# Patient Record
Sex: MALE | Race: WHITE | NOT HISPANIC OR LATINO | URBAN - METROPOLITAN AREA
[De-identification: names, ages, dates, MRNs, and addresses within clinical notes are randomized per-mention and may not be internally consistent; named-entity substitution may affect disease eponyms.]

---

## 2018-04-30 ENCOUNTER — EMERGENCY (EMERGENCY)
Facility: HOSPITAL | Age: 48
LOS: 1 days | End: 2018-04-30
Admitting: EMERGENCY MEDICINE

## 2018-04-30 VITALS
WEIGHT: 214.95 LBS | DIASTOLIC BLOOD PRESSURE: 78 MMHG | RESPIRATION RATE: 18 BRPM | HEART RATE: 100 BPM | SYSTOLIC BLOOD PRESSURE: 119 MMHG | OXYGEN SATURATION: 94 % | TEMPERATURE: 98 F

## 2018-04-30 NOTE — ED ADULT TRIAGE NOTE - CHIEF COMPLAINT QUOTE
pt c/o "MSCW pain that started while bowling about 1 1/2 hrs ago. "+ reproducible pain with inspiration, pt jumping when MSCW touched

## 2018-11-03 ENCOUNTER — EMERGENCY (EMERGENCY)
Facility: HOSPITAL | Age: 48
LOS: 1 days | Discharge: HOME | End: 2018-11-03
Attending: EMERGENCY MEDICINE
Payer: COMMERCIAL

## 2018-11-03 VITALS
TEMPERATURE: 98 F | HEART RATE: 73 BPM | RESPIRATION RATE: 16 BRPM | OXYGEN SATURATION: 96 % | SYSTOLIC BLOOD PRESSURE: 116 MMHG | DIASTOLIC BLOOD PRESSURE: 75 MMHG

## 2018-11-03 VITALS
OXYGEN SATURATION: 97 % | RESPIRATION RATE: 18 BRPM | DIASTOLIC BLOOD PRESSURE: 84 MMHG | SYSTOLIC BLOOD PRESSURE: 134 MMHG | HEART RATE: 87 BPM | TEMPERATURE: 98 F

## 2018-11-03 DIAGNOSIS — S70.11XA CONTUSION OF RIGHT THIGH, INITIAL ENCOUNTER: ICD-10-CM

## 2018-11-03 PROBLEM — E11.9 TYPE 2 DIABETES MELLITUS WITHOUT COMPLICATIONS: Chronic | Status: ACTIVE | Noted: 2018-04-30

## 2018-11-03 LAB
ALBUMIN SERPL ELPH-MCNC: 4.1 G/DL — SIGNIFICANT CHANGE UP (ref 3.3–5)
ALP SERPL-CCNC: 63 U/L — SIGNIFICANT CHANGE UP (ref 40–120)
ALT FLD-CCNC: 22 U/L — SIGNIFICANT CHANGE UP (ref 10–45)
ANION GAP SERPL CALC-SCNC: 10 MMOL/L — SIGNIFICANT CHANGE UP (ref 5–17)
ANION GAP SERPL CALC-SCNC: 11 MMOL/L — SIGNIFICANT CHANGE UP (ref 5–17)
APTT BLD: 27.5 SEC — SIGNIFICANT CHANGE UP (ref 27.5–36.3)
AST SERPL-CCNC: 11 U/L — SIGNIFICANT CHANGE UP (ref 10–40)
BASE EXCESS BLDV CALC-SCNC: -0.2 MMOL/L — SIGNIFICANT CHANGE UP (ref -2–2)
BASOPHILS # BLD AUTO: 0 K/UL — SIGNIFICANT CHANGE UP (ref 0–0.2)
BASOPHILS NFR BLD AUTO: 0.3 % — SIGNIFICANT CHANGE UP (ref 0–2)
BILIRUB SERPL-MCNC: 0.4 MG/DL — SIGNIFICANT CHANGE UP (ref 0.2–1.2)
BUN SERPL-MCNC: 14 MG/DL — SIGNIFICANT CHANGE UP (ref 7–23)
BUN SERPL-MCNC: 17 MG/DL — SIGNIFICANT CHANGE UP (ref 7–23)
CALCIUM SERPL-MCNC: 8.7 MG/DL — SIGNIFICANT CHANGE UP (ref 8.4–10.5)
CALCIUM SERPL-MCNC: 9.2 MG/DL — SIGNIFICANT CHANGE UP (ref 8.4–10.5)
CHLORIDE SERPL-SCNC: 100 MMOL/L — SIGNIFICANT CHANGE UP (ref 96–108)
CHLORIDE SERPL-SCNC: 94 MMOL/L — LOW (ref 96–108)
CK SERPL-CCNC: 105 U/L — SIGNIFICANT CHANGE UP (ref 30–200)
CO2 BLDV-SCNC: 28 MMOL/L — SIGNIFICANT CHANGE UP (ref 22–30)
CO2 SERPL-SCNC: 24 MMOL/L — SIGNIFICANT CHANGE UP (ref 22–31)
CO2 SERPL-SCNC: 24 MMOL/L — SIGNIFICANT CHANGE UP (ref 22–31)
CREAT SERPL-MCNC: 0.64 MG/DL — SIGNIFICANT CHANGE UP (ref 0.5–1.3)
CREAT SERPL-MCNC: 0.73 MG/DL — SIGNIFICANT CHANGE UP (ref 0.5–1.3)
EOSINOPHIL # BLD AUTO: 0.1 K/UL — SIGNIFICANT CHANGE UP (ref 0–0.5)
EOSINOPHIL NFR BLD AUTO: 0.9 % — SIGNIFICANT CHANGE UP (ref 0–6)
GAS PNL BLDV: SIGNIFICANT CHANGE UP
GLUCOSE SERPL-MCNC: 302 MG/DL — HIGH (ref 70–99)
GLUCOSE SERPL-MCNC: 501 MG/DL — CRITICAL HIGH (ref 70–99)
HCO3 BLDV-SCNC: 26 MMOL/L — SIGNIFICANT CHANGE UP (ref 21–29)
HCT VFR BLD CALC: 41.7 % — SIGNIFICANT CHANGE UP (ref 39–50)
HGB BLD-MCNC: 15 G/DL — SIGNIFICANT CHANGE UP (ref 13–17)
INR BLD: 0.89 RATIO — SIGNIFICANT CHANGE UP (ref 0.88–1.16)
LACTATE BLDV-MCNC: 1.7 MMOL/L — SIGNIFICANT CHANGE UP (ref 0.7–2)
LYMPHOCYTES # BLD AUTO: 1.7 K/UL — SIGNIFICANT CHANGE UP (ref 1–3.3)
LYMPHOCYTES # BLD AUTO: 18.2 % — SIGNIFICANT CHANGE UP (ref 13–44)
MAGNESIUM SERPL-MCNC: 2 MG/DL — SIGNIFICANT CHANGE UP (ref 1.6–2.6)
MCHC RBC-ENTMCNC: 31.6 PG — SIGNIFICANT CHANGE UP (ref 27–34)
MCHC RBC-ENTMCNC: 36 GM/DL — SIGNIFICANT CHANGE UP (ref 32–36)
MCV RBC AUTO: 87.8 FL — SIGNIFICANT CHANGE UP (ref 80–100)
MONOCYTES # BLD AUTO: 0.7 K/UL — SIGNIFICANT CHANGE UP (ref 0–0.9)
MONOCYTES NFR BLD AUTO: 7.7 % — SIGNIFICANT CHANGE UP (ref 2–14)
NEUTROPHILS # BLD AUTO: 6.8 K/UL — SIGNIFICANT CHANGE UP (ref 1.8–7.4)
NEUTROPHILS NFR BLD AUTO: 72.9 % — SIGNIFICANT CHANGE UP (ref 43–77)
PCO2 BLDV: 51 MMHG — HIGH (ref 35–50)
PH BLDV: 7.33 — LOW (ref 7.35–7.45)
PHOSPHATE SERPL-MCNC: 3.5 MG/DL — SIGNIFICANT CHANGE UP (ref 2.5–4.5)
PLATELET # BLD AUTO: 153 K/UL — SIGNIFICANT CHANGE UP (ref 150–400)
PO2 BLDV: <50 MMHG — LOW (ref 25–45)
POTASSIUM SERPL-MCNC: 4 MMOL/L — SIGNIFICANT CHANGE UP (ref 3.5–5.3)
POTASSIUM SERPL-MCNC: 4.1 MMOL/L — SIGNIFICANT CHANGE UP (ref 3.5–5.3)
POTASSIUM SERPL-SCNC: 4 MMOL/L — SIGNIFICANT CHANGE UP (ref 3.5–5.3)
POTASSIUM SERPL-SCNC: 4.1 MMOL/L — SIGNIFICANT CHANGE UP (ref 3.5–5.3)
PROT SERPL-MCNC: 6.7 G/DL — SIGNIFICANT CHANGE UP (ref 6–8.3)
PROTHROM AB SERPL-ACNC: 10.2 SEC — SIGNIFICANT CHANGE UP (ref 10–12.9)
RBC # BLD: 4.75 M/UL — SIGNIFICANT CHANGE UP (ref 4.2–5.8)
RBC # FLD: 12.2 % — SIGNIFICANT CHANGE UP (ref 10.3–14.5)
SAO2 % BLDV: 47 % — LOW (ref 67–88)
SODIUM SERPL-SCNC: 129 MMOL/L — LOW (ref 135–145)
SODIUM SERPL-SCNC: 134 MMOL/L — LOW (ref 135–145)
WBC # BLD: 9.3 K/UL — SIGNIFICANT CHANGE UP (ref 3.8–10.5)
WBC # FLD AUTO: 9.3 K/UL — SIGNIFICANT CHANGE UP (ref 3.8–10.5)

## 2018-11-03 PROCEDURE — 82962 GLUCOSE BLOOD TEST: CPT

## 2018-11-03 PROCEDURE — 99284 EMERGENCY DEPT VISIT MOD MDM: CPT | Mod: 25

## 2018-11-03 PROCEDURE — 83735 ASSAY OF MAGNESIUM: CPT

## 2018-11-03 PROCEDURE — 82550 ASSAY OF CK (CPK): CPT

## 2018-11-03 PROCEDURE — 84100 ASSAY OF PHOSPHORUS: CPT

## 2018-11-03 PROCEDURE — 96376 TX/PRO/DX INJ SAME DRUG ADON: CPT | Mod: XU

## 2018-11-03 PROCEDURE — 85610 PROTHROMBIN TIME: CPT

## 2018-11-03 PROCEDURE — 96372 THER/PROPH/DIAG INJ SC/IM: CPT | Mod: XU

## 2018-11-03 PROCEDURE — 84550 ASSAY OF BLOOD/URIC ACID: CPT

## 2018-11-03 PROCEDURE — 85027 COMPLETE CBC AUTOMATED: CPT

## 2018-11-03 PROCEDURE — 82803 BLOOD GASES ANY COMBINATION: CPT

## 2018-11-03 PROCEDURE — 99285 EMERGENCY DEPT VISIT HI MDM: CPT | Mod: 25

## 2018-11-03 PROCEDURE — 80048 BASIC METABOLIC PNL TOTAL CA: CPT

## 2018-11-03 PROCEDURE — 96374 THER/PROPH/DIAG INJ IV PUSH: CPT | Mod: XU

## 2018-11-03 PROCEDURE — 83605 ASSAY OF LACTIC ACID: CPT

## 2018-11-03 PROCEDURE — 73701 CT LOWER EXTREMITY W/DYE: CPT | Mod: 26,RT

## 2018-11-03 PROCEDURE — 80053 COMPREHEN METABOLIC PANEL: CPT

## 2018-11-03 PROCEDURE — 85730 THROMBOPLASTIN TIME PARTIAL: CPT

## 2018-11-03 PROCEDURE — 73701 CT LOWER EXTREMITY W/DYE: CPT

## 2018-11-03 PROCEDURE — 99283 EMERGENCY DEPT VISIT LOW MDM: CPT | Mod: GC

## 2018-11-03 RX ORDER — SODIUM CHLORIDE 9 MG/ML
1000 INJECTION INTRAMUSCULAR; INTRAVENOUS; SUBCUTANEOUS ONCE
Qty: 0 | Refills: 0 | Status: COMPLETED | OUTPATIENT
Start: 2018-11-03 | End: 2018-11-03

## 2018-11-03 RX ORDER — HYDROMORPHONE HYDROCHLORIDE 2 MG/ML
1 INJECTION INTRAMUSCULAR; INTRAVENOUS; SUBCUTANEOUS ONCE
Qty: 0 | Refills: 0 | Status: DISCONTINUED | OUTPATIENT
Start: 2018-11-03 | End: 2018-11-03

## 2018-11-03 RX ORDER — INSULIN LISPRO 100/ML
10 VIAL (ML) SUBCUTANEOUS ONCE
Qty: 0 | Refills: 0 | Status: COMPLETED | OUTPATIENT
Start: 2018-11-03 | End: 2018-11-03

## 2018-11-03 RX ADMIN — HYDROMORPHONE HYDROCHLORIDE 1 MILLIGRAM(S): 2 INJECTION INTRAMUSCULAR; INTRAVENOUS; SUBCUTANEOUS at 05:51

## 2018-11-03 RX ADMIN — SODIUM CHLORIDE 1000 MILLILITER(S): 9 INJECTION INTRAMUSCULAR; INTRAVENOUS; SUBCUTANEOUS at 05:42

## 2018-11-03 RX ADMIN — HYDROMORPHONE HYDROCHLORIDE 1 MILLIGRAM(S): 2 INJECTION INTRAMUSCULAR; INTRAVENOUS; SUBCUTANEOUS at 03:15

## 2018-11-03 RX ADMIN — Medication 10 UNIT(S): at 05:43

## 2018-11-03 NOTE — ED PROVIDER NOTE - PLAN OF CARE
w/u for right thigh hematoma vs myositis vs compartment syndrome vs dvt, obtain CT RLE with contrast, cbc, bmp, coags, ck, pain control and reassess You were seen in the emergency department for thigh pain. You had a CT scan that showed bleeding in your muscle. Please follow up with an orthopedist ([phone]) in the next 2-3 days. Take tylenol 650 milligrams every 6 hrs as needed for pain. Return to the emergency department immediately if you experience You were seen in the emergency department for thigh pain. You had a CT scan that showed bleeding in your muscle. Please follow up with an orthopedist ([phone]) in the next 2-3 days. Take tylenol 650 milligrams every 6 hrs as needed for pain. Return to the emergency department immediately if you experience increasing pain or swelling, numbness, changes in skin color or any other concerning symptoms.

## 2018-11-03 NOTE — CONSULT NOTE ADULT - ASSESSMENT
Jose Villegas is a 48 y.o. man with history of DM and CAD s/p stenting (2010, ASA + Plavix) who presented to the ED with 12 hours of sudden-onset R thigh pain. Found to have an area of heterogeneity that could be intramuscular hematoma versus neoplastic. On physical exam, no signs of compartment syndrome.    Plan:  - No vascular surgery intervention indicated at this time  - Given CT scan reading, recommend Ortho evaluation of leg    Jerman Gardner, PGY2  [phone] Jose Villegas is a 48 y.o. man with history of DM and CAD s/p stenting (2010, ASA + Plavix) who presented to the ED with 12 hours of sudden-onset R thigh pain. Found to have an area of heterogeneity that could be intramuscular hematoma versus neoplastic. On physical exam, no signs of compartment syndrome.    Plan:  - No vascular surgery intervention indicated at this time  no evidence of compartment syndrome  - Given CT scan reading, recommend Ortho evaluation of leg    Jerman Gardner, PGY2  [phone]

## 2018-11-03 NOTE — ED PROVIDER NOTE - CARE PLAN
Principal Discharge DX:	Right thigh pain  Assessment and plan of treatment:	w/u for right thigh hematoma vs myositis vs compartment syndrome vs dvt, obtain CT RLE with contrast, cbc, bmp, coags, ck, pain control and reassess Principal Discharge DX:	Hematoma of muscle  Goal:	You were seen in the emergency department for thigh pain. You had a CT scan that showed bleeding in your muscle. Please follow up with an orthopedist ([phone]) in the next 2-3 days. Take tylenol 650 milligrams every 6 hrs as needed for pain. Return to the emergency department immediately if you experience  Assessment and plan of treatment:	w/u for right thigh hematoma vs myositis vs compartment syndrome vs dvt, obtain CT RLE with contrast, cbc, bmp, coags, ck, pain control and reassess Principal Discharge DX:	Hematoma of muscle  Goal:	You were seen in the emergency department for thigh pain. You had a CT scan that showed bleeding in your muscle. Please follow up with an orthopedist ([phone]) in the next 2-3 days. Take tylenol 650 milligrams every 6 hrs as needed for pain. Return to the emergency department immediately if you experience increasing pain or swelling, numbness, changes in skin color or any other concerning symptoms.  Assessment and plan of treatment:	w/u for right thigh hematoma vs myositis vs compartment syndrome vs dvt, obtain CT RLE with contrast, cbc, bmp, coags, ck, pain control and reassess Principal Discharge DX:	Hematoma of muscle  Goal:	You were seen in the emergency department for thigh pain. You had a CT scan that showed bleeding in your muscle. Please follow up with an orthopedist ([phone]) in the next 2-3 days. Take tylenol 650 milligrams every 6 hrs as needed for pain. Return to the emergency department immediately if you experience increasing pain or swelling, numbness, changes in skin color or any other concerning symptoms.  Assessment and plan of treatment:	w/u for right thigh hematoma vs myositis vs compartment syndrome vs dvt, obtain CT RLE with contrast, cbc, bmp, coags, ck, pain control and reassess  Secondary Diagnosis:	Hyperglycemia

## 2018-11-03 NOTE — ED ADULT NURSE REASSESSMENT NOTE - NS ED NURSE REASSESS COMMENT FT1
Received report from eliana HERRMANN . patient had a fall on wednesday onto right thigh, states he has been able to ambulate at home with pain.   patient states pain is a 7/10 after pain medication. Patient's pulses are equal b/l and strong pedal pulses. patient is strong in the left lower extremity, able to move right lower extremity but states too painful to raise. lung sounds clear, heart sounds within normal limits, right thigh is warm and tight to the touch, sensation and circulation intact b/l. patient in no acute distress.  educated patient on diabetes management, and how important it is to stay compliant with diabetes medication, patient verbalizes understanding .  patient pending surgery consult at this time.

## 2018-11-03 NOTE — ED PROVIDER NOTE - ATTENDING CONTRIBUTION TO CARE
48 yom pmhx cad on plavix, dm on insulin, presents w acute onset of right thigh pain. states was bowling one week ago and accidentally fell landing on right hip, however was pain free in the days following. tonight, was running around playing w kids and had sudden onset right lateral thigh pain. no similar sxs. pain severe, 10/10. no numbness/ tingling. no recent immob or travel.     ROS:   constitutional - no fever, no chills  eyes - no visual changes, no redness  eent - no sore throat, no nasal congestion  cvs - no chest pain, no leg swelling  resp - no shortness of breath, no cough  gi - no abdominal pain, no vomiting, no diarrhea  gu - no dysuria, no hematuria  msk - no acute back pain, no joint swelling, + thigh pain   skin - no rashes, no jaundice  neuro - no headache, no focal weakness  psych - no acute mental health issue     Physical Exam:   constitutional - well appearing, awake and alert, oriented x3  head - no external evidence of trauma  cvs - rrr, no murmurs, no peripheral edema  resp - breath sounds clear and equal bilat  gi - abdomen soft and nontender, no rigidity, guarding or rebound, bowel sounds present  msk - moving all extremities spontaneously. right thigh quad area firm and very tender to touch, noticably more swollen than left. distal pulses / sensation/ motor are intact. no color change. no induration or crepitus.   neuro - alert and oriented x3, no focal deficits, CNs 2-12 grossly intact  skin- no jaundice, warm and dry  psych - mood and affect wnl, no apparent risk to self or others     focal tenderness to quad muscle area - ? intramuscular hematoma, compartments syndrome. distal pulses intact and symmetric making acute arterial embolus less likely. no risk factors for dvt.   ct showing intramuscular hematoma, perhaps due to a focal muscle tear on plavix while running today.   pt continues to have signif pain, vasc surg consulted to r/o compartment syndrome.   endorsed to Dr de la cruz pending surg final recs. on reassessment, leg less tender, compartments were softer prior to endorsement. MARIA A Ko MD

## 2018-11-03 NOTE — ED PROVIDER NOTE - PHYSICAL EXAMINATION
GENERAL: NAD  HEENT: EOMI, MMM, no oropharyngeal lesions or erythema appreciated  Pulm: normal work of breathing, CTABL  CV: RRR, S1&S2+,   ABDOMEN: soft, nt, nd, no hepatosplenomegaly  MSK: dec rom of RLE 2/2 pain, right thigh appears to be swollen, tense. no skin break down, erythema, ecchymosis, +2 DP pulse on right   EXTREMITIES:  no appreciable edema in b/l LE  Neuro: A&Ox3, no focal deficits  SKIN: warm and dry, no visible rash

## 2018-11-03 NOTE — ED ADULT NURSE NOTE - INTERVENTIONS DEFINITIONS
Physically safe environment: no spills, clutter or unnecessary equipment/Provide visual clues: red socks/Review medications for side effects contributing to fall risk/Monitor for mental status changes and reorient to person, place, and time/Reinforce activity limits and safety measures with patient and family

## 2018-11-03 NOTE — CONSULT NOTE ADULT - SUBJECTIVE AND OBJECTIVE BOX
General Surgery Consult  Consulting surgical team: Vascular  Consulting attending: Soraida    HPI: Jose Villegas is a 48 y.o. man with history of DM and CAD s/p stenting (2010, ASA + Plavix) who presented to the ED with 12 hours of sudden-onset R thigh pain. The patient states that he was sitting in a recliner last night and when he extended the foot rest, his thigh began to hurt and began to swell. When the patient did not improve over the next several hours, he came to the ED. The patient states that the pain was severe until he was given pain medication. The patient denies any associated weakness, numbness, or tingling in the right leg or foot.     The patient states that approximately 10 days ago, he was bowling and fell on his right leg. The patient denies any other history of trauma.       PAST MEDICAL HISTORY:  Diabetes  CAD (coronary artery disease)      PAST SURGICAL HISTORY:  No significant past surgical history      MEDICATIONS:      ALLERGIES:  No Known Allergies      VITALS & I/Os:  Vital Signs Last 24 Hrs  T(C): 36.6 (03 Nov 2018 07:37), Max: 36.7 (03 Nov 2018 02:54)  T(F): 97.8 (03 Nov 2018 07:37), Max: 98.1 (03 Nov 2018 02:54)  HR: 73 (03 Nov 2018 07:37) (71 - 87)  BP: 116/75 (03 Nov 2018 07:37) (115/76 - 134/84)  BP(mean): --  RR: 16 (03 Nov 2018 07:37) (16 - 18)  SpO2: 96% (03 Nov 2018 07:37) (96% - 97%)    I&O's Summary      PHYSICAL EXAM:  General: No acute distress  Respiratory: Nonlabored  Cardiovascular: normotensive, regular rate   Extremities: Warm, well perfused, R thigh swollen, soft compartments  Vascular: palpable DP + PT B/l    LABS:                        15.0   9.3   )-----------( 153      ( 03 Nov 2018 03:24 )             41.7     11-03    134<L>  |  100  |  14  ----------------------------<  302<H>  4.0   |  24  |  0.64    Ca    8.7      03 Nov 2018 06:53  Phos  3.5     11-03  Mg     2.0     11-03    TPro  6.7  /  Alb  4.1  /  TBili  0.4  /  DBili  x   /  AST  11  /  ALT  22  /  AlkPhos  63  11-03    Lactate:  11-03 @ 04:01  1.7    PT/INR - ( 03 Nov 2018 03:24 )   PT: 10.2 sec;   INR: 0.89 ratio         PTT - ( 03 Nov 2018 03:24 )  PTT:27.5 sec    CARDIAC MARKERS ( 03 Nov 2018 03:24 )  x     / x     / 105 U/L / x     / x        IMAGING:  < from: CT Lower Extremity w/ IV Cont, Right (11.03.18 @ 04:59) >  There is heterogeneity and loss of the normal muscular fascial/fat planes   as well as bulging contour of the distal vastus intermedius muscle. There   is no joint effusion. No osseous susceptibility is seen.    IMPRESSION:   Findings compatible with intramuscular hematoma versus neoplastic   etiology. If a neoplastic etiology is consistent with the patient's   presentation, MRI can be obtained for further characterization.

## 2018-11-03 NOTE — ED PROVIDER NOTE - OBJECTIVE STATEMENT
48M h/o T2DM, CAD s/p stents, p/w worsening right thigh pain. Pain described as constant, sharp pain, not relieved by advil, worse with physical exertion, ROM. No recent trauma. No recent skin break down. No recent immobilization, LE swelling. Injects diabetic meds in abdomen. No fever/chills, abn pain,  Cp, SOB, N/V/D/C.

## 2018-11-03 NOTE — ED PROVIDER NOTE - NS ED ROS FT
REVIEW OF SYSTEMS:    CONSTITUTIONAL: No weakness, fevers or chills  EYES/ENT: No visual changes;  no throat pain   NECK: No pain or stiffness  RESPIRATORY: No cough, wheezing, hemoptysis; No shortness of breath  CARDIOVASCULAR: No chest pain or palpitations  GASTROINTESTINAL: No abdominal or epigastric pain. No nausea, vomiting, or hematemesis; No diarrhea or constipation. No melena or hematochezia.  GENITOURINARY: No dysuria, change in frequency or hematuria  NEUROLOGICAL: No numbness or weakness  MSK: (+) RLE pain   SKIN: No itching, burning, rashes, or lesions   All other review of systems is negative unless indicated above.

## 2018-11-03 NOTE — ED PROVIDER NOTE - PROGRESS NOTE DETAILS
Elizabeth Goldberger PGY-2: pt signed out to me pending surg c/s. On my exam, pt has swelling of anterolateral right thigh w increased firmness of area relative to remainder however still soft, full pulses, neuro intact - low susp for compartment syndrome by current exam Elizabeth Goldberger PGY-2: pt seen by surg d/w attg, did not suspect compt syndrome. Then seen by vasc fellow who agree. Recommend possible ortho eval as read said hematoma vs malig lesion, however given hx hematoma much more likely. Pt can follow w ortho as outpt Elizabeth Goldberger PGY-2: pt seen by surg d/w attg, did not suspect compt syndrome. Then seen by vasc fellow who agree. Recommend possible ortho eval as read said hematoma vs malig lesion, however given hx hematoma much more likely. Pt can follow w ortho as outpt. Pt feeling better w controlled pain and soft compartments

## 2018-11-03 NOTE — ED ADULT NURSE NOTE - OBJECTIVE STATEMENT
49 y/o male with PMH diabetes, CAD s/p stent placement on plavix, current smoker reports having right leg pain. Patient reports falling while bowling 9 days ago landing on right side. Reports not having pain until today after injecting insulin. Patient reports taking aspirin and advil without relief today, able to ambulate but worsening pain and limiting range of motion to affected leg. Patient right leg swollen compared to left with bruising noted to right knee. Strong pedal pulses noted BL, patient reports decreases sensation to right leg. Patient reports having family history of blood clots. Denies shortness of breath, chest pain, palpitations, abdomen pain, n/v/d. 49 y/o male with PMH diabetes, CAD s/p stent placement on plavix, current smoker reports having right leg pain. Patient reports falling while bowling 9 days ago landing on right side. Reports not having pain until today after injecting insulin. Patient reports taking aspirin and advil without relief today, able to ambulate but worsening pain and limiting range of motion to affected leg. Patient right leg swollen compared to left with bruising noted to right knee. Strong pedal pulses noted BL, patient reports decreases sensation to right leg. Patient reports having family history of blood clots. Denies shortness of breath, chest pain, palpitations, abdomen pain, n/v/d. Patient reports he has not been taking diabetes medications, has felt increased thirst and urinating more.

## 2020-04-14 ENCOUNTER — TRANSCRIPTION ENCOUNTER (OUTPATIENT)
Age: 50
End: 2020-04-14

## 2020-04-14 ENCOUNTER — INPATIENT (INPATIENT)
Facility: HOSPITAL | Age: 50
LOS: 5 days | Discharge: HOME | DRG: 239 | End: 2020-04-20
Attending: SURGERY | Admitting: SURGERY
Payer: COMMERCIAL

## 2020-04-14 VITALS
DIASTOLIC BLOOD PRESSURE: 81 MMHG | RESPIRATION RATE: 18 BRPM | HEART RATE: 117 BPM | TEMPERATURE: 100 F | OXYGEN SATURATION: 96 % | SYSTOLIC BLOOD PRESSURE: 137 MMHG | HEIGHT: 73 IN | WEIGHT: 205.03 LBS

## 2020-04-14 DIAGNOSIS — M86.9 OSTEOMYELITIS, UNSPECIFIED: ICD-10-CM

## 2020-04-14 LAB
ALBUMIN SERPL ELPH-MCNC: 3.9 G/DL — SIGNIFICANT CHANGE UP (ref 3.3–5)
ALP SERPL-CCNC: 52 U/L — SIGNIFICANT CHANGE UP (ref 40–120)
ALT FLD-CCNC: 11 U/L — SIGNIFICANT CHANGE UP (ref 10–45)
ANION GAP SERPL CALC-SCNC: 16 MMOL/L — SIGNIFICANT CHANGE UP (ref 5–17)
APTT BLD: 30.3 SEC — SIGNIFICANT CHANGE UP (ref 27.5–36.3)
AST SERPL-CCNC: 9 U/L — LOW (ref 10–40)
BASE EXCESS BLDV CALC-SCNC: 2.5 MMOL/L — HIGH (ref -2–2)
BASOPHILS # BLD AUTO: 0.02 K/UL — SIGNIFICANT CHANGE UP (ref 0–0.2)
BASOPHILS NFR BLD AUTO: 0.2 % — SIGNIFICANT CHANGE UP (ref 0–2)
BILIRUB SERPL-MCNC: 0.8 MG/DL — SIGNIFICANT CHANGE UP (ref 0.2–1.2)
BUN SERPL-MCNC: 15 MG/DL — SIGNIFICANT CHANGE UP (ref 7–23)
CA-I SERPL-SCNC: 1.17 MMOL/L — SIGNIFICANT CHANGE UP (ref 1.12–1.3)
CALCIUM SERPL-MCNC: 9.5 MG/DL — SIGNIFICANT CHANGE UP (ref 8.4–10.5)
CHLORIDE BLDV-SCNC: 96 MMOL/L — SIGNIFICANT CHANGE UP (ref 96–108)
CHLORIDE SERPL-SCNC: 92 MMOL/L — LOW (ref 96–108)
CO2 BLDV-SCNC: 29 MMOL/L — SIGNIFICANT CHANGE UP (ref 22–30)
CO2 SERPL-SCNC: 25 MMOL/L — SIGNIFICANT CHANGE UP (ref 22–31)
CREAT SERPL-MCNC: 0.9 MG/DL — SIGNIFICANT CHANGE UP (ref 0.5–1.3)
EOSINOPHIL # BLD AUTO: 0 K/UL — SIGNIFICANT CHANGE UP (ref 0–0.5)
EOSINOPHIL NFR BLD AUTO: 0 % — SIGNIFICANT CHANGE UP (ref 0–6)
ERYTHROCYTE [SEDIMENTATION RATE] IN BLOOD: 105 MM/HR — HIGH (ref 0–15)
GAS PNL BLDV: 133 MMOL/L — LOW (ref 135–145)
GAS PNL BLDV: SIGNIFICANT CHANGE UP
GAS PNL BLDV: SIGNIFICANT CHANGE UP
GLUCOSE BLDV-MCNC: 248 MG/DL — HIGH (ref 70–99)
GLUCOSE SERPL-MCNC: 258 MG/DL — HIGH (ref 70–99)
HCO3 BLDV-SCNC: 27 MMOL/L — SIGNIFICANT CHANGE UP (ref 21–29)
HCT VFR BLD CALC: 42.4 % — SIGNIFICANT CHANGE UP (ref 39–50)
HCT VFR BLDA CALC: 46 % — SIGNIFICANT CHANGE UP (ref 39–50)
HGB BLD CALC-MCNC: 15 G/DL — SIGNIFICANT CHANGE UP (ref 13–17)
HGB BLD-MCNC: 14 G/DL — SIGNIFICANT CHANGE UP (ref 13–17)
IMM GRANULOCYTES NFR BLD AUTO: 0.5 % — SIGNIFICANT CHANGE UP (ref 0–1.5)
INR BLD: 1.28 RATIO — HIGH (ref 0.88–1.16)
LACTATE BLDV-MCNC: 1.6 MMOL/L — SIGNIFICANT CHANGE UP (ref 0.7–2)
LYMPHOCYTES # BLD AUTO: 0.87 K/UL — LOW (ref 1–3.3)
LYMPHOCYTES # BLD AUTO: 7 % — LOW (ref 13–44)
MCHC RBC-ENTMCNC: 29.6 PG — SIGNIFICANT CHANGE UP (ref 27–34)
MCHC RBC-ENTMCNC: 33 GM/DL — SIGNIFICANT CHANGE UP (ref 32–36)
MCV RBC AUTO: 89.6 FL — SIGNIFICANT CHANGE UP (ref 80–100)
MONOCYTES # BLD AUTO: 1.27 K/UL — HIGH (ref 0–0.9)
MONOCYTES NFR BLD AUTO: 10.3 % — SIGNIFICANT CHANGE UP (ref 2–14)
NEUTROPHILS # BLD AUTO: 10.16 K/UL — HIGH (ref 1.8–7.4)
NEUTROPHILS NFR BLD AUTO: 82 % — HIGH (ref 43–77)
NRBC # BLD: 0 /100 WBCS — SIGNIFICANT CHANGE UP (ref 0–0)
PCO2 BLDV: 46 MMHG — SIGNIFICANT CHANGE UP (ref 35–50)
PH BLDV: 7.4 — SIGNIFICANT CHANGE UP (ref 7.35–7.45)
PLATELET # BLD AUTO: 161 K/UL — SIGNIFICANT CHANGE UP (ref 150–400)
PO2 BLDV: 26 MMHG — SIGNIFICANT CHANGE UP (ref 25–45)
POTASSIUM BLDV-SCNC: 4.1 MMOL/L — SIGNIFICANT CHANGE UP (ref 3.5–5.3)
POTASSIUM SERPL-MCNC: 4.2 MMOL/L — SIGNIFICANT CHANGE UP (ref 3.5–5.3)
POTASSIUM SERPL-SCNC: 4.2 MMOL/L — SIGNIFICANT CHANGE UP (ref 3.5–5.3)
PROT SERPL-MCNC: 7.7 G/DL — SIGNIFICANT CHANGE UP (ref 6–8.3)
PROTHROM AB SERPL-ACNC: 14.8 SEC — HIGH (ref 10–12.9)
RBC # BLD: 4.73 M/UL — SIGNIFICANT CHANGE UP (ref 4.2–5.8)
RBC # FLD: 11.7 % — SIGNIFICANT CHANGE UP (ref 10.3–14.5)
SAO2 % BLDV: 42 % — LOW (ref 67–88)
SARS-COV-2 RNA SPEC QL NAA+PROBE: SIGNIFICANT CHANGE UP
SODIUM SERPL-SCNC: 133 MMOL/L — LOW (ref 135–145)
WBC # BLD: 12.38 K/UL — HIGH (ref 3.8–10.5)
WBC # FLD AUTO: 12.38 K/UL — HIGH (ref 3.8–10.5)

## 2020-04-14 PROCEDURE — 99285 EMERGENCY DEPT VISIT HI MDM: CPT

## 2020-04-14 PROCEDURE — 73630 X-RAY EXAM OF FOOT: CPT | Mod: 26,RT

## 2020-04-14 PROCEDURE — 71045 X-RAY EXAM CHEST 1 VIEW: CPT | Mod: 26

## 2020-04-14 RX ORDER — LISINOPRIL 2.5 MG/1
20 TABLET ORAL DAILY
Refills: 0 | Status: DISCONTINUED | OUTPATIENT
Start: 2020-04-14 | End: 2020-04-20

## 2020-04-14 RX ORDER — DEXTROSE 50 % IN WATER 50 %
25 SYRINGE (ML) INTRAVENOUS ONCE
Refills: 0 | Status: DISCONTINUED | OUTPATIENT
Start: 2020-04-14 | End: 2020-04-20

## 2020-04-14 RX ORDER — INSULIN LISPRO 100/ML
VIAL (ML) SUBCUTANEOUS AT BEDTIME
Refills: 0 | Status: DISCONTINUED | OUTPATIENT
Start: 2020-04-14 | End: 2020-04-15

## 2020-04-14 RX ORDER — CLOPIDOGREL BISULFATE 75 MG/1
75 TABLET, FILM COATED ORAL DAILY
Refills: 0 | Status: DISCONTINUED | OUTPATIENT
Start: 2020-04-14 | End: 2020-04-14

## 2020-04-14 RX ORDER — SODIUM CHLORIDE 9 MG/ML
1000 INJECTION, SOLUTION INTRAVENOUS
Refills: 0 | Status: DISCONTINUED | OUTPATIENT
Start: 2020-04-14 | End: 2020-04-20

## 2020-04-14 RX ORDER — ENOXAPARIN SODIUM 100 MG/ML
40 INJECTION SUBCUTANEOUS DAILY
Refills: 0 | Status: DISCONTINUED | OUTPATIENT
Start: 2020-04-14 | End: 2020-04-20

## 2020-04-14 RX ORDER — SODIUM CHLORIDE 9 MG/ML
1000 INJECTION, SOLUTION INTRAVENOUS
Refills: 0 | Status: DISCONTINUED | OUTPATIENT
Start: 2020-04-14 | End: 2020-04-16

## 2020-04-14 RX ORDER — DEXTROSE 50 % IN WATER 50 %
15 SYRINGE (ML) INTRAVENOUS ONCE
Refills: 0 | Status: DISCONTINUED | OUTPATIENT
Start: 2020-04-14 | End: 2020-04-20

## 2020-04-14 RX ORDER — ASPIRIN/CALCIUM CARB/MAGNESIUM 324 MG
325 TABLET ORAL DAILY
Refills: 0 | Status: DISCONTINUED | OUTPATIENT
Start: 2020-04-14 | End: 2020-04-20

## 2020-04-14 RX ORDER — PIPERACILLIN AND TAZOBACTAM 4; .5 G/20ML; G/20ML
3.38 INJECTION, POWDER, LYOPHILIZED, FOR SOLUTION INTRAVENOUS ONCE
Refills: 0 | Status: COMPLETED | OUTPATIENT
Start: 2020-04-14 | End: 2020-04-14

## 2020-04-14 RX ORDER — GLUCAGON INJECTION, SOLUTION 0.5 MG/.1ML
1 INJECTION, SOLUTION SUBCUTANEOUS ONCE
Refills: 0 | Status: DISCONTINUED | OUTPATIENT
Start: 2020-04-14 | End: 2020-04-20

## 2020-04-14 RX ORDER — VANCOMYCIN HCL 1 G
1000 VIAL (EA) INTRAVENOUS EVERY 12 HOURS
Refills: 0 | Status: DISCONTINUED | OUTPATIENT
Start: 2020-04-14 | End: 2020-04-16

## 2020-04-14 RX ORDER — LOSARTAN POTASSIUM 100 MG/1
50 TABLET, FILM COATED ORAL DAILY
Refills: 0 | Status: DISCONTINUED | OUTPATIENT
Start: 2020-04-14 | End: 2020-04-20

## 2020-04-14 RX ORDER — OXYCODONE HYDROCHLORIDE 5 MG/1
5 TABLET ORAL EVERY 4 HOURS
Refills: 0 | Status: DISCONTINUED | OUTPATIENT
Start: 2020-04-14 | End: 2020-04-20

## 2020-04-14 RX ORDER — INSULIN LISPRO 100/ML
VIAL (ML) SUBCUTANEOUS
Refills: 0 | Status: DISCONTINUED | OUTPATIENT
Start: 2020-04-14 | End: 2020-04-15

## 2020-04-14 RX ORDER — ACETAMINOPHEN 500 MG
975 TABLET ORAL ONCE
Refills: 0 | Status: COMPLETED | OUTPATIENT
Start: 2020-04-14 | End: 2020-04-14

## 2020-04-14 RX ORDER — HEPARIN SODIUM 5000 [USP'U]/ML
5000 INJECTION INTRAVENOUS; SUBCUTANEOUS EVERY 8 HOURS
Refills: 0 | Status: DISCONTINUED | OUTPATIENT
Start: 2020-04-14 | End: 2020-04-14

## 2020-04-14 RX ORDER — DEXTROSE 50 % IN WATER 50 %
12.5 SYRINGE (ML) INTRAVENOUS ONCE
Refills: 0 | Status: DISCONTINUED | OUTPATIENT
Start: 2020-04-14 | End: 2020-04-20

## 2020-04-14 RX ORDER — OXYCODONE HYDROCHLORIDE 5 MG/1
10 TABLET ORAL EVERY 4 HOURS
Refills: 0 | Status: DISCONTINUED | OUTPATIENT
Start: 2020-04-14 | End: 2020-04-20

## 2020-04-14 RX ORDER — PIPERACILLIN AND TAZOBACTAM 4; .5 G/20ML; G/20ML
3.38 INJECTION, POWDER, LYOPHILIZED, FOR SOLUTION INTRAVENOUS ONCE
Refills: 0 | Status: COMPLETED | OUTPATIENT
Start: 2020-04-14 | End: 2020-04-15

## 2020-04-14 RX ORDER — ACETAMINOPHEN 500 MG
650 TABLET ORAL EVERY 6 HOURS
Refills: 0 | Status: DISCONTINUED | OUTPATIENT
Start: 2020-04-14 | End: 2020-04-16

## 2020-04-14 RX ORDER — VANCOMYCIN HCL 1 G
1000 VIAL (EA) INTRAVENOUS ONCE
Refills: 0 | Status: COMPLETED | OUTPATIENT
Start: 2020-04-14 | End: 2020-04-14

## 2020-04-14 RX ADMIN — Medication 975 MILLIGRAM(S): at 18:06

## 2020-04-14 RX ADMIN — Medication 0.5 MILLIGRAM(S): at 23:59

## 2020-04-14 RX ADMIN — Medication 250 MILLIGRAM(S): at 18:46

## 2020-04-14 RX ADMIN — PIPERACILLIN AND TAZOBACTAM 200 GRAM(S): 4; .5 INJECTION, POWDER, LYOPHILIZED, FOR SOLUTION INTRAVENOUS at 23:59

## 2020-04-14 NOTE — H&P ADULT - NSHPPHYSICALEXAM_GEN_ALL_CORE
T(C): 38.1 (04-14-20 @ 18:50), Max: 39.5 (04-14-20 @ 18:07)  HR: 103 (04-14-20 @ 20:00) (103 - 117)  BP: 118/64 (04-14-20 @ 20:00) (114/66 - 137/81)  BP(mean): --  ABP: --  ABP(mean): --  RR: 20 (04-14-20 @ 20:00) (18 - 20)  SpO2: 96% (04-14-20 @ 20:00) (93% - 96%)  Wt(kg): --  CVP(mm Hg): --  CI: --  CAPILLARY BLOOD GLUCOSE      General: NAD  Respiratory: respirations unlabored  CVS: regular rate and rhythm  Abdomen: soft, nontender, nondistended  Extremities: right distal LE edema and erythema, ecchymosis overlying dorsal aspect of 3rd and 4th digits and interdigital webbed space, palpable PT pulses, DP non-palpable  Skin: warm T(C): 38.1 (04-14-20 @ 18:50), Max: 39.5 (04-14-20 @ 18:07)  HR: 103 (04-14-20 @ 20:00) (103 - 117)  BP: 118/64 (04-14-20 @ 20:00) (114/66 - 137/81)  BP(mean): --  ABP: --  ABP(mean): --  RR: 20 (04-14-20 @ 20:00) (18 - 20)  SpO2: 96% (04-14-20 @ 20:00) (93% - 96%)  Wt(kg): --  CVP(mm Hg): --  CI: --  CAPILLARY BLOOD GLUCOSE      General: NAD  Respiratory: respirations unlabored  CVS: regular rate and rhythm  Abdomen: soft, nontender, nondistended  Extremities: right distal LE edema and erythema, ecchymosis overlying dorsal aspect of 3rd and 4th digits and interdigital webbed space, non palp PT pulses, but has PT signals, no DP signals   Skin: warm

## 2020-04-14 NOTE — H&P ADULT - NSHPLABSRESULTS_GEN_ALL_CORE
CBC (04-14 @ 18:22)                          14.0                     12.38<H>  )--------------(  161        82.0<H>% Neuts, 7.0<L>% Lymphs, ANC: 10.16<H>                          42.4      BMP (04-14 @ 18:22)       133<L>  |  92<L>   |  15    			Ca++ --      Ca 9.5          ---------------------------------( 258<H>		Mg --           4.2     |  25      |  0.90  			Ph --        LFTs (04-14 @ 18:22)      TPro 7.7 / Alb 3.9 / TBili 0.8 / DBili -- / AST 9<L> / ALT 11 / AlkPhos 52    Coags (04-14 @ 18:22)  aPTT 30.3 / INR 1.28<H> / PT 14.8<H>        VBG (04-14 @ 18:22)     7.40 / 46 / 26 / 27 / 2.5<H> / 42<L>%      Lactate: 1.6      ******PRELIMINARY REPORT******    ******PRELIMINARY REPORT******          EXAM:  FOOT COMPLETE RIGHT (MIN 3 VIEW)                        PROCEDURE DATE:  04/14/2020      ******PRELIMINARY REPORT******    ******PRELIMINARY REPORT******          INTERPRETATION:  No cortical erosions or periosteal reaction to suggest osteomyelitis. There is subcutaneous emphysema within the MTP interdigital webspaces.      ******PRELIMINARY REPORT******    ******PRELIMINARY REPORT******          SHAKEEL AGOSTO M.D., RADIOLOGY RESIDENT

## 2020-04-14 NOTE — ED PROVIDER NOTE - PHYSICAL EXAMINATION
GEN:  Nontoxic, NAD  HEENT: NC/AT, Symm Facies.   CV:  +S1S2, RRR w/o m/g/r  RESP: CTAB w/o w/r/r  ABD: Soft, nt/nd  EXT/MSK: No lower extremity edema or calf tenderness. FROMx4  SKIN: Right foot edematous and erythematous. Sensation intact. +Limited ROM of ankle and toes although limited 2/2 swelling. Bruising to dorsal aspect of right toes 3-5. Quarter sized ulceration to the base of right foot, without discharge at this time.   Neuro: Grossly intact, AOX3 with normal speech GEN:  Nontoxic, NAD  HEENT: NC/AT, Symm Facies.   CV:  +S1S2, RRR w/o m/g/r  RESP: CTAB w/o w/r/r  ABD: Soft, nt/nd  EXT/MSK: No lower extremity edema or calf tenderness. FROMx4  SKIN: Right foot edematous and erythematous. Sensation intact. +Limited ROM of ankle and toes although limited 2/2 swelling. Bruising to dorsal aspect of right toes 3-5. Quarter sized ulceration to the base of right foot, without discharge at this time. +DP pulses   Neuro: Grossly intact, AOX3 with normal speech

## 2020-04-14 NOTE — ED ADULT NURSE NOTE - NS ED NOTE ABUSE RESPONSE YN
Yes
decreased step length/decreased velocity of limb motion/decreased stride length/decreased weight-shifting ability/decreased taran/Pt not advancing RW far enough, cues needed

## 2020-04-14 NOTE — CONSULT NOTE ADULT - SUBJECTIVE AND OBJECTIVE BOX
Patient is a 49y old  Male who presents with a chief complaint of gas gangrene (14 Apr 2020 21:59)      HPI:  50yo Man with PMH type 2 diabetes mellitus on metformin and insulin, CAD s/p stent (2010) on asa and plavix, current daily smoker presents with worsening right foot ulceration. He has had intermittent ulceration of his right foot for over 1 year. He saw his podiatrist, Dr. Casimiro Riley, earlier today who referred him to ED for IV abx. He reports worsening pain over the past several days. He has tried doxycycline with no relief. He reports nausea, vomiting, chills, difficulty ambulating 2/2 pain, right leg swelling. Febrile in ED. COVID-19 pcr not detected.    ED xray foot showed no cortical erosions or periosteal reaction to suggest osteomyelitis. There is subcutaneous emphysema within the MTP interdigital webspaces.    In ED, underwent incision and drainage by podiatry. Debridement of wound demonstrated tracking across entire width of dorsum of foot. (14 Apr 2020 21:59)      PAST MEDICAL & SURGICAL HISTORY:  Diabetes  CAD (coronary artery disease)  No significant past surgical history      MEDICATIONS  (STANDING):  aspirin 325 milliGRAM(s) Oral daily  clopidogrel Tablet 75 milliGRAM(s) Oral daily  dextrose 5%. 1000 milliLiter(s) (50 mL/Hr) IV Continuous <Continuous>  dextrose 50% Injectable 12.5 Gram(s) IV Push once  dextrose 50% Injectable 25 Gram(s) IV Push once  dextrose 50% Injectable 25 Gram(s) IV Push once  heparin  Injectable 5000 Unit(s) SubCutaneous every 8 hours  insulin lispro (HumaLOG) corrective regimen sliding scale   SubCutaneous three times a day before meals  insulin lispro (HumaLOG) corrective regimen sliding scale   SubCutaneous at bedtime  lisinopril 20 milliGRAM(s) Oral daily  LORazepam     Tablet 0.5 milliGRAM(s) Oral once  losartan 50 milliGRAM(s) Oral daily  piperacillin/tazobactam IVPB. 3.375 Gram(s) IV Intermittent Once  piperacillin/tazobactam IVPB.. 3.375 Gram(s) IV Intermittent Once  vancomycin  IVPB 1000 milliGRAM(s) IV Intermittent every 12 hours    MEDICATIONS  (PRN):  acetaminophen   Tablet .. 650 milliGRAM(s) Oral every 6 hours PRN Mild Pain (1 - 3)  dextrose 40% Gel 15 Gram(s) Oral once PRN Blood Glucose LESS THAN 70 milliGRAM(s)/deciliter  glucagon  Injectable 1 milliGRAM(s) IntraMuscular once PRN Glucose LESS THAN 70 milligrams/deciliter  oxyCODONE    IR 5 milliGRAM(s) Oral every 4 hours PRN Moderate Pain (4 - 6)  oxyCODONE    IR 10 milliGRAM(s) Oral every 4 hours PRN Severe Pain (7 - 10)      Allergies    No Known Allergies    Intolerances        VITALS:    Vital Signs Last 24 Hrs  T(C): 37.4 (14 Apr 2020 22:17), Max: 39.5 (14 Apr 2020 18:07)  T(F): 99.4 (14 Apr 2020 22:17), Max: 103.1 (14 Apr 2020 18:07)  HR: 94 (14 Apr 2020 22:17) (94 - 117)  BP: 144/83 (14 Apr 2020 22:17) (114/66 - 144/83)  BP(mean): --  RR: 20 (14 Apr 2020 22:17) (18 - 20)  SpO2: 98% (14 Apr 2020 22:17) (93% - 98%)    LABS:                          14.0   12.38 )-----------( 161      ( 14 Apr 2020 18:22 )             42.4       04-14    133<L>  |  92<L>  |  15  ----------------------------<  258<H>  4.2   |  25  |  0.90    Ca    9.5      14 Apr 2020 18:22    TPro  7.7  /  Alb  3.9  /  TBili  0.8  /  DBili  x   /  AST  9<L>  /  ALT  11  /  AlkPhos  52  04-14      CAPILLARY BLOOD GLUCOSE          PT/INR - ( 14 Apr 2020 18:22 )   PT: 14.8 sec;   INR: 1.28 ratio         PTT - ( 14 Apr 2020 18:22 )  PTT:30.3 sec    LOWER EXTREMITY PHYSICAL EXAM:    Vasular: DP non palpable b/l, PT 2/4 Left foot + non palpable RF,  CFT < 3seconds B/L, Temperature gradient warm to warm Right foot  Neuro: Epicritic sensation diminished to the level of toes B/L.  Musculoskeletal/Ortho: crepitus to right forefoot  Skin: swelling of entire right lower extremity w/ cellulitis to forefoot, Right foot submet 4 ulceration to bone, tracking across forefoot, malodorous, +purulence, purple discoloration of 3rd and 4th toes dorsally    RADIOLOGY & ADDITIONAL STUDIES:  < from: Xray Foot AP + Lateral + Oblique, Right (04.14.20 @ 18:14) >    ******PRELIMINARY REPORT******    ******PRELIMINARY REPORT******          EXAM:  FOOT COMPLETE RIGHT (MIN 3 VIEW)                            PROCEDURE DATE:  04/14/2020      ******PRELIMINARY REPORT******    ******PRELIMINARY REPORT******              INTERPRETATION:  No cortical erosions or periosteal reaction to suggest osteomyelitis. There is subcutaneous emphysema within the MTP interdigital webspaces.              ******PRELIMINARY REPORT******    ******PRELIMINARY REPORT******          SHAKEEL AGOSTO M.D., RADIOLOGY RESIDENT                      < end of copied text >

## 2020-04-14 NOTE — ED PROVIDER NOTE - NS ED ROS FT
Constitutional: No fever + chills  Eyes: No visual changes  CV: No chest pain or lower extremity edema  Resp: No SOB + cough  GI: No abd pain. + nausea +vomiting. No diarrhea.   : No dysuria, hematuria.   MSK: No musculoskeletal pain  Skin: +right foot ulceration/ swelling  Psych: No complaints   Neuro: No headache. No numbness or tingling. No weakness.

## 2020-04-14 NOTE — ED PROVIDER NOTE - OBJECTIVE STATEMENT
50 y/o M with PMH DM (insulin and metformin), CAD with stent on Plavix and Asprin, daily smoker presents to ED with right foot ulceration and pain. Patient was sent from Dr. Casimiro Riley's (podiatry) office today to the ED for admission for IVF antibiotics. Patient states that he has had a foot ulceration in the past, intermittently for the past 1.5 years. This episode started on 4/8 after patient noticed scab in the shower. States that he had some discharge from the site. 12/10 pain. Patient has been taking 4 days of Doxycycline, medication left over from the past. Patient able to ambulate.   States that he has chills. Also endorses vomiting today. Patient states that he has been coughing, although stating that he smokes and coughs at baseline. 48 y/o M with PMH DM (insulin and metformin), CAD with stent on Plavix and Asprin, daily smoker presents to ED with right foot ulceration and pain. Patient was sent from Dr. Casimiro Riley's (podiatry) office today to the ED for admission for IVF antibiotics. Patient states that he has had a foot ulceration in the past, intermittently for the past 1.5 years. This episode started on 4/8 after patient noticed scab in the shower. States that he had some discharge from the site. 12/10 pain. Patient has been taking 4 days of Doxycycline, medication left over from the past. Patient also applied silver nitrate to site. Patient able to ambulate.   States that he has chills. Also endorses vomiting today. Patient states that he has been coughing, although stating that he smokes and coughs at baseline.

## 2020-04-14 NOTE — ED ADULT NURSE NOTE - OBJECTIVE STATEMENT
49 y.o. Male presents to the ED c/o R foot infection. Pt was sent by podiatrist. Hx Diabetes, CAD - on plavix. Foot ulcer size of quarter on R ball of foot - necrotic tissue noted. Necrotic tissue noted on top of foot by R third and fourth toes. Pt reports having decreased sensation on R foot. +ROM of R foot. A&Ox3. Pt reports feeling chills. MICKIE Nichole at bedside for assessment. Pt is in no current distress. Comfort and safety provided. Will continue to monitor.

## 2020-04-14 NOTE — H&P ADULT - HISTORY OF PRESENT ILLNESS
48yo Man with PMH type 2 diabetes mellitus on metformin and insulin, CAD s/p stent (2010) on asa and plavix, current daily smoker presents with worsening right foot ulceration. He has had intermittent ulceration of his right foot for over 1 year. He saw his podiatrist, Dr. Casimiro Riley, earlier today who referred him to ED for IV abx. He reports worsening pain over the past several days. He has tried doxycycline with no relief. He reports nausea, vomiting, chills, difficulty ambulating 2/2 pain, right leg swelling. Febrile in ED. COVID-19 pcr not detected.    ED xray foot showed no cortical erosions or periosteal reaction to suggest osteomyelitis. There is subcutaneous emphysema within the MTP interdigital webspaces.    In ED, underwent incision and drainage by podiatry. Debridement of wound demonstrated tracking across entire width of dorsum of foot.

## 2020-04-14 NOTE — ED PROVIDER NOTE - CLINICAL SUMMARY MEDICAL DECISION MAKING FREE TEXT BOX
Nita: 49 year old male with dm, here with right foot ulcer and pain with discoloration and fever. Patient reports worsened pain and Nita: 49 year old male with dm, here with right foot ulcer and pain with discoloration and fever. Patient reports worsened pain and chills. + ulceration to bottom of foot. + 2 dp with doppler. swelling to foot. also with discoloration (darkening). severe pain and febrile. will get labs, xrays, podiatry consult. Patient is smoker with fever. will r/o covid as well. reassess

## 2020-04-14 NOTE — H&P ADULT - ASSESSMENT
50yo Man with PMH T2DM and CAD presents with gas gangrene of right foot. Underwent debridement in ED by podiatry.    Plan  - Admit to Dr. Lopez, vascular surgery  - IV abx: vancomycin and zosyn  - NPO at midnight  - Resume home meds  - F/u podiatry consult - planning for OR later this week

## 2020-04-14 NOTE — CONSULT NOTE ADULT - ASSESSMENT
49M w/ Right foot gas gangrene   -right foot site prepped w/ ankle block administered - 20ccs 1:1 1% lido plain + 1% lido w/ epi   -incision made w/ #15 blade at 3rd interspace of right foot. incision was made to the level of subcutaneous tissue and not beyond. Blunt dissection performed with hemostat. Wound tracked across forefoot w/ 7ccs purulence expressed from site. Necrotic tissue noted at level of 3rd interspace.  -Wound cultured   -flushed w/ saline, packed and dressed w/ DSD   -recommend admit under vascular surgery; ABIs ordered   - I&D done as part of staged procedure to stabilize patient. Will allow for demarcation and plan for amputation later this week   -Continue w/ IV antibiotics   -Discussed w/ attending

## 2020-04-15 LAB
ANION GAP SERPL CALC-SCNC: 14 MMOL/L — SIGNIFICANT CHANGE UP (ref 5–17)
ANION GAP SERPL CALC-SCNC: 17 MMOL/L — SIGNIFICANT CHANGE UP (ref 5–17)
BASOPHILS # BLD AUTO: 0.02 K/UL — SIGNIFICANT CHANGE UP (ref 0–0.2)
BASOPHILS NFR BLD AUTO: 0.2 % — SIGNIFICANT CHANGE UP (ref 0–2)
BLD GP AB SCN SERPL QL: NEGATIVE — SIGNIFICANT CHANGE UP
BUN SERPL-MCNC: 15 MG/DL — SIGNIFICANT CHANGE UP (ref 7–23)
BUN SERPL-MCNC: 25 MG/DL — HIGH (ref 7–23)
CALCIUM SERPL-MCNC: 8.3 MG/DL — LOW (ref 8.4–10.5)
CALCIUM SERPL-MCNC: 9.4 MG/DL — SIGNIFICANT CHANGE UP (ref 8.4–10.5)
CHLORIDE SERPL-SCNC: 90 MMOL/L — LOW (ref 96–108)
CHLORIDE SERPL-SCNC: 96 MMOL/L — SIGNIFICANT CHANGE UP (ref 96–108)
CO2 SERPL-SCNC: 22 MMOL/L — SIGNIFICANT CHANGE UP (ref 22–31)
CO2 SERPL-SCNC: 25 MMOL/L — SIGNIFICANT CHANGE UP (ref 22–31)
CREAT SERPL-MCNC: 0.73 MG/DL — SIGNIFICANT CHANGE UP (ref 0.5–1.3)
CREAT SERPL-MCNC: 1.38 MG/DL — HIGH (ref 0.5–1.3)
CRP SERPL-MCNC: 23.85 MG/DL — HIGH (ref 0–0.4)
EOSINOPHIL # BLD AUTO: 0.02 K/UL — SIGNIFICANT CHANGE UP (ref 0–0.5)
EOSINOPHIL NFR BLD AUTO: 0.2 % — SIGNIFICANT CHANGE UP (ref 0–6)
GLUCOSE BLDC GLUCOMTR-MCNC: 257 MG/DL — HIGH (ref 70–99)
GLUCOSE BLDC GLUCOMTR-MCNC: 268 MG/DL — HIGH (ref 70–99)
GLUCOSE BLDC GLUCOMTR-MCNC: 322 MG/DL — HIGH (ref 70–99)
GLUCOSE BLDC GLUCOMTR-MCNC: 326 MG/DL — HIGH (ref 70–99)
GLUCOSE BLDC GLUCOMTR-MCNC: 360 MG/DL — HIGH (ref 70–99)
GLUCOSE BLDC GLUCOMTR-MCNC: 391 MG/DL — HIGH (ref 70–99)
GLUCOSE SERPL-MCNC: 228 MG/DL — HIGH (ref 70–99)
GLUCOSE SERPL-MCNC: 288 MG/DL — HIGH (ref 70–99)
HCT VFR BLD CALC: 36.8 % — LOW (ref 39–50)
HCT VFR BLD CALC: 41.8 % — SIGNIFICANT CHANGE UP (ref 39–50)
HGB BLD-MCNC: 12 G/DL — LOW (ref 13–17)
HGB BLD-MCNC: 13.7 G/DL — SIGNIFICANT CHANGE UP (ref 13–17)
IMM GRANULOCYTES NFR BLD AUTO: 0.5 % — SIGNIFICANT CHANGE UP (ref 0–1.5)
LYMPHOCYTES # BLD AUTO: 1.06 K/UL — SIGNIFICANT CHANGE UP (ref 1–3.3)
LYMPHOCYTES # BLD AUTO: 9.1 % — LOW (ref 13–44)
MAGNESIUM SERPL-MCNC: 2.3 MG/DL — SIGNIFICANT CHANGE UP (ref 1.6–2.6)
MCHC RBC-ENTMCNC: 29.4 PG — SIGNIFICANT CHANGE UP (ref 27–34)
MCHC RBC-ENTMCNC: 29.4 PG — SIGNIFICANT CHANGE UP (ref 27–34)
MCHC RBC-ENTMCNC: 32.6 GM/DL — SIGNIFICANT CHANGE UP (ref 32–36)
MCHC RBC-ENTMCNC: 32.8 GM/DL — SIGNIFICANT CHANGE UP (ref 32–36)
MCV RBC AUTO: 89.7 FL — SIGNIFICANT CHANGE UP (ref 80–100)
MCV RBC AUTO: 90.2 FL — SIGNIFICANT CHANGE UP (ref 80–100)
MONOCYTES # BLD AUTO: 1.18 K/UL — HIGH (ref 0–0.9)
MONOCYTES NFR BLD AUTO: 10.1 % — SIGNIFICANT CHANGE UP (ref 2–14)
NEUTROPHILS # BLD AUTO: 9.34 K/UL — HIGH (ref 1.8–7.4)
NEUTROPHILS NFR BLD AUTO: 79.9 % — HIGH (ref 43–77)
NRBC # BLD: 0 /100 WBCS — SIGNIFICANT CHANGE UP (ref 0–0)
NRBC # BLD: 0 /100 WBCS — SIGNIFICANT CHANGE UP (ref 0–0)
PHOSPHATE SERPL-MCNC: 4.3 MG/DL — SIGNIFICANT CHANGE UP (ref 2.5–4.5)
PLATELET # BLD AUTO: 163 K/UL — SIGNIFICANT CHANGE UP (ref 150–400)
PLATELET # BLD AUTO: 175 K/UL — SIGNIFICANT CHANGE UP (ref 150–400)
POTASSIUM SERPL-MCNC: 3.9 MMOL/L — SIGNIFICANT CHANGE UP (ref 3.5–5.3)
POTASSIUM SERPL-MCNC: 4.2 MMOL/L — SIGNIFICANT CHANGE UP (ref 3.5–5.3)
POTASSIUM SERPL-SCNC: 3.9 MMOL/L — SIGNIFICANT CHANGE UP (ref 3.5–5.3)
POTASSIUM SERPL-SCNC: 4.2 MMOL/L — SIGNIFICANT CHANGE UP (ref 3.5–5.3)
RBC # BLD: 4.08 M/UL — LOW (ref 4.2–5.8)
RBC # BLD: 4.66 M/UL — SIGNIFICANT CHANGE UP (ref 4.2–5.8)
RBC # FLD: 11.9 % — SIGNIFICANT CHANGE UP (ref 10.3–14.5)
RBC # FLD: 12 % — SIGNIFICANT CHANGE UP (ref 10.3–14.5)
RH IG SCN BLD-IMP: POSITIVE — SIGNIFICANT CHANGE UP
RH IG SCN BLD-IMP: POSITIVE — SIGNIFICANT CHANGE UP
SODIUM SERPL-SCNC: 129 MMOL/L — LOW (ref 135–145)
SODIUM SERPL-SCNC: 135 MMOL/L — SIGNIFICANT CHANGE UP (ref 135–145)
WBC # BLD: 11.62 K/UL — HIGH (ref 3.8–10.5)
WBC # BLD: 11.68 K/UL — HIGH (ref 3.8–10.5)
WBC # FLD AUTO: 11.62 K/UL — HIGH (ref 3.8–10.5)
WBC # FLD AUTO: 11.68 K/UL — HIGH (ref 3.8–10.5)

## 2020-04-15 PROCEDURE — 93923 UPR/LXTR ART STDY 3+ LVLS: CPT | Mod: 26

## 2020-04-15 PROCEDURE — 88311 DECALCIFY TISSUE: CPT | Mod: 26

## 2020-04-15 PROCEDURE — 88305 TISSUE EXAM BY PATHOLOGIST: CPT | Mod: 26

## 2020-04-15 PROCEDURE — 73630 X-RAY EXAM OF FOOT: CPT | Mod: 26,RT

## 2020-04-15 PROCEDURE — 93010 ELECTROCARDIOGRAM REPORT: CPT

## 2020-04-15 RX ORDER — INSULIN GLARGINE 100 [IU]/ML
5 INJECTION, SOLUTION SUBCUTANEOUS EVERY MORNING
Refills: 0 | Status: DISCONTINUED | OUTPATIENT
Start: 2020-04-15 | End: 2020-04-15

## 2020-04-15 RX ORDER — PHENYLEPHRINE HYDROCHLORIDE 10 MG/ML
0.5 INJECTION INTRAVENOUS
Qty: 40 | Refills: 0 | Status: DISCONTINUED | OUTPATIENT
Start: 2020-04-15 | End: 2020-04-16

## 2020-04-15 RX ORDER — HYDROMORPHONE HYDROCHLORIDE 2 MG/ML
0.5 INJECTION INTRAMUSCULAR; INTRAVENOUS; SUBCUTANEOUS EVERY 4 HOURS
Refills: 0 | Status: DISCONTINUED | OUTPATIENT
Start: 2020-04-15 | End: 2020-04-20

## 2020-04-15 RX ORDER — OXYCODONE AND ACETAMINOPHEN 5; 325 MG/1; MG/1
1 TABLET ORAL EVERY 4 HOURS
Refills: 0 | Status: DISCONTINUED | OUTPATIENT
Start: 2020-04-15 | End: 2020-04-16

## 2020-04-15 RX ORDER — INSULIN LISPRO 100/ML
VIAL (ML) SUBCUTANEOUS
Refills: 0 | Status: DISCONTINUED | OUTPATIENT
Start: 2020-04-15 | End: 2020-04-20

## 2020-04-15 RX ORDER — HYDROMORPHONE HYDROCHLORIDE 2 MG/ML
0.5 INJECTION INTRAMUSCULAR; INTRAVENOUS; SUBCUTANEOUS
Refills: 0 | Status: DISCONTINUED | OUTPATIENT
Start: 2020-04-15 | End: 2020-04-15

## 2020-04-15 RX ORDER — PIPERACILLIN AND TAZOBACTAM 4; .5 G/20ML; G/20ML
3.38 INJECTION, POWDER, LYOPHILIZED, FOR SOLUTION INTRAVENOUS EVERY 8 HOURS
Refills: 0 | Status: DISCONTINUED | OUTPATIENT
Start: 2020-04-15 | End: 2020-04-20

## 2020-04-15 RX ORDER — INSULIN GLARGINE 100 [IU]/ML
6 INJECTION, SOLUTION SUBCUTANEOUS AT BEDTIME
Refills: 0 | Status: DISCONTINUED | OUTPATIENT
Start: 2020-04-15 | End: 2020-04-16

## 2020-04-15 RX ORDER — PIPERACILLIN AND TAZOBACTAM 4; .5 G/20ML; G/20ML
3.38 INJECTION, POWDER, LYOPHILIZED, FOR SOLUTION INTRAVENOUS ONCE
Refills: 0 | Status: COMPLETED | OUTPATIENT
Start: 2020-04-15 | End: 2020-04-15

## 2020-04-15 RX ORDER — ONDANSETRON 8 MG/1
4 TABLET, FILM COATED ORAL EVERY 4 HOURS
Refills: 0 | Status: DISCONTINUED | OUTPATIENT
Start: 2020-04-15 | End: 2020-04-15

## 2020-04-15 RX ORDER — INSULIN LISPRO 100/ML
VIAL (ML) SUBCUTANEOUS AT BEDTIME
Refills: 0 | Status: DISCONTINUED | OUTPATIENT
Start: 2020-04-15 | End: 2020-04-20

## 2020-04-15 RX ADMIN — Medication 325 MILLIGRAM(S): at 11:40

## 2020-04-15 RX ADMIN — PIPERACILLIN AND TAZOBACTAM 25 GRAM(S): 4; .5 INJECTION, POWDER, LYOPHILIZED, FOR SOLUTION INTRAVENOUS at 06:49

## 2020-04-15 RX ADMIN — OXYCODONE HYDROCHLORIDE 10 MILLIGRAM(S): 5 TABLET ORAL at 09:53

## 2020-04-15 RX ADMIN — Medication 250 MILLIGRAM(S): at 05:20

## 2020-04-15 RX ADMIN — SODIUM CHLORIDE 125 MILLILITER(S): 9 INJECTION, SOLUTION INTRAVENOUS at 22:55

## 2020-04-15 RX ADMIN — Medication 4: at 12:18

## 2020-04-15 RX ADMIN — ENOXAPARIN SODIUM 40 MILLIGRAM(S): 100 INJECTION SUBCUTANEOUS at 11:40

## 2020-04-15 RX ADMIN — Medication 6: at 22:54

## 2020-04-15 RX ADMIN — LOSARTAN POTASSIUM 50 MILLIGRAM(S): 100 TABLET, FILM COATED ORAL at 05:19

## 2020-04-15 RX ADMIN — Medication 4: at 16:22

## 2020-04-15 RX ADMIN — LISINOPRIL 20 MILLIGRAM(S): 2.5 TABLET ORAL at 05:19

## 2020-04-15 RX ADMIN — HYDROMORPHONE HYDROCHLORIDE 0.5 MILLIGRAM(S): 2 INJECTION INTRAMUSCULAR; INTRAVENOUS; SUBCUTANEOUS at 22:55

## 2020-04-15 RX ADMIN — Medication 3: at 08:34

## 2020-04-15 RX ADMIN — INSULIN GLARGINE 6 UNIT(S): 100 INJECTION, SOLUTION SUBCUTANEOUS at 22:59

## 2020-04-15 RX ADMIN — OXYCODONE HYDROCHLORIDE 10 MILLIGRAM(S): 5 TABLET ORAL at 01:23

## 2020-04-15 RX ADMIN — SODIUM CHLORIDE 125 MILLILITER(S): 9 INJECTION, SOLUTION INTRAVENOUS at 05:19

## 2020-04-15 RX ADMIN — Medication 250 MILLIGRAM(S): at 17:43

## 2020-04-15 RX ADMIN — Medication 650 MILLIGRAM(S): at 11:39

## 2020-04-15 RX ADMIN — PHENYLEPHRINE HYDROCHLORIDE 17.4 MICROGRAM(S)/KG/MIN: 10 INJECTION INTRAVENOUS at 16:42

## 2020-04-15 RX ADMIN — PIPERACILLIN AND TAZOBACTAM 200 GRAM(S): 4; .5 INJECTION, POWDER, LYOPHILIZED, FOR SOLUTION INTRAVENOUS at 20:34

## 2020-04-15 NOTE — BRIEF OPERATIVE NOTE - COMMENTS
Low concern for residual infection   Moderate concern for viability of 5th toe   plan for VAC on Friday

## 2020-04-15 NOTE — BRIEF OPERATIVE NOTE - NSICDXBRIEFPROCEDURE_GEN_ALL_CORE_FT
PROCEDURES:  Partial amputation of fourth ray of right foot by open approach 15-Apr-2020 18:02:08  Tamera Corey  Partial amputation of third ray of right foot by open approach 15-Apr-2020 18:01:50  Tamera Corey

## 2020-04-15 NOTE — PROGRESS NOTE ADULT - ASSESSMENT
48yo Man with PMH T2DM and CAD presents with gas gangrene of right foot. Underwent debridement in ED by podiatry.    Plan  - IV abx: vancomycin and zosyn  - Resume home meds  - F/u podiatry consult - planning for OR later this week  - SAL/PVR (ordered)      2280

## 2020-04-15 NOTE — CHART NOTE - NSCHARTNOTEFT_GEN_A_CORE
SURGERY POST-OP NOTE    S/P Partial amputation of fourth ray of right foot by open approach    Subjective:   Patient on mayur drip in post op unit, unable to wean below 0.2 without blood pressures dropping to systolic 80s. Additionally sugars poorly controlled despite increasing insulin sliding scale. Humalog dose ordered for PM. Patient seen and examined at bedside. Denies fevers, chills, nausea, emesis, chest pain, or shortness of breath. Says he has no sensation in his foot yet.    Objective:   Vital Signs  T(C): 36.8 (04-15 @ 15:56), Max: 37.4 (04-14 @ 22:17)  HR: 78 (04-15 @ 19:15) (69 - 103)  BP: 104/62 (04-15 @ 19:15) (83/48 - 144/83)  RR: 14 (04-15 @ 19:15) (14 - 20)  SpO2: 95% (04-15 @ 19:15) (94% - 100%)  04-14-20 @ 07:01  -  04-15-20 @ 07:00  --------------------------------------------------------  IN: 0 mL / OUT: 650 mL / NET: -650 mL    04-15-20 @ 07:01  -  04-15-20 @ 19:34  --------------------------------------------------------  IN: 151.1 mL / OUT: 0 mL / NET: 151.1 mL        Physical Exam:  General: alert and oriented, NAD  Resp: airway patent, respirations unlabored  CVS: regular rate and rhythm  Abdomen: soft, nontender, nondistended  Extremities: no edema. RLE wrapped in ACE bandage without strikethrough  Skin: warm, dry, appropriate color                          12.0   11.62 )-----------( 163      ( 15 Apr 2020 19:19 )             36.8   04-15    135  |  96  |  15  ----------------------------<  228<H>  4.2   |  22  |  0.73    Ca    9.4      15 Apr 2020 06:32    TPro  7.7  /  Alb  3.9  /  TBili  0.8  /  DBili  x   /  AST  9<L>  /  ALT  11  /  AlkPhos  52  04-14          Assessment:  49yoM with gangrenous R fourth toe s/p I&D in ED with podiatry, now s/p fourth ray amp. Patient on mayur drip in PACU. EKG normal. STAT set repeat labs ordered    Plan:  - Pain control prn  - f/u stat labs, AM labs ordered  - if unable to wean mayur drip will proceed with SICU consult  - sugars not responding well to ISS. Lantus added at bedtime. Will continue to monitor, consider endocrine consult in AM if still unable to control.      Vascular Surgery, [id number]

## 2020-04-15 NOTE — PROGRESS NOTE ADULT - SUBJECTIVE AND OBJECTIVE BOX
VASCULAR SURGERY DAILY PROGRESS NOTE:     Hospital Day: 1     Subjective:  admitted last night, has right foot gas gangrene, I&D done as part of staged procedure to stabilize patient by pods, HD stable.       Objective:    MEDICATIONS  (STANDING):  aspirin 325 milliGRAM(s) Oral daily  dextrose 5%. 1000 milliLiter(s) (50 mL/Hr) IV Continuous <Continuous>  dextrose 50% Injectable 12.5 Gram(s) IV Push once  dextrose 50% Injectable 25 Gram(s) IV Push once  dextrose 50% Injectable 25 Gram(s) IV Push once  enoxaparin Injectable 40 milliGRAM(s) SubCutaneous daily  insulin lispro (HumaLOG) corrective regimen sliding scale   SubCutaneous three times a day before meals  insulin lispro (HumaLOG) corrective regimen sliding scale   SubCutaneous at bedtime  lactated ringers. 1000 milliLiter(s) (125 mL/Hr) IV Continuous <Continuous>  lisinopril 20 milliGRAM(s) Oral daily  losartan 50 milliGRAM(s) Oral daily  piperacillin/tazobactam IVPB.. 3.375 Gram(s) IV Intermittent Once  vancomycin  IVPB 1000 milliGRAM(s) IV Intermittent every 12 hours    MEDICATIONS  (PRN):  acetaminophen   Tablet .. 650 milliGRAM(s) Oral every 6 hours PRN Mild Pain (1 - 3)  dextrose 40% Gel 15 Gram(s) Oral once PRN Blood Glucose LESS THAN 70 milliGRAM(s)/deciliter  glucagon  Injectable 1 milliGRAM(s) IntraMuscular once PRN Glucose LESS THAN 70 milligrams/deciliter  oxyCODONE    IR 5 milliGRAM(s) Oral every 4 hours PRN Moderate Pain (4 - 6)  oxyCODONE    IR 10 milliGRAM(s) Oral every 4 hours PRN Severe Pain (7 - 10)      Vital Signs Last 24 Hrs  T(C): 37.4 (15 Apr 2020 00:55), Max: 39.5 (14 Apr 2020 18:07)  T(F): 99.3 (15 Apr 2020 00:55), Max: 103.1 (14 Apr 2020 18:07)  HR: 97 (15 Apr 2020 00:55) (94 - 117)  BP: 143/72 (15 Apr 2020 00:55) (114/66 - 144/83)  BP(mean): --  RR: 18 (15 Apr 2020 00:55) (18 - 20)  SpO2: 97% (15 Apr 2020 00:55) (93% - 98%)      	General: NAD  	Respiratory: respirations unlabored  	CVS: regular rate and rhythm  	Abdomen: soft, nontender, nondistended  	Extremities: right distal LE edema and erythema, ecchymosis overlying dorsal aspect of 3rd and 4th digits and interdigital webbed space, non palp PT pulses, but has PT signals, no DP signals   Skin: warm      Daily Height in cm: 185.42 (14 Apr 2020 16:37)    Daily     LABS:                        14.0   12.38 )-----------( 161      ( 14 Apr 2020 18:22 )             42.4     04-14    133<L>  |  92<L>  |  15  ----------------------------<  258<H>  4.2   |  25  |  0.90    Ca    9.5      14 Apr 2020 18:22    TPro  7.7  /  Alb  3.9  /  TBili  0.8  /  DBili  x   /  AST  9<L>  /  ALT  11  /  AlkPhos  52  04-14    PT/INR - ( 14 Apr 2020 18:22 )   PT: 14.8 sec;   INR: 1.28 ratio         PTT - ( 14 Apr 2020 18:22 )  PTT:30.3 sec      RADIOLOGY & ADDITIONAL STUDIES:  x-ray left foot: No cortical erosions or periosteal reaction to suggest osteomyelitis. There is subcutaneous emphysema within the MTP interdigital webspaces.

## 2020-04-16 ENCOUNTER — TRANSCRIPTION ENCOUNTER (OUTPATIENT)
Age: 50
End: 2020-04-16

## 2020-04-16 DIAGNOSIS — E78.5 HYPERLIPIDEMIA, UNSPECIFIED: ICD-10-CM

## 2020-04-16 DIAGNOSIS — I10 ESSENTIAL (PRIMARY) HYPERTENSION: ICD-10-CM

## 2020-04-16 DIAGNOSIS — E11.65 TYPE 2 DIABETES MELLITUS WITH HYPERGLYCEMIA: ICD-10-CM

## 2020-04-16 LAB
ANION GAP SERPL CALC-SCNC: 12 MMOL/L — SIGNIFICANT CHANGE UP (ref 5–17)
BUN SERPL-MCNC: 27 MG/DL — HIGH (ref 7–23)
CALCIUM SERPL-MCNC: 7.8 MG/DL — LOW (ref 8.4–10.5)
CHLORIDE SERPL-SCNC: 95 MMOL/L — LOW (ref 96–108)
CO2 SERPL-SCNC: 24 MMOL/L — SIGNIFICANT CHANGE UP (ref 22–31)
CREAT SERPL-MCNC: 1.41 MG/DL — HIGH (ref 0.5–1.3)
CULTURE RESULTS: SIGNIFICANT CHANGE UP
GLUCOSE BLDC GLUCOMTR-MCNC: 160 MG/DL — HIGH (ref 70–99)
GLUCOSE BLDC GLUCOMTR-MCNC: 223 MG/DL — HIGH (ref 70–99)
GLUCOSE BLDC GLUCOMTR-MCNC: 226 MG/DL — HIGH (ref 70–99)
GLUCOSE BLDC GLUCOMTR-MCNC: 235 MG/DL — HIGH (ref 70–99)
GLUCOSE BLDC GLUCOMTR-MCNC: 235 MG/DL — HIGH (ref 70–99)
GLUCOSE BLDC GLUCOMTR-MCNC: 290 MG/DL — HIGH (ref 70–99)
GLUCOSE BLDC GLUCOMTR-MCNC: 320 MG/DL — HIGH (ref 70–99)
GLUCOSE SERPL-MCNC: 285 MG/DL — HIGH (ref 70–99)
GRAM STN FLD: SIGNIFICANT CHANGE UP
HCT VFR BLD CALC: 31.3 % — LOW (ref 39–50)
HGB BLD-MCNC: 10.7 G/DL — LOW (ref 13–17)
MAGNESIUM SERPL-MCNC: 2.1 MG/DL — SIGNIFICANT CHANGE UP (ref 1.6–2.6)
MCHC RBC-ENTMCNC: 30.1 PG — SIGNIFICANT CHANGE UP (ref 27–34)
MCHC RBC-ENTMCNC: 34.2 GM/DL — SIGNIFICANT CHANGE UP (ref 32–36)
MCV RBC AUTO: 88.2 FL — SIGNIFICANT CHANGE UP (ref 80–100)
NRBC # BLD: 0 /100 WBCS — SIGNIFICANT CHANGE UP (ref 0–0)
PHOSPHATE SERPL-MCNC: 3.2 MG/DL — SIGNIFICANT CHANGE UP (ref 2.5–4.5)
PLATELET # BLD AUTO: 149 K/UL — LOW (ref 150–400)
POTASSIUM SERPL-MCNC: 3.6 MMOL/L — SIGNIFICANT CHANGE UP (ref 3.5–5.3)
POTASSIUM SERPL-SCNC: 3.6 MMOL/L — SIGNIFICANT CHANGE UP (ref 3.5–5.3)
RBC # BLD: 3.55 M/UL — LOW (ref 4.2–5.8)
RBC # FLD: 11.9 % — SIGNIFICANT CHANGE UP (ref 10.3–14.5)
SODIUM SERPL-SCNC: 131 MMOL/L — LOW (ref 135–145)
SPECIMEN SOURCE: SIGNIFICANT CHANGE UP
SPECIMEN SOURCE: SIGNIFICANT CHANGE UP
WBC # BLD: 8.66 K/UL — SIGNIFICANT CHANGE UP (ref 3.8–10.5)
WBC # FLD AUTO: 8.66 K/UL — SIGNIFICANT CHANGE UP (ref 3.8–10.5)

## 2020-04-16 PROCEDURE — 99223 1ST HOSP IP/OBS HIGH 75: CPT

## 2020-04-16 PROCEDURE — 71045 X-RAY EXAM CHEST 1 VIEW: CPT | Mod: 26

## 2020-04-16 RX ORDER — NICOTINE POLACRILEX 2 MG
1 GUM BUCCAL DAILY
Refills: 0 | Status: DISCONTINUED | OUTPATIENT
Start: 2020-04-16 | End: 2020-04-20

## 2020-04-16 RX ORDER — INSULIN GLARGINE 100 [IU]/ML
8 INJECTION, SOLUTION SUBCUTANEOUS AT BEDTIME
Refills: 0 | Status: COMPLETED | OUTPATIENT
Start: 2020-04-16 | End: 2020-04-16

## 2020-04-16 RX ORDER — INSULIN GLARGINE 100 [IU]/ML
10 INJECTION, SOLUTION SUBCUTANEOUS ONCE
Refills: 0 | Status: COMPLETED | OUTPATIENT
Start: 2020-04-16 | End: 2020-04-16

## 2020-04-16 RX ORDER — INSULIN LISPRO 100/ML
6 VIAL (ML) SUBCUTANEOUS
Refills: 0 | Status: DISCONTINUED | OUTPATIENT
Start: 2020-04-16 | End: 2020-04-18

## 2020-04-16 RX ORDER — ACETAMINOPHEN 500 MG
975 TABLET ORAL EVERY 6 HOURS
Refills: 0 | Status: DISCONTINUED | OUTPATIENT
Start: 2020-04-16 | End: 2020-04-20

## 2020-04-16 RX ORDER — LANOLIN ALCOHOL/MO/W.PET/CERES
3 CREAM (GRAM) TOPICAL AT BEDTIME
Refills: 0 | Status: COMPLETED | OUTPATIENT
Start: 2020-04-16 | End: 2020-04-17

## 2020-04-16 RX ORDER — INSULIN GLARGINE 100 [IU]/ML
18 INJECTION, SOLUTION SUBCUTANEOUS AT BEDTIME
Refills: 0 | Status: DISCONTINUED | OUTPATIENT
Start: 2020-04-17 | End: 2020-04-20

## 2020-04-16 RX ORDER — SODIUM CHLORIDE 9 MG/ML
1000 INJECTION INTRAMUSCULAR; INTRAVENOUS; SUBCUTANEOUS
Refills: 0 | Status: DISCONTINUED | OUTPATIENT
Start: 2020-04-16 | End: 2020-04-18

## 2020-04-16 RX ORDER — INSULIN LISPRO 100/ML
4 VIAL (ML) SUBCUTANEOUS ONCE
Refills: 0 | Status: COMPLETED | OUTPATIENT
Start: 2020-04-16 | End: 2020-04-16

## 2020-04-16 RX ADMIN — Medication 4: at 12:19

## 2020-04-16 RX ADMIN — OXYCODONE HYDROCHLORIDE 10 MILLIGRAM(S): 5 TABLET ORAL at 17:27

## 2020-04-16 RX ADMIN — Medication 6: at 08:52

## 2020-04-16 RX ADMIN — Medication 1 PATCH: at 18:04

## 2020-04-16 RX ADMIN — Medication 325 MILLIGRAM(S): at 12:09

## 2020-04-16 RX ADMIN — Medication 250 MILLIGRAM(S): at 04:36

## 2020-04-16 RX ADMIN — LOSARTAN POTASSIUM 50 MILLIGRAM(S): 100 TABLET, FILM COATED ORAL at 04:37

## 2020-04-16 RX ADMIN — PIPERACILLIN AND TAZOBACTAM 25 GRAM(S): 4; .5 INJECTION, POWDER, LYOPHILIZED, FOR SOLUTION INTRAVENOUS at 12:19

## 2020-04-16 RX ADMIN — OXYCODONE HYDROCHLORIDE 10 MILLIGRAM(S): 5 TABLET ORAL at 04:09

## 2020-04-16 RX ADMIN — PIPERACILLIN AND TAZOBACTAM 25 GRAM(S): 4; .5 INJECTION, POWDER, LYOPHILIZED, FOR SOLUTION INTRAVENOUS at 04:00

## 2020-04-16 RX ADMIN — INSULIN GLARGINE 8 UNIT(S): 100 INJECTION, SOLUTION SUBCUTANEOUS at 22:34

## 2020-04-16 RX ADMIN — OXYCODONE HYDROCHLORIDE 10 MILLIGRAM(S): 5 TABLET ORAL at 21:54

## 2020-04-16 RX ADMIN — OXYCODONE HYDROCHLORIDE 10 MILLIGRAM(S): 5 TABLET ORAL at 10:39

## 2020-04-16 RX ADMIN — PIPERACILLIN AND TAZOBACTAM 25 GRAM(S): 4; .5 INJECTION, POWDER, LYOPHILIZED, FOR SOLUTION INTRAVENOUS at 20:38

## 2020-04-16 RX ADMIN — LISINOPRIL 20 MILLIGRAM(S): 2.5 TABLET ORAL at 04:36

## 2020-04-16 RX ADMIN — ENOXAPARIN SODIUM 40 MILLIGRAM(S): 100 INJECTION SUBCUTANEOUS at 12:09

## 2020-04-16 RX ADMIN — OXYCODONE HYDROCHLORIDE 5 MILLIGRAM(S): 5 TABLET ORAL at 22:32

## 2020-04-16 RX ADMIN — Medication 650 MILLIGRAM(S): at 02:41

## 2020-04-16 RX ADMIN — OXYCODONE HYDROCHLORIDE 5 MILLIGRAM(S): 5 TABLET ORAL at 13:51

## 2020-04-16 RX ADMIN — Medication 250 MILLIGRAM(S): at 17:32

## 2020-04-16 RX ADMIN — Medication 4: at 17:32

## 2020-04-16 RX ADMIN — Medication 4 UNIT(S): at 02:35

## 2020-04-16 RX ADMIN — INSULIN GLARGINE 10 UNIT(S): 100 INJECTION, SOLUTION SUBCUTANEOUS at 10:36

## 2020-04-16 RX ADMIN — SODIUM CHLORIDE 125 MILLILITER(S): 9 INJECTION INTRAMUSCULAR; INTRAVENOUS; SUBCUTANEOUS at 08:00

## 2020-04-16 RX ADMIN — Medication 1 PATCH: at 12:10

## 2020-04-16 RX ADMIN — Medication 6 UNIT(S): at 12:20

## 2020-04-16 RX ADMIN — Medication 6 UNIT(S): at 17:32

## 2020-04-16 NOTE — DISCHARGE NOTE PROVIDER - NSDCFUADDINST_GEN_ALL_CORE_FT
Podiatry Discharge Instructions:  Follow up: Please follow up with Dr. Lott within 1 week of discharge from the hospital, please call [phone] for appointment and discuss that you recently were seen in the hospital.  Wound Care:  Please change VAC 3x/week. Black foam to site, continuous, 125mmHg.  Weight bearing: Please weight bear as tolerated in a surgical shoe.  Antibiotics: Please continue as instructed. Podiatry Discharge Instructions:  Follow up: Please follow up with Dr. Lott within 1 week of discharge from the hospital, please call [phone] for appointment and discuss that you recently were seen in the hospital.  Wound Care:  Please change VAC 3x/week. Black foam to site, continuous, 125mmHg.  Weight bearing: Please weight bear as tolerated in a surgical shoe.  Antibiotics: Please continue as instructed.  -Recommend a visiting nurse apply a wet to dry dressing tomorrow, then apply the wound VAC starting on Wednesday 4/22  -VNS to change wound VAC 3x/week  -Pt to have a telemedicine visit w/ Dr. Lott next week. Please call the wound care center ([phone]) to set up a time for a telemedicine visit    Jose De Jesus guo by contacting [phone] Podiatry Discharge Instructions:  Follow up: Please follow up with Dr. Lott within 1 week of discharge from the hospital, please call [phone] for appointment and discuss that you recently were seen in the hospital.  Wound Care:  Please change VAC 3x/week. Vac placed Monday 4/20. To be changed again Wednesday. Black foam to site, continuous, 125mmHg.  Weight bearing: Please weight bear as tolerated in a surgical shoe.  Antibiotics: Please continue as instructed.  -VNS to change wound VAC 3x/week  -Pt to have a telemedicine visit w/ Dr. Lott next week. Please call the wound care center ([phone]) to set up a time for a telemedicine visit    Jose De Jesus guo by contacting [phone] Podiatry Discharge Instructions:  Follow up: Please follow up with Dr. Lott within 1 week of discharge from the hospital, please call [phone] for appointment and discuss that you recently were seen in the hospital.  Wound Care:  -Recommend a visiting nurse apply a wet to dry dressing tomorrow, then apply the wound VAC starting on Wednesday 4/22 Black foam to site, continuous, 125mmHg.  Weight bearing: Please weight bear as tolerated in a surgical shoe.  Antibiotics: Please continue as instructed.  -VNS to change wound VAC 3x/week  -Pt to have a telemedicine visit w/ Dr. Lott next week. Please call the wound care center ([phone]) to set up a time for a telemedicine visit    Jose De Jesus guo by contacting [phone]

## 2020-04-16 NOTE — CONSULT NOTE ADULT - SUBJECTIVE AND OBJECTIVE BOX
HPI:  48 y/o M w/ uncontrolled Type 2 DM last reported A1c 12% complicated by neuropathy and CAD. At home on Tresiba 70 units at bedtime and metformin 1000mg BID with adherence. Rarely checks glucose, when he does check values fasting are 120-130 and 200's-300's during the day especially when he notices polyuria or polydipsia. No reported hypoglycemia or symptoms of hypoglycemia. Drinks diet soda and crystal lite. Avoid sweets but at times nonadherent to carbs. Mother with hx of Type 2 DM. Had nausea and vomiting on admission with fever now improving. +polyuria and polydipsia only with hyperglycemia.  Also hx of CAD s/p stent (2010) on asa and plavix, current daily smoker presents with worsening right foot ulceration. He has had intermittent ulceration of his right foot for over 1 year. He saw his podiatrist, Dr. Casimiro Riley, earlier today who referred him to ED for IV abx. He reports worsening pain over the past several days. He has tried doxycycline with no relief. ED xray foot showed no cortical erosions or periosteal reaction to suggest osteomyelitis. There is subcutaneous emphysema within the MTP interdigital webspaces.    In ED, underwent incision and drainage by podiatry. Debridement of wound demonstrated tracking across entire width of dorsum of foot. (14 Apr 2020 21:59)      PAST MEDICAL & SURGICAL HISTORY:  Diabetes Type 2  CAD (coronary artery disease)  No significant past surgical history      FAMILY HISTORY:  Mother- Type 2 DM       Social History: +Current smoker x > 30 years interested in quitting. + social alcohol use.     Outpatient Medications:  Tresiba 70 units qhs  Metformin 1000mg BID    MEDICATIONS  (STANDING):  aspirin 325 milliGRAM(s) Oral daily  dextrose 5%. 1000 milliLiter(s) (50 mL/Hr) IV Continuous <Continuous>  dextrose 50% Injectable 12.5 Gram(s) IV Push once  dextrose 50% Injectable 25 Gram(s) IV Push once  dextrose 50% Injectable 25 Gram(s) IV Push once  enoxaparin Injectable 40 milliGRAM(s) SubCutaneous daily  insulin glargine Injectable (LANTUS) 6 Unit(s) SubCutaneous at bedtime  insulin lispro (HumaLOG) corrective regimen sliding scale   SubCutaneous three times a day before meals  insulin lispro (HumaLOG) corrective regimen sliding scale   SubCutaneous at bedtime  insulin lispro Injectable (HumaLOG) 6 Unit(s) SubCutaneous three times a day before meals  lisinopril 20 milliGRAM(s) Oral daily  losartan 50 milliGRAM(s) Oral daily  nicotine -  14 mG/24Hr(s) Patch 1 patch Transdermal daily  piperacillin/tazobactam IVPB.. 3.375 Gram(s) IV Intermittent every 8 hours  sodium chloride 0.9%. 1000 milliLiter(s) (125 mL/Hr) IV Continuous <Continuous>  vancomycin  IVPB 1000 milliGRAM(s) IV Intermittent every 12 hours    MEDICATIONS  (PRN):  acetaminophen   Tablet .. 650 milliGRAM(s) Oral every 6 hours PRN Mild Pain (1 - 3)  dextrose 40% Gel 15 Gram(s) Oral once PRN Blood Glucose LESS THAN 70 milliGRAM(s)/deciliter  glucagon  Injectable 1 milliGRAM(s) IntraMuscular once PRN Glucose LESS THAN 70 milligrams/deciliter  HYDROmorphone  Injectable 0.5 milliGRAM(s) IV Push every 4 hours PRN Severe Pain (7 - 10)  oxycodone    5 mG/acetaminophen 325 mG 1 Tablet(s) Oral every 4 hours PRN Moderate Pain (4 - 6)  oxyCODONE    IR 5 milliGRAM(s) Oral every 4 hours PRN Moderate Pain (4 - 6)  oxyCODONE    IR 10 milliGRAM(s) Oral every 4 hours PRN Severe Pain (7 - 10)      Allergies    No Known Allergies    Intolerances      Review of Systems:  Constitutional: + fever, No change in weight  Eyes: + occasional blurry vision  Neuro: No headache, +neuropathy  HEENT: No throat pain  Cardiovascular: No chest pain  Respiratory: No SOB  GI: +improved nausea and vomiting  : +polyuria with hyperglycemia  Skin: +right foot ulcer  Psych: no depression  Endocrine: + polydipsia with hyperglycemia, No heat or cold intolerance, rest as noted in HPI  Hem/lymph: no swelling    All other review of systems negative      PHYSICAL EXAM:  VITALS: T(C): 37.1 (04-16-20 @ 07:37)  T(F): 98.7 (04-16-20 @ 07:37), Max: 98.8 (04-15-20 @ 20:10)  HR: 81 (04-16-20 @ 07:37) (69 - 89)  BP: 99/67 (04-16-20 @ 07:37) (83/48 - 113/68)  RR:  (14 - 19)  SpO2:  (92% - 100%)  Wt(kg): --  GENERAL: NAD at this time  EYES: No proptosis, EOMI  HEENT:  Atraumatic, Normocephalic,   RESPIRATORY: full excursion, non-labored  CARDIOVASCULAR: Regular rhythm; No murmurs; R LE edema with erythema  GI: Soft, nontender, non distended, normal bowel sounds  SKIN: +R LE erythema and foot dressing  MUSCULOSKELETAL: normal strength  NEURO: follows command  PSYCH: Alert and oriented x 3, normal affect, normal mood  CUSHING'S SIGNS: no striae      POCT Blood Glucose.: 223 mg/dL (04-16-20 @ 12:12)  POCT Blood Glucose.: 290 mg/dL (04-16-20 @ 08:46)  POCT Blood Glucose.: 320 mg/dL (04-16-20 @ 02:04)  POCT Blood Glucose.: 391 mg/dL (04-15-20 @ 22:53)  POCT Blood Glucose.: 360 mg/dL (04-15-20 @ 21:47)  POCT Blood Glucose.: 235 mg/dL (04-15-20 @ 16:01)  POCT Blood Glucose.: 257 mg/dL (04-15-20 @ 15:30)  POCT Blood Glucose.: 322 mg/dL (04-15-20 @ 14:27)  POCT Blood Glucose.: 326 mg/dL (04-15-20 @ 12:16)  POCT Blood Glucose.: 268 mg/dL (04-15-20 @ 08:22)  POCT Blood Glucose.: 226 mg/dL (04-15-20 @ 05:17)                              10.7   8.66  )-----------( 149      ( 16 Apr 2020 04:55 )             31.3       04-16    131<L>  |  95<L>  |  27<H>  ----------------------------<  285<H>  3.6   |  24  |  1.41<H>    EGFR if : 68  EGFR if non : 59<L>    Ca    7.8<L>      04-16  Mg     2.1     04-16  Phos  3.2     04-16    TPro  7.7  /  Alb  3.9  /  TBili  0.8  /  DBili  x   /  AST  9<L>  /  ALT  11  /  AlkPhos  52  04-14    Thyroid Function Tests:            Radiology:

## 2020-04-16 NOTE — DISCHARGE NOTE PROVIDER - NSDCMRMEDTOKEN_GEN_ALL_CORE_FT
aspirin 325 mg oral tablet: 1 tab(s) orally once a day  Cozaar 50 mg oral tablet: 1 tab(s) orally once a day  metFORMIN 500 mg oral tablet: 2 tab(s) orally once a day (in the morning)  metFORMIN 500 mg oral tablet: 1 tab(s) orally once a day (at bedtime)  Plavix 75 mg oral tablet: 1 tab(s) orally once a day  ramipril 5 mg oral capsule: 1 cap(s) orally once a day  Tresiba 100 units/mL subcutaneous solution: 70 unit(s) subcutaneous once a day acetaminophen 325 mg oral tablet: 3 tab(s) orally every 6 hours, As needed, Temp greater or equal to 38C (100.4F), Mild Pain (1 - 3)  Adult Aspirin 325 mg oral tablet: 1 tab(s) orally once a day  glucometer: 1 device   lancets: 1 applicator injectable 4 times a day   Lantus 100 units/mL subcutaneous solution: 20 unit(s) subcutaneous once a day (at bedtime)   lisinopril 20 mg oral tablet: 1 tab(s) orally once a day  losartan 50 mg oral tablet: 1 tab(s) orally once a day  melatonin 3 mg oral tablet: 1 tab(s) orally once a day (at bedtime), As needed, Insomnia  metFORMIN 1000 mg oral tablet: 1 tab(s) orally 2 times a day   metFORMIN 500 mg oral tablet: 1 tab(s) orally 2 times a day  nicotine 14 mg/24 hr transdermal film, extended release: 1 patch transdermal 2 times a day   oxyCODONE 5 mg oral tablet: 1 tab(s) orally every 6 hours MDD:20mg  Plavix 75 mg oral tablet: 1 tab(s) orally once a day  ramipril 5 mg oral capsule: 1 cap(s) orally once a day  Rolling Walker: Apply topically to affected area once   test strips: test 4 times a day   Tresiba 100 units/mL subcutaneous solution: 70 unit(s) subcutaneous once a day acetaminophen 325 mg oral tablet: 3 tab(s) orally every 6 hours, As needed, Temp greater or equal to 38C (100.4F), Mild Pain (1 - 3)  Adult Aspirin 325 mg oral tablet: 1 tab(s) orally once a day  amoxicillin-clavulanate 875 mg-125 mg oral tablet: 1 tab(s) orally 2 times a day   glucometer: 1 device   lancets: 1 applicator injectable 4 times a day   lisinopril 20 mg oral tablet: 1 tab(s) orally once a day  losartan 50 mg oral tablet: 1 tab(s) orally once a day  melatonin 3 mg oral tablet: 1 tab(s) orally once a day (at bedtime), As needed, Insomnia  metFORMIN 500 mg oral tablet: 1 tab(s) orally 2 times a day  nicotine 14 mg/24 hr transdermal film, extended release: 1 patch transdermal 2 times a day   oxyCODONE 5 mg oral tablet: 1 tab(s) orally every 6 hours MDD:20mg  Plavix 75 mg oral tablet: 1 tab(s) orally once a day  Prandin 2 mg oral tablet: 1 tab(s) orally 3 times a day   ramipril 5 mg oral capsule: 1 cap(s) orally once a day  Rolling Walker: Apply topically to affected area once   test strips: test 4 times a day   Tresiba 100 units/mL subcutaneous solution: 70 unit(s) subcutaneous once a day

## 2020-04-16 NOTE — CONSULT NOTE ADULT - PROBLEM SELECTOR RECOMMENDATION 9
Diabetes Education and Nutrition Nani  Received Lantus 6 last night which was not enough. Already given additional 10 units of Lantus this morning for total of 16 units for today. Would give additional 8 units of Lantus tonight at bedtime then start Lantus 18 units tomorrow at bedtime and monitor for now on Humalog 6 with meals + correction.  Goal glucose 100-180  Outpt. endo follow-up. Pt. interested in following up at [address] [place] [address]. [phone]  Outpt. optho, podiatry, micro/cr  Plan to d/c on basal bolus vs. basal +orals depending on insulin requirements.

## 2020-04-16 NOTE — DISCHARGE NOTE PROVIDER - CARE PROVIDER_API CALL
Radha Lopez (MD)  Vascular Surgery  [address] [facility], [address]  [place] [address]  Phone: [phone]  Fax: [phone]  Follow Up Time:

## 2020-04-16 NOTE — PROGRESS NOTE ADULT - SUBJECTIVE AND OBJECTIVE BOX
Vascular Progress Note    S: Patient seen and examined. s/p right 3rd and 4th ray amputation by podiatry. No acute events overnight. Persistent elevated glucose on moderate ISS. Reports tolerating diet without nausea, vomiting, passing flatus, having bowel movements, voiding without issues. Denies fever, chills, SOB, chest pain.     O:  Physical Exam:  General: NAD  Respiratory: respirations unlabored  CVS: regular rate and rhythm  Abdomen: soft, nontender, nondistended  Extremities: right distal LE covered in ACE wrap, non palp PT pulses, but has PT signals, no DP signals   Skin: warm      Vital Signs Last 24 Hrs  T(C): 37 (15 Apr 2020 21:52), Max: 37.4 (15 Apr 2020 08:06)  T(F): 98.6 (15 Apr 2020 21:52), Max: 99.3 (15 Apr 2020 08:06)  HR: 89 (15 Apr 2020 21:52) (69 - 99)  BP: 110/68 (15 Apr 2020 21:52) (83/48 - 124/74)  BP(mean): 82 (15 Apr 2020 21:00) (62 - 83)  RR: 18 (15 Apr 2020 21:52) (14 - 19)  SpO2: 96% (15 Apr 2020 21:52) (94% - 100%)    I&O's Detail    14 Apr 2020 07:01  -  15 Apr 2020 07:00  --------------------------------------------------------  IN:  Total IN: 0 mL    OUT:    Voided: 650 mL  Total OUT: 650 mL    Total NET: -650 mL      15 Apr 2020 07:01  -  16 Apr 2020 01:00  --------------------------------------------------------  IN:    IV PiggyBack: 100 mL    lactated ringers.: 375 mL    Oral Fluid: 480 mL    phenylephrine   Infusion: 26.1 mL  Total IN: 981.1 mL    OUT:    Voided: 700 mL  Total OUT: 700 mL    Total NET: 281.1 mL                                12.0   11.62 )-----------( 163      ( 15 Apr 2020 19:19 )             36.8       04-15    129<L>  |  90<L>  |  25<H>  ----------------------------<  288<H>  3.9   |  25  |  1.38<H>    Ca    8.3<L>      15 Apr 2020 19:19  Phos  4.3     04-15  Mg     2.3     04-15    TPro  7.7  /  Alb  3.9  /  TBili  0.8  /  DBili  x   /  AST  9<L>  /  ALT  11  /  AlkPhos  52  04-14

## 2020-04-16 NOTE — PHYSICAL THERAPY INITIAL EVALUATION ADULT - PERTINENT HX OF CURRENT PROBLEM, REHAB EVAL
48 y/o M, PMH type 2 DM on metformin and insulin, CAD s/p stent (2010) on asa and plavix, current daily smoker. Pt p/w worsening right foot ulceration. He has had intermittent ulceration of his right foot for over 1 year. He saw his podiatrist, Dr. Casimiro Riley, earlier today who referred him to ED for IV abx. He reports worsening pain over the past several days. He has tried doxycycline with no relief.

## 2020-04-16 NOTE — DISCHARGE NOTE PROVIDER - NSDCCPCAREPLAN_GEN_ALL_CORE_FT
PRINCIPAL DISCHARGE DIAGNOSIS  Diagnosis: Osteomyelitis of ankle or foot  Assessment and Plan of Treatment:       SECONDARY DISCHARGE DIAGNOSES  Diagnosis: Subcutaneous air  Assessment and Plan of Treatment:

## 2020-04-16 NOTE — PROGRESS NOTE ADULT - SUBJECTIVE AND OBJECTIVE BOX
Patient is a 49y old  Male who presents with a chief complaint of gas gangrene (16 Apr 2020 12:19)       INTERVAL HPI/OVERNIGHT EVENTS:  Patient seen and evaluated at bedside.  Pt is resting comfortable in NAD. Denies N/V/F/C.  Pain rated at X/10    Allergies    No Known Allergies    Intolerances        Vital Signs Last 24 Hrs  T(C): 37.4 (16 Apr 2020 16:29), Max: 37.4 (16 Apr 2020 16:29)  T(F): 99.4 (16 Apr 2020 16:29), Max: 99.4 (16 Apr 2020 16:29)  HR: 91 (16 Apr 2020 16:29) (70 - 91)  BP: 99/63 (16 Apr 2020 16:29) (87/51 - 113/68)  BP(mean): 82 (15 Apr 2020 21:00) (65 - 83)  RR: 18 (16 Apr 2020 16:29) (14 - 18)  SpO2: 92% (16 Apr 2020 16:29) (92% - 97%)    LABS:                        10.7   8.66  )-----------( 149      ( 16 Apr 2020 04:55 )             31.3     04-16    131<L>  |  95<L>  |  27<H>  ----------------------------<  285<H>  3.6   |  24  |  1.41<H>    Ca    7.8<L>      16 Apr 2020 04:55  Phos  3.2     04-16  Mg     2.1     04-16    TPro  7.7  /  Alb  3.9  /  TBili  0.8  /  DBili  x   /  AST  9<L>  /  ALT  11  /  AlkPhos  52  04-14    PT/INR - ( 14 Apr 2020 18:22 )   PT: 14.8 sec;   INR: 1.28 ratio         PTT - ( 14 Apr 2020 18:22 )  PTT:30.3 sec    CAPILLARY BLOOD GLUCOSE      POCT Blood Glucose.: 235 mg/dL (16 Apr 2020 17:25)  POCT Blood Glucose.: 223 mg/dL (16 Apr 2020 12:12)  POCT Blood Glucose.: 290 mg/dL (16 Apr 2020 08:46)  POCT Blood Glucose.: 320 mg/dL (16 Apr 2020 02:04)  POCT Blood Glucose.: 391 mg/dL (15 Apr 2020 22:53)  POCT Blood Glucose.: 360 mg/dL (15 Apr 2020 21:47)      Lower Extremity Physical Exam:  s/p Right foot partial 3rd and 4th ray resection open - purple discoloration dorsally around skin edges, cap refill present to 2nd + 5th toes - warm, viable, no necrosis, no purulence, no malodor, mild oozing but no active bleeding    RADIOLOGY & ADDITIONAL TESTS:

## 2020-04-16 NOTE — DISCHARGE NOTE PROVIDER - NSDCCPTREATMENT_GEN_ALL_CORE_FT
PRINCIPAL PROCEDURE  Procedure: Partial amputation of third ray of right foot by open approach  Findings and Treatment:       SECONDARY PROCEDURE  Procedure: Partial amputation of third ray of right foot by open approach  Findings and Treatment:

## 2020-04-16 NOTE — PHYSICAL THERAPY INITIAL EVALUATION ADULT - ADDITIONAL COMMENTS
Prior to admission, pt resided in an apartment alone (12 steps to enter) and was independent in adl's and ambulation. Pt has a functioning glucometer at home and is independent in use. Prior to admission, pt resided alone in private house, 3 steps to enter, 1 flight (12 steps to enter) to apt in home. Pt was independent in adl's and ambulation. Pt has a functioning glucometer at home and is independent in use. (+) driving.

## 2020-04-16 NOTE — PHYSICAL THERAPY INITIAL EVALUATION ADULT - IMPAIRMENTS FOUND, PT EVAL
muscle strength/aerobic capacity/endurance/gait, locomotion, and balance aerobic capacity/endurance/gait, locomotion, and balance/integumentary integrity/muscle strength

## 2020-04-16 NOTE — CHART NOTE - NSCHARTNOTEFT_GEN_A_CORE
SURGERY CHART NOTE    Patient febrile to 101.5F (38.6C).   Right foot partial 3rd &4th ray resection without gross necrosis  Fever workup: Blood cultures obtained x2, stat labs including CBC, BMP, Mg, Phos, CXR, UA     Vascular Surgery   [id number]

## 2020-04-16 NOTE — PROGRESS NOTE ADULT - ASSESSMENT
49M s/p Right foot partial 3rd and 4th ray resection (DOS 4/15)   -pt seen and evaluated   -intra-operatively extensive necrotic tissue removed; clean margin sent from 4th metatarsal   -currently surgical site appears clean, other toes are currently viable   -will await for final OR data prior to discharge   -continue with IV antibiotics   -Plan for VAC application tomorrow   -discussed w/ attending

## 2020-04-16 NOTE — CONSULT NOTE ADULT - ASSESSMENT
48 y/o M w/ severely uncontrolled Type 2 DM complicated by neuropathy and CAD w/ glucose values around 400 and prior A1c 12% now with gangrene on right foot with HTN, HLD (high risk patient with severely uncontrolled Type 2 DM w/ hyperglycemia around 400 A1c 12% previously with high risk for CAD and CVA with high level decision-making).

## 2020-04-16 NOTE — PHYSICAL THERAPY INITIAL EVALUATION ADULT - PLANNED THERAPY INTERVENTIONS, PT EVAL
strengthening/gait training/bed mobility training/transfer training/GOAL: Pt will be able to Negotiate up/down 10 steps, independently, w/ unilateral rail/and appropriate assistive device, w/reciprocal/step-to gait pattern, in 2 weeks. GOAL: Pt will be able to Negotiate up/down 10 steps, independently, w/ unilateral rail/and appropriate assistive device, w/reciprocal/step-to gait pattern, in 2 weeks.; Negative Pressure Wound Therapy/strengthening/gait training/bed mobility training/transfer training

## 2020-04-16 NOTE — PROGRESS NOTE ADULT - ASSESSMENT
50yo M s/p right 3 and 4th toe ray amputation 2/2 gas gangrene of right foot. Persistent hyperglycemia     Plan  - Consult Endo in the AM  - IV abx: vancomycin and zosyn  - PT consult  - Pain control  - IS/OOB     Vascular Surgery  [id number] 50yo M s/p right 3 and 4th toe ray amputation 2/2 gas gangrene of right foot. Persistent hyperglycemia     Plan  - Endo consult- will see patient today, preliminary recommendations- 10 lantus now (in addition to overnight 6), 6 pre-meal humalog   - IV abx: vancomycin and zosyn  - PT consult  - Pain control  - IS/OOB     Vascular Surgery  [id number]

## 2020-04-16 NOTE — DISCHARGE NOTE PROVIDER - HOSPITAL COURSE
4/14: 50yo Man with PMH T2DM and CAD presents with gas gangrene of right foot. Underwent debridement in ED by podiatry        4/15: OR w pods 4/14: 50yo Man with PMH T2DM and CAD presents with gas gangrene of right foot. Underwent debridement in ED by podiatry        4/15: OR w pods        At time of dc pt clinically and hemodynamically stable

## 2020-04-16 NOTE — PHYSICAL THERAPY INITIAL EVALUATION ADULT - PRECAUTIONS/LIMITATIONS, REHAB EVAL
CONT:  He reports nausea, vomiting, chills, difficulty ambulating 2/2 pain, right leg swelling. Febrile in ED. COVID-19 pcr not detected. ED xray foot showed no cortical erosions or periosteal reaction to suggest osteomyelitis. There is subcutaneous emphysema within the MTP interdigital webspaces. Pt s/p partial amputation of the third and fourth ray of right foot by open approach on 4/15/20. Xray R foot 4/15: Pt s/p interval amputation of the third and fourth rays at the level of the distal metatarsals. Adjacent soft tissue swelling and soft tissue air with bandaging material. The surgical margins are sharp. VA physiol b/l LE: Mild arterial flow limitation in the left lower extremity. CONT:  He reports nausea, vomiting, chills, difficulty ambulating 2/2 pain, right leg swelling. Febrile in ED. COVID-19 pcr not detected. ED xray foot showed no cortical erosions or periosteal reaction to suggest osteomyelitis. There is subcutaneous emphysema within the MTP interdigital webspaces. Pt s/p partial amputation of the third and fourth ray of right foot by open approach on 4/15/20. Xray R foot 4/15: Pt s/p interval amputation of the third and fourth rays at the level of the distal metatarsals. Adjacent soft tissue swelling and soft tissue air with bandaging material. The surgical margins are sharp. VA physiol b/l LE: Mild arterial flow limitation in the left lower extremity./fall precautions/surgical precautions

## 2020-04-17 ENCOUNTER — TRANSCRIPTION ENCOUNTER (OUTPATIENT)
Age: 50
End: 2020-04-17

## 2020-04-17 LAB
ANION GAP SERPL CALC-SCNC: 12 MMOL/L — SIGNIFICANT CHANGE UP (ref 5–17)
ANION GAP SERPL CALC-SCNC: 14 MMOL/L — SIGNIFICANT CHANGE UP (ref 5–17)
APPEARANCE UR: CLEAR — SIGNIFICANT CHANGE UP
BILIRUB UR-MCNC: NEGATIVE — SIGNIFICANT CHANGE UP
BUN SERPL-MCNC: 20 MG/DL — SIGNIFICANT CHANGE UP (ref 7–23)
BUN SERPL-MCNC: 22 MG/DL — SIGNIFICANT CHANGE UP (ref 7–23)
CALCIUM SERPL-MCNC: 8.2 MG/DL — LOW (ref 8.4–10.5)
CALCIUM SERPL-MCNC: 8.4 MG/DL — SIGNIFICANT CHANGE UP (ref 8.4–10.5)
CHLORIDE SERPL-SCNC: 94 MMOL/L — LOW (ref 96–108)
CHLORIDE SERPL-SCNC: 96 MMOL/L — SIGNIFICANT CHANGE UP (ref 96–108)
CO2 SERPL-SCNC: 24 MMOL/L — SIGNIFICANT CHANGE UP (ref 22–31)
CO2 SERPL-SCNC: 24 MMOL/L — SIGNIFICANT CHANGE UP (ref 22–31)
COLOR SPEC: YELLOW — SIGNIFICANT CHANGE UP
CREAT SERPL-MCNC: 1.15 MG/DL — SIGNIFICANT CHANGE UP (ref 0.5–1.3)
CREAT SERPL-MCNC: 1.18 MG/DL — SIGNIFICANT CHANGE UP (ref 0.5–1.3)
CULTURE RESULTS: SIGNIFICANT CHANGE UP
DIFF PNL FLD: NEGATIVE — SIGNIFICANT CHANGE UP
GLUCOSE BLDC GLUCOMTR-MCNC: 170 MG/DL — HIGH (ref 70–99)
GLUCOSE BLDC GLUCOMTR-MCNC: 183 MG/DL — HIGH (ref 70–99)
GLUCOSE BLDC GLUCOMTR-MCNC: 191 MG/DL — HIGH (ref 70–99)
GLUCOSE BLDC GLUCOMTR-MCNC: 208 MG/DL — HIGH (ref 70–99)
GLUCOSE SERPL-MCNC: 163 MG/DL — HIGH (ref 70–99)
GLUCOSE SERPL-MCNC: 190 MG/DL — HIGH (ref 70–99)
GLUCOSE UR QL: ABNORMAL
HCT VFR BLD CALC: 28.9 % — LOW (ref 39–50)
HCT VFR BLD CALC: 31.6 % — LOW (ref 39–50)
HGB BLD-MCNC: 10.2 G/DL — LOW (ref 13–17)
HGB BLD-MCNC: 9.4 G/DL — LOW (ref 13–17)
KETONES UR-MCNC: NEGATIVE — SIGNIFICANT CHANGE UP
LEUKOCYTE ESTERASE UR-ACNC: NEGATIVE — SIGNIFICANT CHANGE UP
MAGNESIUM SERPL-MCNC: 2.2 MG/DL — SIGNIFICANT CHANGE UP (ref 1.6–2.6)
MAGNESIUM SERPL-MCNC: 2.4 MG/DL — SIGNIFICANT CHANGE UP (ref 1.6–2.6)
MCHC RBC-ENTMCNC: 29.4 PG — SIGNIFICANT CHANGE UP (ref 27–34)
MCHC RBC-ENTMCNC: 29.5 PG — SIGNIFICANT CHANGE UP (ref 27–34)
MCHC RBC-ENTMCNC: 32.3 GM/DL — SIGNIFICANT CHANGE UP (ref 32–36)
MCHC RBC-ENTMCNC: 32.5 GM/DL — SIGNIFICANT CHANGE UP (ref 32–36)
MCV RBC AUTO: 90.3 FL — SIGNIFICANT CHANGE UP (ref 80–100)
MCV RBC AUTO: 91.3 FL — SIGNIFICANT CHANGE UP (ref 80–100)
NITRITE UR-MCNC: NEGATIVE — SIGNIFICANT CHANGE UP
NRBC # BLD: 0 /100 WBCS — SIGNIFICANT CHANGE UP (ref 0–0)
NRBC # BLD: 0 /100 WBCS — SIGNIFICANT CHANGE UP (ref 0–0)
PH UR: 5.5 — SIGNIFICANT CHANGE UP (ref 5–8)
PHOSPHATE SERPL-MCNC: 2.8 MG/DL — SIGNIFICANT CHANGE UP (ref 2.5–4.5)
PHOSPHATE SERPL-MCNC: 3.8 MG/DL — SIGNIFICANT CHANGE UP (ref 2.5–4.5)
PLATELET # BLD AUTO: 161 K/UL — SIGNIFICANT CHANGE UP (ref 150–400)
PLATELET # BLD AUTO: 175 K/UL — SIGNIFICANT CHANGE UP (ref 150–400)
POTASSIUM SERPL-MCNC: 3.6 MMOL/L — SIGNIFICANT CHANGE UP (ref 3.5–5.3)
POTASSIUM SERPL-MCNC: 3.7 MMOL/L — SIGNIFICANT CHANGE UP (ref 3.5–5.3)
POTASSIUM SERPL-SCNC: 3.6 MMOL/L — SIGNIFICANT CHANGE UP (ref 3.5–5.3)
POTASSIUM SERPL-SCNC: 3.7 MMOL/L — SIGNIFICANT CHANGE UP (ref 3.5–5.3)
PROT UR-MCNC: ABNORMAL
RBC # BLD: 3.2 M/UL — LOW (ref 4.2–5.8)
RBC # BLD: 3.46 M/UL — LOW (ref 4.2–5.8)
RBC # FLD: 11.9 % — SIGNIFICANT CHANGE UP (ref 10.3–14.5)
RBC # FLD: 12 % — SIGNIFICANT CHANGE UP (ref 10.3–14.5)
SODIUM SERPL-SCNC: 130 MMOL/L — LOW (ref 135–145)
SODIUM SERPL-SCNC: 134 MMOL/L — LOW (ref 135–145)
SP GR SPEC: 1.02 — SIGNIFICANT CHANGE UP (ref 1.01–1.02)
SPECIMEN SOURCE: SIGNIFICANT CHANGE UP
UROBILINOGEN FLD QL: NEGATIVE — SIGNIFICANT CHANGE UP
VANCOMYCIN FLD-MCNC: 4.1 UG/ML — SIGNIFICANT CHANGE UP
WBC # BLD: 8.72 K/UL — SIGNIFICANT CHANGE UP (ref 3.8–10.5)
WBC # BLD: 8.94 K/UL — SIGNIFICANT CHANGE UP (ref 3.8–10.5)
WBC # FLD AUTO: 8.72 K/UL — SIGNIFICANT CHANGE UP (ref 3.8–10.5)
WBC # FLD AUTO: 8.94 K/UL — SIGNIFICANT CHANGE UP (ref 3.8–10.5)

## 2020-04-17 RX ORDER — POTASSIUM CHLORIDE 20 MEQ
20 PACKET (EA) ORAL ONCE
Refills: 0 | Status: COMPLETED | OUTPATIENT
Start: 2020-04-17 | End: 2020-04-17

## 2020-04-17 RX ORDER — VANCOMYCIN HCL 1 G
VIAL (EA) INTRAVENOUS
Refills: 0 | Status: DISCONTINUED | OUTPATIENT
Start: 2020-04-17 | End: 2020-04-17

## 2020-04-17 RX ORDER — LANOLIN ALCOHOL/MO/W.PET/CERES
3 CREAM (GRAM) TOPICAL AT BEDTIME
Refills: 0 | Status: DISCONTINUED | OUTPATIENT
Start: 2020-04-17 | End: 2020-04-20

## 2020-04-17 RX ADMIN — PIPERACILLIN AND TAZOBACTAM 25 GRAM(S): 4; .5 INJECTION, POWDER, LYOPHILIZED, FOR SOLUTION INTRAVENOUS at 11:47

## 2020-04-17 RX ADMIN — ENOXAPARIN SODIUM 40 MILLIGRAM(S): 100 INJECTION SUBCUTANEOUS at 11:46

## 2020-04-17 RX ADMIN — Medication 2: at 13:23

## 2020-04-17 RX ADMIN — OXYCODONE HYDROCHLORIDE 10 MILLIGRAM(S): 5 TABLET ORAL at 20:12

## 2020-04-17 RX ADMIN — Medication 1 PATCH: at 17:42

## 2020-04-17 RX ADMIN — HYDROMORPHONE HYDROCHLORIDE 0.5 MILLIGRAM(S): 2 INJECTION INTRAMUSCULAR; INTRAVENOUS; SUBCUTANEOUS at 10:49

## 2020-04-17 RX ADMIN — PIPERACILLIN AND TAZOBACTAM 25 GRAM(S): 4; .5 INJECTION, POWDER, LYOPHILIZED, FOR SOLUTION INTRAVENOUS at 20:12

## 2020-04-17 RX ADMIN — Medication 3 MILLIGRAM(S): at 23:11

## 2020-04-17 RX ADMIN — Medication 6 UNIT(S): at 13:22

## 2020-04-17 RX ADMIN — OXYCODONE HYDROCHLORIDE 5 MILLIGRAM(S): 5 TABLET ORAL at 04:31

## 2020-04-17 RX ADMIN — Medication 20 MILLIEQUIVALENT(S): at 09:07

## 2020-04-17 RX ADMIN — OXYCODONE HYDROCHLORIDE 10 MILLIGRAM(S): 5 TABLET ORAL at 16:35

## 2020-04-17 RX ADMIN — Medication 975 MILLIGRAM(S): at 00:02

## 2020-04-17 RX ADMIN — Medication 2: at 09:07

## 2020-04-17 RX ADMIN — Medication 4: at 17:42

## 2020-04-17 RX ADMIN — Medication 3 MILLIGRAM(S): at 00:01

## 2020-04-17 RX ADMIN — Medication 325 MILLIGRAM(S): at 11:47

## 2020-04-17 RX ADMIN — Medication 6 UNIT(S): at 17:42

## 2020-04-17 RX ADMIN — Medication 6 UNIT(S): at 09:06

## 2020-04-17 RX ADMIN — PIPERACILLIN AND TAZOBACTAM 25 GRAM(S): 4; .5 INJECTION, POWDER, LYOPHILIZED, FOR SOLUTION INTRAVENOUS at 04:25

## 2020-04-17 RX ADMIN — SODIUM CHLORIDE 75 MILLILITER(S): 9 INJECTION INTRAMUSCULAR; INTRAVENOUS; SUBCUTANEOUS at 17:42

## 2020-04-17 RX ADMIN — Medication 1 PATCH: at 18:34

## 2020-04-17 RX ADMIN — INSULIN GLARGINE 18 UNIT(S): 100 INJECTION, SOLUTION SUBCUTANEOUS at 22:17

## 2020-04-17 RX ADMIN — OXYCODONE HYDROCHLORIDE 10 MILLIGRAM(S): 5 TABLET ORAL at 09:07

## 2020-04-17 NOTE — DISCHARGE NOTE NURSING/CASE MANAGEMENT/SOCIAL WORK - PATIENT PORTAL LINK FT
You can access the FollowMyHealth Patient Portal offered by [facility] by registering at the following website: [email or web address]. By joining [employer]’s FollowMyHealth portal, you will also be able to view your health information using other applications (apps) compatible with our system.

## 2020-04-17 NOTE — PROGRESS NOTE ADULT - SUBJECTIVE AND OBJECTIVE BOX
Vascular Surgery Progress Note     S: patient febrile overnight. fever workup with no obvious source. normal WBC. Patient denies any cough or URI symptoms.    MEDICATIONS  (STANDING):  aspirin 325 milliGRAM(s) Oral daily  dextrose 5%. 1000 milliLiter(s) (50 mL/Hr) IV Continuous <Continuous>  dextrose 50% Injectable 12.5 Gram(s) IV Push once  dextrose 50% Injectable 25 Gram(s) IV Push once  dextrose 50% Injectable 25 Gram(s) IV Push once  enoxaparin Injectable 40 milliGRAM(s) SubCutaneous daily  insulin glargine Injectable (LANTUS) 18 Unit(s) SubCutaneous at bedtime  insulin lispro (HumaLOG) corrective regimen sliding scale   SubCutaneous three times a day before meals  insulin lispro (HumaLOG) corrective regimen sliding scale   SubCutaneous at bedtime  insulin lispro Injectable (HumaLOG) 6 Unit(s) SubCutaneous three times a day before meals  lisinopril 20 milliGRAM(s) Oral daily  losartan 50 milliGRAM(s) Oral daily  nicotine -  14 mG/24Hr(s) Patch 1 patch Transdermal daily  piperacillin/tazobactam IVPB.. 3.375 Gram(s) IV Intermittent every 8 hours  sodium chloride 0.9%. 1000 milliLiter(s) (75 mL/Hr) IV Continuous <Continuous>    MEDICATIONS  (PRN):  acetaminophen   Tablet .. 975 milliGRAM(s) Oral every 6 hours PRN Temp greater or equal to 38C (100.4F), Mild Pain (1 - 3)  dextrose 40% Gel 15 Gram(s) Oral once PRN Blood Glucose LESS THAN 70 milliGRAM(s)/deciliter  glucagon  Injectable 1 milliGRAM(s) IntraMuscular once PRN Glucose LESS THAN 70 milligrams/deciliter  HYDROmorphone  Injectable 0.5 milliGRAM(s) IV Push every 4 hours PRN Severe Pain (7 - 10)  oxyCODONE    IR 5 milliGRAM(s) Oral every 4 hours PRN Moderate Pain (4 - 6)  oxyCODONE    IR 10 milliGRAM(s) Oral every 4 hours PRN Severe Pain (7 - 10)      Physical Exam:    Vital Signs Last 24 Hrs  T(C): 36.6 (17 Apr 2020 04:22), Max: 38.6 (16 Apr 2020 22:42)  T(F): 97.9 (17 Apr 2020 04:22), Max: 101.5 (16 Apr 2020 22:42)  HR: 74 (17 Apr 2020 04:22) (74 - 91)  BP: 91/58 (17 Apr 2020 04:22) (91/58 - 113/68)  BP(mean): --  RR: 18 (17 Apr 2020 00:03) (18 - 18)  SpO2: 93% (17 Apr 2020 00:03) (92% - 95%)    04-15-20 @ 07:01  -  04-16-20 @ 07:00  --------------------------------------------------------  IN: 981.1 mL / OUT: 1200 mL / NET: -218.9 mL    04-16-20 @ 07:01  -  04-17-20 @ 05:41  --------------------------------------------------------  IN: 2080 mL / OUT: 0 mL / NET: 2080 mL        Gen: NAD, alert and oriented  RL: dressing in place, feet warm with pulses     LABS:                        9.4    8.72  )-----------( 175      ( 17 Apr 2020 00:50 )             28.9     04-17    130<L>  |  94<L>  |  22  ----------------------------<  190<H>  3.6   |  24  |  1.18    Ca    8.2<L>      17 Apr 2020 00:50  Phos  2.8     04-17  Mg     2.2     04-17

## 2020-04-17 NOTE — PROGRESS NOTE ADULT - ASSESSMENT
48yo M s/p right 3 and 4th toe ray amputation 2/2 gas gangrene of right foot. Persistent hyperglycemia     -appreciate endocrine recommendations, please begin to set up for discharge, as patient will likely need lantus and unclear insurance status at this time  -plan for vac placement with podiatry today, will attempt to see wound today because patient febrile overnight. also needs vac paperwork for home  -vanc trough today and will continue with antibiotics    page [phone] with surgical questions   Shannan Espinoza, PGY-4 48yo M s/p right 3 and 4th toe ray amputation 2/2 gas gangrene of right foot. Persistent hyperglycemia     -appreciate endocrine recommendations, please begin to set up for discharge, as patient will likely need lantus and unclear insurance status at this time  -plan for vac placement with podiatry today, will attempt to see wound today because patient febrile overnight. also needs vac paperwork for home  -vanc trough today and will continue with antibiotics, follow up remainder of fever workup    page [phone] with surgical questions   Shannan Espinoza, PGY-4

## 2020-04-18 LAB
A1C WITH ESTIMATED AVERAGE GLUCOSE RESULT: 10.5 % — HIGH (ref 4–5.6)
ANION GAP SERPL CALC-SCNC: 11 MMOL/L — SIGNIFICANT CHANGE UP (ref 5–17)
BUN SERPL-MCNC: 12 MG/DL — SIGNIFICANT CHANGE UP (ref 7–23)
CALCIUM SERPL-MCNC: 8.7 MG/DL — SIGNIFICANT CHANGE UP (ref 8.4–10.5)
CHLORIDE SERPL-SCNC: 97 MMOL/L — SIGNIFICANT CHANGE UP (ref 96–108)
CO2 SERPL-SCNC: 27 MMOL/L — SIGNIFICANT CHANGE UP (ref 22–31)
CREAT SERPL-MCNC: 0.93 MG/DL — SIGNIFICANT CHANGE UP (ref 0.5–1.3)
ESTIMATED AVERAGE GLUCOSE: 255 MG/DL — HIGH (ref 68–114)
GLUCOSE BLDC GLUCOMTR-MCNC: 134 MG/DL — HIGH (ref 70–99)
GLUCOSE BLDC GLUCOMTR-MCNC: 152 MG/DL — HIGH (ref 70–99)
GLUCOSE BLDC GLUCOMTR-MCNC: 175 MG/DL — HIGH (ref 70–99)
GLUCOSE BLDC GLUCOMTR-MCNC: 193 MG/DL — HIGH (ref 70–99)
GLUCOSE SERPL-MCNC: 147 MG/DL — HIGH (ref 70–99)
HCT VFR BLD CALC: 28.6 % — LOW (ref 39–50)
HGB BLD-MCNC: 9.2 G/DL — LOW (ref 13–17)
MAGNESIUM SERPL-MCNC: 2.2 MG/DL — SIGNIFICANT CHANGE UP (ref 1.6–2.6)
MCHC RBC-ENTMCNC: 29.2 PG — SIGNIFICANT CHANGE UP (ref 27–34)
MCHC RBC-ENTMCNC: 32.2 GM/DL — SIGNIFICANT CHANGE UP (ref 32–36)
MCV RBC AUTO: 90.8 FL — SIGNIFICANT CHANGE UP (ref 80–100)
NRBC # BLD: 0 /100 WBCS — SIGNIFICANT CHANGE UP (ref 0–0)
PHOSPHATE SERPL-MCNC: 3.1 MG/DL — SIGNIFICANT CHANGE UP (ref 2.5–4.5)
PLATELET # BLD AUTO: 169 K/UL — SIGNIFICANT CHANGE UP (ref 150–400)
POTASSIUM SERPL-MCNC: 4.1 MMOL/L — SIGNIFICANT CHANGE UP (ref 3.5–5.3)
POTASSIUM SERPL-SCNC: 4.1 MMOL/L — SIGNIFICANT CHANGE UP (ref 3.5–5.3)
RBC # BLD: 3.15 M/UL — LOW (ref 4.2–5.8)
RBC # FLD: 12 % — SIGNIFICANT CHANGE UP (ref 10.3–14.5)
SODIUM SERPL-SCNC: 135 MMOL/L — SIGNIFICANT CHANGE UP (ref 135–145)
WBC # BLD: 7.42 K/UL — SIGNIFICANT CHANGE UP (ref 3.8–10.5)
WBC # FLD AUTO: 7.42 K/UL — SIGNIFICANT CHANGE UP (ref 3.8–10.5)

## 2020-04-18 PROCEDURE — 99231 SBSQ HOSP IP/OBS SF/LOW 25: CPT

## 2020-04-18 RX ORDER — LOSARTAN POTASSIUM 100 MG/1
1 TABLET, FILM COATED ORAL
Qty: 30 | Refills: 0
Start: 2020-04-18 | End: 2020-05-17

## 2020-04-18 RX ORDER — METFORMIN HYDROCHLORIDE 850 MG/1
1 TABLET ORAL
Qty: 60 | Refills: 0
Start: 2020-04-18 | End: 2020-05-17

## 2020-04-18 RX ORDER — RAMIPRIL 5 MG
1 CAPSULE ORAL
Qty: 0 | Refills: 0 | DISCHARGE

## 2020-04-18 RX ORDER — INSULIN LISPRO 100/ML
8 VIAL (ML) SUBCUTANEOUS
Refills: 0 | Status: DISCONTINUED | OUTPATIENT
Start: 2020-04-18 | End: 2020-04-20

## 2020-04-18 RX ORDER — CLOPIDOGREL BISULFATE 75 MG/1
1 TABLET, FILM COATED ORAL
Qty: 30 | Refills: 0
Start: 2020-04-18 | End: 2020-05-17

## 2020-04-18 RX ORDER — LANOLIN ALCOHOL/MO/W.PET/CERES
1 CREAM (GRAM) TOPICAL
Qty: 30 | Refills: 0
Start: 2020-04-18 | End: 2020-05-17

## 2020-04-18 RX ORDER — INSULIN DEGLUDEC 100 U/ML
70 INJECTION, SOLUTION SUBCUTANEOUS
Qty: 0 | Refills: 0 | DISCHARGE

## 2020-04-18 RX ORDER — OXYCODONE HYDROCHLORIDE 5 MG/1
1 TABLET ORAL
Qty: 12 | Refills: 0
Start: 2020-04-18 | End: 2020-04-20

## 2020-04-18 RX ORDER — RAMIPRIL 5 MG
1 CAPSULE ORAL
Qty: 30 | Refills: 0
Start: 2020-04-18 | End: 2020-05-17

## 2020-04-18 RX ORDER — ASPIRIN/CALCIUM CARB/MAGNESIUM 324 MG
1 TABLET ORAL
Qty: 30 | Refills: 0
Start: 2020-04-18 | End: 2020-05-17

## 2020-04-18 RX ORDER — NICOTINE POLACRILEX 2 MG
1 GUM BUCCAL
Qty: 60 | Refills: 0
Start: 2020-04-18 | End: 2020-05-17

## 2020-04-18 RX ORDER — LISINOPRIL 2.5 MG/1
1 TABLET ORAL
Qty: 30 | Refills: 0
Start: 2020-04-18 | End: 2020-05-17

## 2020-04-18 RX ORDER — LOSARTAN POTASSIUM 100 MG/1
1 TABLET, FILM COATED ORAL
Qty: 0 | Refills: 0 | DISCHARGE

## 2020-04-18 RX ORDER — ACETAMINOPHEN 500 MG
3 TABLET ORAL
Qty: 120 | Refills: 0
Start: 2020-04-18 | End: 2020-04-27

## 2020-04-18 RX ORDER — METFORMIN HYDROCHLORIDE 850 MG/1
1 TABLET ORAL
Qty: 0 | Refills: 0 | DISCHARGE

## 2020-04-18 RX ORDER — INSULIN DEGLUDEC 100 U/ML
70 INJECTION, SOLUTION SUBCUTANEOUS
Qty: 2100 | Refills: 0
Start: 2020-04-18 | End: 2020-05-17

## 2020-04-18 RX ORDER — CLOPIDOGREL BISULFATE 75 MG/1
1 TABLET, FILM COATED ORAL
Qty: 0 | Refills: 0 | DISCHARGE

## 2020-04-18 RX ORDER — METFORMIN HYDROCHLORIDE 850 MG/1
2 TABLET ORAL
Qty: 0 | Refills: 0 | DISCHARGE

## 2020-04-18 RX ORDER — ASPIRIN/CALCIUM CARB/MAGNESIUM 324 MG
1 TABLET ORAL
Qty: 0 | Refills: 0 | DISCHARGE

## 2020-04-18 RX ADMIN — PIPERACILLIN AND TAZOBACTAM 25 GRAM(S): 4; .5 INJECTION, POWDER, LYOPHILIZED, FOR SOLUTION INTRAVENOUS at 21:06

## 2020-04-18 RX ADMIN — Medication 2: at 09:09

## 2020-04-18 RX ADMIN — Medication 2: at 17:40

## 2020-04-18 RX ADMIN — Medication 8 UNIT(S): at 17:40

## 2020-04-18 RX ADMIN — Medication 8 UNIT(S): at 12:31

## 2020-04-18 RX ADMIN — ENOXAPARIN SODIUM 40 MILLIGRAM(S): 100 INJECTION SUBCUTANEOUS at 12:30

## 2020-04-18 RX ADMIN — LOSARTAN POTASSIUM 50 MILLIGRAM(S): 100 TABLET, FILM COATED ORAL at 05:39

## 2020-04-18 RX ADMIN — OXYCODONE HYDROCHLORIDE 10 MILLIGRAM(S): 5 TABLET ORAL at 21:06

## 2020-04-18 RX ADMIN — INSULIN GLARGINE 18 UNIT(S): 100 INJECTION, SOLUTION SUBCUTANEOUS at 21:06

## 2020-04-18 RX ADMIN — OXYCODONE HYDROCHLORIDE 10 MILLIGRAM(S): 5 TABLET ORAL at 14:23

## 2020-04-18 RX ADMIN — Medication 1 PATCH: at 12:43

## 2020-04-18 RX ADMIN — LISINOPRIL 20 MILLIGRAM(S): 2.5 TABLET ORAL at 05:39

## 2020-04-18 RX ADMIN — OXYCODONE HYDROCHLORIDE 10 MILLIGRAM(S): 5 TABLET ORAL at 01:22

## 2020-04-18 RX ADMIN — PIPERACILLIN AND TAZOBACTAM 25 GRAM(S): 4; .5 INJECTION, POWDER, LYOPHILIZED, FOR SOLUTION INTRAVENOUS at 05:35

## 2020-04-18 RX ADMIN — Medication 1 PATCH: at 07:44

## 2020-04-18 RX ADMIN — OXYCODONE HYDROCHLORIDE 10 MILLIGRAM(S): 5 TABLET ORAL at 09:42

## 2020-04-18 RX ADMIN — PIPERACILLIN AND TAZOBACTAM 25 GRAM(S): 4; .5 INJECTION, POWDER, LYOPHILIZED, FOR SOLUTION INTRAVENOUS at 12:30

## 2020-04-18 RX ADMIN — Medication 325 MILLIGRAM(S): at 12:30

## 2020-04-18 RX ADMIN — Medication 1 PATCH: at 12:31

## 2020-04-18 RX ADMIN — Medication 975 MILLIGRAM(S): at 07:55

## 2020-04-18 RX ADMIN — Medication 1 PATCH: at 21:05

## 2020-04-18 RX ADMIN — OXYCODONE HYDROCHLORIDE 10 MILLIGRAM(S): 5 TABLET ORAL at 05:36

## 2020-04-18 RX ADMIN — Medication 6 UNIT(S): at 09:09

## 2020-04-18 NOTE — PROGRESS NOTE ADULT - SUBJECTIVE AND OBJECTIVE BOX
SURGERY DAILY PROGRESS NOTE:      S:   Patient seen and examined. Afebrile overnight. Yesterday vac dressing was placed on amputation site; patient was found to have bleeding and vac was removed. Bleeding artery was identified, pressure was held and surgicil w/ gauze dressing was placed. Dressing remained in tact overnight. This am pain is well controlled. No complaints at this time, though patient fears going home "too soon" as he was told by podiatry he would be staying until Thursday.      O:   Exam:  Gen: NAD. A&Ox3.  Well developed, alert and cooperative.   Resp: No additional work of breathing.   Card: RR. No peripheral edema or pallor.   Abd: Soft, ND, NT. No rebound or guarding.   Ext: WWP. Able to move all extremities equally. L. 3/4 ray resection site c/i, decreased bleeding, no erythema/induration.  Vasc: Palpable pulses throughout    Vital Signs Last 24 Hrs  T(C): 37.4 (2020 00:30), Max: 37.4 (2020 00:30)  T(F): 99.4 (2020 00:30), Max: 99.4 (2020 00:30)  HR: 84 (2020 00:30) (66 - 89)  BP: 107/73 (2020 00:30) (91/58 - 166/73)  BP(mean): --  RR: 18 (2020 00:30) (18 - 18)  SpO2: 92% (2020 00:30) (92% - 95%)      20 @ 07:01  -  20 @ 07:00  --------------------------------------------------------  IN: 2080 mL / OUT: 0 mL / NET: 2080 mL    20 @ 07:01  -  20 @ 01:29  --------------------------------------------------------  IN: 1325 mL / OUT: 1750 mL / NET: -425 mL        LABS:                        10.2   8.94  )-----------( 161      ( 2020 06:52 )             31.6         134<L>  |  96  |  20  ----------------------------<  163<H>  3.7   |  24  |  1.15    Ca    8.4      2020 06:51  Phos  3.8       Mg     2.4             Urinalysis Basic - ( 2020 06:49 )    Color: Yellow / Appearance: Clear / S.016 / pH: x  Gluc: x / Ketone: Negative  / Bili: Negative / Urobili: Negative   Blood: x / Protein: 30 mg/dL / Nitrite: Negative   Leuk Esterase: Negative / RBC: 4 /hpf / WBC 2 /HPF   Sq Epi: x / Non Sq Epi: 1 /hpf / Bacteria: Negative

## 2020-04-18 NOTE — PROGRESS NOTE ADULT - SUBJECTIVE AND OBJECTIVE BOX
Chief Complaint: Evaluating this 50 y/o M for uncontrolled Type 2 DM w/ hyperglycemia      Interval History: Glucose values improving on basal bolus regimen.    MEDICATIONS  (STANDING):  aspirin 325 milliGRAM(s) Oral daily  dextrose 5%. 1000 milliLiter(s) (50 mL/Hr) IV Continuous <Continuous>  dextrose 50% Injectable 12.5 Gram(s) IV Push once  dextrose 50% Injectable 25 Gram(s) IV Push once  dextrose 50% Injectable 25 Gram(s) IV Push once  enoxaparin Injectable 40 milliGRAM(s) SubCutaneous daily  insulin glargine Injectable (LANTUS) 18 Unit(s) SubCutaneous at bedtime  insulin lispro (HumaLOG) corrective regimen sliding scale   SubCutaneous three times a day before meals  insulin lispro (HumaLOG) corrective regimen sliding scale   SubCutaneous at bedtime  insulin lispro Injectable (HumaLOG) 8 Unit(s) SubCutaneous three times a day before meals  lisinopril 20 milliGRAM(s) Oral daily  losartan 50 milliGRAM(s) Oral daily  nicotine -  14 mG/24Hr(s) Patch 1 patch Transdermal daily  piperacillin/tazobactam IVPB.. 3.375 Gram(s) IV Intermittent every 8 hours    MEDICATIONS  (PRN):  acetaminophen   Tablet .. 975 milliGRAM(s) Oral every 6 hours PRN Temp greater or equal to 38C (100.4F), Mild Pain (1 - 3)  dextrose 40% Gel 15 Gram(s) Oral once PRN Blood Glucose LESS THAN 70 milliGRAM(s)/deciliter  glucagon  Injectable 1 milliGRAM(s) IntraMuscular once PRN Glucose LESS THAN 70 milligrams/deciliter  HYDROmorphone  Injectable 0.5 milliGRAM(s) IV Push every 4 hours PRN Severe Pain (7 - 10)  melatonin 3 milliGRAM(s) Oral at bedtime PRN Insomnia  oxyCODONE    IR 5 milliGRAM(s) Oral every 4 hours PRN Moderate Pain (4 - 6)  oxyCODONE    IR 10 milliGRAM(s) Oral every 4 hours PRN Severe Pain (7 - 10)      Allergies    No Known Allergies    Intolerances      Review of Systems: Not obtained at this time      PHYSICAL EXAM:  VITALS: T(C): 36.6 (04-18-20 @ 16:33)  T(F): 97.9 (04-18-20 @ 16:33), Max: 99.4 (04-18-20 @ 00:30)  HR: 69 (04-18-20 @ 16:33) (69 - 84)  BP: 117/80 (04-18-20 @ 16:33) (107/73 - 126/75)  RR:  (18 - 18)  SpO2:  (92% - 97%)  Wt(kg): --  Deferred. Please refer to Vascular PE      POCT Blood Glucose.: 134 mg/dL (04-18-20 @ 12:21)  POCT Blood Glucose.: 152 mg/dL (04-18-20 @ 08:11)  POCT Blood Glucose.: 191 mg/dL (04-17-20 @ 22:08)  POCT Blood Glucose.: 208 mg/dL (04-17-20 @ 17:43)  POCT Blood Glucose.: 183 mg/dL (04-17-20 @ 12:32)  POCT Blood Glucose.: 170 mg/dL (04-17-20 @ 08:25)  POCT Blood Glucose.: 160 mg/dL (04-16-20 @ 22:03)  POCT Blood Glucose.: 235 mg/dL (04-16-20 @ 17:25)  POCT Blood Glucose.: 223 mg/dL (04-16-20 @ 12:12)  POCT Blood Glucose.: 290 mg/dL (04-16-20 @ 08:46)  POCT Blood Glucose.: 320 mg/dL (04-16-20 @ 02:04)  POCT Blood Glucose.: 391 mg/dL (04-15-20 @ 22:53)  POCT Blood Glucose.: 360 mg/dL (04-15-20 @ 21:47)        04-18    135  |  97  |  12  ----------------------------<  147<H>  4.1   |  27  |  0.93    EGFR if : 113  EGFR if non : 97    Ca    8.7      04-18  Mg     2.2     04-18  Phos  3.1     04-18          Thyroid Function Tests:

## 2020-04-18 NOTE — PROGRESS NOTE ADULT - PROBLEM SELECTOR PLAN 1
Goal glucose 100-180  Continue to monitor on Lantus 18 units qhs  Increase Humalog to 8 units with meals.  Will adjust further as needed.

## 2020-04-18 NOTE — PROGRESS NOTE ADULT - ASSESSMENT
48yo M s/p right 3 and 4th toe ray amputation 2/2 gas gangrene of right foot. Persistent hyperglycemia     Plan:   - appreciate endocrine recommendations, please begin to set up for discharge, as patient will likely need lantus and unclear insurance status at this time  - continue with zosyn  - change dressing  - plan for vac placement tomorrow, 4/19, pending continues bleeding from amputation site  - begin to plan for discharge w/ home VNS (vac at bedside).  - contact Podiatry re: rohini.     Vascular Surgery// [phone]

## 2020-04-19 LAB
ANION GAP SERPL CALC-SCNC: 11 MMOL/L — SIGNIFICANT CHANGE UP (ref 5–17)
BUN SERPL-MCNC: 10 MG/DL — SIGNIFICANT CHANGE UP (ref 7–23)
CALCIUM SERPL-MCNC: 9 MG/DL — SIGNIFICANT CHANGE UP (ref 8.4–10.5)
CHLORIDE SERPL-SCNC: 99 MMOL/L — SIGNIFICANT CHANGE UP (ref 96–108)
CO2 SERPL-SCNC: 28 MMOL/L — SIGNIFICANT CHANGE UP (ref 22–31)
CREAT SERPL-MCNC: 1.09 MG/DL — SIGNIFICANT CHANGE UP (ref 0.5–1.3)
CULTURE RESULTS: SIGNIFICANT CHANGE UP
CULTURE RESULTS: SIGNIFICANT CHANGE UP
GLUCOSE BLDC GLUCOMTR-MCNC: 136 MG/DL — HIGH (ref 70–99)
GLUCOSE BLDC GLUCOMTR-MCNC: 146 MG/DL — HIGH (ref 70–99)
GLUCOSE BLDC GLUCOMTR-MCNC: 147 MG/DL — HIGH (ref 70–99)
GLUCOSE BLDC GLUCOMTR-MCNC: 153 MG/DL — HIGH (ref 70–99)
GLUCOSE BLDC GLUCOMTR-MCNC: 158 MG/DL — HIGH (ref 70–99)
GLUCOSE SERPL-MCNC: 143 MG/DL — HIGH (ref 70–99)
HCT VFR BLD CALC: 28.8 % — LOW (ref 39–50)
HGB BLD-MCNC: 9.6 G/DL — LOW (ref 13–17)
MAGNESIUM SERPL-MCNC: 2.1 MG/DL — SIGNIFICANT CHANGE UP (ref 1.6–2.6)
MCHC RBC-ENTMCNC: 30.2 PG — SIGNIFICANT CHANGE UP (ref 27–34)
MCHC RBC-ENTMCNC: 33.3 GM/DL — SIGNIFICANT CHANGE UP (ref 32–36)
MCV RBC AUTO: 90.6 FL — SIGNIFICANT CHANGE UP (ref 80–100)
NRBC # BLD: 0 /100 WBCS — SIGNIFICANT CHANGE UP (ref 0–0)
PHOSPHATE SERPL-MCNC: 4.2 MG/DL — SIGNIFICANT CHANGE UP (ref 2.5–4.5)
PLATELET # BLD AUTO: 203 K/UL — SIGNIFICANT CHANGE UP (ref 150–400)
POTASSIUM SERPL-MCNC: 4.3 MMOL/L — SIGNIFICANT CHANGE UP (ref 3.5–5.3)
POTASSIUM SERPL-SCNC: 4.3 MMOL/L — SIGNIFICANT CHANGE UP (ref 3.5–5.3)
RBC # BLD: 3.18 M/UL — LOW (ref 4.2–5.8)
RBC # FLD: 12.1 % — SIGNIFICANT CHANGE UP (ref 10.3–14.5)
SODIUM SERPL-SCNC: 138 MMOL/L — SIGNIFICANT CHANGE UP (ref 135–145)
SPECIMEN SOURCE: SIGNIFICANT CHANGE UP
SPECIMEN SOURCE: SIGNIFICANT CHANGE UP
WBC # BLD: 6.75 K/UL — SIGNIFICANT CHANGE UP (ref 3.8–10.5)
WBC # FLD AUTO: 6.75 K/UL — SIGNIFICANT CHANGE UP (ref 3.8–10.5)

## 2020-04-19 RX ORDER — INSULIN GLARGINE 100 [IU]/ML
20 INJECTION, SOLUTION SUBCUTANEOUS
Qty: 6 | Refills: 0
Start: 2020-04-19 | End: 2020-05-18

## 2020-04-19 RX ORDER — METFORMIN HYDROCHLORIDE 850 MG/1
1 TABLET ORAL
Qty: 60 | Refills: 0
Start: 2020-04-19 | End: 2020-05-18

## 2020-04-19 RX ADMIN — OXYCODONE HYDROCHLORIDE 10 MILLIGRAM(S): 5 TABLET ORAL at 19:56

## 2020-04-19 RX ADMIN — Medication 8 UNIT(S): at 17:18

## 2020-04-19 RX ADMIN — Medication 1 PATCH: at 07:29

## 2020-04-19 RX ADMIN — OXYCODONE HYDROCHLORIDE 10 MILLIGRAM(S): 5 TABLET ORAL at 05:36

## 2020-04-19 RX ADMIN — Medication 8 UNIT(S): at 08:50

## 2020-04-19 RX ADMIN — INSULIN GLARGINE 18 UNIT(S): 100 INJECTION, SOLUTION SUBCUTANEOUS at 22:11

## 2020-04-19 RX ADMIN — Medication 1 PATCH: at 11:20

## 2020-04-19 RX ADMIN — Medication 1 PATCH: at 17:20

## 2020-04-19 RX ADMIN — ENOXAPARIN SODIUM 40 MILLIGRAM(S): 100 INJECTION SUBCUTANEOUS at 11:17

## 2020-04-19 RX ADMIN — PIPERACILLIN AND TAZOBACTAM 25 GRAM(S): 4; .5 INJECTION, POWDER, LYOPHILIZED, FOR SOLUTION INTRAVENOUS at 22:10

## 2020-04-19 RX ADMIN — Medication 325 MILLIGRAM(S): at 11:19

## 2020-04-19 RX ADMIN — OXYCODONE HYDROCHLORIDE 10 MILLIGRAM(S): 5 TABLET ORAL at 01:10

## 2020-04-19 RX ADMIN — Medication 1 PATCH: at 11:17

## 2020-04-19 RX ADMIN — OXYCODONE HYDROCHLORIDE 10 MILLIGRAM(S): 5 TABLET ORAL at 23:56

## 2020-04-19 RX ADMIN — OXYCODONE HYDROCHLORIDE 10 MILLIGRAM(S): 5 TABLET ORAL at 15:49

## 2020-04-19 RX ADMIN — PIPERACILLIN AND TAZOBACTAM 25 GRAM(S): 4; .5 INJECTION, POWDER, LYOPHILIZED, FOR SOLUTION INTRAVENOUS at 05:37

## 2020-04-19 RX ADMIN — PIPERACILLIN AND TAZOBACTAM 25 GRAM(S): 4; .5 INJECTION, POWDER, LYOPHILIZED, FOR SOLUTION INTRAVENOUS at 11:19

## 2020-04-19 RX ADMIN — OXYCODONE HYDROCHLORIDE 10 MILLIGRAM(S): 5 TABLET ORAL at 09:36

## 2020-04-19 RX ADMIN — Medication 8 UNIT(S): at 12:45

## 2020-04-19 NOTE — CHART NOTE - NSCHARTNOTEFT_GEN_A_CORE
Endocrinology follow-up. Pt. to be discharged. Current insulin requirements reveal much lower insulin requirements in-patient compared to home likely due to dietary differences. Would avoid sending him home on high doses of insulin given risk of hypoglycemia. Can d/c on Tresiba 20 units qhs and metformin 1000mg BID with improved dietary modifications. Please contact us with any additional questions.    Fran Weber D.O  [phone]

## 2020-04-19 NOTE — CHART NOTE - NSCHARTNOTEFT_GEN_A_CORE
Nutrition Services  Diet Rxd- Consistent CHO DASH   Dxd with Gas gangrene to s/p right 3 and 4th toe ray amputation 2/2 gas gangrene of right foot. Persistent hyperglycemia   Patient referred for nutrition education by consult.  Discussed with  and Patient set for discharge home pending podiatry recommendation.  Upon approach of Patient, Patient polite but very negative.  This RDN provided motivational counseling but Patient said being in hospital makes you negative.  Advised Patient that this RDN has been here for 18 years and is still positive.  his reply was, "I'm sorry to hear that". Patient admits that he was not managing his diabetes at home. His A1c was once >12% and now at 10.5%.  Reinforced that he must have been doing something different to improve value.  Responded that the test must have been wrong.  Very negative and unable to teach back anything positive.  Was not checking finger sticks or watching diet.  Patient had a very difficult time even discussing what he usually eats.  Tries to cook but not all the time.  Eats heavily starchy diet and proteins.  Dislikes all vegetables except some salad greens in bag.  Drinks sugar free beverages.  Admits to being diabetic for 20 years but with a knowledge and self care deficit and lack of regard for dietary discretion.  Reviewed meals and made suggestions but Patient not engaged.  Answered RDN with a condescending comment.  "Why haven't I gotten back while I'm her if it is okay to eat?"  Explained that he is on a DASH diet and [place] can be had in moderation.  Reinforced consistent meal planning, portion control, whole grains vs. simple CHO, eating more food from home and less take out.  Mentioned that he likes [place] and reinforced that could be acceptable if he watches what he adds to it.  His response was, "If it is OK to eat, then why don't they serve it here?"  Again reinforced the need for balance and moderation,  the benefit of lean protein, whole grains and including vegetables of some sort. Self monitoring also mentioned.   Nutrition Dx- Knowledge Deficit related to lack of interest and value for nutrition and diet and diabetes management. Do not see Patient this Patient complying with diet and will need reinforcement as outpatient.   Patient also with increased nutrient needs related to wound healing and suggest Vit c and multivitamin.   PLAN:  Consistent CHO DASH  Reinforce diabetes education and motivational counseling  Monitor diet tolerance, po intake, GI tolerance, weight trends, labs, and skin integrity  RDN remains available for follow up   Vit C and multivitamin for wound healing.    Mariama Fabian MA,RD,CHES,CDN  Beeper [phone]

## 2020-04-19 NOTE — PROGRESS NOTE ADULT - ASSESSMENT
48yo M s/p right 3 and 4th toe ray amputation 2/2 gas gangrene of right foot. Persistent hyperglycemia; has had home medications adjusted to reflect this. Will need extensive diet modification upon discharge per endocrine. Awaiting vac placement and final cultures for discharge.    Plan:   - appreciate endocrine recommendations: prescriptions upon discharge have been sent to pharmacy (lantus 20 units nightly; metformin 1000mg BID)  - continue with zosyn, podiatry to leave discharge recommendations tomorrow after am rounds  - no dressing change today, podiatry to evaluate tomorrow am  - plan for vac re-placement tomorrow  - dispo w/ home VNS (vac at bedside).    Podiatry has been communicated with and is aware of current place.     Vascular Surgery// [phone]

## 2020-04-19 NOTE — PROGRESS NOTE ADULT - SUBJECTIVE AND OBJECTIVE BOX
SURGERY DAILY PROGRESS NOTE:    Interval: Vac dressing taken of Friday evening due to bleeding, a pressure dressing was placed. Remains unsaturated. Podiatry was spoken to today and have stated they will not be changing the dressing today, they will evaluate on rounds tomorrow and place vac if appropriate. Patient has VNS set up at home and vac supplies at bedside, can be discharged after vac placement.     S:   Patient seen and examined. Pain is well controlled. Able to ambulated with boot. GI fxn +/+. Tolerating diet w/o N/V. Denies fever, chills, SOB, cough, chest pain.     O:   Exam:  Gen: NAD. A&Ox3.  Well developed, alert and cooperative.   Resp: No additional work of breathing.   Card: RR. No peripheral edema or pallor.   Abd: Soft, ND, NT.  Ext: WWP. Able to move all extremities equally. L. 3/4 ray resection site w/ dressing in place c/i, non-saturated at this time, no erythema/induration.  Vasc: Palpable pulses throughout    Vital Signs Last 24 Hrs  T(C): 36.8 (19 Apr 2020 08:09), Max: 36.8 (19 Apr 2020 01:00)  T(F): 98.3 (19 Apr 2020 08:09), Max: 98.3 (19 Apr 2020 08:09)  HR: 76 (19 Apr 2020 08:09) (69 - 105)  BP: 115/74 (19 Apr 2020 08:09) (113/74 - 118/72)  BP(mean): --  RR: 18 (19 Apr 2020 08:09) (18 - 18)  SpO2: 94% (19 Apr 2020 08:09) (94% - 99%)      04-18-20 @ 07:01  -  04-19-20 @ 07:00  --------------------------------------------------------  IN: 1160 mL / OUT: 2450 mL / NET: -1290 mL    04-19-20 @ 07:01  -  04-19-20 @ 14:50  --------------------------------------------------------  IN: 0 mL / OUT: 800 mL / NET: -800 mL        LABS:                        9.2    7.42  )-----------( 169      ( 18 Apr 2020 06:31 )             28.6     04-18    135  |  97  |  12  ----------------------------<  147<H>  4.1   |  27  |  0.93    Ca    8.7      18 Apr 2020 06:31  Phos  3.1     04-18  Mg     2.2     04-18

## 2020-04-20 VITALS
DIASTOLIC BLOOD PRESSURE: 81 MMHG | RESPIRATION RATE: 18 BRPM | TEMPERATURE: 98 F | SYSTOLIC BLOOD PRESSURE: 127 MMHG | HEART RATE: 80 BPM | OXYGEN SATURATION: 97 %

## 2020-04-20 DIAGNOSIS — F17.200 NICOTINE DEPENDENCE, UNSPECIFIED, UNCOMPLICATED: ICD-10-CM

## 2020-04-20 LAB
ANION GAP SERPL CALC-SCNC: 13 MMOL/L — SIGNIFICANT CHANGE UP (ref 5–17)
BUN SERPL-MCNC: 11 MG/DL — SIGNIFICANT CHANGE UP (ref 7–23)
CALCIUM SERPL-MCNC: 9.3 MG/DL — SIGNIFICANT CHANGE UP (ref 8.4–10.5)
CHLORIDE SERPL-SCNC: 96 MMOL/L — SIGNIFICANT CHANGE UP (ref 96–108)
CO2 SERPL-SCNC: 25 MMOL/L — SIGNIFICANT CHANGE UP (ref 22–31)
CREAT SERPL-MCNC: 0.99 MG/DL — SIGNIFICANT CHANGE UP (ref 0.5–1.3)
CULTURE RESULTS: SIGNIFICANT CHANGE UP
GLUCOSE BLDC GLUCOMTR-MCNC: 148 MG/DL — HIGH (ref 70–99)
GLUCOSE SERPL-MCNC: 139 MG/DL — HIGH (ref 70–99)
HCT VFR BLD CALC: 29.1 % — LOW (ref 39–50)
HGB BLD-MCNC: 9.7 G/DL — LOW (ref 13–17)
MAGNESIUM SERPL-MCNC: 2.1 MG/DL — SIGNIFICANT CHANGE UP (ref 1.6–2.6)
MCHC RBC-ENTMCNC: 29.9 PG — SIGNIFICANT CHANGE UP (ref 27–34)
MCHC RBC-ENTMCNC: 33.3 GM/DL — SIGNIFICANT CHANGE UP (ref 32–36)
MCV RBC AUTO: 89.8 FL — SIGNIFICANT CHANGE UP (ref 80–100)
NRBC # BLD: 0 /100 WBCS — SIGNIFICANT CHANGE UP (ref 0–0)
PHOSPHATE SERPL-MCNC: 4.1 MG/DL — SIGNIFICANT CHANGE UP (ref 2.5–4.5)
PLATELET # BLD AUTO: 221 K/UL — SIGNIFICANT CHANGE UP (ref 150–400)
POTASSIUM SERPL-MCNC: 4.6 MMOL/L — SIGNIFICANT CHANGE UP (ref 3.5–5.3)
POTASSIUM SERPL-SCNC: 4.6 MMOL/L — SIGNIFICANT CHANGE UP (ref 3.5–5.3)
RBC # BLD: 3.24 M/UL — LOW (ref 4.2–5.8)
RBC # FLD: 12 % — SIGNIFICANT CHANGE UP (ref 10.3–14.5)
SODIUM SERPL-SCNC: 134 MMOL/L — LOW (ref 135–145)
SPECIMEN SOURCE: SIGNIFICANT CHANGE UP
WBC # BLD: 7.48 K/UL — SIGNIFICANT CHANGE UP (ref 3.8–10.5)
WBC # FLD AUTO: 7.48 K/UL — SIGNIFICANT CHANGE UP (ref 3.8–10.5)

## 2020-04-20 PROCEDURE — 85652 RBC SED RATE AUTOMATED: CPT

## 2020-04-20 PROCEDURE — 81001 URINALYSIS AUTO W/SCOPE: CPT

## 2020-04-20 PROCEDURE — 97116 GAIT TRAINING THERAPY: CPT

## 2020-04-20 PROCEDURE — 87205 SMEAR GRAM STAIN: CPT

## 2020-04-20 PROCEDURE — 86140 C-REACTIVE PROTEIN: CPT

## 2020-04-20 PROCEDURE — 86901 BLOOD TYPING SEROLOGIC RH(D): CPT

## 2020-04-20 PROCEDURE — 96374 THER/PROPH/DIAG INJ IV PUSH: CPT | Mod: XU

## 2020-04-20 PROCEDURE — 82330 ASSAY OF CALCIUM: CPT

## 2020-04-20 PROCEDURE — 88305 TISSUE EXAM BY PATHOLOGIST: CPT

## 2020-04-20 PROCEDURE — 82962 GLUCOSE BLOOD TEST: CPT

## 2020-04-20 PROCEDURE — 87070 CULTURE OTHR SPECIMN AEROBIC: CPT

## 2020-04-20 PROCEDURE — 82947 ASSAY GLUCOSE BLOOD QUANT: CPT

## 2020-04-20 PROCEDURE — 82435 ASSAY OF BLOOD CHLORIDE: CPT

## 2020-04-20 PROCEDURE — 86850 RBC ANTIBODY SCREEN: CPT

## 2020-04-20 PROCEDURE — 93005 ELECTROCARDIOGRAM TRACING: CPT

## 2020-04-20 PROCEDURE — 93923 UPR/LXTR ART STDY 3+ LVLS: CPT

## 2020-04-20 PROCEDURE — 85730 THROMBOPLASTIN TIME PARTIAL: CPT

## 2020-04-20 PROCEDURE — 83605 ASSAY OF LACTIC ACID: CPT

## 2020-04-20 PROCEDURE — 84100 ASSAY OF PHOSPHORUS: CPT

## 2020-04-20 PROCEDURE — 97161 PT EVAL LOW COMPLEX 20 MIN: CPT

## 2020-04-20 PROCEDURE — 87075 CULTR BACTERIA EXCEPT BLOOD: CPT

## 2020-04-20 PROCEDURE — 83735 ASSAY OF MAGNESIUM: CPT

## 2020-04-20 PROCEDURE — 97164 PT RE-EVAL EST PLAN CARE: CPT

## 2020-04-20 PROCEDURE — 88311 DECALCIFY TISSUE: CPT

## 2020-04-20 PROCEDURE — 86900 BLOOD TYPING SEROLOGIC ABO: CPT

## 2020-04-20 PROCEDURE — 83036 HEMOGLOBIN GLYCOSYLATED A1C: CPT

## 2020-04-20 PROCEDURE — 80053 COMPREHEN METABOLIC PANEL: CPT

## 2020-04-20 PROCEDURE — 85014 HEMATOCRIT: CPT

## 2020-04-20 PROCEDURE — 84295 ASSAY OF SERUM SODIUM: CPT

## 2020-04-20 PROCEDURE — 71045 X-RAY EXAM CHEST 1 VIEW: CPT

## 2020-04-20 PROCEDURE — 97530 THERAPEUTIC ACTIVITIES: CPT

## 2020-04-20 PROCEDURE — 99232 SBSQ HOSP IP/OBS MODERATE 35: CPT

## 2020-04-20 PROCEDURE — 84132 ASSAY OF SERUM POTASSIUM: CPT

## 2020-04-20 PROCEDURE — 73630 X-RAY EXAM OF FOOT: CPT

## 2020-04-20 PROCEDURE — 80048 BASIC METABOLIC PNL TOTAL CA: CPT

## 2020-04-20 PROCEDURE — 99285 EMERGENCY DEPT VISIT HI MDM: CPT | Mod: 25

## 2020-04-20 PROCEDURE — 10061 I&D ABSCESS COMP/MULTIPLE: CPT

## 2020-04-20 PROCEDURE — 85027 COMPLETE CBC AUTOMATED: CPT

## 2020-04-20 PROCEDURE — 87635 SARS-COV-2 COVID-19 AMP PRB: CPT

## 2020-04-20 PROCEDURE — 87040 BLOOD CULTURE FOR BACTERIA: CPT

## 2020-04-20 PROCEDURE — 85610 PROTHROMBIN TIME: CPT

## 2020-04-20 PROCEDURE — 82803 BLOOD GASES ANY COMBINATION: CPT

## 2020-04-20 RX ORDER — REPAGLINIDE 1 MG/1
1 TABLET ORAL
Qty: 90 | Refills: 0
Start: 2020-04-20 | End: 2020-05-19

## 2020-04-20 RX ADMIN — PIPERACILLIN AND TAZOBACTAM 25 GRAM(S): 4; .5 INJECTION, POWDER, LYOPHILIZED, FOR SOLUTION INTRAVENOUS at 05:30

## 2020-04-20 RX ADMIN — LISINOPRIL 20 MILLIGRAM(S): 2.5 TABLET ORAL at 05:32

## 2020-04-20 RX ADMIN — Medication 325 MILLIGRAM(S): at 11:32

## 2020-04-20 RX ADMIN — Medication 1 PATCH: at 08:46

## 2020-04-20 RX ADMIN — OXYCODONE HYDROCHLORIDE 10 MILLIGRAM(S): 5 TABLET ORAL at 11:47

## 2020-04-20 RX ADMIN — LOSARTAN POTASSIUM 50 MILLIGRAM(S): 100 TABLET, FILM COATED ORAL at 05:30

## 2020-04-20 RX ADMIN — OXYCODONE HYDROCHLORIDE 10 MILLIGRAM(S): 5 TABLET ORAL at 05:37

## 2020-04-20 RX ADMIN — Medication 8 UNIT(S): at 08:35

## 2020-04-20 NOTE — PROGRESS NOTE ADULT - ASSESSMENT
49M s/p Right foot partial 3rd and 4th ray resection 2/2 gas gangrene (DOS 4/15)   -Pt seen and evaluated   -Intra-operatively extensive necrotic tissue removed; clean margin sent from 4th metatarsal   -Upon dressing change and removal of surgicel, active bleeder was noted and tied off using 5-0 vicryl to achieve hemostasis  -Currently surgical site appears clean and remaining toes are viable  -OR clean margin showing no growth, OR deep swab showing >4 anaerobic and/or aerobic bacteria  -Podiatry stable for discharge  -Recommend discharge on PO Augmentin x14 days  -Recommend a visiting nurse apply a wet to dry dressing tomorrow, then apply the wound VAC starting on Wednesday 4/22  -VNS to change wound VAC 3x/week  -Pt to have a telemedicine visit w/ Dr. Lott next week. Please call the wound care center ([phone]) to set up a time for a telemedicine visit.  -Seen w/ attending

## 2020-04-20 NOTE — PROGRESS NOTE ADULT - ASSESSMENT
48 y/o M smoker w/h/o uncontrolled T2DM (A1C 10.5%)c/b neuropathy and CAD> s/p stent. Also h/o HTN/HLD. Here with R foot ulcer found to have gas gangrene of R foot and is now s/p 3rd and 4th toe ray amputation. Tolerating POs with BGs at goal while on present insulin doses. No hypoglycemia. On antibiotic. Per pt and vascular team he is going home today.  Met with patient and reviewed the following:    -A1c LEVEL: Present and goal  -Blood glucose goals: 100s to 150s as out pt  -Glucose monitoring frequency: ac and hs  -Insulin(s) action, time of administration and side effects. Reviewed difference between insulin requirements at home versus hospital. Pt taking 70 units of basal insulin at home and requiring 18units while in hospital with BG at goal of 100s to 180s.  -Foot care and DM  -Importance of follow up care. Wants to f/u with [facility] endo practice as out pt  Pt able to verbalize understanding about plan of care and relation between BG control and circulatory complications.     Spent over 25 minutes providing face to face education in addition to reviewing cahrt/labs/meds and discussing plan of care with primary team.

## 2020-04-20 NOTE — PROGRESS NOTE ADULT - SUBJECTIVE AND OBJECTIVE BOX
Vascular Surgery Progress Note     S: No events overnight. Patient ready for discharge today after being seen by podiatry.     MEDICATIONS  (STANDING):  aspirin 325 milliGRAM(s) Oral daily  dextrose 5%. 1000 milliLiter(s) (50 mL/Hr) IV Continuous <Continuous>  dextrose 50% Injectable 12.5 Gram(s) IV Push once  dextrose 50% Injectable 25 Gram(s) IV Push once  dextrose 50% Injectable 25 Gram(s) IV Push once  enoxaparin Injectable 40 milliGRAM(s) SubCutaneous daily  insulin glargine Injectable (LANTUS) 18 Unit(s) SubCutaneous at bedtime  insulin lispro (HumaLOG) corrective regimen sliding scale   SubCutaneous three times a day before meals  insulin lispro (HumaLOG) corrective regimen sliding scale   SubCutaneous at bedtime  insulin lispro Injectable (HumaLOG) 8 Unit(s) SubCutaneous three times a day before meals  lisinopril 20 milliGRAM(s) Oral daily  losartan 50 milliGRAM(s) Oral daily  nicotine -  14 mG/24Hr(s) Patch 1 patch Transdermal daily  piperacillin/tazobactam IVPB.. 3.375 Gram(s) IV Intermittent every 8 hours    MEDICATIONS  (PRN):  acetaminophen   Tablet .. 975 milliGRAM(s) Oral every 6 hours PRN Temp greater or equal to 38C (100.4F), Mild Pain (1 - 3)  dextrose 40% Gel 15 Gram(s) Oral once PRN Blood Glucose LESS THAN 70 milliGRAM(s)/deciliter  glucagon  Injectable 1 milliGRAM(s) IntraMuscular once PRN Glucose LESS THAN 70 milligrams/deciliter  HYDROmorphone  Injectable 0.5 milliGRAM(s) IV Push every 4 hours PRN Severe Pain (7 - 10)  melatonin 3 milliGRAM(s) Oral at bedtime PRN Insomnia  oxyCODONE    IR 5 milliGRAM(s) Oral every 4 hours PRN Moderate Pain (4 - 6)  oxyCODONE    IR 10 milliGRAM(s) Oral every 4 hours PRN Severe Pain (7 - 10)      Physical Exam:    Vital Signs Last 24 Hrs  T(C): 36.7 (20 Apr 2020 05:29), Max: 36.9 (20 Apr 2020 00:11)  T(F): 98.1 (20 Apr 2020 05:29), Max: 98.5 (20 Apr 2020 00:11)  HR: 75 (20 Apr 2020 05:29) (71 - 76)  BP: 125/80 (20 Apr 2020 05:29) (115/74 - 128/80)  BP(mean): --  RR: 17 (20 Apr 2020 05:29) (17 - 18)  SpO2: 94% (20 Apr 2020 05:29) (94% - 97%)    04-18-20 @ 07:01  -  04-19-20 @ 07:00  --------------------------------------------------------  IN: 1160 mL / OUT: 2450 mL / NET: -1290 mL    04-19-20 @ 07:01  -  04-20-20 @ 05:55  --------------------------------------------------------  IN: 250 mL / OUT: 800 mL / NET: -550 mL        Exam:  Gen: NAD. A&Ox3.  Well developed, alert and cooperative.   Resp: No additional work of breathing.   Card: RR. No peripheral edema or pallor.   Abd: Soft, ND, NT.  Ext: WWP. Able to move all extremities equally. L. 3/4 ray resection site w/ dressing in place c/i, non-saturated at this time, no erythema/induration.  Vasc: Palpable pulses throughout    LABS:                        9.6    6.75  )-----------( 203      ( 19 Apr 2020 17:27 )             28.8     04-19    138  |  99  |  10  ----------------------------<  143<H>  4.3   |  28  |  1.09    Ca    9.0      19 Apr 2020 17:27  Phos  4.2     04-19  Mg     2.1     04-19

## 2020-04-20 NOTE — PROGRESS NOTE ADULT - PROBLEM SELECTOR PLAN 2
- C/w nicotine -  14 mG/24Hr(s) Patch 1 patch Transdermal daily  -Spoke to pt about smoking cessation. Pt willing to quit smoking at this time and would like to c/w Nicotine patch.  -Plan discussed with pt/team.  Contact info: [phone] (24/7). pager [phone]

## 2020-04-20 NOTE — PROGRESS NOTE ADULT - PROBLEM SELECTOR PLAN 1
-Test BG ac and hs  -C/w Lantus 18 units qhs plus Humalog 8 units ac meals while in hospital  -C/w Humalog moderate correction scales ac and hs  -DISCHARGE: - Tresiba 20 units qhs - Metformin 1g bid - Prandin 2mg ac meals (hold if not eating)  -Can follow at [facility] practice [address]. Phone . Please give pt info.  -Plan discussed with pt/team.  Contact info: [phone] (24/7). pager [phone]

## 2020-04-20 NOTE — PROGRESS NOTE ADULT - SUBJECTIVE AND OBJECTIVE BOX
DIABETES FOLLOW UP NOTE: Saw pt earlier today  INTERVAL HX: 48 y/o M smoker w/h/o uncontrolled T2DM (A1C 10.5%)c/b neuropathy and CAD> s/p stent. Also h/o HTN/HLD. Here with R foot ulcer found to have gas gangrene of R foot and is now s/p 3rd and 4th toe ray amputation. Pt reports tolerating POs with BG noted to be at goal while on present insulin doses. Per pt no pain feeling better and eager to go home. No hypoglycemia. On antibiotic.       Review of Systems:  General: As above  Cardiovascular: No chest pain, palpitations  Respiratory: No SOB, no cough  GI: No nausea, vomiting, abdominal pain  Endocrine: no polyuria, polydipsia or S&Sx of hypoglycemia    Allergies    No Known Allergies    Intolerances      MEDICATIONS:  insulin glargine Injectable (LANTUS) 18 Unit(s) SubCutaneous at bedtime  insulin lispro (HumaLOG) corrective regimen sliding scale   SubCutaneous three times a day before meals  insulin lispro (HumaLOG) corrective regimen sliding scale   SubCutaneous at bedtime  insulin lispro Injectable (HumaLOG) 8 Unit(s) SubCutaneous three times a day before meals  nicotine -  14 mG/24Hr(s) Patch 1 patch Transdermal daily  piperacillin/tazobactam IVPB.. 3.375 Gram(s) IV Intermittent every 8 hours      PHYSICAL EXAM:  VITALS: T(C): 36.7 (04-20-20 @ 09:37)  T(F): 98 (04-20-20 @ 09:37), Max: 98.5 (04-20-20 @ 00:11)  HR: 80 (04-20-20 @ 09:37) (71 - 80)  BP: 127/81 (04-20-20 @ 09:37) (121/79 - 128/80)  RR:  (17 - 18)  SpO2:  (94% - 97%)  Wt(kg): --  GENERAL: Male laying in bed in NAD  Abdomen: Soft, nontender, non distended, central adiposity  Extremities: Warm, R foot with ace bandage D&I. No edema noted in all 4 exts  NEURO: A&O X3    LABS:  POCT Blood Glucose.: 148 mg/dL (04-20-20 @ 08:12)  POCT Blood Glucose.: 153 mg/dL (04-19-20 @ 22:08)  POCT Blood Glucose.: 158 mg/dL (04-19-20 @ 21:02)  POCT Blood Glucose.: 147 mg/dL (04-19-20 @ 17:11)  POCT Blood Glucose.: 136 mg/dL (04-19-20 @ 12:37)  POCT Blood Glucose.: 146 mg/dL (04-19-20 @ 08:41)  POCT Blood Glucose.: 175 mg/dL (04-18-20 @ 20:41)  POCT Blood Glucose.: 193 mg/dL (04-18-20 @ 17:24)  POCT Blood Glucose.: 134 mg/dL (04-18-20 @ 12:21)  POCT Blood Glucose.: 152 mg/dL (04-18-20 @ 08:11)  POCT Blood Glucose.: 191 mg/dL (04-17-20 @ 22:08)  POCT Blood Glucose.: 208 mg/dL (04-17-20 @ 17:43)  POCT Blood Glucose.: 183 mg/dL (04-17-20 @ 12:32)                            9.7    7.48  )-----------( 221      ( 20 Apr 2020 07:13 )             29.1       04-20    134<L>  |  96  |  11  ----------------------------<  139<H>  4.6   |  25  |  0.99      EGFR if non : 90    Ca    9.3      04-20  Mg     2.1     04-20  Phos  4.1     04-20    A1C with Estimated Average Glucose Result: 10.5 % (04-18-20 @ 09:21)

## 2020-04-20 NOTE — PROGRESS NOTE ADULT - ASSESSMENT
50yo M s/p right 3 and 4th toe ray amputation 2/2 gas gangrene of right foot. Persistent hyperglycemia; has had home medications adjusted to reflect this. Will need extensive diet modification upon discharge per endocrine. Awaiting vac placement and final cultures for discharge.    -patient ready for discharge this am after podiatry sees and determines vac/antibiotic plan  -diabetes medications already [facility] to [facility] will make sure patient picks up  -VNS on discharge     vascular [id number]  Shannan Espinoza, PGY-4

## 2020-04-20 NOTE — PROGRESS NOTE ADULT - REASON FOR ADMISSION
gas gangrene

## 2020-04-20 NOTE — PROGRESS NOTE ADULT - SUBJECTIVE AND OBJECTIVE BOX
Podiatry pager #: [phone] ([place])/ [phone] ([facility])    Patient is a 49y old  Male who presents with a chief complaint of gas gangrene (20 Apr 2020 05:54)       INTERVAL HPI/OVERNIGHT EVENTS:  Patient seen and evaluated at bedside.  Pt is resting comfortable in NAD. Denies N/V/F/C.     Allergies    No Known Allergies    Intolerances        Vital Signs Last 24 Hrs  T(C): 36.7 (20 Apr 2020 09:37), Max: 36.9 (20 Apr 2020 00:11)  T(F): 98 (20 Apr 2020 09:37), Max: 98.5 (20 Apr 2020 00:11)  HR: 80 (20 Apr 2020 09:37) (71 - 80)  BP: 127/81 (20 Apr 2020 09:37) (121/79 - 128/80)  BP(mean): --  RR: 18 (20 Apr 2020 09:37) (17 - 18)  SpO2: 97% (20 Apr 2020 09:37) (94% - 97%)    LABS:                        9.7    7.48  )-----------( 221      ( 20 Apr 2020 07:13 )             29.1     04-20    134<L>  |  96  |  11  ----------------------------<  139<H>  4.6   |  25  |  0.99    Ca    9.3      20 Apr 2020 07:11  Phos  4.1     04-20  Mg     2.1     04-20          CAPILLARY BLOOD GLUCOSE      POCT Blood Glucose.: 148 mg/dL (20 Apr 2020 08:12)  POCT Blood Glucose.: 153 mg/dL (19 Apr 2020 22:08)  POCT Blood Glucose.: 158 mg/dL (19 Apr 2020 21:02)  POCT Blood Glucose.: 147 mg/dL (19 Apr 2020 17:11)  POCT Blood Glucose.: 136 mg/dL (19 Apr 2020 12:37)      Lower Extremity Physical Exam:  s/p Right foot partial 3rd and 4th ray resection open - purple discoloration dorsally around skin edges, cap refill present to 2nd + 5th toes - warm, viable, no necrosis, no purulence, no malodor, active bleeder noted - tied off w/ 5-0 vicryl.

## 2020-04-22 LAB
CULTURE RESULTS: SIGNIFICANT CHANGE UP
CULTURE RESULTS: SIGNIFICANT CHANGE UP
SPECIMEN SOURCE: SIGNIFICANT CHANGE UP
SPECIMEN SOURCE: SIGNIFICANT CHANGE UP

## 2020-04-28 ENCOUNTER — APPOINTMENT (OUTPATIENT)
Dept: WOUND CARE | Facility: CLINIC | Age: 50
End: 2020-04-28
Payer: COMMERCIAL

## 2020-04-28 VITALS — DIASTOLIC BLOOD PRESSURE: 70 MMHG | SYSTOLIC BLOOD PRESSURE: 120 MMHG

## 2020-04-28 PROCEDURE — 99213 OFFICE O/P EST LOW 20 MIN: CPT | Mod: 95

## 2020-04-28 NOTE — HISTORY OF PRESENT ILLNESS
[Home] : at home, [unfilled] , at the time of the visit. [Other Location: e.g. Home (Enter Location, City,State)___] : at [unfilled] [Other:____] : [unfilled] [Patient] : the patient [FreeTextEntry1] : Seen today for follow up management of a right foot ulcer s/p P3/4PRR for gas gangrene on 4/15/20 compliant with Augmenting and seen w/o c/o f/c/d/n/v/sob/cp.  Afeb as per nurse and his blood sugars are averaging <120.

## 2020-04-28 NOTE — PLAN
[FreeTextEntry1] : The nurse is instructed to continue with VAC @125 mmHg for now and the patient to continue with Augmentin.\par - he is to follow up at the wound center in 6 days for debridement.\par - limit ambulation, elevate

## 2020-04-28 NOTE — REVIEW OF SYSTEMS
[Negative] : Genitourinary [Fever] : no fever [Chills] : no chills [Feeling Poorly] : not feeling poorly [Feeling Tired] : not feeling tired [Chest Pain] : no chest pain [Shortness Of Breath] : no shortness of breath

## 2020-04-28 NOTE — PHYSICAL EXAM
[de-identified] : CR to the 2nd an 5th toes instant and the nurse relates that the foot is warm.  No digital necrosis.  The medial 2nd MPJ joint capsule is fibrotic. [FreeTextEntry1] : 3/4 ray resection site [FreeTextEntry2] : 15.2 cm [de-identified] : medial skin edge necrosis [FreeTextEntry3] : 3.5 cm [FreeTextEntry4] : 6.0 cm deep [TWNoteComboBox3] : FT [TWNoteComboBox1] : Right [TWNoteComboBox2] : 2 [de-identified] : no bone exposed [TWNoteComboBox4] : Small [TWNoteComboBox6] : Surgical [TWNoteComboBox5] : No [de-identified] : other [de-identified] : No [de-identified] : No [de-identified] : None [de-identified] : >75% [de-identified] : <20%

## 2020-05-04 ENCOUNTER — TRANSCRIPTION ENCOUNTER (OUTPATIENT)
Age: 50
End: 2020-05-04

## 2020-05-04 ENCOUNTER — APPOINTMENT (OUTPATIENT)
Dept: WOUND CARE | Facility: CLINIC | Age: 50
End: 2020-05-04
Payer: COMMERCIAL

## 2020-05-04 VITALS — TEMPERATURE: 98.1 F

## 2020-05-04 PROCEDURE — 11042 DBRDMT SUBQ TIS 1ST 20SQCM/<: CPT

## 2020-05-08 NOTE — HISTORY OF PRESENT ILLNESS
[FreeTextEntry1] : Seen today for follow up management of a right foot ulcer s/p P3/4PRR for gas gangrene on 4/15/20 compliant with Augmentin and seen w/o c/o f/c/d/n/v/sob/cp. Home nursing performing VAC changes.

## 2020-05-08 NOTE — PHYSICAL EXAM
[2+] : left 2+ [de-identified] : AAO x 3 [de-identified] : CR to the 2nd an 5th toes instant.  No digital ischemia noted.  The medial 2nd MPJ joint capsule is less fibrotic. [FreeTextEntry1] : 3/4 ray resection site [FreeTextEntry2] : 15.2 cm [FreeTextEntry3] : 3.5 cm [FreeTextEntry4] : 6.0 cm deep [de-identified] : no new medial skin edge necrosis [TWNoteComboBox1] : Right [de-identified] : no bone exposed [TWNoteComboBox4] : Small [TWNoteComboBox3] : FT [TWNoteComboBox2] : 2 [TWNoteComboBox5] : No [TWNoteComboBox6] : Surgical [de-identified] : <20% [de-identified] : other [de-identified] : No [de-identified] : None [de-identified] : >75% [de-identified] : No [TWNoteComboBox7] : Tra [de-identified] : Debridement performed of all devitalized tissue to bleeding viable tissue

## 2020-05-08 NOTE — ASSESSMENT
[FreeTextEntry1] : The patient is to finish the Augmentin and will continue with the VAC.  \par - I will consider him for Grafix pending increase in granulation tissue.\par - Limit ambulation\par - Referred for new PCP. Not happy with his current one.\par - I will see him for telemed eval in 2 weeks

## 2020-05-18 ENCOUNTER — APPOINTMENT (OUTPATIENT)
Dept: WOUND CARE | Facility: CLINIC | Age: 50
End: 2020-05-18
Payer: COMMERCIAL

## 2020-05-18 PROCEDURE — 99213 OFFICE O/P EST LOW 20 MIN: CPT | Mod: 95

## 2020-05-18 NOTE — HISTORY OF PRESENT ILLNESS
[Home] : at home, [unfilled] , at the time of the visit. [Medical Office: ([facility])___] : at the medical office located in  [Self] : self [Other:____] : [unfilled] [Patient] : the patient [FreeTextEntry1] : Seen today via Telemed for follow up management of a right foot ulcer s/p P3/4PRR for gas gangrene on 4/15/20 having finished the Augmentin and seen w/o c/o f/c/d/n/v/sob/cp. Home nursing with him for VAC change.  Has an appointment with nutritionist tomorrow and an appointment with Dr. London on 6/10.  BS today 138.

## 2020-05-18 NOTE — ASSESSMENT
[FreeTextEntry1] : - The patient is to continue with VAC for now and follow up in 2 weeks for debridement and application of Apligraf.\par - Continue with elevation and limited ambulation.

## 2020-05-18 NOTE — PHYSICAL EXAM
[2+] : left 2+ [de-identified] : AAO x 3 [de-identified] : There is partial thickness ischemia to the medial margin of the wound. [FreeTextEntry2] : 8.5 cm [FreeTextEntry1] : 3/4 ray resection site [FreeTextEntry3] : 5.0 cm [de-identified] : no new medial skin edge necrosis [FreeTextEntry4] : 4.0 cm deep [TWNoteComboBox1] : Right [TWNoteComboBox2] : 2 [TWNoteComboBox4] : Small [TWNoteComboBox3] : FT [TWNoteComboBox5] : No [de-identified] : No [TWNoteComboBox6] : Surgical [de-identified] : <20% [de-identified] : other [de-identified] : None [de-identified] : >75% [de-identified] : No

## 2020-05-19 ENCOUNTER — APPOINTMENT (OUTPATIENT)
Dept: ENDOCRINOLOGY | Facility: CLINIC | Age: 50
End: 2020-05-19
Payer: COMMERCIAL

## 2020-05-19 PROCEDURE — G0108 DIAB MANAGE TRN  PER INDIV: CPT | Mod: 95

## 2020-06-01 ENCOUNTER — APPOINTMENT (OUTPATIENT)
Dept: WOUND CARE | Facility: CLINIC | Age: 50
End: 2020-06-01
Payer: COMMERCIAL

## 2020-06-01 VITALS — TEMPERATURE: 97.7 F

## 2020-06-01 PROCEDURE — 11042 DBRDMT SUBQ TIS 1ST 20SQCM/<: CPT

## 2020-06-02 NOTE — PHYSICAL EXAM
[2+] : left 2+ [de-identified] : AAO x 3 [de-identified] : There are no signs of infection and no ischemic changes to the 2nd and 5th toes.  Significant improvement noted. [FreeTextEntry2] : 7.0 cm [FreeTextEntry1] : 3/4 ray resection site [FreeTextEntry3] : 4.5 cm [FreeTextEntry4] : 1.0 cm deep [de-identified] : no new medial skin edge necrosis [de-identified] : 10% medial skin edge [TWNoteComboBox1] : Right [TWNoteComboBox3] : FT [TWNoteComboBox2] : 2 [TWNoteComboBox5] : No [TWNoteComboBox4] : Small [de-identified] : No [de-identified] : other [TWNoteComboBox6] : Surgical [de-identified] : <20% [de-identified] : None [de-identified] : >75% [de-identified] : No [TWNoteComboBox7] : Tra [de-identified] : Debridement performed of all devitalized tissue to bleeding viable tissue

## 2020-06-02 NOTE — PHYSICAL EXAM
[2+] : left 2+ [de-identified] : AAO x 3 [de-identified] : There are no signs of infection and no ischemic changes to the 2nd and 5th toes.  Significant improvement noted. [FreeTextEntry2] : 7.0 cm [FreeTextEntry1] : 3/4 ray resection site [FreeTextEntry4] : 1.0 cm deep [FreeTextEntry3] : 4.5 cm [de-identified] : 10% medial skin edge [de-identified] : no new medial skin edge necrosis [TWNoteComboBox1] : Right [TWNoteComboBox3] : FT [TWNoteComboBox2] : 2 [TWNoteComboBox4] : Small [TWNoteComboBox5] : No [TWNoteComboBox6] : Surgical [de-identified] : No [de-identified] : other [de-identified] : None [de-identified] : <20% [de-identified] : >75% [de-identified] : No [TWNoteComboBox7] : Tra [de-identified] : Debridement performed of all devitalized tissue to bleeding viable tissue

## 2020-06-02 NOTE — ASSESSMENT
[FreeTextEntry1] : Full thickness excisional debridement performed medial skin edge with a #15 blade.  Aquacel applied with a DSD.  \par - the VAC appears to have performed well at achieving a granular bed and the patient is to initiate Aquacel TIW and consider Regranex until he can get coverage for a skin substitute\par - He is cleared to bathe in 1 week and is to try to increase his WB attempting sneaker\par - F/U with telemed visit in 2 weeks.

## 2020-06-02 NOTE — PLAN
[FreeTextEntry1] : applied wet to dry dressing\par patient to follow up 2 weeks thru telemedicine \par pt will benefit from right foot toe filler for 3rd and 4th partial ray resection and customized orthotics for the left

## 2020-06-03 ENCOUNTER — APPOINTMENT (OUTPATIENT)
Dept: OPHTHALMOLOGY | Facility: CLINIC | Age: 50
End: 2020-06-03
Payer: COMMERCIAL

## 2020-06-03 ENCOUNTER — NON-APPOINTMENT (OUTPATIENT)
Age: 50
End: 2020-06-03

## 2020-06-03 PROCEDURE — 92134 CPTRZ OPH DX IMG PST SGM RTA: CPT

## 2020-06-03 PROCEDURE — 92004 COMPRE OPH EXAM NEW PT 1/>: CPT

## 2020-06-03 PROCEDURE — 92202 OPSCPY EXTND ON/MAC DRAW: CPT

## 2020-06-10 ENCOUNTER — APPOINTMENT (OUTPATIENT)
Dept: ENDOCRINOLOGY | Facility: CLINIC | Age: 50
End: 2020-06-10
Payer: COMMERCIAL

## 2020-06-10 VITALS
DIASTOLIC BLOOD PRESSURE: 76 MMHG | SYSTOLIC BLOOD PRESSURE: 126 MMHG | OXYGEN SATURATION: 98 % | WEIGHT: 212 LBS | TEMPERATURE: 98.3 F | BODY MASS INDEX: 28.1 KG/M2 | HEIGHT: 73 IN | HEART RATE: 98 BPM

## 2020-06-10 PROCEDURE — 99205 OFFICE O/P NEW HI 60 MIN: CPT

## 2020-06-10 NOTE — HISTORY OF PRESENT ILLNESS
[FreeTextEntry1] : Diabetes New Patient HPI\par \par CC\par Patient referred by Dr. Hirsch for diabetes management.\par \par Recently has amputation of the right foot toes ( amputations) \par \par \par HPI:\par \par Duration of Diabetes: 18 years         \par Is patient on Insulin? Yes           \par If yes, how long on insulin? 10 years        \par \par List Current Medications for Glycemic control and the doses:\par 1- Tresiba 46 units at bedtime          \par 2-  Trulicity 1.5 mcg weekly ( started 2 weeks ago)     \par 3-  Metformin 1000 mg twice a day \par 4- Repaglinide 2 mg TID       \par \par SMBG (self monitored blood glucose) readings: \par - Name of glucometer:          \par - How often does the patient check BG? 4 times per day             \par - Does the patient keep a log?           \par \par If detailed record is available, what is the range of most of the BG readings?\par - Before Breakfast: 105-120\par - Before Lunch: 120-130s \par - Before Dinner: 130s \par - Before Bedtime:150s \par \par Does patient get Hypoglycemic episodes? None              \par If yes how frequent?            \par Hoe low do the BG readings reach?           \par When do most of those episodes occur?           \par What symptoms does the patient get during those episodes?           \par \par Diabetic Complications: Is patient aware of having any of those complications?\par - Eyes: Retinopathy?    Proliferative retinopathy (laser treatment is scheduled)    \par        	When was the last fully dilated eye exam?    Dr. Newsome one month ago         \par - Feet: 	Neuropathy? Yes             \par         	Foot Ulcers? Yes         \par 	When was the last time patient saw a Podiatrist?  2 weeks ago        \par - Kidneys: Nephropathy? None            \par \par \par Diet: review patient's diet:      \par Breakfast: Whole wheat bread, scrambled eggs, oatmeal, frozen fruit, Kashi cereal \par Lunch: Morris ( whole wheat bread) Turkey, ham, apple \par Dinner: Fish, vegetables, small portions of rice, black beans \par Juice or soda: Crystal light, diet soda  \par \par \par \par Exercise: review patient exercise habits:   Not much                \par \par Symptoms: Write any symptoms, concerns and issues bothering the patient which have not be addressed above:     \par \par

## 2020-06-10 NOTE — PHYSICAL EXAM
[Alert] : alert [Well Nourished] : well nourished [No Acute Distress] : no acute distress [Normal Sclera/Conjunctiva] : normal sclera/conjunctiva [Normal Outer Ear/Nose] : the ears and nose were normal in appearance [Normal Hearing] : hearing was normal [No Neck Mass] : no neck mass was observed [No Respiratory Distress] : no respiratory distress [Normal Rate and Effort] : normal respiratory rate and effort [Normal Rate] : heart rate was normal [Normal Bowel Sounds] : normal bowel sounds [No Rash] : no rash [No Skin Lesions] : no skin lesions [Normal Reflexes] : deep tendon reflexes were 2+ and symmetric [Oriented x3] : oriented to person, place, and time [Normal Affect] : the affect was normal [Normal Insight/Judgement] : insight and judgment were intact [de-identified] : Impaired gait secondary to amputation, dressing clean, dry and intact

## 2020-06-10 NOTE — REVIEW OF SYSTEMS
[Fatigue] : no fatigue [Decreased Appetite] : appetite not decreased [Recent Weight Gain (___ Lbs)] : no recent weight gain [Recent Weight Loss (___ Lbs)] : no recent weight loss [Visual Field Defect] : no visual field defect [Dry Eyes] : no dryness [Dysphagia] : no dysphagia [Neck Pain] : no neck pain [Dysphonia] : no dysphonia [Nasal Congestion] : no nasal congestion [Chest Pain] : no chest pain [Slow Heart Rate] : heart rate is not slow [Palpitations] : no palpitations [Fast Heart Rate] : heart rate is not fast [Shortness Of Breath] : no shortness of breath [Nausea] : no nausea [Vomiting] : no vomiting [Polyuria] : no polyuria [Dysuria] : no dysuria [Hesistancy] : no hesitancy [Muscle Weakness] : no muscle weakness [Joint Pain] : no joint pain [Acanthosis] : no acanthosis  [Acne] : no acne [Dry Skin] : no dry skin [Headaches] : no headaches [Dizziness] : no dizziness [Tremors] : no tremors [Depression] : no depression [Polydipsia] : no polydipsia [Easy Bleeding] : no ~M tendency for easy bleeding [Easy Bruising] : no tendency for easy bruising

## 2020-06-10 NOTE — ASSESSMENT
[FreeTextEntry1] : 49 year old male with history of uncontrolled DM Type 2 ( long term with insulin requirement), recent right foot toe amputation, HLD here for evaluation. \par Last HgA1c was 10.6% in 04/2020 since then was initiated on Trulicity \par SMBGS are much better controlled now \par carb consistent diet was discussed, has seen CDE previously \par At this time will continue same regimen and will try obtaining CGM for the patient ( luciana) \par HgA1C goal of <7% \par \par -Continue Tresiba 46 units at bedtime \par -Continue Trulicity 1.5 mcg weekly \par -Continue Metformin 1000 mg BID \par -Continue repaglinide 2 mg TID with meals \par -Can consider addition of Jardiance in the future \par -Continue Carb consistent diet \par \par Diabetic retinopathy\par -Close follow up encouraged\par -Hypo and hyperglycemic states should be avoided \par -HgA1C goal of <7% \par \par Amputation \par -Keep HgA1C <7% \par -Close outpatient follow up with Dr. Lott \par \par HLD \par -Check Lipid panel \par \par Follow up in 8 weeks

## 2020-06-12 LAB
ALBUMIN SERPL ELPH-MCNC: 5.3 G/DL
ALP BLD-CCNC: 54 U/L
ALT SERPL-CCNC: 35 U/L
ANION GAP SERPL CALC-SCNC: 15 MMOL/L
AST SERPL-CCNC: 19 U/L
BILIRUB SERPL-MCNC: 0.2 MG/DL
BUN SERPL-MCNC: 24 MG/DL
CALCIUM SERPL-MCNC: 10.1 MG/DL
CHLORIDE SERPL-SCNC: 102 MMOL/L
CHOLEST SERPL-MCNC: 73 MG/DL
CHOLEST/HDLC SERPL: 2.3 RATIO
CO2 SERPL-SCNC: 23 MMOL/L
CREAT SERPL-MCNC: 0.9 MG/DL
GLUCOSE SERPL-MCNC: 123 MG/DL
HDLC SERPL-MCNC: 32 MG/DL
LDLC SERPL CALC-MCNC: 21 MG/DL
POTASSIUM SERPL-SCNC: 5.8 MMOL/L
PROT SERPL-MCNC: 7.8 G/DL
SODIUM SERPL-SCNC: 140 MMOL/L
TRIGL SERPL-MCNC: 103 MG/DL

## 2020-06-15 ENCOUNTER — APPOINTMENT (OUTPATIENT)
Dept: WOUND CARE | Facility: CLINIC | Age: 50
End: 2020-06-15
Payer: COMMERCIAL

## 2020-06-15 ENCOUNTER — APPOINTMENT (OUTPATIENT)
Dept: ENDOCRINOLOGY | Facility: CLINIC | Age: 50
End: 2020-06-15
Payer: COMMERCIAL

## 2020-06-15 PROCEDURE — 99213 OFFICE O/P EST LOW 20 MIN: CPT | Mod: 95

## 2020-06-15 PROCEDURE — 95249 CONT GLUC MNTR PT PROV EQP: CPT

## 2020-06-16 LAB
ANION GAP SERPL CALC-SCNC: 14 MMOL/L
BUN SERPL-MCNC: 21 MG/DL
CALCIUM SERPL-MCNC: 10 MG/DL
CHLORIDE SERPL-SCNC: 97 MMOL/L
CO2 SERPL-SCNC: 24 MMOL/L
CREAT SERPL-MCNC: 0.84 MG/DL
GLUCOSE SERPL-MCNC: 136 MG/DL
POTASSIUM SERPL-SCNC: 5.1 MMOL/L
SODIUM SERPL-SCNC: 135 MMOL/L

## 2020-06-16 RX ORDER — RAMIPRIL 5 MG/1
5 CAPSULE ORAL
Refills: 0 | Status: DISCONTINUED | COMMUNITY
End: 2020-06-16

## 2020-06-16 NOTE — PLAN
[FreeTextEntry1] : The patient is to continue with current care.  He will need a debridement though and will see in 1 week for application of Apligraf as well.\par - clean wound with Dakin's solution.\par - elevate and compress with ace.

## 2020-06-16 NOTE — HISTORY OF PRESENT ILLNESS
[Home] : at home, [unfilled] , at the time of the visit. 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 Home nursing with him for dressing change using Aquacel noticing some swelling and drainage.  States being more active and on his feet more.

## 2020-06-16 NOTE — PHYSICAL EXAM
[2+] : left 2+ [de-identified] : AAO x 3 [FreeTextEntry2] : 6.5 cm [de-identified] : There are no signs of infection or ischemic changes to the 2nd and 5th toes.  Continued improvement noted. [FreeTextEntry1] : 3/4 ray resection site [FreeTextEntry3] : 4.0 cm [FreeTextEntry4] : 1.0 cm deep [TWNoteComboBox1] : Right [de-identified] : no new medial skin edge necrosis [TWNoteComboBox4] : Small [TWNoteComboBox3] : FT [TWNoteComboBox2] : 2 [TWNoteComboBox5] : No [de-identified] : No [TWNoteComboBox6] : Surgical [de-identified] : other [de-identified] : None [de-identified] : None [de-identified] : >75% [de-identified] : No

## 2020-06-22 ENCOUNTER — APPOINTMENT (OUTPATIENT)
Dept: WOUND CARE | Facility: CLINIC | Age: 50
End: 2020-06-22
Payer: COMMERCIAL

## 2020-06-22 VITALS — BODY MASS INDEX: 28.1 KG/M2 | TEMPERATURE: 97.2 F | HEIGHT: 73 IN | WEIGHT: 212 LBS

## 2020-06-22 PROCEDURE — 15275 SKIN SUB GRAFT FACE/NK/HF/G: CPT

## 2020-06-22 PROCEDURE — 15271 SKIN SUB GRAFT TRNK/ARM/LEG: CPT

## 2020-06-22 PROCEDURE — 11042 DBRDMT SUBQ TIS 1ST 20SQCM/<: CPT | Mod: 58

## 2020-06-23 ENCOUNTER — NON-APPOINTMENT (OUTPATIENT)
Age: 50
End: 2020-06-23

## 2020-06-23 NOTE — PHYSICAL EXAM
[2+] : left 2+ [Please See PDF for [employer]] : Please See PDF for [employer]. [de-identified] : AAO x 3 [FreeTextEntry1] : 3/4 ray resection site [TWNoteComboBox1] : Right [TWNoteComboBox2] : False [TWNoteComboBox5] : False [TWNoteComboBox3] : False [TWNoteComboBox4] : False [TWNoteComboBox6] : False [de-identified] : False [de-identified] : False [de-identified] : False [de-identified] : False [de-identified] : False [de-identified] : False

## 2020-06-23 NOTE — ASSESSMENT
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 \par -Foot was scrubbed w/ chlorhexadine scrub brush \par -Apligraft was applied to wound and secured in place using wound veil and hypafix\par -VNS to leave graft intact and only change outer dressing\par -Pt to get custom foot orthotics, to get form filled out by PCP \par -RTC in 1 week for telehealth visit

## 2020-06-23 NOTE — HISTORY OF PRESENT ILLNESS
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 VNS doing dressing changes 3x/week using Aquacel silver, nurse was noticing some swelling and drainage. States being more active and on his feet more. Pt has been seeing an endocrinologist and says his blood sugar is controlled and he stopped smoking 9 weeks ago.

## 2020-06-29 ENCOUNTER — APPOINTMENT (OUTPATIENT)
Dept: WOUND CARE | Facility: CLINIC | Age: 50
End: 2020-06-29
Payer: COMMERCIAL

## 2020-06-29 PROCEDURE — 99213 OFFICE O/P EST LOW 20 MIN: CPT | Mod: 95

## 2020-06-29 NOTE — PHYSICAL EXAM
[de-identified] : Amp site wound with intact wound veil and no periwound maceration or erythema.  No edema noted.  As per nurse no significant odor beyond the normal Apligraf odor.

## 2020-06-29 NOTE — ASSESSMENT
[FreeTextEntry1] : The patient is doing well and is to continue with current ADL.\par - Awaiting fitting for diabetic shoes and inserts\par - Will be seen in 1 week for application of another Apligraf.

## 2020-06-29 NOTE — HISTORY OF PRESENT ILLNESS
[Home] : at home, [unfilled] , at the time of the visit. [Other:____] : [unfilled] [Medical Office: ([facility])___] : at the medical office located in  [FreeTextEntry1] : Pt. seen 1 wk S/P application of Apligraf x 1 without c/c.  Performing ADL without issues or significant drainage and denies pain. [Verbal consent obtained from patient] : the patient, [unfilled]

## 2020-07-06 ENCOUNTER — APPOINTMENT (OUTPATIENT)
Dept: WOUND CARE | Facility: CLINIC | Age: 50
End: 2020-07-06
Payer: COMMERCIAL

## 2020-07-06 ENCOUNTER — NON-APPOINTMENT (OUTPATIENT)
Age: 50
End: 2020-07-06

## 2020-07-06 VITALS — HEIGHT: 73 IN | BODY MASS INDEX: 28.1 KG/M2 | WEIGHT: 212 LBS | TEMPERATURE: 97.9 F

## 2020-07-06 PROCEDURE — 11042 DBRDMT SUBQ TIS 1ST 20SQCM/<: CPT

## 2020-07-10 NOTE — ASSESSMENT
[FreeTextEntry1] : The patient is doing well and is to continue with current ADL.\par - Awaiting fitting for diabetic shoes and inserts\par - Right foot wound fibrotic tissue debrided down to the level of Subq using 15 blade; surgical wound stable w/ no signs of infection\par - currently waiting for insurance approval for apligraft \par - Will be seen in 1 week for Telehealth visit.

## 2020-07-10 NOTE — HISTORY OF PRESENT ILLNESS
[Home] : at home, [unfilled] , at the time of the visit. [Medical Office: ([facility])___] : at the medical office located in  [Other:____] : [unfilled] [Verbal consent obtained from patient] : the patient, [unfilled] [FreeTextEntry1] : Pt. seen 3 wks S/P application of Apligraf x 1 without c/c.  Performing ADL without issues or significant drainage and denies pain.

## 2020-07-10 NOTE — PHYSICAL EXAM
[Please See PDF for [employer]] : Please See PDF for [employer]. [de-identified] : Amp site wound with intact wound veil and no periwound maceration or erythema.  No edema noted.  As per nurse no significant odor beyond the normal Apligraf odor.

## 2020-07-13 ENCOUNTER — APPOINTMENT (OUTPATIENT)
Dept: WOUND CARE | Facility: CLINIC | Age: 50
End: 2020-07-13
Payer: COMMERCIAL

## 2020-07-13 PROCEDURE — 99213 OFFICE O/P EST LOW 20 MIN: CPT | Mod: 95

## 2020-07-13 NOTE — PHYSICAL EXAM
[Please See PDF for [employer]] : Please See PDF for [employer]. [de-identified] : No SOI [FreeTextEntry1] : 3/4 ray amp site [FreeTextEntry2] : 2.5 cm  [FreeTextEntry3] : 5.5 cm [FreeTextEntry4] : 0.1 [TWNoteComboBox1] : Right [TWNoteComboBox2] : 2 [TWNoteComboBox3] : FT [TWNoteComboBox4] : None [TWNoteComboBox5] : No [TWNoteComboBox6] : Surgical [de-identified] : No [de-identified] : Normal [de-identified] : None [de-identified] : None [de-identified] : 100% [de-identified] : No

## 2020-07-13 NOTE — HISTORY OF PRESENT ILLNESS
[Home] : at home, [unfilled] , at the time of the visit. [Medical Office: ([facility])___] : at the medical office located in  [Other:____] : [unfilled] [Verbal consent obtained from patient] : the patient, [unfilled] [FreeTextEntry1] : Pt. seen 1 month S/P application of Apligraf x 1 without c/c.  Performing ADL without issues or significant drainage however having pain in the foot more with use.  In a post op shoe.

## 2020-07-13 NOTE — ASSESSMENT
[FreeTextEntry1] : Will continue with alginate for now pending auth for another Apligraf.  \par - I discussed his concern for plantar foot pain with Randall Perry and he will reach out to the patient for cushioned insole.\par -  He is cleared to initiate bathing prior to nursing visit and try a light dressing and no ace to attempt a sneaker which may be more comfortable.\par -  He is to follow up in office next week for debridement.

## 2020-07-27 ENCOUNTER — APPOINTMENT (OUTPATIENT)
Dept: WOUND CARE | Facility: CLINIC | Age: 50
End: 2020-07-27
Payer: COMMERCIAL

## 2020-07-27 VITALS — BODY MASS INDEX: 27.96 KG/M2 | HEIGHT: 73 IN | TEMPERATURE: 96.4 F | WEIGHT: 211 LBS

## 2020-07-27 PROCEDURE — 11042 DBRDMT SUBQ TIS 1ST 20SQCM/<: CPT

## 2020-07-27 NOTE — ASSESSMENT
[FreeTextEntry1] : 48 yo M w/ RF ulcer healing. \par - Pt to continue with Plastizote inserts, awaiting for approval for orthotics. \par -  Continue nursing visit for dressing changes with alginate \par - Sharp excisional debridement of fibrotic tissue with 15 blade to level of subcutaneous tissue and not beyond revealing healthy 100% granular wound with increased epithelization, no clinical signs of infection \par - debridement of toenails X 10 \par - Pt RTC 2 weeks

## 2020-07-27 NOTE — HISTORY OF PRESENT ILLNESS
[Home] : at home, [unfilled] , at the time of the visit. [Medical Office: ([facility])___] : at the medical office located in  [Other:____] : [unfilled] [Verbal consent obtained from patient] : the patient, [unfilled] [FreeTextEntry1] : Pt. seen 1 month S/P application of Apligraf x 1 without c/c.  Performing ADL without issues or significant drainage performing wound care himself and wearing [employer] sneakers with no significant pain.

## 2020-07-27 NOTE — PHYSICAL EXAM
[Please See PDF for [employer]] : Please See PDF for [employer]. [de-identified] : No SOI [FreeTextEntry1] : 3/4 ray amp site [FreeTextEntry3] : 5.5 cm [FreeTextEntry2] : 2.5 cm  [FreeTextEntry4] : 0.1 [TWNoteComboBox1] : Right [TWNoteComboBox2] : 2 [TWNoteComboBox3] : FT [TWNoteComboBox4] : None [TWNoteComboBox5] : No [TWNoteComboBox6] : Surgical [de-identified] : No [de-identified] : Normal [de-identified] : None [de-identified] : 100% [de-identified] : No [de-identified] : None

## 2020-08-03 ENCOUNTER — APPOINTMENT (OUTPATIENT)
Dept: ENDOCRINOLOGY | Facility: CLINIC | Age: 50
End: 2020-08-03
Payer: COMMERCIAL

## 2020-08-03 VITALS
TEMPERATURE: 97.9 F | BODY MASS INDEX: 28.23 KG/M2 | HEART RATE: 95 BPM | SYSTOLIC BLOOD PRESSURE: 124 MMHG | OXYGEN SATURATION: 98 % | HEIGHT: 73 IN | DIASTOLIC BLOOD PRESSURE: 76 MMHG | WEIGHT: 213 LBS

## 2020-08-03 LAB
GLUCOSE BLDC GLUCOMTR-MCNC: 97
HBA1C MFR BLD HPLC: 6.6

## 2020-08-03 PROCEDURE — 83036 HEMOGLOBIN GLYCOSYLATED A1C: CPT | Mod: QW

## 2020-08-03 PROCEDURE — 82962 GLUCOSE BLOOD TEST: CPT

## 2020-08-03 PROCEDURE — 99214 OFFICE O/P EST MOD 30 MIN: CPT | Mod: 25

## 2020-08-03 NOTE — PHYSICAL EXAM
[Alert] : alert [Well Nourished] : well nourished [EOMI] : extra ocular movement intact [Normal Sclera/Conjunctiva] : normal sclera/conjunctiva [No Acute Distress] : no acute distress [Normal Outer Ear/Nose] : the ears and nose were normal in appearance [PERRL] : pupils equal, round and reactive to light [Normal TMs] : both tympanic membranes were normal [No Neck Mass] : no neck mass was observed [Thyroid Not Enlarged] : the thyroid was not enlarged [No Respiratory Distress] : no respiratory distress [Normal S1, S2] : normal S1 and S2 [Clear to Auscultation] : lungs were clear to auscultation bilaterally [Regular Rhythm] : with a regular rhythm [Not Tender] : non-tender [Normal Rate] : heart rate was normal [Normal Bowel Sounds] : normal bowel sounds [Soft] : abdomen soft [Normal Gait] : normal gait [No Clubbing, Cyanosis] : no clubbing  or cyanosis of the fingernails [Normal Strength/Tone] : muscle strength and tone were normal [No Joint Swelling] : no joint swelling seen [No Skin Lesions] : no skin lesions [No Rash] : no rash [No Motor Deficits] : the motor exam was normal [Normal Reflexes] : deep tendon reflexes were 2+ and symmetric [Oriented x3] : oriented to person, place, and time [No Tremors] : no tremors [Normal Affect] : the affect was normal [Normal Insight/Judgement] : insight and judgment were intact [Normal Mood] : the mood was normal

## 2020-08-03 NOTE — ASSESSMENT
[FreeTextEntry1] : 49 year old male with history of uncontrolled DM Type 2 ( long term with insulin requirement), recent right foot toe amputation, HLD here for f/u\par HgA1C today is 6.6%\par SMBGS are much better controlled now \par carb consistent diet was discussed, has seen CDE previously \par At this time will continue same regimen and will try obtaining CGM for the patient ( luciana) \par HgA1C goal of <7% \par \par -Will decrease the Tresiba to 40 units at bedtime \par -Continue Trulicity 1.5 mcg weekly \par -Continue Metformin 1000 mg BID \par -Continue Repaglinide 2 mg TID with meals \par -Can consider addition of Jardiance in the future \par -Continue Carb consistent diet \par \par Diabetic retinopathy\par -Close follow up encouraged\par -Hypo and hyperglycemic states should be avoided \par -HgA1C goal of <7% \par \par Amputation \par -Keep HgA1C <7% \par -Close outpatient follow up with Dr. Lott \par \par HLD \par -LDL is well controlled \par \par Follow up in 4 months

## 2020-08-03 NOTE — REVIEW OF SYSTEMS
[Decreased Appetite] : appetite not decreased [Fatigue] : no fatigue [Visual Field Defect] : no visual field defect [Recent Weight Loss (___ Lbs)] : no recent weight loss [Recent Weight Gain (___ Lbs)] : no recent weight gain [Dry Eyes] : no dryness [Dysphagia] : no dysphagia [Neck Pain] : no neck pain [Nasal Congestion] : no nasal congestion [Dysphonia] : no dysphonia [Chest Pain] : no chest pain [Slow Heart Rate] : heart rate is not slow [Palpitations] : no palpitations [Shortness Of Breath] : no shortness of breath [Fast Heart Rate] : heart rate is not fast [Constipation] : no constipation [Vomiting] : no vomiting [Nausea] : no nausea [Joint Pain] : no joint pain [Diarrhea] : no diarrhea [Polyuria] : no polyuria [Headaches] : no headaches [Acanthosis] : no acanthosis  [Muscle Weakness] : no muscle weakness [Tremors] : no tremors [Dizziness] : no dizziness [Pain/Numbness of Digits] : no pain/numbness of digits [Depression] : no depression

## 2020-08-03 NOTE — HISTORY OF PRESENT ILLNESS
[FreeTextEntry1] : Diabetes F/u  Patient HPI\par \par CC\par Patient referred by Dr. Hirsch for diabetes management.\par \par Recently has amputation of the right foot toes ( amputations) \par \par \par HPI:\par \par Duration of Diabetes: 18 years \par Is patient on Insulin? Yes \par If yes, how long on insulin? 10 years \par \par List Current Medications for Glycemic control and the doses:\par 1- Tresiba 44 units at bedtime \par 2- Trulicity 1.5 mcg weekly ( started 2 weeks ago) - has been off of it for a couple of weeks\par 3- Metformin 1000 mg twice a day \par 4- Repaglinide 2 mg TID \par \par SMBG (self monitored blood glucose) readings: \par - Name of glucometer: \par - How often does the patient check BG? 4 times per day \par - Does the patient keep a log? \par \par If detailed record is available, what is the range of most of the BG readings?\par - Before Breakfast: \par - Before Lunch: 100-110\par - Before Dinner: 120-140\par - Before Bedtime:180-200\par \par Does patient get Hypoglycemic episodes? None \par If yes how frequent? once a week \par Hoe low do the BG readings reach? \par When do most of those episodes occur? In the morning \par What symptoms does the patient get during those episodes? \par \par Diabetic Complications: Is patient aware of having any of those complications?\par - Eyes: Retinopathy? Proliferative retinopathy (laser treatment  in the right eye ( 3 tx) and left eye ( 3 tx) \par  	When was the last fully dilated eye exam? Dr. Newsome one month ago \par - Feet: 	Neuropathy? Yes \par  	Foot Ulcers? Yes \par 	When was the last time patient saw a Podiatrist? 2 weeks ago \par - Kidneys: Nephropathy? None \par \par \par Diet: review patient's diet: \par Breakfast: Whole wheat bread, scrambled eggs, oatmeal, frozen fruit, Kashi cereal \par Lunch: [place] ( whole wheat bread) Turkey, ham, apple \par Dinner: Fish, vegetables, small portions of rice, black beans \par Juice or soda: Crystal light, diet soda \par \par \par \par Exercise: review patient exercise habits: Not much \par \par \par

## 2020-08-05 ENCOUNTER — NON-APPOINTMENT (OUTPATIENT)
Age: 50
End: 2020-08-05

## 2020-08-05 ENCOUNTER — APPOINTMENT (OUTPATIENT)
Dept: INTERNAL MEDICINE | Facility: CLINIC | Age: 50
End: 2020-08-05
Payer: COMMERCIAL

## 2020-08-05 VITALS
DIASTOLIC BLOOD PRESSURE: 80 MMHG | WEIGHT: 212 LBS | BODY MASS INDEX: 28.1 KG/M2 | TEMPERATURE: 98.6 F | SYSTOLIC BLOOD PRESSURE: 130 MMHG | HEART RATE: 86 BPM | OXYGEN SATURATION: 97 % | HEIGHT: 73 IN

## 2020-08-05 PROCEDURE — 36415 COLL VENOUS BLD VENIPUNCTURE: CPT

## 2020-08-05 PROCEDURE — 82270 OCCULT BLOOD FECES: CPT

## 2020-08-05 PROCEDURE — 93000 ELECTROCARDIOGRAM COMPLETE: CPT

## 2020-08-05 PROCEDURE — 99386 PREV VISIT NEW AGE 40-64: CPT | Mod: 25

## 2020-08-05 RX ORDER — LISINOPRIL 20 MG/1
20 TABLET ORAL
Refills: 0 | Status: DISCONTINUED | COMMUNITY
End: 2020-08-05

## 2020-08-05 RX ORDER — LOSARTAN POTASSIUM 50 MG/1
50 TABLET, FILM COATED ORAL
Refills: 0 | Status: DISCONTINUED | COMMUNITY
End: 2020-08-05

## 2020-08-05 RX ORDER — OXYCODONE 5 MG/1
5 TABLET ORAL
Refills: 0 | Status: DISCONTINUED | COMMUNITY
End: 2020-08-05

## 2020-08-05 NOTE — REVIEW OF SYSTEMS
[Negative] : Heme/Lymph [FreeTextEntry8] : erectile dysfunction, secondary to diabetes-not usingViagra at this time

## 2020-08-05 NOTE — PHYSICAL EXAM
[Normal Male:] : meatus normal, the bladder was normal on palpation, the scrotum was normal, there were no testicular masses and no prostate nodules. [Normal] : normal gait, coordination grossly intact, no focal deficits [de-identified] : Good musculature without significant fat [de-identified] : Right pupil slightly larger than the left5. This shows diabetic changes with aneurysms and cotton-wool exudates of the left eye [de-identified] : No proximal [FreeTextEntry1] : Coagulase-negative prostate is normal [de-identified] : No adenopathy [de-identified] : Significant for [de-identified] : The patient's right foot is bandaged and there is amputation of the third and fourth toes [de-identified] : Thyroid is not palpable [de-identified] : Decreaseankle jerks bilaterally

## 2020-08-05 NOTE — ASSESSMENT
[FreeTextEntry1] : The patient has insulin-dependent diabetes mellitus, complicated by ophthalmology, coronary artery disease, neuropathy, and skin ulcers. He is status post amputation of 2 toes. He is getting treatment for his eyes with laser and shots. He is under the care of endocrinology. His blood pressure is under good control on current medicines 130/80. Recent hemoglobin A1c is 6.6. We are getting a general screen of bloods and EKG. eKG is normal in

## 2020-08-05 NOTE — HISTORY OF PRESENT ILLNESS
[de-identified] : The patient is a 49-year-old insulin-dependent diabetic for 18 years, who had LAD stents at age 32, recent amputation of the third and fourth toe of the right foot secondary to diabetic neuropathy and ulceration safe even for diabetic retinopathy. He had a 30-pack-year smoker and used a cane patches in order to stop and has been off approximately 3 months. He has no chest pain, palpitations, swelling, fainting. No dyspnea, and no cough or sputum production. He has no nausea, vomiting, pain in the belly button. The bowel, black, bowel. He has no blood in his urine, burning of urine, increased thirst, polyuria or polyphasia, and his last hemoglobin A1c was 6.6. He has mild numbness in his fee. His weight has been stable between 200 and 210. He refuses vaccinations. He has not had a recent cardiovascular workup

## 2020-08-06 LAB
ALBUMIN SERPL ELPH-MCNC: 4.9 G/DL
ALP BLD-CCNC: 45 U/L
ALT SERPL-CCNC: 39 U/L
ANION GAP SERPL CALC-SCNC: 13 MMOL/L
APPEARANCE: CLEAR
AST SERPL-CCNC: 24 U/L
BACTERIA: NEGATIVE
BASOPHILS # BLD AUTO: 0.01 K/UL
BASOPHILS NFR BLD AUTO: 0.2 %
BILIRUB SERPL-MCNC: 0.3 MG/DL
BILIRUBIN URINE: NEGATIVE
BLOOD URINE: NORMAL
BUN SERPL-MCNC: 17 MG/DL
CALCIUM SERPL-MCNC: 9.5 MG/DL
CHLORIDE SERPL-SCNC: 102 MMOL/L
CHOLEST SERPL-MCNC: 66 MG/DL
CHOLEST/HDLC SERPL: 2.3 RATIO
CO2 SERPL-SCNC: 24 MMOL/L
COLOR: YELLOW
CREAT SERPL-MCNC: 0.73 MG/DL
CRP SERPL HS-MCNC: 0.77 MG/L
EOSINOPHIL # BLD AUTO: 0.11 K/UL
EOSINOPHIL NFR BLD AUTO: 1.8 %
GLUCOSE QUALITATIVE U: NEGATIVE
GLUCOSE SERPL-MCNC: 113 MG/DL
HCT VFR BLD CALC: 43.6 %
HDLC SERPL-MCNC: 28 MG/DL
HGB BLD-MCNC: 14.7 G/DL
HYALINE CASTS: 0 /LPF
IMM GRANULOCYTES NFR BLD AUTO: 0.2 %
KETONES URINE: NEGATIVE
LDLC SERPL CALC-MCNC: 26 MG/DL
LEUKOCYTE ESTERASE URINE: NEGATIVE
LYMPHOCYTES # BLD AUTO: 1.88 K/UL
LYMPHOCYTES NFR BLD AUTO: 31 %
MAN DIFF?: NORMAL
MCHC RBC-ENTMCNC: 29.9 PG
MCHC RBC-ENTMCNC: 33.7 GM/DL
MCV RBC AUTO: 88.6 FL
MICROSCOPIC-UA: NORMAL
MONOCYTES # BLD AUTO: 0.46 K/UL
MONOCYTES NFR BLD AUTO: 7.6 %
NEUTROPHILS # BLD AUTO: 3.6 K/UL
NEUTROPHILS NFR BLD AUTO: 59.2 %
NITRITE URINE: NEGATIVE
PH URINE: 5.5
PLATELET # BLD AUTO: 161 K/UL
POTASSIUM SERPL-SCNC: 4.6 MMOL/L
PROT SERPL-MCNC: 7.2 G/DL
PROTEIN URINE: ABNORMAL
RBC # BLD: 4.92 M/UL
RBC # FLD: 13.8 %
RED BLOOD CELLS URINE: 1 /HPF
SODIUM SERPL-SCNC: 139 MMOL/L
SPECIFIC GRAVITY URINE: 1.02
SQUAMOUS EPITHELIAL CELLS: 0 /HPF
TRIGL SERPL-MCNC: 61 MG/DL
UROBILINOGEN URINE: NORMAL
WBC # FLD AUTO: 6.07 K/UL
WHITE BLOOD CELLS URINE: 1 /HPF

## 2020-08-10 ENCOUNTER — NON-APPOINTMENT (OUTPATIENT)
Age: 50
End: 2020-08-10

## 2020-08-10 ENCOUNTER — APPOINTMENT (OUTPATIENT)
Dept: WOUND CARE | Facility: CLINIC | Age: 50
End: 2020-08-10
Payer: COMMERCIAL

## 2020-08-10 PROCEDURE — 11042 DBRDMT SUBQ TIS 1ST 20SQCM/<: CPT

## 2020-08-11 NOTE — ASSESSMENT
[FreeTextEntry1] : 50 yo M w/ RF ulcer healing. \par - Pt to continue with Plastizote inserts, awaiting for approval for orthotics. \par - Continue nursing visit for dressing changes with alginate \par - Sharp excisional debridement of fibrotic tissue with 15 blade to level of subcutaneous tissue and not beyond revealing healthy 100% granular wound with increased epithelization, no clinical signs of infection \par - Pt RTC 2 weeks

## 2020-08-11 NOTE — HISTORY OF PRESENT ILLNESS
[Home] : at home, [unfilled] , at the time of the visit. [Medical Office: ([facility])___] : at the medical office located in  [Other:____] : [unfilled] [Verbal consent obtained from patient] : the patient, [unfilled] [FreeTextEntry1] : Pt. returns to clinic S/P application of Apligraf x 1 without c/c.  Performing ADL without issues or significant drainage performing wound care himself and wearing New Balance sneakers with no significant pain.

## 2020-08-16 ENCOUNTER — NON-APPOINTMENT (OUTPATIENT)
Age: 50
End: 2020-08-16

## 2020-08-24 ENCOUNTER — APPOINTMENT (OUTPATIENT)
Dept: WOUND CARE | Facility: CLINIC | Age: 50
End: 2020-08-24
Payer: COMMERCIAL

## 2020-08-24 ENCOUNTER — NON-APPOINTMENT (OUTPATIENT)
Age: 50
End: 2020-08-24

## 2020-08-24 VITALS — TEMPERATURE: 97.6 F

## 2020-08-24 PROCEDURE — 97597 DBRDMT OPN WND 1ST 20 CM/<: CPT

## 2020-08-24 NOTE — ASSESSMENT
[FreeTextEntry1] : 48 yo M w/ RF ulcer healing. \par - Pt to continue with Plastizote inserts, pt says he received insurance approval for the custom orthotics and has been casted for them he is currently waiting for them to be dispensed \par - Continue dressing changes with aquacell, instructed pt to leave wound open at night and only wear dressing during the day \par - Sharp excisional debridement of fibrotic tissue with 15 blade to level of subcutaneous tissue and not beyond revealing healthy 100% granular wound with increased epithelization, no clinical signs of infection \par - getting orthotics in about a week\par - Pt RTC 3 weeks for likely discharge

## 2020-08-24 NOTE — HISTORY OF PRESENT ILLNESS
[Home] : at home, [unfilled] , at the time of the visit. [Medical Office: ([facility])___] : at the medical office located in  [Other:____] : [unfilled] [Verbal consent obtained from patient] : the patient, [unfilled] [FreeTextEntry1] : Pt. returns to clinic S/P application of Apligraf x 1 without c/c.  Performing ADL without issues or significant drainage performing wound care himself and wearing New Balance sneakers with no significant pain.

## 2020-09-02 ENCOUNTER — NON-APPOINTMENT (OUTPATIENT)
Age: 50
End: 2020-09-02

## 2020-09-14 ENCOUNTER — APPOINTMENT (OUTPATIENT)
Dept: WOUND CARE | Facility: CLINIC | Age: 50
End: 2020-09-14
Payer: COMMERCIAL

## 2020-09-14 PROCEDURE — 99213 OFFICE O/P EST LOW 20 MIN: CPT

## 2020-09-21 NOTE — ASSESSMENT
[FreeTextEntry1] : 50 yo M w/ RF ulcer healing. \par - Pt to continue dressing w/ bandaid\par - Sharp excisional debridement of LF submet 4 callus with 15 blade\par - RF healthy 100% granular wound with increased epithelization, no clinical signs of infection \par - Pt RTC 3 weeks for likely discharge

## 2020-09-21 NOTE — HISTORY OF PRESENT ILLNESS
[FreeTextEntry1] : Pt. returns to clinic S/P application of Apligraf x 1 without c/c.  Performing ADL without issues or significant drainage performing wound care himself and wearing New Balance sneakers w/ custom molded orthotic. Pt denies N/V/F/Sob

## 2020-10-05 ENCOUNTER — APPOINTMENT (OUTPATIENT)
Dept: WOUND CARE | Facility: CLINIC | Age: 50
End: 2020-10-05

## 2020-10-19 ENCOUNTER — APPOINTMENT (OUTPATIENT)
Dept: WOUND CARE | Facility: CLINIC | Age: 50
End: 2020-10-19

## 2020-11-09 ENCOUNTER — APPOINTMENT (OUTPATIENT)
Dept: INTERNAL MEDICINE | Facility: CLINIC | Age: 50
End: 2020-11-09

## 2020-12-30 ENCOUNTER — APPOINTMENT (OUTPATIENT)
Dept: ENDOCRINOLOGY | Facility: CLINIC | Age: 50
End: 2020-12-30
Payer: COMMERCIAL

## 2020-12-30 VITALS
TEMPERATURE: 98 F | BODY MASS INDEX: 28.49 KG/M2 | HEART RATE: 91 BPM | HEIGHT: 73 IN | SYSTOLIC BLOOD PRESSURE: 130 MMHG | WEIGHT: 215 LBS | OXYGEN SATURATION: 98 % | DIASTOLIC BLOOD PRESSURE: 82 MMHG

## 2020-12-30 PROCEDURE — 99214 OFFICE O/P EST MOD 30 MIN: CPT

## 2020-12-30 PROCEDURE — 99072 ADDL SUPL MATRL&STAF TM PHE: CPT

## 2021-01-02 NOTE — ASSESSMENT
[FreeTextEntry1] : 50 year old male with history of uncontrolled DM Type 2 ( long term with insulin requirement), recent right foot toe amputation, HLD here for f/u\par HgA1C today is 9.9%\par SMBGS are much worse due to non-compliance with medications and diet \par carb consistent diet was discussed, has seen CDE previously \par HgA1C goal of <7% \par \par -Continue Basaglar to 40 units at bedtime \par -Continue Trulicity 1.5 mcg weekly \par -Continue Metformin 1000 mg BID \par -Continue Repaglinide 2 mg TID with meals \par -Continue Carb consistent diet \par -For now medication compliance and diet compliance is heavily emphasized \par \par Diabetic retinopathy\par -Close follow up encouraged\par -Hypo and hyperglycemic states should be avoided \par -HgA1C goal of <7% \par \par Amputation \par -Keep HgA1C <7% \par -Close outpatient follow up with Dr. Lott \par \par HLD \par -LDL is well controlled \par \par Follow up in 4 months. \par

## 2021-01-02 NOTE — HISTORY OF PRESENT ILLNESS
[FreeTextEntry1] : Diabetes F/u Patient HPI\par \par CC\par Patient referred by Dr. Hirsch for diabetes management.\par \par Recently has amputation of the right foot toes ( amputations) \par \par \par HPI:\par \par Duration of Diabetes: 18 years \par Is patient on Insulin? Yes \par If yes, how long on insulin? 10 years \par \par List Current Medications for Glycemic control and the doses:\par 1- Lantus 44 units at bedtime \par 2- Trulicity 1.5 mcg weekly ( started 2 weeks ago) - has been off of it for a couple of weeks\par 3- Metformin 1000 mg twice a day \par 4- Repaglinide 2 mg TID \par \par SMBG (self monitored blood glucose) readings: \par - Name of glucometer: \par - How often does the patient check BG? 4 times per day \par - Does the patient keep a log? \par \par If detailed record is available, what is the range of most of the BG readings?\par - Before Breakfast: 120-140\par - 2 hours after Lunch: low 200s \par - Before Bedtime:250-260\par \par Does patient get Hypoglycemic episodes? None \par If yes how frequent? once a week \par Hoe low do the BG readings reach? \par When do most of those episodes occur? In the morning \par What symptoms does the patient get during those episodes? \par \par Diabetic Complications: Is patient aware of having any of those complications?\par - Eyes: Retinopathy? Proliferative retinopathy (laser treatment in the right eye ( 3 tx) and left eye ( 3 tx) -completed and did 9 treatments \par  	When was the last fully dilated eye exam? Dr. Newsome 12/2020\par - Feet: 	Neuropathy? Yes \par  	Foot Ulcers? Yes \par 	When was the last time patient saw a Podiatrist? 2 weeks ago- developed a blister on the left foot \par - Kidneys: Nephropathy? None \par \par \par Diet: review patient's diet: \par Breakfast: Whole wheat bread, scrambled eggs, oatmeal, frozen fruit, Kashi cereal \par Lunch: [place] ( whole wheat bread) Turkey, ham, apple \par Dinner: Fish, vegetables, small portions of rice, black beans \par Juice or soda: Crystal light, diet soda \par \par \par \par Exercise: review patient exercise habits: Not much \par \par \par

## 2021-01-04 RX ORDER — FLASH GLUCOSE SENSOR
KIT MISCELLANEOUS
Qty: 2 | Refills: 7 | Status: DISCONTINUED | COMMUNITY
Start: 2020-12-07 | End: 2021-01-04

## 2021-01-04 RX ORDER — FLASH GLUCOSE SCANNING READER
EACH MISCELLANEOUS
Qty: 1 | Refills: 0 | Status: DISCONTINUED | COMMUNITY
Start: 2020-06-10 | End: 2021-01-04

## 2021-01-04 RX ORDER — FLASH GLUCOSE SENSOR
KIT MISCELLANEOUS
Qty: 6 | Refills: 3 | Status: DISCONTINUED | COMMUNITY
Start: 2020-06-10 | End: 2021-01-04

## 2021-01-06 ENCOUNTER — APPOINTMENT (OUTPATIENT)
Dept: INTERNAL MEDICINE | Facility: CLINIC | Age: 51
End: 2021-01-06
Payer: COMMERCIAL

## 2021-01-06 ENCOUNTER — NON-APPOINTMENT (OUTPATIENT)
Age: 51
End: 2021-01-06

## 2021-01-06 VITALS
BODY MASS INDEX: 28.63 KG/M2 | HEART RATE: 85 BPM | OXYGEN SATURATION: 98 % | HEIGHT: 73 IN | WEIGHT: 216 LBS | TEMPERATURE: 97.9 F

## 2021-01-06 DIAGNOSIS — N52.9 MALE ERECTILE DYSFUNCTION, UNSPECIFIED: ICD-10-CM

## 2021-01-06 PROCEDURE — 99214 OFFICE O/P EST MOD 30 MIN: CPT | Mod: 25

## 2021-01-06 PROCEDURE — 93000 ELECTROCARDIOGRAM COMPLETE: CPT

## 2021-01-06 PROCEDURE — 99072 ADDL SUPL MATRL&STAF TM PHE: CPT

## 2021-01-06 PROCEDURE — 36415 COLL VENOUS BLD VENIPUNCTURE: CPT

## 2021-01-06 RX ORDER — SILDENAFIL 20 MG/1
20 TABLET ORAL
Qty: 60 | Refills: 3 | Status: DISCONTINUED | COMMUNITY
Start: 2020-10-14 | End: 2021-01-06

## 2021-01-06 RX ORDER — INSULIN DEGLUDEC INJECTION 100 U/ML
100 INJECTION, SOLUTION SUBCUTANEOUS
Qty: 4 | Refills: 0 | Status: DISCONTINUED | COMMUNITY
Start: 2020-10-30 | End: 2021-01-06

## 2021-01-06 RX ORDER — SILDENAFIL 100 MG/1
100 TABLET, FILM COATED ORAL
Qty: 12 | Refills: 11 | Status: ACTIVE | COMMUNITY
Start: 2021-01-06 | End: 1900-01-01

## 2021-01-06 RX ORDER — INSULIN DEGLUDEC INJECTION 100 U/ML
100 INJECTION, SOLUTION SUBCUTANEOUS
Refills: 0 | Status: DISCONTINUED | COMMUNITY
End: 2021-01-06

## 2021-01-06 NOTE — ASSESSMENT
[FreeTextEntry1] : foot ulcer -wound surgeon,diabetes -poor control -seeing endocrine, [facility] ck'ing lipids,EKG -asymptomatic -LAD stent,EKG shows non sp st-t changes new from last ekg wh was normal - to get stress testing Ck'ing lipids, urology for ED. Encouraged to stay off cigarettes wh he occas uses. I spent 30 min with the pt.WE are rerchecikng lipids, cpk,lft, ekg,u/a,renal function

## 2021-01-06 NOTE — PHYSICAL EXAM
[No Carotid Bruits] : no carotid bruits [No Abdominal Bruit] : a ~M bruit was not heard ~T in the abdomen [Pedal Pulses Present] : the pedal pulses are present [Normal] : no posterior cervical lymphadenopathy and no anterior cervical lymphadenopathy [de-identified] : =overwt [de-identified] : R mildly dilated -see retinal sp [de-identified] : 3,4 toes amputation R, L ulcer sole of foot

## 2021-01-06 NOTE — REVIEW OF SYSTEMS
[Negative] : Heme/Lymph [FreeTextEntry3] : see retinologist [FreeTextEntry8] : ED [de-identified] : see foot ulcer -wound healing [de-identified] : neuropathy

## 2021-01-07 LAB
ALBUMIN SERPL ELPH-MCNC: 4.8 G/DL
ALP BLD-CCNC: 57 U/L
ALT SERPL-CCNC: 25 U/L
ANION GAP SERPL CALC-SCNC: 12 MMOL/L
APPEARANCE: CLEAR
AST SERPL-CCNC: 13 U/L
BACTERIA: NEGATIVE
BASOPHILS # BLD AUTO: 0.02 K/UL
BASOPHILS NFR BLD AUTO: 0.3 %
BILIRUB SERPL-MCNC: 0.5 MG/DL
BILIRUBIN URINE: NEGATIVE
BLOOD URINE: NEGATIVE
BUN SERPL-MCNC: 21 MG/DL
CALCIUM SERPL-MCNC: 9.7 MG/DL
CHLORIDE SERPL-SCNC: 100 MMOL/L
CHOLEST SERPL-MCNC: 93 MG/DL
CK SERPL-CCNC: 104 U/L
CO2 SERPL-SCNC: 24 MMOL/L
COLOR: YELLOW
CREAT SERPL-MCNC: 0.94 MG/DL
CRP SERPL HS-MCNC: 0.56 MG/L
EOSINOPHIL # BLD AUTO: 0.1 K/UL
EOSINOPHIL NFR BLD AUTO: 1.5 %
GLUCOSE QUALITATIVE U: ABNORMAL
GLUCOSE SERPL-MCNC: 249 MG/DL
HCT VFR BLD CALC: 50.5 %
HDLC SERPL-MCNC: 31 MG/DL
HGB BLD-MCNC: 16.2 G/DL
HYALINE CASTS: 0 /LPF
IMM GRANULOCYTES NFR BLD AUTO: 0.3 %
KETONES URINE: NEGATIVE
LDLC SERPL CALC-MCNC: 40 MG/DL
LEUKOCYTE ESTERASE URINE: NEGATIVE
LYMPHOCYTES # BLD AUTO: 2.44 K/UL
LYMPHOCYTES NFR BLD AUTO: 35.7 %
MAN DIFF?: NORMAL
MCHC RBC-ENTMCNC: 30.1 PG
MCHC RBC-ENTMCNC: 32.1 GM/DL
MCV RBC AUTO: 93.7 FL
MICROSCOPIC-UA: NORMAL
MONOCYTES # BLD AUTO: 0.57 K/UL
MONOCYTES NFR BLD AUTO: 8.3 %
NEUTROPHILS # BLD AUTO: 3.69 K/UL
NEUTROPHILS NFR BLD AUTO: 53.9 %
NITRITE URINE: NEGATIVE
NONHDLC SERPL-MCNC: 62 MG/DL
PH URINE: 6
PLATELET # BLD AUTO: 141 K/UL
POTASSIUM SERPL-SCNC: 4.7 MMOL/L
PROT SERPL-MCNC: 7.1 G/DL
PROTEIN URINE: ABNORMAL
RBC # BLD: 5.39 M/UL
RBC # FLD: 13.3 %
RED BLOOD CELLS URINE: 0 /HPF
SODIUM SERPL-SCNC: 136 MMOL/L
SPECIFIC GRAVITY URINE: 1.03
SQUAMOUS EPITHELIAL CELLS: 0 /HPF
TRIGL SERPL-MCNC: 108 MG/DL
TSH SERPL-ACNC: 4.74 UIU/ML
UROBILINOGEN URINE: NORMAL
WBC # FLD AUTO: 6.84 K/UL
WHITE BLOOD CELLS URINE: 0 /HPF

## 2021-01-12 ENCOUNTER — RX RENEWAL (OUTPATIENT)
Age: 51
End: 2021-01-12

## 2021-02-08 ENCOUNTER — RX RENEWAL (OUTPATIENT)
Age: 51
End: 2021-02-08

## 2021-03-06 ENCOUNTER — RX RENEWAL (OUTPATIENT)
Age: 51
End: 2021-03-06

## 2021-03-19 ENCOUNTER — TRANSCRIPTION ENCOUNTER (OUTPATIENT)
Age: 51
End: 2021-03-19

## 2021-03-26 ENCOUNTER — TRANSCRIPTION ENCOUNTER (OUTPATIENT)
Age: 51
End: 2021-03-26

## 2021-03-31 ENCOUNTER — RX RENEWAL (OUTPATIENT)
Age: 51
End: 2021-03-31

## 2021-04-28 ENCOUNTER — APPOINTMENT (OUTPATIENT)
Dept: ENDOCRINOLOGY | Facility: CLINIC | Age: 51
End: 2021-04-28

## 2021-05-04 ENCOUNTER — RX RENEWAL (OUTPATIENT)
Age: 51
End: 2021-05-04

## 2021-05-11 NOTE — ED PROVIDER NOTE - PROGRESS NOTE DETAILS
[facility]  Home Care Following Anesthesia  For 24 hours after surgery:  1. Get plenty of rest.  A responsible adult must stay with you for at least 24 hours after you leave the surgery center.  2. Do not drive or use heavy equipment.  If you have weakness or tingling, don't drive or use heavy equipment until this feeling goes away.   3. Do not drink alcohol.   4. Avoid strenuous or risky activities.  Ask for help when climbing stairs.  5. You may feel lightheaded.  IF so, sit for a few minutes before standing.  Have someone help you get up.   6. If you have nausea (feel sick to your stomach): Drink only clear liquids such as apple juice, ginger ale, broth or 7-Up.  Rest may also help.  Be sure to drink enough fluids.  Move to a regular diet as you feel able.   7. You may have a slight fever.  Call the doctor if your fever is over 100 F (37.7 C) (taken under the tongue) or lasts longer than 24 hours.  8. You may have a dry mouth, a sore throat, muscle aches or trouble sleeping. These should go away after 24 hours.  9. Do not make important or legal decisions.   10. It is recommended to avoid smoking.               Tips for taking pain medications  To get the best pain relief possible, remember these points:    Take pain medications as directed, before pain becomes severe.    Pain medication can upset your stomach: taking it with food may help.    Constipation is a common side effect of pain medication. Drink plenty of  fluids.    Eat foods high in fiber. Take a stool softener if recommended by your doctor or pharmacist.    Do not drink alcohol, drive or operate machinery while taking pain medications.    Ask about other ways to control pain, such as with heat, ice or relaxation.    Tylenol/Acetaminophen Consumption  To help encourage the safe use of acetaminophen, the makers of TYLENOL  have lowered the maximum daily dose for single-ingredient Extra Strength TYLENOL   (acetaminophen) products sold in the [place] from 8 pills per day (4,000 mg) to 6 pills per day (3,000 mg). The dosing interval has also changed from 2 pills every 4-6 hours to 2 pills every 6 hours.    If you feel your pain relief is insufficient, you may take Tylenol/Acetaminophen in addition to your narcotic pain medication.     Be careful not to exceed 3,000 mg of Tylenol/Acetaminophen in a 24 hour period from all sources.    If you are taking extra strength Tylenol/acetaminophen (500 mg), the maximum dose is 6 tablets in 24 hours.    If you are taking regular strength acetaminophen (325 mg), the maximum dose is 9 tablets in 24 hours.    Call a doctor for any of the followin. Signs of infection (fever, growing tenderness at the surgery site, a large amount of drainage or bleeding, severe pain, foul-smelling drainage, redness, swelling).  2. It has been over 8 to 10 hours since surgery and you are still not able to urinate (pass water).  3. Headache for over 24 hours.  4. Numbness, tingling or weakness the day after surgery (if you had spinal anesthesia).  5. Signs of Covid-19 infection (temperature over 100 degrees, shortness of breath, cough, loss of taste/smell, generalized body aches, persistent headache, chills, sore throat, nausea/vomiting/diarrhea)  Your doctor is:       Dr. Alberto Zhang, Prostate and Urology: [phone]               Or dial [phone] and ask for the resident on call for:  Prostate Urology  For emergency care, call the:  [facility]:  [phone] (TTY for hearing impaired: [phone])                 ED PROGRESS NOTE MICKIE PAL: Patient evaluated by Podiatry, for Right foot gas gangrene. I&D preformed, culture sent. Vascular consulted as will and patient admitted to Surgery Dr. Jessica Garcia. c/d/w Attending

## 2021-05-25 ENCOUNTER — INPATIENT (INPATIENT)
Facility: HOSPITAL | Age: 51
LOS: 9 days | Discharge: HOME | End: 2021-06-04
Attending: HOSPITALIST | Admitting: HOSPITALIST
Payer: COMMERCIAL

## 2021-05-25 VITALS
OXYGEN SATURATION: 97 % | HEART RATE: 115 BPM | HEIGHT: 73 IN | DIASTOLIC BLOOD PRESSURE: 79 MMHG | SYSTOLIC BLOOD PRESSURE: 144 MMHG | TEMPERATURE: 99 F | RESPIRATION RATE: 18 BRPM

## 2021-05-25 PROCEDURE — 99285 EMERGENCY DEPT VISIT HI MDM: CPT

## 2021-05-25 RX ORDER — VANCOMYCIN HCL 1 G
1000 VIAL (EA) INTRAVENOUS ONCE
Refills: 0 | Status: COMPLETED | OUTPATIENT
Start: 2021-05-25 | End: 2021-05-25

## 2021-05-25 RX ORDER — PIPERACILLIN AND TAZOBACTAM 4; .5 G/20ML; G/20ML
3.38 INJECTION, POWDER, LYOPHILIZED, FOR SOLUTION INTRAVENOUS ONCE
Refills: 0 | Status: COMPLETED | OUTPATIENT
Start: 2021-05-25 | End: 2021-05-25

## 2021-05-25 RX ORDER — SODIUM CHLORIDE 9 MG/ML
1000 INJECTION INTRAMUSCULAR; INTRAVENOUS; SUBCUTANEOUS ONCE
Refills: 0 | Status: COMPLETED | OUTPATIENT
Start: 2021-05-25 | End: 2021-05-25

## 2021-05-25 RX ORDER — IBUPROFEN 200 MG
600 TABLET ORAL ONCE
Refills: 0 | Status: COMPLETED | OUTPATIENT
Start: 2021-05-25 | End: 2021-05-25

## 2021-05-25 NOTE — ED PROVIDER NOTE - ATTENDING CONTRIBUTION TO CARE
Patient is a 49 yo M with history of type 2 DM, HTN, CAD here for evaluation of redness and drainage from right foot wound, sent in by his podiatrist for likely admission and treatment. Patient reports he had right foot 3rd and 4th digit resection in April, now with worsening symptoms and feeling unwell for 2 days. He felt like he had a fever today, spoke to Dr. Lott who told him to come in. No nausea, vomiting, diarrhea. No chest pain or shortness of breath.     VS noted  Gen. no acute distress, Non toxic   HEENT: EOMI, mmm,   Lungs: CTAB/L no C/ W /R   CVS: RRR   Abd; Soft non tender, non distended   Ext: right foot: warm to touch, edema and erythema streaking to ankle on dorsal surface. At base of 2nd metatarsal, ulcer on plantar surface.  Skin: no rash  Neuro AAOx3 non focal clear speech  a/p: diabetic foot infection - concern for osteomyelitis/ cellulitis/ sepsis given systemic symptoms. plan for Abx, labs, cultures, Podiatry and admit.   - Emily WEINER

## 2021-05-25 NOTE — ED PROVIDER NOTE - PHYSICAL EXAMINATION
Gen: Well appearing and in NAD  Head: normal appearing atraumatic   Neck: trachea midline  cv- tachy regular   Resp:  No respiratory distress  Abd; soft NT ND  Ext: no visible deformities  Neuro:  Alert and oriented, appears non focal  Skin:  Warm and dry as visualized, right foot erythematous along distal ankle to toes on dorsal surface blanchable, not indurated, no ttp, ulcer at base of 2nd mt on plantar side down to tendon    Psych:  Normal affect and mood  ~Ronny Shannon D.O

## 2021-05-25 NOTE — ED ADULT NURSE NOTE - NSIMPLEMENTINTERV_GEN_ALL_ED
Implemented All Fall with Harm Risk Interventions:  [place] to call system. Call bell, personal items and telephone within reach. Instruct patient to call for assistance. Room bathroom lighting operational. Non-slip footwear when patient is off stretcher. Physically safe environment: no spills, clutter or unnecessary equipment. Stretcher in lowest position, wheels locked, appropriate side rails in place. Provide visual cue, wrist band, yellow gown, etc. Monitor gait and stability. Monitor for mental status changes and reorient to person, place, and time. Review medications for side effects contributing to fall risk. Reinforce activity limits and safety measures with patient and family. Provide visual clues: red socks.

## 2021-05-25 NOTE — ED PROVIDER NOTE - CLINICAL SUMMARY MEDICAL DECISION MAKING FREE TEXT BOX
51 yo m PMHx T2DM and CAD hx of multiple foot ulcers, sp 4/15 Right foot partial 3rd and 4th ray resection pw fevers and increased redness and drainage from right foot wound. infected foot ucler will need admission ivabx xrays podiatry eval

## 2021-05-25 NOTE — ED PROVIDER NOTE - PROGRESS NOTE DETAILS
discussed with podiatry will eval patient pods recc admit w/ ivabx bedside debridement or in am, npo, discussed with vascular admitted for further mgmt

## 2021-05-25 NOTE — ED ADULT NURSE NOTE - OBJECTIVE STATEMENT
facilitator RN - pt received in room 4 A&Ox4 c/o foot pain, fevers. Pt has DM, CAD, multiple foot ulcers. pt c/o chills earlier today, had fever of 102 and was referred to ED. pt also states increased drainage from foot. pt has partial 3rd and 4th toe resection. pt has unstageable ulcers on bottom of bilateral feet. c/o numbness and tingling to R foot. denies n/v, cp, sob. resp even and unlabored. 18G IV placed to L AC. labs drawn and sent. report endorsed to primary RN.

## 2021-05-25 NOTE — ED PROVIDER NOTE - OBJECTIVE STATEMENT
49 yo m PMHx T2DM and CAD hx of multiple foot ulcers, sp 4/15 Right foot partial 3rd and 4th ray resection pw fevers and increased redness and drainage from right foot wound. reports fevers and feeling generalized unwell for last 2 days. messaged podiatrist dr. palacios today and concerned for fever and drainage so told to come to the ed for abx and further mgmt. Denies recent trauma, chills, headache, dizziness, nausea, vomiting, dysuria, freq, hematuria, diarrhea, constipation, chest pain, shortness of breath, cough. took 2 tylenol pta

## 2021-05-25 NOTE — ED PROVIDER NOTE - NS ED ROS FT
ROS:  GENERAL: + fever, no chills  EYES: no change in vision  HEENT: no trouble swallowing, no trouble speaking  CARDIAC: no chest pain  PULMONARY: no cough, no shortness of breath  GI: no abdominal pain, no nausea, no vomiting, no diarrhea, no constipation  : No dysuria, no frequency, no change in appearance, or odor of urine  SKIN: no rashes  NEURO: no headache, no weakness  MSK: No joint pain  ~Ronny Shannon D.O. -Resident

## 2021-05-25 NOTE — ED ADULT TRIAGE NOTE - CHIEF COMPLAINT QUOTE
c/o worsening R foot pain x3 days. Pt states he has been getting treated for an ulcer on the foot x8 months. PT states "I am going to lose my foot." PMHx diabetes, stents.

## 2021-05-26 ENCOUNTER — APPOINTMENT (OUTPATIENT)
Dept: ENDOCRINOLOGY | Facility: CLINIC | Age: 51
End: 2021-05-26

## 2021-05-26 DIAGNOSIS — L97.519 NON-PRESSURE CHRONIC ULCER OF OTHER PART OF RIGHT FOOT WITH UNSPECIFIED SEVERITY: ICD-10-CM

## 2021-05-26 LAB
ALBUMIN SERPL ELPH-MCNC: 4.2 G/DL — SIGNIFICANT CHANGE UP (ref 3.3–5)
ALP SERPL-CCNC: 61 U/L — SIGNIFICANT CHANGE UP (ref 40–120)
ALT FLD-CCNC: 13 U/L — SIGNIFICANT CHANGE UP (ref 4–41)
ANION GAP SERPL CALC-SCNC: 12 MMOL/L — SIGNIFICANT CHANGE UP (ref 7–14)
ANION GAP SERPL CALC-SCNC: 14 MMOL/L — SIGNIFICANT CHANGE UP (ref 7–14)
APTT BLD: 31.2 SEC — SIGNIFICANT CHANGE UP (ref 27–36.3)
APTT BLD: 31.9 SEC — SIGNIFICANT CHANGE UP (ref 27–36.3)
AST SERPL-CCNC: 11 U/L — SIGNIFICANT CHANGE UP (ref 4–40)
BASOPHILS # BLD AUTO: 0.02 K/UL — SIGNIFICANT CHANGE UP (ref 0–0.2)
BASOPHILS NFR BLD AUTO: 0.2 % — SIGNIFICANT CHANGE UP (ref 0–2)
BILIRUB SERPL-MCNC: 0.5 MG/DL — SIGNIFICANT CHANGE UP (ref 0.2–1.2)
BLD GP AB SCN SERPL QL: NEGATIVE — SIGNIFICANT CHANGE UP
BUN SERPL-MCNC: 19 MG/DL — SIGNIFICANT CHANGE UP (ref 7–23)
BUN SERPL-MCNC: 21 MG/DL — SIGNIFICANT CHANGE UP (ref 7–23)
CALCIUM SERPL-MCNC: 10 MG/DL — SIGNIFICANT CHANGE UP (ref 8.4–10.5)
CALCIUM SERPL-MCNC: 8.9 MG/DL — SIGNIFICANT CHANGE UP (ref 8.4–10.5)
CHLORIDE SERPL-SCNC: 98 MMOL/L — SIGNIFICANT CHANGE UP (ref 98–107)
CHLORIDE SERPL-SCNC: 99 MMOL/L — SIGNIFICANT CHANGE UP (ref 98–107)
CO2 SERPL-SCNC: 23 MMOL/L — SIGNIFICANT CHANGE UP (ref 22–31)
CO2 SERPL-SCNC: 26 MMOL/L — SIGNIFICANT CHANGE UP (ref 22–31)
CREAT SERPL-MCNC: 0.97 MG/DL — SIGNIFICANT CHANGE UP (ref 0.5–1.3)
CREAT SERPL-MCNC: 1.24 MG/DL — SIGNIFICANT CHANGE UP (ref 0.5–1.3)
CRP SERPL-MCNC: 136.6 MG/L — HIGH
EOSINOPHIL # BLD AUTO: 0.08 K/UL — SIGNIFICANT CHANGE UP (ref 0–0.5)
EOSINOPHIL NFR BLD AUTO: 0.7 % — SIGNIFICANT CHANGE UP (ref 0–6)
ERYTHROCYTE [SEDIMENTATION RATE] IN BLOOD: 70 MM/HR — HIGH (ref 1–15)
GLUCOSE BLDC GLUCOMTR-MCNC: 258 MG/DL — HIGH (ref 70–99)
GLUCOSE BLDC GLUCOMTR-MCNC: 262 MG/DL — HIGH (ref 70–99)
GLUCOSE BLDC GLUCOMTR-MCNC: 267 MG/DL — HIGH (ref 70–99)
GLUCOSE BLDC GLUCOMTR-MCNC: 283 MG/DL — HIGH (ref 70–99)
GLUCOSE SERPL-MCNC: 138 MG/DL — HIGH (ref 70–99)
GLUCOSE SERPL-MCNC: 181 MG/DL — HIGH (ref 70–99)
HCT VFR BLD CALC: 39.2 % — SIGNIFICANT CHANGE UP (ref 39–50)
HCT VFR BLD CALC: 44.1 % — SIGNIFICANT CHANGE UP (ref 39–50)
HGB BLD-MCNC: 12.9 G/DL — LOW (ref 13–17)
HGB BLD-MCNC: 14.8 G/DL — SIGNIFICANT CHANGE UP (ref 13–17)
IANC: 9.07 K/UL — HIGH (ref 1.5–8.5)
IMM GRANULOCYTES NFR BLD AUTO: 0.4 % — SIGNIFICANT CHANGE UP (ref 0–1.5)
INR BLD: 1.24 RATIO — HIGH (ref 0.88–1.16)
INR BLD: 1.25 RATIO — HIGH (ref 0.88–1.16)
LACTATE BLDV-MCNC: 1.4 MMOL/L — SIGNIFICANT CHANGE UP (ref 0.5–2)
LYMPHOCYTES # BLD AUTO: 1.16 K/UL — SIGNIFICANT CHANGE UP (ref 1–3.3)
LYMPHOCYTES # BLD AUTO: 10.3 % — LOW (ref 13–44)
MAGNESIUM SERPL-MCNC: 1.8 MG/DL — SIGNIFICANT CHANGE UP (ref 1.6–2.6)
MCHC RBC-ENTMCNC: 29.5 PG — SIGNIFICANT CHANGE UP (ref 27–34)
MCHC RBC-ENTMCNC: 29.8 PG — SIGNIFICANT CHANGE UP (ref 27–34)
MCHC RBC-ENTMCNC: 32.9 GM/DL — SIGNIFICANT CHANGE UP (ref 32–36)
MCHC RBC-ENTMCNC: 33.6 GM/DL — SIGNIFICANT CHANGE UP (ref 32–36)
MCV RBC AUTO: 88.9 FL — SIGNIFICANT CHANGE UP (ref 80–100)
MCV RBC AUTO: 89.5 FL — SIGNIFICANT CHANGE UP (ref 80–100)
MONOCYTES # BLD AUTO: 0.92 K/UL — HIGH (ref 0–0.9)
MONOCYTES NFR BLD AUTO: 8.1 % — SIGNIFICANT CHANGE UP (ref 2–14)
NEUTROPHILS # BLD AUTO: 9.07 K/UL — HIGH (ref 1.8–7.4)
NEUTROPHILS NFR BLD AUTO: 80.3 % — HIGH (ref 43–77)
NRBC # BLD: 0 /100 WBCS — SIGNIFICANT CHANGE UP
NRBC # BLD: 0 /100 WBCS — SIGNIFICANT CHANGE UP
NRBC # FLD: 0 K/UL — SIGNIFICANT CHANGE UP
NRBC # FLD: 0 K/UL — SIGNIFICANT CHANGE UP
PHOSPHATE SERPL-MCNC: 3.9 MG/DL — SIGNIFICANT CHANGE UP (ref 2.5–4.5)
PLATELET # BLD AUTO: 146 K/UL — LOW (ref 150–400)
PLATELET # BLD AUTO: 151 K/UL — SIGNIFICANT CHANGE UP (ref 150–400)
POTASSIUM SERPL-MCNC: 3.9 MMOL/L — SIGNIFICANT CHANGE UP (ref 3.5–5.3)
POTASSIUM SERPL-MCNC: 4.2 MMOL/L — SIGNIFICANT CHANGE UP (ref 3.5–5.3)
POTASSIUM SERPL-SCNC: 3.9 MMOL/L — SIGNIFICANT CHANGE UP (ref 3.5–5.3)
POTASSIUM SERPL-SCNC: 4.2 MMOL/L — SIGNIFICANT CHANGE UP (ref 3.5–5.3)
PROT SERPL-MCNC: 8.1 G/DL — SIGNIFICANT CHANGE UP (ref 6–8.3)
PROTHROM AB SERPL-ACNC: 14 SEC — HIGH (ref 10.6–13.6)
PROTHROM AB SERPL-ACNC: 14.1 SEC — HIGH (ref 10.6–13.6)
RBC # BLD: 4.38 M/UL — SIGNIFICANT CHANGE UP (ref 4.2–5.8)
RBC # BLD: 4.96 M/UL — SIGNIFICANT CHANGE UP (ref 4.2–5.8)
RBC # FLD: 12.4 % — SIGNIFICANT CHANGE UP (ref 10.3–14.5)
RBC # FLD: 12.4 % — SIGNIFICANT CHANGE UP (ref 10.3–14.5)
RH IG SCN BLD-IMP: POSITIVE — SIGNIFICANT CHANGE UP
SARS-COV-2 RNA SPEC QL NAA+PROBE: SIGNIFICANT CHANGE UP
SODIUM SERPL-SCNC: 136 MMOL/L — SIGNIFICANT CHANGE UP (ref 135–145)
SODIUM SERPL-SCNC: 136 MMOL/L — SIGNIFICANT CHANGE UP (ref 135–145)
WBC # BLD: 11.3 K/UL — HIGH (ref 3.8–10.5)
WBC # BLD: 11.39 K/UL — HIGH (ref 3.8–10.5)
WBC # FLD AUTO: 11.3 K/UL — HIGH (ref 3.8–10.5)
WBC # FLD AUTO: 11.39 K/UL — HIGH (ref 3.8–10.5)

## 2021-05-26 PROCEDURE — 73630 X-RAY EXAM OF FOOT: CPT | Mod: 26,RT

## 2021-05-26 PROCEDURE — 71045 X-RAY EXAM CHEST 1 VIEW: CPT | Mod: 26

## 2021-05-26 PROCEDURE — 73630 X-RAY EXAM OF FOOT: CPT | Mod: 26,LT,77

## 2021-05-26 RX ORDER — INSULIN GLARGINE 100 [IU]/ML
32 INJECTION, SOLUTION SUBCUTANEOUS AT BEDTIME
Refills: 0 | Status: DISCONTINUED | OUTPATIENT
Start: 2021-05-26 | End: 2021-05-26

## 2021-05-26 RX ORDER — ENOXAPARIN SODIUM 100 MG/ML
40 INJECTION SUBCUTANEOUS EVERY 24 HOURS
Refills: 0 | Status: DISCONTINUED | OUTPATIENT
Start: 2021-05-26 | End: 2021-05-27

## 2021-05-26 RX ORDER — ACETAMINOPHEN 500 MG
650 TABLET ORAL EVERY 6 HOURS
Refills: 0 | Status: DISCONTINUED | OUTPATIENT
Start: 2021-05-26 | End: 2021-06-04

## 2021-05-26 RX ORDER — LISINOPRIL 2.5 MG/1
40 TABLET ORAL DAILY
Refills: 0 | Status: DISCONTINUED | OUTPATIENT
Start: 2021-05-26 | End: 2021-05-30

## 2021-05-26 RX ORDER — ATORVASTATIN CALCIUM 80 MG/1
80 TABLET, FILM COATED ORAL AT BEDTIME
Refills: 0 | Status: DISCONTINUED | OUTPATIENT
Start: 2021-05-26 | End: 2021-06-04

## 2021-05-26 RX ORDER — CLOPIDOGREL BISULFATE 75 MG/1
75 TABLET, FILM COATED ORAL DAILY
Refills: 0 | Status: DISCONTINUED | OUTPATIENT
Start: 2021-05-26 | End: 2021-05-27

## 2021-05-26 RX ORDER — SODIUM CHLORIDE 9 MG/ML
1000 INJECTION, SOLUTION INTRAVENOUS
Refills: 0 | Status: DISCONTINUED | OUTPATIENT
Start: 2021-05-26 | End: 2021-05-26

## 2021-05-26 RX ORDER — INSULIN LISPRO 100/ML
4 VIAL (ML) SUBCUTANEOUS
Refills: 0 | Status: DISCONTINUED | OUTPATIENT
Start: 2021-05-26 | End: 2021-06-04

## 2021-05-26 RX ORDER — ACETAMINOPHEN 500 MG
975 TABLET ORAL ONCE
Refills: 0 | Status: COMPLETED | OUTPATIENT
Start: 2021-05-26 | End: 2021-05-26

## 2021-05-26 RX ORDER — INSULIN GLARGINE 100 [IU]/ML
44 INJECTION, SOLUTION SUBCUTANEOUS AT BEDTIME
Refills: 0 | Status: DISCONTINUED | OUTPATIENT
Start: 2021-05-26 | End: 2021-05-27

## 2021-05-26 RX ORDER — OXYCODONE HYDROCHLORIDE 5 MG/1
5 TABLET ORAL EVERY 4 HOURS
Refills: 0 | Status: DISCONTINUED | OUTPATIENT
Start: 2021-05-26 | End: 2021-05-28

## 2021-05-26 RX ORDER — MAGNESIUM SULFATE 500 MG/ML
2 VIAL (ML) INJECTION ONCE
Refills: 0 | Status: COMPLETED | OUTPATIENT
Start: 2021-05-26 | End: 2021-05-26

## 2021-05-26 RX ORDER — LANOLIN ALCOHOL/MO/W.PET/CERES
3 CREAM (GRAM) TOPICAL ONCE
Refills: 0 | Status: COMPLETED | OUTPATIENT
Start: 2021-05-26 | End: 2021-05-26

## 2021-05-26 RX ORDER — INSULIN LISPRO 100/ML
VIAL (ML) SUBCUTANEOUS
Refills: 0 | Status: DISCONTINUED | OUTPATIENT
Start: 2021-05-26 | End: 2021-05-28

## 2021-05-26 RX ORDER — INSULIN GLARGINE 100 [IU]/ML
20 INJECTION, SOLUTION SUBCUTANEOUS AT BEDTIME
Refills: 0 | Status: DISCONTINUED | OUTPATIENT
Start: 2021-05-26 | End: 2021-05-26

## 2021-05-26 RX ORDER — INSULIN LISPRO 100/ML
VIAL (ML) SUBCUTANEOUS EVERY 6 HOURS
Refills: 0 | Status: DISCONTINUED | OUTPATIENT
Start: 2021-05-26 | End: 2021-05-26

## 2021-05-26 RX ORDER — PIPERACILLIN AND TAZOBACTAM 4; .5 G/20ML; G/20ML
3.38 INJECTION, POWDER, LYOPHILIZED, FOR SOLUTION INTRAVENOUS EVERY 8 HOURS
Refills: 0 | Status: DISCONTINUED | OUTPATIENT
Start: 2021-05-26 | End: 2021-06-01

## 2021-05-26 RX ORDER — VANCOMYCIN HCL 1 G
1500 VIAL (EA) INTRAVENOUS EVERY 12 HOURS
Refills: 0 | Status: DISCONTINUED | OUTPATIENT
Start: 2021-05-26 | End: 2021-05-29

## 2021-05-26 RX ADMIN — Medication 600 MILLIGRAM(S): at 01:51

## 2021-05-26 RX ADMIN — OXYCODONE HYDROCHLORIDE 5 MILLIGRAM(S): 5 TABLET ORAL at 19:12

## 2021-05-26 RX ADMIN — PIPERACILLIN AND TAZOBACTAM 25 GRAM(S): 4; .5 INJECTION, POWDER, LYOPHILIZED, FOR SOLUTION INTRAVENOUS at 09:30

## 2021-05-26 RX ADMIN — Medication 6: at 18:32

## 2021-05-26 RX ADMIN — Medication 6: at 13:27

## 2021-05-26 RX ADMIN — Medication 250 MILLIGRAM(S): at 01:35

## 2021-05-26 RX ADMIN — Medication 600 MILLIGRAM(S): at 00:15

## 2021-05-26 RX ADMIN — Medication 3 MILLIGRAM(S): at 01:51

## 2021-05-26 RX ADMIN — Medication 6: at 22:23

## 2021-05-26 RX ADMIN — OXYCODONE HYDROCHLORIDE 5 MILLIGRAM(S): 5 TABLET ORAL at 13:44

## 2021-05-26 RX ADMIN — OXYCODONE HYDROCHLORIDE 5 MILLIGRAM(S): 5 TABLET ORAL at 18:42

## 2021-05-26 RX ADMIN — INSULIN GLARGINE 44 UNIT(S): 100 INJECTION, SOLUTION SUBCUTANEOUS at 22:45

## 2021-05-26 RX ADMIN — ATORVASTATIN CALCIUM 80 MILLIGRAM(S): 80 TABLET, FILM COATED ORAL at 22:24

## 2021-05-26 RX ADMIN — CLOPIDOGREL BISULFATE 75 MILLIGRAM(S): 75 TABLET, FILM COATED ORAL at 16:32

## 2021-05-26 RX ADMIN — PIPERACILLIN AND TAZOBACTAM 25 GRAM(S): 4; .5 INJECTION, POWDER, LYOPHILIZED, FOR SOLUTION INTRAVENOUS at 18:33

## 2021-05-26 RX ADMIN — Medication 975 MILLIGRAM(S): at 01:35

## 2021-05-26 RX ADMIN — Medication 50 GRAM(S): at 14:16

## 2021-05-26 RX ADMIN — ENOXAPARIN SODIUM 40 MILLIGRAM(S): 100 INJECTION SUBCUTANEOUS at 16:32

## 2021-05-26 RX ADMIN — SODIUM CHLORIDE 1000 MILLILITER(S): 9 INJECTION INTRAMUSCULAR; INTRAVENOUS; SUBCUTANEOUS at 01:51

## 2021-05-26 RX ADMIN — Medication 975 MILLIGRAM(S): at 02:21

## 2021-05-26 RX ADMIN — PIPERACILLIN AND TAZOBACTAM 200 GRAM(S): 4; .5 INJECTION, POWDER, LYOPHILIZED, FOR SOLUTION INTRAVENOUS at 00:15

## 2021-05-26 RX ADMIN — OXYCODONE HYDROCHLORIDE 5 MILLIGRAM(S): 5 TABLET ORAL at 23:26

## 2021-05-26 RX ADMIN — Medication 6: at 09:48

## 2021-05-26 RX ADMIN — OXYCODONE HYDROCHLORIDE 5 MILLIGRAM(S): 5 TABLET ORAL at 08:07

## 2021-05-26 RX ADMIN — OXYCODONE HYDROCHLORIDE 5 MILLIGRAM(S): 5 TABLET ORAL at 14:09

## 2021-05-26 RX ADMIN — OXYCODONE HYDROCHLORIDE 5 MILLIGRAM(S): 5 TABLET ORAL at 09:54

## 2021-05-26 NOTE — H&P ADULT - ASSESSMENT
ASSESSMENT:  Jose Villegas is a very pleasant 50 year old gentleman with history of DM type II, CAD s/p stents x 2, longstanding history of diabetic foot ulcers, admitted to Dr. Lopez last year and underwent R 3rd and 4th partial ray resections by podiatry 4/15/21. Patient now presents with worsening R foot ulcer and sepsis  s/p bedside debridement by podiatry    PLAN:  - Admit to surgery, Dr. Lopez  - NPO, IVF  - IV Abx: Zosyn  - Pain control  - F/u SAL/PVRs  - Medical reconciliation   - OR for washout and debridement by podiatry today  - F/u medicine consult    Compa Villa, PGY 3  Surgery [id number]

## 2021-05-26 NOTE — PROGRESS NOTE ADULT - SUBJECTIVE AND OBJECTIVE BOX
Patient is a 50y old  Male who presents with a chief complaint of      INTERVAL HPI/OVERNIGHT EVENTS:  Patient seen and evaluated at bedside.  Pt is resting comfortable in NAD. Denies N/V/F/C.  Pain rated at X/10    Allergies    No Known Allergies    Intolerances        Vital Signs Last 24 Hrs  T(C): 36.7 (26 May 2021 05:30), Max: 38.8 (26 May 2021 00:30)  T(F): 98.1 (26 May 2021 05:30), Max: 101.9 (26 May 2021 00:30)  HR: 80 (26 May 2021 05:30) (80 - 115)  BP: 110/62 (26 May 2021 05:30) (103/59 - 144/79)  BP(mean): --  RR: 18 (26 May 2021 05:30) (18 - 18)  SpO2: 100% (26 May 2021 05:30) (97% - 100%)    LABS:                        12.9   11.39 )-----------( 146      ( 26 May 2021 08:23 )             39.2     05-26    136  |  99  |  21  ----------------------------<  138<H>  3.9   |  23  |  1.24    Ca    8.9      26 May 2021 08:23  Phos  3.9     05-26  Mg     1.8     05-26    TPro  8.1  /  Alb  4.2  /  TBili  0.5  /  DBili  x   /  AST  11  /  ALT  13  /  AlkPhos  61  05-26    PT/INR - ( 26 May 2021 01:21 )   PT: 14.1 sec;   INR: 1.25 ratio         PTT - ( 26 May 2021 01:21 )  PTT:31.2 sec    CAPILLARY BLOOD GLUCOSE      POCT Blood Glucose.: 283 mg/dL (26 May 2021 09:29)  POCT Blood Glucose.: 183 mg/dL (25 May 2021 22:20)      Lower Extremity Physical Exam:  Vascular:  DP non palpable b/l, PT 2/4 Left foot + non palpable RF,  CFT < 3seconds B/L, Temperature gradient warm to warm Right foot, warm to cool L foot  Neuro: Epicritic sensation diminished to the level of toes B/L  Musculoskeletal/Ortho: s/p R 3rd and 4th ray partial resections  Skin: R foot hemorrhagic blister on submet 1,R submet 2 wound probing to bone and tracking distally towards 1st interspace and proximally towards midfoot along FDL, erythema streaking along medial aspect of R foot, R foot warm to touch and diffusely edematous, w/ strong malodor; L foot submet4 wound down to bone w/ no signs of active infection     RADIOLOGY & ADDITIONAL TESTS:

## 2021-05-26 NOTE — CONSULT NOTE ADULT - SUBJECTIVE AND OBJECTIVE BOX
Date of Admission: 5/26/21    CHIEF COMPLAINT: foot infection    REASON FOR CONSULT: transfer to medicine service    HISTORY OF PRESENT ILLNESS:    50M PMH DM type II (last a1c 10.2) , CAD s/p stents x 2, longstanding history of diabetic foot ulcers, admitted to Dr. Lopez last year and underwent R 3rd and 4th partial ray resections by podiatry 4/15/21; now presenting with worsening R foot ulcer and fever to 102F at home. States he felt unwell for the past 2 days, describing lethargy, somnolence, and fever to 102F. His girlfriend noticed new purple discoloration of the dorsal right foot, distant from the plantar ulcer he has been treating for the past 6 months, and his Podiatrist told him to come to the ED for evaluation. Pt reports no pain at this time. Denies any other complaints including recent illness/sick contacts, chest pain/shortness of breath, nausea/vomiting, diarrhea/constipation.    Patient was febrile in .9, tachy to 115, BPs stable. Received vanc/zosyn. Originally admitted to surgery due to plan for emergent OR for I&D by podiatry but this was delayed until Friday 5/28 to monitor 2nd digit viability. Patient received wound culture per podiatry. Currently on zosyn for abx. Medicine c/s to continue medical management of patient.       Allergies    No Known Allergies    Intolerances    	    MEDICATIONS:  clopidogrel Tablet 75 milliGRAM(s) Oral daily  enoxaparin Injectable 40 milliGRAM(s) SubCutaneous every 24 hours  lisinopril 40 milliGRAM(s) Oral daily    piperacillin/tazobactam IVPB.. 3.375 Gram(s) IV Intermittent every 8 hours      acetaminophen   Tablet .. 650 milliGRAM(s) Oral every 6 hours PRN  oxyCODONE    IR 5 milliGRAM(s) Oral every 4 hours PRN      atorvastatin 80 milliGRAM(s) Oral at bedtime  insulin glargine Injectable (LANTUS) 32 Unit(s) SubCutaneous at bedtime  insulin lispro (ADMELOG) corrective regimen sliding scale   SubCutaneous Before meals and at bedtime        PAST MEDICAL & SURGICAL HISTORY:  CAD (coronary artery disease)    Diabetes    No significant past surgical history        FAMILY HISTORY:  No pertinent family history in first degree relatives        SOCIAL HISTORY:    [ ] Non-smoker  [ ] Smoker  [ ] Alcohol      REVIEW OF SYSTEMS:  General: no fatigue/malaise, weight loss/gain.  Skin: no rashes.  Ophthalmologic: no blurred vision, no loss of vision. 	  ENT: no sore throat, rhinorrhea, sinus congestion.  Respiratory: no SOB, cough or wheeze.  Gastrointestinal:  no N/V/D, no melena/hematemesis/hematochezia.  Genitourinary: no dysuria/hesitancy or hematuria.  Musculoskeletal: no myalgias or arthralgias.  Neurological: no changes in vision or hearing, no lightheadedness/dizziness, no syncope/near syncope	  Psychiatric: no unusual stress/anxiety.   Hematology/Lymphatics: no unusual bleeding, bruising and no lymphadenopathy.  Endocrine: no unusual thirst.   All others negative except as stated above and in HPI.    PHYSICAL EXAM:  T(C): 37.8 (05-26-21 @ 21:40), Max: 38.8 (05-26-21 @ 00:30)  HR: 89 (05-26-21 @ 21:40) (80 - 97)  BP: 101/62 (05-26-21 @ 21:40) (101/62 - 132/78)  RR: 18 (05-26-21 @ 21:40) (18 - 18)  SpO2: 95% (05-26-21 @ 21:40) (95% - 100%)  Wt(kg): --  I&O's Summary    26 May 2021 07:01  -  26 May 2021 22:19  --------------------------------------------------------  IN: 340 mL / OUT: 1800 mL / NET: -1460 mL        Appearance: Normal	  HEENT:   Normal oral mucosa, PERRL, EOMI	  Lymphatic: No lymphadenopathy  Cardiovascular: Normal S1 S2, No JVD, No murmurs, No edema  Respiratory: Lungs clear to auscultation	  Psychiatry: A & O x 3, Mood & affect appropriate  Gastrointestinal:  Soft, Non-tender, + BS	  Skin: No rashes, No ecchymoses, No cyanosis	  Neurologic: Non-focal  Extremities: Normal range of motion, No clubbing, cyanosis or edema  Vascular: Peripheral pulses palpable 2+ bilaterally        LABS:	 	    CBC Full  -  ( 26 May 2021 08:23 )  WBC Count : 11.39 K/uL  Hemoglobin : 12.9 g/dL  Hematocrit : 39.2 %  Platelet Count - Automated : 146 K/uL  Mean Cell Volume : 89.5 fL  Mean Cell Hemoglobin : 29.5 pg  Mean Cell Hemoglobin Concentration : 32.9 gm/dL  Auto Neutrophil # : x  Auto Lymphocyte # : x  Auto Monocyte # : x  Auto Eosinophil # : x  Auto Basophil # : x  Auto Neutrophil % : x  Auto Lymphocyte % : x  Auto Monocyte % : x  Auto Eosinophil % : x  Auto Basophil % : x    05-26    136  |  99  |  21  ----------------------------<  138<H>  3.9   |  23  |  1.24  05-26    136  |  98  |  19  ----------------------------<  181<H>  4.2   |  26  |  0.97    Ca    8.9      26 May 2021 08:23  Ca    10.0      26 May 2021 00:21  Phos  3.9     05-26  Mg     1.8     05-26    TPro  8.1  /  Alb  4.2  /  TBili  0.5  /  DBili  x   /  AST  11  /  ALT  13  /  AlkPhos  61  05-26    `< from: Xray Foot AP + Lateral + Oblique, Left (05.26.21 @ 02:31) >  FINDINGS:    There is no evidence of acute fracture or dislocation. There is a small plantar calcaneal spur.    There is no cortical erosion or aggressive osseous destruction to suggest osteomyelitis radiographically.    IMPRESSION:  Noradiographic evidence of osteomyelitis.    < end of copied text >  `< from: Xray Chest 1 View- PORTABLE-Urgent (05.26.21 @ 00:24) >    FINDINGS:    The lungs are clear.    Theheart size is normal.    Degenerative changes of the spine.    IMPRESSION:    Clear lungs.    < end of copied text >   Date of Admission: 5/26/21    CHIEF COMPLAINT: foot infection    REASON FOR CONSULT: transfer to medicine service    HISTORY OF PRESENT ILLNESS:    50M PMH DM type II (last a1c 10.2) , CAD s/p stents x 2, longstanding history of diabetic foot ulcers, admitted to Dr. Lopez last year and underwent R 3rd and 4th partial ray resections by podiatry 4/15/21; now presenting with worsening R foot ulcer and fever to 102F at home. States he felt unwell for the past 2 days, describing lethargy, somnolence, and fever to 102F. His girlfriend noticed new purple discoloration of the dorsal right foot, distant from the plantar ulcer he has been treating for the past 6 months, and his Podiatrist told him to come to the ED for evaluation. Pt reports no pain at this time. Denies any other complaints including recent illness/sick contacts, chest pain/shortness of breath, nausea/vomiting, diarrhea/constipation.    Patient was febrile in .9, tachy to 115, BPs stable. Received vanc/zosyn. Originally admitted to surgery due to plan for emergent OR for I&D by podiatry but this was delayed until Friday 5/28 to monitor 2nd digit viability. Patient received wound culture per podiatry. Currently on zosyn for abx. Medicine c/s to continue medical management of patient. Patient currently reports he is feeling well; he is anxious to receive his foot surgery. He reports he can engage in rigorous physical activity without chest pain or dyspnea. He denies prior lucy-operative complications.       Allergies    No Known Allergies    Intolerances    	    MEDICATIONS:  clopidogrel Tablet 75 milliGRAM(s) Oral daily  enoxaparin Injectable 40 milliGRAM(s) SubCutaneous every 24 hours  lisinopril 40 milliGRAM(s) Oral daily    piperacillin/tazobactam IVPB.. 3.375 Gram(s) IV Intermittent every 8 hours      acetaminophen   Tablet .. 650 milliGRAM(s) Oral every 6 hours PRN  oxyCODONE    IR 5 milliGRAM(s) Oral every 4 hours PRN      atorvastatin 80 milliGRAM(s) Oral at bedtime  insulin glargine Injectable (LANTUS) 32 Unit(s) SubCutaneous at bedtime  insulin lispro (ADMELOG) corrective regimen sliding scale   SubCutaneous Before meals and at bedtime        PAST MEDICAL & SURGICAL HISTORY:  CAD (coronary artery disease)    Diabetes    No significant past surgical history        FAMILY HISTORY:  No pertinent family history in first degree relatives        SOCIAL HISTORY:    [ ] Non-smoker  [ ] Smoker  [ ] Alcohol      REVIEW OF SYSTEMS:  General: no fatigue/malaise, weight loss/gain.  Skin: no rashes.  Ophthalmologic: no blurred vision, no loss of vision. 	  ENT: no sore throat, rhinorrhea, sinus congestion.  Respiratory: no SOB, cough or wheeze.  Gastrointestinal:  no N/V/D, no melena/hematemesis/hematochezia.  Genitourinary: no dysuria/hesitancy or hematuria.  Musculoskeletal: no myalgias or arthralgias.  Neurological: no changes in vision or hearing, no lightheadedness/dizziness, no syncope/near syncope	  Psychiatric: no unusual stress/anxiety.   Hematology/Lymphatics: no unusual bleeding, bruising and no lymphadenopathy.  Endocrine: no unusual thirst.   All others negative except as stated above and in HPI.    PHYSICAL EXAM:  T(C): 37.8 (05-26-21 @ 21:40), Max: 38.8 (05-26-21 @ 00:30)  HR: 89 (05-26-21 @ 21:40) (80 - 97)  BP: 101/62 (05-26-21 @ 21:40) (101/62 - 132/78)  RR: 18 (05-26-21 @ 21:40) (18 - 18)  SpO2: 95% (05-26-21 @ 21:40) (95% - 100%)  Wt(kg): --  I&O's Summary    26 May 2021 07:01  -  26 May 2021 22:19  --------------------------------------------------------  IN: 340 mL / OUT: 1800 mL / NET: -1460 mL        Appearance: Normal	  HEENT:   Normal oral mucosa, PERRL, EOMI	  Lymphatic: No lymphadenopathy  Cardiovascular: Normal S1 S2, No JVD, No murmurs, No edema  Respiratory: Lungs clear to auscultation	  Psychiatry: A & O x 3, Mood & affect appropriate  Gastrointestinal:  Soft, Non-tender, + BS	  Skin: No rashes, No ecchymoses, No cyanosis	  Neurologic: Non-focal  Extremities: RLE 1+ pitting edema. Patient refused full foot exam as foot was wrapped in bandage by podiatry.        LABS:	 	    CBC Full  -  ( 26 May 2021 08:23 )  WBC Count : 11.39 K/uL  Hemoglobin : 12.9 g/dL  Hematocrit : 39.2 %  Platelet Count - Automated : 146 K/uL  Mean Cell Volume : 89.5 fL  Mean Cell Hemoglobin : 29.5 pg  Mean Cell Hemoglobin Concentration : 32.9 gm/dL  Auto Neutrophil # : x  Auto Lymphocyte # : x  Auto Monocyte # : x  Auto Eosinophil # : x  Auto Basophil # : x  Auto Neutrophil % : x  Auto Lymphocyte % : x  Auto Monocyte % : x  Auto Eosinophil % : x  Auto Basophil % : x    05-26    136  |  99  |  21  ----------------------------<  138<H>  3.9   |  23  |  1.24  05-26    136  |  98  |  19  ----------------------------<  181<H>  4.2   |  26  |  0.97    Ca    8.9      26 May 2021 08:23  Ca    10.0      26 May 2021 00:21  Phos  3.9     05-26  Mg     1.8     05-26    TPro  8.1  /  Alb  4.2  /  TBili  0.5  /  DBili  x   /  AST  11  /  ALT  13  /  AlkPhos  61  05-26    `< from: Xray Foot AP + Lateral + Oblique, Left (05.26.21 @ 02:31) >  FINDINGS:    There is no evidence of acute fracture or dislocation. There is a small plantar calcaneal spur.    There is no cortical erosion or aggressive osseous destruction to suggest osteomyelitis radiographically.    IMPRESSION:  Noradiographic evidence of osteomyelitis.    < end of copied text >  `< from: Xray Chest 1 View- PORTABLE-Urgent (05.26.21 @ 00:24) >    FINDINGS:    The lungs are clear.    Theheart size is normal.    Degenerative changes of the spine.    IMPRESSION:    Clear lungs.    < end of copied text >    EKG: patient refused.

## 2021-05-26 NOTE — PROGRESS NOTE ADULT - ASSESSMENT
49 yo M pt w/ b/l foot wounds  - pt seen and evaluated  - R foot hemorrhagic blister on submet 1,R submet 2 wound probing to bone and tracking distally towards 1st interspace and proximally towards midfoot along FDL, erythema streaking along medial aspect of R foot, R foot warm to touch and diffusely edematous, w/ strong malodor, R foot submet 1 tissue necrotic; L foot submet4 wound down to bone w/ no signs of active infection   - 5/26: no further purulence expressed, strong malodor persistent at right foot wound   - xrays showed gas in first interspace of R foot w/ osteomyelitis of 2nd metatarsal head   - cont. IV abx  - RF MR ordered  - Pt stabilized, will delay emergent I&D to monitor 2nd digit viability and wait for MRI. Rescheduled Right foot surgery for Friday at 3pm w/ Dr. Lott. Plan for right foot partial 2nd ray resection.   - please document medical optimization for surgery under local w/ sedation  - discussed w/ attending

## 2021-05-26 NOTE — CONSULT NOTE ADULT - SUBJECTIVE AND OBJECTIVE BOX
Podiatry pager #: [phone] ([facility])/ [phone] ([facility])    Patient is a 50y old  Male who presents with a chief complaint of     HPI:    51 yo m PMHx T2DM and CAD hx of multiple foot ulcers, sp 4/15 Right foot partial 3rd and 4th ray resection pw fevers and increased redness and drainage from right foot wound. reports fevers and feeling generalized unwell for last 2 days. messaged podiatrist dr. palacios today and concerned for fever and drainage so told to come to the ed for abx and further mgmt. Denies recent trauma, chills, headache, dizziness, nausea, vomiting, dysuria, freq, hematuria, diarrhea, constipation, chest pain, shortness of breath, cough. took 2 tylenol pta    PAST MEDICAL & SURGICAL HISTORY:  CAD (coronary artery disease)    Diabetes    No significant past surgical history        MEDICATIONS  (STANDING):  acetaminophen   Tablet .. 975 milliGRAM(s) Oral Once  melatonin 5 milliGRAM(s) Oral once  sodium chloride 0.9% Bolus 1000 milliLiter(s) IV Bolus once  vancomycin  IVPB 1000 milliGRAM(s) IV Intermittent once    MEDICATIONS  (PRN):      Allergies    No Known Allergies    Intolerances        VITALS:    Vital Signs Last 24 Hrs  T(C): 38.8 (26 May 2021 00:30), Max: 38.8 (26 May 2021 00:30)  T(F): 101.9 (26 May 2021 00:30), Max: 101.9 (26 May 2021 00:30)  HR: 97 (26 May 2021 00:30) (97 - 115)  BP: 124/81 (26 May 2021 00:30) (124/81 - 144/79)  BP(mean): --  RR: 18 (26 May 2021 00:30) (18 - 18)  SpO2: 100% (26 May 2021 00:30) (97% - 100%)    LABS:                          14.8   11.30 )-----------( 151      ( 26 May 2021 00:21 )             44.1       05-26    136  |  98  |  19  ----------------------------<  181<H>  4.2   |  26  |  0.97    Ca    10.0      26 May 2021 00:21    TPro  8.1  /  Alb  4.2  /  TBili  0.5  /  DBili  x   /  AST  11  /  ALT  13  /  AlkPhos  61  05-26      CAPILLARY BLOOD GLUCOSE      POCT Blood Glucose.: 183 mg/dL (25 May 2021 22:20)      PT/INR - ( 26 May 2021 00:21 )   PT: 14.0 sec;   INR: 1.24 ratio         PTT - ( 26 May 2021 00:21 )  PTT:31.9 sec    LOWER EXTREMITY PHYSICAL EXAM:    Vascular:  DP non palpable b/l, PT 2/4 Left foot + non palpable RF,  CFT < 3seconds B/L, Temperature gradient warm to warm Right foot, warm to cool L foot  Neuro: Epicritic sensation diminished to the level of toes B/L  Musculoskeletal/Ortho: s/p R 3rd and 4th ray partial resections  Skin: R foot hemorrhagic blister on submet 1,R submet 2 wound probing to bone and tracking distally towards 1st interspace and proximally towards midfoot along FDL, erythema streaking along medial aspect of R foot, R foot warm to touch and diffusely edematous, w/ strong malodor; L foot submet4 wound down to bone w/ no signs of active infection       RADIOLOGY & ADDITIONAL STUDIES:    < from: Xray Foot AP + Lateral + Oblique, Right (05.26.21 @ 00:24) >  ******PRELIMINARY REPORT******    ******PRELIMINARY REPORT******            EXAM:  XR FOOT COMP MIN 3 VIEWS RT        PROCEDURE DATE:  May 26 2021     ******PRELIMINARY REPORT******    ******PRELIMINARY REPORT******            INTERPRETATION:  Necrotizing soft tissue infection within the 1st metatarsophalangeal interdigital webspace. No radiographic evidence of adjacent osteomyelitis.    Cortical erosions of the lateral aspect of the 2nd metatarsal head concerning for osteomyelitis, new since 4/14/2020, possibly from prior infection from that date.          ******PRELIMINARY REPORT******    ******PRELIMINARY REPORT******          ROBERT RIVERA MD; Resident Radiology    < end of copied text >

## 2021-05-26 NOTE — CONSULT NOTE ADULT - ASSESSMENT
51 yo M pt w/ b/l foot wounds  - pt seen and evaluated  - R foot hemorrhagic blister on submet 1,R submet 2 wound probing to bone and tracking distally towards 1st interspace and proximally towards midfoot along FDL, erythema streaking along medial aspect of R foot, R foot warm to touch and diffusely edematous, w/ strong malodor, R foot submet 1 tissue necrotic; L foot submet4 wound down to bone w/ no signs of active infection   - using aseptic technique, incision and drainage performed using #15 blade down to subQ but not beyond subQ, along the areas of tracking, no purulence expressed, but malodor intensified as incision and drainage was performed  - wound culture taken   - L foot xrays and SAL/PVR ordered   - xrays showed gas in first interspace of R foot w/ possible osteomyelitis of 2nd metatarsal head   - pt added on for urgent R foot washout tomorrow w/ Dr. Lott   - please document medical optimization for surgery under local w/ sedation  - pt consented and amenable to procedure  - discussed w/ attending  51 yo M pt w/ b/l foot wounds  - pt seen and evaluated  - R foot hemorrhagic blister on submet 1,R submet 2 wound probing to bone and tracking distally towards 1st interspace and proximally towards midfoot along FDL, erythema streaking along medial aspect of R foot, R foot warm to touch and diffusely edematous, w/ strong malodor, R foot submet 1 tissue necrotic; L foot submet4 wound down to bone w/ no signs of active infection   - using aseptic technique, incision and drainage performed using #15 blade down to subQ but not beyond subQ, along the areas of tracking, no purulence expressed, but malodor intensified as incision and drainage was performed  - wound culture taken   - L foot xrays and SAL/PVR ordered   - xrays showed gas in first interspace of R foot w/ possible osteomyelitis of 2nd metatarsal head   - pt added on for urgent R foot washout 5/26 w/ Dr. Lott   - please document medical optimization for surgery under local w/ sedation  - pt consented and amenable to procedure  - discussed w/ attending  51 yo M pt w/ b/l foot wounds  - pt seen and evaluated  - R foot hemorrhagic blister on submet 1,R submet 2 wound probing to bone and tracking distally towards 1st interspace and proximally towards midfoot along FDL, erythema streaking along medial aspect of R foot, R foot warm to touch and diffusely edematous, w/ strong malodor, R foot submet 1 tissue necrotic; L foot submet4 wound down to bone w/ no signs of active infection   - using aseptic technique, incision and drainage performed using #15 blade down to subQ but not beyond subQ, along the areas of tracking, no purulence expressed, but malodor intensified as incision and drainage was performed  - wound culture taken   - L foot xrays and SAL/PVR ordered   - xrays showed gas in first interspace of R foot w/ possible osteomyelitis of 2nd metatarsal head   - admit to medicine  - cont. IV abx  - pt added on for R foot washout 5/26 w/ Dr. Lott   - please document medical optimization for surgery under local w/ sedation  - pt consented and amenable to procedure  - discussed w/ attending

## 2021-05-26 NOTE — H&P ADULT - HISTORY OF PRESENT ILLNESS
Patient is a 50y old  Male who presents with a chief complaint of right foot wound worsening    HPI:   Jose Villegas is a very pleasant 50 year old gentleman with history of DM type II, CAD s/p stents x 2, longstanding history of diabetic foot ulcers, admitted to Dr. Lopez last year and underwent R 3rd and 4th partial ray resections by podiatry 4/15/21. Patient now presents with worsening R foot ulcer and fever to 102F at home. States he felt unwell for the past 2 days, describing lethargy, somnolence, and today also a fever to 102F. His girlfriend noticed new purple discoloration of the dorsal right foot, distant from the plantar ulcer he has been treating for the past 6 months, and his Podiatrist told him to come to the ED for evaluation. Pt reports no pain at this time. Denies any other complaints including recent illness/sick contacts, chest pain/shortness of breath, nausea/vomiting, diarrhea/constipation.     ROS: 10-system review is otherwise negative except HPI above.      PAST MEDICAL & SURGICAL HISTORY:  CAD (coronary artery disease)    Diabetes    No significant past surgical history      FAMILY HISTORY:  No pertinent family history in first degree relatives      [] Family history not pertinent as reviewed with the patient and family    SOCIAL HISTORY:    Former smoker (quit last year)  Social EtOH  Denies Illicit drug use     ALLERGIES: No Known Allergies      --------------------------------------------------------------------------------------------

## 2021-05-26 NOTE — H&P ADULT - NSHPPHYSICALEXAM_GEN_ALL_CORE
General: Well-developed, in no acute distress   Neurologic: Awake, alert, GCS 15, No focal Deficits   Respiratory: Normal respiratory effort  CVS: RRR, perfusing adequately  Abdomen: Abdomen soft, NT/ND, no rebound or guarding   Ext: R foot s/p podiatry bedside debridement, open area between 1st/2nd toes not bleeding, no discharge, mild surrounding erythema noted. s/p 3rd/4th toe amputations. No proximal signs of infection, no fluctuance or crepitus.   Vascular: 2+ b/l PT pulses, nonpalpable DP b/l with +doppler signals. 2+ femoral b/l. No edema appreciated.

## 2021-05-26 NOTE — CONSULT NOTE ADULT - ASSESSMENT
50M PMH DM type II (last a1c 10.2) , CAD s/p stents x 2, longstanding history of diabetic foot ulcers, admitted to Dr. Lopez last year and underwent R 3rd and 4th partial ray resections by podiatry 4/15/21; now presenting with worsening R foot ulcer and fever to 102F at home; presentation c/f infected foot ulcer c/b sepsis in the setting of poorly controlled DM.    #Infected diabetic foot ulcer    #DM2, poorly controlled    #HTN    #CAD     50M PMH DM type II (last a1c 10.2) , CAD s/p stents x 2, longstanding history of diabetic foot ulcers, admitted to Dr. Lopez last year and underwent R 3rd and 4th partial ray resections by podiatry 4/15/21; now presenting with worsening R foot ulcer and fever to 102F at home; presentation c/f infected foot ulcer c/b sepsis in the setting of poorly controlled DM.    #Infected diabetic foot ulcer  - c/w zosyn, would c/w vanc as well  - RCRI score 2, patient with 10 METS, patient with 10% cardiac cause mortality in 30 day post-op period, patient is moderate risk for low-risk procedure. Patient refused EKG.   - obtain MRI to r/o osteo  - f/u BCs, wound cultures    #DM2, poorly controlled  - check a1c in am  - increase lantus to 44 U qHS for FS in 200s  - start premeal admelog 4 TID    #HTN  - c/w home ACE    #CAD  - would like to check EKG but patient refused  - will check BNP in AM for RCRI score 2   50M PMH DM type II (last a1c 10.2) , CAD s/p stents x 2, longstanding history of diabetic foot ulcers, admitted to Dr. Lopez last year and underwent R 3rd and 4th partial ray resections by podiatry 4/15/21; now presenting with worsening R foot ulcer and fever to 102F at home; presentation c/f infected foot ulcer c/b sepsis in the setting of poorly controlled DM.    #Infected diabetic foot ulcer  - c/w zosyn, would c/w vanc as well  - RCRI score 2, patient with 10 METS, patient with 10% cardiac cause mortality in 30 day post-op period, patient is moderate risk for low-risk procedure. Patient refused EKG. Would benefit from EKG for further risk factor stratification. Can check BNP in AM for further risk stratification but otherwise he is medically optimized.   - obtain MRI to r/o osteo  - f/u BCs, wound cultures    #DM2, poorly controlled  - check a1c in am  - increase lantus to 44 U qHS for FS in 200s  - start premeal admelog 4 TID    #HTN  - c/w home ACE    #CAD  - would like to check EKG but patient refused  - will check BNP in AM for RCRI score 2   50M PMH DM type II (last a1c 10.2) , CAD s/p stents x 2, longstanding history of diabetic foot ulcers, admitted to Dr. Lopez last year and underwent R 3rd and 4th partial ray resections by podiatry 4/15/21; now presenting with worsening R foot ulcer and fever to 102F at home; presentation c/f infected foot ulcer c/b sepsis in the setting of poorly controlled DM.    #Infected diabetic foot ulcer  - c/w zosyn, would c/w vanc as well  - RCRI score 2, patient with 10 METS, patient with 10% cardiac cause mortality in 30 day post-op period, patient is moderate risk for low-risk procedure. Patient refused EKG. Would benefit from EKG for further risk factor stratification. Can check BNP in AM for further risk stratification but otherwise he is medically optimized.   - obtain MRI to r/o osteo  - f/u BCs, wound cultures  - patient received plavix 5/26; would recommend holding plavix 5 days prior to planned procedure    #DM2, poorly controlled  - check a1c in am  - increase lantus to 44 U qHS for FS in 200s  - start premeal admelog 4 TID    #HTN  - c/w home ACE    #CAD  - would like to check EKG but patient refused  - will check BNP in AM for RCRI score 2  - c/w ASA; hold plavix  - patient received plavix 5/26; would recommend holding plavix 5 days prior to planned procedure   50M PMH DM type II (last a1c 10.2) , CAD s/p stents x 2, longstanding history of diabetic foot ulcers, admitted to Dr. Lopez last year and underwent R 3rd and 4th partial ray resections by podiatry 4/15/21; now presenting with worsening R foot ulcer and fever to 102F at home; presentation c/f infected foot ulcer with concern for OM c/b sepsis in the setting of poorly controlled DM.    #Infected diabetic foot ulcer  - c/w zosyn, would c/w vanc as well  - RCRI score 2, patient with 10 METS, patient with 10% cardiac cause mortality in 30 day post-op period, patient is moderate risk for low-risk procedure. Patient refused EKG. Would benefit from EKG for further risk factor stratification. Can check BNP in AM for further risk stratification but otherwise he is medically optimized.   - obtain MRI to r/o osteo  - f/u BCs, wound cultures  - patient received plavix 5/26; would recommend holding plavix 5 days prior to planned procedure    #DM2, poorly controlled  - check a1c in am  - increase lantus to 44 U qHS for FS in 200s  - start premeal admelog 4 TID    #HTN  - c/w home ACE    #CAD  - would like to check EKG but patient refused  - will check BNP in AM for RCRI score 2  - c/w ASA; hold plavix  - patient received plavix 5/26; would recommend holding plavix 5 days prior to planned procedure

## 2021-05-27 ENCOUNTER — TRANSCRIPTION ENCOUNTER (OUTPATIENT)
Age: 51
End: 2021-05-27

## 2021-05-27 LAB
A1C WITH ESTIMATED AVERAGE GLUCOSE RESULT: 9.8 % — HIGH (ref 4–5.6)
ANION GAP SERPL CALC-SCNC: 13 MMOL/L — SIGNIFICANT CHANGE UP (ref 7–14)
BLD GP AB SCN SERPL QL: NEGATIVE — SIGNIFICANT CHANGE UP
BUN SERPL-MCNC: 15 MG/DL — SIGNIFICANT CHANGE UP (ref 7–23)
CALCIUM SERPL-MCNC: 8.1 MG/DL — LOW (ref 8.4–10.5)
CHLORIDE SERPL-SCNC: 97 MMOL/L — LOW (ref 98–107)
CO2 SERPL-SCNC: 24 MMOL/L — SIGNIFICANT CHANGE UP (ref 22–31)
COVID-19 SPIKE DOMAIN AB INTERP: POSITIVE
COVID-19 SPIKE DOMAIN ANTIBODY RESULT: 75.4 U/ML — HIGH
CREAT SERPL-MCNC: 0.97 MG/DL — SIGNIFICANT CHANGE UP (ref 0.5–1.3)
ESTIMATED AVERAGE GLUCOSE: 235 MG/DL — HIGH (ref 68–114)
GLUCOSE BLDC GLUCOMTR-MCNC: 126 MG/DL — HIGH (ref 70–99)
GLUCOSE BLDC GLUCOMTR-MCNC: 176 MG/DL — HIGH (ref 70–99)
GLUCOSE BLDC GLUCOMTR-MCNC: 193 MG/DL — HIGH (ref 70–99)
GLUCOSE BLDC GLUCOMTR-MCNC: 203 MG/DL — HIGH (ref 70–99)
GLUCOSE BLDC GLUCOMTR-MCNC: 232 MG/DL — HIGH (ref 70–99)
GLUCOSE SERPL-MCNC: 192 MG/DL — HIGH (ref 70–99)
HCT VFR BLD CALC: 33.6 % — LOW (ref 39–50)
HGB BLD-MCNC: 11.3 G/DL — LOW (ref 13–17)
MAGNESIUM SERPL-MCNC: 2.1 MG/DL — SIGNIFICANT CHANGE UP (ref 1.6–2.6)
MCHC RBC-ENTMCNC: 29.9 PG — SIGNIFICANT CHANGE UP (ref 27–34)
MCHC RBC-ENTMCNC: 33.6 GM/DL — SIGNIFICANT CHANGE UP (ref 32–36)
MCV RBC AUTO: 88.9 FL — SIGNIFICANT CHANGE UP (ref 80–100)
NRBC # BLD: 0 /100 WBCS — SIGNIFICANT CHANGE UP
NRBC # FLD: 0 K/UL — SIGNIFICANT CHANGE UP
PHOSPHATE SERPL-MCNC: 3 MG/DL — SIGNIFICANT CHANGE UP (ref 2.5–4.5)
PLATELET # BLD AUTO: 161 K/UL — SIGNIFICANT CHANGE UP (ref 150–400)
POTASSIUM SERPL-MCNC: 3.9 MMOL/L — SIGNIFICANT CHANGE UP (ref 3.5–5.3)
POTASSIUM SERPL-SCNC: 3.9 MMOL/L — SIGNIFICANT CHANGE UP (ref 3.5–5.3)
RBC # BLD: 3.78 M/UL — LOW (ref 4.2–5.8)
RBC # FLD: 12.3 % — SIGNIFICANT CHANGE UP (ref 10.3–14.5)
RH IG SCN BLD-IMP: POSITIVE — SIGNIFICANT CHANGE UP
SARS-COV-2 IGG+IGM SERPL QL IA: 75.4 U/ML — HIGH
SARS-COV-2 IGG+IGM SERPL QL IA: POSITIVE
SARS-COV-2 RNA SPEC QL NAA+PROBE: SIGNIFICANT CHANGE UP
SODIUM SERPL-SCNC: 134 MMOL/L — LOW (ref 135–145)
WBC # BLD: 8.95 K/UL — SIGNIFICANT CHANGE UP (ref 3.8–10.5)
WBC # FLD AUTO: 8.95 K/UL — SIGNIFICANT CHANGE UP (ref 3.8–10.5)

## 2021-05-27 PROCEDURE — 93923 UPR/LXTR ART STDY 3+ LVLS: CPT | Mod: 26

## 2021-05-27 PROCEDURE — 73720 MRI LWR EXTREMITY W/O&W/DYE: CPT | Mod: 26,RT

## 2021-05-27 PROCEDURE — 12345: CPT | Mod: NC

## 2021-05-27 PROCEDURE — 99223 1ST HOSP IP/OBS HIGH 75: CPT | Mod: GC

## 2021-05-27 RX ORDER — INSULIN GLARGINE 100 [IU]/ML
30 INJECTION, SOLUTION SUBCUTANEOUS ONCE
Refills: 0 | Status: DISCONTINUED | OUTPATIENT
Start: 2021-05-27 | End: 2021-05-27

## 2021-05-27 RX ORDER — ASPIRIN/CALCIUM CARB/MAGNESIUM 324 MG
81 TABLET ORAL DAILY
Refills: 0 | Status: DISCONTINUED | OUTPATIENT
Start: 2021-05-27 | End: 2021-06-04

## 2021-05-27 RX ORDER — LANOLIN ALCOHOL/MO/W.PET/CERES
6 CREAM (GRAM) TOPICAL AT BEDTIME
Refills: 0 | Status: DISCONTINUED | OUTPATIENT
Start: 2021-05-27 | End: 2021-06-04

## 2021-05-27 RX ORDER — CLOPIDOGREL BISULFATE 75 MG/1
75 TABLET, FILM COATED ORAL DAILY
Refills: 0 | Status: DISCONTINUED | OUTPATIENT
Start: 2021-05-27 | End: 2021-05-27

## 2021-05-27 RX ORDER — INSULIN GLARGINE 100 [IU]/ML
40 INJECTION, SOLUTION SUBCUTANEOUS ONCE
Refills: 0 | Status: COMPLETED | OUTPATIENT
Start: 2021-05-27 | End: 2021-05-27

## 2021-05-27 RX ORDER — INSULIN GLARGINE 100 [IU]/ML
44 INJECTION, SOLUTION SUBCUTANEOUS AT BEDTIME
Refills: 0 | Status: DISCONTINUED | OUTPATIENT
Start: 2021-05-28 | End: 2021-05-31

## 2021-05-27 RX ADMIN — OXYCODONE HYDROCHLORIDE 5 MILLIGRAM(S): 5 TABLET ORAL at 07:32

## 2021-05-27 RX ADMIN — Medication 4 UNIT(S): at 17:51

## 2021-05-27 RX ADMIN — Medication 300 MILLIGRAM(S): at 02:08

## 2021-05-27 RX ADMIN — Medication 300 MILLIGRAM(S): at 14:47

## 2021-05-27 RX ADMIN — PIPERACILLIN AND TAZOBACTAM 25 GRAM(S): 4; .5 INJECTION, POWDER, LYOPHILIZED, FOR SOLUTION INTRAVENOUS at 10:56

## 2021-05-27 RX ADMIN — OXYCODONE HYDROCHLORIDE 5 MILLIGRAM(S): 5 TABLET ORAL at 16:06

## 2021-05-27 RX ADMIN — Medication 81 MILLIGRAM(S): at 12:39

## 2021-05-27 RX ADMIN — OXYCODONE HYDROCHLORIDE 5 MILLIGRAM(S): 5 TABLET ORAL at 10:55

## 2021-05-27 RX ADMIN — OXYCODONE HYDROCHLORIDE 5 MILLIGRAM(S): 5 TABLET ORAL at 06:50

## 2021-05-27 RX ADMIN — Medication 4: at 17:50

## 2021-05-27 RX ADMIN — INSULIN GLARGINE 40 UNIT(S): 100 INJECTION, SOLUTION SUBCUTANEOUS at 23:38

## 2021-05-27 RX ADMIN — Medication 4 UNIT(S): at 08:38

## 2021-05-27 RX ADMIN — Medication 4 UNIT(S): at 12:39

## 2021-05-27 RX ADMIN — PIPERACILLIN AND TAZOBACTAM 25 GRAM(S): 4; .5 INJECTION, POWDER, LYOPHILIZED, FOR SOLUTION INTRAVENOUS at 17:54

## 2021-05-27 RX ADMIN — Medication 4: at 20:44

## 2021-05-27 RX ADMIN — OXYCODONE HYDROCHLORIDE 5 MILLIGRAM(S): 5 TABLET ORAL at 11:25

## 2021-05-27 RX ADMIN — OXYCODONE HYDROCHLORIDE 5 MILLIGRAM(S): 5 TABLET ORAL at 16:36

## 2021-05-27 RX ADMIN — Medication 2: at 08:38

## 2021-05-27 RX ADMIN — PIPERACILLIN AND TAZOBACTAM 25 GRAM(S): 4; .5 INJECTION, POWDER, LYOPHILIZED, FOR SOLUTION INTRAVENOUS at 04:00

## 2021-05-27 RX ADMIN — OXYCODONE HYDROCHLORIDE 5 MILLIGRAM(S): 5 TABLET ORAL at 00:15

## 2021-05-27 RX ADMIN — ATORVASTATIN CALCIUM 80 MILLIGRAM(S): 80 TABLET, FILM COATED ORAL at 22:48

## 2021-05-27 NOTE — PROGRESS NOTE ADULT - SUBJECTIVE AND OBJECTIVE BOX
[facility] Division of Hospital Medicine  Leah Newell MD  Pager: [phone]      Patient is a 50y old  Male who presents with a chief complaint of foot infection (26 May 2021 22:17)      SUBJECTIVE / OVERNIGHT EVENTS: patient states that he is very upset and frustrated with hospital stay - keeps being told he needs different surgeries and has been waiting for MRI. Emotional support provided. Refusing EKG - explained we needed for medical clearance, he still refuses. Denies chest pain, SOB, is very active and can walk long distances without stopping. Denies fevers. Wants to speak to his podiatrist about planned surgery, states he has "calmed down" and would like to know what the plan is.     MEDICATIONS  (STANDING):  aspirin enteric coated 81 milliGRAM(s) Oral daily  atorvastatin 80 milliGRAM(s) Oral at bedtime  insulin glargine Injectable (LANTUS) 44 Unit(s) SubCutaneous at bedtime  insulin lispro (ADMELOG) corrective regimen sliding scale   SubCutaneous Before meals and at bedtime  insulin lispro Injectable (ADMELOG) 4 Unit(s) SubCutaneous three times a day before meals  lisinopril 40 milliGRAM(s) Oral daily  piperacillin/tazobactam IVPB.. 3.375 Gram(s) IV Intermittent every 8 hours  vancomycin  IVPB 1500 milliGRAM(s) IV Intermittent every 12 hours    MEDICATIONS  (PRN):  acetaminophen   Tablet .. 650 milliGRAM(s) Oral every 6 hours PRN Mild Pain (1 - 3)  oxyCODONE    IR 5 milliGRAM(s) Oral every 4 hours PRN Severe Pain (7 - 10)      CAPILLARY BLOOD GLUCOSE      POCT Blood Glucose.: 126 mg/dL (27 May 2021 12:33)  POCT Blood Glucose.: 193 mg/dL (27 May 2021 08:28)  POCT Blood Glucose.: 258 mg/dL (26 May 2021 21:43)  POCT Blood Glucose.: 267 mg/dL (26 May 2021 18:07)    I&O's Summary    26 May 2021 07:01  -  27 May 2021 07:00  --------------------------------------------------------  IN: 1530 mL / OUT: 3050 mL / NET: -1520 mL    27 May 2021 07:01  -  27 May 2021 15:20  --------------------------------------------------------  IN: 0 mL / OUT: 600 mL / NET: -600 mL        PHYSICAL EXAM:  Vital Signs Last 24 Hrs  T(C): 37 (27 May 2021 14:30), Max: 37.8 (26 May 2021 21:40)  T(F): 98.6 (27 May 2021 14:30), Max: 100 (26 May 2021 21:40)  HR: 80 (27 May 2021 14:30) (78 - 89)  BP: 142/83 (27 May 2021 14:30) (101/62 - 142/83)  BP(mean): --  RR: 18 (27 May 2021 14:30) (16 - 18)  SpO2: 97% (27 May 2021 14:30) (94% - 98%)    CONSTITUTIONAL: NAD, well-developed, well-groomed  ENMT: Moist oral mucosa  RESPIRATORY: Normal respiratory effort; lungs are clear to auscultation bilaterally  CARDIOVASCULAR:  S1/S2; No lower extremity edema  ABDOMEN: Nontender to palpation, normoactive bowel sounds, no rebound/guarding  MUSCULOSKELETAL: R foot bandaged, L midfoot bandaged   PSYCH: A+O to person, place, and time; affect appropriate  NEUROLOGY: no focal deficits  SKIN: No rashes; no palpable lesions    LABS:                        11.3   8.95  )-----------( 161      ( 27 May 2021 06:56 )             33.6     05-27    134<L>  |  97<L>  |  15  ----------------------------<  192<H>  3.9   |  24  |  0.97    Ca    8.1<L>      27 May 2021 06:56  Phos  3.0     05-27  Mg     2.1     05-27    TPro  8.1  /  Alb  4.2  /  TBili  0.5  /  DBili  x   /  AST  11  /  ALT  13  /  AlkPhos  61  05-26    PT/INR - ( 26 May 2021 01:21 )   PT: 14.1 sec;   INR: 1.25 ratio         PTT - ( 26 May 2021 01:21 )  PTT:31.2 sec          Culture - Blood (collected 26 May 2021 06:53)  Source: .Blood Blood-Peripheral  Preliminary Report (27 May 2021 07:01):    No growth to date.    Culture - Blood (collected 26 May 2021 06:53)  Source: .Blood Blood-Peripheral  Preliminary Report (27 May 2021 07:01):    No growth to date.    Culture - Abscess with Gram Stain (collected 26 May 2021 06:11)  Source: .Abscess R foot  Preliminary Report (27 May 2021 12:54):    Moderate Streptococcus dysgalactiae (Group C/G) "Susceptibilities not    performed"    Moderate Coag Negative Staphylococcus "Susceptibilities not performed"        RADIOLOGY & ADDITIONAL TESTS:  Results Reviewed:   Imaging Personally Reviewed:  Electrocardiogram Personally Reviewed:    COORDINATION OF CARE:  Care Discussed with Consultants/Other Providers [Y/N]:  Prior or Outpatient Records Reviewed [Y/N]:

## 2021-05-27 NOTE — PROGRESS NOTE ADULT - ASSESSMENT
50M PMH DM type II (last a1c 10.2) , CAD s/p stents x 2, longstanding history of diabetic foot ulcers, s/p R 3rd and 4th partial ray resections by podiatry 4/15/21; now presenting with worsening R foot ulcer and fever to 102F at home; presentation c/f infected foot ulcer with concern for OM c/b sepsis in the setting of poorly controlled DM.    #Infected diabetic foot ulcer  - c/w zosyn and  vanc. Will need ID eval likely after OR.   - RCRI score 2 (hx of CAD and pre-op use of insulin), reportedly > 10 METS, patient with 10% cardiac cause mortality in 30 day post-op period, patient is moderate risk for low-risk procedure. Patient refused EKG. Would benefit from EKG for further risk factor stratification, however no active chest pain and no evidence of CHF. Patient is medically optimized to proceed with planned procedure without further testing.   - obtain MRI to r/o osteo  - BCx NGTD, wound cultures w/ mod strep and coag neg staph       #DM2, poorly controlled  - A1c = 9.8   - C/w lantus to 44 U qHS for FS in 200s  - start premeal admelog 4 TID    #HTN  - c/w home ACE    #CAD  - No active chest pain currently   - Attempted to check EKG but patient refused multiple times      50M PMH DM type II (last a1c 10.2) , CAD s/p stents x 2, longstanding history of diabetic foot ulcers, s/p R 3rd and 4th partial ray resections by podiatry 4/15/21; now presenting with worsening R foot ulcer and fever to 102F at home; presentation c/f infected foot ulcer with concern for OM c/b sepsis in the setting of poorly controlled DM.    #Infected diabetic foot ulcer  - c/w zosyn and  vanc. Will need ID eval likely after OR.   - RCRI score 2 (hx of CAD and pre-op use of insulin), reportedly > 10 METS, patient with 10% cardiac cause mortality in 30 day post-op period, patient is moderate risk for low-risk procedure. Patient refused EKG. Would benefit from EKG for further risk factor stratification, however no active chest pain and no evidence of CHF. Patient is medically optimized to proceed with planned procedure without further testing.   - obtain MRI to r/o osteo  - BCx NGTD, wound cultures w/ mod strep and coag neg staph       #DM2, poorly controlled  - A1c = 9.8   - C/w lantus to 44 + admelog 4 TID + ISS  - Monitor FS and adjust regimen as necessary   - Consider endo eval if FS are difficult to control     #HTN  - c/w home ACE    #CAD  - No active chest pain currently   - Attempted to check EKG but patient refused multiple times   - C/w ASA, plavix and statin     #Anxiety  - Patient anxious and intermittently agitated with staff during hospital stay   -  made aware, patient needs to agree to psych eval prior to consultation, will see agrees     Discussed with ACP Beth      50M PMH DM type II (last a1c 10.2) , CAD s/p stents x 2, longstanding history of diabetic foot ulcers, s/p R 3rd and 4th partial ray resections by podiatry 4/15/21; now presenting with worsening R foot ulcer and fever to 102F at home; presentation c/f infected foot ulcer with concern for OM c/b sepsis in the setting of poorly controlled DM.    #Infected diabetic foot ulcer  - c/w zosyn and  vanc. Will need ID eval likely after OR.   - Discussed with podiatry Dr. Pablo - tentative plan for right foot partial 2nd ray resection tomorrow, pending patient agreeable to procedure   - RCRI score 2 (hx of CAD and pre-op use of insulin), reports > 10 METS, patient with 10% cardiac cause mortality in 30 day post-op period, patient is moderate risk for low-risk procedure. Patient refused EKG. Would benefit from EKG for further risk factor stratification, however no active chest pain and no evidence of CHF. Patient is medically optimized to proceed with planned procedure without further testing.   - Pending MRI to r/o osteo  - BCx NGTD, wound cultures w/ mod strep and coag neg staph       #DM2, poorly controlled  - A1c = 9.8   - C/w lantus to 44 + admelog 4 TID + ISS  - Monitor FS and adjust regimen as necessary   - Consider endo eval if FS are difficult to control     #HTN  - c/w home ACEi   - BP acceptable     #CAD s/p stent   - No active chest pain   - Attempted to check EKG but patient refused multiple times   - C/w ASA, plavix and statin     #Anxiety  - Patient anxious and intermittently agitated with staff during hospital stay   -  made aware, patient needs to agree to psych eval prior to consultation, will see agrees     Discussed with ACP Beth

## 2021-05-27 NOTE — PROGRESS NOTE ADULT - ASSESSMENT
51 yo M pt w/ b/l foot wounds  - pt seen and evaluated  - Afebrile, no leukocytosis   - R foot hemorrhagic blister on submet 1, R submet 2 wound probing to bone and tracking distally towards 1st interspace and proximally towards midfoot along FDL, erythema streaking along medial aspect of R foot, R foot warm to touch and diffusely edematous, w/ strong malodor, R foot submet 1 tissue necrotic; L foot submet4 wound down to bone w/ no signs of active infection   -5/27: RF wound stable today, no further purulence expressed, minimal malodor, improved erythema. LF submet 4 wound w/ 1cc of purulence expressed no periwound erythema, no malodor.   - xrays showed gas in first interspace of R foot w/ osteomyelitis of 2nd metatarsal head   - RF MR ordered  - Pt expressed anxiety and frustration about initially being told he needed a transmetatarsal amputation when initially seen, even though it was clinically warranted. Also expressed frustration that his MRI was not completed yesterday evening, and expressed the desire to have a second opinion.  -  Booked for Right foot surgery for Friday at 3pm w/ Dr. Lott pending patients decision. Plan for right foot partial 2nd ray resection.   - please document medical optimization for surgery under local w/ sedation  - Recommend behavioral health evaluation for anxiety   - discussed w/ attending  49 yo M pt w/ b/l foot wounds  - pt seen and evaluated  - Afebrile, no leukocytosis   - R foot hemorrhagic blister on submet 1, R submet 2 wound probing to bone and tracking distally towards 1st interspace and proximally towards midfoot along FDL, erythema streaking along medial aspect of R foot, R foot warm to touch and diffusely edematous, w/ strong malodor, R foot submet 1 tissue necrotic; L foot submet4 wound down to bone w/ no signs of active infection   -5/27: RF wound stable today, no further purulence expressed, minimal malodor, improved erythema. LF submet 4 wound w/ 1cc of purulence expressed no periwound erythema, no malodor.   - xrays showed gas in first interspace of R foot w/ osteomyelitis of 2nd metatarsal head   - RF MR ordered  - Pt expressed anxiety and frustration about initially being told he needed a transmetatarsal amputation when initially seen, even though it was clinically warranted. Also expressed frustration that his MRI was not completed yesterday evening, and expressed the desire to have a second opinion.  -  Booked for Right foot surgery for Friday at 3pm w/ Dr. Lott pending patients decision. Plan for right foot partial 2nd ray resection.   - Rec d/c anticoagulant today prior to surgery  - please document medical optimization for surgery under local w/ sedation  - Recommend behavioral health evaluation for anxiety   - discussed w/ attending

## 2021-05-27 NOTE — PROGRESS NOTE ADULT - SUBJECTIVE AND OBJECTIVE BOX
SUBJECTIVE:   Pt seen and examined at bedside. No acute events overnight. Pt laying in bed comfortably.         Vital Signs Last 24 Hrs  T(C): 37.8 (26 May 2021 21:40), Max: 38.8 (26 May 2021 00:30)  T(F): 100 (26 May 2021 21:40), Max: 101.9 (26 May 2021 00:30)  HR: 89 (26 May 2021 21:40) (80 - 97)  BP: 101/62 (26 May 2021 21:40) (101/62 - 132/78)  BP(mean): --  RR: 18 (26 May 2021 21:40) (18 - 18)  SpO2: 95% (26 May 2021 21:40) (95% - 100%)      PHYSICAL EXAM:  Constitutional: NAD, laying in bed  Neuro: AAOx3  Respiratory: breathing comfortably  Gastrointestinal: Abdomen soft, non distended, nontender  Wound: C/D/I  Extremities: No edema, no calf tenderness      I&O's Summary    26 May 2021 07:01  -  27 May 2021 00:14  --------------------------------------------------------  IN: 340 mL / OUT: 1800 mL / NET: -1460 mL      I&O's Detail    26 May 2021 07:01  -  27 May 2021 00:14  --------------------------------------------------------  IN:    IV PiggyBack: 100 mL    Oral Fluid: 240 mL  Total IN: 340 mL    OUT:    Voided (mL): 1800 mL  Total OUT: 1800 mL    Total NET: -1460 mL          MEDICATIONS  (STANDING):  atorvastatin 80 milliGRAM(s) Oral at bedtime  clopidogrel Tablet 75 milliGRAM(s) Oral daily  enoxaparin Injectable 40 milliGRAM(s) SubCutaneous every 24 hours  insulin glargine Injectable (LANTUS) 44 Unit(s) SubCutaneous at bedtime  insulin lispro (ADMELOG) corrective regimen sliding scale   SubCutaneous Before meals and at bedtime  insulin lispro Injectable (ADMELOG) 4 Unit(s) SubCutaneous three times a day before meals  lisinopril 40 milliGRAM(s) Oral daily  piperacillin/tazobactam IVPB.. 3.375 Gram(s) IV Intermittent every 8 hours  vancomycin  IVPB 1500 milliGRAM(s) IV Intermittent every 12 hours    MEDICATIONS  (PRN):  acetaminophen   Tablet .. 650 milliGRAM(s) Oral every 6 hours PRN Mild Pain (1 - 3)  oxyCODONE    IR 5 milliGRAM(s) Oral every 4 hours PRN Severe Pain (7 - 10)      LABS:                        12.9   11.39 )-----------( 146      ( 26 May 2021 08:23 )             39.2     05-26    136  |  99  |  21  ----------------------------<  138<H>  3.9   |  23  |  1.24    Ca    8.9      26 May 2021 08:23  Phos  3.9     05-26  Mg     1.8     05-26    TPro  8.1  /  Alb  4.2  /  TBili  0.5  /  DBili  x   /  AST  11  /  ALT  13  /  AlkPhos  61  05-26    PT/INR - ( 26 May 2021 01:21 )   PT: 14.1 sec;   INR: 1.25 ratio         PTT - ( 26 May 2021 01:21 )  PTT:31.2 sec      RADIOLOGY & ADDITIONAL STUDIES:

## 2021-05-27 NOTE — PROGRESS NOTE ADULT - SUBJECTIVE AND OBJECTIVE BOX
Patient is a 50y old  Male who presents with a chief complaint of foot infection (26 May 2021 22:17)       INTERVAL HPI/OVERNIGHT EVENTS:  Patient seen and evaluated at bedside.  Pt is resting comfortable in NAD. Denies N/V/F/C.  Pain rated at X/10    Allergies    No Known Allergies    Intolerances        Vital Signs Last 24 Hrs  T(C): 37.6 (27 May 2021 06:46), Max: 37.8 (26 May 2021 21:40)  T(F): 99.6 (27 May 2021 06:46), Max: 100 (26 May 2021 21:40)  HR: 85 (27 May 2021 06:46) (78 - 93)  BP: 106/68 (27 May 2021 06:46) (101/62 - 132/78)  BP(mean): --  RR: 16 (27 May 2021 06:46) (16 - 18)  SpO2: 95% (27 May 2021 06:46) (94% - 100%)    LABS:                        11.3   8.95  )-----------( 161      ( 27 May 2021 06:56 )             33.6     05-27    134<L>  |  97<L>  |  15  ----------------------------<  192<H>  3.9   |  24  |  0.97    Ca    8.1<L>      27 May 2021 06:56  Phos  3.0     05-27  Mg     2.1     05-27    TPro  8.1  /  Alb  4.2  /  TBili  0.5  /  DBili  x   /  AST  11  /  ALT  13  /  AlkPhos  61  05-26    PT/INR - ( 26 May 2021 01:21 )   PT: 14.1 sec;   INR: 1.25 ratio         PTT - ( 26 May 2021 01:21 )  PTT:31.2 sec    CAPILLARY BLOOD GLUCOSE      POCT Blood Glucose.: 193 mg/dL (27 May 2021 08:28)  POCT Blood Glucose.: 258 mg/dL (26 May 2021 21:43)  POCT Blood Glucose.: 267 mg/dL (26 May 2021 18:07)  POCT Blood Glucose.: 262 mg/dL (26 May 2021 13:10)  POCT Blood Glucose.: 283 mg/dL (26 May 2021 09:29)      Lower Extremity Physical Exam:  Vascular:  DP non palpable b/l, PT 2/4 Left foot + non palpable RF,  CFT < 3seconds B/L, Temperature gradient warm to warm Right foot, warm to cool L foot  Neuro: Epicritic sensation diminished to the level of toes B/L  Musculoskeletal/Ortho: s/p R 3rd and 4th ray partial resections  Skin: R foot hemorrhagic blister on submet 1,R submet 2 wound probing to bone and tracking distally towards 1st interspace and proximally towards midfoot along FDL, erythema streaking along medial aspect of R foot, R foot warm to touch and diffusely edematous, w/ strong malodor; L foot submet4 wound down to bone w/ no signs of active infection     5/27: RF wound stable today, no further purulence expressed, minimal malodor, improved erythema. LF submet 4 wound w/ 1cc of purulence expressed no periwound erythema, no malodor.     RADIOLOGY & ADDITIONAL TESTS:

## 2021-05-27 NOTE — PROGRESS NOTE ADULT - ASSESSMENT
Pt is a 49yo M with hx of DM type II, CAD s/p stents x 2, diabetic foot ulcers, R 3rd and 4th partial ray resections by podiatry 4/15/21. Patient now p/w worsening R foot ulcer and sepsis s/p bedside debridement by podiatry    PLAN:  - regular diet  - IV Abx: Zosyn  - Pain control  - F/u SAL/PVRs  - f/u MRI  - OR planning by podiatry  - f/u medicine transfer this AM    C Team Surgery, [id number]

## 2021-05-28 ENCOUNTER — TRANSCRIPTION ENCOUNTER (OUTPATIENT)
Age: 51
End: 2021-05-28

## 2021-05-28 ENCOUNTER — RESULT REVIEW (OUTPATIENT)
Age: 51
End: 2021-05-28

## 2021-05-28 DIAGNOSIS — I25.10 ATHEROSCLEROTIC HEART DISEASE OF NATIVE CORONARY ARTERY WITHOUT ANGINA PECTORIS: ICD-10-CM

## 2021-05-28 DIAGNOSIS — M86.9 OSTEOMYELITIS, UNSPECIFIED: ICD-10-CM

## 2021-05-28 DIAGNOSIS — F41.9 ANXIETY DISORDER, UNSPECIFIED: ICD-10-CM

## 2021-05-28 DIAGNOSIS — Z29.9 ENCOUNTER FOR PROPHYLACTIC MEASURES, UNSPECIFIED: ICD-10-CM

## 2021-05-28 DIAGNOSIS — E11.628 TYPE 2 DIABETES MELLITUS WITH OTHER SKIN COMPLICATIONS: ICD-10-CM

## 2021-05-28 DIAGNOSIS — I10 ESSENTIAL (PRIMARY) HYPERTENSION: ICD-10-CM

## 2021-05-28 DIAGNOSIS — E11.59 TYPE 2 DIABETES MELLITUS WITH OTHER CIRCULATORY COMPLICATIONS: ICD-10-CM

## 2021-05-28 LAB
ANION GAP SERPL CALC-SCNC: 12 MMOL/L — SIGNIFICANT CHANGE UP (ref 7–14)
APTT BLD: 29.6 SEC — SIGNIFICANT CHANGE UP (ref 27–36.3)
BLD GP AB SCN SERPL QL: NEGATIVE — SIGNIFICANT CHANGE UP
BUN SERPL-MCNC: 15 MG/DL — SIGNIFICANT CHANGE UP (ref 7–23)
CALCIUM SERPL-MCNC: 9.1 MG/DL — SIGNIFICANT CHANGE UP (ref 8.4–10.5)
CHLORIDE SERPL-SCNC: 98 MMOL/L — SIGNIFICANT CHANGE UP (ref 98–107)
CO2 SERPL-SCNC: 23 MMOL/L — SIGNIFICANT CHANGE UP (ref 22–31)
CREAT SERPL-MCNC: 1.01 MG/DL — SIGNIFICANT CHANGE UP (ref 0.5–1.3)
GLUCOSE BLDC GLUCOMTR-MCNC: 143 MG/DL — HIGH (ref 70–99)
GLUCOSE BLDC GLUCOMTR-MCNC: 175 MG/DL — HIGH (ref 70–99)
GLUCOSE BLDC GLUCOMTR-MCNC: 197 MG/DL — HIGH (ref 70–99)
GLUCOSE BLDC GLUCOMTR-MCNC: 247 MG/DL — HIGH (ref 70–99)
GLUCOSE BLDC GLUCOMTR-MCNC: 300 MG/DL — HIGH (ref 70–99)
GLUCOSE BLDC GLUCOMTR-MCNC: 300 MG/DL — HIGH (ref 70–99)
GLUCOSE SERPL-MCNC: 213 MG/DL — HIGH (ref 70–99)
GRAM STN FLD: SIGNIFICANT CHANGE UP
GRAM STN FLD: SIGNIFICANT CHANGE UP
HCT VFR BLD CALC: 37.1 % — LOW (ref 39–50)
HGB BLD-MCNC: 12.6 G/DL — LOW (ref 13–17)
INR BLD: 1.17 RATIO — HIGH (ref 0.88–1.16)
MAGNESIUM SERPL-MCNC: 2 MG/DL — SIGNIFICANT CHANGE UP (ref 1.6–2.6)
MCHC RBC-ENTMCNC: 29.9 PG — SIGNIFICANT CHANGE UP (ref 27–34)
MCHC RBC-ENTMCNC: 34 GM/DL — SIGNIFICANT CHANGE UP (ref 32–36)
MCV RBC AUTO: 88.1 FL — SIGNIFICANT CHANGE UP (ref 80–100)
NRBC # BLD: 0 /100 WBCS — SIGNIFICANT CHANGE UP
NRBC # FLD: 0 K/UL — SIGNIFICANT CHANGE UP
NT-PROBNP SERPL-SCNC: 43 PG/ML — SIGNIFICANT CHANGE UP
PHOSPHATE SERPL-MCNC: 3.3 MG/DL — SIGNIFICANT CHANGE UP (ref 2.5–4.5)
PLATELET # BLD AUTO: 176 K/UL — SIGNIFICANT CHANGE UP (ref 150–400)
POTASSIUM SERPL-MCNC: 4 MMOL/L — SIGNIFICANT CHANGE UP (ref 3.5–5.3)
POTASSIUM SERPL-SCNC: 4 MMOL/L — SIGNIFICANT CHANGE UP (ref 3.5–5.3)
PROTHROM AB SERPL-ACNC: 13.4 SEC — SIGNIFICANT CHANGE UP (ref 10.6–13.6)
RBC # BLD: 4.21 M/UL — SIGNIFICANT CHANGE UP (ref 4.2–5.8)
RBC # FLD: 12.2 % — SIGNIFICANT CHANGE UP (ref 10.3–14.5)
RH IG SCN BLD-IMP: POSITIVE — SIGNIFICANT CHANGE UP
SODIUM SERPL-SCNC: 133 MMOL/L — LOW (ref 135–145)
SPECIMEN SOURCE: SIGNIFICANT CHANGE UP
SPECIMEN SOURCE: SIGNIFICANT CHANGE UP
VANCOMYCIN TROUGH SERPL-MCNC: 9.4 UG/ML — LOW (ref 10–20)
WBC # BLD: 7.76 K/UL — SIGNIFICANT CHANGE UP (ref 3.8–10.5)
WBC # FLD AUTO: 7.76 K/UL — SIGNIFICANT CHANGE UP (ref 3.8–10.5)

## 2021-05-28 PROCEDURE — 88305 TISSUE EXAM BY PATHOLOGIST: CPT | Mod: 26

## 2021-05-28 PROCEDURE — 73630 X-RAY EXAM OF FOOT: CPT | Mod: 26,RT

## 2021-05-28 PROCEDURE — 88311 DECALCIFY TISSUE: CPT | Mod: 26

## 2021-05-28 PROCEDURE — 99233 SBSQ HOSP IP/OBS HIGH 50: CPT

## 2021-05-28 RX ORDER — OXYCODONE HYDROCHLORIDE 5 MG/1
10 TABLET ORAL EVERY 4 HOURS
Refills: 0 | Status: DISCONTINUED | OUTPATIENT
Start: 2021-05-29 | End: 2021-05-29

## 2021-05-28 RX ORDER — INSULIN LISPRO 100/ML
VIAL (ML) SUBCUTANEOUS
Refills: 0 | Status: DISCONTINUED | OUTPATIENT
Start: 2021-05-28 | End: 2021-05-28

## 2021-05-28 RX ORDER — INSULIN LISPRO 100/ML
VIAL (ML) SUBCUTANEOUS AT BEDTIME
Refills: 0 | Status: DISCONTINUED | OUTPATIENT
Start: 2021-05-28 | End: 2021-05-30

## 2021-05-28 RX ORDER — HYDROMORPHONE HYDROCHLORIDE 2 MG/ML
0.5 INJECTION INTRAMUSCULAR; INTRAVENOUS; SUBCUTANEOUS EVERY 4 HOURS
Refills: 0 | Status: DISCONTINUED | OUTPATIENT
Start: 2021-05-28 | End: 2021-05-29

## 2021-05-28 RX ORDER — OXYCODONE HYDROCHLORIDE 5 MG/1
10 TABLET ORAL ONCE
Refills: 0 | Status: DISCONTINUED | OUTPATIENT
Start: 2021-05-28 | End: 2021-05-28

## 2021-05-28 RX ORDER — OXYCODONE AND ACETAMINOPHEN 5; 325 MG/1; MG/1
1 TABLET ORAL EVERY 4 HOURS
Refills: 0 | Status: DISCONTINUED | OUTPATIENT
Start: 2021-05-28 | End: 2021-05-29

## 2021-05-28 RX ORDER — INSULIN LISPRO 100/ML
1 VIAL (ML) SUBCUTANEOUS ONCE
Refills: 0 | Status: COMPLETED | OUTPATIENT
Start: 2021-05-28 | End: 2021-05-28

## 2021-05-28 RX ORDER — INSULIN LISPRO 100/ML
VIAL (ML) SUBCUTANEOUS EVERY 6 HOURS
Refills: 0 | Status: DISCONTINUED | OUTPATIENT
Start: 2021-05-28 | End: 2021-05-28

## 2021-05-28 RX ORDER — TRAMADOL HYDROCHLORIDE 50 MG/1
25 TABLET ORAL ONCE
Refills: 0 | Status: DISCONTINUED | OUTPATIENT
Start: 2021-05-28 | End: 2021-05-28

## 2021-05-28 RX ORDER — COLLAGENASE CLOSTRIDIUM HIST. 250 UNIT/G
1 OINTMENT (GRAM) TOPICAL DAILY
Refills: 0 | Status: DISCONTINUED | OUTPATIENT
Start: 2021-05-28 | End: 2021-06-04

## 2021-05-28 RX ADMIN — PIPERACILLIN AND TAZOBACTAM 25 GRAM(S): 4; .5 INJECTION, POWDER, LYOPHILIZED, FOR SOLUTION INTRAVENOUS at 19:51

## 2021-05-28 RX ADMIN — OXYCODONE HYDROCHLORIDE 5 MILLIGRAM(S): 5 TABLET ORAL at 12:35

## 2021-05-28 RX ADMIN — PIPERACILLIN AND TAZOBACTAM 25 GRAM(S): 4; .5 INJECTION, POWDER, LYOPHILIZED, FOR SOLUTION INTRAVENOUS at 01:38

## 2021-05-28 RX ADMIN — TRAMADOL HYDROCHLORIDE 25 MILLIGRAM(S): 50 TABLET ORAL at 18:58

## 2021-05-28 RX ADMIN — OXYCODONE HYDROCHLORIDE 5 MILLIGRAM(S): 5 TABLET ORAL at 05:42

## 2021-05-28 RX ADMIN — OXYCODONE HYDROCHLORIDE 5 MILLIGRAM(S): 5 TABLET ORAL at 06:40

## 2021-05-28 RX ADMIN — ATORVASTATIN CALCIUM 80 MILLIGRAM(S): 80 TABLET, FILM COATED ORAL at 22:02

## 2021-05-28 RX ADMIN — OXYCODONE HYDROCHLORIDE 5 MILLIGRAM(S): 5 TABLET ORAL at 17:33

## 2021-05-28 RX ADMIN — Medication 300 MILLIGRAM(S): at 17:33

## 2021-05-28 RX ADMIN — OXYCODONE HYDROCHLORIDE 5 MILLIGRAM(S): 5 TABLET ORAL at 13:05

## 2021-05-28 RX ADMIN — OXYCODONE HYDROCHLORIDE 5 MILLIGRAM(S): 5 TABLET ORAL at 18:03

## 2021-05-28 RX ADMIN — PIPERACILLIN AND TAZOBACTAM 25 GRAM(S): 4; .5 INJECTION, POWDER, LYOPHILIZED, FOR SOLUTION INTRAVENOUS at 09:36

## 2021-05-28 RX ADMIN — OXYCODONE HYDROCHLORIDE 10 MILLIGRAM(S): 5 TABLET ORAL at 22:02

## 2021-05-28 RX ADMIN — Medication 300 MILLIGRAM(S): at 00:02

## 2021-05-28 RX ADMIN — Medication 81 MILLIGRAM(S): at 12:15

## 2021-05-28 RX ADMIN — LISINOPRIL 40 MILLIGRAM(S): 2.5 TABLET ORAL at 05:26

## 2021-05-28 RX ADMIN — OXYCODONE HYDROCHLORIDE 10 MILLIGRAM(S): 5 TABLET ORAL at 22:32

## 2021-05-28 RX ADMIN — Medication 1 UNIT(S): at 05:40

## 2021-05-28 NOTE — DISCHARGE NOTE PROVIDER - NSDCFUSCHEDAPPT_GEN_ALL_CORE_FT
TIKI HOUSTON ; 07/01/2021 ; [facility] [address] [facility] TIKI HOUSTON ; 06/09/2021 ; [facility] [address] TIKI Mojica ; 07/01/2021 ; [facility] [address] [facility]

## 2021-05-28 NOTE — PRE-OP CHECKLIST - SELECT TESTS ORDERED
BMP/CBC/PT/PTT/INR/Type and Screen/POCT Blood Glucose 175/BMP/CBC/PT/PTT/INR/Type and Screen/POCT Blood Glucose 175/BMP/CBC/PT/PTT/INR/Type and Screen/POCT Blood Glucose/COVID-19

## 2021-05-28 NOTE — DISCHARGE NOTE PROVIDER - PROVIDER TOKENS
PROVIDER:[TOKEN:[[id number]],FOLLOWUP:[1 week]] PROVIDER:[TOKEN:[[id number]],FOLLOWUP:[1 week]],PROVIDER:[TOKEN:[[id number]]],PROVIDER:[TOKEN:[[id number]:[id number]]] PROVIDER:[TOKEN:[[id number]],FOLLOWUP:[1 week]],PROVIDER:[TOKEN:[[id number]]],PROVIDER:[TOKEN:[[id number]]],PROVIDER:[TOKEN:[[id number]],SCHEDULEDAPPT:[07/01/2021],ESTABLISHEDPATIENT:[T]] PROVIDER:[TOKEN:[[id number]],SCHEDULEDAPPT:[06/09/2021],SCHEDULEDAPPTTIME:[05:00 PM],ESTABLISHEDPATIENT:[T]],PROVIDER:[TOKEN:[[id number]]],PROVIDER:[TOKEN:[[id number]]],PROVIDER:[TOKEN:[[id number]],SCHEDULEDAPPT:[07/01/2021],ESTABLISHEDPATIENT:[T]] PROVIDER:[TOKEN:[[id number]],SCHEDULEDAPPT:[06/09/2021],SCHEDULEDAPPTTIME:[05:00 PM],ESTABLISHEDPATIENT:[T]],PROVIDER:[TOKEN:[[id number]:[id number]]],PROVIDER:[TOKEN:[[id number]],SCHEDULEDAPPT:[07/01/2021],ESTABLISHEDPATIENT:[T]],PROVIDER:[TOKEN:[[id number]],FOLLOWUP:[1 week]] PROVIDER:[TOKEN:[[id number]],SCHEDULEDAPPT:[06/09/2021],SCHEDULEDAPPTTIME:[05:00 PM],ESTABLISHEDPATIENT:[T]],PROVIDER:[TOKEN:[[id number]:[id number]]],PROVIDER:[TOKEN:[[id number]],SCHEDULEDAPPT:[07/01/2021],ESTABLISHEDPATIENT:[T]],FREE:[LAST:[Nephrology - Dr OSCAR Blevins],FIRST:[[address] [place] [address]],PHONE:[(   )    -],FAX:[(   )    -],SCHEDULEDAPPT:[06/09/2021],SCHEDULEDAPPTTIME:[10:00 AM]] PROVIDER:[TOKEN:[[id number]],SCHEDULEDAPPT:[06/09/2021],SCHEDULEDAPPTTIME:[05:00 PM],ESTABLISHEDPATIENT:[T]],PROVIDER:[TOKEN:[[id number]],FOLLOWUP:[1 week]],PROVIDER:[TOKEN:[[id number]:[id number]],SCHEDULEDAPPT:[07/01/2021],ESTABLISHEDPATIENT:[T]],FREE:[LAST:[Nephrology - Dr OSCAR Blevins],FIRST:[[address] [place] [address]],PHONE:[(   )    -],FAX:[(   )    -],SCHEDULEDAPPT:[06/09/2021],SCHEDULEDAPPTTIME:[10:00 AM]]

## 2021-05-28 NOTE — DISCHARGE NOTE PROVIDER - CARE PROVIDER_API CALL
Gavin Lott (DPM)  Podiatric Medicine and Surgery  [address] [place] [address]  Phone: [phone]  Fax: [phone]  Follow Up Time: 1 week   Gavin Lott (DPM)  Podiatric Medicine and Surgery  [address] [place] [address]  Phone: [phone]  Fax: [phone]  Follow Up Time: 1 week    Renita Manriquez)  Internal Medicine; Nephrology  [address] [place] [address], [address]nd [place] [address]  Phone: [phone]  Fax: [phone]  Follow Up Time:     Tato Uribe)  Internal Medicine  [address] [place] [address], [address]  [place] [address]  Phone: [phone]  Fax: [phone]  Follow Up Time:    Gavin Lott (DPM)  Podiatric Medicine and Surgery  [address] [place] [address]  Phone: [phone]  Fax: [phone]  Follow Up Time: 1 week    Renita Manriquez)  Internal Medicine; Nephrology  [address] [facility], [address]nd [place] [address]  Phone: [phone]  Fax: [phone]  Follow Up Time:     Tato Uribe (MD)  Internal Medicine  [address] [place] [address], [address]  [place] [address]  Phone: [phone]  Fax: [phone]  Follow Up Time:     Jenifer London (DO)  Internal Medicine  [address] [facility], [address]  [place] [address]  Phone: [phone]  Fax: [phone]  Established Patient  Scheduled Appointment: 07/01/2021   Gavin oLtt (DPM)  Podiatric Medicine and Surgery  [address] [place] [address]  Phone: [phone]  Fax: [phone]  Established Patient  Scheduled Appointment: 06/09/2021 05:00 PM    Renita Manriquez)  Internal Medicine; Nephrology  [address] [place] [address], [address]nd [place] [address]  Phone: [phone]  Fax: [phone]  Follow Up Time:     Tato Uribe (MD)  Internal Medicine  [address] [place] [address], [address]  [place] [address]  Phone: [phone]  Fax: [phone]  Follow Up Time:     Jenifer London (DO)  Internal Medicine  [address] [place] [address], [address]  [place] [address]  Phone: [phone]  Fax: [phone]  Established Patient  Scheduled Appointment: 07/01/2021   Gavin Lott (DPM)  Podiatric Medicine and Surgery  [address] [place] [address]  Phone: [phone]  Fax: [phone]  Established Patient  Scheduled Appointment: 06/09/2021 05:00 PM    Tato Uribe (MD)  Internal Medicine  [address] [place] [address] [address]  [place] [address]  Phone: [phone]  Fax: [phone]  Follow Up Time:     Jenifer London (DO)  Internal Medicine  [address] [place] [address] [address]  [place] [address]  Phone: [phone]  Fax: [phone]  Established Patient  Scheduled Appointment: 07/01/2021    Siddhartha Blevins)  Internal Medicine; Nephrology  [address] [place] [address]  Phone: [phone]  Fax: [phone]  Follow Up Time: 1 week   Gavin Lott (DPM)  Podiatric Medicine and Surgery  [address] [place] [address]  Phone: [phone]  Fax: [phone]  Established Patient  Scheduled Appointment: 06/09/2021 05:00 PM    Tato Uribe (MD)  Internal Medicine  [address] [facility], [address]  [place] [address]  Phone: [phone]  Fax: [phone]  Follow Up Time:     Jenifer London (DO)  Internal Medicine  [address] [facility], [address]  [place] [address]  Phone: [phone]  Fax: [phone]  Established Patient  Scheduled Appointment: 07/01/2021    Nephrology - Dr OSCAR Blevins, [address] [place] [address]  Phone: (   )    -  Fax: (   )    -  Scheduled Appointment: 06/09/2021 10:00 AM   Gavin Lott (DPM)  Podiatric Medicine and Surgery  [address] [place] [address]  Phone: [phone]  Fax: [phone]  Established Patient  Scheduled Appointment: 06/09/2021 05:00 PM    Tato Uribe (MD)  Internal Medicine  [address] [facility] [address]  [place] [address]  Phone: [phone]  Fax: [phone]  Follow Up Time: 1 week    Jenifer London (DO)  Internal Medicine  [address] [facility] [address]  [place] [address]  Phone: [phone]  Fax: [phone]  Established Patient  Scheduled Appointment: 07/01/2021    Nephrology - Dr OSCAR Blevins, [address] [facility]  Phone: (   )    -  Fax: (   )    -  Scheduled Appointment: 06/09/2021 10:00 AM

## 2021-05-28 NOTE — CHART NOTE - NSCHARTNOTEFT_GEN_A_CORE
Notified by RN patient complaining of 10/10 pain of the R foot. S/P Right foot partial 2nd ray resection this evening. Patient states that current pain regimen, Oxycodone IR 5 mg q 4 hours is not enough for the type of pain he is in. States that his pain is not being well managed. Offered patient IV Tylenol and tramadol PO as bridge until next oxycodone is due. Patient amendable to changing PM doses of oxycodone from 5 mg IR to 10 mg IR. Explained to patient no additional narcotics will be administered since pain dose increased. Encouraged patient to take Tylenol as needed in addition to oxycodone. Will reassess patient in am regarding pain management.         Notified by RN patient refusing insulin sliding scale. FS currently 300; patient due for 1 unit Admelog sliding scale and 44 units of Lantus. Patient states the 1 unit of Admelog is not enough to cover him overnight; and the RN staff is not advocating for him regarding his hyperglycemia. Informed patient that he will also be taking Lantus which is long acting insulin to help control his sugar in the am. Patient persistently refusing DM medications all together stating that he will take his own medications that he has bedside. Informed patient that medications bedside are prohibited as all medications should be administered from RN staff. Refusing to have medications confiscated and refuses to have belongings searched. ANM and Security called bedside, patient contemplating to leave AMA...... Notified by RN patient complaining of 10/10 pain of the R foot. S/P Right foot partial 2nd ray resection this evening. Patient states that current pain regimen, Oxycodone IR 5 mg q 4 hours is not enough for the type of pain he is in. States that his pain is not being well managed. Offered patient IV Tylenol and tramadol PO as bridge until next oxycodone is due. Patient amendable to changing PM doses of oxycodone from 5 mg IR to 10 mg IR. Explained to patient no additional narcotics will be administered since pain dose increased. Encouraged patient to take Tylenol as needed in addition to oxycodone. Will reassess patient in am regarding pain management.     Notified by RN patient refusing insulin sliding scale. FS currently 300; patient due for 1 unit Admelog sliding scale and 44 units of Lantus. Patient states the 1 unit of Admelog is not enough to cover him overnight; and the RN staff is not advocating for him regarding his hyperglycemia. Informed patient that he will also be taking Lantus which is long acting insulin to help control his sugar in the am. Patient persistently refusing DM medications all together stating that he will take his own medications that he has bedside. Informed patient that medications bedside are prohibited as all medications should be administered from RN staff. Patient informed that all home medications belong in the pharmacy and it is [facility]'s policy not to have medications bedside as it may lead to overdosing on medications.  Refusing to have medications confiscated and refuses to have belongings searched. ANM and Security called bedside, patient contemplating to leave AMA; decided to stay in hospital and give medications to security to be brought down to pharmacy. Agreed to give medications to girlfriend in am when girlfriend visits during scheduled visiting hours.     Patient now agreeable to 2 units of moderate sliding scale + Lantus 44 units. Patient demanding to have fingerstick rechecked 2 hours post administration of insulin and if FS >250 wants additional coverage per sliding scale. FS now ordered for 0200.   Recommending Endocrine consult in am regarding his insulin regimen.     MICKIE Odonnell  Department of Medicine   In House # [id number]     Repeat fingerstick @ 0200 --> 271- now demanding additional 2 units coverage as patient believes fingerstick is too elevated. 2 units of admelog adminstered. Repeat fingerstick ordered @ 4 am. Will continue to monitor. Notified by RN patient complaining of 10/10 pain of the R foot. S/P Right foot partial 2nd ray resection this evening. Patient states that current pain regimen, Oxycodone IR 5 mg q 4 hours is not enough for the type of pain he is in. States that his pain is not being well managed. Offered patient IV Tylenol and tramadol PO as bridge until next oxycodone is due. Patient amendable to changing PM doses of oxycodone from 5 mg IR to 10 mg IR. Explained to patient no additional narcotics will be administered since pain dose increased. Encouraged patient to take Tylenol as needed in addition to oxycodone. Will reassess patient in am regarding pain management.     Notified by RN patient refusing insulin sliding scale. FS currently 300; patient due for 1 unit Admelog sliding scale and 44 units of Lantus. Patient states the 1 unit of Admelog is not enough to cover him overnight; and the RN staff is not advocating for him regarding his hyperglycemia. Educated patient that he will also be taking Lantus which is long acting insulin to help control his sugar in the am. Patient persistently refusing DM medications all together stating that he will take his own medications that he has bedside. Informed patient that medications bedside are prohibited as all medications should be administered from RN staff. Patient informed that all home medications held in pharmacy and it is [facility]'s policy to have medications locked up and not on person.   Refusing to have medications confiscated and refuses to have belongings searched. ANM and Security called bedside, patient contemplating to leave AMA; decided to stay in hospital and give medications to security to be brought down to pharmacy. Agreed to give medications to girlfriend in am when girlfriend visits during scheduled visiting hours.     Patient now agreeable to 2 units of moderate sliding scale + Lantus 44 units. Patient demanding to have fingerstick rechecked 2 hours post administration of insulin and if FS >250 wants additional coverage per sliding scale. FS now ordered for 0200.   Recommending Endocrine consult in am regarding his insulin regimen.     MICKIE Odonnell  Department of Medicine   In House # [id number]     Repeat fingerstick @ 0200 --> 271- now demanding additional 2 units coverage as patient believes fingerstick is too elevated. 2 units of admelog adminstered. Repeat fingerstick ordered @ 4 am. Will continue to monitor. Notified by RN patient complaining of 10/10 pain of the R foot. S/P Right foot partial 2nd ray resection this evening. Patient states that current pain regimen, Oxycodone IR 5 mg q 4 hours is not enough for the type of pain he is in. States that his pain is not being well managed. Offered patient IV Tylenol and tramadol PO as bridge until next oxycodone is due. Patient amendable to changing PM doses of oxycodone from 5 mg IR to 10 mg IR. Explained to patient no additional narcotics will be administered since pain dose increased. Encouraged patient to take Tylenol as needed in addition to oxycodone. Will reassess patient in am regarding pain management.     Notified by RN patient refusing insulin sliding scale. FS currently 300; patient due for 1 unit Admelog sliding scale and 44 units of Lantus. Patient states the 1 unit of Admelog is not enough to cover him overnight; and the RN staff is not advocating for him regarding his hyperglycemia. Educated patient that he will also be taking Lantus which is long acting insulin to help control his sugar in the am. Patient persistently refusing DM medications all together stating that he will take his own medications that he has bedside. Informed patient that medications bedside are prohibited as all medications should be administered from RN staff. Patient informed that all home medications held in pharmacy and it is [facility]'s policy to have medications locked up and not on person.   Refusing to have medications confiscated and refuses to have belongings searched. ANM and Security called bedside, patient contemplating to leave AMA; decided to stay in hospital and give medications to security to be brought down to pharmacy. Agreed to give medications to girlfriend in am when girlfriend visits during scheduled visiting hours.     Patient now agreeable to 2 units of moderate sliding scale + Lantus 44 units. Patient demanding to have fingerstick rechecked 2 hours post administration of insulin and if FS >250 wants additional coverage per sliding scale. FS now ordered for 0200.   Recommending Endocrine consult in am regarding his insulin regimen.     MICKIE Odonnell  Department of Medicine   In House # [id number]     Repeat fingerstick @ 0200 --> 271- now demanding additional 2 units coverage as patient believes fingerstick is too elevated. 2 units of admelog administered. Repeat fingerstick ordered @ 4 am. Will continue to monitor.

## 2021-05-28 NOTE — DISCHARGE NOTE PROVIDER - CARE PROVIDERS DIRECT ADDRESSES
,[email or web address] ,[email or web address] ,[email or web address] [email or web address] [email or web address].[email or web address] Chula Vista Medical [email or web address] ,[email or web address]."[employer].[email or web address] 4Space Culture & MediaThe Specialty Hospital [facility]idian."[employer].".net,DirectAddress_Unknown

## 2021-05-28 NOTE — PROGRESS NOTE ADULT - PROBLEM SELECTOR PLAN 6
DVT ppx: on hold for OR  Dispo: pending hospital course, PT eval after OR     Discussed with ACP Hope

## 2021-05-28 NOTE — BRIEF OPERATIVE NOTE - NSICDXBRIEFPROCEDURE_GEN_ALL_CORE_FT
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

## 2021-05-28 NOTE — DISCHARGE NOTE PROVIDER - NSDCMRMEDTOKEN_GEN_ALL_CORE_FT
acetaminophen 325 mg oral tablet: 3 tab(s) orally every 6 hours, As needed, Temp greater or equal to 38C (100.4F), Mild Pain (1 - 3)  Adult Aspirin 325 mg oral tablet: 1 tab(s) orally once a day  amoxicillin-clavulanate 875 mg-125 mg oral tablet: 1 tab(s) orally 2 times a day   glucometer: 1 device   lancets: 1 applicator injectable 4 times a day   lisinopril 20 mg oral tablet: 1 tab(s) orally once a day  losartan 50 mg oral tablet: 1 tab(s) orally once a day  melatonin 3 mg oral tablet: 1 tab(s) orally once a day (at bedtime), As needed, Insomnia  metFORMIN 500 mg oral tablet: 1 tab(s) orally 2 times a day  nicotine 14 mg/24 hr transdermal film, extended release: 1 patch transdermal 2 times a day   oxyCODONE 5 mg oral tablet: 1 tab(s) orally every 6 hours MDD:20mg  Plavix 75 mg oral tablet: 1 tab(s) orally once a day  Prandin 2 mg oral tablet: 1 tab(s) orally 3 times a day   ramipril 5 mg oral capsule: 1 cap(s) orally once a day  Rolling Walker: Apply topically to affected area once   test strips: test 4 times a day   Tresiba 100 units/mL subcutaneous solution: 40 unit(s) subcutaneous once a day (at bedtime)   acetaminophen 325 mg oral tablet: 3 tab(s) orally every 6 hours, As needed, Temp greater or equal to 38C (100.4F), Mild Pain (1 - 3)  Adult Aspirin 325 mg oral tablet: 1 tab(s) orally once a day  lisinopril 20 mg oral tablet: 1 tab(s) orally once a day  losartan 50 mg oral tablet: 1 tab(s) orally once a day  melatonin 3 mg oral tablet: 1 tab(s) orally once a day (at bedtime), As needed, Insomnia  metFORMIN 500 mg oral tablet: 1 tab(s) orally 2 times a day  nicotine 14 mg/24 hr transdermal film, extended release: 1 patch transdermal 2 times a day   oxyCODONE 5 mg oral tablet: 1 tab(s) orally every 6 hours MDD:20mg  Plavix 75 mg oral tablet: 1 tab(s) orally once a day  Prandin 2 mg oral tablet: 1 tab(s) orally 3 times a day   ramipril 5 mg oral capsule: 1 cap(s) orally once a day  RX CAM Boot: RX CAM Boot to RLE, use as directed  Dx: RF partial 2nd resection w ADDISON  Tresiba 100 units/mL subcutaneous solution: 40 unit(s) subcutaneous once a day (at bedtime)   acetaminophen 325 mg oral tablet: 3 tab(s) orally every 6 hours, As needed, Temp greater or equal to 38C (100.4F), Mild Pain (1 - 3)  Adult Aspirin 325 mg oral tablet: 1 tab(s) orally once a day  lisinopril 20 mg oral tablet: 1 tab(s) orally once a day  losartan 50 mg oral tablet: 1 tab(s) orally once a day  melatonin 3 mg oral tablet: 1 tab(s) orally once a day (at bedtime), As needed, Insomnia  nicotine 14 mg/24 hr transdermal film, extended release: 1 patch transdermal 2 times a day   NovoLOG FlexPen 100 units/mL injectable solution: 4 unit(s) injectable 3 times a day (before meals)     Please test for coverage, thank you!  Leah [id number] spectra  oxyCODONE 5 mg oral tablet: 1 tab(s) orally every 6 hours MDD:20mg  Plavix 75 mg oral tablet: 1 tab(s) orally once a day  ramipril 5 mg oral capsule: 1 cap(s) orally once a day  RX CAM Boot: RX CAM Boot to RLE, use as directed  Dx: RF partial 2nd resection w ADDISON  Tresiba 100 units/mL subcutaneous solution: 40 unit(s) subcutaneous once a day (at bedtime)   acetaminophen 325 mg oral tablet: 3 tab(s) orally every 6 hours, As needed, Temp greater or equal to 38C (100.4F), Mild Pain (1 - 3)  Adult Aspirin 325 mg oral tablet: 1 tab(s) orally once a day  Lantus Solostar Pen 100 units/mL subcutaneous solution: 25 unit(s) subcutaneous once a day (at bedtime)   lisinopril 20 mg oral tablet: 1 tab(s) orally once a day  losartan 50 mg oral tablet: 1 tab(s) orally once a day  melatonin 3 mg oral tablet: 1 tab(s) orally once a day (at bedtime), As needed, Insomnia  nicotine 14 mg/24 hr transdermal film, extended release: 1 patch transdermal 2 times a day   NovoLOG FlexPen 100 units/mL injectable solution: 4 unit(s) injectable 3 times a day (before meals)     oxyCODONE 5 mg oral tablet: 1 tab(s) orally every 6 hours MDD:20mg  Plavix 75 mg oral tablet: 1 tab(s) orally once a day  ramipril 5 mg oral capsule: 1 cap(s) orally once a day  RX CAM Boot: RX CAM Boot to RLE, use as directed  Dx: RF partial 2nd resection w ADDISON  Tresiba 100 units/mL subcutaneous solution: 40 unit(s) subcutaneous once a day (at bedtime)   acetaminophen 325 mg oral tablet: 3 tab(s) orally every 6 hours, As needed, Temp greater or equal to 38C (100.4F), Mild Pain (1 - 3)  amLODIPine 2.5 mg oral tablet: 1 tab(s) orally once a day  amoxicillin-clavulanate 500 mg-125 mg oral tablet: 1 tab(s) orally 2 times a day  aspirin 81 mg oral delayed release tablet: 1 tab(s) orally once a day  atorvastatin 80 mg oral tablet: 1 tab(s) orally once a day (at bedtime)  melatonin 3 mg oral tablet: 1 tab(s) orally once a day (at bedtime), As needed, Insomnia  nicotine 14 mg/24 hr transdermal film, extended release: 1 patch transdermal 2 times a day   NovoLOG FlexPen 100 units/mL injectable solution: 4 unit(s) injectable 3 times a day (before meals)     oxyCODONE 5 mg oral tablet: 1 tab(s) orally every 6 hours MDD:20mg  Plavix 75 mg oral tablet: 1 tab(s) orally once a day  polyethylene glycol 3350 oral powder for reconstitution: 17 gram(s) orally 2 times a day  RX CAM Boot: RX CAM Boot to RLE, use as directed  Dx: RF partial 2nd resection w ADDISON  Semglee Prefilled Pen 100 units/mL subcutaneous solution: 35 unit(s) subcutaneous once a day (at bedtime)   senna oral tablet: 2 tab(s) orally once a day (at bedtime)   acetaminophen 325 mg oral tablet: 3 tab(s) orally every 6 hours, As needed, Temp greater or equal to 38C (100.4F), Mild Pain (1 - 3)  alcohol swabs : Apply topically to affected area 4 times a day   amLODIPine 2.5 mg oral tablet: 1 tab(s) orally once a day  amoxicillin-clavulanate 500 mg-125 mg oral tablet: 1 tab(s) orally 2 times a day  aspirin 81 mg oral delayed release tablet: 1 tab(s) orally once a day  atorvastatin 80 mg oral tablet: 1 tab(s) orally once a day (at bedtime)  Insulin Pen Needles, 4mm: 1 application subcutaneously 4 times a day. ** Use with insulin pen **   lancets: 1 application subcutaneously 4 times a day   melatonin 3 mg oral tablet: 1 tab(s) orally once a day (at bedtime), As needed, Insomnia  nicotine 14 mg/24 hr transdermal film, extended release: 1 patch transdermal 2 times a day   NovoLOG FlexPen 100 units/mL injectable solution: 4 unit(s) injectable 3 times a day (before meals)     oxyCODONE 5 mg oral tablet: 1 tab(s) orally every 6 hours MDD:20mg  Plavix 75 mg oral tablet: 1 tab(s) orally once a day  polyethylene glycol 3350 oral powder for reconstitution: 17 gram(s) orally 2 times a day  RX CAM Boot: RX CAM Boot to RLE, use as directed  Dx: RF partial 2nd resection w ADDISON  Semglee Prefilled Pen 100 units/mL subcutaneous solution: 35 unit(s) subcutaneous once a day (at bedtime)   senna oral tablet: 2 tab(s) orally once a day (at bedtime)  U-100 Insulin Syringe, 1/2 mL: 1 application subcutaneously 2 times a day ** 1/2 mL holds up to 50 units of insulin **    acetaminophen 325 mg oral tablet: 3 tab(s) orally every 6 hours, As needed, Temp greater or equal to 38C (100.4F), Mild Pain (1 - 3)  alcohol swabs : Apply topically to affected area 4 times a day   amoxicillin-clavulanate 500 mg-125 mg oral tablet: 1 tab(s) orally 2 times a day  aspirin 81 mg oral delayed release tablet: 1 tab(s) orally once a day  atorvastatin 80 mg oral tablet: 1 tab(s) orally once a day (at bedtime)  glucometer (per patient&#x27;s insurance): Test blood sugars four times a day. Dispense #1 glucometer.  price check [id number]  Insulin Pen Needles, 4mm: 1 application subcutaneously 4 times a day . ** Use with insulin pen **   price check 81865  lancets: 1 application subcutaneously 4 times a day   price check 19699  Lantus Solostar Pen 100 units/mL subcutaneous solution: 35 unit(s) subcutaneous once a day (at bedtime)  price check 14758   melatonin 3 mg oral tablet: 1 tab(s) orally once a day (at bedtime), As needed, Insomnia  nicotine 14 mg/24 hr transdermal film, extended release: 1 patch transdermal 2 times a day   NovoLOG FlexPen 100 units/mL injectable solution: 4 unit(s) injectable 3 times a day (before meals)   price check 05137  oxyCODONE 5 mg oral tablet: 1 tab(s) orally every 6 hours MDD:20mg  Plavix 75 mg oral tablet: 1 tab(s) orally once a day  polyethylene glycol 3350 oral powder for reconstitution: 17 gram(s) orally 2 times a day  RX CAM Boot: RX CAM Boot to RLE, use as directed  Dx: RF partial 2nd resection w ADDISON  senna oral tablet: 2 tab(s) orally once a day (at bedtime)  test strips (per patient&#x27;s insurance): 1 application subcutaneously 4 times a day. ** Compatible with patient&#x27;s glucometer **  price check 26082   acetaminophen 325 mg oral tablet: 3 tab(s) orally every 6 hours, As needed, Temp greater or equal to 38C (100.4F), Mild Pain (1 - 3)  alcohol swabs : Apply topically to affected area 4 times a day   amLODIPine 5 mg oral tablet: 1 tab(s) orally once a day  amoxicillin-clavulanate 500 mg-125 mg oral tablet: 1 tab(s) orally 2 times a day  aspirin 81 mg oral delayed release tablet: 1 tab(s) orally once a day  atorvastatin 80 mg oral tablet: 1 tab(s) orally once a day (at bedtime)  glucometer (per patient&#x27;s insurance): 1 application subcutaneous 4 times a day   Insulin Pen Needles, 4mm: 1 application subcutaneously 4 times a day  . ** Use with insulin pen ** price check 89420   lancets: 1 application subcutaneously 4 times a day  price check 68772   Lantus Solostar Pen 100 units/mL subcutaneous solution: 35 unit(s) subcutaneous once a day (at bedtime)  price check 83400   melatonin 3 mg oral tablet: 1 tab(s) orally once a day (at bedtime), As needed, Insomnia  nicotine 14 mg/24 hr transdermal film, extended release: 1 patch transdermal 2 times a day   NovoLOG FlexPen 100 units/mL injectable solution: 4 unit(s) injectable 3 times a day (before meals)   price check 74133  Plavix 75 mg oral tablet: 1 tab(s) orally once a day  polyethylene glycol 3350 oral powder for reconstitution: 17 gram(s) orally 2 times a day  RX CAM Boot: RX CAM Boot to RLE, use as directed  Dx: RF partial 2nd resection w ADDISON  senna oral tablet: 2 tab(s) orally once a day (at bedtime)  test strips (per patient&#x27;s insurance): 1 application subcutaneously 4 times a day . ** Compatible with patient&#x27;s glucometer ** price check 14717

## 2021-05-28 NOTE — PROGRESS NOTE ADULT - ASSESSMENT
Plan:   To OR today at 2 pm with Dr. Lott for Right foot partial 2nd ray resection.   CXR on sunrise.  EKG on sunrise.  Medical/Cardiac clearance since 5/27and documented in chart.  Consent signed and in chart.  Procedure was explained to patient in detail. All alternatives, risks and complications were discussed. All questions answered.

## 2021-05-28 NOTE — DISCHARGE NOTE PROVIDER - NSDCFUADDAPPT_GEN_ALL_CORE_FT
Follow up with Podiatry on 6/9/2021 at 5pm    Renal follow up appt:    Follow up with your primary care physician for further monitoring in 1-2 weeks. Please call to arrange appointment.     Follow up with Endocrine:    Follow up with Podiatry on 6/9/2021 at 5pm    Renal follow up appt:    Follow up with your primary care physician for further monitoring in 1-2 weeks. Please call to arrange appointment.     Follow up with Endocrine: appointment scheduled for July 1 at 10:30AM, [address] [facility] [address]  [phone].     Follow up with Podiatry on 6/9/2021 at 5pm      Follow up with your primary care physician for further monitoring in 1-2 weeks. Please call to arrange appointment.     Follow up with Endocrine: appointment scheduled for July 1 at 10:30AM, [address] [facility] [address]  [phone].

## 2021-05-28 NOTE — BRIEF OPERATIVE NOTE - SPECIMENS
Right foot OR Deep wound culture; Right foot dirty bone path; Right foot clean bone margin 2nd metatarsal path and culture

## 2021-05-28 NOTE — PROGRESS NOTE ADULT - PROBLEM SELECTOR PLAN 1
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 - Podiatry recs appreciated, plan for right foot partial 2nd ray resection today  - c/w zosyn and  vanc. Will need ID eval likely after OR.   - RCRI score 2 (hx of CAD and pre-op use of insulin), reports > 10 METS, patient with 10% cardiac cause mortality in 30 day post-op period, patient is moderate risk for low-risk procedure. Patient refused EKG. Would benefit from EKG for further risk factor stratification, however no active chest pain and no evidence of CHF. Patient is medically optimized to proceed with planned procedure without further testing.   - BCx NGTD, wound cultures w/ mod strep and coag neg staph

## 2021-05-28 NOTE — PROGRESS NOTE ADULT - ASSESSMENT
50M PMH DM type II (last a1c 10.2) , CAD s/p stents x 2, longstanding history of diabetic foot ulcers, s/p R 3rd and 4th partial ray resections by podiatry 4/15/21; now presenting with worsening R foot ulcer and fever to 102F at home; presentation c/f infected foot ulcer with concern for OM c/b sepsis in the setting of poorly controlled DM.

## 2021-05-28 NOTE — DISCHARGE NOTE PROVIDER - NPI NUMBER (FOR SYSADMIN USE ONLY) :
[[id number]] [[id number]],[[id number] [[id number]],[[id number]],[[id number] [[id number]],[[id number]],[[id number]],[[id number]] [[id number]],[[id number]],[[id number]],[UNKNOWN]

## 2021-05-28 NOTE — PROGRESS NOTE ADULT - PROBLEM SELECTOR PLAN 2
poorly controlled  - A1c = 9.8   - C/w lantus to 44 + admelog 4 TID + ISS  - Monitor FS and adjust regimen as necessary   - Consider endo eval if FS are difficult to control

## 2021-05-28 NOTE — PROGRESS NOTE ADULT - SUBJECTIVE AND OBJECTIVE BOX
[facility] Division of Hospital Medicine  Leah Newell MD  Pager: [phone]      Patient is a 50y old  Male who presents with a chief complaint of foot infection (27 May 2021 15:20)      SUBJECTIVE / OVERNIGHT EVENTS: patient resting and limited engagement in interview. He denies foot pain and chest pain. He is waiting for his surgery. He has no questions for me. Discussed that we need to follow-up OR cultures and that will determine his antibiotic course as he is still on IV - he understands     MEDICATIONS  (STANDING):  aspirin enteric coated 81 milliGRAM(s) Oral daily  atorvastatin 80 milliGRAM(s) Oral at bedtime  insulin glargine Injectable (LANTUS) 44 Unit(s) SubCutaneous at bedtime  insulin lispro (ADMELOG) corrective regimen sliding scale   SubCutaneous every 6 hours  insulin lispro Injectable (ADMELOG) 4 Unit(s) SubCutaneous three times a day before meals  lisinopril 40 milliGRAM(s) Oral daily  piperacillin/tazobactam IVPB.. 3.375 Gram(s) IV Intermittent every 8 hours  vancomycin  IVPB 1500 milliGRAM(s) IV Intermittent every 12 hours    MEDICATIONS  (PRN):  acetaminophen   Tablet .. 650 milliGRAM(s) Oral every 6 hours PRN Mild Pain (1 - 3)  melatonin 6 milliGRAM(s) Oral at bedtime PRN Insomnia  oxyCODONE    IR 5 milliGRAM(s) Oral every 4 hours PRN Severe Pain (7 - 10)      CAPILLARY BLOOD GLUCOSE  232 (27 May 2021 17:30)      POCT Blood Glucose.: 175 mg/dL (28 May 2021 12:08)  POCT Blood Glucose.: 247 mg/dL (28 May 2021 05:30)  POCT Blood Glucose.: 176 mg/dL (27 May 2021 22:52)  POCT Blood Glucose.: 203 mg/dL (27 May 2021 20:41)  POCT Blood Glucose.: 232 mg/dL (27 May 2021 17:20)  POCT Blood Glucose.: 126 mg/dL (27 May 2021 12:33)    I&O's Summary    27 May 2021 07:01  -  28 May 2021 07:00  --------------------------------------------------------  IN: 0 mL / OUT: 1850 mL / NET: -1850 mL    28 May 2021 07:01  -  28 May 2021 12:22  --------------------------------------------------------  IN: 0 mL / OUT: 200 mL / NET: -200 mL        PHYSICAL EXAM:  Vital Signs Last 24 Hrs  T(C): 36.8 (28 May 2021 10:57), Max: 37.4 (27 May 2021 17:45)  T(F): 98.3 (28 May 2021 10:57), Max: 99.3 (27 May 2021 17:45)  HR: 76 (28 May 2021 10:57) (76 - 85)  BP: 114/74 (28 May 2021 10:57) (112/69 - 142/83)  BP(mean): --  RR: 17 (28 May 2021 10:57) (17 - 18)  SpO2: 96% (28 May 2021 10:57) (96% - 98%)    CONSTITUTIONAL: NAD, well-developed, well-groomed  ENMT: Moist oral mucosa  RESPIRATORY: Normal respiratory effort; lungs are clear to auscultation bilaterally  CARDIOVASCULAR:  S1/S2; No lower extremity edema  ABDOMEN: Nontender to palpation, normoactive bowel sounds, no rebound/guarding  MUSCULOSKELETAL: R foot bandaged, L midfoot bandaged   PSYCH: A+O to person, place, and time; flat affect, not engaging in interview   NEUROLOGY: no focal deficits  SKIN: No rashes; no palpable lesions      LABS:                        12.6   7.76  )-----------( 176      ( 28 May 2021 06:48 )             37.1     05-28    133<L>  |  98  |  15  ----------------------------<  213<H>  4.0   |  23  |  1.01    Ca    9.1      28 May 2021 06:48  Phos  3.3     05-28  Mg     2.0     05-28      PT/INR - ( 28 May 2021 06:48 )   PT: 13.4 sec;   INR: 1.17 ratio         PTT - ( 28 May 2021 06:48 )  PTT:29.6 sec          Culture - Blood (collected 26 May 2021 06:53)  Source: .Blood Blood-Peripheral  Preliminary Report (27 May 2021 07:01):    No growth to date.    Culture - Blood (collected 26 May 2021 06:53)  Source: .Blood Blood-Peripheral  Preliminary Report (27 May 2021 07:01):    No growth to date.    Culture - Abscess with Gram Stain (collected 26 May 2021 06:11)  Source: .Abscess R foot  Preliminary Report (27 May 2021 12:54):    Moderate Streptococcus dysgalactiae (Group C/G) "Susceptibilities not    performed"    Moderate Coag Negative Staphylococcus "Susceptibilities not performed"        RADIOLOGY & ADDITIONAL TESTS:  Results Reviewed:   Imaging Personally Reviewed:  Electrocardiogram Personally Reviewed:    COORDINATION OF CARE:  Care Discussed with Consultants/Other Providers [Y/N]:  Prior or Outpatient Records Reviewed [Y/N]:

## 2021-05-28 NOTE — PROGRESS NOTE ADULT - PROBLEM SELECTOR PLAN 5
- Patient anxious and intermittently agitated with staff during hospital stay   - BH made aware, patient needs to agree to psych eval prior to consultation   - At this time will defer, patient is agreeable with OR plan

## 2021-05-28 NOTE — DISCHARGE NOTE PROVIDER - HOSPITAL COURSE
51 y/o Male with PMH of DM type II (last a1c 10.2) , CAD s/p stents x 2, longstanding history of diabetic foot ulcers, s/p R 3rd and 4th partial ray resections by podiatry 4/15/21; now presenting with worsening R foot ulcer and fever to 102F at home; presentation concerning for infected foot ulcer with concern for OM c/b sepsis in the setting of poorly controlled DM. MRI R foot showing OM of the second metatarsal head and second digit proximal phalanx, with underlying ulcer. Podiatry following, s/p RF partial 2nd ray resection w ADDISON and free flap open medial (5/28/21). Per podiatry, low concern for residual bone infection, moderate concern for flap viability. NWB to RLE in a CAM boot. PT recommended home PT. Preliminary bone culture growing staph hemolyticus. BCx NGTD, wound cultures w/ mod strep and coag neg staph. Patient on Zosyn, s/p Vancomycin (discontinued to supratherapeutic vanco level and abscess c/w strep). Stable to transition to PO Augmentin x 1 week on discharge per Podiatry (ID also curbsided and agrees). Course complicated by acute renal failure in the setting of supratherapeutic vanco level, nsaids, ace-i, hypotension, and patient received IV contrast with MRI on 5/27. House Renal consulted. Now likely in ATN. Lisinopril discontinued, No urinary retention. Renal ultrasound showed... Patient was started on IV fluids per Renal. Patient also with poorly controlled DM2 however sugars are stable on the current regimen. A1c = 9.8.        51 y/o Male with PMH of DM type II (last a1c 10.2) , CAD s/p stents x 2, longstanding history of diabetic foot ulcers, s/p R 3rd and 4th partial ray resections by podiatry 4/15/21; now presenting with worsening R foot ulcer and fever to 102F at home; presentation concerning for infected foot ulcer with concern for OM c/b sepsis in the setting of poorly controlled DM. MRI R foot showing OM of the second metatarsal head and second digit proximal phalanx, with underlying ulcer. Podiatry following, s/p RF partial 2nd ray resection w ADDISON and free flap open medial (5/28/21). Per podiatry, low concern for residual bone infection, moderate concern for flap viability. NWB to RLE in a CAM boot. PT recommended home PT. Preliminary bone culture growing staph hemolyticus. BCx NGTD, wound cultures w/ mod strep and coag neg staph. Patient on Zosyn, s/p Vancomycin (discontinued to supratherapeutic vanco level and abscess c/w strep). Stable to transition to PO Augmentin x 1 week on discharge per Podiatry (ID also curbsided and agrees). Course complicated by acute renal failure in the setting of supratherapeutic vanco level, nsaids, ace-i, and hypotension. House Renal consulted. Now likely in ATN. Lisinopril discontinued, No urinary retention. Renal ultrasound showed... Patient was started on IV fluids per Renal. Patient also with poorly controlled DM2 however sugars are stable on the current regimen. A1c = 9.8.        49 y/o Male with PMH of DM type II (last a1c 10.2) , CAD s/p stents x 2, longstanding history of diabetic foot ulcers, s/p R 3rd and 4th partial ray resections by podiatry 4/15/21; now presenting with worsening R foot ulcer and fever to 102F at home; presentation concerning for infected foot ulcer with concern for OM c/b sepsis in the setting of poorly controlled DM. MRI R foot showing OM of the second metatarsal head and second digit proximal phalanx, with underlying ulcer. Podiatry following, s/p RF partial 2nd ray resection w ADDISON and free flap open medial (5/28/21). Per podiatry, low concern for residual bone infection, moderate concern for flap viability. NWB to RLE in a CAM boot. PT recommended home PT. Preliminary bone culture growing staph hemolyticus. BCx NGTD, wound cultures w/ mod strep and coag neg staph. Patient on Zosyn, s/p Vancomycin (discontinued to supratherapeutic vanco level and abscess c/w strep). Stable to transition to PO Augmentin x 1 week on discharge per Podiatry (ID also curbsided and agrees). Course complicated by acute renal failure in the setting of supratherapeutic vanco level, nsaids, ace-i, and hypotension. House Renal consulted. Now likely in ATN. Lisinopril discontinued, No urinary retention. Renal ultrasound showed... Patient was started on IV fluids per Renal. Patient also with poorly controlled DM2 however sugars are stable on the current regimen. A1c = 9.8.     INCOMPLETE 49 y/o Male with PMH of DM type II (last a1c 10.2) , CAD s/p stents x 2, longstanding history of diabetic foot ulcers, s/p R 3rd and 4th partial ray resections by podiatry 4/15/21; now presenting with worsening R foot ulcer and fever to 102F at home; presentation concerning for infected foot ulcer with concern for OM c/b sepsis in the setting of poorly controlled DM. MRI R foot showing OM of the second metatarsal head and second digit proximal phalanx, with underlying ulcer. Podiatry following, s/p RF partial 2nd ray resection w ADDISON and free flap open medial (5/28/21). Per podiatry, low concern for residual bone infection, moderate concern for flap viability. NWB to RLE in a CAM boot. PT recommended home PT. Preliminary bone culture growing staph hemolyticus. BCx NGTD, wound cultures w/ mod strep and coag neg staph. Patient on Zosyn, s/p Vancomycin (discontinued to supratherapeutic vanco level and abscess c/w strep). Stable to transition to PO Augmentin x 1 week on discharge per Podiatry (ID also curbsided and agrees). Course complicated by acute renal failure in the setting of supratherapeutic vanco level, nsaids, ace-i, and hypotension. House Renal consulted. Now likely in ATN. Lisinopril discontinued, No urinary retention. Renal ultrasound showed... Patient was started on IV fluids per Renal. Patient also with poorly controlled DM2 however sugars are stable on the current regimen. A1c = 9.8.      51 y/o Male with PMH of DM type II (last a1c 10.2) , CAD s/p stents x 2, longstanding history of diabetic foot ulcers, s/p R 3rd and 4th partial ray resections by podiatry 4/15/21; now presenting with worsening R foot ulcer and fever to 102F at home; presentation concerning for infected foot ulcer with concern for OM c/b sepsis in the setting of poorly controlled DM. MRI R foot showing OM of the second metatarsal head and second digit proximal phalanx, with underlying ulcer. Podiatry following, s/p RF partial 2nd ray resection w ADDISON and free flap open medial (5/28/21). Per podiatry, low concern for residual bone infection, moderate concern for flap viability. NWB to RLE in a CAM boot. PT recommended home PT. Preliminary bone culture growing staph hemolyticus. BCx NGTD, wound cultures w/ mod strep and coag neg staph. Patient on Zosyn, s/p Vancomycin (discontinued to supratherapeutic vanco level and abscess c/w strep). Stable to transition to PO Augmentin x 1 week on discharge per Podiatry (ID also curbsided and agrees). Course complicated by acute renal failure in the setting of supratherapeutic vanco level, nsaids, ace-i, and hypotension. House Renal consulted. Now likely in ATN. Lisinopril discontinued, No urinary retention. Renal ultrasound  5/31/21showed normal renal Us. Patient was started on IV fluids per Renal. Patient also with poorly controlled DM2 however sugars are stable on the current regimen. A1c = 9.8. He was treated with IVF hydration and noted to have creat peaked at 4.72 --> 4.7---> 3.8 on 6/2/21. He was also seen by Endocrine and recommended to go home on Insulin regimen while his creatnine was elevated- he can follow up out patient with his Endocrine doctor to resume diabetic oral medication once his creatnine has normalized.     49 y/o Male with PMH of DM type II (last a1c 10.2) , CAD s/p stents x 2, longstanding history of diabetic foot ulcers, s/p R 3rd and 4th partial ray resections by podiatry 4/15/21; now presenting with worsening R foot ulcer and fever to 102F at home; presentation concerning for infected foot ulcer with concern for OM c/b sepsis in the setting of poorly controlled DM. MRI R foot showing OM of the second metatarsal head and second digit proximal phalanx, with underlying ulcer. Podiatry following, s/p RF partial 2nd ray resection w ADDISON and free flap open medial (5/28/21). Per podiatry, low concern for residual bone infection, moderate concern for flap viability. NWB to RLE in a CAM boot. PT recommended home PT. Preliminary bone culture growing staph hemolyticus. BCx NGTD, wound cultures w/ mod strep and coag neg staph. Patient on Zosyn, s/p Vancomycin (discontinued to supratherapeutic vanco level and abscess c/w strep). Stable to transition to PO Augmentin x 1 week on discharge per Podiatry (ID also curbsided and agrees). Course complicated by acute renal failure in the setting of supratherapeutic vanco level, nsaids, ace-i, and hypotension. House Renal consulted. Now likely in ATN. Lisinopril discontinued, No urinary retention. Renal ultrasound  5/31/21showed normal renal Us. Patient was started on IV fluids per Renal. Patient also with poorly controlled DM2 however sugars are stable on the current regimen. A1c = 9.8. He was treated with IVF hydration and noted to have creat peaked at 4.72 --> 4.7---> 3.8 ----> 2.66 on 6/4/21.  . He was also seen by Endocrine and recommended to go home on Insulin regimen while his creatnine was elevated- he can follow up out patient with his Endocrine doctor to resume diabetic oral medication once his creatnine has normalized. HTN medication was changed to Norvasc 5 mg po qd while patient await Creatinine to normalize.    Pod f/u Dr Lott 6/9/21 at 5:45 pm  Endocrine f/u dr Elizabeth on 7/1/21  Nephrology follow up Dr Blevins in 1 week  PCP  Patient and mother aware of need to stay of home BP medications of Lisinopril / Ace/ ARB.  Also no OTC pain medications ecept for tylenol- NSAIDs can harm kidney- no alleve/ naprosyn/ advil/ ibuprofen/ moltrin  50 min to coordinate d/c  d/w patient / mother /acp. 49 y/o Male with PMH of DM type II (last a1c 10.2) , CAD s/p stents x 2, longstanding history of diabetic foot ulcers, s/p R 3rd and 4th partial ray resections by podiatry 4/15/21; now presenting with worsening R foot ulcer and fever to 102F at home; presentation concerning for infected foot ulcer with concern for OM c/b sepsis in the setting of poorly controlled DM. MRI R foot showing OM of the second metatarsal head and second digit proximal phalanx, with underlying ulcer. Podiatry following, s/p RF partial 2nd ray resection w ADDISON and free flap open medial (5/28/21). Per podiatry, low concern for residual bone infection, moderate concern for flap viability. NWB to RLE in a CAM boot. PT recommended home PT. Preliminary bone culture growing staph hemolyticus. BCx NGTD, wound cultures w/ mod strep and coag neg staph. Patient on Zosyn, s/p Vancomycin (discontinued to supratherapeutic vanco level and abscess c/w strep). Stable to transition to PO Augmentin x 1 week on discharge per Podiatry (ID also curbsided and agrees). Course complicated by acute renal failure in the setting of supratherapeutic vanco level, nsaids, ace-i, and hypotension. House Renal consulted. Now likely in ATN. Lisinopril discontinued, No urinary retention. Renal ultrasound  5/31/21showed normal renal Us. Patient was started on IV fluids per Renal. Patient also with poorly controlled DM2 however sugars are stable on the current regimen. A1c = 9.8. He was treated with IVF hydration and noted to have creat peaked at 4.72 --> 4.7---> 3.8 ----> 2.66 on 6/4/21.  . He was also seen by Endocrine and recommended to go home on Insulin regimen while his creatnine was elevated- he can follow up out patient with his Endocrine doctor to resume diabetic oral medication once his creatnine has normalized. HTN medication was changed to Norvasc 5 mg po qd while patient await Creatinine to normalize.  Endocrine  with decreased dose of Lantus 35units qhs, given decreased renal clearance.  Counselled on signs of Hypoglycemia  Recommend continue Admelog 4units TIDac, hold if NPO   Recommend low correctional scale premeal and qhs   s/p RF partial 2nd ray resection w ADDISON and free flap open medial (DOS 5/28/21): surgical sites well coapted, sutures intact, no dehiscence, flap warm with duskiness to dorsal flap and no signs of active infection   -L foot submet 4 wound down to bone w/ no signs of active infection applied Aquacel and DSD  -as per intra op findings low concern for residual bone infection, moderate concern for flap viability  - pt to stay non WB to RLE in a CAM boot  - bone culture staph hemolyticus  - pod stable for discharge on PO Augmentin x 1 week, info in discharge paperwork"    Pod f/u Dr Lott 6/9/21 at 5:45 pm  Endocrine f/u dr London on 7/1/21  Nephrology follow up   PCP  Patient and mother aware of need to stay of home BP medications of Lisinopril / Ace/ ARB.  Also no OTC pain medications ecept for tylenol- NSAIDs can harm kidney- no alleve/ naprosyn/ advil/ ibuprofen/ moltrin  50 min to coordinate d/c  d/w patient / mother /acp. 51 y/o Male with PMH of DM type II (last a1c 10.2) , CAD s/p stents x 2, longstanding history of diabetic foot ulcers, s/p R 3rd and 4th partial ray resections by podiatry 4/15/21; now presenting with worsening R foot ulcer and fever to 102F at home; presentation concerning for infected foot ulcer with concern for OM c/b sepsis in the setting of poorly controlled DM. MRI R foot showing OM of the second metatarsal head and second digit proximal phalanx, with underlying ulcer. Podiatry following, s/p RF partial 2nd ray resection w ADDISON and free flap open medial (5/28/21). Per podiatry, low concern for residual bone infection, moderate concern for flap viability. NWB to RLE in a CAM boot. PT recommended home PT. Preliminary bone culture growing staph hemolyticus. BCx NGTD, wound cultures w/ mod strep and coag neg staph. Patient on Zosyn, s/p Vancomycin (discontinued to supratherapeutic vanco level and abscess c/w strep). Stable to transition to PO Augmentin x 1 week on discharge per Podiatry (ID also curbsided and agrees). Course complicated by acute renal failure in the setting of supratherapeutic vanco level, nsaids, ace-i, and hypotension. House Renal consulted. Now likely in ATN. Lisinopril discontinued, No urinary retention. Renal ultrasound  5/31/21showed normal renal Us. Patient was started on IV fluids per Renal. Patient also with poorly controlled DM2 however sugars are stable on the current regimen. A1c = 9.8. He was treated with IVF hydration and noted to have creat peaked at 4.72 --> 4.7---> 3.8 ----> 2.66 on 6/4/21.  . He was also seen by Endocrine and recommended to go home on Insulin regimen while his creatnine was elevated- he can follow up out patient with his Endocrine doctor to resume diabetic oral medication once his creatnine has normalized. HTN medication was changed to Norvasc 5 mg po qd while patient await Creatinine to normalize.  Endocrine  with decreased dose of Lantus 35units qhs, given decreased renal clearance.  Counseled on signs of Hypoglycemia  Recommend continue Admelog 4units TIDac, hold if NPO   Recommend low correctional scale premeal and qhs   s/p RF partial 2nd ray resection w ADDISON and free flap open medial (DOS 5/28/21): surgical sites well coapted, sutures intact, no dehiscence, flap warm with duskiness to dorsal flap and no signs of active infection   L foot submet 4 wound down to bone w/ no signs of active infection applied Aquacel and DSD  as per intra op findings low concern for residual bone infection, moderate concern for flap viability  Pt to stay non WB to RLE in a CAM boot  bone culture staph hemolyticus  pod stable for discharge on PO Augmentin x 1 week, info in discharge paperwork"    Pod f/u Dr Lott 6/9/21 at 5:45 pm  Endocrine f/u dr London on 7/1/21  Nephrology follow up   PCP  Patient and mother aware of need to stay of home BP medications of Lisinopril / Ace/ ARB.  Also no OTC pain medications ecept for tylenol- NSAIDs can harm kidney- no alleve/ naprosyn/ advil/ ibuprofen/ moltrin  50 min to coordinate d/c  d/w patient / mother /acp.     On 6/4/21 this case was reviewed with Dr. Castillo, the patient is medically stable and optimized for discharge. All medications were reviewed and prescriptions were sent to mutually agreed upon pharmacy. 49 y/o Male with PMH of DM type II (last a1c 10.2) , CAD s/p stents x 2, longstanding history of diabetic foot ulcers, s/p R 3rd and 4th partial ray resections by podiatry 4/15/21; now presenting with worsening R foot ulcer and fever to 102F at home; presentation concerning for infected foot ulcer with concern for OM c/b sepsis in the setting of poorly controlled DM. MRI R foot showing OM of the second metatarsal head and second digit proximal phalanx, with underlying ulcer. Podiatry following, s/p RF partial 2nd ray resection w ADDISON and free flap open medial (5/28/21). Per podiatry, low concern for residual bone infection, moderate concern for flap viability. NWB to RLE in a CAM boot. PT recommended home PT. Preliminary bone culture growing staph hemolyticus. BCx NGTD, wound cultures w/ mod strep and coag neg staph. Patient on Zosyn, s/p Vancomycin (discontinued to supratherapeutic vanco level and abscess c/w strep). Stable to transition to PO Augmentin x 1 week on discharge per Podiatry (ID also curbsided and agrees). Course complicated by acute renal failure in the setting of supratherapeutic vanco level, nsaids, ace-i, and hypotension. House Renal consulted. Now likely in ATN. Lisinopril discontinued, No urinary retention. Renal ultrasound  5/31/21showed normal renal Us. Patient was started on IV fluids per Renal. Patient also with poorly controlled DM2 however sugars are stable on the current regimen. A1c = 9.8. He was treated with IVF hydration and noted to have creat peaked at 4.72 --> 4.7---> 3.8 ----> 2.66 on 6/4/21.  . He was also seen by Endocrine and recommended to go home on Insulin regimen while his creatnine was elevated- he can follow up out patient with his Endocrine doctor to resume diabetic oral medication once his creatnine has normalized. HTN medication was changed to Norvasc 5 mg po qd while patient await Creatinine to normalize.  Endocrine  with decreased dose of Lantus 35units qhs, given decreased renal clearance.  Counseled on signs of Hypoglycemia  Recommend continue Admelog 4units TIDac, hold if NPO   Recommend low correctional scale premeal and qhs   s/p RF partial 2nd ray resection w ADDISON and free flap open medial (DOS 5/28/21): surgical sites well coapted, sutures intact, no dehiscence, flap warm with duskiness to dorsal flap and no signs of active infection   L foot submet 4 wound down to bone w/ no signs of active infection applied Aquacel and DSD  as per intra op findings low concern for residual bone infection, moderate concern for flap viability  Pt to stay non WB to RLE in a CAM boot  bone culture staph hemolyticus  pod stable for discharge on PO Augmentin to complete 1 week course, info in discharge paperwork    Pod f/u Dr Lott 6/9/21 at 5:45 pm   Endocrine f/u dr London on 7/1/21  Nephrology follow up   PCP  Patient and mother aware of need to stay of home BP medications of Lisinopril / Ace/ ARB.  Also no OTC pain medications ecept for tylenol- NSAIDs can harm kidney- no alleve/ naprosyn/ advil/ ibuprofen/ moltrin  50 min to coordinate d/c  d/w patient / mother /acp.     On 6/4/21 this case was reviewed with Dr. Castillo, the patient is medically stable and optimized for discharge. All medications were reviewed and prescriptions were sent to mutually agreed upon pharmacy.

## 2021-05-28 NOTE — DISCHARGE NOTE PROVIDER - NSDCCPCAREPLAN_GEN_ALL_CORE_FT
PRINCIPAL DISCHARGE DIAGNOSIS  Diagnosis: Diabetic foot infection  Assessment and Plan of Treatment: Diabetic foot infection      SECONDARY DISCHARGE DIAGNOSES  Diagnosis: Acute renal failure  Assessment and Plan of Treatment:     Diagnosis: Type 2 diabetes mellitus with other circulatory complication, with long-term current use of insulin  Assessment and Plan of Treatment:     Diagnosis: Essential hypertension  Assessment and Plan of Treatment:      PRINCIPAL DISCHARGE DIAGNOSIS  Diagnosis: Diabetic foot infection  Assessment and Plan of Treatment: You underwent Right foot partial 2nd ray resection with ADDISON and free flap open medial on 5/28/21 by Podiatry team. PT recommended home Physical Therapy. Continue antibiotics on discharge as prescribed. Please follow up with Dr. Gavin Lott (Podiatrist) within 1 week of discharge from the hospital, please call [phone] for appointment and discuss that you recently were seen in the hospital.  Wound Care: Please leave your dressing clean dry intact until your follow up appointment.  Weight bearing: Please be non weight bearing to right lower extremity with CAM boot and crutches.      SECONDARY DISCHARGE DIAGNOSES  Diagnosis: Acute renal failure  Assessment and Plan of Treatment: Your kidney function worsened this admission, likely due to Vancomycin use, NSAIDs, ACE-I (Lisinopril), and low blood pressure. Lisinopril was discontinued. You were seen by the Nephrology team in the hospital and were given IV fluids. Avoid nephrotoxic drugs such as nonsteroidal anti-inflammatory agents (Advil, Motrin, Ibuprofen, Aleve, Naproxen, etc). Please follow up with a nephrologist to monitor your kidney function.    Diagnosis: Type 2 diabetes mellitus with other circulatory complication, with long-term current use of insulin  Assessment and Plan of Treatment: Continue diabetes regimen as prescribed. Continue a consistent carbohydrate diet (Meaning eating the same amount of carbohydrates at the same time each day). Monitor blood glucose levels four times a day before meals and at bedtime. Record blood sugars and bring to outpatient providers appointment in order to be reviewed by your doctor for management modifications. If your sugars are more than 400 or less than 70 you should contact your Primary Care Physician immediately. Monitor for signs/symptoms of low blood glucose, such as, dizziness, altered mental status, or cool/clammy skin. In addition, monitor for signs/symptoms of high blood glucose, such as, feeling hot, dry, fatigued, or with increased thirst/urination.  Exercise daily for at least 30 minutes and weight loss.  Follow up with your primary care physician and endocrinologist for routine Hemoglobin A1C checks and management.  Follow up with your ophthalmologist for routine yearly vision exams. Make regular podiatry appointments in order to have feet checked for wounds.     PRINCIPAL DISCHARGE DIAGNOSIS  Diagnosis: Diabetic foot infection  Assessment and Plan of Treatment: You underwent Right foot partial 2nd ray resection with ADDISON and free flap open medial on 5/28/21 by Podiatry team. PT recommended home Physical Therapy. Continue antibiotics on discharge as prescribed. Please follow up with Dr. Gavin Lott (Podiatrist) within 1 week of discharge from the hospital, please call [phone] for appointment and discuss that you recently were seen in the hospital.  Wound Care: Please leave your dressing clean dry intact until your follow up appointment.  Weight bearing: Please be non weight bearing to right lower extremity with CAM boot and crutches.      SECONDARY DISCHARGE DIAGNOSES  Diagnosis: Type 2 diabetes mellitus with other circulatory complication, with long-term current use of insulin  Assessment and Plan of Treatment: Continue diabetes regimen as prescribed. Continue a consistent carbohydrate diet (Meaning eating the same amount of carbohydrates at the same time each day). Monitor blood glucose levels four times a day before meals and at bedtime. Record blood sugars and bring to outpatient providers appointment in order to be reviewed by your doctor for management modifications. If your sugars are more than 400 or less than 70 you should contact your Primary Care Physician immediately. Monitor for signs/symptoms of low blood glucose, such as, dizziness, altered mental status, or cool/clammy skin. In addition, monitor for signs/symptoms of high blood glucose, such as, feeling hot, dry, fatigued, or with increased thirst/urination.  Exercise daily for at least 30 minutes and weight loss.  Follow up with your primary care physician and endocrinologist for routine Hemoglobin A1C checks and management.  Follow up with your ophthalmologist for routine yearly vision exams. Make regular podiatry appointments in order to have feet checked for wounds.    Diagnosis: Acute renal failure  Assessment and Plan of Treatment: no  NSAIDs, ACE-I (Lisinopril), and low blood pressure. Lisinopril was discontinued. You were seen by the Nephrology team in the hospital and were given IV fluids. Avoid nephrotoxic drugs such as nonsteroidal anti-inflammatory agents (Advil, Motrin, Ibuprofen, Aleve, Naproxen, etc). Please follow up with a nephrologist to monitor your kidney function in 1week     PRINCIPAL DISCHARGE DIAGNOSIS  Diagnosis: Diabetic foot infection  Assessment and Plan of Treatment: You underwent Right foot partial 2nd ray resection with ADDISON and free flap open medial on 5/28/21 by Podiatry team. PT recommended home Physical Therapy. Continue antibiotics on discharge as prescribed. Please follow up with Dr. Gavin Lott (Podiatrist) within 1 week of discharge from the hospital, please call [phone] for appointment and discuss that you recently were seen in the hospital.  Wound Care: Please leave your dressing clean dry intact until your follow up appointment.  Weight bearing: Please be non weight bearing to right lower extremity with CAM boot and crutches.      SECONDARY DISCHARGE DIAGNOSES  Diagnosis: Type 2 diabetes mellitus with other circulatory complication, with long-term current use of insulin  Assessment and Plan of Treatment: Continue diabetes regimen as prescribed. Continue a consistent carbohydrate diet (Meaning eating the same amount of carbohydrates at the same time each day). Monitor blood glucose levels four times a day before meals and at bedtime. Record blood sugars and bring to outpatient providers appointment in order to be reviewed by your doctor for management modifications. If your sugars are more than 400 or less than 70 you should contact your Primary Care Physician immediately. Monitor for signs/symptoms of low blood glucose, such as, dizziness, altered mental status, or cool/clammy skin. In addition, monitor for signs/symptoms of high blood glucose, such as, feeling hot, dry, fatigued, or with increased thirst/urination.  Exercise daily for at least 30 minutes and weight loss.  Follow up with your primary care physician and endocrinologist for routine Hemoglobin A1C checks and management.  Follow up with your ophthalmologist for routine yearly vision exams. Make regular podiatry appointments in order to have feet checked for wounds.    Diagnosis: Acute renal failure  Assessment and Plan of Treatment: no  NSAIDs, ACE-I (Lisinopril), and low blood pressure. Lisinopril was discontinued. You were seen by the Nephrology team in the hospital and were given IV fluids. Avoid nephrotoxic drugs such as nonsteroidal anti-inflammatory agents (Advil, Motrin, Ibuprofen, Aleve, Naproxen, etc). Please follow up with a nephrologist to monitor your kidney function in 1week. please do not take alleve/ naprosyn/ advil/ Ibuprofen or any over the conter pain medications except for tylenol- other medications can be harmfull to your kindneys    Diagnosis: Sebaceous cyst  Assessment and Plan of Treatment:      PRINCIPAL DISCHARGE DIAGNOSIS  Diagnosis: Diabetic foot infection  Assessment and Plan of Treatment: You underwent Right foot partial 2nd ray resection with ADDISON and free flap open medial on 5/28/21 by Podiatry team. PT recommended home Physical Therapy. Continue antibiotics on discharge as prescribed. Please follow up with Dr. Gavin Lott (Podiatrist) at your scheduled appointment time, call [phone] with questions.  Wound Care: Please leave your dressing clean dry intact until your follow up appointment.  Weight bearing: Please be non weight bearing to right lower extremity with CAM boot and crutches.      SECONDARY DISCHARGE DIAGNOSES  Diagnosis: Type 2 diabetes mellitus with other circulatory complication, with long-term current use of insulin  Assessment and Plan of Treatment: Continue diabetes regimen as prescribed. Continue a consistent carbohydrate diet (Meaning eating the same amount of carbohydrates at the same time each day). Monitor blood glucose levels four times a day before meals and at bedtime. Record blood sugars and bring to outpatient providers appointment in order to be reviewed by your doctor for management modifications. If your sugars are more than 400 or less than 70 you should contact your Primary Care Physician immediately. Monitor for signs/symptoms of low blood glucose, such as, dizziness, altered mental status, or cool/clammy skin. In addition, monitor for signs/symptoms of high blood glucose, such as, feeling hot, dry, fatigued, or with increased thirst/urination.  Exercise daily for at least 30 minutes and weight loss.  Follow up with your primary care physician and endocrinologist for routine Hemoglobin A1C checks and management.  Follow up with your ophthalmologist for routine yearly vision exams. Make regular podiatry appointments in order to have feet checked for wounds.    Diagnosis: Acute renal failure  Assessment and Plan of Treatment: no  NSAIDs, stop ACE-I (Lisinopril). Lisinopril was discontinued due to kidney injury and low blood pressure. You were seen by the Nephrology team in the hospital and were given IV fluids. Avoid nephrotoxic drugs such as nonsteroidal anti-inflammatory agents (Advil, Motrin, Ibuprofen, Aleve, Naproxen, etc). Please follow up with a nephrologist to monitor your kidney function in 1week. please do not take alleve/ naprosyn/ advil/ Ibuprofen or any over the conter pain medications except for tylenol- other medications can be harmfull to your kindneys    Diagnosis: Sebaceous cyst  Assessment and Plan of Treatment:      PRINCIPAL DISCHARGE DIAGNOSIS  Diagnosis: Diabetic foot infection  Assessment and Plan of Treatment: You underwent Right foot partial 2nd ray resection with ADDISON and free flap open medial on 5/28/21 by Podiatry team. PT recommended home Physical Therapy. Continue antibiotics on discharge as prescribed. Please follow up with Dr. Gavin Lott (Podiatrist) at your scheduled appointment time, call [phone] with questions.  Wound Care: Please leave your dressing clean dry intact until your follow up appointment.  Weight bearing: Please be non weight bearing to right lower extremity with CAM boot and crutches.      SECONDARY DISCHARGE DIAGNOSES  Diagnosis: Type 2 diabetes mellitus with other circulatory complication, with long-term current use of insulin  Assessment and Plan of Treatment: Continue diabetes regimen as prescribed. Continue a consistent carbohydrate diet (Meaning eating the same amount of carbohydrates at the same time each day). Monitor blood glucose levels four times a day before meals and at bedtime. Record blood sugars and bring to outpatient providers appointment in order to be reviewed by your doctor for management modifications. If your sugars are more than 400 or less than 70 you should contact your Primary Care Physician immediately. Monitor for signs/symptoms of low blood glucose, such as, dizziness, altered mental status, or cool/clammy skin. In addition, monitor for signs/symptoms of high blood glucose, such as, feeling hot, dry, fatigued, or with increased thirst/urination.  Exercise daily for at least 30 minutes and weight loss.  Follow up with your primary care physician and endocrinologist for routine Hemoglobin A1C checks and management.  Follow up with your ophthalmologist for routine yearly vision exams. Make regular podiatry appointments in order to have feet checked for wounds.    Diagnosis: Acute renal failure  Assessment and Plan of Treatment: no  NSAIDs, stop ACE-I (Lisinopril). Lisinopril was discontinued due to kidney injury and low blood pressure. You were seen by the Nephrology team in the hospital and were given IV fluids. Avoid nephrotoxic drugs such as nonsteroidal anti-inflammatory agents (Advil, Motrin, Ibuprofen, Aleve, Naproxen, etc). Please follow up with a nephrologist to monitor your kidney function in 1week. please do not take alleve/ naprosyn/ advil/ Ibuprofen or any over the conter pain medications except for tylenol- other medications can be harmfull to your kindneys  please keep appointment with Dr Blevins 6/9 at 10 am at [phone]    Diagnosis: Sebaceous cyst  Assessment and Plan of Treatment:      PRINCIPAL DISCHARGE DIAGNOSIS  Diagnosis: Diabetic foot infection  Assessment and Plan of Treatment: Continue your antibiotics as directed.   You underwent Right foot partial 2nd ray resection with ADDISON and free flap open medial on 5/28/21 by Podiatry team. PT recommended home Physical Therapy. Continue antibiotics on discharge as prescribed. Please follow up with Dr. Gavin Lott (Podiatrist) at your scheduled appointment time for further care and your bone culture results, call [phone] with questions.  Wound Care: Please leave your dressing clean dry intact until your follow up appointment.  Weight bearing: Please be non weight bearing to right lower extremity with CAM boot and crutches.      SECONDARY DISCHARGE DIAGNOSES  Diagnosis: Type 2 diabetes mellitus with other circulatory complication, with long-term current use of insulin  Assessment and Plan of Treatment: Follow up with Dr London on discharge - appointment scheduled for July 1 at 10:30AM, [address] [place] [address] [address]  [phone].  Do not take metformin or Prandin anymore. Continue your bedtime insulin 35 units once a day at bedtime and Premeal insulin 4 units before meals (three times a day in total - before breakfast, before lunch and before dinner)  Continue diabetes regimen as prescribed. Continue a consistent carbohydrate diet (Meaning eating the same amount of carbohydrates at the same time each day). Monitor blood glucose levels four times a day before meals and at bedtime. Record blood sugars and bring to outpatient providers appointment in order to be reviewed by your doctor for management modifications. If your sugars are more than 400 or less than 70 you should contact your Primary Care Physician immediately. Monitor for signs/symptoms of low blood glucose, such as, dizziness, altered mental status, or cool/clammy skin. In addition, monitor for signs/symptoms of high blood glucose, such as, feeling hot, dry, fatigued, or with increased thirst/urination.  Exercise daily for at least 30 minutes and weight loss.  Follow up with your primary care physician and endocrinologist for routine Hemoglobin A1C checks and management.  Follow up with your ophthalmologist for routine yearly vision exams. Make regular podiatry appointments in order to have feet checked for wounds.    Diagnosis: Acute renal failure  Assessment and Plan of Treatment: no  NSAIDs, stop ACE-I (Lisinopril) and Losartan. These medications was discontinued due to kidney injury and low blood pressure. Continue amlodipine. You were seen by the Nephrology team in the hospital and were given IV fluids. Avoid nephrotoxic drugs such as nonsteroidal anti-inflammatory agents (Advil, Motrin, Ibuprofen, Aleve, Naproxen, etc). Please follow up with a nephrologist to monitor your kidney function in 1week. please do not take alleve/ naprosyn/ advil/ Ibuprofen or any over the conter pain medications except for tylenol- other medications can be harmfull to your kindneys  please keep appointment with Dr Blevins 6/9 at 10 am at [phone]    Diagnosis: Sebaceous cyst  Assessment and Plan of Treatment: Please follow up with your primary care to evaluate the bump on your L shoulder. You may need an ultrasound or further imaging.     PRINCIPAL DISCHARGE DIAGNOSIS  Diagnosis: Diabetic foot infection  Assessment and Plan of Treatment: Continue your antibiotics as directed.   You underwent Right foot partial 2nd ray resection with ADDISON and free flap open medial on 5/28/21 by Podiatry team. PT recommended home Physical Therapy. Continue antibiotics on discharge as prescribed. Please follow up with Dr. Gavin Lott (Podiatrist) at your scheduled appointment time for further care and your bone culture results, call [phone] with questions.  Wound Care: Please leave your dressing clean dry intact until your follow up appointment.  Weight bearing: Please be non weight bearing to right lower extremity with CAM boot and crutches.      SECONDARY DISCHARGE DIAGNOSES  Diagnosis: Type 2 diabetes mellitus with other circulatory complication, with long-term current use of insulin  Assessment and Plan of Treatment: Follow up with Dr London on discharge - appointment scheduled for July 1 at 10:30AM, [address] [place] [address] [address]  [phone].  Do not take metformin, trulicity or Prandin anymore. Continue your bedtime insulin (Basaglar) 35 units once a day at bedtime and Premeal insulin (Novolog) 4 units before meals (three times a day in total - before breakfast, before lunch and before dinner)  Continue diabetes regimen as prescribed. Continue a consistent carbohydrate diet (Meaning eating the same amount of carbohydrates at the same time each day). Monitor blood glucose levels four times a day before meals and at bedtime. Record blood sugars and bring to outpatient providers appointment in order to be reviewed by your doctor for management modifications. If your sugars are more than 400 or less than 70 you should contact your Primary Care Physician immediately. Monitor for signs/symptoms of low blood glucose, such as, dizziness, altered mental status, or cool/clammy skin. In addition, monitor for signs/symptoms of high blood glucose, such as, feeling hot, dry, fatigued, or with increased thirst/urination.  Exercise daily for at least 30 minutes and weight loss.  Follow up with your primary care physician and endocrinologist for routine Hemoglobin A1C checks and management.  Follow up with your ophthalmologist for routine yearly vision exams. Make regular podiatry appointments in order to have feet checked for wounds.    Diagnosis: Acute renal failure  Assessment and Plan of Treatment: no  NSAIDs, stop ACE-I (Lisinopril) and Losartan. These medications was discontinued due to kidney injury and low blood pressure. Continue amlodipine. You were seen by the Nephrology team in the hospital and were given IV fluids. Avoid nephrotoxic drugs such as nonsteroidal anti-inflammatory agents (Advil, Motrin, Ibuprofen, Aleve, Naproxen, etc). Please follow up with a nephrologist to monitor your kidney function in 1week. please do not take alleve/ naprosyn/ advil/ Ibuprofen or any over the conter pain medications except for tylenol- other medications can be harmfull to your kindneys  please keep appointment with Dr Blevins 6/9 at 10 am at [phone]    Diagnosis: Sebaceous cyst  Assessment and Plan of Treatment: Please follow up with your primary care to evaluate the bump on your L shoulder. You may need an ultrasound or further imaging.     PRINCIPAL DISCHARGE DIAGNOSIS  Diagnosis: Diabetic foot infection  Assessment and Plan of Treatment: Continue your antibiotics as directed.   You underwent Right foot partial 2nd ray resection with ADDISON and free flap open medial on 5/28/21 by Podiatry team. PT recommended home Physical Therapy. Continue antibiotics on discharge as prescribed. Please follow up with Dr. Gavin Lott (Podiatrist) at your scheduled appointment time for further care and your bone culture results, call [phone] with questions.  Wound Care: Please leave your dressing clean dry intact until your follow up appointment.  Weight bearing: Please be non weight bearing to right lower extremity with CAM boot and crutches.      SECONDARY DISCHARGE DIAGNOSES  Diagnosis: Type 2 diabetes mellitus with other circulatory complication, with long-term current use of insulin  Assessment and Plan of Treatment: Follow up with Dr London on discharge - appointment scheduled for July 1 at 10:30AM, [address] [place] [address] [address]  [phone].  Do not take metformin, trulicity or Prandin anymore. Continue your bedtime insulin (Basaglar) 35 units once a day at bedtime and Premeal insulin (Novolog) 4 units before meals (three times a day in total - before breakfast, before lunch and before dinner)  Continue diabetes regimen as prescribed. Continue a consistent carbohydrate diet (Meaning eating the same amount of carbohydrates at the same time each day). Monitor blood glucose levels four times a day before meals and at bedtime. Record blood sugars and bring to outpatient providers appointment in order to be reviewed by your doctor for management modifications. If your sugars are more than 400 or less than 70 you should contact your Primary Care Physician immediately. Monitor for signs/symptoms of low blood glucose, such as, dizziness, altered mental status, or cool/clammy skin. In addition, monitor for signs/symptoms of high blood glucose, such as, feeling hot, dry, fatigued, or with increased thirst/urination.  Exercise daily for at least 30 minutes and weight loss.  Follow up with your primary care physician and endocrinologist for routine Hemoglobin A1C checks and management.  Follow up with your ophthalmologist for routine yearly vision exams. Make regular podiatry appointments in order to have feet checked for wounds.    Diagnosis: Acute renal failure  Assessment and Plan of Treatment: no  NSAIDs, stop ACE-I (Lisinopril) and Losartan. These medications was discontinued due to kidney injury and low blood pressure. Continue amlodipine. You were seen by the Nephrology team in the hospital and were given IV fluids. Avoid nephrotoxic drugs such as nonsteroidal anti-inflammatory agents (Advil, Motrin, Ibuprofen, Aleve, Naproxen, etc). Please follow up with a nephrologist to monitor your kidney function in 1week. please do not take alleve/ naprosyn/ advil/ Ibuprofen or any over the conter pain medications except for tylenol- other medications can be harmfull to your kindneys  please keep appointment with Dr Blevins 6/9 at 10 am at [phone]    Diagnosis: CAD (coronary artery disease)  Assessment and Plan of Treatment: Continue your aspirin, plavix and statin on discharge    Diagnosis: Sebaceous cyst  Assessment and Plan of Treatment: Please follow up with your primary care to evaluate the bump on your L shoulder. You may need an ultrasound or further imaging.

## 2021-05-28 NOTE — PROGRESS NOTE ADULT - SUBJECTIVE AND OBJECTIVE BOX
Patient is a 50y old  Male who presents with a chief complaint of foot infection (27 May 2021 15:20)      INTERVAL HPI/OVERNIGHT EVENTS:   Pt is scheduled for R foot partial 2nd ray resection with Dr. Lott at 2 pm. Patient is aware of procedure and is NPO since midnight.    MEDICATIONS  (STANDING):  aspirin enteric coated 81 milliGRAM(s) Oral daily  atorvastatin 80 milliGRAM(s) Oral at bedtime  insulin glargine Injectable (LANTUS) 44 Unit(s) SubCutaneous at bedtime  insulin lispro (ADMELOG) corrective regimen sliding scale   SubCutaneous every 6 hours  insulin lispro Injectable (ADMELOG) 4 Unit(s) SubCutaneous three times a day before meals  lisinopril 40 milliGRAM(s) Oral daily  piperacillin/tazobactam IVPB.. 3.375 Gram(s) IV Intermittent every 8 hours  vancomycin  IVPB 1500 milliGRAM(s) IV Intermittent every 12 hours    MEDICATIONS  (PRN):  acetaminophen   Tablet .. 650 milliGRAM(s) Oral every 6 hours PRN Mild Pain (1 - 3)  melatonin 6 milliGRAM(s) Oral at bedtime PRN Insomnia  oxyCODONE    IR 5 milliGRAM(s) Oral every 4 hours PRN Severe Pain (7 - 10)      Allergies    No Known Allergies    Intolerances        Vital Signs Last 24 Hrs  T(C): 36.6 (28 May 2021 06:15), Max: 37.4 (27 May 2021 17:45)  T(F): 97.8 (28 May 2021 06:15), Max: 99.3 (27 May 2021 17:45)  HR: 85 (28 May 2021 06:15) (79 - 85)  BP: 132/92 (28 May 2021 06:15) (112/69 - 142/83)  BP(mean): --  RR: 18 (28 May 2021 06:15) (18 - 18)  SpO2: 98% (28 May 2021 06:15) (96% - 98%)    LABS:                        12.6   7.76  )-----------( 176      ( 28 May 2021 06:48 )             37.1     05-27    134<L>  |  97<L>  |  15  ----------------------------<  192<H>  3.9   |  24  |  0.97    Ca    8.1<L>      27 May 2021 06:56  Phos  3.0     05-27  Mg     2.1     05-27      PT/INR - ( 28 May 2021 06:48 )   PT: 13.4 sec;   INR: 1.17 ratio         PTT - ( 28 May 2021 06:48 )  PTT:29.6 sec    CAPILLARY BLOOD GLUCOSE  232 (27 May 2021 17:30)      POCT Blood Glucose.: 247 mg/dL (28 May 2021 05:30)  POCT Blood Glucose.: 176 mg/dL (27 May 2021 22:52)  POCT Blood Glucose.: 203 mg/dL (27 May 2021 20:41)  POCT Blood Glucose.: 232 mg/dL (27 May 2021 17:20)  POCT Blood Glucose.: 126 mg/dL (27 May 2021 12:33)  POCT Blood Glucose.: 193 mg/dL (27 May 2021 08:28)      RADIOLOGY & ADDITIONAL TESTS:

## 2021-05-28 NOTE — PROGRESS NOTE ADULT - PROBLEM SELECTOR PLAN 3
s/p stent   - No active chest pain   - Attempted to check EKG but patient refused multiple times   - C/w ASA, plavix and statin

## 2021-05-28 NOTE — DISCHARGE NOTE PROVIDER - NSDCFUADDINST_GEN_ALL_CORE_FT
Podiatry Discharge Instructions:  Follow up: Please follow up with Dr. Gavin Lott within 1 week of discharge from the hospital, please call [phone] for appointment and discuss that you recently were seen in the hospital.  Wound Care: Please leave your dressing clean dry intact until your follow up appointment.  Weight bearing: Please be non weight bearing to right lower extremity with CAM boot and crutches  Antibiotics: Please continue as instructed.

## 2021-05-29 LAB
ANION GAP SERPL CALC-SCNC: 13 MMOL/L — SIGNIFICANT CHANGE UP (ref 7–14)
BUN SERPL-MCNC: 17 MG/DL — SIGNIFICANT CHANGE UP (ref 7–23)
CALCIUM SERPL-MCNC: 8.9 MG/DL — SIGNIFICANT CHANGE UP (ref 8.4–10.5)
CHLORIDE SERPL-SCNC: 98 MMOL/L — SIGNIFICANT CHANGE UP (ref 98–107)
CO2 SERPL-SCNC: 23 MMOL/L — SIGNIFICANT CHANGE UP (ref 22–31)
CREAT SERPL-MCNC: 1.08 MG/DL — SIGNIFICANT CHANGE UP (ref 0.5–1.3)
GLUCOSE BLDC GLUCOMTR-MCNC: 123 MG/DL — HIGH (ref 70–99)
GLUCOSE BLDC GLUCOMTR-MCNC: 147 MG/DL — HIGH (ref 70–99)
GLUCOSE BLDC GLUCOMTR-MCNC: 155 MG/DL — HIGH (ref 70–99)
GLUCOSE BLDC GLUCOMTR-MCNC: 220 MG/DL — HIGH (ref 70–99)
GLUCOSE BLDC GLUCOMTR-MCNC: 224 MG/DL — HIGH (ref 70–99)
GLUCOSE BLDC GLUCOMTR-MCNC: 271 MG/DL — HIGH (ref 70–99)
GLUCOSE BLDC GLUCOMTR-MCNC: 286 MG/DL — HIGH (ref 70–99)
GLUCOSE SERPL-MCNC: 222 MG/DL — HIGH (ref 70–99)
HCT VFR BLD CALC: 33.1 % — LOW (ref 39–50)
HGB BLD-MCNC: 11.2 G/DL — LOW (ref 13–17)
MCHC RBC-ENTMCNC: 29.5 PG — SIGNIFICANT CHANGE UP (ref 27–34)
MCHC RBC-ENTMCNC: 33.8 GM/DL — SIGNIFICANT CHANGE UP (ref 32–36)
MCV RBC AUTO: 87.1 FL — SIGNIFICANT CHANGE UP (ref 80–100)
NIGHT BLUE STAIN TISS: SIGNIFICANT CHANGE UP
NRBC # BLD: 0 /100 WBCS — SIGNIFICANT CHANGE UP
NRBC # FLD: 0 K/UL — SIGNIFICANT CHANGE UP
PLATELET # BLD AUTO: 191 K/UL — SIGNIFICANT CHANGE UP (ref 150–400)
POTASSIUM SERPL-MCNC: 4.2 MMOL/L — SIGNIFICANT CHANGE UP (ref 3.5–5.3)
POTASSIUM SERPL-SCNC: 4.2 MMOL/L — SIGNIFICANT CHANGE UP (ref 3.5–5.3)
RBC # BLD: 3.8 M/UL — LOW (ref 4.2–5.8)
RBC # FLD: 12.5 % — SIGNIFICANT CHANGE UP (ref 10.3–14.5)
SODIUM SERPL-SCNC: 134 MMOL/L — LOW (ref 135–145)
SPECIMEN SOURCE: SIGNIFICANT CHANGE UP
VANCOMYCIN TROUGH SERPL-MCNC: 31.5 UG/ML — CRITICAL HIGH (ref 10–20)
WBC # BLD: 8.39 K/UL — SIGNIFICANT CHANGE UP (ref 3.8–10.5)
WBC # FLD AUTO: 8.39 K/UL — SIGNIFICANT CHANGE UP (ref 3.8–10.5)

## 2021-05-29 PROCEDURE — 99233 SBSQ HOSP IP/OBS HIGH 50: CPT

## 2021-05-29 RX ORDER — OXYCODONE HYDROCHLORIDE 5 MG/1
5 TABLET ORAL EVERY 4 HOURS
Refills: 0 | Status: DISCONTINUED | OUTPATIENT
Start: 2021-05-29 | End: 2021-06-04

## 2021-05-29 RX ORDER — CLOPIDOGREL BISULFATE 75 MG/1
75 TABLET, FILM COATED ORAL DAILY
Refills: 0 | Status: DISCONTINUED | OUTPATIENT
Start: 2021-05-29 | End: 2021-06-04

## 2021-05-29 RX ORDER — INSULIN GLARGINE 100 [IU]/ML
35 INJECTION, SOLUTION SUBCUTANEOUS ONCE
Refills: 0 | Status: COMPLETED | OUTPATIENT
Start: 2021-05-29 | End: 2021-05-29

## 2021-05-29 RX ORDER — ENOXAPARIN SODIUM 100 MG/ML
40 INJECTION SUBCUTANEOUS EVERY 24 HOURS
Refills: 0 | Status: DISCONTINUED | OUTPATIENT
Start: 2021-05-29 | End: 2021-06-02

## 2021-05-29 RX ORDER — ONDANSETRON 8 MG/1
4 TABLET, FILM COATED ORAL ONCE
Refills: 0 | Status: COMPLETED | OUTPATIENT
Start: 2021-05-29 | End: 2021-05-29

## 2021-05-29 RX ORDER — OXYCODONE HYDROCHLORIDE 5 MG/1
10 TABLET ORAL EVERY 4 HOURS
Refills: 0 | Status: DISCONTINUED | OUTPATIENT
Start: 2021-05-29 | End: 2021-06-04

## 2021-05-29 RX ORDER — INSULIN LISPRO 100/ML
2 VIAL (ML) SUBCUTANEOUS ONCE
Refills: 0 | Status: COMPLETED | OUTPATIENT
Start: 2021-05-29 | End: 2021-05-29

## 2021-05-29 RX ADMIN — OXYCODONE HYDROCHLORIDE 10 MILLIGRAM(S): 5 TABLET ORAL at 11:17

## 2021-05-29 RX ADMIN — PIPERACILLIN AND TAZOBACTAM 25 GRAM(S): 4; .5 INJECTION, POWDER, LYOPHILIZED, FOR SOLUTION INTRAVENOUS at 10:24

## 2021-05-29 RX ADMIN — OXYCODONE HYDROCHLORIDE 10 MILLIGRAM(S): 5 TABLET ORAL at 06:05

## 2021-05-29 RX ADMIN — Medication 300 MILLIGRAM(S): at 05:11

## 2021-05-29 RX ADMIN — ONDANSETRON 4 MILLIGRAM(S): 8 TABLET, FILM COATED ORAL at 23:36

## 2021-05-29 RX ADMIN — OXYCODONE HYDROCHLORIDE 10 MILLIGRAM(S): 5 TABLET ORAL at 16:15

## 2021-05-29 RX ADMIN — PIPERACILLIN AND TAZOBACTAM 25 GRAM(S): 4; .5 INJECTION, POWDER, LYOPHILIZED, FOR SOLUTION INTRAVENOUS at 17:37

## 2021-05-29 RX ADMIN — ENOXAPARIN SODIUM 40 MILLIGRAM(S): 100 INJECTION SUBCUTANEOUS at 15:29

## 2021-05-29 RX ADMIN — OXYCODONE HYDROCHLORIDE 10 MILLIGRAM(S): 5 TABLET ORAL at 10:24

## 2021-05-29 RX ADMIN — Medication 1 APPLICATION(S): at 12:34

## 2021-05-29 RX ADMIN — INSULIN GLARGINE 44 UNIT(S): 100 INJECTION, SOLUTION SUBCUTANEOUS at 00:13

## 2021-05-29 RX ADMIN — LISINOPRIL 40 MILLIGRAM(S): 2.5 TABLET ORAL at 05:11

## 2021-05-29 RX ADMIN — Medication 2: at 00:14

## 2021-05-29 RX ADMIN — Medication 4 UNIT(S): at 12:34

## 2021-05-29 RX ADMIN — OXYCODONE HYDROCHLORIDE 10 MILLIGRAM(S): 5 TABLET ORAL at 20:15

## 2021-05-29 RX ADMIN — Medication 2 UNIT(S): at 02:14

## 2021-05-29 RX ADMIN — OXYCODONE HYDROCHLORIDE 10 MILLIGRAM(S): 5 TABLET ORAL at 06:35

## 2021-05-29 RX ADMIN — OXYCODONE HYDROCHLORIDE 10 MILLIGRAM(S): 5 TABLET ORAL at 15:29

## 2021-05-29 RX ADMIN — Medication 4 UNIT(S): at 09:33

## 2021-05-29 RX ADMIN — Medication 4 UNIT(S): at 17:37

## 2021-05-29 RX ADMIN — OXYCODONE HYDROCHLORIDE 10 MILLIGRAM(S): 5 TABLET ORAL at 19:32

## 2021-05-29 RX ADMIN — OXYCODONE HYDROCHLORIDE 10 MILLIGRAM(S): 5 TABLET ORAL at 01:59

## 2021-05-29 RX ADMIN — Medication 81 MILLIGRAM(S): at 12:34

## 2021-05-29 RX ADMIN — OXYCODONE HYDROCHLORIDE 10 MILLIGRAM(S): 5 TABLET ORAL at 23:36

## 2021-05-29 RX ADMIN — PIPERACILLIN AND TAZOBACTAM 25 GRAM(S): 4; .5 INJECTION, POWDER, LYOPHILIZED, FOR SOLUTION INTRAVENOUS at 01:59

## 2021-05-29 RX ADMIN — OXYCODONE HYDROCHLORIDE 10 MILLIGRAM(S): 5 TABLET ORAL at 02:29

## 2021-05-29 RX ADMIN — CLOPIDOGREL BISULFATE 75 MILLIGRAM(S): 75 TABLET, FILM COATED ORAL at 15:29

## 2021-05-29 NOTE — PROGRESS NOTE ADULT - PROBLEM SELECTOR PLAN 6
DVT ppx: on hold for OR  Dispo: pending hospital course, PT eval after OR     Discussed with ACP Beth

## 2021-05-29 NOTE — PROGRESS NOTE ADULT - ASSESSMENT
49 yo M pt w/ b/l foot wounds, s/p RF partial 2nd ray resection w ADDISON and free flap open medial (DOS 5/28/21)  - pt seen and evaluated  - Afebrile, no leukocytosis   - s/p RF partial 2nd ray resection w ADDISON and free flap open medial (DOS 5/28/21): surgical sites well coapted, sutures intact, no dehiscence, flap warm and viable, no signs of infection, packing removed and applied DSD   -as per introp findings low concern for residual bone infection, moderate concern for flap viability  -pt to stay non WB to RLE in a CAM boot  -prelim bone culture neg  -stable for discharge on PO Augmentin 1 x 1week, pending negative clean bone culture x 2 days  - discussed w/ attending

## 2021-05-29 NOTE — CHART NOTE - NSCHARTNOTEFT_GEN_A_CORE
Notified by RN patient  due for 44U of lantus. WIll reduce bedtime dose to 80% = Lantus 35 U x 1 tonight and monitor FS closely. Notified by RN patient  due for 44U of lantus. WIll reduce bedtime dose to 80% = Lantus 35 U x 1 tonight and monitor FS closely.    Also, patient refusing nighttime vitals. Discussed with RN to reattempt when patient more cooperative.

## 2021-05-29 NOTE — PROGRESS NOTE ADULT - SUBJECTIVE AND OBJECTIVE BOX
Podiatry pager #: [phone] ([place])/ [phone] ([facility])    Patient is a 50y old  Male who presents with a chief complaint of foot infection (28 May 2021 12:22)       INTERVAL HPI/OVERNIGHT EVENTS:  Patient seen and evaluated at bedside.  Pt is resting comfortable in NAD. Denies N/V/F/C.     Allergies    No Known Allergies    Intolerances        Vital Signs Last 24 Hrs  T(C): 36.8 (29 May 2021 05:05), Max: 36.9 (28 May 2021 13:25)  T(F): 98.2 (29 May 2021 05:05), Max: 98.5 (28 May 2021 13:25)  HR: 79 (29 May 2021 05:05) (64 - 88)  BP: 113/65 (29 May 2021 05:05) (95/59 - 121/72)  BP(mean): 78 (28 May 2021 16:15) (59 - 78)  RR: 18 (29 May 2021 05:05) (13 - 19)  SpO2: 94% (29 May 2021 05:05) (93% - 98%)    LABS:                        11.2   8.39  )-----------( 191      ( 29 May 2021 07:29 )             33.1     05-29    134<L>  |  98  |  17  ----------------------------<  222<H>  4.2   |  23  |  1.08    Ca    8.9      29 May 2021 07:29  Phos  3.3     05-28  Mg     2.0     05-28      PT/INR - ( 28 May 2021 06:48 )   PT: 13.4 sec;   INR: 1.17 ratio         PTT - ( 28 May 2021 06:48 )  PTT:29.6 sec    CAPILLARY BLOOD GLUCOSE      POCT Blood Glucose.: 220 mg/dL (29 May 2021 08:32)  POCT Blood Glucose.: 224 mg/dL (29 May 2021 05:04)  POCT Blood Glucose.: 271 mg/dL (29 May 2021 02:03)  POCT Blood Glucose.: 286 mg/dL (29 May 2021 00:12)  POCT Blood Glucose.: 300 mg/dL (28 May 2021 22:38)  POCT Blood Glucose.: 300 mg/dL (28 May 2021 21:23)  POCT Blood Glucose.: 197 mg/dL (28 May 2021 17:31)  POCT Blood Glucose.: 143 mg/dL (28 May 2021 15:57)  POCT Blood Glucose.: 175 mg/dL (28 May 2021 12:08)      Lower Extremity Physical Exam:  Vascular:  DP non palpable b/l, PT 2/4 Left foot + non palpable RF,  CFT < 3seconds B/L, Temperature gradient warm to warm Right foot, warm to cool L foot  Neuro: Epicritic sensation diminished to the level of toes B/L  Musculoskeletal/Ortho: s/p R 3rd and 4th ray partial resections  Skin: R foot hemorrhagic blister on submet 1,R submet 2 wound probing to bone and tracking distally towards 1st interspace and proximally towards midfoot along FDL, erythema streaking along medial aspect of R foot, R foot warm to touch and diffusely edematous, w/ strong malodor; L foot submet4 wound down to bone w/ no signs of active infection     5/27: RF wound stable today, no further purulence expressed, minimal malodor, improved erythema. LF submet 4 wound w/ 1cc of purulence expressed no periwound erythema, no malodor.   s/p RF partial 2nd ray resection w ADDISON and free flap open medial (DOS 5/28/21): surgical sites well coapted, sutures intact, no dehiscence, flap warm and viable, no signs of infection     RADIOLOGY & ADDITIONAL TESTS:

## 2021-05-29 NOTE — PROGRESS NOTE ADULT - SUBJECTIVE AND OBJECTIVE BOX
[facility] Division of Hospital Medicine  Leah Newell MD  Pager: [phone]      Patient is a 50y old  Male who presents with a chief complaint of foot infection (28 May 2021 12:22)      SUBJECTIVE / OVERNIGHT EVENTS: overnight events noted. Patient states that pain is better this AM after he gets the Oxy 10mg, the 5mg was not helping at all. Discussed we will space out dosing tomorrow. Reports pain is in his tendon.     MEDICATIONS  (STANDING):  aspirin enteric coated 81 milliGRAM(s) Oral daily  atorvastatin 80 milliGRAM(s) Oral at bedtime  clopidogrel Tablet 75 milliGRAM(s) Oral daily  collagenase Ointment 1 Application(s) Topical daily  enoxaparin Injectable 40 milliGRAM(s) SubCutaneous every 24 hours  insulin glargine Injectable (LANTUS) 44 Unit(s) SubCutaneous at bedtime  insulin lispro (ADMELOG) corrective regimen sliding scale   SubCutaneous at bedtime  insulin lispro Injectable (ADMELOG) 4 Unit(s) SubCutaneous three times a day before meals  lisinopril 40 milliGRAM(s) Oral daily  piperacillin/tazobactam IVPB.. 3.375 Gram(s) IV Intermittent every 8 hours    MEDICATIONS  (PRN):  acetaminophen   Tablet .. 650 milliGRAM(s) Oral every 6 hours PRN Mild Pain (1 - 3)  melatonin 6 milliGRAM(s) Oral at bedtime PRN Insomnia  oxyCODONE    IR 5 milliGRAM(s) Oral every 4 hours PRN Moderate Pain (4 - 6)  oxyCODONE    IR 10 milliGRAM(s) Oral every 4 hours PRN Severe Pain (7 - 10)      CAPILLARY BLOOD GLUCOSE      POCT Blood Glucose.: 147 mg/dL (29 May 2021 11:56)  POCT Blood Glucose.: 220 mg/dL (29 May 2021 08:32)  POCT Blood Glucose.: 224 mg/dL (29 May 2021 05:04)  POCT Blood Glucose.: 271 mg/dL (29 May 2021 02:03)  POCT Blood Glucose.: 286 mg/dL (29 May 2021 00:12)  POCT Blood Glucose.: 300 mg/dL (28 May 2021 22:38)  POCT Blood Glucose.: 300 mg/dL (28 May 2021 21:23)  POCT Blood Glucose.: 197 mg/dL (28 May 2021 17:31)  POCT Blood Glucose.: 143 mg/dL (28 May 2021 15:57)    I&O's Summary    28 May 2021 07:01  -  29 May 2021 07:00  --------------------------------------------------------  IN: 1540 mL / OUT: 600 mL / NET: 940 mL    29 May 2021 07:01  -  29 May 2021 14:15  --------------------------------------------------------  IN: 100 mL / OUT: 600 mL / NET: -500 mL        PHYSICAL EXAM:  Vital Signs Last 24 Hrs  T(C): 36.8 (29 May 2021 05:05), Max: 36.8 (29 May 2021 05:05)  T(F): 98.2 (29 May 2021 05:05), Max: 98.2 (29 May 2021 05:05)  HR: 79 (29 May 2021 05:05) (64 - 79)  BP: 113/65 (29 May 2021 05:05) (95/59 - 120/77)  BP(mean): 78 (28 May 2021 16:15) (59 - 78)  RR: 18 (29 May 2021 05:05) (13 - 19)  SpO2: 94% (29 May 2021 05:05) (94% - 98%)    CONSTITUTIONAL: NAD, well-developed, well-groomed  ENMT: Moist oral mucosa  RESPIRATORY: Normal respiratory effort; lungs are clear to auscultation bilaterally  CARDIOVASCULAR:  S1/S2; No lower extremity edema  ABDOMEN: Nontender to palpation, normoactive bowel sounds, no rebound/guarding  MUSCULOSKELETAL: R foot bandaged and elevated   PSYCH: A+O to person, place, and time  NEUROLOGY: no focal deficits        LABS:                        11.2   8.39  )-----------( 191      ( 29 May 2021 07:29 )             33.1     05-29    134<L>  |  98  |  17  ----------------------------<  222<H>  4.2   |  23  |  1.08    Ca    8.9      29 May 2021 07:29  Phos  3.3     05-28  Mg     2.0     05-28      PT/INR - ( 28 May 2021 06:48 )   PT: 13.4 sec;   INR: 1.17 ratio         PTT - ( 28 May 2021 06:48 )  PTT:29.6 sec          Culture - Acid Fast - Tissue w/Smear (collected 28 May 2021 18:13)  Source: .Tissue BONE MARGIN OF RESECTION 2ND METATARSAL RIGHT FOOT    Culture - Tissue with Gram Stain (collected 28 May 2021 18:13)  Source: .Tissue BONE MARGIN OF RESECTION 2ND METATARSAL RIGHT FOOT  Gram Stain (28 May 2021 23:13):    No polymorphonuclear cells seen    No organisms seen        RADIOLOGY & ADDITIONAL TESTS:  Results Reviewed:   Imaging Personally Reviewed:  Electrocardiogram Personally Reviewed:    COORDINATION OF CARE:  Care Discussed with Consultants/Other Providers [Y/N]:  Prior or Outpatient Records Reviewed [Y/N]:

## 2021-05-29 NOTE — PROGRESS NOTE ADULT - PROBLEM SELECTOR PLAN 1
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 - Podiatry recs appreciated, s/p RF partial 2nd ray resection w ADDISON and free flap open medial (5/28/21)  - Per podiatry, low concern for residual bone infection, moderate concern for flap viability  - NWB to RLE in a CAM boot  - Prelim bone culture neg  - If clean bone cx s2 days, then can discharge on PO Augmentin 1 x 1week  - C/w zosyn for now and DC vanco as abscess cx w/ strep   - BCx NGTD, wound cultures w/ mod strep and coag neg staph

## 2021-05-30 DIAGNOSIS — N17.9 ACUTE KIDNEY FAILURE, UNSPECIFIED: ICD-10-CM

## 2021-05-30 LAB
ANION GAP SERPL CALC-SCNC: 15 MMOL/L — HIGH (ref 7–14)
ANION GAP SERPL CALC-SCNC: 17 MMOL/L — HIGH (ref 7–14)
APPEARANCE UR: ABNORMAL
BACTERIA # UR AUTO: NEGATIVE — SIGNIFICANT CHANGE UP
BILIRUB UR-MCNC: NEGATIVE — SIGNIFICANT CHANGE UP
BUN SERPL-MCNC: 34 MG/DL — HIGH (ref 7–23)
BUN SERPL-MCNC: 36 MG/DL — HIGH (ref 7–23)
CALCIUM SERPL-MCNC: 8.9 MG/DL — SIGNIFICANT CHANGE UP (ref 8.4–10.5)
CALCIUM SERPL-MCNC: 9 MG/DL — SIGNIFICANT CHANGE UP (ref 8.4–10.5)
CHLORIDE SERPL-SCNC: 95 MMOL/L — LOW (ref 98–107)
CHLORIDE SERPL-SCNC: 95 MMOL/L — LOW (ref 98–107)
CHLORIDE UR-SCNC: <20 MMOL/L — SIGNIFICANT CHANGE UP
CO2 SERPL-SCNC: 21 MMOL/L — LOW (ref 22–31)
CO2 SERPL-SCNC: 23 MMOL/L — SIGNIFICANT CHANGE UP (ref 22–31)
COLOR SPEC: YELLOW — SIGNIFICANT CHANGE UP
CREAT ?TM UR-MCNC: 117 MG/DL — SIGNIFICANT CHANGE UP
CREAT SERPL-MCNC: 3.79 MG/DL — HIGH (ref 0.5–1.3)
CREAT SERPL-MCNC: 4.18 MG/DL — HIGH (ref 0.5–1.3)
DIFF PNL FLD: ABNORMAL
EPI CELLS # UR: 2 /HPF — SIGNIFICANT CHANGE UP (ref 0–5)
GLUCOSE BLDC GLUCOMTR-MCNC: 100 MG/DL — HIGH (ref 70–99)
GLUCOSE BLDC GLUCOMTR-MCNC: 105 MG/DL — HIGH (ref 70–99)
GLUCOSE BLDC GLUCOMTR-MCNC: 185 MG/DL — HIGH (ref 70–99)
GLUCOSE BLDC GLUCOMTR-MCNC: 99 MG/DL — SIGNIFICANT CHANGE UP (ref 70–99)
GLUCOSE SERPL-MCNC: 104 MG/DL — HIGH (ref 70–99)
GLUCOSE SERPL-MCNC: 106 MG/DL — HIGH (ref 70–99)
GLUCOSE UR QL: NEGATIVE — SIGNIFICANT CHANGE UP
HCT VFR BLD CALC: 32.6 % — LOW (ref 39–50)
HGB BLD-MCNC: 10.7 G/DL — LOW (ref 13–17)
HYALINE CASTS # UR AUTO: 1 /LPF — SIGNIFICANT CHANGE UP (ref 0–7)
KETONES UR-MCNC: NEGATIVE — SIGNIFICANT CHANGE UP
LEUKOCYTE ESTERASE UR-ACNC: NEGATIVE — SIGNIFICANT CHANGE UP
MAGNESIUM SERPL-MCNC: 2.1 MG/DL — SIGNIFICANT CHANGE UP (ref 1.6–2.6)
MAGNESIUM SERPL-MCNC: 2.1 MG/DL — SIGNIFICANT CHANGE UP (ref 1.6–2.6)
MCHC RBC-ENTMCNC: 29.2 PG — SIGNIFICANT CHANGE UP (ref 27–34)
MCHC RBC-ENTMCNC: 32.8 GM/DL — SIGNIFICANT CHANGE UP (ref 32–36)
MCV RBC AUTO: 89.1 FL — SIGNIFICANT CHANGE UP (ref 80–100)
NITRITE UR-MCNC: NEGATIVE — SIGNIFICANT CHANGE UP
NRBC # BLD: 0 /100 WBCS — SIGNIFICANT CHANGE UP
NRBC # FLD: 0 K/UL — SIGNIFICANT CHANGE UP
PH UR: 6 — SIGNIFICANT CHANGE UP (ref 5–8)
PHOSPHATE SERPL-MCNC: 5.8 MG/DL — HIGH (ref 2.5–4.5)
PHOSPHATE SERPL-MCNC: 5.8 MG/DL — HIGH (ref 2.5–4.5)
PLATELET # BLD AUTO: 203 K/UL — SIGNIFICANT CHANGE UP (ref 150–400)
POTASSIUM SERPL-MCNC: 4.2 MMOL/L — SIGNIFICANT CHANGE UP (ref 3.5–5.3)
POTASSIUM SERPL-MCNC: 4.3 MMOL/L — SIGNIFICANT CHANGE UP (ref 3.5–5.3)
POTASSIUM SERPL-SCNC: 4.2 MMOL/L — SIGNIFICANT CHANGE UP (ref 3.5–5.3)
POTASSIUM SERPL-SCNC: 4.3 MMOL/L — SIGNIFICANT CHANGE UP (ref 3.5–5.3)
POTASSIUM UR-SCNC: 49.9 MMOL/L — SIGNIFICANT CHANGE UP
PROT ?TM UR-MCNC: 77 MG/DL — SIGNIFICANT CHANGE UP
PROT UR-MCNC: ABNORMAL
RBC # BLD: 3.66 M/UL — LOW (ref 4.2–5.8)
RBC # FLD: 12.5 % — SIGNIFICANT CHANGE UP (ref 10.3–14.5)
RBC CASTS # UR COMP ASSIST: 2 /HPF — SIGNIFICANT CHANGE UP (ref 0–4)
SODIUM SERPL-SCNC: 133 MMOL/L — LOW (ref 135–145)
SODIUM SERPL-SCNC: 133 MMOL/L — LOW (ref 135–145)
SODIUM UR-SCNC: 28 MMOL/L — SIGNIFICANT CHANGE UP
SP GR SPEC: 1.02 — SIGNIFICANT CHANGE UP (ref 1.01–1.02)
UROBILINOGEN FLD QL: SIGNIFICANT CHANGE UP
WBC # BLD: 9.26 K/UL — SIGNIFICANT CHANGE UP (ref 3.8–10.5)
WBC # FLD AUTO: 9.26 K/UL — SIGNIFICANT CHANGE UP (ref 3.8–10.5)
WBC UR QL: 7 /HPF — HIGH (ref 0–5)

## 2021-05-30 PROCEDURE — 99233 SBSQ HOSP IP/OBS HIGH 50: CPT

## 2021-05-30 RX ORDER — SODIUM CHLORIDE 9 MG/ML
1000 INJECTION INTRAMUSCULAR; INTRAVENOUS; SUBCUTANEOUS
Refills: 0 | Status: DISCONTINUED | OUTPATIENT
Start: 2021-05-30 | End: 2021-05-31

## 2021-05-30 RX ORDER — INSULIN LISPRO 100/ML
VIAL (ML) SUBCUTANEOUS
Refills: 0 | Status: DISCONTINUED | OUTPATIENT
Start: 2021-05-30 | End: 2021-06-04

## 2021-05-30 RX ORDER — INSULIN LISPRO 100/ML
VIAL (ML) SUBCUTANEOUS AT BEDTIME
Refills: 0 | Status: DISCONTINUED | OUTPATIENT
Start: 2021-05-30 | End: 2021-06-04

## 2021-05-30 RX ADMIN — PIPERACILLIN AND TAZOBACTAM 25 GRAM(S): 4; .5 INJECTION, POWDER, LYOPHILIZED, FOR SOLUTION INTRAVENOUS at 17:40

## 2021-05-30 RX ADMIN — INSULIN GLARGINE 44 UNIT(S): 100 INJECTION, SOLUTION SUBCUTANEOUS at 21:51

## 2021-05-30 RX ADMIN — OXYCODONE HYDROCHLORIDE 10 MILLIGRAM(S): 5 TABLET ORAL at 11:52

## 2021-05-30 RX ADMIN — ATORVASTATIN CALCIUM 80 MILLIGRAM(S): 80 TABLET, FILM COATED ORAL at 21:51

## 2021-05-30 RX ADMIN — OXYCODONE HYDROCHLORIDE 10 MILLIGRAM(S): 5 TABLET ORAL at 17:50

## 2021-05-30 RX ADMIN — OXYCODONE HYDROCHLORIDE 10 MILLIGRAM(S): 5 TABLET ORAL at 21:51

## 2021-05-30 RX ADMIN — ENOXAPARIN SODIUM 40 MILLIGRAM(S): 100 INJECTION SUBCUTANEOUS at 17:40

## 2021-05-30 RX ADMIN — OXYCODONE HYDROCHLORIDE 10 MILLIGRAM(S): 5 TABLET ORAL at 18:30

## 2021-05-30 RX ADMIN — INSULIN GLARGINE 35 UNIT(S): 100 INJECTION, SOLUTION SUBCUTANEOUS at 00:12

## 2021-05-30 RX ADMIN — Medication 81 MILLIGRAM(S): at 11:52

## 2021-05-30 RX ADMIN — OXYCODONE HYDROCHLORIDE 10 MILLIGRAM(S): 5 TABLET ORAL at 08:45

## 2021-05-30 RX ADMIN — Medication 1 APPLICATION(S): at 11:52

## 2021-05-30 RX ADMIN — OXYCODONE HYDROCHLORIDE 10 MILLIGRAM(S): 5 TABLET ORAL at 22:21

## 2021-05-30 RX ADMIN — PIPERACILLIN AND TAZOBACTAM 25 GRAM(S): 4; .5 INJECTION, POWDER, LYOPHILIZED, FOR SOLUTION INTRAVENOUS at 02:10

## 2021-05-30 RX ADMIN — PIPERACILLIN AND TAZOBACTAM 25 GRAM(S): 4; .5 INJECTION, POWDER, LYOPHILIZED, FOR SOLUTION INTRAVENOUS at 10:19

## 2021-05-30 RX ADMIN — CLOPIDOGREL BISULFATE 75 MILLIGRAM(S): 75 TABLET, FILM COATED ORAL at 11:52

## 2021-05-30 RX ADMIN — OXYCODONE HYDROCHLORIDE 10 MILLIGRAM(S): 5 TABLET ORAL at 07:52

## 2021-05-30 RX ADMIN — Medication 4 UNIT(S): at 08:45

## 2021-05-30 RX ADMIN — OXYCODONE HYDROCHLORIDE 10 MILLIGRAM(S): 5 TABLET ORAL at 00:30

## 2021-05-30 RX ADMIN — OXYCODONE HYDROCHLORIDE 10 MILLIGRAM(S): 5 TABLET ORAL at 12:35

## 2021-05-30 RX ADMIN — Medication 30 MILLILITER(S): at 10:16

## 2021-05-30 RX ADMIN — OXYCODONE HYDROCHLORIDE 10 MILLIGRAM(S): 5 TABLET ORAL at 04:21

## 2021-05-30 RX ADMIN — OXYCODONE HYDROCHLORIDE 10 MILLIGRAM(S): 5 TABLET ORAL at 03:38

## 2021-05-30 NOTE — PHYSICAL THERAPY INITIAL EVALUATION ADULT - ASR EQUIP NEEDS DISCH PT EVAL
Patient adamantly refused administration of crutches, stating "This is stupid, I will just use my knee crutch when it gets here." Pt eventually agreeable to use rolling walker for PT evaluation and demonstrated safe ambulation with rolling walker./rolling walker (5 inch wheels)

## 2021-05-30 NOTE — PHYSICAL THERAPY INITIAL EVALUATION ADULT - PHYSICAL ASSIST/NONPHYSICAL ASSIST: GAIT, REHAB EVAL
verbal cues/nonverbal cues (demo/gestures)/1 person assist verbal cues/nonverbal cues (demo/gestures)

## 2021-05-30 NOTE — CONSULT NOTE ADULT - ASSESSMENT
Pt. with TEENA in setting of supratherapeutic vancomycin level, now likely with ATN  Pt. with TEENA in setting of supratherapeutic vancomycin level, and hypotension, now likely with ATN

## 2021-05-30 NOTE — PROGRESS NOTE ADULT - PROBLEM SELECTOR PLAN 7
DVT ppx: on hold for OR  Dispo: pending hospital course, PT eval after OR     Discussed with ACP Beth DVT ppx: lovenox 40  Dispo: pending hospital course, PT eval pending    Discussed with ACP Beth

## 2021-05-30 NOTE — CHART NOTE - NSCHARTNOTEFT_GEN_A_CORE
Spoke to Nephro regarding TEENA; recommends to start NS @ 75 cc/hour x 12 hours.   Will continue to monitor.     MICKIE Odonnell  Department of Medicine   In House # [id number]

## 2021-05-30 NOTE — PROGRESS NOTE ADULT - ASSESSMENT
49 yo M pt w/ b/l foot wounds, s/p RF partial 2nd ray resection w ADDISON and free flap open medial (DOS 5/28/21)  - pt seen and evaluated  - Afebrile, no leukocytosis   - s/p RF partial 2nd ray resection w ADDISON and free flap open medial (DOS 5/28/21): surgical sites well coapted, sutures intact, no dehiscence, flap warm and viable, no signs of infection, packing removed and applied DSD   -as per intra op findings low concern for residual bone infection, moderate concern for flap viability  -pt to stay non WB to RLE in a CAM boot  - CAM boot ordered from [facility], to be delivered   - prelim bone culture neg  -stable for discharge on PO Augmentin 1 x 1week, pending negative clean bone culture x 3 days  - discussed w/ attending

## 2021-05-30 NOTE — PROGRESS NOTE ADULT - SUBJECTIVE AND OBJECTIVE BOX
Hospitalist Progress Note  Lexi Dang MD Pager # [phone]    OVERNIGHT EVENTS: AUDREY    SUBJECTIVE / INTERVAL HPI: Patient seen and examined at bedside. Patient is unhappy that he has not had physical therapy yet. He had a stomach ache this morning which he requested pepto bismol for. No nausea/vomiting. Has not had a BM in a few days. All other ROS negative.     VITAL SIGNS:  Vital Signs Last 24 Hrs  T(C): 36.8 (30 May 2021 06:38), Max: 37.6 (29 May 2021 17:30)  T(F): 98.2 (30 May 2021 06:38), Max: 99.7 (29 May 2021 17:30)  HR: 69 (30 May 2021 06:38) (69 - 86)  BP: 105/62 (30 May 2021 06:38) (100/59 - 115/71)  BP(mean): --  RR: 18 (30 May 2021 06:38) (18 - 18)  SpO2: 96% (30 May 2021 06:38) (94% - 96%)    PHYSICAL EXAM:    General: WDWN male resting in bed   HEENT: NC/AT; PERRL, anicteric sclera; MMM  Neck: supple  Cardiovascular: +S1/S2; RRR  Respiratory: CTA B/L; no W/R/R  Gastrointestinal: soft, NT/ND; +BSx4  Extremities: WWP; no edema, clubbing or cyanosis. Right foot wrapped.   Vascular: 2+ radial, DP/PT pulses B/L  Neurological: AAOx3; no focal deficits    MEDICATIONS:  MEDICATIONS  (STANDING):  aspirin enteric coated 81 milliGRAM(s) Oral daily  atorvastatin 80 milliGRAM(s) Oral at bedtime  clopidogrel Tablet 75 milliGRAM(s) Oral daily  collagenase Ointment 1 Application(s) Topical daily  enoxaparin Injectable 40 milliGRAM(s) SubCutaneous every 24 hours  insulin glargine Injectable (LANTUS) 44 Unit(s) SubCutaneous at bedtime  insulin lispro (ADMELOG) corrective regimen sliding scale   SubCutaneous at bedtime  insulin lispro Injectable (ADMELOG) 4 Unit(s) SubCutaneous three times a day before meals  lisinopril 40 milliGRAM(s) Oral daily  piperacillin/tazobactam IVPB.. 3.375 Gram(s) IV Intermittent every 8 hours    MEDICATIONS  (PRN):  acetaminophen   Tablet .. 650 milliGRAM(s) Oral every 6 hours PRN Mild Pain (1 - 3)  melatonin 6 milliGRAM(s) Oral at bedtime PRN Insomnia  oxyCODONE    IR 5 milliGRAM(s) Oral every 4 hours PRN Moderate Pain (4 - 6)  oxyCODONE    IR 10 milliGRAM(s) Oral every 4 hours PRN Severe Pain (7 - 10)      ALLERGIES:  Allergies    No Known Allergies    Intolerances        LABS:                        10.7   9.26  )-----------( 203      ( 30 May 2021 07:51 )             32.6     05-30    133<L>  |  95<L>  |  36<H>  ----------------------------<  104<H>  4.3   |  21<L>  |  4.18<H>    Ca    8.9      30 May 2021 11:07  Phos  5.8     05-30  Mg     2.1     05-30          CAPILLARY BLOOD GLUCOSE      POCT Blood Glucose.: 100 mg/dL (30 May 2021 12:33)      RADIOLOGY & ADDITIONAL TESTS: Reviewed.    ASSESSMENT:    PLAN:

## 2021-05-30 NOTE — PROGRESS NOTE ADULT - SUBJECTIVE AND OBJECTIVE BOX
Podiatry pager #: [phone] ([place])/ [phone] ([facility])    Patient is a 50y old  Male who presents with a chief complaint of foot infection (29 May 2021 14:14)       INTERVAL HPI/OVERNIGHT EVENTS:  Patient seen and evaluated at bedside.  Pt is resting comfortable in NAD. Denies N/V/F/C.     Allergies    No Known Allergies    Intolerances        Vital Signs Last 24 Hrs  T(C): 36.8 (30 May 2021 06:38), Max: 37.6 (29 May 2021 17:30)  T(F): 98.2 (30 May 2021 06:38), Max: 99.7 (29 May 2021 17:30)  HR: 69 (30 May 2021 06:38) (69 - 86)  BP: 105/62 (30 May 2021 06:38) (100/59 - 115/71)  BP(mean): --  RR: 18 (30 May 2021 06:38) (18 - 18)  SpO2: 96% (30 May 2021 06:38) (94% - 96%)    LABS:                        10.7   9.26  )-----------( 203      ( 30 May 2021 07:51 )             32.6     05-29    134<L>  |  98  |  17  ----------------------------<  222<H>  4.2   |  23  |  1.08    Ca    8.9      29 May 2021 07:29          CAPILLARY BLOOD GLUCOSE      POCT Blood Glucose.: 105 mg/dL (30 May 2021 07:56)  POCT Blood Glucose.: 123 mg/dL (29 May 2021 22:40)  POCT Blood Glucose.: 155 mg/dL (29 May 2021 17:29)  POCT Blood Glucose.: 147 mg/dL (29 May 2021 11:56)  POCT Blood Glucose.: 220 mg/dL (29 May 2021 08:32)      Lower Extremity Physical Exam:  Lower Extremity Physical Exam:  Vascular:  DP non palpable b/l, PT 2/4 Left foot + non palpable RF,  CFT < 3seconds B/L, Temperature gradient warm to warm Right foot, warm to cool L foot  Neuro: Epicritic sensation diminished to the level of toes B/L  Musculoskeletal/Ortho: s/p R 3rd and 4th ray partial resections  Skin: R foot hemorrhagic blister on submet 1,R submet 2 wound probing to bone and tracking distally towards 1st interspace and proximally towards midfoot along FDL, erythema streaking along medial aspect of R foot, R foot warm to touch and diffusely edematous, w/ strong malodor; L foot submet 4 wound down to bone w/ no signs of active infection     5/27: RF wound stable today, no further purulence expressed, minimal malodor, improved erythema. LF submet 4 wound w/ 1cc of purulence expressed no periwound erythema, no malodor.     s/p RF partial 2nd ray resection w ADDISON and free flap open medial (DOS 5/28/21): surgical sites well coapted, sutures intact, no dehiscence, flap warm and viable, no signs of infection

## 2021-05-30 NOTE — PROGRESS NOTE ADULT - PROBLEM SELECTOR PLAN 3
poorly controlled however sugards are stable on the current regimen.   - A1c = 9.8   - C/w lantus to 44 + admelog 4 TID + ISS  - Monitor FS and adjust regimen as necessary   - Consider endo eval if FS are difficult to control

## 2021-05-30 NOTE — CONSULT NOTE ADULT - SUBJECTIVE AND OBJECTIVE BOX
[facility] DIVISION OF KIDNEY DISEASES AND HYPERTENSION -- [phone]  -- INITIAL CONSULT NOTE  --------------------------------------------------------------------------------  HPI: Patient is a 51 y/o M w PMH of DM2, CAD s/p stents and diabetic foot ulcers presented to [facility] for worsening R foot ulcer and fever. Admitted for suspected sepsis and possible OM. S/p R foot partial 2nd ray resection on 5/28/21. Nephrology consulted for TEENA. On review of labs on [facility] HIE/[place], patient noted to have Scr of 1.08 on 5/28/21. Scr increased to 3.79 this morning, and further increased to 4.18 later today.     Patient was seen and examined at bedside. Denies CP, SOB, fever, chills, nausea, vomiting, diarrhea, LE edema or dysuria.    PAST HISTORY  --------------------------------------------------------------------------------  PAST MEDICAL & SURGICAL HISTORY:  CAD (coronary artery disease)    Diabetes    No significant past surgical history      FAMILY HISTORY:  No pertinent family history in first degree relatives      PAST SOCIAL HISTORY:    ALLERGIES & MEDICATIONS  --------------------------------------------------------------------------------  Allergies    No Known Allergies    Intolerances    Standing Inpatient Medications  aspirin enteric coated 81 milliGRAM(s) Oral daily  atorvastatin 80 milliGRAM(s) Oral at bedtime  clopidogrel Tablet 75 milliGRAM(s) Oral daily  collagenase Ointment 1 Application(s) Topical daily  enoxaparin Injectable 40 milliGRAM(s) SubCutaneous every 24 hours  insulin glargine Injectable (LANTUS) 44 Unit(s) SubCutaneous at bedtime  insulin lispro (ADMELOG) corrective regimen sliding scale   SubCutaneous at bedtime  insulin lispro Injectable (ADMELOG) 4 Unit(s) SubCutaneous three times a day before meals  piperacillin/tazobactam IVPB.. 3.375 Gram(s) IV Intermittent every 8 hours    PRN Inpatient Medications  acetaminophen   Tablet .. 650 milliGRAM(s) Oral every 6 hours PRN  melatonin 6 milliGRAM(s) Oral at bedtime PRN  oxyCODONE    IR 5 milliGRAM(s) Oral every 4 hours PRN  oxyCODONE    IR 10 milliGRAM(s) Oral every 4 hours PRN    REVIEW OF SYSTEMS  --------------------------------------------------------------------------------  Gen: No fevers/chills  Respiratory: No dyspnea, cough  CV: No chest pain  GI: No abdominal pain, diarrhea  : No dysuria, hematuria  MSK: No  edema    All other systems were reviewed and are negative, except as noted.    VITALS/PHYSICAL EXAM  --------------------------------------------------------------------------------  T(C): 36.8 (05-30-21 @ 06:38), Max: 37.6 (05-29-21 @ 17:30)  HR: 69 (05-30-21 @ 06:38) (69 - 86)  BP: 105/62 (05-30-21 @ 06:38) (100/59 - 115/71)  RR: 18 (05-30-21 @ 06:38) (18 - 18)  SpO2: 96% (05-30-21 @ 06:38) (94% - 96%)  Wt(kg): --    05-29-21 @ 07:01  -  05-30-21 @ 07:00  --------------------------------------------------------  IN: 200 mL / OUT: 1200 mL / NET: -1000 mL    05-30-21 @ 07:01  -  05-30-21 @ 14:17  --------------------------------------------------------  IN: 100 mL / OUT: 400 mL / NET: -300 mL    Physical Exam:  	Gen: NAD  	HEENT: MMM  	Pulm: CTA B/L, no crackles or wheezing   	CV: S1S2  	Abd: Soft, +BS   	Ext: No LE edema B/L, wrap around RLE   	Neuro: Awake  	Skin: Warm and dry    LABS/STUDIES  --------------------------------------------------------------------------------              10.7   9.26  >-----------<  203      [05-30-21 @ 07:51]              32.6     133  |  95  |  36  ----------------------------<  104      [05-30-21 @ 11:07]  4.3   |  21  |  4.18        Ca     8.9     [05-30-21 @ 11:07]      Mg     2.1     [05-30-21 @ 11:07]      Phos  5.8     [05-30-21 @ 11:07]            Creatinine Trend:  SCr 4.18 [05-30 @ 11:07]  SCr 3.79 [05-30 @ 07:51]  SCr 1.08 [05-29 @ 07:29]  SCr 1.01 [05-28 @ 06:48]  SCr 0.97 [05-27 @ 06:56]    Urinalysis - [04-17-20 @ 06:49]      Color Yellow / Appearance Clear / SG 1.016 / pH 5.5      Gluc 200 mg/dL / Ketone Negative  / Bili Negative / Urobili Negative       Blood Negative / Protein 30 mg/dL / Leuk Est Negative / Nitrite Negative      RBC 4 / WBC 2 / Hyaline 1 / Gran  / Sq Epi  / Non Sq Epi 1 / Bacteria Negative           [facility] DIVISION OF KIDNEY DISEASES AND HYPERTENSION -- [phone]  -- INITIAL CONSULT NOTE  --------------------------------------------------------------------------------  HPI: Patient is a 51 y/o M w PMH of DM2, CAD s/p stents and diabetic foot ulcers presented to [facility] for worsening R foot ulcer and fever. Admitted for suspected sepsis and possible OM of RLE. S/p R foot partial 2nd ray resection on 5/28/21. Nephrology consulted for TEENA. On review of labs on [facility] HIE/[place], patient noted to have Scr of 1.08 on 5/28/21. Scr increased to 3.79 this morning, and further increased to 4.18 later today.     Patient was seen and examined at bedside. Denies CP, SOB, fever, chills, nausea, vomiting, diarrhea, LE edema or dysuria.    PAST HISTORY  --------------------------------------------------------------------------------  PAST MEDICAL & SURGICAL HISTORY:  CAD (coronary artery disease)    Diabetes    No significant past surgical history      FAMILY HISTORY:  No pertinent family history in first degree relatives      PAST SOCIAL HISTORY:    ALLERGIES & MEDICATIONS  --------------------------------------------------------------------------------  Allergies    No Known Allergies    Intolerances    Standing Inpatient Medications  aspirin enteric coated 81 milliGRAM(s) Oral daily  atorvastatin 80 milliGRAM(s) Oral at bedtime  clopidogrel Tablet 75 milliGRAM(s) Oral daily  collagenase Ointment 1 Application(s) Topical daily  enoxaparin Injectable 40 milliGRAM(s) SubCutaneous every 24 hours  insulin glargine Injectable (LANTUS) 44 Unit(s) SubCutaneous at bedtime  insulin lispro (ADMELOG) corrective regimen sliding scale   SubCutaneous at bedtime  insulin lispro Injectable (ADMELOG) 4 Unit(s) SubCutaneous three times a day before meals  piperacillin/tazobactam IVPB.. 3.375 Gram(s) IV Intermittent every 8 hours    PRN Inpatient Medications  acetaminophen   Tablet .. 650 milliGRAM(s) Oral every 6 hours PRN  melatonin 6 milliGRAM(s) Oral at bedtime PRN  oxyCODONE    IR 5 milliGRAM(s) Oral every 4 hours PRN  oxyCODONE    IR 10 milliGRAM(s) Oral every 4 hours PRN    REVIEW OF SYSTEMS  --------------------------------------------------------------------------------  Gen: No fevers/chills  Respiratory: No dyspnea, cough  CV: No chest pain  GI: No abdominal pain, diarrhea  : No dysuria, hematuria  MSK: No  edema    All other systems were reviewed and are negative, except as noted.    VITALS/PHYSICAL EXAM  --------------------------------------------------------------------------------  T(C): 36.8 (05-30-21 @ 06:38), Max: 37.6 (05-29-21 @ 17:30)  HR: 69 (05-30-21 @ 06:38) (69 - 86)  BP: 105/62 (05-30-21 @ 06:38) (100/59 - 115/71)  RR: 18 (05-30-21 @ 06:38) (18 - 18)  SpO2: 96% (05-30-21 @ 06:38) (94% - 96%)  Wt(kg): --    05-29-21 @ 07:01  -  05-30-21 @ 07:00  --------------------------------------------------------  IN: 200 mL / OUT: 1200 mL / NET: -1000 mL    05-30-21 @ 07:01  -  05-30-21 @ 14:17  --------------------------------------------------------  IN: 100 mL / OUT: 400 mL / NET: -300 mL    Physical Exam:  	Gen: NAD  	HEENT: MMM  	Pulm: CTA B/L, no crackles or wheezing   	CV: S1S2  	Abd: Soft, +BS   	Ext: No LE edema B/L, wrap around RLE   	Neuro: Awake  	Skin: Warm and dry    LABS/STUDIES  --------------------------------------------------------------------------------              10.7   9.26  >-----------<  203      [05-30-21 @ 07:51]              32.6     133  |  95  |  36  ----------------------------<  104      [05-30-21 @ 11:07]  4.3   |  21  |  4.18        Ca     8.9     [05-30-21 @ 11:07]      Mg     2.1     [05-30-21 @ 11:07]      Phos  5.8     [05-30-21 @ 11:07]            Creatinine Trend:  SCr 4.18 [05-30 @ 11:07]  SCr 3.79 [05-30 @ 07:51]  SCr 1.08 [05-29 @ 07:29]  SCr 1.01 [05-28 @ 06:48]  SCr 0.97 [05-27 @ 06:56]    Urinalysis - [04-17-20 @ 06:49]      Color Yellow / Appearance Clear / SG 1.016 / pH 5.5      Gluc 200 mg/dL / Ketone Negative  / Bili Negative / Urobili Negative       Blood Negative / Protein 30 mg/dL / Leuk Est Negative / Nitrite Negative      RBC 4 / WBC 2 / Hyaline 1 / Gran  / Sq Epi  / Non Sq Epi 1 / Bacteria Negative           [facility] DIVISION OF KIDNEY DISEASES AND HYPERTENSION -- [phone]  -- INITIAL CONSULT NOTE  --------------------------------------------------------------------------------  HPI: Patient is a 49 y/o M w PMH of DM2, CAD s/p stents and diabetic foot ulcers presented to [facility] for worsening R foot ulcer and fever. Admitted for suspected sepsis and possible OM of RLE. S/p R foot partial 2nd ray resection on 5/28/21. Nephrology consulted for TEENA. On review of labs on [facility] HIE/[place], patient noted to have Scr of 1.08 on 5/28/21. Scr increased to 3.79 this morning, and further increased to 4.18 later today.     Patient was seen and examined at bedside. Denies CP, SOB, fever, chills, nausea, vomiting, diarrhea, LE edema or dysuria.    PAST HISTORY  --------------------------------------------------------------------------------  PAST MEDICAL & SURGICAL HISTORY:  CAD (coronary artery disease)    Diabetes    No significant past surgical history      FAMILY HISTORY:  No pertinent family history in first degree relatives      PAST SOCIAL HISTORY:  non contributory    ALLERGIES & MEDICATIONS  --------------------------------------------------------------------------------  Allergies    No Known Allergies    Intolerances    Standing Inpatient Medications  aspirin enteric coated 81 milliGRAM(s) Oral daily  atorvastatin 80 milliGRAM(s) Oral at bedtime  clopidogrel Tablet 75 milliGRAM(s) Oral daily  collagenase Ointment 1 Application(s) Topical daily  enoxaparin Injectable 40 milliGRAM(s) SubCutaneous every 24 hours  insulin glargine Injectable (LANTUS) 44 Unit(s) SubCutaneous at bedtime  insulin lispro (ADMELOG) corrective regimen sliding scale   SubCutaneous at bedtime  insulin lispro Injectable (ADMELOG) 4 Unit(s) SubCutaneous three times a day before meals  piperacillin/tazobactam IVPB.. 3.375 Gram(s) IV Intermittent every 8 hours    PRN Inpatient Medications  acetaminophen   Tablet .. 650 milliGRAM(s) Oral every 6 hours PRN  melatonin 6 milliGRAM(s) Oral at bedtime PRN  oxyCODONE    IR 5 milliGRAM(s) Oral every 4 hours PRN  oxyCODONE    IR 10 milliGRAM(s) Oral every 4 hours PRN    REVIEW OF SYSTEMS  --------------------------------------------------------------------------------  Gen: No fevers/chills  Respiratory: No dyspnea, cough  CV: No chest pain  GI: No abdominal pain, diarrhea  : No dysuria, hematuria  MSK: No  edema    All other systems were reviewed and are negative, except as noted.    VITALS/PHYSICAL EXAM  --------------------------------------------------------------------------------  T(C): 36.8 (05-30-21 @ 06:38), Max: 37.6 (05-29-21 @ 17:30)  HR: 69 (05-30-21 @ 06:38) (69 - 86)  BP: 105/62 (05-30-21 @ 06:38) (100/59 - 115/71)  RR: 18 (05-30-21 @ 06:38) (18 - 18)  SpO2: 96% (05-30-21 @ 06:38) (94% - 96%)  Wt(kg): --    05-29-21 @ 07:01  -  05-30-21 @ 07:00  --------------------------------------------------------  IN: 200 mL / OUT: 1200 mL / NET: -1000 mL    05-30-21 @ 07:01  -  05-30-21 @ 14:17  --------------------------------------------------------  IN: 100 mL / OUT: 400 mL / NET: -300 mL    Physical Exam:  	Gen: NAD  	HEENT: MMM  	Pulm: CTA B/L, no crackles or wheezing   	CV: S1S2  	Abd: Soft, +BS   	Ext: No LE edema B/L, wrap around RLE   	Neuro: Awake  	Skin: Warm and dry    LABS/STUDIES  --------------------------------------------------------------------------------              10.7   9.26  >-----------<  203      [05-30-21 @ 07:51]              32.6     133  |  95  |  36  ----------------------------<  104      [05-30-21 @ 11:07]  4.3   |  21  |  4.18        Ca     8.9     [05-30-21 @ 11:07]      Mg     2.1     [05-30-21 @ 11:07]      Phos  5.8     [05-30-21 @ 11:07]            Creatinine Trend:  SCr 4.18 [05-30 @ 11:07]  SCr 3.79 [05-30 @ 07:51]  SCr 1.08 [05-29 @ 07:29]  SCr 1.01 [05-28 @ 06:48]  SCr 0.97 [05-27 @ 06:56]    Urinalysis - [04-17-20 @ 06:49]      Color Yellow / Appearance Clear / SG 1.016 / pH 5.5      Gluc 200 mg/dL / Ketone Negative  / Bili Negative / Urobili Negative       Blood Negative / Protein 30 mg/dL / Leuk Est Negative / Nitrite Negative      RBC 4 / WBC 2 / Hyaline 1 / Gran  / Sq Epi  / Non Sq Epi 1 / Bacteria Negative

## 2021-05-30 NOTE — PHYSICAL THERAPY INITIAL EVALUATION ADULT - ASSISTIVE DEVICE FOR TRANSFER: SIT/STAND, REHAB EVAL
Patient adamantly refused administration of crutches, stating "This is stupid, I will just use my knee crutch when it gets here." Pt eventually agreeable to use rolling walker for PT evaluation. Patient expressed frustration regarding use of CAM boot. Patient required frequent cues to maintain NWB status, educated on importance of respecting weightbearing status to protect surgery and integrity of joint.

## 2021-05-30 NOTE — CHART NOTE - NSCHARTNOTEFT_GEN_A_CORE
Called by RN who received a call from the lab with bone culture results. Bone culture of 2nd metatarsal R foot growing GPC in clusters in fluid media only. Podiatry made aware, originally planned to send patient home on Augmentin 875mg BID x7 days. Curbsided ID Fellow Dr. Reddy who reports that this is likely coag neg staph and agrees with Augmentin on discharge.

## 2021-05-30 NOTE — PHYSICAL THERAPY INITIAL EVALUATION ADULT - STANDING BALANCE: STATIC
DATE: 8/7/2019  PATIENT: Tamie Fink  REFERRING MD:   CHIEF COMPLAINT:   Chief Complaint   Patient presents with    Right Knee - Pain       HISTORY:  Tamie Fink is a 89 y.o. female  who presents for evaluation of her right knee pain with swelling.  I saw her 3/13/19 then 06/19/19 and performed a cortisone injection and she reports she had moderate pain relief.  She had Synvisc-one injection 07/17/19 which she reports she had soreness a couple days after the injection but has had no pain since.  She reports she worked all day in her yard Sunday and then Monday morning she noted pain with swelling about her right knee.  She denies any fall or trauma.  She presents today requesting relief of the pain and swelling. She is a  and takes care of her son who is disabled and lives with her.  Patient's well son, Renato, is in attendance today and participating in the history portion of the exam.        PAST MEDICAL/SURGICAL HISTORY:  Past Medical History:   Diagnosis Date    Allergy     Arthritis     Asthma     GERD (gastroesophageal reflux disease)     HEARING LOSS     Hypertension     Lung disease     Pneumonia      Past Surgical History:   Procedure Laterality Date    CAROTID STENT         Current Medications:   Current Outpatient Medications:     albuterol (VENTOLIN HFA) 90 mcg/actuation inhaler, Inhale 2 puffs into the lungs., Disp: , Rfl:     atorvastatin (LIPITOR) 20 MG tablet, TAKE 1 TABLET EVERY DAY, Disp: 30 tablet, Rfl: 12    benazepril (LOTENSIN) 10 MG tablet, Take 1 tablet (10 mg total) by mouth once daily., Disp: 90 tablet, Rfl: 3    clopidogrel (PLAVIX) 75 mg tablet, TAKE ONE TABLET BY MOUTH ONCE DAILY, Disp: 30 tablet, Rfl: 11    fluticasone-salmeterol 250-50 mcg/dose (ADVAIR) 250-50 mcg/dose diskus inhaler, Inhale 1 puff into the lungs 2 (two) times daily., Disp: 60 each, Rfl: 5    methylPREDNISolone (MEDROL DOSEPACK) 4 mg tablet, As directed, Disp: 1 Package, Rfl: 0     omeprazole (PRILOSEC) 20 MG capsule, Take 1 capsule (20 mg total) by mouth every evening., Disp: 90 capsule, Rfl: 4    pregabalin (LYRICA) 75 MG capsule, Take 1 capsule (75 mg total) by mouth 2 (two) times daily., Disp: 60 capsule, Rfl: 5    pregabalin (LYRICA) 75 MG capsule, Take 75 mg by mouth., Disp: , Rfl:     SENNOSIDES (SENOKOT ORAL), Take by mouth as needed., Disp: , Rfl:     traMADol (ULTRAM) 50 mg tablet, Take 1 tablet (50 mg total) by mouth every 6 (six) hours as needed., Disp: 120 tablet, Rfl: 0    ipratropium (ATROVENT HFA) 17 mcg/actuation inhaler, Inhale 2 puffs into the lungs every 6 (six) hours as needed for Wheezing., Disp: 12.9 g, Rfl: 11    Family History: family history was reviewed and is noncontributory  Social History:   Social History     Socioeconomic History    Marital status:      Spouse name: Not on file    Number of children: Not on file    Years of education: Not on file    Highest education level: Not on file   Occupational History    Not on file   Social Needs    Financial resource strain: Not on file    Food insecurity:     Worry: Not on file     Inability: Not on file    Transportation needs:     Medical: Not on file     Non-medical: Not on file   Tobacco Use    Smoking status: Former Smoker     Packs/day: 1.00     Years: 40.00     Pack years: 40.00     Last attempt to quit: 5/3/1984     Years since quittin.2    Smokeless tobacco: Never Used   Substance and Sexual Activity    Alcohol use: No    Drug use: No    Sexual activity: Not on file   Lifestyle    Physical activity:     Days per week: Not on file     Minutes per session: Not on file    Stress: Not on file   Relationships    Social connections:     Talks on phone: Not on file     Gets together: Not on file     Attends [employer] service: Not on file     Active member of club or organization: Not on file     Attends meetings of clubs or organizations: Not on file     Relationship status: Not on  "file   Other Topics Concern    Not on file   Social History Narrative    Not on file       ROS:  Constitution: Negative for chills, fever, and sweats. Negative for unexplained weight loss.  Eyes: no redness, no discharge  Ears: no ear pain or tinnitus  Cardiovascular: Negative for chest pain, irregular heartbeat, leg swelling and palpitations.   Respiratory: Negative for cough and shortness of breath.   Gastrointestinal: Negative for abdominal pain, nausea and vomiting.   Genitourinary: Negative for bladder incontinence and dysuria.   Neurological: Negative for numbness.   Psychiatric/Behavioral: Negative for behavior changes.   Endocrine: Negative for palpitations.   Hematologic/Lymphatic: Negative for bleeding disorders.  Skin: Negative for pruritis or rash.       PHYSICAL EXAM:  BP (!) 117/54 Comment: NP notified  Pulse 64   Ht 5' 4" (1.626 m)   Wt 54.9 kg (121 lb)   BMI 20.77 kg/m²   Tamie Fink is a well developed, well nourished female in no acute distress. Physical examination of the right knee evaluated the following:    Gait and Alignment  Inspection for ecchymosis, swelling, atrophy, or deformity  Inspection for intra-articular and/or bursal effusions  Tenderness to palpation over the bony and soft tissue structures around the knee  Range of Motion and presence of extensor lag/contractures  Sensation and motor strength  Varus/valgus or anterior/posterior/rotatory instability  Flexion pinch and Wilfrid's Tests  Patellar alignment/tracking/pain to palpation  Vascular exam to include skin temperature/color/capillary refill    Remarkable findings included:  ++ effusion, no ecchymosis or obvious deformity  tender to palpation about the joint line  ROM 0-120  Strength 5/5  No gross instability  Sensation intact  Skin warm, dry, intact      IMAGING:   X-ray obtained Right knee performed 03/13/19 personally reviewed with patient. Radiologist report as follows:   There is a small joint effusion on the " right.  No acute fractures or dislocations visualized.  There is tricompartmental osteoarthritis on the right with moderate to severe joint space loss in the lateral tibiofemoral compartment.  Small tibiofemoral marginal osteophytes and mild joint space narrowing noted involving the left knee.Visualized osseous structures appear diffusely osteopenic.    ASSESSMENT:   Right knee osteoarthritis    PLAN:  The nature of the diagnosis, using models and diagrams when appropriate, was explained to the patient in detail.  Aspiration and drainage with cortisone injection  performed toRight knee today (see procedure documentation).  She will monitor for signs and symptoms of infection/inflammation.  Instructed to elevate legs and apply ice tonight.  She will return for follow up evaluation as needed.   fair plus

## 2021-05-30 NOTE — CHART NOTE - NSCHARTNOTEFT_GEN_A_CORE
Order received from podiatry resident to fit patient with right cam type afo to use following surgery.  Boot sized and adjusted.  Extension strap added dur to bulky dressing, can be removed when dressing is reduced.  Awaiting PT for gait training.  Follow up if needed      Randall schneider [place]  [phone]

## 2021-05-30 NOTE — PHYSICAL THERAPY INITIAL EVALUATION ADULT - PATIENT PROFILE REVIEW, REHAB EVAL
PT orders received:  . Consult with RN  , pt may participate in PT evaluation./yes PT orders received: no formal activity order. Consult with MONTZE SWANSON, pt may participate in PT evaluation./yes

## 2021-05-30 NOTE — CONSULT NOTE ADULT - PROBLEM SELECTOR RECOMMENDATION 9
Pt with TEENA in the setting of supratherapeutic vancomycin level. Now likely in ATN. Patient noted to have Scr of 1.08 on 5/28/21. Scr increased to 3.79 this morning, and further increased to 4.18 later today. Vancomycin level from yesterday was elevated at 31.5 (was on Vancomycin from 5/25/21 to 5/29/21). Suggest to send UA, urine electrolytes, spot urine TP/CR. Obtain bladder scan to r/o urinary retention. Repeat vancomycin level with the next set of labs. Discontinue Lisinopril. Continue to hold Vancomycin. Monitor labs and urine output. Avoid NSAIDs, ACEI/ARBS, RCA and nephrotoxins. Dose medications as per eGFR.    If any questions, please feel free to contact me     Reva Solano  Nephrology Fellow  [facility] Pager: [phone]  [facility] Pager: [phone] Pt with TEENA in the setting of supratherapeutic vancomycin level and hypotension. Now likely in ATN. Patient noted to have Scr of 1.08 on 5/28/21. Scr increased to 3.79 this morning, and further increased to 4.18 later today. Vancomycin level from yesterday was elevated at 31.5 (was on Vancomycin from 5/25/21 to 5/29/21). Blood pressure noted to be boarderline low at 95/59 on 5/28/21.  Suggest to send UA, urine electrolytes, spot urine TP/CR. Obtain bladder scan to r/o urinary retention. Repeat vancomycin level with the next set of labs. Discontinue Lisinopril. Continue to hold Vancomycin. Monitor labs and urine output. Avoid NSAIDs, ACEI/ARBS, RCA and nephrotoxins. Dose medications as per eGFR.    If any questions, please feel free to contact me     Reva Solano  Nephrology Fellow  [facility] Pager: [phone]  [facility] Pager: [phone] Pt with TEENA in the setting of supratherapeutic vancomycin level, hypotension, NSAIDS and ACE-I. Now likely in ATN. Patient noted to have Scr of 1.08 on 5/28/21. Scr increased to 3.79 this morning, and further increased to 4.18 later today. Vancomycin level from yesterday was elevated at 31.5 (was on Vancomycin from 5/25/21 to 5/29/21). Blood pressure noted to be boarderline low at 95/59 on 5/28/21. Got 1 dose of motrin on5/26/21. Suggest to send UA, urine electrolytes, spot urine TP/CR. Obtain bladder scan to r/o urinary retention. Repeat vancomycin level with the next set of labs. Discontinue Lisinopril. Continue to hold Vancomycin. Monitor labs and urine output. Avoid NSAIDs, ACEI/ARBS, RCA and nephrotoxins. Dose medications as per eGFR.    If any questions, please feel free to contact me     Reva oSlano  Nephrology Fellow  [facility] Pager: [phone]  [facility] Pager: [phone]

## 2021-05-30 NOTE — PHYSICAL THERAPY INITIAL EVALUATION ADULT - PERTINENT HX OF CURRENT PROBLEM, REHAB EVAL
Patient is a 50 year old male admitted to [facility] Patient is a 50 year old male admitted to [facility] with with worsening Right foot ulcer and fever to 102F at home. PMH: DM type II, CAD s/p stents x 2, longstanding history of diabetic foot ulcers, admitted to Dr. Lopez last year and underwent R 3rd and 4th partial ray resections by podiatry 4/15/21.

## 2021-05-30 NOTE — PHYSICAL THERAPY INITIAL EVALUATION ADULT - ADDITIONAL COMMENTS
Patient reports he lives alone in a private house, +1 flight to enter. Patient reports he was previously independent in all ADLs and did not use an assistive device for ambulation.    Patient was left sitting at the edge of the bed, PCA at bedside, all lines/tubes intact and call bell within reach, RN aware Patient reports he lives alone in a private house, +1 flight to enter. Patient reports he was previously independent in all ADLs and did not use an assistive device for ambulation. Pt reports at home he owns a "knee crutch", and that is "what I will use when I get home. Everything else is stupid."    Patient was left sitting at the edge of the bed, PCA at bedside, all lines/tubes intact and call bell within reach, RN aware

## 2021-05-30 NOTE — PHYSICAL THERAPY INITIAL EVALUATION ADULT - DID THE PATIENT HAVE SURGERY?
[FreeTextEntry1] : 84 yo with PMH of high cholesterol presents to the office with L spine L1, 2 and 5 compression fracture with lumbar pain and to review imaging.\par  Pt reports for years bilateral lower lumbar pain radiating to right buttocks and around to right groin. He states he has this pain on left as well, more severe on right. He states the pain in right groin is stabbing pain. THe pain has not worsened and is better than 1 year ago. He has tried 4 weeks of physical therapy without success.He also had pain injections which relieved pain on temporarily. He states he can walk somewhat long distances, but then the pain starts. He denies legs giving out or numbness and tingling down legs. He also denies urine and bowel disturbance. \par \par CT lumbar spine L1, 2, 5 compression fractures without change 8/18/2019. He has degenerative disease, osteophytes most likely causing back pain. I have ordered MRI lumbar spine to assess fracture and other underlying disease and xrays of pelvis due to positive Suazn test. \par MRI lumbar spine shows chronic L1, 2 and 5 compression fractures unchanged. Xray of pelvis shows no fracture. \par \par Plan: PT 4- 6 weeks\par Continue with Tylenol for pain\par \par  yes Lengthening of right Achilles tendon, Partial amputation of second ray of right foot, Skin flap sectioning/yes

## 2021-05-30 NOTE — PROGRESS NOTE ADULT - PROBLEM SELECTOR PLAN 1
Cr 1.08 --> 4.18 in one day. Was on vancomycin so possibly 2/2 medications.   - Will rule out urinary retention, obtain bladder scan  - Send urine lytes.   - Renal consult pending.   - Vanc d/luz because the abscess is consistent with strep.

## 2021-05-31 LAB
-  AMPICILLIN/SULBACTAM: SIGNIFICANT CHANGE UP
-  CEFAZOLIN: SIGNIFICANT CHANGE UP
-  CLINDAMYCIN: SIGNIFICANT CHANGE UP
-  ERYTHROMYCIN: SIGNIFICANT CHANGE UP
-  GENTAMICIN: SIGNIFICANT CHANGE UP
-  OXACILLIN: SIGNIFICANT CHANGE UP
-  PENICILLIN: SIGNIFICANT CHANGE UP
-  RIFAMPIN: SIGNIFICANT CHANGE UP
-  TETRACYCLINE: SIGNIFICANT CHANGE UP
-  TRIMETHOPRIM/SULFAMETHOXAZOLE: SIGNIFICANT CHANGE UP
-  VANCOMYCIN: SIGNIFICANT CHANGE UP
ANION GAP SERPL CALC-SCNC: 16 MMOL/L — HIGH (ref 7–14)
BUN SERPL-MCNC: 46 MG/DL — HIGH (ref 7–23)
CALCIUM SERPL-MCNC: 9.1 MG/DL — SIGNIFICANT CHANGE UP (ref 8.4–10.5)
CHLORIDE SERPL-SCNC: 93 MMOL/L — LOW (ref 98–107)
CO2 SERPL-SCNC: 21 MMOL/L — LOW (ref 22–31)
CREAT SERPL-MCNC: 4.72 MG/DL — HIGH (ref 0.5–1.3)
CULTURE RESULTS: NO GROWTH — SIGNIFICANT CHANGE UP
CULTURE RESULTS: SIGNIFICANT CHANGE UP
GLUCOSE BLDC GLUCOMTR-MCNC: 128 MG/DL — HIGH (ref 70–99)
GLUCOSE BLDC GLUCOMTR-MCNC: 128 MG/DL — HIGH (ref 70–99)
GLUCOSE BLDC GLUCOMTR-MCNC: 145 MG/DL — HIGH (ref 70–99)
GLUCOSE BLDC GLUCOMTR-MCNC: 198 MG/DL — HIGH (ref 70–99)
GLUCOSE SERPL-MCNC: 151 MG/DL — HIGH (ref 70–99)
HCT VFR BLD CALC: 31.6 % — LOW (ref 39–50)
HGB BLD-MCNC: 10.5 G/DL — LOW (ref 13–17)
MAGNESIUM SERPL-MCNC: 2.2 MG/DL — SIGNIFICANT CHANGE UP (ref 1.6–2.6)
MCHC RBC-ENTMCNC: 29.7 PG — SIGNIFICANT CHANGE UP (ref 27–34)
MCHC RBC-ENTMCNC: 33.2 GM/DL — SIGNIFICANT CHANGE UP (ref 32–36)
MCV RBC AUTO: 89.5 FL — SIGNIFICANT CHANGE UP (ref 80–100)
METHOD TYPE: SIGNIFICANT CHANGE UP
NRBC # BLD: 0 /100 WBCS — SIGNIFICANT CHANGE UP
NRBC # FLD: 0 K/UL — SIGNIFICANT CHANGE UP
PHOSPHATE SERPL-MCNC: 5.9 MG/DL — HIGH (ref 2.5–4.5)
PLATELET # BLD AUTO: 224 K/UL — SIGNIFICANT CHANGE UP (ref 150–400)
POTASSIUM SERPL-MCNC: 4.4 MMOL/L — SIGNIFICANT CHANGE UP (ref 3.5–5.3)
POTASSIUM SERPL-SCNC: 4.4 MMOL/L — SIGNIFICANT CHANGE UP (ref 3.5–5.3)
RBC # BLD: 3.53 M/UL — LOW (ref 4.2–5.8)
RBC # FLD: 12.5 % — SIGNIFICANT CHANGE UP (ref 10.3–14.5)
SODIUM SERPL-SCNC: 130 MMOL/L — LOW (ref 135–145)
SPECIMEN SOURCE: SIGNIFICANT CHANGE UP
VANCOMYCIN FLD-MCNC: 8.9 UG/ML — SIGNIFICANT CHANGE UP
WBC # BLD: 8.48 K/UL — SIGNIFICANT CHANGE UP (ref 3.8–10.5)
WBC # FLD AUTO: 8.48 K/UL — SIGNIFICANT CHANGE UP (ref 3.8–10.5)

## 2021-05-31 PROCEDURE — 99223 1ST HOSP IP/OBS HIGH 75: CPT

## 2021-05-31 PROCEDURE — 99233 SBSQ HOSP IP/OBS HIGH 50: CPT

## 2021-05-31 PROCEDURE — 76770 US EXAM ABDO BACK WALL COMP: CPT | Mod: 26

## 2021-05-31 PROCEDURE — 99222 1ST HOSP IP/OBS MODERATE 55: CPT

## 2021-05-31 RX ORDER — SODIUM CHLORIDE 9 MG/ML
1000 INJECTION INTRAMUSCULAR; INTRAVENOUS; SUBCUTANEOUS
Refills: 0 | Status: DISCONTINUED | OUTPATIENT
Start: 2021-05-31 | End: 2021-06-04

## 2021-05-31 RX ORDER — INSULIN GLARGINE 100 [IU]/ML
35 INJECTION, SOLUTION SUBCUTANEOUS AT BEDTIME
Refills: 0 | Status: DISCONTINUED | OUTPATIENT
Start: 2021-05-31 | End: 2021-06-04

## 2021-05-31 RX ADMIN — OXYCODONE HYDROCHLORIDE 10 MILLIGRAM(S): 5 TABLET ORAL at 22:34

## 2021-05-31 RX ADMIN — PIPERACILLIN AND TAZOBACTAM 25 GRAM(S): 4; .5 INJECTION, POWDER, LYOPHILIZED, FOR SOLUTION INTRAVENOUS at 17:32

## 2021-05-31 RX ADMIN — INSULIN GLARGINE 35 UNIT(S): 100 INJECTION, SOLUTION SUBCUTANEOUS at 22:30

## 2021-05-31 RX ADMIN — PIPERACILLIN AND TAZOBACTAM 25 GRAM(S): 4; .5 INJECTION, POWDER, LYOPHILIZED, FOR SOLUTION INTRAVENOUS at 10:04

## 2021-05-31 RX ADMIN — OXYCODONE HYDROCHLORIDE 10 MILLIGRAM(S): 5 TABLET ORAL at 14:14

## 2021-05-31 RX ADMIN — Medication 4 UNIT(S): at 08:40

## 2021-05-31 RX ADMIN — OXYCODONE HYDROCHLORIDE 10 MILLIGRAM(S): 5 TABLET ORAL at 02:30

## 2021-05-31 RX ADMIN — Medication 1 APPLICATION(S): at 13:28

## 2021-05-31 RX ADMIN — OXYCODONE HYDROCHLORIDE 10 MILLIGRAM(S): 5 TABLET ORAL at 14:44

## 2021-05-31 RX ADMIN — OXYCODONE HYDROCHLORIDE 10 MILLIGRAM(S): 5 TABLET ORAL at 10:04

## 2021-05-31 RX ADMIN — Medication 4 UNIT(S): at 13:12

## 2021-05-31 RX ADMIN — OXYCODONE HYDROCHLORIDE 10 MILLIGRAM(S): 5 TABLET ORAL at 23:05

## 2021-05-31 RX ADMIN — CLOPIDOGREL BISULFATE 75 MILLIGRAM(S): 75 TABLET, FILM COATED ORAL at 13:12

## 2021-05-31 RX ADMIN — OXYCODONE HYDROCHLORIDE 10 MILLIGRAM(S): 5 TABLET ORAL at 02:00

## 2021-05-31 RX ADMIN — OXYCODONE HYDROCHLORIDE 10 MILLIGRAM(S): 5 TABLET ORAL at 06:41

## 2021-05-31 RX ADMIN — Medication 30 MILLILITER(S): at 18:13

## 2021-05-31 RX ADMIN — Medication 81 MILLIGRAM(S): at 13:12

## 2021-05-31 RX ADMIN — SODIUM CHLORIDE 75 MILLILITER(S): 9 INJECTION INTRAMUSCULAR; INTRAVENOUS; SUBCUTANEOUS at 16:29

## 2021-05-31 RX ADMIN — OXYCODONE HYDROCHLORIDE 10 MILLIGRAM(S): 5 TABLET ORAL at 10:34

## 2021-05-31 RX ADMIN — ATORVASTATIN CALCIUM 80 MILLIGRAM(S): 80 TABLET, FILM COATED ORAL at 22:34

## 2021-05-31 RX ADMIN — Medication 1: at 13:12

## 2021-05-31 RX ADMIN — PIPERACILLIN AND TAZOBACTAM 25 GRAM(S): 4; .5 INJECTION, POWDER, LYOPHILIZED, FOR SOLUTION INTRAVENOUS at 02:01

## 2021-05-31 RX ADMIN — OXYCODONE HYDROCHLORIDE 10 MILLIGRAM(S): 5 TABLET ORAL at 06:11

## 2021-05-31 NOTE — PROGRESS NOTE ADULT - SUBJECTIVE AND OBJECTIVE BOX
Hospitalist Progress Note  Lexi Dang MD Pager # [phone]    OVERNIGHT EVENTS: AUDREY     SUBJECTIVE / INTERVAL HPI: Patient seen and examined at bedside. Pt upset about his renal function. He says he is urinating but it is difficult to get up to urinate due to his foot.     VITAL SIGNS:  Vital Signs Last 24 Hrs  T(C): 36.6 (31 May 2021 09:38), Max: 37.3 (30 May 2021 17:30)  T(F): 97.9 (31 May 2021 09:38), Max: 99.1 (30 May 2021 17:30)  HR: 72 (31 May 2021 09:38) (72 - 81)  BP: 99/63 (31 May 2021 09:38) (99/63 - 143/86)  BP(mean): --  RR: 18 (31 May 2021 09:38) (17 - 18)  SpO2: 97% (31 May 2021 09:38) (97% - 99%)    PHYSICAL EXAM:    General: WDWN male  HEENT: NC/AT; PERRL, anicteric sclera; MMM  Neck: supple  Cardiovascular: +S1/S2; RRR  Respiratory: CTA B/L; no W/R/R  Gastrointestinal: soft, NT/ND; +BSx4  Extremities: WWP; no edema, clubbing or cyanosis. Cam boot on right foot.   Vascular: 2+ radial, DP/PT pulses B/L  Neurological: AAOx3; no focal deficits    MEDICATIONS:  MEDICATIONS  (STANDING):  aspirin enteric coated 81 milliGRAM(s) Oral daily  atorvastatin 80 milliGRAM(s) Oral at bedtime  clopidogrel Tablet 75 milliGRAM(s) Oral daily  collagenase Ointment 1 Application(s) Topical daily  enoxaparin Injectable 40 milliGRAM(s) SubCutaneous every 24 hours  insulin glargine Injectable (LANTUS) 44 Unit(s) SubCutaneous at bedtime  insulin lispro (ADMELOG) corrective regimen sliding scale   SubCutaneous three times a day before meals  insulin lispro (ADMELOG) corrective regimen sliding scale   SubCutaneous at bedtime  insulin lispro Injectable (ADMELOG) 4 Unit(s) SubCutaneous three times a day before meals  piperacillin/tazobactam IVPB.. 3.375 Gram(s) IV Intermittent every 8 hours  sodium chloride 0.9%. 1000 milliLiter(s) (75 mL/Hr) IV Continuous <Continuous>    MEDICATIONS  (PRN):  acetaminophen   Tablet .. 650 milliGRAM(s) Oral every 6 hours PRN Mild Pain (1 - 3)  melatonin 6 milliGRAM(s) Oral at bedtime PRN Insomnia  oxyCODONE    IR 5 milliGRAM(s) Oral every 4 hours PRN Moderate Pain (4 - 6)  oxyCODONE    IR 10 milliGRAM(s) Oral every 4 hours PRN Severe Pain (7 - 10)      ALLERGIES:  Allergies    No Known Allergies    Intolerances        LABS:                        10.5   8.48  )-----------( 224      ( 31 May 2021 07:29 )             31.6     05-31    130<L>  |  93<L>  |  46<H>  ----------------------------<  151<H>  4.4   |  21<L>  |  4.72<H>    Ca    9.1      31 May 2021 07:29  Phos  5.9       Mg     2.2             Urinalysis Basic - ( 30 May 2021 16:49 )    Color: Yellow / Appearance: Slightly Turbid / S.016 / pH: x  Gluc: x / Ketone: Negative  / Bili: Negative / Urobili: <2 mg/dL   Blood: x / Protein: 100 mg/dL / Nitrite: Negative   Leuk Esterase: Negative / RBC: 2 /HPF / WBC 7 /HPF   Sq Epi: x / Non Sq Epi: 2 /HPF / Bacteria: Negative      CAPILLARY BLOOD GLUCOSE      POCT Blood Glucose.: 198 mg/dL (31 May 2021 13:08)      RADIOLOGY & ADDITIONAL TESTS: Reviewed.    ASSESSMENT:    PLAN:

## 2021-05-31 NOTE — PROGRESS NOTE ADULT - SUBJECTIVE AND OBJECTIVE BOX
Podiatry pager #: [phone] ([place])/ [phone] ([facility])    Patient is a 50y old  Male who presents with a chief complaint of foot infection (29 May 2021 14:14)       INTERVAL HPI/OVERNIGHT EVENTS:  Patient seen and evaluated at bedside.  Pt is resting comfortable in NAD. Denies N/V/F/C.     Allergies    No Known Allergies    Intolerances        Vital Signs Last 24 Hrs  T(C): 36.6 (31 May 2021 09:38), Max: 37.3 (30 May 2021 17:30)  T(F): 97.9 (31 May 2021 09:38), Max: 99.1 (30 May 2021 17:30)  HR: 72 (31 May 2021 09:38) (72 - 81)  BP: 99/63 (31 May 2021 09:38) (99/63 - 143/86)  BP(mean): --  RR: 18 (31 May 2021 09:38) (17 - 18)  SpO2: 97% (31 May 2021 09:38) (97% - 99%)    LABS:                        10.5   8.48  )-----------( 224      ( 31 May 2021 07:29 )             31.6     05-31    130<L>  |  93<L>  |  46<H>  ----------------------------<  151<H>  4.4   |  21<L>  |  4.72<H>    Ca    9.1      31 May 2021 07:29  Phos  5.9     05-31  Mg     2.2     05-31        Urinalysis Basic - ( 30 May 2021 16:49 )    Color: Yellow / Appearance: Slightly Turbid / S.016 / pH: x  Gluc: x / Ketone: Negative  / Bili: Negative / Urobili: <2 mg/dL   Blood: x / Protein: 100 mg/dL / Nitrite: Negative   Leuk Esterase: Negative / RBC: 2 /HPF / WBC 7 /HPF   Sq Epi: x / Non Sq Epi: 2 /HPF / Bacteria: Negative      CAPILLARY BLOOD GLUCOSE      POCT Blood Glucose.: 145 mg/dL (31 May 2021 08:39)  POCT Blood Glucose.: 185 mg/dL (30 May 2021 21:47)  POCT Blood Glucose.: 99 mg/dL (30 May 2021 17:31)  POCT Blood Glucose.: 100 mg/dL (30 May 2021 12:33)      Lower Extremity Physical Exam:  Vascular:  DP non palpable b/l, PT 2/4 Left foot + non palpable RF,  CFT < 3seconds B/L, Temperature gradient warm to warm Right foot, warm to cool L foot  Neuro: Epicritic sensation diminished to the level of toes B/L  Musculoskeletal/Ortho: s/p R 3rd and 4th ray partial resections  Skin: R foot hemorrhagic blister on submet 1,R submet 2 wound probing to bone and tracking distally towards 1st interspace and proximally towards midfoot along FDL, erythema streaking along medial aspect of R foot, R foot warm to touch and diffusely edematous, w/ strong malodor; L foot submet 4 wound down to bone w/ no signs of active infection     : RF wound stable today, no further purulence expressed, minimal malodor, improved erythema. LF submet 4 wound w/ 1cc of purulence expressed no periwound erythema, no malodor.     s/p RF partial 2nd ray resection w ADDISON and free flap open medial (DOS 21): surgical sites well coapted, sutures intact, no dehiscence, flap warm with duskiness to dorsal flap and blood blister, no signs of infection   RADIOLOGY & ADDITIONAL TESTS:

## 2021-05-31 NOTE — PROGRESS NOTE ADULT - ASSESSMENT
49 yo M pt w/ b/l foot wounds, s/p RF partial 2nd ray resection w ADDISON and free flap open medial (DOS 5/28/21)  - pt seen and evaluated  - Afebrile, no leukocytosis   -s/p RF partial 2nd ray resection w ADDISON and free flap open medial (DOS 5/28/21): surgical sites well coapted, sutures intact, no dehiscence, flap warm with duskiness to dorsal flap and blood blister, no signs of infection, applied xeroform and DSD  -L foot submet 4 wound down to bone w/ no signs of active infection applied Aquacel and DSD  -as per intra op findings low concern for residual bone infection, moderate concern for flap viability  - pt to stay non WB to RLE in a CAM boot  - CAM boot delivered   - prelim bone culture staph hemolyticus  - pod stable for discharge on PO Augmentin x 1 week, info in discharge paperwork  - Seen w/ attending

## 2021-05-31 NOTE — PROGRESS NOTE ADULT - PROBLEM SELECTOR PLAN 1
Cr 1.08 --> 4.18 --> 4.72. Likely ATN. Was on vancomycin, blood pressure low, and received motrin on 5/26.. Bladder scan 42cc- no urinary retention. Repeat vanco level 8.9. FeNa 0.8% - pre-renal.   - UA and urine lytes sent.   - Vanc d/luz because the abscess is consistent with strep.  - Hold lisinopril for now  - Renal recommended IV hydration: NS at 75cc/hr for 12 hours.   - Urine output : 1800cc on 5/30  - F/u strict I&Os.   - F/u renal recommendations.  - Avoid nephrotoxic medications. Cr 1.08 --> 4.18 --> 4.72. Likely ATN. Was on vancomycin, blood pressure low, and received motrin on 5/26.. Bladder scan 42cc- no urinary retention. Repeat vanco level 8.9. FeNa 0.8% - pre-renal.   - UA and urine lytes sent.   - Vanc d/luz because the abscess is consistent with strep.  - Hold lisinopril for now  - Renal recommended IV hydration: NS at 75cc/hr for 12 hours.   - Urine output : 1800cc on 5/30  - F/u renal ultrasound.   - F/u strict I&Os.   - F/u renal recommendations.  - Avoid nephrotoxic medications. Cr 1.08 --> 4.18 --> 4.72. Likely ATN. Was on vancomycin, blood pressure low, and received motrin on 5/26.. Bladder scan 42cc- no urinary retention. Repeat vanco level 8.9. FeNa 0.8% - pre-renal.   - UA and urine lytes sent.   - Vanc d/luz  - Hold lisinopril for now  - Renal recommended IV hydration: NS at 75cc/hr for 12 hours.   - Urine output : 1800cc on 5/30  - F/u renal ultrasound.   - F/u strict I&Os.   - F/u renal recommendations.  - Avoid nephrotoxic medications.

## 2021-06-01 ENCOUNTER — NON-APPOINTMENT (OUTPATIENT)
Age: 51
End: 2021-06-01

## 2021-06-01 DIAGNOSIS — E78.5 HYPERLIPIDEMIA, UNSPECIFIED: ICD-10-CM

## 2021-06-01 DIAGNOSIS — E11.29 TYPE 2 DIABETES MELLITUS WITH OTHER DIABETIC KIDNEY COMPLICATION: ICD-10-CM

## 2021-06-01 DIAGNOSIS — I10 ESSENTIAL (PRIMARY) HYPERTENSION: ICD-10-CM

## 2021-06-01 LAB
ANION GAP SERPL CALC-SCNC: 13 MMOL/L — SIGNIFICANT CHANGE UP (ref 7–14)
BUN SERPL-MCNC: 47 MG/DL — HIGH (ref 7–23)
CALCIUM SERPL-MCNC: 8.9 MG/DL — SIGNIFICANT CHANGE UP (ref 8.4–10.5)
CHLORIDE SERPL-SCNC: 99 MMOL/L — SIGNIFICANT CHANGE UP (ref 98–107)
CO2 SERPL-SCNC: 23 MMOL/L — SIGNIFICANT CHANGE UP (ref 22–31)
CREAT SERPL-MCNC: 4.7 MG/DL — HIGH (ref 0.5–1.3)
GLUCOSE BLDC GLUCOMTR-MCNC: 103 MG/DL — HIGH (ref 70–99)
GLUCOSE BLDC GLUCOMTR-MCNC: 116 MG/DL — HIGH (ref 70–99)
GLUCOSE BLDC GLUCOMTR-MCNC: 129 MG/DL — HIGH (ref 70–99)
GLUCOSE SERPL-MCNC: 112 MG/DL — HIGH (ref 70–99)
HCT VFR BLD CALC: 31.8 % — LOW (ref 39–50)
HGB BLD-MCNC: 10.2 G/DL — LOW (ref 13–17)
MAGNESIUM SERPL-MCNC: 2.7 MG/DL — HIGH (ref 1.6–2.6)
MCHC RBC-ENTMCNC: 29.1 PG — SIGNIFICANT CHANGE UP (ref 27–34)
MCHC RBC-ENTMCNC: 32.1 GM/DL — SIGNIFICANT CHANGE UP (ref 32–36)
MCV RBC AUTO: 90.6 FL — SIGNIFICANT CHANGE UP (ref 80–100)
NRBC # BLD: 0 /100 WBCS — SIGNIFICANT CHANGE UP
NRBC # FLD: 0 K/UL — SIGNIFICANT CHANGE UP
PHOSPHATE SERPL-MCNC: 5.8 MG/DL — HIGH (ref 2.5–4.5)
PLATELET # BLD AUTO: 220 K/UL — SIGNIFICANT CHANGE UP (ref 150–400)
POTASSIUM SERPL-MCNC: 4.8 MMOL/L — SIGNIFICANT CHANGE UP (ref 3.5–5.3)
POTASSIUM SERPL-SCNC: 4.8 MMOL/L — SIGNIFICANT CHANGE UP (ref 3.5–5.3)
RBC # BLD: 3.51 M/UL — LOW (ref 4.2–5.8)
RBC # FLD: 12.6 % — SIGNIFICANT CHANGE UP (ref 10.3–14.5)
SODIUM SERPL-SCNC: 135 MMOL/L — SIGNIFICANT CHANGE UP (ref 135–145)
WBC # BLD: 6.8 K/UL — SIGNIFICANT CHANGE UP (ref 3.8–10.5)
WBC # FLD AUTO: 6.8 K/UL — SIGNIFICANT CHANGE UP (ref 3.8–10.5)

## 2021-06-01 PROCEDURE — 99223 1ST HOSP IP/OBS HIGH 75: CPT

## 2021-06-01 PROCEDURE — 99233 SBSQ HOSP IP/OBS HIGH 50: CPT

## 2021-06-01 RX ORDER — ONDANSETRON 8 MG/1
4 TABLET, FILM COATED ORAL ONCE
Refills: 0 | Status: COMPLETED | OUTPATIENT
Start: 2021-06-01 | End: 2021-06-01

## 2021-06-01 RX ADMIN — SODIUM CHLORIDE 75 MILLILITER(S): 9 INJECTION INTRAMUSCULAR; INTRAVENOUS; SUBCUTANEOUS at 10:41

## 2021-06-01 RX ADMIN — Medication 30 MILLILITER(S): at 07:41

## 2021-06-01 RX ADMIN — ATORVASTATIN CALCIUM 80 MILLIGRAM(S): 80 TABLET, FILM COATED ORAL at 21:35

## 2021-06-01 RX ADMIN — INSULIN GLARGINE 35 UNIT(S): 100 INJECTION, SOLUTION SUBCUTANEOUS at 21:35

## 2021-06-01 RX ADMIN — PIPERACILLIN AND TAZOBACTAM 25 GRAM(S): 4; .5 INJECTION, POWDER, LYOPHILIZED, FOR SOLUTION INTRAVENOUS at 10:41

## 2021-06-01 RX ADMIN — OXYCODONE HYDROCHLORIDE 10 MILLIGRAM(S): 5 TABLET ORAL at 12:11

## 2021-06-01 RX ADMIN — Medication 4 UNIT(S): at 08:30

## 2021-06-01 RX ADMIN — CLOPIDOGREL BISULFATE 75 MILLIGRAM(S): 75 TABLET, FILM COATED ORAL at 11:40

## 2021-06-01 RX ADMIN — SODIUM CHLORIDE 75 MILLILITER(S): 9 INJECTION INTRAMUSCULAR; INTRAVENOUS; SUBCUTANEOUS at 08:30

## 2021-06-01 RX ADMIN — Medication 1 TABLET(S): at 17:26

## 2021-06-01 RX ADMIN — OXYCODONE HYDROCHLORIDE 10 MILLIGRAM(S): 5 TABLET ORAL at 07:12

## 2021-06-01 RX ADMIN — OXYCODONE HYDROCHLORIDE 10 MILLIGRAM(S): 5 TABLET ORAL at 11:41

## 2021-06-01 RX ADMIN — ONDANSETRON 4 MILLIGRAM(S): 8 TABLET, FILM COATED ORAL at 23:44

## 2021-06-01 RX ADMIN — PIPERACILLIN AND TAZOBACTAM 25 GRAM(S): 4; .5 INJECTION, POWDER, LYOPHILIZED, FOR SOLUTION INTRAVENOUS at 02:33

## 2021-06-01 RX ADMIN — OXYCODONE HYDROCHLORIDE 10 MILLIGRAM(S): 5 TABLET ORAL at 02:35

## 2021-06-01 RX ADMIN — Medication 1 APPLICATION(S): at 11:40

## 2021-06-01 RX ADMIN — Medication 81 MILLIGRAM(S): at 11:40

## 2021-06-01 RX ADMIN — OXYCODONE HYDROCHLORIDE 10 MILLIGRAM(S): 5 TABLET ORAL at 03:05

## 2021-06-01 RX ADMIN — SODIUM CHLORIDE 75 MILLILITER(S): 9 INJECTION INTRAMUSCULAR; INTRAVENOUS; SUBCUTANEOUS at 11:41

## 2021-06-01 RX ADMIN — OXYCODONE HYDROCHLORIDE 10 MILLIGRAM(S): 5 TABLET ORAL at 06:42

## 2021-06-01 NOTE — CONSULT NOTE ADULT - SUBJECTIVE AND OBJECTIVE BOX
HPI:  50y old  Male who presents with history of DM type II, CAD s/p stents x 2, longstanding history of diabetic foot ulcers, admitted for worsening R foot ulcer and fever to 102F at home.   Patient now s/p RF partial 2nd ray resection w ADDISON and free flap open medial. Patient treated with IV antibiotics, now in ATN.   Endocrine consult requested for management of DM2 in patient with TEENA. A1c 9.8%    Endocrine history:  Endocrinologist Dr London  Reports having DM for over 10 years. Reports adherence to Lantus 44units qhs and Metformin 1000mg BID and Prandin 2mg TIDac.  Patient has freestyle luciana, reports BG variable, however did not want to quantify   Report history of DM retinopathy and neuropathy, with multiple toe amputations. + CAD    PAST MEDICAL & SURGICAL HISTORY:  CAD (coronary artery disease)  Diabetes  No significant past surgical history    FAMILY HISTORY:  mother has history of DM    Social History:  Report history of alcohol and tobacco     Outpatient Medications:  Tresiba 100 units/mL subcutaneous solution: 40 unit(s) subcutaneous once a day (at bedtime) (26 May 2021 11:39)      MEDICATIONS  (STANDING):  amoxicillin  500 milliGRAM(s)/clavulanate 1 Tablet(s) Oral two times a day  aspirin enteric coated 81 milliGRAM(s) Oral daily  atorvastatin 80 milliGRAM(s) Oral at bedtime  clopidogrel Tablet 75 milliGRAM(s) Oral daily  collagenase Ointment 1 Application(s) Topical daily  enoxaparin Injectable 40 milliGRAM(s) SubCutaneous every 24 hours  insulin glargine Injectable (LANTUS) 35 Unit(s) SubCutaneous at bedtime  insulin lispro (ADMELOG) corrective regimen sliding scale   SubCutaneous three times a day before meals  insulin lispro (ADMELOG) corrective regimen sliding scale   SubCutaneous at bedtime  insulin lispro Injectable (ADMELOG) 4 Unit(s) SubCutaneous three times a day before meals  sodium chloride 0.9%. 1000 milliLiter(s) (75 mL/Hr) IV Continuous <Continuous>    MEDICATIONS  (PRN):  acetaminophen   Tablet .. 650 milliGRAM(s) Oral every 6 hours PRN Mild Pain (1 - 3)  melatonin 6 milliGRAM(s) Oral at bedtime PRN Insomnia  oxyCODONE    IR 5 milliGRAM(s) Oral every 4 hours PRN Moderate Pain (4 - 6)  oxyCODONE    IR 10 milliGRAM(s) Oral every 4 hours PRN Severe Pain (7 - 10)    Allergies  No Known Allergies    Review of Systems:  Constitutional: + fever  Eyes: No blurry vision  Neuro: No tremors  HEENT: No pain  Cardiovascular: No chest pain, palpitations  Respiratory: No SOB, no cough  GI: No nausea, vomiting, abdominal pain  : No dysuria  Skin: right foot pain and foot ulcer   Endocrine: no polyuria, polydipsia  Hem/lymph: no swelling    ALL OTHER SYSTEMS REVIEWED AND NEGATIVE      PHYSICAL EXAM:  VITALS: T(C): 36.5 (06-01-21 @ 17:25)  T(F): 97.7 (06-01-21 @ 17:25), Max: 99 (05-31-21 @ 22:21)  HR: 65 (06-01-21 @ 17:25) (65 - 72)  BP: 137/83 (06-01-21 @ 17:25) (117/66 - 137/83)  RR:  (17 - 18)  SpO2:  (97% - 100%)  Wt(kg): 102kg   GENERAL: NAD, well-groomed, well-developed  EYES: No proptosis,  anicteric  HEENT:  Atraumatic, Normocephalic, moist mucous membranes  THYROID: Normal size, no palpable nodules, nontender   RESPIRATORY: Clear to auscultation bilaterally; No rales, rhonchi, wheezing  CARDIOVASCULAR: Regular rate and rhythm; No murmurs; no peripheral edema  GI: Soft, nontender, non distended, normal bowel sounds  SKIN: Dry, intact, No rashes or lesions  MUSCULOSKELETAL: right foot wrapped in gauze   NEURO: sensation intact, extraocular movements intact, no tremor  PSYCH: Alert and oriented x 3, reactive affect    POCT Blood Glucose.: 116 mg/dL (06-01-21 @ 17:14)  POCT Blood Glucose.: 103 mg/dL (06-01-21 @ 08:26)  POCT Blood Glucose.: 128 mg/dL (05-31-21 @ 21:47)  POCT Blood Glucose.: 128 mg/dL (05-31-21 @ 17:05)  POCT Blood Glucose.: 198 mg/dL (05-31-21 @ 13:08)  POCT Blood Glucose.: 145 mg/dL (05-31-21 @ 08:39)  POCT Blood Glucose.: 185 mg/dL (05-30-21 @ 21:47)  POCT Blood Glucose.: 99 mg/dL (05-30-21 @ 17:31)  POCT Blood Glucose.: 100 mg/dL (05-30-21 @ 12:33)  POCT Blood Glucose.: 105 mg/dL (05-30-21 @ 07:56)  POCT Blood Glucose.: 123 mg/dL (05-29-21 @ 22:40)                            10.2   6.80  )-----------( 220      ( 01 Jun 2021 06:56 )             31.8       06-01    135  |  99  |  47<H>  ----------------------------<  112<H>  4.8   |  23  |  4.70<H>    EGFR if : 16<L>  EGFR if non : 14<L>    Ca    8.9      06-01  Mg     2.7     06-01  Phos  5.8     06-01                     HPI:  50y old  Male who presents with history of DM type 2, CAD s/p stents x 2, longstanding history of diabetic foot ulcers, admitted for worsening R foot ulcer and fever to 102F at home.   Patient now s/p RF partial 2nd ray resection w ADDISON and free flap open medial. Patient treated with IV antibiotics, now in ATN.   Endocrine consult requested for management of DM2 in patient with TEENA. A1c 9.8%    Endocrine history:  Endocrinologist Dr London  Reports having DM for over 10 years. Reports adherence to Lantus 44units qhs and Metformin 1000mg BID and Prandin 2mg TIDac.  Patient has freestyle luciana, reports BG variable, however did not want to quantify   Report history of DM retinopathy and neuropathy, with multiple toe amputations. + CAD    PAST MEDICAL & SURGICAL HISTORY:  CAD (coronary artery disease)  Diabetes  No significant past surgical history    FAMILY HISTORY:  mother has history of DM    Social History:  Report history of alcohol and tobacco     Outpatient Medications:  Tresiba 100 units/mL subcutaneous solution: 40 unit(s) subcutaneous once a day (at bedtime) (26 May 2021 11:39)      MEDICATIONS  (STANDING):  amoxicillin  500 milliGRAM(s)/clavulanate 1 Tablet(s) Oral two times a day  aspirin enteric coated 81 milliGRAM(s) Oral daily  atorvastatin 80 milliGRAM(s) Oral at bedtime  clopidogrel Tablet 75 milliGRAM(s) Oral daily  collagenase Ointment 1 Application(s) Topical daily  enoxaparin Injectable 40 milliGRAM(s) SubCutaneous every 24 hours  insulin glargine Injectable (LANTUS) 35 Unit(s) SubCutaneous at bedtime  insulin lispro (ADMELOG) corrective regimen sliding scale   SubCutaneous three times a day before meals  insulin lispro (ADMELOG) corrective regimen sliding scale   SubCutaneous at bedtime  insulin lispro Injectable (ADMELOG) 4 Unit(s) SubCutaneous three times a day before meals  sodium chloride 0.9%. 1000 milliLiter(s) (75 mL/Hr) IV Continuous <Continuous>    MEDICATIONS  (PRN):  acetaminophen   Tablet .. 650 milliGRAM(s) Oral every 6 hours PRN Mild Pain (1 - 3)  melatonin 6 milliGRAM(s) Oral at bedtime PRN Insomnia  oxyCODONE    IR 5 milliGRAM(s) Oral every 4 hours PRN Moderate Pain (4 - 6)  oxyCODONE    IR 10 milliGRAM(s) Oral every 4 hours PRN Severe Pain (7 - 10)    Allergies  No Known Allergies    Review of Systems:  Constitutional: + fever  Eyes: No blurry vision  Neuro: No tremors  HEENT: No pain  Cardiovascular: No chest pain, palpitations  Respiratory: No SOB, no cough  GI: No nausea, vomiting, abdominal pain  : No dysuria  Skin: right foot pain and foot ulcer   Endocrine: no polyuria, polydipsia  Hem/lymph: no swelling    ALL OTHER SYSTEMS REVIEWED AND NEGATIVE      PHYSICAL EXAM:  VITALS: T(C): 36.5 (06-01-21 @ 17:25)  T(F): 97.7 (06-01-21 @ 17:25), Max: 99 (05-31-21 @ 22:21)  HR: 65 (06-01-21 @ 17:25) (65 - 72)  BP: 137/83 (06-01-21 @ 17:25) (117/66 - 137/83)  RR:  (17 - 18)  SpO2:  (97% - 100%)  Wt(kg): 102kg   GENERAL: NAD, well-groomed, well-developed  EYES: No proptosis,  anicteric  HEENT:  Atraumatic, Normocephalic, moist mucous membranes  THYROID: Normal size, no palpable nodules, nontender   RESPIRATORY: Clear to auscultation bilaterally; No rales, rhonchi, wheezing  CARDIOVASCULAR: Regular rate and rhythm; No murmurs; no peripheral edema  GI: Soft, nontender, non distended, normal bowel sounds  SKIN: Dry, intact, No rashes or lesions  MUSCULOSKELETAL: right foot wrapped in gauze   NEURO: sensation intact, extraocular movements intact, no tremor  PSYCH: Alert and oriented x 3, reactive affect    POCT Blood Glucose.: 116 mg/dL (06-01-21 @ 17:14)  POCT Blood Glucose.: 103 mg/dL (06-01-21 @ 08:26)  POCT Blood Glucose.: 128 mg/dL (05-31-21 @ 21:47)  POCT Blood Glucose.: 128 mg/dL (05-31-21 @ 17:05)  POCT Blood Glucose.: 198 mg/dL (05-31-21 @ 13:08)  POCT Blood Glucose.: 145 mg/dL (05-31-21 @ 08:39)  POCT Blood Glucose.: 185 mg/dL (05-30-21 @ 21:47)  POCT Blood Glucose.: 99 mg/dL (05-30-21 @ 17:31)  POCT Blood Glucose.: 100 mg/dL (05-30-21 @ 12:33)  POCT Blood Glucose.: 105 mg/dL (05-30-21 @ 07:56)  POCT Blood Glucose.: 123 mg/dL (05-29-21 @ 22:40)                            10.2   6.80  )-----------( 220      ( 01 Jun 2021 06:56 )             31.8       06-01    135  |  99  |  47<H>  ----------------------------<  112<H>  4.8   |  23  |  4.70<H>    EGFR if : 16<L>  EGFR if non : 14<L>    Ca    8.9      06-01  Mg     2.7     06-01  Phos  5.8     06-01                     HPI:  50y old  Male who presents with history of DM type 2, CAD s/p stents x 2, longstanding history of diabetic foot ulcers, admitted for worsening R foot ulcer and fever to 102F at home.   Patient now s/p RF partial 2nd ray resection w ADDISON and free flap open medial. Patient treated with IV antibiotics, now in ATN.   Endocrine consult requested for management of DM2 in patient with TEENA. A1c 9.8%    Endocrine history:  Endocrinologist Dr London, last seen 12/2020  Reports having DM for over 10 years. Reports adherence to Lantus 44units qhs and Metformin 1000mg BID and Prandin 2mg TIDac. (patient did not mention, however noted in allscripts, patient was on Trulcity 1.5mg weekly at 12/2020 office visit)   Patient has freestyle luciana, reports BG variable, however did not want to quantify   Report history of DM retinopathy and neuropathy, with multiple toe amputations. + CAD    PAST MEDICAL & SURGICAL HISTORY:  CAD (coronary artery disease)  Diabetes  No significant past surgical history    FAMILY HISTORY:  mother has history of DM    Social History:  Report history of alcohol and tobacco     Outpatient Medications:  Tresiba 100 units/mL subcutaneous solution: 40 unit(s) subcutaneous once a day (at bedtime) (26 May 2021 11:39)      MEDICATIONS  (STANDING):  amoxicillin  500 milliGRAM(s)/clavulanate 1 Tablet(s) Oral two times a day  aspirin enteric coated 81 milliGRAM(s) Oral daily  atorvastatin 80 milliGRAM(s) Oral at bedtime  clopidogrel Tablet 75 milliGRAM(s) Oral daily  collagenase Ointment 1 Application(s) Topical daily  enoxaparin Injectable 40 milliGRAM(s) SubCutaneous every 24 hours  insulin glargine Injectable (LANTUS) 35 Unit(s) SubCutaneous at bedtime  insulin lispro (ADMELOG) corrective regimen sliding scale   SubCutaneous three times a day before meals  insulin lispro (ADMELOG) corrective regimen sliding scale   SubCutaneous at bedtime  insulin lispro Injectable (ADMELOG) 4 Unit(s) SubCutaneous three times a day before meals  sodium chloride 0.9%. 1000 milliLiter(s) (75 mL/Hr) IV Continuous <Continuous>    MEDICATIONS  (PRN):  acetaminophen   Tablet .. 650 milliGRAM(s) Oral every 6 hours PRN Mild Pain (1 - 3)  melatonin 6 milliGRAM(s) Oral at bedtime PRN Insomnia  oxyCODONE    IR 5 milliGRAM(s) Oral every 4 hours PRN Moderate Pain (4 - 6)  oxyCODONE    IR 10 milliGRAM(s) Oral every 4 hours PRN Severe Pain (7 - 10)    Allergies  No Known Allergies    Review of Systems:  Constitutional: + fever  Eyes: No blurry vision  Neuro: No tremors  HEENT: No pain  Cardiovascular: No chest pain, palpitations  Respiratory: No SOB, no cough  GI: No nausea, vomiting, abdominal pain  : No dysuria  Skin: right foot pain and foot ulcer   Endocrine: no polyuria, polydipsia  Hem/lymph: no swelling    ALL OTHER SYSTEMS REVIEWED AND NEGATIVE      PHYSICAL EXAM:  VITALS: T(C): 36.5 (06-01-21 @ 17:25)  T(F): 97.7 (06-01-21 @ 17:25), Max: 99 (05-31-21 @ 22:21)  HR: 65 (06-01-21 @ 17:25) (65 - 72)  BP: 137/83 (06-01-21 @ 17:25) (117/66 - 137/83)  RR:  (17 - 18)  SpO2:  (97% - 100%)  Wt(kg): 102kg   GENERAL: NAD, well-groomed, well-developed  EYES: No proptosis,  anicteric  HEENT:  Atraumatic, Normocephalic, moist mucous membranes  THYROID: Normal size, no palpable nodules, nontender   RESPIRATORY: Clear to auscultation bilaterally; No rales, rhonchi, wheezing  CARDIOVASCULAR: Regular rate and rhythm; No murmurs; no peripheral edema  GI: Soft, nontender, non distended, normal bowel sounds  SKIN: Dry, intact, No rashes or lesions  MUSCULOSKELETAL: right foot wrapped in gauze   NEURO: sensation intact, extraocular movements intact, no tremor  PSYCH: Alert and oriented x 3, reactive affect    POCT Blood Glucose.: 116 mg/dL (06-01-21 @ 17:14)  POCT Blood Glucose.: 103 mg/dL (06-01-21 @ 08:26)  POCT Blood Glucose.: 128 mg/dL (05-31-21 @ 21:47)  POCT Blood Glucose.: 128 mg/dL (05-31-21 @ 17:05)  POCT Blood Glucose.: 198 mg/dL (05-31-21 @ 13:08)  POCT Blood Glucose.: 145 mg/dL (05-31-21 @ 08:39)  POCT Blood Glucose.: 185 mg/dL (05-30-21 @ 21:47)  POCT Blood Glucose.: 99 mg/dL (05-30-21 @ 17:31)  POCT Blood Glucose.: 100 mg/dL (05-30-21 @ 12:33)  POCT Blood Glucose.: 105 mg/dL (05-30-21 @ 07:56)  POCT Blood Glucose.: 123 mg/dL (05-29-21 @ 22:40)                            10.2   6.80  )-----------( 220      ( 01 Jun 2021 06:56 )             31.8       06-01    135  |  99  |  47<H>  ----------------------------<  112<H>  4.8   |  23  |  4.70<H>    EGFR if : 16<L>  EGFR if non : 14<L>    Ca    8.9      06-01  Mg     2.7     06-01  Phos  5.8     06-01

## 2021-06-01 NOTE — CONSULT NOTE ADULT - ASSESSMENT
50y old  Male who presents with history of DM type II, CAD s/p stents x 2, longstanding history of diabetic foot ulcers, admitted for worsening R foot ulcer and fever to 102F at home.   Patient now s/p RF partial 2nd ray resection w ADDISON and free flap open medial. Patient treated with IV antibiotics, now in ATN.   Endocrine consult requested for management of DM2 in patient with TEENA. A1c 9.8%    Uncontrolled DM2 c/b CAD, retinopathy neurpopathy and DM foot ulcers  Currently in ATN per renal due to Antibx  A1c 9.8%  Fs goal 100-180  Fs premeal and qhs   Agree with decreased dose of Lantus 35units qhs, given decreased renal clearance  Recommend continue Admelog 4units TIDac, hold if NPO   Recommend low correctional scale premeal and qhs   If patient to be NPO for any further procedures, recommend decrease basal insulin by 80% the evening before procedure.     Recommend basal/bolus insulin, dose to be determined on discharge given uncontrolled BG on PO medication/insulin and acute change in GFR  Discharge on metformin to be determined by improvement in GFR. Will discontinue Prandin if patient agreeable to basal/bolus  Patient to follow up with Dr London on discharge    HTN   goal< 130/80  bp slightly above goal  Would continue to monitor and optimize pain management    HLD  goal LDL<70  Continue Atorvastatin       Discussed with Dr Jane Diaz MD  Endocrine Fellow   Pager  on weekdays 9am- 5pm   Please call  after hours and on weekends 50y old  Male who presents with history of DM type 2, CAD s/p stents x 2, longstanding history of diabetic foot ulcers, admitted for worsening R foot ulcer and fever to 102F at home.   Patient now s/p RF partial 2nd ray resection w ADDISON and free flap open medial. Patient treated with IV antibiotics, now in ATN.   Endocrine consult requested for management of DM2 in patient with TEENA. A1c 9.8%    Uncontrolled DM2 c/b CAD, retinopathy neurpopathy and DM foot ulcers  Currently in ATN per renal due to Antibx  A1c 9.8%  Fs goal 100-180  Fs premeal and qhs   Agree with decreased dose of Lantus 35units qhs, given decreased renal clearance  Recommend continue Admelog 4units TIDac, hold if NPO   Recommend low correctional scale premeal and qhs   If patient to be NPO for any further procedures, recommend decrease basal insulin by 80% the evening before procedure.     Recommend basal/bolus insulin, dose to be determined on discharge given uncontrolled BG on PO medication/insulin and acute change in GFR  Discharge on metformin to be determined by improvement in GFR. Will discontinue Prandin if patient agreeable to basal/bolus  Patient to follow up with Dr London on discharge    HTN   goal< 130/80  bp slightly above goal  Would continue to monitor and optimize pain management    HLD  goal LDL<70  Continue Atorvastatin       Discussed with Dr Jane Diaz MD  Endocrine Fellow   Pager  on weekdays 9am- 5pm   Please call  after hours and on weekends 50y old  Male who presents with history of DM type 2, CAD s/p stents x 2, longstanding history of diabetic foot ulcers, admitted for worsening R foot ulcer and fever to 102F at home.   Patient now s/p RF partial 2nd ray resection w ADDISON and free flap open medial. Patient treated with IV antibiotics, now in ATN.   Endocrine consult requested for management of DM2 in patient with TEENA. A1c 9.8%    Uncontrolled DM2 c/b CAD, retinopathy neurpopathy and DM foot ulcers  Currently in ATN per renal due to Antibx  A1c 9.8%, (possibly due to medication nonadherence documented in outpt endocrine notes)    Fs goal 100-180  Fs premeal and qhs   Agree with decreased dose of Lantus 35units qhs, given decreased renal clearance  Recommend continue Admelog 4units TIDac, hold if NPO   Recommend low correctional scale premeal and qhs   If patient to be NPO for any further procedures, recommend decrease basal insulin by 80% the evening before procedure.     Recommend basal/bolus insulin, dose to be determined on discharge given uncontrolled BG on PO medication/insulin and acute change in GFR  Discharge on metformin to be determined by improvement in GFR. Will discontinue Prandin if patient agreeable to basal/bolus  Will need to clarify if patient is on Trulcity as outpt   Patient to follow up with Dr London on discharge ( no appt scheduled at this time)    HTN   goal< 130/80  bp slightly above goal  Would continue to monitor and optimize pain management    HLD  goal LDL<70  Continue Atorvastatin       Discussed with Dr Jane Rehman-Niya Diaz MD  Endocrine Fellow   Pager  on weekdays 9am- 5pm   Please call  after hours and on weekends

## 2021-06-01 NOTE — PROGRESS NOTE ADULT - ASSESSMENT
50M PMH DM type II (last a1c 10.2) , CAD s/p stents x 2, longstanding history of diabetic foot ulcers, s/p R 3rd and 4th partial ray resections by podiatry 4/15/21; now presenting with worsening R foot ulcer and fever to 102F at home; presentation c/f infected foot ulcer with concern for OM c/b sepsis in the setting of poorly controlled DM.             50M PMH DM type II (last a1c 10.2) , CAD s/p stents x 2, longstanding history of diabetic foot ulcers, s/p R 3rd and 4th partial ray resections by podiatry 4/15/21; now presenting with worsening R foot ulcer and fever to 102F at home; presentation c/f infected foot ulcer with concern for OM c/b sepsis in the setting of poorly controlled DM.  Had Pod surgery 5/28.  TEENA -seen by Nephrology Creat 1------> 4.7  Neph recs:  "  Problem: TEENA (acute kidney injury). Recommendation: Pt with TEENA in the setting of supratherapeutic vancomycin level, hypotension, NSAIDS and ACE-I. Now likely in ATN. Patient noted to have Scr of 1.08 on 5/28/21. Scr increased to 3.79 this morning, and further increased to 4.18 later today. Vancomycin level from yesterday was elevated at 31.5 (was on Vancomycin from 5/25/21 to 5/29/21). Blood pressure noted to be boarderline low at 95/59 on 5/28/21. Got 1 dose of motrin on5/26/21. Suggest to send UA, urine electrolytes, spot urine TP/CR. Obtain bladder scan to r/o urinary retention. Repeat vancomycin level with the next set of labs. Discontinue Lisinopril. Continue to hold Vancomycin. Monitor labs and urine output. Avoid NSAIDs, ACEI/ARBS, RCA and nephrotoxins. Dose medications as per eGFR."

## 2021-06-01 NOTE — CONSULT NOTE ADULT - ATTENDING COMMENTS
50M PMH DM type II (last a1c 10.2) , CAD s/p stents x 2, longstanding history of diabetic foot ulcers, admitted to Dr. Lopez last year and underwent R 3rd and 4th partial ray resections by podiatry 4/15/21 p/w infected foot ulcer, r/o OM. Pt admitted to surgery service for podiatry intervention. Medicine consulted for transfer of service. Continue with vanc and zosyn, MRI pending. Pre-op eval as above, pt refusing EKG despite explaining necessity for risk stratification prior to procedure. Adjust insulin as per FS.
case presented to me by phone 5/30.  seen and evaluated 5/31.  in addition to above patient reports issues with voiding urine due to positioning and lying flat.  Recommend to monitor PVR and check kidney ultrasound to evaluate for hydronephrosis.  Although ultimately suspect that this is most likely the plateau phase of ATN.  Recommend hold ACE I, ok to continue fluids given pt today reports poor appetite + prior hypotension.
Uncontrolled DM2 complicated by foot ulcer requiring amputation, TEENA, HbA1c 9.8%.  Was on basal plus oral agents prior to admission. Recommend basal/bolus insulin for dc, patient will think about it.  Follows with Dr. London, need to schedule next visit.  Prescribe Joy 14 day for dc as he prefers this over Joy 2 so he can use the  song on his phone.  Endocrine team consulted for uncontrolled diabetes. Patient is high risk with high level decision making due to uncontrolled diabetes which places patient at high risk for cardiovascular and cerebrovascular events. Patient with lability of glucose requiring close monitoring and insulin adjustments.    Aria Hassan MD  Division of Endocrinology  Pager: [phone]    If after 6PM or before 9AM, or on weekends/holidays, please call endocrine answering service for assistance ([phone]).  For nonurgent matters email [email or web address] for assistance.

## 2021-06-01 NOTE — CHART NOTE - NSCHARTNOTEFT_GEN_A_CORE
Notified by RN that the patient is complaining of nausea, requesting anti-emetic.     Patient was seen and evaluated at bedside. On arrival pt was drinking apple juice and sitting up in bed, in NAD. Pt reports that a few hours ago he started to feel nauseous and the nausea is now persisting. Pt denies abdominal pain, vomiting, constipation, dyspepsia. Admits he feels "gassy" but otherwise without further complaints at this time. Last bowel movement was yesterday, he tired to have bowel movement this evening but could not go. When asked if he feels constipated he said "no." His last Oxycodone was at 11am, he does not think the nausea is from the pain meds. He is requesting medication for his nausea.     PHYSICAL EXAM:  GENERAL: No acute distress, well-developed  ABDOMEN: Soft, non-tender, non-distended, no rigidity, no guarding no rebound tenderness; normal bowel sounds were auscultated.     Vital Signs Last 24 Hrs  T(C): 36.7 (01 Jun 2021 21:23), Max: 36.7 (01 Jun 2021 21:23)  T(F): 98 (01 Jun 2021 21:23), Max: 98 (01 Jun 2021 21:23)  HR: 61 (01 Jun 2021 21:23) (61 - 72)  BP: 134/86 (01 Jun 2021 21:23) (120/70 - 137/83)  RR: 17 (01 Jun 2021 21:23) (17 - 17)  SpO2: 98% (01 Jun 2021 21:23) (98% - 100%)    Plan   - Abdominal exam unremarkable  - Pt is very concerned about his renal function; I discussed pt's case with Pharmacy, they advised Zofran is fine and it should NOT impair renal function.   - Pt amendable to Zofran, which was ordered.   - Will continue to monitor pt closely.

## 2021-06-01 NOTE — CONSULT NOTE ADULT - PROBLEM SELECTOR PROBLEM 1
TEENA (acute kidney injury)
Type 2 diabetes mellitus with other diabetic kidney complication, with long-term current use of insulin

## 2021-06-01 NOTE — PROGRESS NOTE ADULT - ASSESSMENT
If you have any questions, please feel free to contact me  Ayah Amaya  Nephrology Fellow  [phone]  (After 5pm or on weekends please page the on-call fellow)  Pt. with TEENA in setting of supratherapeutic vancomycin level, and hypotension, now likely with ATN     TEENA progressing to ATN-  Pt with TEENA in the setting of supratherapeutic vancomycin level, hypotension, NSAIDS and ACE-I. Now likely in ATN. Patient noted to have Scr of 1.08 on 5/28/21. Scr increased to 3.79 this morning, and further peaked at to 4.72 on 5/31 & now downtrending to 4.70 today  Vancomycin level from 5/29 was elevated at 31.5 (was on Vancomycin from 5/25/21 to 5/29/21). Vanco level down to 8.9 on 5/31. Blood pressure noted to be borderline low at 95/59 on 5/28/21 & again on 5/31. Got 1 dose of motrin on 5/26/21. Please switch zosyn to 3.375g q12 or 2.25 q8 hrs for renal dosing.  UA with protein 100mg/dl, 7 WBCs. Jareth is 28. FeNa 0.8%.  urine electrolytes, spot urine TP/CR.   Renal US: Right kidney: 12.5 cm. No renal mass, hydronephrosis or calculi.Left kidney: 13.4 cm. No renal mass, hydronephrosis or calculi.  Lisinopril on hold.  Continue to hold Vancomycin. Continue IVF. Monitor labs and urine output. Avoid NSAIDs, ACEI/ARBS, RCA and nephrotoxins. Dose medications as per eGFR.      If you have any questions, please feel free to contact me  Ayah Amaya  Nephrology Fellow  [phone]  (After 5pm or on weekends please page the on-call fellow)

## 2021-06-01 NOTE — PROGRESS NOTE ADULT - PROBLEM SELECTOR PLAN 2
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 - Podiatry recs appreciated, s/p RF partial 2nd ray resection w ADDISON and free flap open medial (5/28/21)  - Per podiatry, low concern for residual bone infection, moderate concern for flap viability  - NWB to RLE in a CAM boot  - Prelim bone culture staph hemolyticus  - If clean bone cx s2 days, then can discharge on PO Augmentin 1 x 1week  - C/w zosyn for now and DC vanco as abscess cx w/ strep   - BCx NGTD, wound cultures w/ mod strep and coag neg staph

## 2021-06-01 NOTE — PROGRESS NOTE ADULT - ASSESSMENT
51 yo M pt w/ L foot submet 4 wound,  s/p RF partial 2nd ray resection w ADDISON and free flap open medial (DOS 5/28/21)  - pt seen and evaluated  -s/p RF partial 2nd ray resection w ADDISON and free flap open medial (DOS 5/28/21): surgical sites well coapted, sutures intact, no dehiscence, flap warm with duskiness to dorsal flap and no signs of active infection   -L foot submet 4 wound down to bone w/ no signs of active infection applied Aquacel and DSD  -as per intra op findings low concern for residual bone infection, moderate concern for flap viability  - pt to stay non WB to RLE in a CAM boot  - bone culture staph hemolyticus  - pod stable for discharge on PO Augmentin x 1 week, info in discharge paperwork  - Seen w/ attending

## 2021-06-01 NOTE — PROGRESS NOTE ADULT - PROBLEM SELECTOR PLAN 7
DVT ppx: lovenox 40  Dispo: pending hospital course, PT eval pending    Discussed with ACP DVT ppx: lovenox 40  Dispo: pending hospital course, PT eval pending    Discussed with ACP    Called Nephrology to f/u patient DVT ppx: lovenox 40  Dispo: pending hospital course, PT eval pending    Discussed with ACP    Called Nephrology to f/u patient- at [phone] DVT ppx: lovenox 40  Dispo: pending hospital course, PT eval pending    Discussed with ACP    Called Nephrology to f/u patient- at [phone]  Called by ACP- patient wants to leave AMA- she will call nephrology to talk to patient   Also called patient care advocate at [phone] for help DVT ppx: lovenox 40  Dispo: pending hospital course, PT eval pending    Discussed with ACP    Called Nephrology to f/u patient- at [phone]  Called by ACP- patient wants to leave AMA- she will call nephrology to talk to patient   Also called patient care advocate at [phone] for help  called Endocrine consult called [phone] for help with glucose in setting of TEENA- spoke Phyllis

## 2021-06-01 NOTE — PROGRESS NOTE ADULT - PROBLEM SELECTOR PLAN 1
Cr 1.08 --> 4.18 --> 4.72. Likely ATN. Was on vancomycin, blood pressure low, and received motrin on 5/26.. Bladder scan 42cc- no urinary retention. Repeat vanco level 8.9. FeNa 0.8% - pre-renal.   - UA and urine lytes sent.   - Vanc d/luz  - Hold lisinopril for now  - Renal recommended IV hydration: NS at 75cc/hr for 12 hours.   - Urine output : 1800cc on 5/30  - F/u renal ultrasound.   - F/u strict I&Os.   - F/u renal recommendations.  - Avoid nephrotoxic medications.

## 2021-06-01 NOTE — PROGRESS NOTE ADULT - SUBJECTIVE AND OBJECTIVE BOX
Podiatry pager #: [phone] ([place])/ [phone] ([facility])    Patient is a 50y old  Male who presents with a chief complaint of foot inf (2021 10:20)       INTERVAL HPI/OVERNIGHT EVENTS:  Patient seen and evaluated at bedside.  Pt is resting comfortable in NAD. Denies N/V/F/C.     Allergies    No Known Allergies    Intolerances        Vital Signs Last 24 Hrs  T(C): 36.5 (2021 10:06), Max: 37.2 (31 May 2021 22:21)  T(F): 97.7 (2021 10:06), Max: 99 (31 May 2021 22:21)  HR: 67 (2021 10:06) (66 - 84)  BP: 131/89 (2021 10:06) (115/74 - 148/81)  BP(mean): --  RR: 17 (2021 10:06) (17 - 18)  SpO2: 100% (2021 10:06) (97% - 100%)    LABS:                        10.2   6.80  )-----------( 220      ( 2021 06:56 )             31.8     06-01    135  |  99  |  47<H>  ----------------------------<  112<H>  4.8   |  23  |  4.70<H>    Ca    8.9      2021 06:56  Phos  5.8     06-01  Mg     2.7     06-01        Urinalysis Basic - ( 30 May 2021 16:49 )    Color: Yellow / Appearance: Slightly Turbid / S.016 / pH: x  Gluc: x / Ketone: Negative  / Bili: Negative / Urobili: <2 mg/dL   Blood: x / Protein: 100 mg/dL / Nitrite: Negative   Leuk Esterase: Negative / RBC: 2 /HPF / WBC 7 /HPF   Sq Epi: x / Non Sq Epi: 2 /HPF / Bacteria: Negative      CAPILLARY BLOOD GLUCOSE      POCT Blood Glucose.: 103 mg/dL (2021 08:26)  POCT Blood Glucose.: 128 mg/dL (31 May 2021 21:47)  POCT Blood Glucose.: 128 mg/dL (31 May 2021 17:05)      Lower Extremity Physical Exam:    Vascular:  DP non palpable b/l, PT 2/4 Left foot + non palpable RF,  CFT < 3seconds B/L, Temperature gradient warm to warm Right foot, warm to cool L foot  Neuro: Epicritic sensation diminished to the level of toes B/L  L foot submet 4 wound down to bone w/ no signs of active infection   s/p RF partial 2nd ray resection w ADDISON and free flap open medial (DOS 21): surgical sites well coapted, sutures intact, no dehiscence, flap warm with duskiness to dorsal flap and blood blister, no signs of infection     RADIOLOGY & ADDITIONAL TESTS:

## 2021-06-01 NOTE — PROGRESS NOTE ADULT - SUBJECTIVE AND OBJECTIVE BOX
This is my first time meeting this patient   Patient is a 50y old  Male who presents with a chief complaint of foot infection (29 May 2021 14:14)      SUBJECTIVE / OVERNIGHT EVENTS:  Patient seen with Pod Dr Berry-very angry that he is still in hospital and wants to jorge a   His kidney was fine coming in and now he has kidney issues and he want to jorge a.  Podiatry notes he is able to go home.  Explained his Kidneys are not optimized and so he will not be able to leave  stated his mother is Coming from [place] to care for him- explained she can visit him in hospital - he is not happy- he wants to see Nephrology    MEDICATIONS  (STANDING):  aspirin enteric coated 81 milliGRAM(s) Oral daily  atorvastatin 80 milliGRAM(s) Oral at bedtime  clopidogrel Tablet 75 milliGRAM(s) Oral daily  collagenase Ointment 1 Application(s) Topical daily  enoxaparin Injectable 40 milliGRAM(s) SubCutaneous every 24 hours  insulin glargine Injectable (LANTUS) 35 Unit(s) SubCutaneous at bedtime  insulin lispro (ADMELOG) corrective regimen sliding scale   SubCutaneous three times a day before meals  insulin lispro (ADMELOG) corrective regimen sliding scale   SubCutaneous at bedtime  insulin lispro Injectable (ADMELOG) 4 Unit(s) SubCutaneous three times a day before meals  piperacillin/tazobactam IVPB.. 3.375 Gram(s) IV Intermittent every 8 hours  sodium chloride 0.9%. 1000 milliLiter(s) (75 mL/Hr) IV Continuous <Continuous>    MEDICATIONS  (PRN):  acetaminophen   Tablet .. 650 milliGRAM(s) Oral every 6 hours PRN Mild Pain (1 - 3)  melatonin 6 milliGRAM(s) Oral at bedtime PRN Insomnia  oxyCODONE    IR 5 milliGRAM(s) Oral every 4 hours PRN Moderate Pain (4 - 6)  oxyCODONE    IR 10 milliGRAM(s) Oral every 4 hours PRN Severe Pain (7 - 10)        CAPILLARY BLOOD GLUCOSE      POCT Blood Glucose.: 103 mg/dL (2021 08:26)  POCT Blood Glucose.: 128 mg/dL (31 May 2021 21:47)  POCT Blood Glucose.: 128 mg/dL (31 May 2021 17:05)  POCT Blood Glucose.: 198 mg/dL (31 May 2021 13:08)    I&O's Summary    31 May 2021 07:01  -  2021 07:00  --------------------------------------------------------  IN: 2795 mL / OUT: 1950 mL / NET: 845 mL    2021 07:01  -  2021 11:15  --------------------------------------------------------  IN: 500 mL / OUT: 0 mL / NET: 500 mL        PHYSICAL EXAM:  GENERAL: NAD, well-developed  HEAD:  Atraumatic, Normocephalic  EYES: EOMI, PERRLA, conjunctiva and sclera clear  NECK: Supple, No JVD  CHEST/LUNG: Clear to auscultation bilaterally; No wheeze  HEART: Regular rate and rhythm; No murmurs, rubs, or gallops  ABDOMEN: Soft, Nontender, Nondistended; Bowel sounds present  EXTREMITIES:  2+ Peripheral Pulses, No clubbing, cyanosis, or edema  PSYCH: AAOx3  NEUROLOGY: non-focal  SKIN: No rashes or lesions    LABS:                        10.2   6.80  )-----------( 220      ( 2021 06:56 )             31.8     06-01    135  |  99  |  47<H>  ----------------------------<  112<H>  4.8   |  23  |  4.70<H>    Ca    8.9      2021 06:56  Phos  5.8     06-01  Mg     2.7     06-01            Urinalysis Basic - ( 30 May 2021 16:49 )  Color: Yellow / Appearance: Slightly Turbid / S.016 / pH: x  Gluc: x / Ketone: Negative  / Bili: Negative / Urobili: <2 mg/dL   Blood: x / Protein: 100 mg/dL / Nitrite: Negative   Leuk Esterase: Negative / RBC: 2 /HPF / WBC 7 /HPF   Sq Epi: x / Non Sq Epi: 2 /HPF / Bacteria: Negative          Care Discussed with Consultants/Other Providers:  Pod - Dr Berry   This is my first time meeting this patient   Patient is a 50y old  Male who presents with a chief complaint of foot infection (29 May 2021 14:14)      SUBJECTIVE / OVERNIGHT EVENTS:  Patient seen with Pod Dr Berry-very angry that he is still in hospital and wants to jorge a   His kidney was fine coming in and now he has kidney issues and he want to jorge a.  Podiatry notes he is able to go home.  Explained his Kidneys are not optimized and so he will not be able to leave  stated his mother is Coming from [place] to care for him- explained she can visit him in hospital - he is not happy- he wants to see Nephrology    MEDICATIONS  (STANDING):  aspirin enteric coated 81 milliGRAM(s) Oral daily  atorvastatin 80 milliGRAM(s) Oral at bedtime  clopidogrel Tablet 75 milliGRAM(s) Oral daily  collagenase Ointment 1 Application(s) Topical daily  enoxaparin Injectable 40 milliGRAM(s) SubCutaneous every 24 hours  insulin glargine Injectable (LANTUS) 35 Unit(s) SubCutaneous at bedtime  insulin lispro (ADMELOG) corrective regimen sliding scale   SubCutaneous three times a day before meals  insulin lispro (ADMELOG) corrective regimen sliding scale   SubCutaneous at bedtime  insulin lispro Injectable (ADMELOG) 4 Unit(s) SubCutaneous three times a day before meals  piperacillin/tazobactam IVPB.. 3.375 Gram(s) IV Intermittent every 8 hours  sodium chloride 0.9%. 1000 milliLiter(s) (75 mL/Hr) IV Continuous <Continuous>    MEDICATIONS  (PRN):  acetaminophen   Tablet .. 650 milliGRAM(s) Oral every 6 hours PRN Mild Pain (1 - 3)  melatonin 6 milliGRAM(s) Oral at bedtime PRN Insomnia  oxyCODONE    IR 5 milliGRAM(s) Oral every 4 hours PRN Moderate Pain (4 - 6)  oxyCODONE    IR 10 milliGRAM(s) Oral every 4 hours PRN Severe Pain (7 - 10)        CAPILLARY BLOOD GLUCOSE      POCT Blood Glucose.: 103 mg/dL (2021 08:26)  POCT Blood Glucose.: 128 mg/dL (31 May 2021 21:47)  POCT Blood Glucose.: 128 mg/dL (31 May 2021 17:05)  POCT Blood Glucose.: 198 mg/dL (31 May 2021 13:08)    I&O's Summary    31 May 2021 07:  -  2021 07:00  --------------------------------------------------------  IN: 2795 mL / OUT: 1950 mL / NET: 845 mL    2021 07:01  -  2021 11:15  --------------------------------------------------------  IN: 500 mL / OUT: 0 mL / NET: 500 mL        PHYSICAL EXAM:  GENERAL: NAD, well-developed  HEAD:  Atraumatic, Normocephalic  EYES: EOMI, PERRLA, conjunctiva and sclera clear  NECK: Supple, No JVD  CHEST/LUNG: Clear to auscultation bilaterally; No wheeze  HEART: Regular rate and rhythm; No murmurs, rubs, or gallops  ABDOMEN: Soft, Nontender, Nondistended; Bowel sounds present  EXTREMITIES:  2+ Peripheral Pulses, No clubbing, cyanosis, or edema  dressing to b/l feet  PSYCH: AAOx3  NEUROLOGY: non-focal      LABS:                        10.2   6.80  )-----------( 220      ( 2021 06:56 )             31.8     06-01    135  |  99  |  47<H>  ----------------------------<  112<H>  4.8   |  23  |  4.70<H>    Ca    8.9      2021 06:56  Phos  5.8     06-01  Mg     2.7     06-01            Urinalysis Basic - ( 30 May 2021 16:49 )  Color: Yellow / Appearance: Slightly Turbid / S.016 / pH: x  Gluc: x / Ketone: Negative  / Bili: Negative / Urobili: <2 mg/dL   Blood: x / Protein: 100 mg/dL / Nitrite: Negative   Leuk Esterase: Negative / RBC: 2 /HPF / WBC 7 /HPF   Sq Epi: x / Non Sq Epi: 2 /HPF / Bacteria: Negative          Care Discussed with Consultants/Other Providers:  Pod - Dr Berry

## 2021-06-01 NOTE — CHART NOTE - NSCHARTNOTEFT_GEN_A_CORE
Called by primary RN patient frustrated with care states he has many questions for the kidney doctor.  Called nephrology who states patient was seen @ 0700 and was pending lab results, this was explained to the patient at that time and he stated understanding.  Patient wants a "timeframe for kidney recovery from kidney doctor" spoke to nephrology at patients request however AS THEY DISCUSSED with patient there is no timeframe that can be guaranteed for recovery from TEENA.     Patient upset with care stating that the hospital "damaged his kidneys" explained to patient that he was admitted with a fo Called by primary RN patient frustrated with care states he has many questions for the kidney doctor.  Called nephrology who states patient was seen @ 0700 and was pending lab results, this was explained to the patient at that time and he stated understanding.  Patient wants a "timeframe for kidney recovery from kidney doctor" spoke to nephrology at patients request however AS THEY DISCUSSED with patient there is no timeframe that can be guaranteed for recovery from TEENA. Attempted to answer all questions and address all concerns however patient fixated on his renal function and blaming providers.

## 2021-06-01 NOTE — PROGRESS NOTE ADULT - SUBJECTIVE AND OBJECTIVE BOX
[facility] Division of Kidney Diseases & Hypertension  FOLLOW UP NOTE  [phone]--------------------------------------------------------------------------------  Chief Complaint:Non-pressure chronic ulcer of other part of right foot    HPI: Patient is a 51 y/o M w PMH of DM2, CAD s/p stents and diabetic foot ulcers presented to [facility] for worsening R foot ulcer and fever. Admitted for suspected sepsis and possible OM of RLE. S/p R foot partial 2nd ray resection on 5/28/21. Nephrology consulted for TEENA. On review of labs on [facility] HIE/[facility], patient noted to have Scr of 1.08 on 5/28/21. Scr increased to 3.79 this morning, and further peaked at to 4.72 on 5/31 & now downtrending to 4.70 today    24 hour events/subjective: Patient seen & examined. Labs & vitals reviewed. Denies chest pain, fever, chills, increased frequency, dysuria, hematuria, pus in urine, frothy urine, SOB, leg edema, loss of appetite, N/V, pruritis. UO in the last 24 hours 1.9L. Appears angry.           PAST HISTORY  --------------------------------------------------------------------------------  No significant changes to PMH, PSH, FHx, SHx, unless otherwise noted    ALLERGIES & MEDICATIONS  --------------------------------------------------------------------------------  Allergies    No Known Allergies    Intolerances      Standing Inpatient Medications  aspirin enteric coated 81 milliGRAM(s) Oral daily  atorvastatin 80 milliGRAM(s) Oral at bedtime  clopidogrel Tablet 75 milliGRAM(s) Oral daily  collagenase Ointment 1 Application(s) Topical daily  enoxaparin Injectable 40 milliGRAM(s) SubCutaneous every 24 hours  insulin glargine Injectable (LANTUS) 35 Unit(s) SubCutaneous at bedtime  insulin lispro (ADMELOG) corrective regimen sliding scale   SubCutaneous three times a day before meals  insulin lispro (ADMELOG) corrective regimen sliding scale   SubCutaneous at bedtime  insulin lispro Injectable (ADMELOG) 4 Unit(s) SubCutaneous three times a day before meals  piperacillin/tazobactam IVPB.. 3.375 Gram(s) IV Intermittent every 8 hours  sodium chloride 0.9%. 1000 milliLiter(s) IV Continuous <Continuous>    PRN Inpatient Medications  acetaminophen   Tablet .. 650 milliGRAM(s) Oral every 6 hours PRN  melatonin 6 milliGRAM(s) Oral at bedtime PRN  oxyCODONE    IR 5 milliGRAM(s) Oral every 4 hours PRN  oxyCODONE    IR 10 milliGRAM(s) Oral every 4 hours PRN      REVIEW OF SYSTEMS  --------------------------------------------------------------------------------  Gen: No  fevers/chills  Skin: No rashes  Head/Eyes/Ears/Mouth: No headache; Normal hearing; Normal vision w/o blurriness  Respiratory: No dyspnea, cough, wheezing, hemoptysis  CV: No chest pain, PND, orthopnea  GI: No abdominal pain, diarrhea, constipation, nausea, vomiting  : No increased frequency, dysuria, hematuria, nocturia  MSK: No joint pain/swelling; no back pain; no edema  Neuro: No dizziness/lightheadedness, weakness, seizures, numbness, tingling      All other systems were reviewed and are negative, except as noted.    VITALS/PHYSICAL EXAM  --------------------------------------------------------------------------------  T(C): 36.5 (06-01-21 @ 10:06), Max: 37.2 (05-31-21 @ 22:21)  HR: 67 (06-01-21 @ 10:06) (66 - 84)  BP: 131/89 (06-01-21 @ 10:06) (115/74 - 148/81)  RR: 17 (06-01-21 @ 10:06) (17 - 18)  SpO2: 100% (06-01-21 @ 10:06) (97% - 100%)  Wt(kg): --        05-31-21 @ 07:01  -  06-01-21 @ 07:00  --------------------------------------------------------  IN: 2795 mL / OUT: 1950 mL / NET: 845 mL    06-01-21 @ 07:01  -  06-01-21 @ 12:26  --------------------------------------------------------  IN: 500 mL / OUT: 0 mL / NET: 500 mL      Physical Exam:  	Gen: NAD, well-appearing  	HEENT: PERRL, supple neck  	Pulm: CTA B/L  	CV: RRR, S1S2  	Back: No spinal or CVA tenderness  	Abd: +BS, soft, nontender/nondistended  	: No suprapubic tenderness          Extremities: no bilateral LE edema, no asterixis, RLE in dressing with mild pedal edema           Neuro: Awake, alert, No focal deficits  	Skin: Warm, without rashes  	Vascular access:    LABS/STUDIES  --------------------------------------------------------------------------------              10.2   6.80  >-----------<  220      [06-01-21 @ 06:56]              31.8     135  |  99  |  47  ----------------------------<  112      [06-01-21 @ 06:56]  4.8   |  23  |  4.70        Ca     8.9     [06-01-21 @ 06:56]      Mg     2.7     [06-01-21 @ 06:56]      Phos  5.8     [06-01-21 @ 06:56]            Creatinine Trend:  SCr 4.70 [06-01 @ 06:56]  SCr 4.72 [05-31 @ 07:29]  SCr 4.18 [05-30 @ 11:07]  SCr 3.79 [05-30 @ 07:51]  SCr 1.08 [05-29 @ 07:29]    Urinalysis - [05-30-21 @ 16:49]      Color Yellow / Appearance Slightly Turbid / SG 1.016 / pH 6.0      Gluc Negative / Ketone Negative  / Bili Negative / Urobili <2 mg/dL       Blood Trace / Protein 100 mg/dL / Leuk Est Negative / Nitrite Negative      RBC 2 / WBC 7 / Hyaline 1 / Gran  / Sq Epi  / Non Sq Epi 2 / Bacteria Negative    Urine Creatinine 117      [05-30-21 @ 16:49]  Urine Protein 77      [05-30-21 @ 16:49]  Urine Sodium 28      [05-30-21 @ 16:49]  Urine Potassium 49.9      [05-30-21 @ 16:49]  Urine Chloride <20      [05-30-21 @ 16:49]

## 2021-06-01 NOTE — CHART NOTE - NSCHARTNOTEFT_GEN_A_CORE
Patient requesting dressing change stating has not been changed since procedure.  Spoke to podiatry bilateral dressing changes were performed on 6/1 by podiatry

## 2021-06-02 LAB
ANION GAP SERPL CALC-SCNC: 11 MMOL/L — SIGNIFICANT CHANGE UP (ref 7–14)
BUN SERPL-MCNC: 40 MG/DL — HIGH (ref 7–23)
CALCIUM SERPL-MCNC: 9.3 MG/DL — SIGNIFICANT CHANGE UP (ref 8.4–10.5)
CHLORIDE SERPL-SCNC: 103 MMOL/L — SIGNIFICANT CHANGE UP (ref 98–107)
CO2 SERPL-SCNC: 25 MMOL/L — SIGNIFICANT CHANGE UP (ref 22–31)
CREAT SERPL-MCNC: 3.88 MG/DL — HIGH (ref 0.5–1.3)
CULTURE RESULTS: SIGNIFICANT CHANGE UP
CULTURE RESULTS: SIGNIFICANT CHANGE UP
GLUCOSE BLDC GLUCOMTR-MCNC: 103 MG/DL — HIGH (ref 70–99)
GLUCOSE BLDC GLUCOMTR-MCNC: 114 MG/DL — HIGH (ref 70–99)
GLUCOSE BLDC GLUCOMTR-MCNC: 118 MG/DL — HIGH (ref 70–99)
GLUCOSE BLDC GLUCOMTR-MCNC: 137 MG/DL — HIGH (ref 70–99)
GLUCOSE BLDC GLUCOMTR-MCNC: 148 MG/DL — HIGH (ref 70–99)
GLUCOSE BLDC GLUCOMTR-MCNC: 90 MG/DL — SIGNIFICANT CHANGE UP (ref 70–99)
GLUCOSE SERPL-MCNC: 100 MG/DL — HIGH (ref 70–99)
ORGANISM # SPEC MICROSCOPIC CNT: SIGNIFICANT CHANGE UP
ORGANISM # SPEC MICROSCOPIC CNT: SIGNIFICANT CHANGE UP
POTASSIUM SERPL-MCNC: 4.9 MMOL/L — SIGNIFICANT CHANGE UP (ref 3.5–5.3)
POTASSIUM SERPL-SCNC: 4.9 MMOL/L — SIGNIFICANT CHANGE UP (ref 3.5–5.3)
SODIUM SERPL-SCNC: 139 MMOL/L — SIGNIFICANT CHANGE UP (ref 135–145)
SPECIMEN SOURCE: SIGNIFICANT CHANGE UP
SPECIMEN SOURCE: SIGNIFICANT CHANGE UP

## 2021-06-02 PROCEDURE — 99233 SBSQ HOSP IP/OBS HIGH 50: CPT | Mod: GC

## 2021-06-02 PROCEDURE — 99233 SBSQ HOSP IP/OBS HIGH 50: CPT

## 2021-06-02 RX ORDER — CALCIUM CARBONATE 500(1250)
1 TABLET ORAL
Refills: 0 | Status: COMPLETED | OUTPATIENT
Start: 2021-06-02 | End: 2021-06-03

## 2021-06-02 RX ORDER — HEPARIN SODIUM 5000 [USP'U]/ML
5000 INJECTION INTRAVENOUS; SUBCUTANEOUS EVERY 8 HOURS
Refills: 0 | Status: DISCONTINUED | OUTPATIENT
Start: 2021-06-02 | End: 2021-06-04

## 2021-06-02 RX ADMIN — ATORVASTATIN CALCIUM 80 MILLIGRAM(S): 80 TABLET, FILM COATED ORAL at 21:35

## 2021-06-02 RX ADMIN — Medication 1 TABLET(S): at 06:20

## 2021-06-02 RX ADMIN — Medication 81 MILLIGRAM(S): at 13:54

## 2021-06-02 RX ADMIN — CLOPIDOGREL BISULFATE 75 MILLIGRAM(S): 75 TABLET, FILM COATED ORAL at 13:54

## 2021-06-02 RX ADMIN — INSULIN GLARGINE 35 UNIT(S): 100 INJECTION, SOLUTION SUBCUTANEOUS at 22:43

## 2021-06-02 RX ADMIN — Medication 1 TABLET(S): at 21:34

## 2021-06-02 RX ADMIN — OXYCODONE HYDROCHLORIDE 5 MILLIGRAM(S): 5 TABLET ORAL at 23:20

## 2021-06-02 RX ADMIN — Medication 1 APPLICATION(S): at 13:55

## 2021-06-02 RX ADMIN — Medication 1 TABLET(S): at 17:48

## 2021-06-02 RX ADMIN — Medication 4 UNIT(S): at 18:53

## 2021-06-02 RX ADMIN — OXYCODONE HYDROCHLORIDE 5 MILLIGRAM(S): 5 TABLET ORAL at 22:42

## 2021-06-02 NOTE — PROVIDER CONTACT NOTE (MEDICATION) - ACTION/TREATMENT ORDERED:
MD aware,
Pt states and understands not to be taken oral meds from home; tucked away in draw at this time, but pt is adamant about his sugars being within range. Team notified at this time and will continue to monitor pt throughout shift and continue to educate pt on the policy of possession of home medication.
No new order

## 2021-06-02 NOTE — PROGRESS NOTE ADULT - SUBJECTIVE AND OBJECTIVE BOX
[facility] DIVISION OF KIDNEY DISEASES AND HYPERTENSION -- FOLLOW UP NOTE  IRINEO Fellow pager # [phone]  IRINEO Attending phone # [phone]  Nephrology office # [phone]  -----------------------------------------------------------------------------  Chief Complaint:/subjective:   pt seen and examined along with the hospitalist Dr. Jeronimo, his mother at bedside   his cr is downtrending         PAST HISTORY  --------------------------------------------------------------------------------  No significant changes to PMH, PSH, FHx, SHx, unless otherwise noted    ALLERGIES & MEDICATIONS  --------------------------------------------------------------------------------  Allergies    No Known Allergies    Intolerances      Standing Inpatient Medications  amoxicillin  500 milliGRAM(s)/clavulanate 1 Tablet(s) Oral two times a day  aspirin enteric coated 81 milliGRAM(s) Oral daily  atorvastatin 80 milliGRAM(s) Oral at bedtime  clopidogrel Tablet 75 milliGRAM(s) Oral daily  collagenase Ointment 1 Application(s) Topical daily  heparin   Injectable 5000 Unit(s) SubCutaneous every 8 hours  insulin glargine Injectable (LANTUS) 35 Unit(s) SubCutaneous at bedtime  insulin lispro (ADMELOG) corrective regimen sliding scale   SubCutaneous three times a day before meals  insulin lispro (ADMELOG) corrective regimen sliding scale   SubCutaneous at bedtime  insulin lispro Injectable (ADMELOG) 4 Unit(s) SubCutaneous three times a day before meals  sodium chloride 0.9%. 1000 milliLiter(s) IV Continuous <Continuous>    PRN Inpatient Medications  acetaminophen   Tablet .. 650 milliGRAM(s) Oral every 6 hours PRN  melatonin 6 milliGRAM(s) Oral at bedtime PRN  oxyCODONE    IR 10 milliGRAM(s) Oral every 4 hours PRN  oxyCODONE    IR 5 milliGRAM(s) Oral every 4 hours PRN      VITALS/PHYSICAL EXAM  --------------------------------------------------------------------------------  T(C): 36.6 (06-02-21 @ 13:53), Max: 36.7 (06-01-21 @ 21:23)  HR: 62 (06-02-21 @ 13:53) (61 - 66)  BP: 139/88 (06-02-21 @ 13:53) (125/83 - 139/88)  RR: 16 (06-02-21 @ 13:53) (16 - 17)  SpO2: 95% (06-02-21 @ 13:53) (95% - 98%)  Wt(kg): --        06-01-21 @ 07:01  -  06-02-21 @ 07:00  --------------------------------------------------------  IN: 1015 mL / OUT: 2200 mL / NET: -1185 mL    06-02-21 @ 07:01  -  06-02-21 @ 16:29  --------------------------------------------------------  IN: 270 mL / OUT: 800 mL / NET: -530 mL      Physical Exam:  Gen NAD  	HEENT: no JVD  	Pulm: CTABL  	CV: S1S2,  	Abd: Soft, Obese, + BS  	Ext: R foot dressing,   	Neuro: Awake and alert  	Skin: Warm and dry    LABS/STUDIES  --------------------------------------------------------------------------------              10.2   6.80  >-----------<  220      [06-01-21 @ 06:56]              31.8     Hemoglobin: 10.2 g/dL (06-01-21 @ 06:56)    Platelet Count - Automated: 220 K/uL (06-01-21 @ 06:56)    139  |  103  |  40  ----------------------------<  100      [06-02-21 @ 10:03]  4.9   |  25  |  3.88        Ca     9.3     [06-02-21 @ 10:03]      Mg     2.7     [06-01-21 @ 06:56]      Phos  5.8     [06-01-21 @ 06:56]            Creatinine, Serum: 3.88 mg/dL (06-02-21 @ 10:03)  Creatinine, Serum: 4.70 mg/dL (06-01-21 @ 06:56)  Creatinine, Serum: 4.72 mg/dL (05-31-21 @ 07:29)  Creatinine, Serum: 4.18 mg/dL (05-30-21 @ 11:07)  Creatinine, Serum: 3.79 mg/dL (05-30-21 @ 07:51)  Creatinine, Serum: 1.08 mg/dL (05-29-21 @ 07:29)  Creatinine, Serum: 1.01 mg/dL (05-28-21 @ 06:48)  Creatinine, Serum: 0.97 mg/dL (05-27-21 @ 06:56)  Creatinine, Serum: 1.24 mg/dL (05-26-21 @ 08:23)  Creatinine, Serum: 0.97 mg/dL (05-26-21 @ 00:21)    SODIUM TREND:  Sodium 139 [06-02 @ 10:03]  Sodium 135 [06-01 @ 06:56]  Sodium 130 [05-31 @ 07:29]  Sodium 133 [05-30 @ 11:07]  Sodium 133 [05-30 @ 07:51]  Sodium 134 [05-29 @ 07:29]  Sodium 133 [05-28 @ 06:48]  Sodium 134 [05-27 @ 06:56]  Sodium 136 [05-26 @ 08:23]  Sodium 136 [05-26 @ 00:21]        SCr 3.88 [06-02 @ 10:03]  SCr 4.70 [06-01 @ 06:56]  SCr 4.72 [05-31 @ 07:29]  SCr 4.18 [05-30 @ 11:07]  SCr 3.79 [05-30 @ 07:51]    Urinalysis - [05-30-21 @ 16:49]      Color Yellow / Appearance Slightly Turbid / SG 1.016 / pH 6.0      Gluc Negative / Ketone Negative  / Bili Negative / Urobili <2 mg/dL       Blood Trace / Protein 100 mg/dL / Leuk Est Negative / Nitrite Negative      RBC 2 / WBC 7 / Hyaline 1 / Gran  / Sq Epi  / Non Sq Epi 2 / Bacteria Negative    Urine Creatinine 117      [05-30-21 @ 16:49]  Urine Protein 77      [05-30-21 @ 16:49]  Urine Sodium 28      [05-30-21 @ 16:49]  Urine Potassium 49.9      [05-30-21 @ 16:49]  Urine Chloride <20      [05-30-21 @ 16:49]

## 2021-06-02 NOTE — PROVIDER CONTACT NOTE (MEDICATION) - RECOMMENDATIONS
Team made aware. management aware, AND aware; recommended doctor to come to bedside; management came to bedside.
MD notified and asked if want to speak to patient.
No new order

## 2021-06-02 NOTE — PROVIDER CONTACT NOTE (MEDICATION) - SITUATION
pt refusing Lisinopril, wants Ramipril
Pt has metformin 1000mg and Repaglinide 2mg at bedside from home. Pt is refusing to let us take medication to pharmacy and states "if he has to take medication, he will"
patient [id number] refusing heparin

## 2021-06-02 NOTE — PROGRESS NOTE ADULT - ASSESSMENT
50y old  Male who presents with history of DM type 2, CAD s/p stents x 2, longstanding history of diabetic foot ulcers, admitted for worsening R foot ulcer and fever to 102F at home.   Patient now s/p RF partial 2nd ray resection w ADDISON and free flap open medial. Patient treated with IV antibiotics, now in ATN.   Endocrine consult requested for management of DM2 in patient with TEENA. A1c 9.8%    1) Uncontrolled DM2 c/b CAD, retinopathy neurpopathy and DM foot ulcers  Currently in ATN per renal due to Antibx  A1c 9.8%, (possibly due to medication nonadherence documented in outpt endocrine notes)    Fs goal 100-180, currently in goal range, patient reports not eating well in hospital because he cannot tolerate hospital food.  Refusing premeal Admelog doses.  Fs premeal and qhs   Agree with decreased dose of Lantus 35units qhs, given decreased renal clearance. He states he will take 44 units at home regardless of what is recommended. Counselled on signs/symptoms of hypoglycemia and that fasting hypoglycemia would be related to taking too much basal insulin.  Recommend continue Admelog 4units TIDac, hold if NPO   Recommend low correctional scale premeal and qhs   If patient to be NPO for any further procedures, recommend decrease basal insulin by 80% the evening before procedure.     Recommend basal/bolus insulin pen, dose to be determined on discharge given uncontrolled BG on PO medication/insulin and acute change in GFR  Discontinue metformin at Prandin at dc.  Lantus pen can be resumed at dc but will recommend lower dose due to renal function.  Please assess for which rapid acting insulin is covered, finalize dose before dc.  Patient knows how to use insulin pen, needs reinforcement on schedule of taking mealtime insulin before eating.  Will need to clarify if patient is on Trulicity as outpt - but given current GFR would be held at MT.  Patient to follow up with Dr London on discharge - appointment scheduled for July 1 at 10:30AM, [address] [facility] [address]  [phone].    2) HTN   goal< 130/80  bp above goal  Would continue to monitor and optimize pain management  medication adjustment per primary team    3) HLD  goal LDL<70  Continue Atorvastatin       Aira Hassan MD  Division of Endocrinology  Pager: [phone]    If after 6PM or before 9AM, or on weekends/holidays, please call endocrine answering service for assistance ([phone]).  For nonurgent matters email [email or web address] for assistance.

## 2021-06-02 NOTE — PROGRESS NOTE ADULT - PROBLEM SELECTOR PLAN 7
DVT ppx: lovenox 40  Dispo: pending hospital course, PT eval pending    Discussed with ACP    Called Nephrology to f/u patient- at [phone]  Called by ACP- patient wants to leave AMA- she will call nephrology to talk to patient   Also called patient care advocate at [phone] for help  called Endocrine consult called [phone] for help with glucose in setting of TEENA- spoke Phyllis DVT ppx: hep sq q8  Dispo: pending hospital course, PT eval pending    Discussed with ACP    Called Nephrology to f/u patient- at [phone]  Called by ACP- patient wants to leave AMA on 6/1-nephrology spoke with patient - await input from renal today  Also called patient care advocate at [phone] for help  called Endocrine consult called [phone] for help with glucose in setting of TEENA- saw patient 6/1- awaiting improvement in creat before final recs DVT ppx: hep sq q8  Dispo: pending hospital course, PT eval pending    Discussed with ACP    Nephrology f/u apprecited- agree with plan for home on Friday 6/4  patient wanted to leave AMA on 6/1-today 6/2 both patient and mother agreeable to stay and alow creat to decrease more  Endocrine consult appreciated [phone] for help with glucose in setting of TEENA- saw patient 6/1- awaiting improvement in creat before final recs

## 2021-06-02 NOTE — PROVIDER CONTACT NOTE (MEDICATION) - ASSESSMENT
explained what heparin is for and possible adverse effects of not taking heparin
pt is asymptomatic
Pt remained a/ox4, agitated and upset about insulin management. Frustrated with care. Pain managed, bedrest at this time. Blood glucose monitoring maintained.

## 2021-06-02 NOTE — DIETITIAN INITIAL EVALUATION ADULT. - ORAL INTAKE PTA/DIET HISTORY
Reports usually with good appetite and PO intake PTA. NKFA. Patient with knowledge of Consistent Carbohydrate diet when inquired but did not elaborate further nor answer to questions. Appears to be frustrated at time of visit. Therefore, limited information obtained. Will re-attempt at a later time as able.

## 2021-06-02 NOTE — PROVIDER CONTACT NOTE (MEDICATION) - BACKGROUND
Current R foot ulcer worsening c/b sepsis and debridment done in ER. 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 CAD x2 stents
chronic ulcer of R foot
Pt has diagnosis of HTN, BP at 122/77 mmHg

## 2021-06-02 NOTE — DIETITIAN INITIAL EVALUATION ADULT. - NSPROEDAREADYLEARN_GEN_A_NUR
Detail Level: Zone Continue Regimen: HYDROQUINONE 8% at night interest in learning/motivation to learn

## 2021-06-02 NOTE — PROGRESS NOTE ADULT - PROBLEM SELECTOR PLAN 1
Cr 1.08 --> 4.18 --> 4.72. Likely ATN. Was on vancomycin, blood pressure low, and received motrin on 5/26.. Bladder scan 42cc- no urinary retention. Repeat vanco level 8.9. FeNa 0.8% - pre-renal.   - UA and urine lytes sent.   - Vanc d/luz  - Hold lisinopril for now  - Renal recommended IV hydration: NS at 75cc/hr for 12 hours.   - Urine output : 1800cc on 5/30  - F/u renal ultrasound.   - F/u strict I&Os.   - F/u renal recommendations.  - Avoid nephrotoxic medications. Cr 1.08 --> 4.18 --> 4.72. Likely ATN. Was on vancomycin, blood pressure low, and received motrin on 5/26.. Bladder scan 42cc- no urinary retention. Repeat vanco level 8.9. FeNa 0.8% - pre-renal.   - UA and urine lytes sent.   - Vanc d/luz  - Hold lisinopril for now  - Renal recommended IV hydration: NS at 75cc/hr for 12 hours.   - 5/31 renal ultrasound- NL  - F/u strict I&Os.   - F/u renal recommendations.  - Avoid nephrotoxic medications. Cr 1.08 --> 4.18 --> 4.72.-----> 4.7-----> 3.88   Likely ATN. Was on vancomycin, blood pressure low, and received motrin on 5/26.. Bladder scan 42cc- no urinary retention. Repeat vanco level 8.9. FeNa 0.8% - pre-renal.   - Vanc d/luz  - Hold lisinopril for now  - Renal recommended IV hydration: NS at 75cc/hr for 12 hours.   - 5/31 renal ultrasound- NL  - F/u strict I&Os.   -  renal f/u appreciated  - Avoid nephrotoxic medications.

## 2021-06-02 NOTE — CHART NOTE - NSCHARTNOTEFT_GEN_A_CORE
Pt very angry/ frustrated about TEENA. Blaming nephrology team. Writer spoke with nephrology team re patient's concerns. Writer informed patient was seen by team and had an opportunity to have all questions answered. Will discuss readiness for discharge. Cr is trending down now 3.88 from 4.70

## 2021-06-02 NOTE — DIETITIAN INITIAL EVALUATION ADULT. - PERTINENT LABORATORY DATA
06-02 Na139 mmol/L Glu 100 mg/dL<H> K+ 4.9 mmol/L Cr  3.88 mg/dL<H> BUN 40 mg/dL<H> 06-01 Phos 5.8 mg/dL<H>    A1C with Estimated Average Glucose Result: 9.8: High Risk (prediabetic)    5.7 - 6.4 %  Diabetic, diagnostic           > 6.5 %  ADA diabetic treatment goal    < 7.0 %  HbA1C values may not accurately reflect mean blood glucose in patients  with Hb variants.  Suggest clinical correlation. % (05.27.21 @ 06:56)

## 2021-06-02 NOTE — PROGRESS NOTE ADULT - PROBLEM SELECTOR PLAN 6
- Patient anxious and intermittently agitated with staff during hospital stay   - BH made aware, patient needs to agree to psych eval prior to consultation   - At this time will defer, patient is agreeable with OR plan - Patient anxious and intermittently agitated with staff during hospital stay   -  made aware, patient needs to agree to psych eval prior to consultation   - At this time will defer, patient is agreeable with plans for home about Friday 6/4  Mother is present in room- will keep mother updated

## 2021-06-02 NOTE — PROGRESS NOTE ADULT - ASSESSMENT
50M PMH DM type II (last a1c 10.2) , CAD s/p stents x 2, longstanding history of diabetic foot ulcers, s/p R 3rd and 4th partial ray resections by podiatry 4/15/21; now presenting with worsening R foot ulcer and fever to 102F at home; presentation c/f infected foot ulcer with concern for OM c/b sepsis in the setting of poorly controlled DM.  Had Pod surgery 5/28.  TEENA -seen by Nephrology Creat 1------> 4.7------> 3.88  Neph recs:  "  Problem: TEENA (acute kidney injury). Recommendation: Pt with TEENA in the setting of supratherapeutic vancomycin level, hypotension, NSAIDS and ACE-I. Now likely in ATN. Patient noted to have Scr of 1.08 on 5/28/21. Scr increased to 3.79 this morning, and further increased to 4.18 later today. Vancomycin level from yesterday was elevated at 31.5 (was on Vancomycin from 5/25/21 to 5/29/21). Blood pressure noted to be boarderline low at 95/59 on 5/28/21. Got 1 dose of motrin on5/26/21. Suggest to send UA, urine electrolytes, spot urine TP/CR. Obtain bladder scan to r/o urinary retention. Repeat vancomycin level with the next set of labs. Discontinue Lisinopril. Continue to hold Vancomycin. Monitor labs and urine output. Avoid NSAIDs, ACEI/ARBS, RCA and nephrotoxins. Dose medications as per eGFR."         50M PMH DM type II (last a1c 10.2) , CAD s/p stents x 2, longstanding history of diabetic foot ulcers, s/p R 3rd and 4th partial ray resections by podiatry 4/15/21; now presenting with worsening R foot ulcer and fever to 102F at home; presentation c/f infected foot ulcer with concern for OM c/b sepsis in the setting of poorly controlled DM.  Had Pod surgery 5/28.  TEENA -seen by Nephrology Creat 1------> 4.7------> 3.88  Neph recs:  "  Problem: TEENA (acute kidney injury). Recommendation: Pt with TEENA in the setting of supratherapeutic vancomycin level, hypotension, NSAIDS and ACE-I. Now likely in ATN. Patient noted to have Scr of 1.08 on 5/28/21. Scr increased to 3.79 this morning, and further increased to 4.18 later today. Vancomycin level from yesterday was elevated at 31.5 (was on Vancomycin from 5/25/21 to 5/29/21). Blood pressure noted to be boarderline low at 95/59 on 5/28/21. Got 1 dose of motrin on5/26/21. Suggest to send UA, urine electrolytes, spot urine TP/CR. Obtain bladder scan to r/o urinary retention. Repeat vancomycin level with the next set of labs. Discontinue Lisinopril. Continue to hold Vancomycin. Monitor labs and urine output. Avoid NSAIDs, ACEI/ARBS, RCA and nephrotoxins. Dose medications as per eGFR."    Pod  "  s/p RF partial 2nd ray resection w ADDISON and free flap open medial (DOS 5/28/21): surgical sites well coapted, sutures intact, no dehiscence, flap warm with duskiness to dorsal flap and no signs of active infection   -L foot submet 4 wound down to bone w/ no signs of active infection applied Aquacel and DSD  -as per intra op findings low concern for residual bone infection, moderate concern for flap viability  - pt to stay non WB to RLE in a CAM boot  - bone culture staph hemolyticus  - pod stable for discharge on PO Augmentin x 1 week, info in discharge paperwork"

## 2021-06-02 NOTE — PROGRESS NOTE ADULT - SUBJECTIVE AND OBJECTIVE BOX
Patient is a 50y old  Male who presents with a chief complaint of foot inf (02 Jun 2021 13:03)      SUBJECTIVE / OVERNIGHT EVENTS:  Patient seen in room with RN and mother Mrs Ayon [phone]  Also Renal Attending joined.  Explain to patient and mother that his creat is improving 4.72--> 4.7----> 3.88, and we expect it to continue to improve- we aim for home by Friday 6/4  We will need to send him home on insulin -he will need to use insulin until creat has normalize- cannot send on home on metformin while creat is elevated- mother and patient expresses understanding.  Patient still a bit upset because he wanted to see his 10 year old son- hopes to do so this weekend.  All on board with plan for home about Friday 6/4 with Insulin and fs traing - patient stated he used insulin before and this will not be hard.    MEDICATIONS  (STANDING):  amoxicillin  500 milliGRAM(s)/clavulanate 1 Tablet(s) Oral two times a day  aspirin enteric coated 81 milliGRAM(s) Oral daily  atorvastatin 80 milliGRAM(s) Oral at bedtime  clopidogrel Tablet 75 milliGRAM(s) Oral daily  collagenase Ointment 1 Application(s) Topical daily  heparin   Injectable 5000 Unit(s) SubCutaneous every 8 hours  insulin glargine Injectable (LANTUS) 35 Unit(s) SubCutaneous at bedtime  insulin lispro (ADMELOG) corrective regimen sliding scale   SubCutaneous three times a day before meals  insulin lispro (ADMELOG) corrective regimen sliding scale   SubCutaneous at bedtime  insulin lispro Injectable (ADMELOG) 4 Unit(s) SubCutaneous three times a day before meals  sodium chloride 0.9%. 1000 milliLiter(s) (75 mL/Hr) IV Continuous <Continuous>    MEDICATIONS  (PRN):  acetaminophen   Tablet .. 650 milliGRAM(s) Oral every 6 hours PRN Mild Pain (1 - 3)  melatonin 6 milliGRAM(s) Oral at bedtime PRN Insomnia  oxyCODONE    IR 10 milliGRAM(s) Oral every 4 hours PRN Severe Pain (7 - 10)  oxyCODONE    IR 5 milliGRAM(s) Oral every 4 hours PRN Moderate Pain (4 - 6)        CAPILLARY BLOOD GLUCOSE      POCT Blood Glucose.: 90 mg/dL (02 Jun 2021 13:52)  POCT Blood Glucose.: 103 mg/dL (02 Jun 2021 11:58)  POCT Blood Glucose.: 114 mg/dL (02 Jun 2021 08:20)  POCT Blood Glucose.: 129 mg/dL (01 Jun 2021 21:11)  POCT Blood Glucose.: 116 mg/dL (01 Jun 2021 17:14)    I&O's Summary    01 Jun 2021 07:01  -  02 Jun 2021 07:00  --------------------------------------------------------  IN: 1015 mL / OUT: 2200 mL / NET: -1185 mL    02 Jun 2021 07:01  -  02 Jun 2021 14:12  --------------------------------------------------------  IN: 70 mL / OUT: 600 mL / NET: -530 mL        PHYSICAL EXAM:  GENERAL: NAD, well-developed  HEAD:  Atraumatic, Normocephalic  EYES: EOMI, PERRLA, conjunctiva and sclera clear  NECK: Supple, No JVD  CHEST/LUNG: Clear to auscultation bilaterally; No wheeze  HEART: Regular rate and rhythm; No murmurs, rubs, or gallops  ABDOMEN: Soft, Nontender, Nondistended; Bowel sounds present  EXTREMITIES:  2+ Peripheral Pulses, No clubbing, cyanosis,   B/l Dressing to feet  PSYCH: AAOx3  NEUROLOGY: non-focal  SKIN: No rashes or lesions    LABS:                        10.2   6.80  )-----------( 220      ( 01 Jun 2021 06:56 )             31.8     06-02    139  |  103  |  40<H>  ----------------------------<  100<H>  4.9   |  25  |  3.88<H>    Ca    9.3      02 Jun 2021 10:03  Phos  5.8     06-01  Mg     2.7     06-01          Care Discussed with Consultants/Other Providers:  Renal  Pod- 6/1  ACP  RN  Mother

## 2021-06-02 NOTE — PROGRESS NOTE ADULT - ASSESSMENT
TEENA progressing to ATN-  Pt with TEENA in the setting of supratherapeutic vancomycin level, hypotension, NSAIDS and ACE-I. Now likely in ATN. Patient noted to have Scr of 1.08 on 5/28/21. Scr increased to 3.79 5/30, and further peaked at to 4.72 on 5/31 & now downtrending to 3.88 today  Vancomycin level from 5/29 was elevated at 31.5 (was on Vancomycin from 5/25/21 to 5/29/21). Vanco level down to 8.9 on 5/31.   Blood pressure noted to be borderline low at 95/59 on 5/28/21 & again on 5/31. Got 1 dose of Motrin on 5/26/21.  UA with protein 100mg/dl, 7 WBCs. Jareth is 28. FeNa 0.8%.  urine electrolytes, spot urine TP/CR.   Renal US: Right kidney: 12.5 cm. No renal mass, hydronephrosis or calculi. Left kidney: 13.4 cm. No renal mass, hydronephrosis or calculi.  Lisinopril on hold.    Continue to hold Vancomycin.   . Monitor labs and urine output. Avoid NSAIDs, ACEI/ARBS, RCA and nephrotoxins.   Dose medications as per eGFR.  -Dc planning as  cr continues to trend down   -no Metformin unless cr is below 1.5.

## 2021-06-02 NOTE — PROGRESS NOTE ADULT - PROBLEM SELECTOR PLAN 3
poorly controlled however sugards are stable on the current regimen.   - A1c = 9.8   - C/w lantus to 44 + admelog 4 TID + ISS  - Monitor FS and adjust regimen as necessary   - Consider endo eval if FS are difficult to control poorly controlled however sugards are stable on the current regimen.   - A1c = 9.8   - C/w lantus 35 with premeals - lantus dec due to Inc Creat  Seen by Endocrine 6/1  No recommendations yet made for D/c- awaiting improvement in creatnine before making any final recs. poorly controlled however sugards are stable on the current regimen.   - A1c = 9.8   - C/w lantus 35 with premeals - lantus dec due to Inc Creat  Seen by Endocrine 6/1  No recommendations yet made for D/c- awaiting improvement in creatinine before making any final recs.  Endocrine rec home on insulin while creat is elevated and then out patient f/u with Endocrine to det when pills can be restarted once Creat has normalized.

## 2021-06-02 NOTE — DIETITIAN INITIAL EVALUATION ADULT. - OTHER INFO
Patient reports fair appetite and PO intake in-house due to dislike for food options available. Attempted to discussed other food alternatives but patient declined. Patient is also having food from outside-observed at bedside. Patient denies any nausea/vomiting/diarrhea/constipation or difficulty chewing and swallowing. Offered PO supplement to optimize po intake given reported suboptimal po intake in-house which he was amenable to. Denies any weight changes. Weight trend per HIE: 8/2020-96.2kg, 12/2020-97.5kg, 1/2021-98kg.

## 2021-06-02 NOTE — PROGRESS NOTE ADULT - PROBLEM SELECTOR PLAN 2
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 - Podiatry recs appreciated, s/p RF partial 2nd ray resection w ADDISON and free flap open medial (5/28/21)  - Per podiatry, low concern for residual bone infection, moderate concern for flap viability  - NWB to RLE in a CAM boot  - Prelim bone culture staph hemolyticus  - If clean bone cx s2 days, then can discharge on PO Augmentin 1 x 1week  - C/w zosyn for now and DC vanco as abscess cx w/ strep   - BCx NGTD, wound cultures w/ mod strep and coag neg staph MRI reviewed, showing OM of the second metatarsal head and second digit proximal phalanx, with underlying ulcer. - Podiatry recs appreciated, s/p RF partial 2nd ray resection w ADDISON and free flap open medial (5/28/21)  - Per podiatry, low concern for residual bone infection, moderate concern for flap viability  - NWB to RLE in a CAM boot  - Prelim bone culture staph hemolyticus  - If clean bone cx s2 days, then can discharge on PO Augmentin 1 x 1week  - Now on Augmentin   - BCx NGTD, wound cultures w/ mod strep and coag neg staph MRI reviewed, showing OM of the second metatarsal head and second digit proximal phalanx, with underlying ulcer. - Podiatry recs appreciated, s/p RF partial 2nd ray resection w ADDISON and free flap open medial (5/28/21)  - Per podiatry, low concern for residual bone infection, moderate concern for flap viability  - NWB to RLE in a CAM boot  - Prelim bone culture staph hemolyticus  - If clean bone cx s2 days, then can discharge on PO Augmentin 1 x 1week  - Now on Augmentin   - BCx NGTD, wound cultures w/ mod strep and coag neg staph  Pod f/u appreciated

## 2021-06-02 NOTE — DIETITIAN INITIAL EVALUATION ADULT. - ORAL NUTRITION SUPPLEMENTS
Recommend add Nepro 1x daily (425 kcals, 19.1g protein) given elevated PHOS. Can switch to Glucerna Therapeutic Nutrition 240mls 2x daily (440kcals, 20g protein) when PHOS level WNL

## 2021-06-02 NOTE — PROGRESS NOTE ADULT - TIME BILLING
Extended visit answering all questions regarding diabetes and insulin management and patient having many comments about renal function and care in hospital.

## 2021-06-02 NOTE — PROGRESS NOTE ADULT - SUBJECTIVE AND OBJECTIVE BOX
Chief Complaint: DM2    History: Patient is upset about his kidney function  talked at length complaining about his care.  States the hospital food is unable to be consumed and he is obtaining outside food at off times so not taking premeal insulin.  States he will self adjust his insulin at home regardless of what is recommended.    MEDICATIONS  (STANDING):  amoxicillin  500 milliGRAM(s)/clavulanate 1 Tablet(s) Oral two times a day  aspirin enteric coated 81 milliGRAM(s) Oral daily  atorvastatin 80 milliGRAM(s) Oral at bedtime  clopidogrel Tablet 75 milliGRAM(s) Oral daily  collagenase Ointment 1 Application(s) Topical daily  heparin   Injectable 5000 Unit(s) SubCutaneous every 8 hours  insulin glargine Injectable (LANTUS) 35 Unit(s) SubCutaneous at bedtime  insulin lispro (ADMELOG) corrective regimen sliding scale   SubCutaneous three times a day before meals  insulin lispro (ADMELOG) corrective regimen sliding scale   SubCutaneous at bedtime  insulin lispro Injectable (ADMELOG) 4 Unit(s) SubCutaneous three times a day before meals  sodium chloride 0.9%. 1000 milliLiter(s) (75 mL/Hr) IV Continuous <Continuous>    MEDICATIONS  (PRN):  acetaminophen   Tablet .. 650 milliGRAM(s) Oral every 6 hours PRN Mild Pain (1 - 3)  melatonin 6 milliGRAM(s) Oral at bedtime PRN Insomnia  oxyCODONE    IR 5 milliGRAM(s) Oral every 4 hours PRN Moderate Pain (4 - 6)  oxyCODONE    IR 10 milliGRAM(s) Oral every 4 hours PRN Severe Pain (7 - 10)      Allergies    No Known Allergies    Intolerances      Review of Systems:    ALL OTHER SYSTEMS REVIEWED AND NEGATIVE        PHYSICAL EXAM:  VITALS: T(C): 36.5 (06-02-21 @ 18:44)  T(F): 97.7 (06-02-21 @ 18:44), Max: 98 (06-01-21 @ 21:23)  HR: 66 (06-02-21 @ 18:44) (61 - 66)  BP: 156/77 (06-02-21 @ 18:44) (125/83 - 156/77)  RR:  (16 - 17)  SpO2:  (95% - 98%)  Wt(kg): --  GENERAL: NAD, well-groomed, well-developed  EYES: No proptosis, no lid lag, anicteric  HEENT:  Atraumatic, Normocephalic, moist mucous membranes  RESPIRATORY: nonlabored respirations, no wheezing  PSYCH: Alert and oriented x 3, angry    CAPILLARY BLOOD GLUCOSE      POCT Blood Glucose.: 148 mg/dL (02 Jun 2021 18:52)  POCT Blood Glucose.: 118 mg/dL (02 Jun 2021 17:34)  POCT Blood Glucose.: 90 mg/dL (02 Jun 2021 13:52)  POCT Blood Glucose.: 103 mg/dL (02 Jun 2021 11:58)  POCT Blood Glucose.: 114 mg/dL (02 Jun 2021 08:20)  POCT Blood Glucose.: 129 mg/dL (01 Jun 2021 21:11)      06-02    139  |  103  |  40<H>  ----------------------------<  100<H>  4.9   |  25  |  3.88<H>    EGFR if : 20<L>  EGFR if non : 17<L>    Ca    9.3      06-02  Mg     2.7     06-01  Phos  5.8     06-01        A1C with Estimated Average Glucose Result: 9.8 % (05-27-21 @ 06:56)      Thyroid Function Tests:

## 2021-06-03 LAB
ANION GAP SERPL CALC-SCNC: 13 MMOL/L — SIGNIFICANT CHANGE UP (ref 7–14)
BUN SERPL-MCNC: 37 MG/DL — HIGH (ref 7–23)
CALCIUM SERPL-MCNC: 9.5 MG/DL — SIGNIFICANT CHANGE UP (ref 8.4–10.5)
CHLORIDE SERPL-SCNC: 101 MMOL/L — SIGNIFICANT CHANGE UP (ref 98–107)
CO2 SERPL-SCNC: 21 MMOL/L — LOW (ref 22–31)
CREAT SERPL-MCNC: 3.34 MG/DL — HIGH (ref 0.5–1.3)
GLUCOSE BLDC GLUCOMTR-MCNC: 100 MG/DL — HIGH (ref 70–99)
GLUCOSE BLDC GLUCOMTR-MCNC: 107 MG/DL — HIGH (ref 70–99)
GLUCOSE BLDC GLUCOMTR-MCNC: 85 MG/DL — SIGNIFICANT CHANGE UP (ref 70–99)
GLUCOSE SERPL-MCNC: 118 MG/DL — HIGH (ref 70–99)
HCT VFR BLD CALC: 34.3 % — LOW (ref 39–50)
HGB BLD-MCNC: 11.2 G/DL — LOW (ref 13–17)
MAGNESIUM SERPL-MCNC: 2.6 MG/DL — SIGNIFICANT CHANGE UP (ref 1.6–2.6)
MCHC RBC-ENTMCNC: 29.1 PG — SIGNIFICANT CHANGE UP (ref 27–34)
MCHC RBC-ENTMCNC: 32.7 GM/DL — SIGNIFICANT CHANGE UP (ref 32–36)
MCV RBC AUTO: 89.1 FL — SIGNIFICANT CHANGE UP (ref 80–100)
NRBC # BLD: 0 /100 WBCS — SIGNIFICANT CHANGE UP
NRBC # FLD: 0 K/UL — SIGNIFICANT CHANGE UP
PHOSPHATE SERPL-MCNC: 4.9 MG/DL — HIGH (ref 2.5–4.5)
PLATELET # BLD AUTO: 252 K/UL — SIGNIFICANT CHANGE UP (ref 150–400)
POTASSIUM SERPL-MCNC: 4.7 MMOL/L — SIGNIFICANT CHANGE UP (ref 3.5–5.3)
POTASSIUM SERPL-SCNC: 4.7 MMOL/L — SIGNIFICANT CHANGE UP (ref 3.5–5.3)
RBC # BLD: 3.85 M/UL — LOW (ref 4.2–5.8)
RBC # FLD: 12.5 % — SIGNIFICANT CHANGE UP (ref 10.3–14.5)
SODIUM SERPL-SCNC: 135 MMOL/L — SIGNIFICANT CHANGE UP (ref 135–145)
WBC # BLD: 6.84 K/UL — SIGNIFICANT CHANGE UP (ref 3.8–10.5)
WBC # FLD AUTO: 6.84 K/UL — SIGNIFICANT CHANGE UP (ref 3.8–10.5)

## 2021-06-03 PROCEDURE — 99232 SBSQ HOSP IP/OBS MODERATE 35: CPT

## 2021-06-03 PROCEDURE — 99232 SBSQ HOSP IP/OBS MODERATE 35: CPT | Mod: GC

## 2021-06-03 PROCEDURE — 74018 RADEX ABDOMEN 1 VIEW: CPT | Mod: 26

## 2021-06-03 PROCEDURE — 76882 US LMTD JT/FCL EVL NVASC XTR: CPT | Mod: 26,LT

## 2021-06-03 RX ORDER — ACETAMINOPHEN 500 MG
975 TABLET ORAL ONCE
Refills: 0 | Status: COMPLETED | OUTPATIENT
Start: 2021-06-03 | End: 2021-06-03

## 2021-06-03 RX ORDER — ENOXAPARIN SODIUM 100 MG/ML
25 INJECTION SUBCUTANEOUS
Qty: 5 | Refills: 0
Start: 2021-06-03 | End: 2021-07-02

## 2021-06-03 RX ORDER — INSULIN ASPART 100 [IU]/ML
4 INJECTION, SOLUTION SUBCUTANEOUS
Qty: 5 | Refills: 0
Start: 2021-06-03 | End: 2021-07-02

## 2021-06-03 RX ORDER — POLYETHYLENE GLYCOL 3350 17 G/17G
17 POWDER, FOR SOLUTION ORAL
Qty: 0 | Refills: 0 | DISCHARGE
Start: 2021-06-03

## 2021-06-03 RX ORDER — ISOPROPYL ALCOHOL, BENZOCAINE .7; .06 ML/ML; ML/ML
1 SWAB TOPICAL
Qty: 100 | Refills: 1
Start: 2021-06-03 | End: 2021-07-22

## 2021-06-03 RX ORDER — INSULIN GLARGINE 100 [IU]/ML
35 INJECTION, SOLUTION SUBCUTANEOUS
Qty: 5 | Refills: 0
Start: 2021-06-03 | End: 2021-07-02

## 2021-06-03 RX ORDER — HYDRALAZINE HCL 50 MG
10 TABLET ORAL ONCE
Refills: 0 | Status: COMPLETED | OUTPATIENT
Start: 2021-06-03 | End: 2021-06-03

## 2021-06-03 RX ORDER — SENNA PLUS 8.6 MG/1
2 TABLET ORAL AT BEDTIME
Refills: 0 | Status: DISCONTINUED | OUTPATIENT
Start: 2021-06-03 | End: 2021-06-04

## 2021-06-03 RX ORDER — ASPIRIN/CALCIUM CARB/MAGNESIUM 324 MG
1 TABLET ORAL
Qty: 0 | Refills: 0 | DISCHARGE
Start: 2021-06-03

## 2021-06-03 RX ORDER — AMLODIPINE BESYLATE 2.5 MG/1
1 TABLET ORAL
Qty: 30 | Refills: 0
Start: 2021-06-03 | End: 2021-07-02

## 2021-06-03 RX ORDER — INSULIN DEGLUDEC 100 U/ML
40 INJECTION, SOLUTION SUBCUTANEOUS
Qty: 0 | Refills: 0 | DISCHARGE

## 2021-06-03 RX ORDER — ATORVASTATIN CALCIUM 80 MG/1
1 TABLET, FILM COATED ORAL
Qty: 30 | Refills: 0
Start: 2021-06-03 | End: 2021-07-02

## 2021-06-03 RX ORDER — POLYETHYLENE GLYCOL 3350 17 G/17G
17 POWDER, FOR SOLUTION ORAL
Refills: 0 | Status: DISCONTINUED | OUTPATIENT
Start: 2021-06-03 | End: 2021-06-04

## 2021-06-03 RX ORDER — SENNA PLUS 8.6 MG/1
2 TABLET ORAL
Qty: 0 | Refills: 0 | DISCHARGE
Start: 2021-06-03

## 2021-06-03 RX ORDER — LACTULOSE 10 G/15ML
20 SOLUTION ORAL ONCE
Refills: 0 | Status: COMPLETED | OUTPATIENT
Start: 2021-06-03 | End: 2021-06-03

## 2021-06-03 RX ORDER — AMLODIPINE BESYLATE 2.5 MG/1
2.5 TABLET ORAL DAILY
Refills: 0 | Status: DISCONTINUED | OUTPATIENT
Start: 2021-06-03 | End: 2021-06-04

## 2021-06-03 RX ADMIN — Medication 4 UNIT(S): at 08:27

## 2021-06-03 RX ADMIN — Medication 1 TABLET(S): at 17:37

## 2021-06-03 RX ADMIN — Medication 1 TABLET(S): at 07:28

## 2021-06-03 RX ADMIN — CLOPIDOGREL BISULFATE 75 MILLIGRAM(S): 75 TABLET, FILM COATED ORAL at 12:46

## 2021-06-03 RX ADMIN — Medication 975 MILLIGRAM(S): at 02:41

## 2021-06-03 RX ADMIN — Medication 975 MILLIGRAM(S): at 03:11

## 2021-06-03 RX ADMIN — Medication 81 MILLIGRAM(S): at 12:46

## 2021-06-03 RX ADMIN — Medication 1 TABLET(S): at 07:27

## 2021-06-03 RX ADMIN — Medication 650 MILLIGRAM(S): at 21:19

## 2021-06-03 RX ADMIN — LACTULOSE 20 GRAM(S): 10 SOLUTION ORAL at 02:40

## 2021-06-03 RX ADMIN — ATORVASTATIN CALCIUM 80 MILLIGRAM(S): 80 TABLET, FILM COATED ORAL at 21:19

## 2021-06-03 RX ADMIN — Medication 650 MILLIGRAM(S): at 21:49

## 2021-06-03 RX ADMIN — Medication 1 APPLICATION(S): at 12:46

## 2021-06-03 NOTE — PROGRESS NOTE ADULT - PROBLEM SELECTOR PLAN 3
poorly controlled however sugards are stable on the current regimen.   - A1c = 9.8   - C/w lantus 35 with premeals - lantus dec due to Inc Creat  Seen by Endocrine 6/1  No recommendations yet made for D/c- awaiting improvement in creatinine before making any final recs.  Endocrine rec home on insulin while creat is elevated and then out patient f/u with Endocrine to det when pills can be restarted once Creat has normalized.

## 2021-06-03 NOTE — PROGRESS NOTE ADULT - ASSESSMENT
50M PMH DM type II (last a1c 10.2) , CAD s/p stents x 2, longstanding history of diabetic foot ulcers, s/p R 3rd and 4th partial ray resections by podiatry 4/15/21; now presenting with worsening R foot ulcer and fever to 102F at home; presentation c/f infected foot ulcer with concern for OM c/b sepsis in the setting of poorly controlled DM.  Had Pod surgery 5/28.  TEENA -seen by Nephrology Creat 1------> 4.7------> 3.88-----> 3.3  Neph recs:  Problem: TEENA (acute kidney injury). Recommendation: Pt with TEENA in the setting of supratherapeutic vancomycin level, hypotension, NSAIDS and ACE-I. Now likely in ATN.    Pod  "  s/p RF partial 2nd ray resection w ADDISON and free flap open medial (DOS 5/28/21): surgical sites well coapted, sutures intact, no dehiscence, flap warm with duskiness to dorsal flap and no signs of active infection   -L foot submet 4 wound down to bone w/ no signs of active infection applied Aquacel and DSD  -as per intra op findings low concern for residual bone infection, moderate concern for flap viability  - pt to stay non WB to RLE in a CAM boot  - bone culture staph hemolyticus  - pod stable for discharge on PO Augmentin x 1 week, info in discharge paperwork"

## 2021-06-03 NOTE — PROGRESS NOTE ADULT - SUBJECTIVE AND OBJECTIVE BOX
Podiatry pager #: [phone] ([place])/ [phone] ([facility])    Patient is a 50y old  Male who presents with a chief complaint of foot inf (2021 10:20)       INTERVAL HPI/OVERNIGHT EVENTS:  Patient seen and evaluated at bedside.  Pt is resting comfortable in NAD. Denies N/V/F/C.     Allergies    No Known Allergies    Intolerances        Vital Signs Last 24 Hrs  T(C): 36.5 (2021 10:06), Max: 37.2 (31 May 2021 22:21)  T(F): 97.7 (2021 10:06), Max: 99 (31 May 2021 22:21)  HR: 67 (2021 10:06) (66 - 84)  BP: 131/89 (2021 10:06) (115/74 - 148/81)  BP(mean): --  RR: 17 (2021 10:06) (17 - 18)  SpO2: 100% (2021 10:06) (97% - 100%)    LABS:                        10.2   6.80  )-----------( 220      ( 2021 06:56 )             31.8     06-01    135  |  99  |  47<H>  ----------------------------<  112<H>  4.8   |  23  |  4.70<H>    Ca    8.9      2021 06:56  Phos  5.8     06-01  Mg     2.7     06-01        Urinalysis Basic - ( 30 May 2021 16:49 )    Color: Yellow / Appearance: Slightly Turbid / S.016 / pH: x  Gluc: x / Ketone: Negative  / Bili: Negative / Urobili: <2 mg/dL   Blood: x / Protein: 100 mg/dL / Nitrite: Negative   Leuk Esterase: Negative / RBC: 2 /HPF / WBC 7 /HPF   Sq Epi: x / Non Sq Epi: 2 /HPF / Bacteria: Negative      CAPILLARY BLOOD GLUCOSE      POCT Blood Glucose.: 103 mg/dL (2021 08:26)  POCT Blood Glucose.: 128 mg/dL (31 May 2021 21:47)  POCT Blood Glucose.: 128 mg/dL (31 May 2021 17:05)      Lower Extremity Physical Exam:    Vascular:  DP non palpable b/l, PT 2/4 Left foot + non palpable RF,  CFT < 3seconds B/L, Temperature gradient warm to warm Right foot, warm to cool L foot  Neuro: Epicritic sensation diminished to the level of toes B/L  Derm: L foot submet 4 wound down to bone w/ no signs of active infection    s/p RF partial 2nd ray resection w ADDISON and free flap open medial (DOS 21): surgical sites well coapted, sutures intact, no dehiscence, distal and plantar flap mottling w/ concern for viability however warm to touch, CFT delayed

## 2021-06-03 NOTE — PROGRESS NOTE ADULT - PROBLEM SELECTOR PLAN 6
- Patient anxious and intermittently agitated with staff during hospital stay   -  made aware, patient needs to agree to psych eval prior to consultation   - At this time will defer, patient is agreeable with plans for home about Friday 6/4  Mother - updated 6/3

## 2021-06-03 NOTE — PROGRESS NOTE ADULT - PROBLEM SELECTOR PLAN 2
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 - Podiatry recs appreciated, s/p RF partial 2nd ray resection w ADDISON and free flap open medial (5/28/21)  - Per podiatry, low concern for residual bone infection, moderate concern for flap viability  - NWB to RLE in a CAM boot  - Prelim bone culture staph hemolyticus  - If clean bone cx s2 days, then can discharge on PO Augmentin 1 x 1week  - Now on Augmentin   - BCx NGTD, wound cultures w/ mod strep and coag neg staph  Pod f/u appreciated- Out patient f/u with Pod- no need to do dressing changes at home- keep Pod appointment

## 2021-06-03 NOTE — CHART NOTE - NSCHARTNOTEFT_GEN_A_CORE
Patient is c/o abdominal pain- B/L lower quadrants. 7/10 constant. Patient reports - last bowel movement was a week ago. On exam, abdomen mildly distended, soft, tender to left and right lower quadrants only. Hypoactive bowel sounds. Patient denies any nausea/vomiting, no melena/hematemasis. Patient was requesting CT scan of abdomen to r/o bleed. Will order xray abdomen and stat CBC and STAT LABS. Tylenol 975 mg  po x1 for now and will continue to mon Patient is c/o abdominal pain- B/L lower quadrants. 7/10 constant. Patient reports - last bowel movement was a week ago. On exam, abdomen mildly distended, soft, tender to left and right lower quadrants only. Hypoactive bowel sounds. Patient denies any nausea/vomiting, no melena/hematemasis. Patient was requesting CT scan of abdomen to r/o bleed. Will order xray abdomen and stat CBC and STAT LABS. Tylenol 975 mg  po x1 for now and will continue to monitor and f/u the labs

## 2021-06-03 NOTE — PROGRESS NOTE ADULT - PROBLEM SELECTOR PLAN 1
Cr 1.08 --> 4.18 --> 4.72.-----> 4.7-----> 3.88-----> 3.3   Likely ATN. Was on vancomycin, blood pressure low, and received motrin on 5/26.. Bladder scan 42cc- no urinary retention. Repeat vanco level 8.9. FeNa 0.8% - pre-renal.   - Vanc d/luz  - Hold lisinopril for now  - Renal recommended IV hydration: NS at 75cc/hr for 12 hours.   - 5/31 renal ultrasound- NL  - F/u strict I&Os.   -  renal f/u appreciated  - Avoid nephrotoxic medications.- no NSAIDS

## 2021-06-03 NOTE — CHART NOTE - NSCHARTNOTESELECT_GEN_ALL_CORE
Event Note
ACP/Event Note
ACP/Event Note
Event Note

## 2021-06-03 NOTE — PROGRESS NOTE ADULT - ASSESSMENT
51 yo M pt w/ L foot submet 4 wound,  s/p RF partial 2nd ray resection w ADDISON and free flap open medial (DOS 5/28/21)    - pt seen and evaluated  -s/p RF partial 2nd ray resection w ADDISON and free flap open medial (DOS 5/28/21): surgical sites well coapted, sutures intact, no dehiscence, flap warm with distal and plantar duskiness/mottling w/ concern for viability   -L foot submet 4 wound down to bone w/ no signs of active infection applied Aquacel and DSD  - pt to stay non WB to RLE in a CAM boot  -as per intra op findings low concern for residual bone infection, moderate concern for flap viability  - bone culture staph hemolyticus, likely contaminant   - pod stable for discharge on PO Augmentin x 1 week, info in discharge paperwork  - Discussed w/ attending

## 2021-06-03 NOTE — PROGRESS NOTE ADULT - ASSESSMENT
Pt. with TEENA in setting of supratherapeutic vancomycin level, and hypotension, now likely with ATN     TEENA progressing to ATN-  Pt with TEENA in the setting of supratherapeutic vancomycin level, hypotension, NSAIDS and ACE-I. Now likely in ATN. Patient noted to have Scr of 1.08 on 5/28/21. Scr increased to 3.79 this morning, and further peaked at to 4.72 on 5/31 & now downtrending to 3.34 today  Vancomycin level from 5/29 was elevated at 31.5 (was on Vancomycin from 5/25/21 to 5/29/21). Vanco level down to 8.9 on 5/31. Blood pressure noted to be borderline low at 95/59 on 5/28/21 & again on 5/31. Got 1 dose of motrin on 5/26/21. Pt was switched zosyn & now on augmentin with improvement in Cr.  UA with protein 100mg/dl, 7 WBCs. Jareth is 28. FeNa 0.8% which goes with prerenal  Renal US: Right kidney: 12.5 cm. No renal mass, hydronephrosis or calculi.Left kidney: 13.4 cm. No renal mass, hydronephrosis or calculi.  Lisinopril on hold.  Continue to hold Vancomycin. Continue IVF. Monitor labs and urine output. Avoid NSAIDs, ACEI/ARBS, RCA and nephrotoxins. Dose medications as per eGFR.        If you have any questions, please feel free to contact me  Ayah Amaya  Nephrology Fellow  [phone]  (After 5pm or on weekends please page the on-call fellow)

## 2021-06-03 NOTE — PROGRESS NOTE ADULT - SUBJECTIVE AND OBJECTIVE BOX
creat 3.3   out patient f/u PCP/ Endocrine/ Renal creat 3.3   out patient f/u PCP/ Endocrine/ Renal  RN will do diabetic teaching- chrissy Fs and insulin administration  CM notes- he declined home care  Patient c/o L upper arm lump- lipoma/ seb cyst- US ordered   creat 3.3   out patient f/u PCP/ Endocrine/ Renal  RN will do diabetic teaching- chrissy Fs and insulin administration  CM notes- he declined home care  Patient c/o L upper arm lump- lipoma/ seb cyst- US ordered    mother Mrs Lopez  [phone]- called  and updated   Patient is a 50y old  Male who presents with a chief complaint of foot inf (02 Jun 2021 13:03)      SUBJECTIVE / OVERNIGHT EVENTS:  resting in bed  c/o L upper arm lump- asked for US to asses    MEDICATIONS  (STANDING):  amoxicillin  500 milliGRAM(s)/clavulanate 1 Tablet(s) Oral two times a day  aspirin enteric coated 81 milliGRAM(s) Oral daily  atorvastatin 80 milliGRAM(s) Oral at bedtime  calcium carbonate    500 mG (Tums) Chewable 1 Tablet(s) Chew two times a day  clopidogrel Tablet 75 milliGRAM(s) Oral daily  collagenase Ointment 1 Application(s) Topical daily  heparin   Injectable 5000 Unit(s) SubCutaneous every 8 hours  insulin glargine Injectable (LANTUS) 35 Unit(s) SubCutaneous at bedtime  insulin lispro (ADMELOG) corrective regimen sliding scale   SubCutaneous three times a day before meals  insulin lispro (ADMELOG) corrective regimen sliding scale   SubCutaneous at bedtime  insulin lispro Injectable (ADMELOG) 4 Unit(s) SubCutaneous three times a day before meals  polyethylene glycol 3350 17 Gram(s) Oral two times a day  senna 2 Tablet(s) Oral at bedtime  sodium chloride 0.9%. 1000 milliLiter(s) (75 mL/Hr) IV Continuous <Continuous>    MEDICATIONS  (PRN):  acetaminophen   Tablet .. 650 milliGRAM(s) Oral every 6 hours PRN Mild Pain (1 - 3)  melatonin 6 milliGRAM(s) Oral at bedtime PRN Insomnia  oxyCODONE    IR 5 milliGRAM(s) Oral every 4 hours PRN Moderate Pain (4 - 6)  oxyCODONE    IR 10 milliGRAM(s) Oral every 4 hours PRN Severe Pain (7 - 10)        CAPILLARY BLOOD GLUCOSE      POCT Blood Glucose.: 107 mg/dL (03 Jun 2021 08:25)  POCT Blood Glucose.: 137 mg/dL (02 Jun 2021 22:08)  POCT Blood Glucose.: 148 mg/dL (02 Jun 2021 18:52)  POCT Blood Glucose.: 118 mg/dL (02 Jun 2021 17:34)  POCT Blood Glucose.: 90 mg/dL (02 Jun 2021 13:52)  POCT Blood Glucose.: 103 mg/dL (02 Jun 2021 11:58)    I&O's Summary    02 Jun 2021 07:01  -  03 Jun 2021 07:00  --------------------------------------------------------  IN: 690 mL / OUT: 1200 mL / NET: -510 mL      Vital Signs Last 24 Hrs  T(C): 36.6 (03 Jun 2021 09:48), Max: 36.6 (02 Jun 2021 13:53)  T(F): 97.9 (03 Jun 2021 09:48), Max: 97.9 (02 Jun 2021 13:53)  HR: 62 (03 Jun 2021 09:48) (61 - 71)  BP: 138/86 (03 Jun 2021 09:48) (123/76 - 165/76)  BP(mean): --  RR: 18 (03 Jun 2021 09:48) (16 - 18)  SpO2: 96% (03 Jun 2021 09:48) (95% - 98%)  PHYSICAL EXAM:  GENERAL: NAD, well-developed  HEAD:  Atraumatic, Normocephalic  EYES: EOMI, PERRLA, conjunctiva and sclera clear  NECK: Supple, No JVD  CHEST/LUNG: Clear to auscultation bilaterally; No wheeze  HEART: Regular rate and rhythm; No murmurs, rubs, or gallops  ABDOMEN: Soft, Nontender, Nondistended; Bowel sounds present  EXTREMITIES:  2+ Peripheral Pulses, No clubbing, cyanosis, or edema  R foot with dressing in place  PSYCH: AAOx3  NEUROLOGY: non-focal  SKIN: No rashes or lesions    LABS:                        11.2   6.84  )-----------( 252      ( 03 Jun 2021 04:45 )             34.3     06-03    135  |  101  |  37<H>  ----------------------------<  118<H>  4.7   |  21<L>  |  3.34<H>    Ca    9.5      03 Jun 2021 04:45  Phos  4.9     06-03  Mg     2.6     06-03          Care Discussed with Consultants/Other Providers:  creat 3.3   out patient f/u PCP/ Endocrine/ Renal  RN will do diabetic teaching- chrissy Fs and insulin administration  CM notes- he declined home care  Patient c/o L upper arm lump- lipoma/ seb cyst- US ordered    mother Mrs Lopez  [phone]- called  and updated

## 2021-06-03 NOTE — PROGRESS NOTE ADULT - SUBJECTIVE AND OBJECTIVE BOX
Chief Complaint: DM2    History: resting at time of visit today  no hypoglycemia events or symptoms    MEDICATIONS  (STANDING):  amoxicillin  500 milliGRAM(s)/clavulanate 1 Tablet(s) Oral two times a day  aspirin enteric coated 81 milliGRAM(s) Oral daily  atorvastatin 80 milliGRAM(s) Oral at bedtime  calcium carbonate    500 mG (Tums) Chewable 1 Tablet(s) Chew two times a day  clopidogrel Tablet 75 milliGRAM(s) Oral daily  collagenase Ointment 1 Application(s) Topical daily  heparin   Injectable 5000 Unit(s) SubCutaneous every 8 hours  insulin glargine Injectable (LANTUS) 35 Unit(s) SubCutaneous at bedtime  insulin lispro (ADMELOG) corrective regimen sliding scale   SubCutaneous three times a day before meals  insulin lispro (ADMELOG) corrective regimen sliding scale   SubCutaneous at bedtime  insulin lispro Injectable (ADMELOG) 4 Unit(s) SubCutaneous three times a day before meals  polyethylene glycol 3350 17 Gram(s) Oral two times a day  senna 2 Tablet(s) Oral at bedtime  sodium chloride 0.9%. 1000 milliLiter(s) (75 mL/Hr) IV Continuous <Continuous>    MEDICATIONS  (PRN):  acetaminophen   Tablet .. 650 milliGRAM(s) Oral every 6 hours PRN Mild Pain (1 - 3)  melatonin 6 milliGRAM(s) Oral at bedtime PRN Insomnia  oxyCODONE    IR 5 milliGRAM(s) Oral every 4 hours PRN Moderate Pain (4 - 6)  oxyCODONE    IR 10 milliGRAM(s) Oral every 4 hours PRN Severe Pain (7 - 10)      Allergies    No Known Allergies    Intolerances      Review of Systems:  ALL OTHER SYSTEMS REVIEWED AND NEGATIVE        PHYSICAL EXAM:  VITALS: T(C): 36.6 (06-03-21 @ 09:48)  T(F): 97.9 (06-03-21 @ 09:48), Max: 97.9 (06-03-21 @ 09:48)  HR: 62 (06-03-21 @ 09:48) (61 - 71)  BP: 138/86 (06-03-21 @ 09:48) (123/76 - 165/76)  RR:  (17 - 18)  SpO2:  (96% - 98%)  Wt(kg): --  GENERAL: NAD, well-groomed, well-developed  EYES: No proptosis, no lid lag, anicteric  HEENT:  Atraumatic, Normocephalic, moist mucous membranes  RESPIRATORY: nonlabored respirations, no wheezing  SKIN: R foot with dressing      CAPILLARY BLOOD GLUCOSE      POCT Blood Glucose.: 85 mg/dL (03 Jun 2021 12:31)  POCT Blood Glucose.: 107 mg/dL (03 Jun 2021 08:25)  POCT Blood Glucose.: 137 mg/dL (02 Jun 2021 22:08)  POCT Blood Glucose.: 148 mg/dL (02 Jun 2021 18:52)  POCT Blood Glucose.: 118 mg/dL (02 Jun 2021 17:34)      06-03    135  |  101  |  37<H>  ----------------------------<  118<H>  4.7   |  21<L>  |  3.34<H>    EGFR if : 24<L>  EGFR if non : 21<L>    Ca    9.5      06-03  Mg     2.6     06-03  Phos  4.9     06-03        A1C with Estimated Average Glucose Result: 9.8 % (05-27-21 @ 06:56)      Thyroid Function Tests:

## 2021-06-03 NOTE — ADVANCED PRACTICE NURSE CONSULT - ASSESSMENT
Patient with a history of type 2 diabetes for over 10 years. Patient Reports adherence to Lantus 44units qhs and Metformin 1000mg BID and Prandin 2mg TIDac. (Patient did not mention, however noted in allscripts, patient was on Trulcity 1.5mg weekly at 12/2020 office visit)   Patient has freestyle luciana, reports BG variable. When diabetes educator entered the room he stated that he understands, given his current kidney condition, that he needs to be on insulin before meals and he cannot take metformin at this time. Patient stated that he checks his blood sugar 4 times a day. Patient would not be specific regarding times. Instructed he should know what his blood sugar is before he takes insulin. Patient thought diabetes educator would be instructing him on how much insulin to take before meals. Patient was instructed that Dr. Lara is the one that makes the decision. He said then what are you here for. Patient instructed that diabetes educator called to assess his understanding of the new treatment plan, any barriers, assess management/lifestyle/knowledge of diabetes prior to admission, and any address any learning needs identified. Patient stated proper use of the insulin pen. Dr. Lara was contacted on the patient’s behalf and pt was instructed, based on what he is eating now, he will be discharged on 4unit of rapid acting insulin before meals and 35 units of basal insulin. Patient was given those instructions. Patient stated I will take what I feel is right. Pt instructed that he is at risk for hypoglycemia if he walks up with a blood sugar below 100mg/dl. Patient instructed on proper treatment of hypoglycemia. Patient said he knew how to treat low blood sugar and he rarely experiences hypoglycemia at home.  Patient also instructed that when blood sugars are variable (fluctuates between high and low blood sugar) that’s when complications of diabetes can occur. Patient just shook his head. Unsure of understanding of all information taught as pt wanted to go back to sleep. Case discussed with primary RN who will reinforce education. Case also discussed with Dr. Lraa.

## 2021-06-03 NOTE — PROGRESS NOTE ADULT - ATTENDING COMMENTS
- Chart reviewed. Events noted. Pt seen and examined.  - Pt very angry/ frustrated about TEENA. Blaming nephrology team for it. Allowed him to vent and tried to allay his concerns, but patient was in no mood to talk. Explained that we will monitor his renal function while admitted. He said he is not staying nor is he "paying" for any of our services.  - TEENA likely due to supratherapeutic vanc levels, which coincides with TEENA timeline  - Plan to switch to Augmentin 500mg BID
improving ATN  DC planning  pt can f/u with Dr. Blevins within1 week

## 2021-06-03 NOTE — PROGRESS NOTE ADULT - SUBJECTIVE AND OBJECTIVE BOX
[facility] Division of Kidney Diseases & Hypertension  FOLLOW UP NOTE  [phone]--------------------------------------------------------------------------------  Chief Complaint:Non-pressure chronic ulcer of other part of right foot      HPI: Patient is a 51 y/o M w PMH of DM2, CAD s/p stents and diabetic foot ulcers presented to [facility] for worsening R foot ulcer and fever. Admitted for suspected sepsis and possible OM of RLE. S/p R foot partial 2nd ray resection on 5/28/21. Nephrology consulted for TEENA. On review of labs on [facility] HIE/[place], patient noted to have Scr of 1.08 on 5/28/21. Scr increased to 3.79 this morning, and further peaked at to 4.72 on 5/31 & now downtrending to 3.34 today    24 hour events/subjective: Patient seen & examined. Labs & vitals reviewed. Denies chest pain, fever, chills, increased frequency, dysuria, hematuria, pus in urine, frothy urine, SOB, leg edema, loss of appetite, N/V, pruritis. UO in the last 24 hours 1.2L            PAST HISTORY  --------------------------------------------------------------------------------  No significant changes to PMH, PSH, FHx, SHx, unless otherwise noted    ALLERGIES & MEDICATIONS  --------------------------------------------------------------------------------  Allergies    No Known Allergies    Intolerances      Standing Inpatient Medications  amoxicillin  500 milliGRAM(s)/clavulanate 1 Tablet(s) Oral two times a day  aspirin enteric coated 81 milliGRAM(s) Oral daily  atorvastatin 80 milliGRAM(s) Oral at bedtime  calcium carbonate    500 mG (Tums) Chewable 1 Tablet(s) Chew two times a day  clopidogrel Tablet 75 milliGRAM(s) Oral daily  collagenase Ointment 1 Application(s) Topical daily  heparin   Injectable 5000 Unit(s) SubCutaneous every 8 hours  insulin glargine Injectable (LANTUS) 35 Unit(s) SubCutaneous at bedtime  insulin lispro (ADMELOG) corrective regimen sliding scale   SubCutaneous three times a day before meals  insulin lispro (ADMELOG) corrective regimen sliding scale   SubCutaneous at bedtime  insulin lispro Injectable (ADMELOG) 4 Unit(s) SubCutaneous three times a day before meals  polyethylene glycol 3350 17 Gram(s) Oral two times a day  senna 2 Tablet(s) Oral at bedtime  sodium chloride 0.9%. 1000 milliLiter(s) IV Continuous <Continuous>    PRN Inpatient Medications  acetaminophen   Tablet .. 650 milliGRAM(s) Oral every 6 hours PRN  melatonin 6 milliGRAM(s) Oral at bedtime PRN  oxyCODONE    IR 5 milliGRAM(s) Oral every 4 hours PRN  oxyCODONE    IR 10 milliGRAM(s) Oral every 4 hours PRN      REVIEW OF SYSTEMS  --------------------------------------------------------------------------------  Gen: No  fevers/chills  Skin: No rashes  Head/Eyes/Ears/Mouth: No headache; Normal hearing; Normal vision w/o blurriness  Respiratory: No dyspnea, cough, wheezing, hemoptysis  CV: No chest pain, PND, orthopnea  GI: No abdominal pain, diarrhea, constipation, nausea, vomiting  : No increased frequency, dysuria, hematuria, nocturia  MSK: No joint pain/swelling; no back pain; no edema  Neuro: No dizziness/lightheadedness, weakness, seizures, numbness, tingling      All other systems were reviewed and are negative, except as noted.    VITALS/PHYSICAL EXAM  --------------------------------------------------------------------------------  T(C): 36.6 (06-03-21 @ 09:48), Max: 36.6 (06-02-21 @ 13:53)  HR: 62 (06-03-21 @ 09:48) (61 - 71)  BP: 138/86 (06-03-21 @ 09:48) (123/76 - 165/76)  RR: 18 (06-03-21 @ 09:48) (16 - 18)  SpO2: 96% (06-03-21 @ 09:48) (95% - 98%)  Wt(kg): --        06-02-21 @ 07:01  -  06-03-21 @ 07:00  --------------------------------------------------------  IN: 690 mL / OUT: 1200 mL / NET: -510 mL      Physical Exam:  	Gen: NAD, well-appearing  	HEENT: PERRL, supple neck  	Pulm: CTA B/L  	CV: RRR, S1S2  	Back: No spinal or CVA tenderness  	Abd: +BS, soft, nontender/nondistended  	: No suprapubic tenderness          Extremities: no bilateral LE edema, no asterixis, RLE in dressing with mild pedal edema           Neuro: Awake, alert, No focal deficits  	Skin: Warm, without rashes  	Vascular access:    LABS/STUDIES  --------------------------------------------------------------------------------              11.2   6.84  >-----------<  252      [06-03-21 @ 04:45]              34.3     135  |  101  |  37  ----------------------------<  118      [06-03-21 @ 04:45]  4.7   |  21  |  3.34        Ca     9.5     [06-03-21 @ 04:45]      Mg     2.6     [06-03-21 @ 04:45]      Phos  4.9     [06-03-21 @ 04:45]            Creatinine Trend:  SCr 3.34 [06-03 @ 04:45]  SCr 3.88 [06-02 @ 10:03]  SCr 4.70 [06-01 @ 06:56]  SCr 4.72 [05-31 @ 07:29]  SCr 4.18 [05-30 @ 11:07]    Urinalysis - [05-30-21 @ 16:49]      Color Yellow / Appearance Slightly Turbid / SG 1.016 / pH 6.0      Gluc Negative / Ketone Negative  / Bili Negative / Urobili <2 mg/dL       Blood Trace / Protein 100 mg/dL / Leuk Est Negative / Nitrite Negative      RBC 2 / WBC 7 / Hyaline 1 / Gran  / Sq Epi  / Non Sq Epi 2 / Bacteria Negative    Urine Creatinine 117      [05-30-21 @ 16:49]  Urine Protein 77      [05-30-21 @ 16:49]  Urine Sodium 28      [05-30-21 @ 16:49]  Urine Potassium 49.9      [05-30-21 @ 16:49]  Urine Chloride <20      [05-30-21 @ 16:49]         [facility] Division of Kidney Diseases & Hypertension  FOLLOW UP NOTE  [phone]--------------------------------------------------------------------------------  Chief Complaint:Non-pressure chronic ulcer of other part of right foot      HPI: Patient is a 49 y/o M w PMH of DM2, CAD s/p stents and diabetic foot ulcers presented to [facility] for worsening R foot ulcer and fever. Admitted for suspected sepsis and possible OM of RLE. S/p R foot partial 2nd ray resection on 5/28/21. Nephrology consulted for TEENA. On review of labs on [facility] HIE/[place], patient noted to have Scr of 1.08 on 5/28/21. Scr increased to 3.79 , and further peaked at to 4.72 on 5/31 & now downtrending to 3.34 today    24 hour events/subjective: Patient seen & examined. Labs & vitals reviewed. Denies chest pain, fever, chills, increased frequency, dysuria, hematuria, pus in urine, frothy urine, SOB, leg edema, loss of appetite, N/V, pruritis. UO in the last 24 hours 1.2L            PAST HISTORY  --------------------------------------------------------------------------------  No significant changes to PMH, PSH, FHx, SHx, unless otherwise noted    ALLERGIES & MEDICATIONS  --------------------------------------------------------------------------------  Allergies    No Known Allergies    Intolerances      Standing Inpatient Medications  amoxicillin  500 milliGRAM(s)/clavulanate 1 Tablet(s) Oral two times a day  aspirin enteric coated 81 milliGRAM(s) Oral daily  atorvastatin 80 milliGRAM(s) Oral at bedtime  calcium carbonate    500 mG (Tums) Chewable 1 Tablet(s) Chew two times a day  clopidogrel Tablet 75 milliGRAM(s) Oral daily  collagenase Ointment 1 Application(s) Topical daily  heparin   Injectable 5000 Unit(s) SubCutaneous every 8 hours  insulin glargine Injectable (LANTUS) 35 Unit(s) SubCutaneous at bedtime  insulin lispro (ADMELOG) corrective regimen sliding scale   SubCutaneous three times a day before meals  insulin lispro (ADMELOG) corrective regimen sliding scale   SubCutaneous at bedtime  insulin lispro Injectable (ADMELOG) 4 Unit(s) SubCutaneous three times a day before meals  polyethylene glycol 3350 17 Gram(s) Oral two times a day  senna 2 Tablet(s) Oral at bedtime  sodium chloride 0.9%. 1000 milliLiter(s) IV Continuous <Continuous>    PRN Inpatient Medications  acetaminophen   Tablet .. 650 milliGRAM(s) Oral every 6 hours PRN  melatonin 6 milliGRAM(s) Oral at bedtime PRN  oxyCODONE    IR 5 milliGRAM(s) Oral every 4 hours PRN  oxyCODONE    IR 10 milliGRAM(s) Oral every 4 hours PRN      REVIEW OF SYSTEMS  --------------------------------------------------------------------------------  Gen: No  fevers/chills  Skin: No rashes  Head/Eyes/Ears/Mouth: No headache; Normal hearing; Normal vision w/o blurriness  Respiratory: No dyspnea, cough, wheezing, hemoptysis  CV: No chest pain, PND, orthopnea  GI: No abdominal pain, diarrhea, constipation, nausea, vomiting  : No increased frequency, dysuria, hematuria, nocturia  MSK: foot dressing   Neuro: No dizziness/lightheadedness, weakness, seizures, numbness, tingling      All other systems were reviewed and are negative, except as noted.    VITALS/PHYSICAL EXAM  --------------------------------------------------------------------------------  T(C): 36.6 (06-03-21 @ 09:48), Max: 36.6 (06-02-21 @ 13:53)  HR: 62 (06-03-21 @ 09:48) (61 - 71)  BP: 138/86 (06-03-21 @ 09:48) (123/76 - 165/76)  RR: 18 (06-03-21 @ 09:48) (16 - 18)  SpO2: 96% (06-03-21 @ 09:48) (95% - 98%)  Wt(kg): --        06-02-21 @ 07:01  -  06-03-21 @ 07:00  --------------------------------------------------------  IN: 690 mL / OUT: 1200 mL / NET: -510 mL      Physical Exam:  	Gen: NAD, well-appearing  	HEENT: PERRL, supple neck  	Pulm: CTA B/L  	CV: RRR, S1S2  	Back: No spinal or CVA tenderness  	Abd: +BS, soft, nontender/nondistended  	: No suprapubic tenderness          Extremities: no bilateral LE edema, no asterixis, RLE in dressing with mild pedal edema           Neuro: Awake, alert, No focal deficits  	Skin: Warm, without rashes  	Vascular access:    LABS/STUDIES  --------------------------------------------------------------------------------              11.2   6.84  >-----------<  252      [06-03-21 @ 04:45]              34.3     135  |  101  |  37  ----------------------------<  118      [06-03-21 @ 04:45]  4.7   |  21  |  3.34        Ca     9.5     [06-03-21 @ 04:45]      Mg     2.6     [06-03-21 @ 04:45]      Phos  4.9     [06-03-21 @ 04:45]            Creatinine Trend:  SCr 3.34 [06-03 @ 04:45]  SCr 3.88 [06-02 @ 10:03]  SCr 4.70 [06-01 @ 06:56]  SCr 4.72 [05-31 @ 07:29]  SCr 4.18 [05-30 @ 11:07]    Urinalysis - [05-30-21 @ 16:49]      Color Yellow / Appearance Slightly Turbid / SG 1.016 / pH 6.0      Gluc Negative / Ketone Negative  / Bili Negative / Urobili <2 mg/dL       Blood Trace / Protein 100 mg/dL / Leuk Est Negative / Nitrite Negative      RBC 2 / WBC 7 / Hyaline 1 / Gran  / Sq Epi  / Non Sq Epi 2 / Bacteria Negative    Urine Creatinine 117      [05-30-21 @ 16:49]  Urine Protein 77      [05-30-21 @ 16:49]  Urine Sodium 28      [05-30-21 @ 16:49]  Urine Potassium 49.9      [05-30-21 @ 16:49]  Urine Chloride <20      [05-30-21 @ 16:49]

## 2021-06-03 NOTE — ADVANCED PRACTICE NURSE CONSULT - REASON FOR CONSULT
50y old Male who presents with history of DM type 2 for over 10 years, c/b retinopathy and neuropathy, with multiple toe amputations, CAD s/p stents x 2, longstanding history of diabetic foot ulcers, admitted for worsening R foot ulcer and fever to 102F at home.   Patient now s/p RF partial 2nd ray resection w ADDISON and free flap open medial. Patient treated with IV antibiotics, now in ATN. Endocrinologist Dr London, last seen 12/2020. Endocrine consulted for management of DM2 in patient with TEENA. A1c 9.8%. Due to home medication changes staff stated patient wanted to see the diabetes educator.

## 2021-06-03 NOTE — PROGRESS NOTE ADULT - PROBLEM SELECTOR PLAN 7
DVT ppx: hep sq q8  Dispo: pending hospital course, PT eval pending    Discussed with ACP    Nephrology f/u apprecited- agree with plan for home on Friday 6/4  patient wanted to leave AMA on 6/1-today 6/2 both patient and mother agreeable to stay and alow creat to decrease more  Endocrine consult appreciated [phone] for help with glucose in setting of TEENA- saw patient 6/1- awaiting improvement in creat before final recs    Addendum - patient declined going with transport for  L upper arm US to asses lump

## 2021-06-03 NOTE — PROGRESS NOTE ADULT - ASSESSMENT
50y old  Male who presents with history of DM type 2, CAD s/p stents x 2, longstanding history of diabetic foot ulcers, admitted for worsening R foot ulcer and fever to 102F at home.   Patient now s/p RF partial 2nd ray resection w ADDISON and free flap open medial. Patient treated with IV antibiotics, now in ATN.   Endocrine consult requested for management of DM2 in patient with TEENA. A1c 9.8%    1) Uncontrolled DM2 c/b CAD, retinopathy neurpopathy and DM foot ulcers  Currently in ATN per renal due to Antibx  A1c 9.8%, (possibly due to medication nonadherence documented in outpt endocrine notes)    Fs goal 100-180, currently in or close to goal range, patient reported not eating well in hospital because he cannot tolerate hospital food.  Refusing premeal Admelog doses.  Fs premeal and qhs   Agree with decreased dose of Lantus 35units qhs, given decreased renal clearance. He states he will take 44 units at home regardless of what is recommended. Counselled on signs/symptoms of hypoglycemia and that fasting hypoglycemia would be related to taking too much basal insulin.  Recommend continue Admelog 4units TIDac, hold if NPO   Recommend low correctional scale premeal and qhs   If patient to be NPO for any further procedures, recommend decrease basal insulin by 80% the evening before procedure.     Recommend basal/bolus insulin pen, dose to be determined on discharge given uncontrolled BG on PO medication/insulin and acute change in GFR.  As of now tentative discharge plan would be Lantus pen 35 units qhs and rapid acting insulin pen as per insurance 4/4/4 units premeal TID  Discontinue metformin at Prandin at AL.  Lantus pen can be resumed at AL but will recommend lower dose due to renal function (previously using 44 units qhs)  Please assess for which rapid acting insulin is covered, finalize dose before dc.  Patient knows how to use insulin pen, needs reinforcement on schedule of taking mealtime insulin before eating.  Will need to clarify if patient is on Trulicity as outpt - but given current GFR would be held at AL.  Patient to follow up with Dr London on discharge - appointment scheduled for July 1 at 10:30AM, [address] [facility] [address]  [phone].    2) HTN   goal< 130/80  bp above goal  Would continue to monitor and optimize pain management  medication adjustment per primary team    3) HLD  goal LDL<70  Continue Atorvastatin       Aria Hassan MD  Division of Endocrinology  Pager: [phone]    If after 6PM or before 9AM, or on weekends/holidays, please call endocrine answering service for assistance ([phone]).  For nonurgent matters email [email or web address] for assistance.

## 2021-06-03 NOTE — ADVANCED PRACTICE NURSE CONSULT - RECOMMEDATIONS
Possible discharge tomorrow. Patient followed by endocrine team while inpatient. RN to reinforce diabetes education.

## 2021-06-04 ENCOUNTER — RX RENEWAL (OUTPATIENT)
Age: 51
End: 2021-06-04

## 2021-06-04 ENCOUNTER — TRANSCRIPTION ENCOUNTER (OUTPATIENT)
Age: 51
End: 2021-06-04

## 2021-06-04 VITALS
HEART RATE: 66 BPM | RESPIRATION RATE: 16 BRPM | DIASTOLIC BLOOD PRESSURE: 83 MMHG | OXYGEN SATURATION: 100 % | TEMPERATURE: 98 F | SYSTOLIC BLOOD PRESSURE: 140 MMHG

## 2021-06-04 DIAGNOSIS — Z02.9 ENCOUNTER FOR ADMINISTRATIVE EXAMINATIONS, UNSPECIFIED: ICD-10-CM

## 2021-06-04 LAB
ANION GAP SERPL CALC-SCNC: 13 MMOL/L — SIGNIFICANT CHANGE UP (ref 7–14)
BUN SERPL-MCNC: 35 MG/DL — HIGH (ref 7–23)
CALCIUM SERPL-MCNC: 8.9 MG/DL — SIGNIFICANT CHANGE UP (ref 8.4–10.5)
CHLORIDE SERPL-SCNC: 103 MMOL/L — SIGNIFICANT CHANGE UP (ref 98–107)
CO2 SERPL-SCNC: 23 MMOL/L — SIGNIFICANT CHANGE UP (ref 22–31)
CREAT SERPL-MCNC: 2.66 MG/DL — HIGH (ref 0.5–1.3)
GLUCOSE BLDC GLUCOMTR-MCNC: 161 MG/DL — HIGH (ref 70–99)
GLUCOSE SERPL-MCNC: 240 MG/DL — HIGH (ref 70–99)
HCT VFR BLD CALC: 35.6 % — LOW (ref 39–50)
HGB BLD-MCNC: 11.8 G/DL — LOW (ref 13–17)
MAGNESIUM SERPL-MCNC: 2.3 MG/DL — SIGNIFICANT CHANGE UP (ref 1.6–2.6)
MCHC RBC-ENTMCNC: 29 PG — SIGNIFICANT CHANGE UP (ref 27–34)
MCHC RBC-ENTMCNC: 33.1 GM/DL — SIGNIFICANT CHANGE UP (ref 32–36)
MCV RBC AUTO: 87.5 FL — SIGNIFICANT CHANGE UP (ref 80–100)
NRBC # BLD: 0 /100 WBCS — SIGNIFICANT CHANGE UP
NRBC # FLD: 0 K/UL — SIGNIFICANT CHANGE UP
PHOSPHATE SERPL-MCNC: 4.4 MG/DL — SIGNIFICANT CHANGE UP (ref 2.5–4.5)
PLATELET # BLD AUTO: 269 K/UL — SIGNIFICANT CHANGE UP (ref 150–400)
POTASSIUM SERPL-MCNC: 4.7 MMOL/L — SIGNIFICANT CHANGE UP (ref 3.5–5.3)
POTASSIUM SERPL-SCNC: 4.7 MMOL/L — SIGNIFICANT CHANGE UP (ref 3.5–5.3)
RBC # BLD: 4.07 M/UL — LOW (ref 4.2–5.8)
RBC # FLD: 12.5 % — SIGNIFICANT CHANGE UP (ref 10.3–14.5)
SODIUM SERPL-SCNC: 139 MMOL/L — SIGNIFICANT CHANGE UP (ref 135–145)
WBC # BLD: 6.9 K/UL — SIGNIFICANT CHANGE UP (ref 3.8–10.5)
WBC # FLD AUTO: 6.9 K/UL — SIGNIFICANT CHANGE UP (ref 3.8–10.5)

## 2021-06-04 PROCEDURE — 99232 SBSQ HOSP IP/OBS MODERATE 35: CPT

## 2021-06-04 PROCEDURE — 99239 HOSP IP/OBS DSCHRG MGMT >30: CPT

## 2021-06-04 RX ORDER — ASPIRIN/CALCIUM CARB/MAGNESIUM 324 MG
1 TABLET ORAL
Qty: 30 | Refills: 0
Start: 2021-06-04 | End: 2021-07-03

## 2021-06-04 RX ORDER — INSULIN ASPART 100 [IU]/ML
4 INJECTION, SOLUTION SUBCUTANEOUS
Qty: 5 | Refills: 0
Start: 2021-06-04 | End: 2021-07-03

## 2021-06-04 RX ORDER — ATORVASTATIN CALCIUM 80 MG/1
1 TABLET, FILM COATED ORAL
Qty: 30 | Refills: 0
Start: 2021-06-04 | End: 2021-07-03

## 2021-06-04 RX ORDER — ISOPROPYL ALCOHOL, BENZOCAINE .7; .06 ML/ML; ML/ML
1 SWAB TOPICAL
Qty: 100 | Refills: 1
Start: 2021-06-04 | End: 2021-07-23

## 2021-06-04 RX ORDER — AMLODIPINE BESYLATE 2.5 MG/1
1 TABLET ORAL
Qty: 30 | Refills: 0
Start: 2021-06-04 | End: 2021-07-03

## 2021-06-04 RX ORDER — CLOPIDOGREL BISULFATE 75 MG/1
1 TABLET, FILM COATED ORAL
Qty: 30 | Refills: 0
Start: 2021-06-04 | End: 2021-07-03

## 2021-06-04 RX ORDER — ENOXAPARIN SODIUM 100 MG/ML
35 INJECTION SUBCUTANEOUS
Qty: 1 | Refills: 0
Start: 2021-06-04 | End: 2021-07-03

## 2021-06-04 RX ORDER — AMLODIPINE BESYLATE 2.5 MG/1
5 TABLET ORAL DAILY
Refills: 0 | Status: DISCONTINUED | OUTPATIENT
Start: 2021-06-04 | End: 2021-06-04

## 2021-06-04 RX ADMIN — Medication 1 TABLET(S): at 05:34

## 2021-06-04 RX ADMIN — Medication 10 MILLIGRAM(S): at 00:00

## 2021-06-04 RX ADMIN — INSULIN GLARGINE 35 UNIT(S): 100 INJECTION, SOLUTION SUBCUTANEOUS at 00:07

## 2021-06-04 NOTE — PROGRESS NOTE ADULT - PROBLEM SELECTOR PROBLEM 2
Type 2 diabetes mellitus with other circulatory complication, with long-term current use of insulin
Hypertension, unspecified type
Diabetic foot infection
Hypertension, unspecified type
Diabetic foot infection
Hypertension, unspecified type
Type 2 diabetes mellitus with other circulatory complication, with long-term current use of insulin
Diabetic foot infection

## 2021-06-04 NOTE — PROGRESS NOTE ADULT - PROBLEM SELECTOR PLAN 4
- c/w home ACEi   - BP acceptable
s/p stent   - No active chest pain   - Attempted to check EKG but patient refused multiple times   - C/w ASA, plavix and statin
- c/w home ACEi   - BP acceptable
s/p stent   - No active chest pain   - Attempted to check EKG but patient refused multiple times   - C/w ASA, plavix and statin
s/p stent   - No active chest pain   - Attempted to check EKG but patient refused multiple times   - C/w ASA, plavix and statin

## 2021-06-04 NOTE — PROGRESS NOTE ADULT - ASSESSMENT
50M PMH DM type II (last a1c 10.2) , CAD s/p stents x 2, longstanding history of diabetic foot ulcers, s/p R 3rd and 4th partial ray resections by podiatry 4/15/21; now presenting with worsening R foot ulcer and fever to 102F at home; presentation c/f infected foot ulcer with concern for OM c/b sepsis in the setting of poorly controlled DM.  Had Pod surgery 5/28.  TEENA -seen by Nephrology Creat 1------> 4.7------> 3.88-----> 3.3------> 2.66  Neph recs:  Problem: TEENA (acute kidney injury). Recommendation: Pt with TEENA in the setting of supratherapeutic vancomycin level, hypotension, NSAIDS and ACE-I. Now likely in ATN.    Pod  "  s/p RF partial 2nd ray resection w ADDISON and free flap open medial (DOS 5/28/21): surgical sites well coapted, sutures intact, no dehiscence, flap warm with duskiness to dorsal flap and no signs of active infection   -L foot submet 4 wound down to bone w/ no signs of active infection applied Aquacel and DSD  -as per intra op findings low concern for residual bone infection, moderate concern for flap viability  - pt to stay non WB to RLE in a CAM boot  - bone culture staph hemolyticus  - pod stable for discharge on PO Augmentin x 1 week, info in discharge paperwork"

## 2021-06-04 NOTE — PROVIDER CONTACT NOTE (OTHER) - RECOMMENDATIONS
bp meds?
zofran
PA notified, awaiting response
tums
Provider to order lower dose of Lantus and anti-emetic.
PA notified, please assess pt bedside
notify MD
No new order.
provide pt with education, reinforce, monitor notify MD

## 2021-06-04 NOTE — PROVIDER CONTACT NOTE (OTHER) - NAME OF MD/NP/PA/DO NOTIFIED:
Clari CORADO
Dinora Doshi
Provider Leah Haines
Ofelia (ACP)
dr blanco [facility] hospitalist
Alpa Carbajal [id number]
Alpa Carbajal [id number]
GABY EDMONDSON
GABY EDMONDSON

## 2021-06-04 NOTE — PROGRESS NOTE ADULT - SUBJECTIVE AND OBJECTIVE BOX
Chief Complaint: DM2    History: for discharge home today  patient angry at time of visit,   refusing fingersticks and insulin premeal doses  no hypoglycemia events noted    MEDICATIONS  (STANDING):  amLODIPine   Tablet 5 milliGRAM(s) Oral daily  amoxicillin  500 milliGRAM(s)/clavulanate 1 Tablet(s) Oral two times a day  aspirin enteric coated 81 milliGRAM(s) Oral daily  atorvastatin 80 milliGRAM(s) Oral at bedtime  clopidogrel Tablet 75 milliGRAM(s) Oral daily  collagenase Ointment 1 Application(s) Topical daily  heparin   Injectable 5000 Unit(s) SubCutaneous every 8 hours  insulin glargine Injectable (LANTUS) 35 Unit(s) SubCutaneous at bedtime  insulin lispro (ADMELOG) corrective regimen sliding scale   SubCutaneous three times a day before meals  insulin lispro (ADMELOG) corrective regimen sliding scale   SubCutaneous at bedtime  insulin lispro Injectable (ADMELOG) 4 Unit(s) SubCutaneous three times a day before meals  polyethylene glycol 3350 17 Gram(s) Oral two times a day  senna 2 Tablet(s) Oral at bedtime  sodium chloride 0.9%. 1000 milliLiter(s) (75 mL/Hr) IV Continuous <Continuous>    MEDICATIONS  (PRN):  acetaminophen   Tablet .. 650 milliGRAM(s) Oral every 6 hours PRN Mild Pain (1 - 3)  melatonin 6 milliGRAM(s) Oral at bedtime PRN Insomnia  oxyCODONE    IR 5 milliGRAM(s) Oral every 4 hours PRN Moderate Pain (4 - 6)  oxyCODONE    IR 10 milliGRAM(s) Oral every 4 hours PRN Severe Pain (7 - 10)      Allergies    No Known Allergies    Intolerances      Review of Systems:  ALL OTHER SYSTEMS REVIEWED AND NEGATIVE      PHYSICAL EXAM:  VITALS: T(C): 36.7 (06-04-21 @ 05:30)  T(F): 98 (06-04-21 @ 05:30), Max: 98.8 (06-03-21 @ 23:20)  HR: 66 (06-04-21 @ 05:30) (63 - 69)  BP: 140/83 (06-04-21 @ 05:30) (140/83 - 168/94)  RR:  (16 - 18)  SpO2:  (98% - 100%)  Wt(kg): --  GENERAL: NAD, well-groomed, well-developed  EYES: No proptosis, anicteric  HEENT:  Atraumatic, Normocephalic, moist mucous membranes  RESPIRATORY: nonlabored respirations, no wheezing  PSYCH: Alert and oriented x 3, angry    CAPILLARY BLOOD GLUCOSE      POCT Blood Glucose.: 161 mg/dL (04 Jun 2021 00:06)  POCT Blood Glucose.: 100 mg/dL (03 Jun 2021 17:24)      06-04    139  |  103  |  35<H>  ----------------------------<  240<H>  4.7   |  23  |  2.66<H>    EGFR if : 31<L>  EGFR if non : 27<L>    Ca    8.9      06-04  Mg     2.3     06-04  Phos  4.4     06-04        A1C with Estimated Average Glucose Result: 9.8 % (05-27-21 @ 06:56)      Thyroid Function Tests:

## 2021-06-04 NOTE — PROGRESS NOTE ADULT - ASSESSMENT
50y old  Male who presents with history of DM type 2, CAD s/p stents x 2, longstanding history of diabetic foot ulcers, admitted for worsening R foot ulcer and fever to 102F at home.   Patient now s/p RF partial 2nd ray resection w ADDISON and free flap open medial. Patient treated with IV antibiotics, now in ATN.   Endocrine consult requested for management of DM2 in patient with TEENA. A1c 9.8%    1) Uncontrolled DM2 c/b CAD, retinopathy neurpopathy and DM foot ulcers  Currently in ATN per renal due to Antibx  A1c 9.8%, (possibly due to medication nonadherence documented in outpt endocrine notes)    Fs goal 100-180, currently in or close to goal range, patient reported not eating well in hospital because he cannot tolerate hospital food.  Refusing premeal Admelog doses.  Fs premeal and qhs   Agree with decreased dose of Lantus 35 units qhs, given decreased renal clearance. He states he will take 44 units at home regardless of what is recommended. Counselled on signs/symptoms of hypoglycemia and that fasting hypoglycemia would be related to taking too much basal insulin.  Recommend continue Admelog 4units TIDac, hold if NPO   Recommend low correctional scale premeal and qhs   If patient to be NPO for any further procedures, recommend decrease basal insulin by 80% the evening before procedure.     Discharge plan would be Lantus or equivalent basal insulin pen 35 units qhs and rapid acting insulin pen as per insurance 4/4/4 units premeal TID  Discontinue metformin and Prandin at dc.  Lantus pen can be resumed at dc but will recommend lower dose due to renal function (previously using 44 units qhs)  Please assess for which rapid acting insulin is covered, finalize dose before dc.  Patient knows how to use insulin pen, needs reinforcement on schedule of taking mealtime insulin before eating.  No Trulicity given low GFR/TEENA  Patient to follow up with Dr London on discharge - appointment scheduled for July 1 at 10:30AM, [address] [place] [address] [address]  [phone].    2) HTN   goal< 130/80  bp above goal  Would continue to monitor and optimize pain management  medication adjustment per primary team    3) HLD  goal LDL<70  Continue Atorvastatin     Discharge plan reviewed with several members of primary team.      Aria Hassan MD  Division of Endocrinology  Pager: [phone]    If after 6PM or before 9AM, or on weekends/holidays, please call endocrine answering service for assistance ([phone]).  For nonurgent matters email [email or web address] for assistance.

## 2021-06-04 NOTE — PROVIDER CONTACT NOTE (OTHER) - SITUATION
Patient is s/p right foot toe amputation.
bp 168/94, and refusing lantus until bp managed
refusing new IV access, refused lovenox/ heparin. pt refused breakfast & lunch insulin coverage.
patient complaining of feeling nauseous. states "that the medications he has been prescribed in the hospital are causing his creatinine to increase".
pt POCT FS at 2123 300, pt to receive 1 unit of admelog per sliding scale. pt requesting more coverage. if coverage pt states he will take his own meds at bedside
pt is non-compliant with medications. Refused oral medications, Heparin and insulin injection despite much encouragement and counseling.
patient refused assessment and c/o stomach pain
pt c/o pain 8/10, requesting oxy 10 because pt states oxy 5 is not adequately right foot pain. pt expects med by 2130 per last oxy 5 dose
wants to sign AMA, and not wanting vitals rechecked

## 2021-06-04 NOTE — PROVIDER CONTACT NOTE (OTHER) - REASON
pt c/o pain 8/10
wants to sign AMA, and not wanting vitals rechecked
refusal of oral medications and insulin
nausea
Patient's blood glucose is 132 and he is ordered for 44 units of Lantus, patient complains of nausea, refuses vital signs
patient refusing assessment and c/o stomach pain
refusing new IV access
bp 168/94, and refusing lantus until bp managed
pt requesting more insulin per POCT FS results, wants to leave AMA

## 2021-06-04 NOTE — PROGRESS NOTE ADULT - PROBLEM SELECTOR PLAN 1
Cr 1.08 --> 4.18 --> 4.72.-----> 4.7-----> 3.88-----> 3.3------->2.66   Likely ATN. Was on vancomycin, blood pressure low, and received motrin on 5/26.. Bladder scan 42cc- no urinary retention. Repeat vanco level 8.9. FeNa 0.8% - pre-renal.   - Vanc d/luz  - Hold lisinopril for now  - Renal recommended IV hydration: NS at 75cc/hr for 12 hours.   - 5/31 renal ultrasound- NL  - F/u strict I&Os.   -  renal f/u appreciated  - Avoid nephrotoxic medications.- no NSAIDS

## 2021-06-04 NOTE — PROGRESS NOTE ADULT - PROBLEM SELECTOR PLAN 3
poorly controlled however sugards are stable on the current regimen.   - A1c = 9.8   - C/w lantus 35 with premeals - lantus dec due to Inc Creat  Seen by Endocrine 6/1  No recommendations yet made for D/c- awaiting improvement in creatinine before making any final recs.  Endocrine rec home on insulin while creat is elevated and then out patient f/u with Endocrine to det when pills can be restarted once Creat has normalized.  Out patient f/u with Endocrine on 7/1/21  patient had diabetic teaching of FS and insulin administration.

## 2021-06-04 NOTE — PROGRESS NOTE ADULT - PROBLEM SELECTOR PROBLEM 1
Type 2 diabetes mellitus with other diabetic kidney complication, with long-term current use of insulin
TEENA (acute kidney injury)
Type 2 diabetes mellitus with other diabetic kidney complication, with long-term current use of insulin
Type 2 diabetes mellitus with other diabetic kidney complication, with long-term current use of insulin
TEENA (acute kidney injury)
TEENA (acute kidney injury)
Diabetic foot infection
TEENA (acute kidney injury)
Diabetic foot infection
TEENA (acute kidney injury)
TEENA (acute kidney injury)

## 2021-06-04 NOTE — PROVIDER CONTACT NOTE (OTHER) - DATE AND TIME:
03-Jun-2021 23:32
29-May-2021 22:45
02-Jun-2021 19:19
28-May-2021 22:00
02-Jun-2021 20:50
04-Jun-2021 02:22
28-May-2021 21:00
03-Jun-2021 18:30
01-Jun-2021 23:00

## 2021-06-04 NOTE — DISCHARGE NOTE NURSING/CASE MANAGEMENT/SOCIAL WORK - NSDCFUADDAPPT_GEN_ALL_CORE_FT
Follow up with Podiatry on 6/9/2021 at 5pm    Renal follow up appt:    Follow up with your primary care physician for further monitoring in 1-2 weeks. Please call to arrange appointment.     Follow up with Endocrine: appointment scheduled for July 1 at 10:30AM, [address]  [phone].

## 2021-06-04 NOTE — DISCHARGE NOTE NURSING/CASE MANAGEMENT/SOCIAL WORK - NSDCPNINST_GEN_ALL_CORE
Call your provider for a follow-up appointment.  Call your provider for fever and/chills; persistent nausea, vomiting, diarrhea; uncontrolled bleeding to surgical site; pus discharge; severe pain unrelieved by pain medication.

## 2021-06-04 NOTE — PROGRESS NOTE ADULT - PROVIDER SPECIALTY LIST ADULT
Endocrinology
Hospitalist
Hospitalist
Nephrology
Podiatry
Podiatry
Vascular Surgery
Podiatry
Hospitalist
Nephrology
Podiatry
Endocrinology
Nephrology
Endocrinology
Hospitalist

## 2021-06-04 NOTE — PROGRESS NOTE ADULT - PROBLEM SELECTOR PROBLEM 4
Essential hypertension
CAD (coronary artery disease)
Essential hypertension
CAD (coronary artery disease)
CAD (coronary artery disease)

## 2021-06-04 NOTE — PROVIDER CONTACT NOTE (OTHER) - BACKGROUND
s/p Rt foot partial 2nd ray resection w/ free flap
pt is diagnosed with DM type 2, osteomyelitis, TEENA, HTN
s/p Rt foot partial 2nd ray resection w/ free flap
pt is diagnosed with DM type 2, osteomyelitis, TEENA, HTN. s/p R toe amputation
pt is diagnosed with DM type 2, osteomyelitis, TEENA, HTN
s/p right foot resection; PMH of left foot diabetic ulcer, osteomyelitis, TEENA
s/p right foot resection; PMH of left foot diabetic ulcer, osteomyelitis, TEENA
patient admitted for right chronic foot ulcer

## 2021-06-04 NOTE — PROGRESS NOTE ADULT - PROBLEM SELECTOR PROBLEM 6
Need for prophylactic measure
Anxiety
Need for prophylactic measure
Anxiety

## 2021-06-04 NOTE — PROGRESS NOTE ADULT - PROBLEM SELECTOR PLAN 7
DVT ppx: hep sq q8  Dispo: pending hospital course, PT eval pending    Discussed with ACP    Nephrology f/u apprecited- agree with plan for home on Friday 6/4  patient wanted to leave AMA on 6/1, 6/2 both patient and mother agreeable to stay and allow creat to decrease more  Endocrine consult appreciated [phone] for help with glucose in setting of TENEA- saw patient 6/1- awaiting improvement in creat before final recs    Addendum - patient declined going with transport for  L upper arm US to asses lump- most likely [place] cyst

## 2021-06-04 NOTE — PROGRESS NOTE ADULT - NSICDXPILOT_GEN_ALL_CORE
[place]

## 2021-06-04 NOTE — PROVIDER CONTACT NOTE (OTHER) - ASSESSMENT
axox4, oob x 1, frustrated
patient does not show signs/symptoms of discomfort, no episodes of vomiting. openly agitated/frustrated with his care.
patient refused assessment. c/o stomach pain
pt frustrated. agitated, unpleasant, team aware
pt is asymptomatic. denies chest pain, (-) headache, dizziness, no s/sx of hypoglycemia/hyperglycemia
pt is asymptomatic. denies chest pain, (-) headache, dizziness, no s/sx of hypoglycemia/hyperglycemia. patient sitting in bed
pt is asymptomatic. denies chest pain, (-) headache, dizziness, no s/sx of hypoglycemia/hyperglycemia. all other vitals within range
The patient has a history of Type II diabetes. His pre-bedtime blood glucose was 132 and he was ordered for 44 units of insulin. The patient refused vital signs and stated he has not eaten because he is nauseous. No signs or symptoms of acute distress noted.
axox4, pt with pain 8/10

## 2021-06-04 NOTE — PROGRESS NOTE ADULT - PROBLEM SELECTOR PLAN 5
Holding home ACE given TEENA.   Started on Norvasc 5 mg po QD for home  most check with PCP before resuming any ACE/ ARB

## 2021-06-04 NOTE — PROGRESS NOTE ADULT - PROBLEM SELECTOR PROBLEM 5
Anxiety
Essential hypertension
Anxiety
Essential hypertension

## 2021-06-04 NOTE — PROGRESS NOTE ADULT - PROBLEM SELECTOR PLAN 8
Transitions of Care Status:  1.  Name of PCP:  2.  PCP Contacted on Admission: [ ] Y    [ ] N    3.  PCP contacted at Discharge: [ ] Y    [ ] N    [ ] N/A  4.  Post-Discharge Appointment Date and Location:  5.  Summary of Handoff given to PCP:  Pod f/u Dr Lott 6/9/21 at 5:45 pm  Endocrine f/u dr SKY on 7/1/21  Nephrology follow up   PCP  Patient and mother aware of need to stay of home BP medications of Lisinopril / Ace/ ARB.  Also no OTC pain medications ecept for tylenol- NSAIDs can harm kidney- no alleve/ naprosyn/ advil/ ibuprofen/ moltrin  50 min to coordinate d/c  d/w patient / mother /acp Transitions of Care Status:  1.  Name of PCP:  2.  PCP Contacted on Admission: [ ] Y    [ ] N    3.  PCP contacted at Discharge: [ ] Y    [ ] N    [ ] N/A  4.  Post-Discharge Appointment Date and Location:  5.  Summary of Handoff given to PCP:  Pod f/u Dr Lott 6/9/21 at 5:45 pm  Endocrine f/u dr SKY on 7/1/21  Nephrology follow up   PCP- wrong one in comp  Patient and mother aware of need to stay of home BP medications of Lisinopril / Ace/ ARB.  Also no OTC pain medications ecept for tylenol- NSAIDs can harm kidney- no alleve/ naprosyn/ advil/ ibuprofen/ moltrin  50 min to coordinate d/c  d/w patient / mother /acp Transitions of Care Status:  1.  Name of PCP:  2.  PCP Contacted on Admission: [ ] Y    [ ] N    3.  PCP contacted at Discharge: [ ] Y    [ ] N    [ ] N/A  4.  Post-Discharge Appointment Date and Location:  5.  Summary of Handoff given to PCP:  Pod f/u Dr Lott 6/9/21 at 5:45 pm  Endocrine f/u dr SKY on 7/1/21  Nephrology follow up   Called mother Mrs Correia at [phone] and updated  Patient and mother aware of need to stay of home BP medications of Lisinopril / Ace/ ARB.  Also no OTC pain medications except for tylenol- NSAIDs can harm kidney- no alleve/ naprosyn/ advil/ ibuprofen/ moltrin  50 min to coordinate d/c  d/w patient / mother /acp Transitions of Care Status:  1.  Name of PCP:  2.  PCP Contacted on Admission: [ ] Y    [ ] N    3.  PCP contacted at Discharge: [x ] Y    [ ] N    [ ] N/A  4.  Post-Discharge Appointment Date and Location:  5.  Summary of Handoff given to PCP: Dr Morocho on 6/4/21 updated  Pod f/u Dr Lott 6/9/21 at 5:45 pm  Endocrine f/u dr SKY on 7/1/21  Nephrology follow up   Called mother Mrs Correia at [phone] and updated  Patient and mother aware of need to stay of home BP medications of Lisinopril / Ace/ ARB.  Also no OTC pain medications except for tylenol- NSAIDs can harm kidney- no alleve/ naprosyn/ advil/ ibuprofen/ moltrin  50 min to coordinate d/c  d/w patient / mother /acp

## 2021-06-04 NOTE — PROVIDER CONTACT NOTE (OTHER) - ACTION/TREATMENT ORDERED:
MD aware. will come and talk to patient
Per team, x1 tramadol given at 1658, oxy 10 can be given at 2200. update: pt upset, requesting meds earlier and to see speak to team directly. pt seen bedside by team. standing 10 ordered q4 overnight
MD aware, educate pt. MD to bedside provided education, explained benefits/ indications, patient was encouraged, +reeducated multiple times. pt refusing. will continue to educate
PA increases admelog dose per pt request. requests pt to hand over bedside meds, pt refuses and states he will like leave AMA.   update: meds at pharmacy per hospital policy, repeat FS to be done at 2
provider to double check zofran side effects for kidney injury, then will prescribe.
MD will rx bp meds
No new order.
tums ordered, no actions regarding assessment

## 2021-06-07 ENCOUNTER — NON-APPOINTMENT (OUTPATIENT)
Age: 51
End: 2021-06-07

## 2021-06-07 ENCOUNTER — APPOINTMENT (OUTPATIENT)
Dept: WOUND CARE | Facility: CLINIC | Age: 51
End: 2021-06-07
Payer: COMMERCIAL

## 2021-06-07 VITALS — TEMPERATURE: 96.4 F | BODY MASS INDEX: 28.49 KG/M2 | HEIGHT: 73 IN | WEIGHT: 215 LBS

## 2021-06-07 PROCEDURE — 99213 OFFICE O/P EST LOW 20 MIN: CPT | Mod: 24

## 2021-06-07 RX ORDER — INSULIN GLARGINE 100 [IU]/ML
100 INJECTION, SOLUTION SUBCUTANEOUS
Qty: 3 | Refills: 3 | Status: DISCONTINUED | COMMUNITY
Start: 2020-11-02 | End: 2021-06-07

## 2021-06-07 RX ORDER — RAMIPRIL 10 MG/1
10 CAPSULE ORAL
Qty: 90 | Refills: 3 | Status: DISCONTINUED | COMMUNITY
Start: 2020-08-05 | End: 2021-06-07

## 2021-06-09 ENCOUNTER — INPATIENT (INPATIENT)
Facility: HOSPITAL | Age: 51
LOS: 2 days | Discharge: HOME | DRG: 901 | End: 2021-06-12
Attending: INTERNAL MEDICINE | Admitting: STUDENT IN AN ORGANIZED HEALTH CARE EDUCATION/TRAINING PROGRAM
Payer: MEDICAID

## 2021-06-09 ENCOUNTER — APPOINTMENT (OUTPATIENT)
Dept: NEPHROLOGY | Facility: CLINIC | Age: 51
End: 2021-06-09

## 2021-06-09 VITALS
SYSTOLIC BLOOD PRESSURE: 158 MMHG | TEMPERATURE: 99 F | DIASTOLIC BLOOD PRESSURE: 82 MMHG | RESPIRATION RATE: 18 BRPM | OXYGEN SATURATION: 97 % | WEIGHT: 220.02 LBS | HEIGHT: 73 IN | HEART RATE: 104 BPM

## 2021-06-09 LAB
ALBUMIN SERPL ELPH-MCNC: 4.2 G/DL — SIGNIFICANT CHANGE UP (ref 3.3–5)
ALP SERPL-CCNC: 34 U/L — LOW (ref 40–120)
ALT FLD-CCNC: 38 U/L — SIGNIFICANT CHANGE UP (ref 10–45)
ANION GAP SERPL CALC-SCNC: 20 MMOL/L — HIGH (ref 5–17)
APTT BLD: 32.4 SEC — SIGNIFICANT CHANGE UP (ref 27.5–35.5)
AST SERPL-CCNC: 25 U/L — SIGNIFICANT CHANGE UP (ref 10–40)
BASOPHILS # BLD AUTO: 0.02 K/UL — SIGNIFICANT CHANGE UP (ref 0–0.2)
BASOPHILS NFR BLD AUTO: 0.3 % — SIGNIFICANT CHANGE UP (ref 0–2)
BILIRUB SERPL-MCNC: 0.4 MG/DL — SIGNIFICANT CHANGE UP (ref 0.2–1.2)
BLD GP AB SCN SERPL QL: NEGATIVE — SIGNIFICANT CHANGE UP
BUN SERPL-MCNC: 42 MG/DL — HIGH (ref 7–23)
CALCIUM SERPL-MCNC: <3 MG/DL — CRITICAL LOW (ref 8.4–10.5)
CHLORIDE SERPL-SCNC: 94 MMOL/L — LOW (ref 96–108)
CO2 SERPL-SCNC: 18 MMOL/L — LOW (ref 22–31)
CREAT SERPL-MCNC: 1.78 MG/DL — HIGH (ref 0.5–1.3)
CRP SERPL-MCNC: 8 MG/L — HIGH (ref 0–4)
EOSINOPHIL # BLD AUTO: 0.19 K/UL — SIGNIFICANT CHANGE UP (ref 0–0.5)
EOSINOPHIL NFR BLD AUTO: 2.5 % — SIGNIFICANT CHANGE UP (ref 0–6)
GLUCOSE SERPL-MCNC: 316 MG/DL — HIGH (ref 70–99)
HCT VFR BLD CALC: 42 % — SIGNIFICANT CHANGE UP (ref 39–50)
HGB BLD-MCNC: 13.4 G/DL — SIGNIFICANT CHANGE UP (ref 13–17)
IMM GRANULOCYTES NFR BLD AUTO: 0.3 % — SIGNIFICANT CHANGE UP (ref 0–1.5)
INR BLD: 1.12 RATIO — SIGNIFICANT CHANGE UP (ref 0.88–1.16)
LYMPHOCYTES # BLD AUTO: 1.94 K/UL — SIGNIFICANT CHANGE UP (ref 1–3.3)
LYMPHOCYTES # BLD AUTO: 25.1 % — SIGNIFICANT CHANGE UP (ref 13–44)
MCHC RBC-ENTMCNC: 28.6 PG — SIGNIFICANT CHANGE UP (ref 27–34)
MCHC RBC-ENTMCNC: 31.9 GM/DL — LOW (ref 32–36)
MCV RBC AUTO: 89.6 FL — SIGNIFICANT CHANGE UP (ref 80–100)
MONOCYTES # BLD AUTO: 0.65 K/UL — SIGNIFICANT CHANGE UP (ref 0–0.9)
MONOCYTES NFR BLD AUTO: 8.4 % — SIGNIFICANT CHANGE UP (ref 2–14)
NEUTROPHILS # BLD AUTO: 4.91 K/UL — SIGNIFICANT CHANGE UP (ref 1.8–7.4)
NEUTROPHILS NFR BLD AUTO: 63.4 % — SIGNIFICANT CHANGE UP (ref 43–77)
NRBC # BLD: 0 /100 WBCS — SIGNIFICANT CHANGE UP (ref 0–0)
PLATELET # BLD AUTO: 200 K/UL — SIGNIFICANT CHANGE UP (ref 150–400)
POTASSIUM SERPL-MCNC: >9 MMOL/L — CRITICAL HIGH (ref 3.5–5.3)
POTASSIUM SERPL-SCNC: >9 MMOL/L — CRITICAL HIGH (ref 3.5–5.3)
PROT SERPL-MCNC: 8.9 G/DL — HIGH (ref 6–8.3)
PROTHROM AB SERPL-ACNC: 13.4 SEC — SIGNIFICANT CHANGE UP (ref 10.6–13.6)
RBC # BLD: 4.69 M/UL — SIGNIFICANT CHANGE UP (ref 4.2–5.8)
RBC # FLD: 12.8 % — SIGNIFICANT CHANGE UP (ref 10.3–14.5)
RH IG SCN BLD-IMP: POSITIVE — SIGNIFICANT CHANGE UP
SODIUM SERPL-SCNC: 132 MMOL/L — LOW (ref 135–145)
WBC # BLD: 7.73 K/UL — SIGNIFICANT CHANGE UP (ref 3.8–10.5)
WBC # FLD AUTO: 7.73 K/UL — SIGNIFICANT CHANGE UP (ref 3.8–10.5)

## 2021-06-09 PROCEDURE — 99285 EMERGENCY DEPT VISIT HI MDM: CPT

## 2021-06-09 PROCEDURE — 93010 ELECTROCARDIOGRAM REPORT: CPT

## 2021-06-09 RX ORDER — AMPICILLIN SODIUM AND SULBACTAM SODIUM 250; 125 MG/ML; MG/ML
3 INJECTION, POWDER, FOR SUSPENSION INTRAMUSCULAR; INTRAVENOUS ONCE
Refills: 0 | Status: COMPLETED | OUTPATIENT
Start: 2021-06-09 | End: 2021-06-10

## 2021-06-09 RX ORDER — AMPICILLIN SODIUM AND SULBACTAM SODIUM 250; 125 MG/ML; MG/ML
INJECTION, POWDER, FOR SUSPENSION INTRAMUSCULAR; INTRAVENOUS
Refills: 0 | Status: DISCONTINUED | OUTPATIENT
Start: 2021-06-10 | End: 2021-06-11

## 2021-06-09 RX ORDER — CADEXOMER IODINE 0.9 %
1 PADS, MEDICATED (EA) TOPICAL DAILY
Refills: 0 | Status: DISCONTINUED | OUTPATIENT
Start: 2021-06-09 | End: 2021-06-11

## 2021-06-09 RX ORDER — AMPICILLIN SODIUM AND SULBACTAM SODIUM 250; 125 MG/ML; MG/ML
3 INJECTION, POWDER, FOR SUSPENSION INTRAMUSCULAR; INTRAVENOUS EVERY 6 HOURS
Refills: 0 | Status: DISCONTINUED | OUTPATIENT
Start: 2021-06-10 | End: 2021-06-11

## 2021-06-09 RX ORDER — COLLAGENASE CLOSTRIDIUM HIST. 250 UNIT/G
1 OINTMENT (GRAM) TOPICAL DAILY
Refills: 0 | Status: DISCONTINUED | OUTPATIENT
Start: 2021-06-09 | End: 2021-06-11

## 2021-06-09 NOTE — CONSULT NOTE ADULT - ASSESSMENT
50M s/p R foot partial 2nd ray resection with flap necrosis  - Pt see and evaluated  - Afebrile, labs pending  - s/p RF partial 2nd ray resection w ADDISON and free flap open medially with necrosis, 8x5cm, depth to bone, wound bed fibronecrotic, no drainage, mild malodor, periwound sloughing, etiology 2/2 infection  - L foot submet 4 wound to subq, 2.0x2.0cm, no drainage, no malodor, no signs of infection, wound bed fibrogranular, etiology 2/2 pressure  - R foot sutures were removed with excisional debridement of necrotic slough to the level of subq demonstrating no purulence, mild malodor & wound culture obtained  - L foot submet 4 wound debrided to the level of but not beyond subq using sterile 15 blade demonstrating fibrogranular wound bed  - Discussed in length with patient regarding planned surgical procedure for this Friday with Dr. Lott & recommendations for starting IV line - patient refused multiple times with concerns for kidney injury however upon discussion with attending patient was amenable   - Rec IV Unasyn  - Rec admission to medicine  - Booked for R foot TMA with Dr. Lott this Friday @ 7:30AM  - Please document medical clearance / optimization for procedure with light sedation   - Patient was hesitant on admission to hospital with concerns for length of stay - advised patient that it is advised to stay & if he wishes to leave it would be against medical advice  - d/w attending

## 2021-06-09 NOTE — ED ADULT NURSE NOTE - CAS TRG GENERAL AIRWAY, MLM
LOS: 13 days   Patient Care Team:  Nahun Wilkerson MD as PCP - General (Internal Medicine)  Vijay Arango MD as Consulting Physician (Cardiology)  Rachel Krishnan [facility] as Pharmacist  Nancy Smalls MD as Consulting Physician (Urogynecology)    Subjective     Patient reports she is doing pretty well today she does not feel short of breath on the heated high flow nasal cannula she is not really having any pain she still wants to go home    Review of Systems:          Objective     Vital Signs  Vital Sign Min/Max for last 24 hours  Temp  Min: 96.2 °F (35.7 °C)  Max: 98.8 °F (37.1 °C)   BP  Min: 112/79  Max: 151/84   Pulse  Min: 64  Max: 86   Resp  Min: 18  Max: 24   SpO2  Min: 82 %  Max: 99 %   Flow (L/min)  Min: 12  Max: 40   No data recorded        Ventilator/Non-Invasive Ventilation Settings (From admission, onward)    None                       Body mass index is 24.37 kg/m².  I/O last 3 completed shifts:  In: 660 [P.O.:660]  Out: 1550 [Urine:1550]  No intake/output data recorded.        Physical Exam:  General Appearance: Well-developed white female resting comfortably in bed in no apparent distress.  She is on 30 L 70% FiO2 with oxygen saturations of 99% we need to try and wean O2 further.  Eyes: Conjunctiva are clear and anicteric pupils are equal  ENT: Mucous membranes are moist no erythema or exudates, nasal septum midline.  Neck: No adenopathy or thyromegaly no jugular venous distension trachea midline  Lungs: Clear nonlabored symmetric expansion no wheezes rales or rhonchi  Cardiac: Regular rate rhythm no murmur no gallop  Abdomen: Soft nontender no palpable hepatosplenomegaly or masses  : Not examined  Musculoskeletal: Grossly normal  Skin: No jaundice no petechiae skin is warm and dry  Neuro: She is alert oriented cooperative pleasant grossly intact  Extremities/P Vascular: No clubbing no cyanosis no edema palpable radial and dorsalis pedis pulses bilaterally she has no palpable cords no  pain with dorsiflexion of the feet  MSE: Seems to be in good spirits       Labs:  Results from last 7 days   Lab Units 11/08/20  0809 11/07/20  0551 11/06/20  0544 11/05/20  0601 11/04/20  0809 11/03/20  2241 11/03/20  0745   GLUCOSE mg/dL 152* 161* 152* 150* 166*  --  108*   SODIUM mmol/L 141 137 138 139 142  --  144   POTASSIUM mmol/L 4.1 4.3 4.2 4.4 4.5 4.6 3.2*   MAGNESIUM mg/dL  --   --   --   --   --   --  2.1   CO2 mmol/L 27.3 28.6 27.3 24.6 26.0  --  26.5   CHLORIDE mmol/L 102 102 104 105 109*  --  108*   ANION GAP mmol/L 11.7 6.4 6.7 9.4 7.0  --  9.5   CREATININE mg/dL 0.47* 0.40* 0.37* 0.44* 0.39*  --  0.40*   BUN mg/dL 25* 23 22 21 20  --  21   BUN / CREAT RATIO  53.2* 57.5* 59.5* 47.7* 51.3*  --  52.5*   CALCIUM mg/dL 7.8* 8.0* 7.8* 7.5* 7.8*  --  7.4*   EGFR IF NONAFRICN AM mL/min/1.73 130 >150 >150 140 >150  --  >150   ALK PHOS U/L 80 80 80 84 80  --  77   TOTAL PROTEIN g/dL 5.2* 5.2* 5.0* 5.1* 5.2*  --  5.4*   ALT (SGPT) U/L 29 27 25 25 19  --  18   AST (SGOT) U/L 27 28 31 36* 32  --  37*   BILIRUBIN mg/dL 0.9 1.0 0.9 0.9 0.7  --  0.7   ALBUMIN g/dL 2.80* 2.70* 2.60* 2.60* 2.90*  --  2.70*   GLOBULIN gm/dL 2.4 2.5 2.4 2.5 2.3  --  2.7     Estimated Creatinine Clearance: 54.1 mL/min (A) (by C-G formula based on SCr of 0.47 mg/dL (L)).      Results from last 7 days   Lab Units 11/08/20  0809 11/07/20  0551 11/06/20  0544 11/05/20  0601 11/04/20  0809 11/03/20  0745   WBC 10*3/mm3 24.32* 19.56* 18.43* 20.55* 17.18* 13.13*   RBC 10*6/mm3 3.86 3.89 3.70* 3.76* 3.75* 3.64*   HEMOGLOBIN g/dL 11.5* 11.4* 10.9* 11.1* 11.0* 10.9*   HEMATOCRIT % 34.8 34.4 33.1* 33.3* 33.7* 31.9*   MCV fL 90.2 88.4 89.5 88.6 89.9 87.6   MCH pg 29.8 29.3 29.5 29.5 29.3 29.9   MCHC g/dL 33.0 33.1 32.9 33.3 32.6 34.2   RDW % 14.7 14.7 14.6 14.7 14.8 15.0   RDW-SD fl 48.2 45.8 47.8 46.9 47.8 47.7   MPV fL 12.7* 12.0 11.9 10.9 11.4 11.5   PLATELETS 10*3/mm3 159 176 178 190 233 252   NEUTROPHIL % %  --   --   --   --   --  81.7*    LYMPHOCYTE % %  --   --   --   --   --  7.3*   MONOCYTES % %  --   --   --   --   --  5.6   EOSINOPHIL % %  --   --   --   --   --  0.0*   BASOPHIL % %  --   --   --   --   --  0.5   IMM GRAN % %  --   --   --   --   --  4.9*   NEUTROS ABS 10*3/mm3 22.86* 18.56* 17.12* 18.86* 15.12* 10.73*   LYMPHS ABS 10*3/mm3  --   --   --   --   --  0.96   MONOS ABS 10*3/mm3  --   --   --   --   --  0.74   EOS ABS 10*3/mm3  --   --   --   --   --  0.00   BASOS ABS 10*3/mm3  --   --   --   --   --  0.06   IMMATURE GRANS (ABS) 10*3/mm3  --   --   --   --   --  0.64*   NRBC /100 WBC  --   --   --   --   --  0.1         Results from last 7 days   Lab Units 11/08/20  0809 11/07/20  0551 11/06/20  0544   CK TOTAL U/L 20 22 27             Results from last 7 days   Lab Units 11/08/20  0809   PROCALCITONIN ng/mL 0.06     Results from last 7 days   Lab Units 11/08/20  0809 11/07/20  0551 11/06/20  0544 11/05/20  0601 11/04/20  0809 11/03/20  0745   INR  2.80* 2.40* 2.81* 3.18* 3.22* 2.82*     Microbiology Results (last 10 days)     Procedure Component Value - Date/Time    Urine Culture - Urine, Urine, Clean Catch [[id number]]  (Abnormal) Collected: 11/02/20 0526    Lab Status: Final result Specimen: Urine, Clean Catch Updated: 11/03/20 1011     Urine Culture Yeast isolated     Comment: No further workup                   amoxicillin-clavulanate, 1 tablet, Oral, Q12H  atorvastatin, 20 mg, Oral, Daily  calcium 500 mg vitamin D 5 mcg (200 UT), 1 tablet, Oral, Daily  cetirizine, 10 mg, Oral, Daily  cholecalciferol, 2,000 Units, Oral, Daily  dexamethasone, 6 mg, Oral, Q12H  famotidine, 20 mg, Oral, BID  ferrous sulfate, 325 mg, Oral, BID  folic acid, 1,600 mcg, Oral, Daily  furosemide, 40 mg, Oral, Daily  metoprolol succinate XL, 50 mg, Oral, Daily  multivitamin with minerals, 1 tablet, Oral, Daily  potassium chloride, 40 mEq, Oral, Daily  sodium chloride, 10 mL, Intravenous, Q12H      Pharmacy to dose warfarin,         Diagnostics:  Ct  Abdomen Pelvis Without Contrast    Result Date: 10/27/2020  CT ABDOMEN PELVIS WO CONTRAST-  HISTORY:  Abdominal pain, UTI.  TECHNIQUE:  CT images of the abdomen and pelvis were obtained without intravenous contrast. Reformatted images were reviewed. Radiation dose reduction techniques were utilized, including automated exposure control and exposure modulation based on body size.  COMPARISON:  CT abdomen and pelvis 02/10/2019.  FINDINGS CT ABDOMEN/PELVIS: Heart size is normal. There is no pericardial effusion. There is calcific coronary artery atherosclerosis. There are patchy groundglass opacities in the lung bases, new from prior CT, superimposed on background fibrosis/scarring. Pleural spaces are clear. The liver is normal in size. There are calcified hepatic and splenic granulomata. The gallbladder is present. There is layering within the gallbladder, suggestive of stones and/or sludge. The pancreas and adrenal glands are within normal limits. There is a punctate nonobstructing right renal stone. There is no hydronephrosis. There is extensive calcific aortoiliac and branch vessel atherosclerosis. There is an infrarenal IVC filter. There is a tiny hiatal hernia. There is no bowel obstruction. There is colonic diverticulosis without CT evidence of acute diverticulitis. The bladder is mildly distended. The uterus is not seen. There is no adnexal mass. There is a left hip total arthroplasty with streak artifact limiting evaluation of adjacent structures. There is multilevel degenerative disc disease. There is diffuse osseous demineralization. There are old rib fractures.  Limitations: Evaluation of the solid parenchymal organs and vasculature is limited due to lack of intravenous contrast.       1.  Patchy bibasilar groundglass opacities are superimposed on background fibrosis/scarring and concerning for multifocal pneumonia. 2.  Punctate nonobstructing right renal stone. No hydronephrosis. 3.  Gallstones and/or  biliary sludge.  Discussed with Dr. Mccoy at 2:50 AM.  This report was finalized on 10/27/2020 2:54 AM by Dr. Hilary Tilley M.D.      Xr Chest 1 View    Result Date: 10/30/2020  ONE VIEW PORTABLE CHEST  HISTORY: Covid 19 pneumonia. Shortness of breath.  FINDINGS: There are scattered areas of pneumonia throughout both lungs showing worsening from 3 days ago. The heart is slightly enlarged without change.  This report was finalized on 10/30/2020 3:16 PM by Dr. Laith Whitney M.D.      Xr Chest 1 View    Result Date: 10/27/2020  XR CHEST 1 VW-  HISTORY:  Fever.  COMPARISON:  Chest radiograph 08/19/2018.  FINDINGS:  A single portable view of the chest was obtained. The cardiac silhouette and mediastinal and hilar contours are within normal limits and not significantly changed. There is calcific aortic atherosclerosis. A calcified granuloma in the mid to lower right lung is not significantly changed. There is no new focal consolidation. Pleural spaces are clear. There is multilevel degenerative disc disease. The tip of an IVC filter is partially visualized. The previously noted right PICC has been removed.  CONCLUSION(S):   1.  No new focal consolidation or effusion.  This report was finalized on 10/27/2020 1:40 AM by Dr. Hilary Tilley M.D.               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 [T84.59XA, Z96.649]  Not Applicable   • Acute respiratory failure with hypoxia (CMS/HCC) [J96.01]  Unknown   • Blood type A+ [Z67.10]  Unknown   • UTI (urinary tract infection) [N39.0]  Unknown   • Pulmonary emboli (CMS/HCC) [I26.99]  Yes   • Mixed hyperlipidemia [E78.2]  Yes   • Long term (current) use of antibiotics [Z79.2]  Not Applicable   • Hypertension [I10]  Yes   • Rheumatoid arthritis involving multiple sites (CMS/[facility]) [M06.9]  Yes   • History of DVT (deep vein thrombosis) [Z86.718]  Not  Applicable   • OA (osteoarthritis) of hip [M16.9]  Yes      Resolved Hospital Problems   No resolved problems to display.         Assessment/Plan     1. COVID-19 infection and pneumonia she is on higher dose Decadron therapy to continue looks like overall her inflammatory markers are improving  2. Acute hypoxemic respiratory failure her O2 requirements are improving again wean O2  3. Rheumatoid arthritis on chronic methotrexate therapy  4. Interstitial lung disease probably rheumatoid lung but she has been on chronic nitrofurantoin and that should be avoided in the future also the methotrexate needs to be monitored.  5. UTI on Augmentin  6. History of pulmonary embolism on chronic anticoagulation PT/INR therapeutic.  D-dimer is elevated which is probably related to Covid she is already anticoagulated.  7. Hyperglycemia  8. Leukocytosis this may all be related to steroids organ to have to continue to follow and maintain high index of suspicion for secondary infections.    Plan for disposition:    Richard Barahona MD  11/09/20  08:47 EST    Time:      Patent

## 2021-06-09 NOTE — ED ADULT NURSE NOTE - NSIMPLEMENTINTERV_GEN_ALL_ED
Implemented All Fall Risk Interventions:  [place] to call system. Call bell, personal items and telephone within reach. Instruct patient to call for assistance. Room bathroom lighting operational. Non-slip footwear when patient is off stretcher. Physically safe environment: no spills, clutter or unnecessary equipment. Stretcher in lowest position, wheels locked, appropriate side rails in place. Provide visual cue, wrist band, yellow gown, etc. Monitor gait and stability. Monitor for mental status changes and reorient to person, place, and time. Review medications for side effects contributing to fall risk. Reinforce activity limits and safety measures with patient and family.

## 2021-06-09 NOTE — CONSULT NOTE ADULT - SUBJECTIVE AND OBJECTIVE BOX
Patient is a 50y old  Male who presents with a chief complaint of R foot wound here for surgery     HPI: 51 yo m PMHx T2DM and CAD hx of multiple foot ulcers, s/p 4/15/20 Right foot partial 3rd and 4th ray resection & 5/28/21 s/p RF P2RR w/ ADDISON, w/ free flap open medially. Was following up in wound care center where the flap has since failed resulting in necrosis. Was told that options for current wound would be for HBOT vs TMA & states that he would rather have TMA. Denies N/V/F/SOB/C.      PAST MEDICAL & SURGICAL HISTORY:  CAD (coronary artery disease)    Diabetes    No significant past surgical history        MEDICATIONS  (STANDING):    MEDICATIONS  (PRN):      Allergies    No Known Allergies    Intolerances        VITALS:    Vital Signs Last 24 Hrs  T(C): 37.3 (09 Jun 2021 20:21), Max: 37.3 (09 Jun 2021 20:21)  T(F): 99.1 (09 Jun 2021 20:21), Max: 99.1 (09 Jun 2021 20:21)  HR: 104 (09 Jun 2021 20:21) (104 - 104)  BP: 158/82 (09 Jun 2021 20:21) (158/82 - 158/82)  BP(mean): --  RR: 18 (09 Jun 2021 20:21) (18 - 18)  SpO2: 97% (09 Jun 2021 20:21) (97% - 97%)    LABS:                CAPILLARY BLOOD GLUCOSE              LOWER EXTREMITY PHYSICAL EXAM:    Vascular: DP/PT 2/4, B/L, CFT <3 seconds B/L, Temperature gradient warm to cool, B/L.   Neuro: Epicritic sensation absent to the level of forefoot, B/L.  Musculoskeletal/Ortho: s/p R foot P2RR w/ ADDISON  Wound #1: s/p RF partial 2nd ray resection w ADDISON and free flap open medially with necrosis, 8x5cm, depth to bone, wound bed fibronecotic, no drainage, mild malodor, periwound sloughing, etiology 2/2 infection  Wound #2: L foot submet 4 wound to subq, 2.0x2.0cm, no drainage, no malodor, no signs of infection, wound bed fibrogranular, etiology 2/2 pressure    RADIOLOGY & ADDITIONAL STUDIES:

## 2021-06-09 NOTE — ED ADULT TRIAGE NOTE - INTERNATIONAL TRAVEL
No Family history of medical problems  cad, lbbb, hyperchoesteremia  Hypertension    Mitral insufficiency    Type 1 diabetes mellitus without complication

## 2021-06-09 NOTE — ED ADULT NURSE NOTE - OBJECTIVE STATEMENT
Pt is a 51 y/o male sent in by outpatient provider for scheduled amputation of two toes of right foot on Friday morning r/t DM. States he had amputation and flap in past which failed. On initial RN assessment asked pt to remove gown, pt stated he would prefer to refuse to put on gown r/t surgery not being until Friday morning. Pt has right lower extremity wrapped with ace bandages from outpatient and wears boot r/t achilles rupture as per pt, states he uses walker to ambulate. Denies CP, SOB, N/V/D, numbness, tingling, cough, fever, chills, dizziness, weakness, headache. A&Ox3. Breathing unlabored and spontaneous. Abdomen soft, nontender, nondistended. Full ROM and strength of extremities. Safety and comfort measures provided, call bell provided to pt and bed in lowest position.

## 2021-06-10 ENCOUNTER — TRANSCRIPTION ENCOUNTER (OUTPATIENT)
Age: 51
End: 2021-06-10

## 2021-06-10 DIAGNOSIS — E11.621 TYPE 2 DIABETES MELLITUS WITH FOOT ULCER: ICD-10-CM

## 2021-06-10 DIAGNOSIS — I25.10 ATHEROSCLEROTIC HEART DISEASE OF NATIVE CORONARY ARTERY WITHOUT ANGINA PECTORIS: ICD-10-CM

## 2021-06-10 DIAGNOSIS — R09.89 OTHER SPECIFIED SYMPTOMS AND SIGNS INVOLVING THE CIRCULATORY AND RESPIRATORY SYSTEMS: ICD-10-CM

## 2021-06-10 DIAGNOSIS — Z01.818 ENCOUNTER FOR OTHER PREPROCEDURAL EXAMINATION: ICD-10-CM

## 2021-06-10 DIAGNOSIS — Z89.429 ACQUIRED ABSENCE OF OTHER TOE(S), UNSPECIFIED SIDE: Chronic | ICD-10-CM

## 2021-06-10 DIAGNOSIS — I10 ESSENTIAL (PRIMARY) HYPERTENSION: ICD-10-CM

## 2021-06-10 DIAGNOSIS — I96 GANGRENE, NOT ELSEWHERE CLASSIFIED: ICD-10-CM

## 2021-06-10 DIAGNOSIS — E11.628 TYPE 2 DIABETES MELLITUS WITH OTHER SKIN COMPLICATIONS: ICD-10-CM

## 2021-06-10 DIAGNOSIS — N17.9 ACUTE KIDNEY FAILURE, UNSPECIFIED: ICD-10-CM

## 2021-06-10 DIAGNOSIS — Z29.9 ENCOUNTER FOR PROPHYLACTIC MEASURES, UNSPECIFIED: ICD-10-CM

## 2021-06-10 LAB
ANION GAP SERPL CALC-SCNC: 15 MMOL/L — SIGNIFICANT CHANGE UP (ref 5–17)
BASE EXCESS BLDV CALC-SCNC: 0.2 MMOL/L — SIGNIFICANT CHANGE UP (ref -2–2)
BUN SERPL-MCNC: 43 MG/DL — HIGH (ref 7–23)
CA-I SERPL-SCNC: 1.26 MMOL/L — SIGNIFICANT CHANGE UP (ref 1.12–1.3)
CALCIUM SERPL-MCNC: 10.2 MG/DL — SIGNIFICANT CHANGE UP (ref 8.4–10.5)
CHLORIDE BLDV-SCNC: 104 MMOL/L — SIGNIFICANT CHANGE UP (ref 96–108)
CHLORIDE SERPL-SCNC: 100 MMOL/L — SIGNIFICANT CHANGE UP (ref 96–108)
CO2 BLDV-SCNC: 28 MMOL/L — SIGNIFICANT CHANGE UP (ref 22–30)
CO2 SERPL-SCNC: 22 MMOL/L — SIGNIFICANT CHANGE UP (ref 22–31)
CREAT SERPL-MCNC: 1.88 MG/DL — HIGH (ref 0.5–1.3)
GAS PNL BLDV: 136 MMOL/L — SIGNIFICANT CHANGE UP (ref 135–145)
GAS PNL BLDV: SIGNIFICANT CHANGE UP
GAS PNL BLDV: SIGNIFICANT CHANGE UP
GLUCOSE BLDC GLUCOMTR-MCNC: 189 MG/DL — HIGH (ref 70–99)
GLUCOSE BLDC GLUCOMTR-MCNC: 219 MG/DL — HIGH (ref 70–99)
GLUCOSE BLDC GLUCOMTR-MCNC: 221 MG/DL — HIGH (ref 70–99)
GLUCOSE BLDC GLUCOMTR-MCNC: 275 MG/DL — HIGH (ref 70–99)
GLUCOSE BLDC GLUCOMTR-MCNC: 300 MG/DL — HIGH (ref 70–99)
GLUCOSE BLDV-MCNC: 302 MG/DL — HIGH (ref 70–99)
GLUCOSE SERPL-MCNC: 287 MG/DL — HIGH (ref 70–99)
HCO3 BLDV-SCNC: 26 MMOL/L — SIGNIFICANT CHANGE UP (ref 21–29)
HCT VFR BLDA CALC: 40 % — SIGNIFICANT CHANGE UP (ref 39–50)
HGB BLD CALC-MCNC: 13 G/DL — SIGNIFICANT CHANGE UP (ref 13–17)
LACTATE BLDV-MCNC: 0.9 MMOL/L — SIGNIFICANT CHANGE UP (ref 0.7–2)
PCO2 BLDV: 51 MMHG — HIGH (ref 35–50)
PH BLDV: 7.33 — LOW (ref 7.35–7.45)
PO2 BLDV: 22 MMHG — LOW (ref 25–45)
POTASSIUM BLDV-SCNC: 4.2 MMOL/L — SIGNIFICANT CHANGE UP (ref 3.5–5.3)
POTASSIUM SERPL-MCNC: 4.5 MMOL/L — SIGNIFICANT CHANGE UP (ref 3.5–5.3)
POTASSIUM SERPL-SCNC: 4.5 MMOL/L — SIGNIFICANT CHANGE UP (ref 3.5–5.3)
SAO2 % BLDV: 31 % — LOW (ref 67–88)
SARS-COV-2 RNA SPEC QL NAA+PROBE: SIGNIFICANT CHANGE UP
SODIUM SERPL-SCNC: 137 MMOL/L — SIGNIFICANT CHANGE UP (ref 135–145)
SURGICAL PATHOLOGY STUDY: SIGNIFICANT CHANGE UP

## 2021-06-10 PROCEDURE — 99223 1ST HOSP IP/OBS HIGH 75: CPT

## 2021-06-10 PROCEDURE — 71045 X-RAY EXAM CHEST 1 VIEW: CPT | Mod: 26

## 2021-06-10 PROCEDURE — 12345: CPT | Mod: NC

## 2021-06-10 RX ORDER — INSULIN LISPRO 100/ML
VIAL (ML) SUBCUTANEOUS
Refills: 0 | Status: DISCONTINUED | OUTPATIENT
Start: 2021-06-10 | End: 2021-06-11

## 2021-06-10 RX ORDER — INSULIN GLARGINE 100 [IU]/ML
35 INJECTION, SOLUTION SUBCUTANEOUS AT BEDTIME
Refills: 0 | Status: DISCONTINUED | OUTPATIENT
Start: 2021-06-10 | End: 2021-06-11

## 2021-06-10 RX ORDER — DEXTROSE 50 % IN WATER 50 %
25 SYRINGE (ML) INTRAVENOUS ONCE
Refills: 0 | Status: DISCONTINUED | OUTPATIENT
Start: 2021-06-10 | End: 2021-06-11

## 2021-06-10 RX ORDER — SODIUM CHLORIDE 9 MG/ML
1000 INJECTION INTRAMUSCULAR; INTRAVENOUS; SUBCUTANEOUS ONCE
Refills: 0 | Status: COMPLETED | OUTPATIENT
Start: 2021-06-10 | End: 2021-06-10

## 2021-06-10 RX ORDER — INSULIN GLARGINE 100 [IU]/ML
28 INJECTION, SOLUTION SUBCUTANEOUS ONCE
Refills: 0 | Status: COMPLETED | OUTPATIENT
Start: 2021-06-10 | End: 2021-06-10

## 2021-06-10 RX ORDER — ALBUTEROL 90 UG/1
2.5 AEROSOL, METERED ORAL ONCE
Refills: 0 | Status: COMPLETED | OUTPATIENT
Start: 2021-06-10 | End: 2021-06-10

## 2021-06-10 RX ORDER — CALCIUM GLUCONATE 100 MG/ML
2 VIAL (ML) INTRAVENOUS ONCE
Refills: 0 | Status: DISCONTINUED | OUTPATIENT
Start: 2021-06-10 | End: 2021-06-10

## 2021-06-10 RX ORDER — INSULIN GLARGINE 100 [IU]/ML
35 INJECTION, SOLUTION SUBCUTANEOUS ONCE
Refills: 0 | Status: COMPLETED | OUTPATIENT
Start: 2021-06-10 | End: 2021-06-10

## 2021-06-10 RX ORDER — DEXTROSE 50 % IN WATER 50 %
15 SYRINGE (ML) INTRAVENOUS ONCE
Refills: 0 | Status: DISCONTINUED | OUTPATIENT
Start: 2021-06-10 | End: 2021-06-11

## 2021-06-10 RX ORDER — AMLODIPINE BESYLATE 2.5 MG/1
5 TABLET ORAL DAILY
Refills: 0 | Status: DISCONTINUED | OUTPATIENT
Start: 2021-06-10 | End: 2021-06-11

## 2021-06-10 RX ORDER — INSULIN LISPRO 100/ML
4 VIAL (ML) SUBCUTANEOUS
Refills: 0 | Status: DISCONTINUED | OUTPATIENT
Start: 2021-06-10 | End: 2021-06-11

## 2021-06-10 RX ORDER — ACETAMINOPHEN 500 MG
650 TABLET ORAL EVERY 6 HOURS
Refills: 0 | Status: DISCONTINUED | OUTPATIENT
Start: 2021-06-10 | End: 2021-06-11

## 2021-06-10 RX ORDER — DEXTROSE 50 % IN WATER 50 %
12.5 SYRINGE (ML) INTRAVENOUS ONCE
Refills: 0 | Status: DISCONTINUED | OUTPATIENT
Start: 2021-06-10 | End: 2021-06-11

## 2021-06-10 RX ORDER — ASPIRIN/CALCIUM CARB/MAGNESIUM 324 MG
81 TABLET ORAL DAILY
Refills: 0 | Status: DISCONTINUED | OUTPATIENT
Start: 2021-06-10 | End: 2021-06-11

## 2021-06-10 RX ORDER — SODIUM CHLORIDE 9 MG/ML
1000 INJECTION, SOLUTION INTRAVENOUS
Refills: 0 | Status: DISCONTINUED | OUTPATIENT
Start: 2021-06-10 | End: 2021-06-11

## 2021-06-10 RX ORDER — INSULIN LISPRO 100/ML
VIAL (ML) SUBCUTANEOUS AT BEDTIME
Refills: 0 | Status: DISCONTINUED | OUTPATIENT
Start: 2021-06-10 | End: 2021-06-11

## 2021-06-10 RX ORDER — GLUCAGON INJECTION, SOLUTION 0.5 MG/.1ML
1 INJECTION, SOLUTION SUBCUTANEOUS ONCE
Refills: 0 | Status: DISCONTINUED | OUTPATIENT
Start: 2021-06-10 | End: 2021-06-11

## 2021-06-10 RX ORDER — ATORVASTATIN CALCIUM 80 MG/1
80 TABLET, FILM COATED ORAL AT BEDTIME
Refills: 0 | Status: DISCONTINUED | OUTPATIENT
Start: 2021-06-10 | End: 2021-06-11

## 2021-06-10 RX ORDER — ENOXAPARIN SODIUM 100 MG/ML
40 INJECTION SUBCUTANEOUS DAILY
Refills: 0 | Status: DISCONTINUED | OUTPATIENT
Start: 2021-06-10 | End: 2021-06-11

## 2021-06-10 RX ADMIN — AMPICILLIN SODIUM AND SULBACTAM SODIUM 200 GRAM(S): 250; 125 INJECTION, POWDER, FOR SUSPENSION INTRAMUSCULAR; INTRAVENOUS at 13:53

## 2021-06-10 RX ADMIN — SODIUM CHLORIDE 1000 MILLILITER(S): 9 INJECTION INTRAMUSCULAR; INTRAVENOUS; SUBCUTANEOUS at 00:14

## 2021-06-10 RX ADMIN — INSULIN GLARGINE 35 UNIT(S): 100 INJECTION, SOLUTION SUBCUTANEOUS at 02:28

## 2021-06-10 RX ADMIN — Medication 2: at 17:01

## 2021-06-10 RX ADMIN — AMPICILLIN SODIUM AND SULBACTAM SODIUM 200 GRAM(S): 250; 125 INJECTION, POWDER, FOR SUSPENSION INTRAMUSCULAR; INTRAVENOUS at 00:18

## 2021-06-10 RX ADMIN — Medication 1: at 22:21

## 2021-06-10 RX ADMIN — AMPICILLIN SODIUM AND SULBACTAM SODIUM 200 GRAM(S): 250; 125 INJECTION, POWDER, FOR SUSPENSION INTRAMUSCULAR; INTRAVENOUS at 21:05

## 2021-06-10 RX ADMIN — ATORVASTATIN CALCIUM 80 MILLIGRAM(S): 80 TABLET, FILM COATED ORAL at 21:06

## 2021-06-10 RX ADMIN — ALBUTEROL 2.5 MILLIGRAM(S): 90 AEROSOL, METERED ORAL at 00:18

## 2021-06-10 RX ADMIN — Medication 4 UNIT(S): at 17:00

## 2021-06-10 RX ADMIN — INSULIN GLARGINE 28 UNIT(S): 100 INJECTION, SOLUTION SUBCUTANEOUS at 23:02

## 2021-06-10 NOTE — PROGRESS NOTE ADULT - PROBLEM SELECTOR PLAN 3
uncontrolled T2DM, last A1c 9.8  c/w previous insulin regimen - lantus 35units at night, admelog 4units with meals  low ISS and monitor FS ac and hs   outpt endocrine f/u  Pt has been refusing insulin as well as glucose monitoring

## 2021-06-10 NOTE — H&P ADULT - PROBLEM SELECTOR PLAN 4
BP stable, at goal  was previously on ACEi, which was discontinue on prior admission 2/2 TEENA and started on norvasc 5mg daily  c/w norvasc for now, with hold parameters   monitor BP routinely

## 2021-06-10 NOTE — PROVIDER CONTACT NOTE (OTHER) - ASSESSMENT
Pt A&Ox4, able to make medical decisions. Pt refusing finger sticks to be done, IV antibiotics to be given, blood pressure medications to be given, vitals to be taken, and BL LE wound skin assessment to be completed because he states he does not want anything done while in the emergency department. Pt states "get me a bed and I will do everything" RN educated patient that delay in treatment is risk factor for patient with infection and diabetes and pt adamantly still refusing at this time. NP Cindy Reyes made aware.
Pt AOx4, refusing AM medication including BP meds and IV abx. Pt re-educated. Pt refusing FS and vitals.

## 2021-06-10 NOTE — H&P ADULT - NSHPREVIEWOFSYSTEMS_GEN_ALL_CORE
REVIEW OF SYSTEMS:    CONSTITUTIONAL: No weakness, fevers, chills  EYES/ENT: No visual changes;  no  throat pain   NECK: No pain or stiffness  RESPIRATORY: No cough, no shortness of breath  CARDIOVASCULAR: No chest pain or palpitations  GASTROINTESTINAL: no nausea, vomiting, no abdominal pain, no BRBPR  GENITOURINARY: no polyuria, no dysuria  NEUROLOGICAL: no numbness, no headaches, no confusion   MUSCULOSKELETAL: no back pain, no weakness   SKIN: No itching, burning, rashes, or lesions   PSYCH: no anxiety, depression  HEME: no gum bleeding, no bruising

## 2021-06-10 NOTE — H&P ADULT - NSHPPHYSICALEXAM_GEN_ALL_CORE
Vital Signs Last 24 Hrs  T(C): 36.8 (10 Herman 2021 03:23), Max: 37.3 (09 Jun 2021 20:21)  T(F): 98.2 (10 Herman 2021 03:23), Max: 99.1 (09 Jun 2021 20:21)  HR: 94 (10 Herman 2021 03:23) (84 - 104)  BP: 108/62 (10 Herman 2021 03:23) (108/62 - 158/82)  BP(mean): --  RR: 20 (10 Herman 2021 03:23) (17 - 20)  SpO2: 96% (10 Herman 2021 03:23) (96% - 97%)    PHYSICAL EXAM:  GENERAL: NAD, well-developed  HEAD:  Atraumatic, normocephalic  EYES: EOMI, conjunctiva and sclera clear  NECK: Supple, no JVD  CHEST/LUNG: Clear to auscultation bilaterally; no wheezing or rales  HEART: Regular rate and rhythm; no murmurs  ABDOMEN: Soft, nontender, nondistended; bowel sounds present  EXTREMITIES:  2+ Peripheral Pulses, b/l LEs in suzy wrap - pt refusing unwrapping and further examination of fett   PSYCH: calm affect, not anxious  NEUROLOGY: non-focal, AAOx3  SKIN: No rashes or lesions  MUSCULOSKELETAL: no back pain, moving all extremities

## 2021-06-10 NOTE — ED PROVIDER NOTE - OBJECTIVE STATEMENT
50 year old male with  PMHx CAD, DM complicated by  hx of multiple foot ulcers s/p amputation toes 3-4 on right foot April 2020 and recent admission w/ith amputation 2nd toe on right foot 5/28/21  w/ free flap presents to ED for failed flap. Pt  was following up in wound care center where for flap and has since had worsened infection with necrosis. Was  given options for hyperbaric chamber  vs TMA and opted for TMA. Last saw in Podiatry office 3/7 w/ plan for admission this evening for medical clearance and OR planned Friday 6/11.  Denies fevers, chills, chest pain, sob, abd pain, n/v/d

## 2021-06-10 NOTE — PROGRESS NOTE ADULT - SUBJECTIVE AND OBJECTIVE BOX
Patient is a 50y old  Male who presents with a chief complaint of R foot infection (10 Herman 2021 10:22)      SUBJECTIVE / OVERNIGHT EVENTS:  50M with PMH CAD s/p stents x 2 (>10years ago), uncontrolled T2DM on insulin, HTN, longstanding h/o diabetic foot ulcers s/p R 3rd and 4th partial ray resections (4/15/21) with recent admission for infected foot ulcer/OM s/p R 2nd partial ray resection w/ ADDISON w/ free flap open medially (5/25/21) presenting for flap necrosis with plan for likely TMA. Pt just recent admitted from 5/26/-6/4 for above procedure, with course c/b by TEENA/ATN (presumed 2/2 vancomycin toxicity, hypotension, ACEi/ARB use) with improving Cr on discharge. Pt went for routine follow-up this past Monday 6/7/21 with podiatry and told his flap was failing with e/o necrosis and told to come to ED for planned surgery on 6/11/21.  Pt states he has been staying off his feet as he was instructed; denies any pain, swelling, purulence or warmth at site of recent surgery. Denies any fevers, chills, nausea/vomiting; rest of ROS negative.   Pt with h/o CAD about 10 years ago, denies any CP or SOB with exertional activity, tolerated recent surgery well without any adverse reaction to anesthesia.       ADDITIONAL REVIEW OF SYSTEMS: Negative except for above    MEDICATIONS  (STANDING):  amLODIPine   Tablet 5 milliGRAM(s) Oral daily  ampicillin/sulbactam  IVPB      ampicillin/sulbactam  IVPB 3 Gram(s) IV Intermittent every 6 hours  aspirin enteric coated 81 milliGRAM(s) Oral daily  atorvastatin 80 milliGRAM(s) Oral at bedtime  cadexomer iodine 0.9% Gel 1 Application(s) Topical daily  collagenase Ointment 1 Application(s) Topical daily  dextrose 40% Gel 15 Gram(s) Oral once  dextrose 5%. 1000 milliLiter(s) (50 mL/Hr) IV Continuous <Continuous>  dextrose 5%. 1000 milliLiter(s) (100 mL/Hr) IV Continuous <Continuous>  dextrose 50% Injectable 25 Gram(s) IV Push once  dextrose 50% Injectable 12.5 Gram(s) IV Push once  dextrose 50% Injectable 25 Gram(s) IV Push once  enoxaparin Injectable 40 milliGRAM(s) SubCutaneous daily  glucagon  Injectable 1 milliGRAM(s) IntraMuscular once  insulin glargine Injectable (LANTUS) 35 Unit(s) SubCutaneous at bedtime  insulin lispro (ADMELOG) corrective regimen sliding scale   SubCutaneous three times a day before meals  insulin lispro (ADMELOG) corrective regimen sliding scale   SubCutaneous at bedtime  insulin lispro Injectable (ADMELOG) 4 Unit(s) SubCutaneous three times a day before meals    MEDICATIONS  (PRN):  acetaminophen   Tablet .. 650 milliGRAM(s) Oral every 6 hours PRN Temp greater or equal to 38C (100.4F), Mild Pain (1 - 3), Moderate Pain (4 - 6)      CAPILLARY BLOOD GLUCOSE      POCT Blood Glucose.: 189 mg/dL (10 Herman 2021 15:02)  POCT Blood Glucose.: 300 mg/dL (10 Herman 2021 09:17)  POCT Blood Glucose.: 219 mg/dL (10 Herman 2021 02:22)    I&O's Summary    10 Herman 2021 07:01  -  10 Herman 2021 15:36  --------------------------------------------------------  IN: 0 mL / OUT: 700 mL / NET: -700 mL        PHYSICAL EXAM:  Vital Signs Last 24 Hrs  T(C): 36.8 (10 Herman 2021 03:23), Max: 37.3 (09 Jun 2021 20:21)  T(F): 98.2 (10 Herman 2021 03:23), Max: 99.1 (09 Jun 2021 20:21)  HR: 94 (10 Herman 2021 03:23) (84 - 104)  BP: 108/62 (10 Herman 2021 03:23) (108/62 - 158/82)  BP(mean): --  RR: 20 (10 Herman 2021 03:23) (17 - 20)  SpO2: 96% (10 Herman 2021 03:23) (96% - 97%)    PHYSICAL EXAM:  GENERAL: NAD, well-developed  HEAD:  Atraumatic, Normocephalic  EYES:  conjunctiva and sclera clear  NECK: Supple, No JVD  CHEST/LUNG: Clear to auscultation bilaterally; No wheeze  HEART: Regular rate and rhythm; No murmurs, rubs, or gallops  ABDOMEN: Soft, Nontender, Nondistended; Bowel sounds present  EXTREMITIES:  2+ Peripheral Pulses, No clubbing, cyanosis, or edema  PSYCH: AAOx3  NEUROLOGY: non-focal  SKIN: No rashes or lesions      LABS:                        13.4   7.73  )-----------( 200      ( 09 Jun 2021 23:18 )             42.0     06-10    137  |  100  |  43<H>  ----------------------------<  287<H>  4.5   |  22  |  1.88<H>    Ca    10.2      10 Herman 2021 00:08  Mg     2.1     06-10    TPro  8.9<H>  /  Alb  4.2  /  TBili  0.4  /  DBili  x   /  AST  25  /  ALT  38  /  AlkPhos  34<L>  06-09    PT/INR - ( 09 Jun 2021 23:18 )   PT: 13.4 sec;   INR: 1.12 ratio         PTT - ( 09 Jun 2021 23:18 )  PTT:32.4 sec            RADIOLOGY & ADDITIONAL TESTS:    Imaging Personally Reviewed:    Electrocardiogram Personally Reviewed:    COORDINATION OF CARE:  Care Discussed with Consultants/Other Providers [Y/N]:  Prior or Outpatient Records Reviewed [Y/N]:     Patient is a 50y old  Male who presents with a chief complaint of R foot infection (10 Herman 2021 10:22)      SUBJECTIVE / OVERNIGHT EVENTS:  50M with PMH CAD s/p stents x 2 (>10years ago), uncontrolled T2DM on insulin, HTN, longstanding h/o diabetic foot ulcers s/p R 3rd and 4th partial ray resections (4/15/21) with recent admission for infected foot ulcer/OM s/p R 2nd partial ray resection w/ ADDISON w/ free flap open medially (5/25/21) presenting for flap necrosis with plan for likely TMA. Pt just recent admitted from 5/26/-6/4 for above procedure, with course c/b by TEENA/ATN (presumed 2/2 vancomycin toxicity, hypotension, ACEi/ARB use) with improving Cr on discharge. Pt went for routine follow-up this past Monday 6/7/21 with podiatry and told his flap was failing with e/o necrosis and told to come to ED for planned surgery on 6/11/21.     Patient was seen in the ED , he refused Finger stick testing for glucose , he refuses admelog and wants his own novolog and repeatedly told me that he is not being properly managed in the hospital as he is being deprived of Insulin.  He also states that he is not treated as human as he spends 18 hours in an uncomfortable bed and his care is unacceptable in the hospital .  He complaints that the bell at the bedside is not attracting the nurse as it is not loud enough and he has a bedside remote which I pressed and the nurse responded right away he then proceeded to ask for time for him to get a bed upstairs and wants me to tell what is his number in line for a bed .  He then refuses for further history taking and refuses physical examination       ADDITIONAL REVIEW OF SYSTEMS: Unable to obtain     MEDICATIONS  (STANDING):  amLODIPine   Tablet 5 milliGRAM(s) Oral daily  ampicillin/sulbactam  IVPB      ampicillin/sulbactam  IVPB 3 Gram(s) IV Intermittent every 6 hours  aspirin enteric coated 81 milliGRAM(s) Oral daily  atorvastatin 80 milliGRAM(s) Oral at bedtime  cadexomer iodine 0.9% Gel 1 Application(s) Topical daily  collagenase Ointment 1 Application(s) Topical daily  dextrose 40% Gel 15 Gram(s) Oral once  dextrose 5%. 1000 milliLiter(s) (50 mL/Hr) IV Continuous <Continuous>  dextrose 5%. 1000 milliLiter(s) (100 mL/Hr) IV Continuous <Continuous>  dextrose 50% Injectable 25 Gram(s) IV Push once  dextrose 50% Injectable 12.5 Gram(s) IV Push once  dextrose 50% Injectable 25 Gram(s) IV Push once  enoxaparin Injectable 40 milliGRAM(s) SubCutaneous daily  glucagon  Injectable 1 milliGRAM(s) IntraMuscular once  insulin glargine Injectable (LANTUS) 35 Unit(s) SubCutaneous at bedtime  insulin lispro (ADMELOG) corrective regimen sliding scale   SubCutaneous three times a day before meals  insulin lispro (ADMELOG) corrective regimen sliding scale   SubCutaneous at bedtime  insulin lispro Injectable (ADMELOG) 4 Unit(s) SubCutaneous three times a day before meals    MEDICATIONS  (PRN):  acetaminophen   Tablet .. 650 milliGRAM(s) Oral every 6 hours PRN Temp greater or equal to 38C (100.4F), Mild Pain (1 - 3), Moderate Pain (4 - 6)      CAPILLARY BLOOD GLUCOSE      POCT Blood Glucose.: 189 mg/dL (10 Herman 2021 15:02)  POCT Blood Glucose.: 300 mg/dL (10 Herman 2021 09:17)  POCT Blood Glucose.: 219 mg/dL (10 Herman 2021 02:22)    I&O's Summary    10 Herman 2021 07:01  -  10 Herman 2021 15:36  --------------------------------------------------------  IN: 0 mL / OUT: 700 mL / NET: -700 mL        PHYSICAL EXAM:  Vital Signs Last 24 Hrs  T(C): 36.8 (10 Herman 2021 03:23), Max: 37.3 (09 Jun 2021 20:21)  T(F): 98.2 (10 Herman 2021 03:23), Max: 99.1 (09 Jun 2021 20:21)  HR: 94 (10 Herman 2021 03:23) (84 - 104)  BP: 108/62 (10 Herman 2021 03:23) (108/62 - 158/82)  BP(mean): --  RR: 20 (10 Herman 2021 03:23) (17 - 20)  SpO2: 96% (10 Herman 2021 03:23) (96% - 97%)    PHYSICAL EXAM:  PATIENT REFUSES       LABS:                        13.4   7.73  )-----------( 200      ( 09 Jun 2021 23:18 )             42.0     06-10    137  |  100  |  43<H>  ----------------------------<  287<H>  4.5   |  22  |  1.88<H>    Ca    10.2      10 Herman 2021 00:08  Mg     2.1     06-10    TPro  8.9<H>  /  Alb  4.2  /  TBili  0.4  /  DBili  x   /  AST  25  /  ALT  38  /  AlkPhos  34<L>  06-09    PT/INR - ( 09 Jun 2021 23:18 )   PT: 13.4 sec;   INR: 1.12 ratio         PTT - ( 09 Jun 2021 23:18 )  PTT:32.4 sec            RADIOLOGY & ADDITIONAL TESTS:    Imaging Personally Reviewed:    Electrocardiogram Personally Reviewed:    COORDINATION OF CARE:  Care Discussed with Consultants/Other Providers [Y/N]:  Prior or Outpatient Records Reviewed [Y/N]:

## 2021-06-10 NOTE — H&P ADULT - NSHPLABSRESULTS_GEN_ALL_CORE
Labs, imaging and EKG personally reviewed and interpreted by me.                           13.4   7.73  )-----------( 200      ( 09 Jun 2021 23:18 )             42.0     06-10    137  |  100  |  43<H>  ----------------------------<  287<H>  4.5   |  22  |  1.88<H>    Ca    10.2      10 Herman 2021 00:08  Mg     2.1     06-10    TPro  8.9<H>  /  Alb  4.2  /  TBili  0.4  /  DBili  x   /  AST  25  /  ALT  38  /  AlkPhos  34<L>  06-09        PT/INR - ( 09 Jun 2021 23:18 )   PT: 13.4 sec;   INR: 1.12 ratio         PTT - ( 09 Jun 2021 23:18 )  PTT:32.4 sec      < from: US Kidney and Bladder (05.31.21 @ 12:23) >      FINDINGS:    Right kidney: 12.5 cm. No renal mass, hydronephrosis or calculi.    Left kidney: 13.4 cm. No renal mass, hydronephrosis or calculi.    Urinary bladder: Within normal limits.    IMPRESSION:    Normal renal ultrasound.    < end of copied text >

## 2021-06-10 NOTE — ED ADULT NURSE REASSESSMENT NOTE - NS ED NURSE REASSESS COMMENT FT1
Pt expressed wanting to leave and sign himself out AMA because he is cold and "keeping a human in a place this cold is inhumane". Additionally pt frustrated that he has not yet received his insulin. Upon asking if insulin was the deciding factor of whether or not he AMAs he responded with " no I just need to get out of here and go to a hospital that is more organized". Explained to pt the risk of leaving and the importance of remaining for his surgery on Friday but he insists that he still wants to leave. Called MAR who reported that he is not yet assigned to a provider and that she would call back- MAR unable to clearly say whether or not someone is coming to talk to the patients. Pt expressed wanting to leave and sign himself out AMA because he is cold and "keeping a human in a place this cold is inhumane". Additionally pt frustrated that he has not yet received his insulin. Upon asking if insulin was the deciding factor of whether or not he AMAs he responded with " no I just need to get out of here and go to a hospital that is more organized". Explained to pt the risk of leaving and the importance of remaining for his surgery on Friday but he insists that he still wants to leave. Called MAR who reported that he is not yet assigned to a provider and that she would call back- MAR unable to clearly say whether or not someone is coming to talk to the patients. Adorned pt with a warm blanket from the heater to which he responded " I don't want a warm blanket I didn't ask for a warm blanket"

## 2021-06-10 NOTE — PROGRESS NOTE ADULT - PROBLEM SELECTOR PLAN 5
CAD s/p stents x 2   pt refused EKG ,  no EKG from recent admission (also refused)   c/w ASA, holding plavix prior to surgery

## 2021-06-10 NOTE — PROGRESS NOTE ADULT - ASSESSMENT
50M with PMH CAD s/p stents x 2 (>10years ago), uncontrolled T2DM on insulin, HTN, longstanding h/o diabetic foot ulcers s/p R 3rd and 4th partial ray resections (4/15/21) with recent admission for infected foot ulcer/OM s/p R 2nd partial ray resection w/ ADDISON w/ free flap open medially (5/25/21) presenting for flap necrosis with plan for R TMA.

## 2021-06-10 NOTE — PROGRESS NOTE ADULT - ASSESSMENT
50M s/p R foot partial 2nd ray resection with flap necrosis  - Pt see and evaluated  - Afebrile, WBC 7.73  - s/p RF partial 2nd ray resection w ADDISON and free flap open medially with necrosis, 8x5cm, depth to bone, wound bed fibronecrotic, no drainage, mild malodor, periwound sloughing, etiology 2/2 infection. Failing previous partial 2nd ray resection with necrosing flap.  - L foot submet 4 wound to subq, 2.0x2.0cm, no drainage, no malodor, no signs of infection, wound bed fibrogranular, etiology 2/2 pressure  - Discussed in length with patient regarding planned surgical procedure for this Friday with Dr. Lott & recommendations for starting IV line - patient refused multiple times with concerns for kidney injury however upon discussion with attending patient was amenable   - Consented and booked for R foot TMA with Dr. Lott this Friday 6/11@ 7:30AM. Patient understanding of procedure and aware of the necessity of this salvage procedure.  - Please document medical clearance / optimization for procedure with light sedation   - d/w attending 50M s/p R foot partial 2nd ray resection with flap necrosis  - Pt see and evaluated  - Afebrile, WBC 7.73  - s/p RF partial 2nd ray resection w ADDISON and free flap open medially with necrosis, 8x5cm, depth to bone, wound bed fibronecrotic, no drainage, mild malodor, periwound sloughing, etiology 2/2 infection. Failing previous partial 2nd ray resection with necrosing flap.  - L foot submet 4 wound to subq, 2.0x2.0cm, no drainage, no malodor, no signs of infection, wound bed fibrogranular, etiology 2/2 pressure  - Discussed in length with patient regarding planned surgical procedure for this Friday with Dr. Lott & recommendations for starting IV line - patient refused multiple times with concerns for kidney injury however upon discussion with attending patient was amenable   - Consented and booked for R foot TMA with Dr. Lott this Friday 6/11@ 7:30AM. Patient understanding of procedure and aware of the necessity of this salvage procedure.  - Please document medical clearance / optimization for procedure with light sedation   - NPO after midnight  - d/w attending 50M s/p R foot partial 2nd ray resection with flap necrosis  - Pt see and evaluated  - Afebrile, WBC 7.73  - s/p RF partial 2nd ray resection w ADDISON and free flap open medially with necrosis, 8x5cm, depth to bone, wound bed fibronecrotic, no drainage, mild malodor, periwound sloughing, etiology 2/2 infection. Failing previous partial 2nd ray resection with necrosing flap.  - L foot submet 4 wound to subq, 2.0x2.0cm, no drainage, no malodor, no signs of infection, wound bed fibrogranular, etiology 2/2 pressure  - Discussed in length with patient regarding planned surgical procedure for this Friday with Dr. Lott & recommendations for starting IV line - patient refused multiple times with concerns for kidney injury however upon discussion with attending patient was amenable   - rec  consult for management of anxiety   - Consented and booked for R foot TMA with Dr. Lott this Friday 6/11@ 7:30AM. Patient understanding of procedure and aware of the necessity of this salvage procedure.  - Please document medical clearance / optimization for procedure with light sedation   - NPO after midnight  - d/w attending

## 2021-06-10 NOTE — PROGRESS NOTE ADULT - PROBLEM SELECTOR PLAN 2
TEENA on prior admission with Cr up to ~4 with baseline ~1 - presumed TEENA/ATN 2/2 vanco toxicity, hypotension, ACEi/ARB; VS DM nephropathy   - cont to monitor  now 1.66 (Cr was >4 in May 2021)   - GFR >40   - monitor I/Os, trend Cr  - renally dose medications, avoid nephrotoxins   - consult nephrology as needed

## 2021-06-10 NOTE — ED PROVIDER NOTE - CLINICAL SUMMARY MEDICAL DECISION MAKING FREE TEXT BOX
R foot diabetic wound ulceration with failed flap s/p ray amputation at [facility] c clinical course c/b veronique likely 2/2 abx.  No complaints at present -- no f/c, weakness, n/v, or worsening foot pain/swelling/odor.  Making normal urine at home.  No e/o nec fasc or compartment syndrome.  Will check preop labs, give fluids/abx as per pod recs, imaging as per pods recs, plan to admit.  --BMM

## 2021-06-10 NOTE — H&P ADULT - DOES THIS PATIENT HAVE A HISTORY OF OR HAS BEEN DX WITH HEART FAILURE?
Attempted to contact patient with Pap results. Message left for patient to contact our office. 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 Abnormal cervical cells. Patient needs further evaluation including colposcopy by gynecology.   unknown

## 2021-06-10 NOTE — H&P ADULT - PROBLEM SELECTOR PLAN 5
CAD s/p stents x 2   pt refusing EKG at this time - prior EKG reviewed, CAD s/p stents x 2   pt refusing EKG at this time, no EKG from recent admission (also refused)   c/w ASA, holding plavix prior to surgery

## 2021-06-10 NOTE — H&P ADULT - PROBLEM SELECTOR PLAN 2
TEENA on prior admission with Cr up to ~4 with baseline ~1 - presumed TEENA/ATN 2/2 vanco toxicity, hypotension, ACEi/ARB; Cr slowly downtrending prior to discharge on 6/4/21  - Cr continue to show improvement, now 1.66 (2.66 last week)  - renal US form prior hospitalization reviewed   - monitor I/Os, trend Cr  - renally dose medications, avoid nephrotoxins   - consult nephrology in AM prior to OR

## 2021-06-10 NOTE — PATIENT PROFILE ADULT - HOME ACCESSIBILITY CONCERNS
EMERGENCY DEPARTMENT ENCOUNTER    Room Number:  17/17  PCP: Mary Walker MD  Historian: Pt  History Limited By: None      HPI  Chief Complaint: Vaginal bleeding  Context: Radha Hernández is a 44 y.o. female who presents to the ED c/o vaginal spotting for 2-3 days. The bleeding was very light at onset but pt noticed more blood in her underwear and when wiping after urinating today. She also c/o mild lower abd pain, worse on the left. Pt had a hysterectomy in 2010 that left her with the right ovary. Pt denies vaginal trauma, recent intercourse, fever, CP, SOA, N/V/D, hematochezia, dysuria, and hematuria. She denies h/o hemorrhoids. She had a h/o HTN, SANTILLAN and gastroparesis.       Location: vaginal  Character: spotting  Duration: 2-3 days  Severity: moderate   Progression: ongoing   Aggravating Factors: none   Alleviating Factors: none          PAST MEDICAL HISTORY  Active Ambulatory Problems     Diagnosis Date Noted   • No Active Ambulatory Problems     Resolved Ambulatory Problems     Diagnosis Date Noted   • S/P total thyroidectomy 03/30/2017   • S/P thyroidectomy 03/30/2017     Past Medical History:   Diagnosis Date   • Anemia    • Disease of thyroid gland    • Gastroparesis    • Hypertension    • SANTILLAN (nonalcoholic steatohepatitis)    • PONV (postoperative nausea and vomiting)    • Thyroid goiter          PAST SURGICAL HISTORY  Past Surgical History:   Procedure Laterality Date   • HYSTERECTOMY  08/01/2010   • KNEE SURGERY Right 01/01/1994   • KNEE SURGERY Right 01/01/2004   • KNEE SURGERY  01/01/2004   • THYROIDECTOMY Bilateral 3/30/2017    Procedure: TOTAL THYROIDECTOMY;  Surgeon: Fer Duff MD;  Location: [facility] OR [facility] [place];  Service:    • TUBAL ABDOMINAL LIGATION  01/01/2002         FAMILY HISTORY  Family History   Problem Relation Age of Onset   • Hypertension Mother          SOCIAL HISTORY  Social History     Socioeconomic History   • Marital status:      Spouse name: Not on file   • Number  of children: Not on file   • Years of education: Not on file   • Highest education level: Not on file   Tobacco Use   • Smoking status: Never Smoker   • Smokeless tobacco: Never Used   Substance and Sexual Activity   • Alcohol use: Yes     Comment: occ   • Drug use: No         ALLERGIES  Morphine and Sertraline        REVIEW OF SYSTEMS  Review of Systems   Constitutional: Negative for fever.   HENT: Negative for sore throat.    Eyes: Negative.    Respiratory: Negative for cough and shortness of breath.    Cardiovascular: Negative for chest pain.   Gastrointestinal: Positive for abdominal pain (mild left sided). Negative for diarrhea, nausea and vomiting.   Genitourinary: Positive for vaginal bleeding (spotting). Negative for dysuria and hematuria.   Musculoskeletal: Negative for neck pain.   Skin: Negative for rash.   Allergic/Immunologic: Negative.    Neurological: Negative for weakness, numbness and headaches.   Hematological: Negative.    Psychiatric/Behavioral: Negative.    All other systems reviewed and are negative.           PHYSICAL EXAM  ED Triage Vitals [09/01/19 1157]   Temp Heart Rate Resp BP SpO2   98.3 °F (36.8 °C) 91 17 -- 98 %      Temp src Heart Rate Source Patient Position BP Location FiO2 (%)   Tympanic -- -- -- --       Physical Exam   Constitutional: She is oriented to person, place, and time. No distress.   HENT:   Head: Normocephalic and atraumatic.   Eyes: EOM are normal. Pupils are equal, round, and reactive to light.   Neck: Normal range of motion. Neck supple.   Cardiovascular: Normal rate, regular rhythm and normal heart sounds.   Pulmonary/Chest: Effort normal and breath sounds normal. No respiratory distress.   Abdominal: Soft. There is tenderness (mild) in the suprapubic area. There is no rebound and no guarding.   Genitourinary:   Genitourinary Comments: Pelvic exam performed with female nurse at bedside. There is no active bleeding seen in the vaginal vault, however there is an area  of ulceration    Musculoskeletal: Normal range of motion. She exhibits no edema.   Neurological: She is alert and oriented to person, place, and time. She has normal sensation and normal strength.   Skin: Skin is warm and dry. No rash noted.   Psychiatric: Mood and affect normal.   Nursing note and vitals reviewed.          LAB RESULTS  Recent Results (from the past 24 hour(s))   Comprehensive Metabolic Panel    Collection Time: 09/01/19  3:10 PM   Result Value Ref Range    Glucose 117 (H) 65 - 99 mg/dL    BUN 9 6 - 20 mg/dL    Creatinine 0.88 0.57 - 1.00 mg/dL    Sodium 137 136 - 145 mmol/L    Potassium 4.6 3.5 - 5.2 mmol/L    Chloride 101 98 - 107 mmol/L    CO2 24.5 22.0 - 29.0 mmol/L    Calcium 9.0 8.6 - 10.5 mg/dL    Total Protein 7.8 6.0 - 8.5 g/dL    Albumin 4.10 3.50 - 5.20 g/dL    ALT (SGPT) 47 (H) 1 - 33 U/L    AST (SGOT) 40 (H) 1 - 32 U/L    Alkaline Phosphatase 161 (H) 39 - 117 U/L    Total Bilirubin 0.4 0.2 - 1.2 mg/dL    eGFR Non African Amer 70 >60 mL/min/1.73    Globulin 3.7 gm/dL    A/G Ratio 1.1 g/dL    BUN/Creatinine Ratio 10.2 7.0 - 25.0    Anion Gap 11.5 5.0 - 15.0 mmol/L   CBC Auto Differential    Collection Time: 09/01/19  3:10 PM   Result Value Ref Range    WBC 8.24 3.40 - 10.80 10*3/mm3    RBC 5.06 3.77 - 5.28 10*6/mm3    Hemoglobin 12.4 12.0 - 15.9 g/dL    Hematocrit 40.1 34.0 - 46.6 %    MCV 79.2 79.0 - 97.0 fL    MCH 24.5 (L) 26.6 - 33.0 pg    MCHC 30.9 (L) 31.5 - 35.7 g/dL    RDW 15.4 12.3 - 15.4 %    RDW-SD 44.4 37.0 - 54.0 fl    MPV 9.5 6.0 - 12.0 fL    Platelets 349 140 - 450 10*3/mm3    Neutrophil % 65.1 42.7 - 76.0 %    Lymphocyte % 22.8 19.6 - 45.3 %    Monocyte % 10.2 5.0 - 12.0 %    Eosinophil % 0.6 0.3 - 6.2 %    Basophil % 0.8 0.0 - 1.5 %    Immature Grans % 0.5 0.0 - 0.5 %    Neutrophils, Absolute 5.36 1.70 - 7.00 10*3/mm3    Lymphocytes, Absolute 1.88 0.70 - 3.10 10*3/mm3    Monocytes, Absolute 0.84 0.10 - 0.90 10*3/mm3    Eosinophils, Absolute 0.05 0.00 - 0.40 10*3/mm3     Basophils, Absolute 0.07 0.00 - 0.20 10*3/mm3    Immature Grans, Absolute 0.04 0.00 - 0.05 10*3/mm3    nRBC 0.0 0.0 - 0.2 /100 WBC   Urinalysis With Microscopic If Indicated (No Culture) - Urine, Catheter    Collection Time: 09/01/19  3:15 PM   Result Value Ref Range    Color, UA Yellow Yellow, Straw    Appearance, UA Clear Clear    pH, UA 6.0 5.0 - 8.0    Specific Gravity, UA 1.015 1.005 - 1.030    Glucose, UA Negative Negative    Ketones, UA Negative Negative    Bilirubin, UA Negative Negative    Blood, UA Negative Negative    Protein, UA Negative Negative    Leuk Esterase, UA Negative Negative    Nitrite, UA Negative Negative    Urobilinogen, UA 0.2 E.U./dL 0.2 - 1.0 E.U./dL       Ordered the above labs and reviewed the results.        PROCEDURES  Procedures      MEDICATIONS GIVEN IN ER  Medications - No data to display          PROGRESS AND CONSULTS  ED Course as of Sep 01 1613   Sun Sep 01, 2019   1609 4:09 PM  Patient here for vaginal bleeding.  Has had hysterectomy.   No blood in urine or stool.  Not anemic.  Has a small ulceration that looks like it has been bleeding but not currently bleeding. May be granulation tissue. Will discharge home.  Should follow up with Dr. Riley.  Needs to restart her BP meds.  Elevated LFTs most likely from her SANTILLAN.  [SL]      ED Course User Index  [SL] Fer Steel MD     2:31 PM  Labs ordered.     4:02 PM  Rechecked pt who is resting in NAD. Informed pt of lab results. Pelvic exam performed with female nurse at bedside. There is no active bleeding seen in the vaginal vault, however there is an area of ulceration.   Plan to discharge pt and have her f/u with her gynecologist. Pt had BP medication but admits to not taking it for several days. She will start retaking it as prescribed. Pt understands and agrees with the plan, all questions answered.        MEDICAL DECISION MAKING      MDM  Number of Diagnoses or Management Options     Amount and/or Complexity of Data  Reviewed  Clinical lab tests: ordered and reviewed (WBC-8.24  ALT-47  AST-40)  Decide to obtain previous medical records or to obtain history from someone other than the patient: yes  Review and summarize past medical records: yes               DIAGNOSIS  Final diagnoses:   Vaginal bleeding           DISPOSITION  DISCHARGE    Patient discharged in stable condition.    Reviewed implications of results, diagnosis, meds, responsibility to follow up, warning signs and symptoms of possible worsening, potential complications and reasons to return to ER.    Patient/Family voiced understanding of above instructions.    Discussed plan for discharge, as there is no emergent indication for admission. Patient referred to primary care provider for BP management due to today's BP. Pt/family is agreeable and understands need for follow up and repeat testing.  Pt is aware that discharge does not mean that nothing is wrong but it indicates no emergency is present that requires admission and they must continue care with follow-up as given below or physician of their choice.     FOLLOW-UP  Barbara Riley MD  [address] [facility] [address]  [place] [id number]  [phone]    Schedule an appointment as soon as possible for a visit       Mary Walker MD  [address] [facility] [address]  [place] [id number]  [phone]    Schedule an appointment as soon as possible for a visit            Medication List      No changes were made to your prescriptions during this visit.           Latest Documented Vital Signs:  As of 4:13 PM  BP- (!) 145/102 HR- 91 Temp- 98.3 °F (36.8 °C) (Tympanic) O2 sat- 98%        --  Documentation assistance provided by ronda Skinner for Dr. Damián MD.  Information recorded by the scribe was done at my direction and has been verified and validated by me.     Bianca Skinner  09/01/19 6939       Fer Steel MD  09/01/19 7314     none

## 2021-06-10 NOTE — H&P ADULT - NSICDXPASTSURGICALHX_GEN_ALL_CORE_FT
PAST SURGICAL HISTORY:  Status post amputation of toe s/p R 3/4th partial ray resection (4/2021; s/p 2nd partial ray resection (5/2021)

## 2021-06-10 NOTE — ED PROVIDER NOTE - PHYSICAL EXAMINATION
CONSTITUTIONAL: Patient is awake, alert and oriented x 3. Patient is well appearing and in no acute distress  HEAD: NCAT  NECK: supple, FROM  LUNGS: CTA B/L  HEART: RRR  ABDOMEN: Soft nd/nttp  EXTREMITY:  FROM upper and lower ext b/l. R foot: S/p partial resection toes 2-4. There is a flap around 2nd toe that appears necrotic w/ sloughing no active drainage  SKIN: with no rash or lesions  NEURO: No focal deficits

## 2021-06-10 NOTE — H&P ADULT - NSICDXPASTMEDICALHX_GEN_ALL_CORE_FT
PAST MEDICAL HISTORY:  CAD (coronary artery disease)     Diabetes     Diabetic foot ulcer     HTN (hypertension)

## 2021-06-10 NOTE — H&P ADULT - PROBLEM SELECTOR PLAN 1
pt with extensive h/o DM foot ulcer, recent admission for R 2nd toe ulcer/OM s/p partial ray resection with flap; recent f/u showing flap necrosis, now admitted for R foot TMA  - pt refusing examination; per podiatry note - necrosis of flap 8x5cm, no drainage, mild malodor s/p bedside excisional debridement of necrotic slough  - c/w Unasyn q6h for now  - f/u wound Cx data    - plan for R foot TMA with Dr. Lott this Friday @ 7:30AM  - pain control as needed     pt with METS>4, RCRI 2 with 10% 30day mortality; pt is moderate risk for low/moderate risk procedure. Pt refusing EKG at this time and no recent EKG to review - will try to obtain for further stratification. Pt is otherwise medically optimized for R TMA.

## 2021-06-10 NOTE — H&P ADULT - PROBLEM SELECTOR PLAN 3
uncontrolled T2DM, last A1c 9.8  c/w previous insulin regimen - lantus 35units at night, admelog 4units with meals  low ISS and monitor FS ac and hs   outpt endocrine f/u

## 2021-06-10 NOTE — H&P ADULT - HISTORY OF PRESENT ILLNESS
50M with PMH CAD s/p stents x 2 (>10years ago), uncontrolled T2DM on insulin, HTN, longstanding h/o diabetic foot ulcers s/p R 3rd and 4th partial ray resections (4/15/21) with recent admission for infected foot ulcer/OM s/p R 2nd partial ray resection w/ ADDISON w/ free flap open medially (5/25/21) presenting for flap necrosis with plan for likely TMA. Pt just recent admitted from 5/26/-6/4 for above procedure, with course c/b by TEENA/ATN (presumed 2/2 vancomycin toxicity, hypotension, ACEi/ARB use) with improving Cr on discharge. Pt went for routine follow-up this past Monday 6/7/21 with podiatry and told his flap was failing with e/o necrosis and told to come to ED for planned surgery on 6/11/21.  Pt states he has been staying off his feet as he was instructed; denies any pain, swelling, purulence or warmth at site of recent surgery. Denies any fevers, chills, nausea/vomiting; rest of ROS negative.   Pt with h/o CAD about 10 years ago, denies any CP or SOB with exertional activity, tolerated recent surgery well without any adverse reaction to anesthesia.

## 2021-06-10 NOTE — H&P ADULT - PROBLEM SELECTOR PLAN 7
lovenox for VTE ppx - hold on AM of surgery   DASH/CC diet for now, NPO at MN tonight   fall, aspirations precautions

## 2021-06-10 NOTE — H&P ADULT - ASSESSMENT
50M with PMH CAD s/p stents x 2 (>10years ago), uncontrolled T2DM on insulin, HTN, longstanding h/o diabetic foot ulcers s/p R 3rd and 4th partial ray resections (4/15/21) with recent admission for infected foot ulcer/OM s/p R 2nd partial ray resection w/ ADDISON w/ free flap open medially (5/25/21) presenting for flap necrosis with plan for total R TMA.  50M with PMH CAD s/p stents x 2 (>10years ago), uncontrolled T2DM on insulin, HTN, longstanding h/o diabetic foot ulcers s/p R 3rd and 4th partial ray resections (4/15/21) with recent admission for infected foot ulcer/OM s/p R 2nd partial ray resection w/ ADDISON w/ free flap open medially (5/25/21) presenting for flap necrosis with plan for R TMA.

## 2021-06-10 NOTE — ED PROVIDER NOTE - PROGRESS NOTE DETAILS
K and Ca abnormalities noted.  Patient has no complaints, EKG NSR without ectopy, LUIS, or signs c/w hyperkalemia (peaked Ts, prolonged/flattened IA, etc).   He has no complaints currently and monitor is 77 NSR.  Will give albuterol and Ca, send vbg and rpt bmp +mg +bhb.  If K remains elevated and initial labs are not due to laboratory error will treat with lasix, insulin, likely c/s ICU/renal.  Doubt DKA but will treat as appropriate if evident.  --BMM K and Ca abnormalities noted.  Patient has no complaints, EKG NSR without ectopy, LUSI, or signs c/w hyperkalemia (peaked Ts, prolonged/flattened NC, etc).   He has no complaints currently and monitor is 77 NSR.  Will give albuterol and Ca, send vbg and rpt bmp +mg +bhb.  If K remains elevated and initial labs are not due to laboratory error will treat with lasix, insulin, likely c/s ICU/renal.  Doubt DKA but will treat as appropriate if evident.  Of note, patients labs and abx were delayed initially as he told me, MICKIE Farrell, and the podiatry resident that he did not wish to stay in the hospital and initially refused IV placement.  --REECE CMP resulted.  Nonactionable, confirming specious prior CMP.  Hyperglycemia noted.  Pt's glucometer shows downtrending glucose (300s --> 198) after fluids.  No e/o dka.  During pt's ED course he threatened to AMA multiple times but was willing to stay after long discussions re r/b/a.   --RADHAM Pt signed out to Dr. Fisher.  I was called to bedside soon thereafter as he stated again he wanted to AMA.  Requesting his lantus and stating he was cold.  Stated he wanted something warm to drink.  Had been given heated blankets prior to this but complained that the heat wore off.  Was given warm water but stated he wanted coffee.  I again had a long d/w pt re r/b/a of his AMA.  His rpt temp was 97 deg.  Rpt fs 219.  Home dose 35 u lantus subq ordered.  Pt amenable to staying after receiving food.  Discussed this with Dr. Bauer, hospitalist.  --BMM

## 2021-06-10 NOTE — PROGRESS NOTE ADULT - SUBJECTIVE AND OBJECTIVE BOX
Podiatry pager #: [phone]/ [phone]    Patient is a 50y old  Male who presents with a chief complaint of R foot infection (10 Herman 2021 03:31)      HPI:  50M with PMH CAD s/p stents x 2 (>10years ago), uncontrolled T2DM on insulin, HTN, longstanding h/o diabetic foot ulcers s/p R 3rd and 4th partial ray resections (4/15/21) with recent admission for infected foot ulcer/OM s/p R 2nd partial ray resection w/ ADDISON w/ free flap open medially (5/25/21) presenting for flap necrosis with plan for likely TMA. Pt just recent admitted from 5/26/-6/4 for above procedure, with course c/b by TEENA/ATN (presumed 2/2 vancomycin toxicity, hypotension, ACEi/ARB use) with improving Cr on discharge. Pt went for routine follow-up this past Monday 6/7/21 with podiatry and told his flap was failing with e/o necrosis and told to come to ED for planned surgery on 6/11/21.  Pt states he has been staying off his feet as he was instructed; denies any pain, swelling, purulence or warmth at site of recent surgery. Denies any fevers, chills, nausea/vomiting; rest of ROS negative.   Pt with h/o CAD about 10 years ago, denies any CP or SOB with exertional activity, tolerated recent surgery well without any adverse reaction to anesthesia.  (10 Herman 2021 03:31)      PAST MEDICAL & SURGICAL HISTORY:  CAD (coronary artery disease)    Diabetes    HTN (hypertension)    Diabetic foot ulcer    Status post amputation of toe  s/p R 3/4th partial ray resection (4/2021; s/p 2nd partial ray resection (5/2021)        MEDICATIONS  (STANDING):  amLODIPine   Tablet 5 milliGRAM(s) Oral daily  ampicillin/sulbactam  IVPB      ampicillin/sulbactam  IVPB 3 Gram(s) IV Intermittent every 6 hours  aspirin enteric coated 81 milliGRAM(s) Oral daily  atorvastatin 80 milliGRAM(s) Oral at bedtime  cadexomer iodine 0.9% Gel 1 Application(s) Topical daily  collagenase Ointment 1 Application(s) Topical daily  dextrose 40% Gel 15 Gram(s) Oral once  dextrose 5%. 1000 milliLiter(s) (50 mL/Hr) IV Continuous <Continuous>  dextrose 5%. 1000 milliLiter(s) (100 mL/Hr) IV Continuous <Continuous>  dextrose 50% Injectable 25 Gram(s) IV Push once  dextrose 50% Injectable 12.5 Gram(s) IV Push once  dextrose 50% Injectable 25 Gram(s) IV Push once  enoxaparin Injectable 40 milliGRAM(s) SubCutaneous daily  glucagon  Injectable 1 milliGRAM(s) IntraMuscular once  insulin glargine Injectable (LANTUS) 35 Unit(s) SubCutaneous at bedtime  insulin lispro (ADMELOG) corrective regimen sliding scale   SubCutaneous three times a day before meals  insulin lispro (ADMELOG) corrective regimen sliding scale   SubCutaneous at bedtime  insulin lispro Injectable (ADMELOG) 4 Unit(s) SubCutaneous three times a day before meals    MEDICATIONS  (PRN):  acetaminophen   Tablet .. 650 milliGRAM(s) Oral every 6 hours PRN Temp greater or equal to 38C (100.4F), Mild Pain (1 - 3), Moderate Pain (4 - 6)      Allergies    No Known Allergies    Intolerances        VITALS:    Vital Signs Last 24 Hrs  T(C): 36.8 (10 Herman 2021 03:23), Max: 37.3 (09 Jun 2021 20:21)  T(F): 98.2 (10 Herman 2021 03:23), Max: 99.1 (09 Jun 2021 20:21)  HR: 94 (10 Herman 2021 03:23) (84 - 104)  BP: 108/62 (10 Herman 2021 03:23) (108/62 - 158/82)  BP(mean): --  RR: 20 (10 Herman 2021 03:23) (17 - 20)  SpO2: 96% (10 Herman 2021 03:23) (96% - 97%)    LABS:                          13.4   7.73  )-----------( 200      ( 09 Jun 2021 23:18 )             42.0       06-10    137  |  100  |  43<H>  ----------------------------<  287<H>  4.5   |  22  |  1.88<H>    Ca    10.2      10 Herman 2021 00:08  Mg     2.1     06-10    TPro  8.9<H>  /  Alb  4.2  /  TBili  0.4  /  DBili  x   /  AST  25  /  ALT  38  /  AlkPhos  34<L>  06-09      CAPILLARY BLOOD GLUCOSE      POCT Blood Glucose.: 300 mg/dL (10 Herman 2021 09:17)  POCT Blood Glucose.: 219 mg/dL (10 Herman 2021 02:22)      PT/INR - ( 09 Jun 2021 23:18 )   PT: 13.4 sec;   INR: 1.12 ratio         PTT - ( 09 Jun 2021 23:18 )  PTT:32.4 sec    LOWER EXTREMITY PHYSICAL EXAM:    Vascular: DP/PT 2/4, B/L, CFT <3 seconds B/L, Temperature gradient warm to cool, B/L.   Neuro: Epicritic sensation absent to the level of forefoot, B/L.  Musculoskeletal/Ortho: s/p R foot P2RR w/ ADDISON  Wound #1: s/p RF partial 2nd ray resection w ADDISON and free flap open medially with necrosis, 8x5cm, depth to bone, wound bed fibronecotic, no drainage, mild malodor, periwound sloughing, etiology 2/2 infection  Wound #2: L foot submet 4 wound to subq, 2.0x2.0cm, no drainage, no malodor, no signs of infection, wound bed fibrogranular, etiology 2/2 pressure      RADIOLOGY & ADDITIONAL STUDIES:

## 2021-06-11 ENCOUNTER — TRANSCRIPTION ENCOUNTER (OUTPATIENT)
Age: 51
End: 2021-06-11

## 2021-06-11 ENCOUNTER — RESULT REVIEW (OUTPATIENT)
Age: 51
End: 2021-06-11

## 2021-06-11 LAB
ANION GAP SERPL CALC-SCNC: 12 MMOL/L — SIGNIFICANT CHANGE UP (ref 5–17)
APTT BLD: 33.5 SEC — SIGNIFICANT CHANGE UP (ref 27.5–35.5)
BLD GP AB SCN SERPL QL: NEGATIVE — SIGNIFICANT CHANGE UP
BUN SERPL-MCNC: 35 MG/DL — HIGH (ref 7–23)
CALCIUM SERPL-MCNC: 9.6 MG/DL — SIGNIFICANT CHANGE UP (ref 8.4–10.5)
CHLORIDE SERPL-SCNC: 105 MMOL/L — SIGNIFICANT CHANGE UP (ref 96–108)
CO2 SERPL-SCNC: 20 MMOL/L — LOW (ref 22–31)
COVID-19 SPIKE DOMAIN AB INTERP: POSITIVE
COVID-19 SPIKE DOMAIN ANTIBODY RESULT: 85.4 U/ML — HIGH
CREAT SERPL-MCNC: 1.52 MG/DL — HIGH (ref 0.5–1.3)
GLUCOSE BLDC GLUCOMTR-MCNC: 248 MG/DL — HIGH (ref 70–99)
GLUCOSE BLDC GLUCOMTR-MCNC: 256 MG/DL — HIGH (ref 70–99)
GLUCOSE BLDC GLUCOMTR-MCNC: 340 MG/DL — HIGH (ref 70–99)
GLUCOSE SERPL-MCNC: 256 MG/DL — HIGH (ref 70–99)
HCT VFR BLD CALC: 33.2 % — LOW (ref 39–50)
HCT VFR BLD CALC: 36.6 % — LOW (ref 39–50)
HGB BLD-MCNC: 10.9 G/DL — LOW (ref 13–17)
HGB BLD-MCNC: 12.3 G/DL — LOW (ref 13–17)
INR BLD: 1.12 RATIO — SIGNIFICANT CHANGE UP (ref 0.88–1.16)
MCHC RBC-ENTMCNC: 28.8 PG — SIGNIFICANT CHANGE UP (ref 27–34)
MCHC RBC-ENTMCNC: 29.3 PG — SIGNIFICANT CHANGE UP (ref 27–34)
MCHC RBC-ENTMCNC: 32.8 GM/DL — SIGNIFICANT CHANGE UP (ref 32–36)
MCHC RBC-ENTMCNC: 33.6 GM/DL — SIGNIFICANT CHANGE UP (ref 32–36)
MCV RBC AUTO: 87.1 FL — SIGNIFICANT CHANGE UP (ref 80–100)
MCV RBC AUTO: 87.8 FL — SIGNIFICANT CHANGE UP (ref 80–100)
NRBC # BLD: 0 /100 WBCS — SIGNIFICANT CHANGE UP (ref 0–0)
NRBC # BLD: 0 /100 WBCS — SIGNIFICANT CHANGE UP (ref 0–0)
PLATELET # BLD AUTO: 161 K/UL — SIGNIFICANT CHANGE UP (ref 150–400)
PLATELET # BLD AUTO: 185 K/UL — SIGNIFICANT CHANGE UP (ref 150–400)
POTASSIUM SERPL-MCNC: 4.5 MMOL/L — SIGNIFICANT CHANGE UP (ref 3.5–5.3)
POTASSIUM SERPL-SCNC: 4.5 MMOL/L — SIGNIFICANT CHANGE UP (ref 3.5–5.3)
PROTHROM AB SERPL-ACNC: 13.4 SEC — SIGNIFICANT CHANGE UP (ref 10.6–13.6)
RBC # BLD: 3.78 M/UL — LOW (ref 4.2–5.8)
RBC # BLD: 4.2 M/UL — SIGNIFICANT CHANGE UP (ref 4.2–5.8)
RBC # FLD: 12.6 % — SIGNIFICANT CHANGE UP (ref 10.3–14.5)
RBC # FLD: 12.6 % — SIGNIFICANT CHANGE UP (ref 10.3–14.5)
RH IG SCN BLD-IMP: POSITIVE — SIGNIFICANT CHANGE UP
SARS-COV-2 IGG+IGM SERPL QL IA: 85.4 U/ML — HIGH
SARS-COV-2 IGG+IGM SERPL QL IA: POSITIVE
SODIUM SERPL-SCNC: 137 MMOL/L — SIGNIFICANT CHANGE UP (ref 135–145)
WBC # BLD: 7.47 K/UL — SIGNIFICANT CHANGE UP (ref 3.8–10.5)
WBC # BLD: 7.56 K/UL — SIGNIFICANT CHANGE UP (ref 3.8–10.5)
WBC # FLD AUTO: 7.47 K/UL — SIGNIFICANT CHANGE UP (ref 3.8–10.5)
WBC # FLD AUTO: 7.56 K/UL — SIGNIFICANT CHANGE UP (ref 3.8–10.5)

## 2021-06-11 PROCEDURE — 73630 X-RAY EXAM OF FOOT: CPT | Mod: 26,50

## 2021-06-11 PROCEDURE — 88307 TISSUE EXAM BY PATHOLOGIST: CPT | Mod: 26

## 2021-06-11 PROCEDURE — 99222 1ST HOSP IP/OBS MODERATE 55: CPT

## 2021-06-11 PROCEDURE — 99233 SBSQ HOSP IP/OBS HIGH 50: CPT | Mod: GC

## 2021-06-11 RX ORDER — COLLAGENASE CLOSTRIDIUM HIST. 250 UNIT/G
1 OINTMENT (GRAM) TOPICAL DAILY
Refills: 0 | Status: DISCONTINUED | OUTPATIENT
Start: 2021-06-11 | End: 2021-06-12

## 2021-06-11 RX ORDER — INSULIN LISPRO 100/ML
4 VIAL (ML) SUBCUTANEOUS
Refills: 0 | Status: DISCONTINUED | OUTPATIENT
Start: 2021-06-11 | End: 2021-06-12

## 2021-06-11 RX ORDER — SODIUM CHLORIDE 9 MG/ML
1000 INJECTION, SOLUTION INTRAVENOUS
Refills: 0 | Status: DISCONTINUED | OUTPATIENT
Start: 2021-06-11 | End: 2021-06-12

## 2021-06-11 RX ORDER — OXYCODONE AND ACETAMINOPHEN 5; 325 MG/1; MG/1
1 TABLET ORAL EVERY 4 HOURS
Refills: 0 | Status: DISCONTINUED | OUTPATIENT
Start: 2021-06-11 | End: 2021-06-12

## 2021-06-11 RX ORDER — ASPIRIN/CALCIUM CARB/MAGNESIUM 324 MG
81 TABLET ORAL DAILY
Refills: 0 | Status: DISCONTINUED | OUTPATIENT
Start: 2021-06-12 | End: 2021-06-12

## 2021-06-11 RX ORDER — GLUCAGON INJECTION, SOLUTION 0.5 MG/.1ML
1 INJECTION, SOLUTION SUBCUTANEOUS ONCE
Refills: 0 | Status: DISCONTINUED | OUTPATIENT
Start: 2021-06-11 | End: 2021-06-12

## 2021-06-11 RX ORDER — ATORVASTATIN CALCIUM 80 MG/1
80 TABLET, FILM COATED ORAL AT BEDTIME
Refills: 0 | Status: DISCONTINUED | OUTPATIENT
Start: 2021-06-11 | End: 2021-06-12

## 2021-06-11 RX ORDER — CADEXOMER IODINE 0.9 %
1 PADS, MEDICATED (EA) TOPICAL DAILY
Refills: 0 | Status: DISCONTINUED | OUTPATIENT
Start: 2021-06-11 | End: 2021-06-12

## 2021-06-11 RX ORDER — AMLODIPINE BESYLATE 2.5 MG/1
5 TABLET ORAL DAILY
Refills: 0 | Status: DISCONTINUED | OUTPATIENT
Start: 2021-06-11 | End: 2021-06-12

## 2021-06-11 RX ORDER — FENTANYL CITRATE 50 UG/ML
50 INJECTION INTRAVENOUS ONCE
Refills: 0 | Status: DISCONTINUED | OUTPATIENT
Start: 2021-06-11 | End: 2021-06-11

## 2021-06-11 RX ORDER — DEXTROSE 50 % IN WATER 50 %
15 SYRINGE (ML) INTRAVENOUS ONCE
Refills: 0 | Status: DISCONTINUED | OUTPATIENT
Start: 2021-06-11 | End: 2021-06-12

## 2021-06-11 RX ORDER — INSULIN LISPRO 100/ML
VIAL (ML) SUBCUTANEOUS AT BEDTIME
Refills: 0 | Status: DISCONTINUED | OUTPATIENT
Start: 2021-06-11 | End: 2021-06-12

## 2021-06-11 RX ORDER — FENTANYL CITRATE 50 UG/ML
25 INJECTION INTRAVENOUS
Refills: 0 | Status: DISCONTINUED | OUTPATIENT
Start: 2021-06-11 | End: 2021-06-11

## 2021-06-11 RX ORDER — INSULIN LISPRO 100/ML
VIAL (ML) SUBCUTANEOUS
Refills: 0 | Status: DISCONTINUED | OUTPATIENT
Start: 2021-06-11 | End: 2021-06-12

## 2021-06-11 RX ORDER — AMPICILLIN SODIUM AND SULBACTAM SODIUM 250; 125 MG/ML; MG/ML
3 INJECTION, POWDER, FOR SUSPENSION INTRAMUSCULAR; INTRAVENOUS EVERY 6 HOURS
Refills: 0 | Status: DISCONTINUED | OUTPATIENT
Start: 2021-06-11 | End: 2021-06-12

## 2021-06-11 RX ORDER — ENOXAPARIN SODIUM 100 MG/ML
40 INJECTION SUBCUTANEOUS DAILY
Refills: 0 | Status: DISCONTINUED | OUTPATIENT
Start: 2021-06-11 | End: 2021-06-12

## 2021-06-11 RX ORDER — LANOLIN ALCOHOL/MO/W.PET/CERES
3 CREAM (GRAM) TOPICAL ONCE
Refills: 0 | Status: COMPLETED | OUTPATIENT
Start: 2021-06-11 | End: 2021-06-11

## 2021-06-11 RX ORDER — ACETAMINOPHEN 500 MG
650 TABLET ORAL EVERY 6 HOURS
Refills: 0 | Status: DISCONTINUED | OUTPATIENT
Start: 2021-06-11 | End: 2021-06-12

## 2021-06-11 RX ORDER — DEXTROSE 50 % IN WATER 50 %
12.5 SYRINGE (ML) INTRAVENOUS ONCE
Refills: 0 | Status: DISCONTINUED | OUTPATIENT
Start: 2021-06-11 | End: 2021-06-12

## 2021-06-11 RX ORDER — INSULIN GLARGINE 100 [IU]/ML
35 INJECTION, SOLUTION SUBCUTANEOUS AT BEDTIME
Refills: 0 | Status: DISCONTINUED | OUTPATIENT
Start: 2021-06-11 | End: 2021-06-12

## 2021-06-11 RX ORDER — DEXTROSE 50 % IN WATER 50 %
25 SYRINGE (ML) INTRAVENOUS ONCE
Refills: 0 | Status: DISCONTINUED | OUTPATIENT
Start: 2021-06-11 | End: 2021-06-12

## 2021-06-11 RX ORDER — CALCIUM GLUCONATE 100 MG/ML
2 VIAL (ML) INTRAVENOUS ONCE
Refills: 0 | Status: DISCONTINUED | OUTPATIENT
Start: 2021-06-11 | End: 2021-06-11

## 2021-06-11 RX ORDER — INSULIN LISPRO 100/ML
VIAL (ML) SUBCUTANEOUS
Refills: 0 | Status: DISCONTINUED | OUTPATIENT
Start: 2021-06-11 | End: 2021-06-11

## 2021-06-11 RX ORDER — ASPIRIN/CALCIUM CARB/MAGNESIUM 324 MG
81 TABLET ORAL DAILY
Refills: 0 | Status: DISCONTINUED | OUTPATIENT
Start: 2021-06-11 | End: 2021-06-11

## 2021-06-11 RX ORDER — ONDANSETRON 8 MG/1
4 TABLET, FILM COATED ORAL ONCE
Refills: 0 | Status: DISCONTINUED | OUTPATIENT
Start: 2021-06-11 | End: 2021-06-11

## 2021-06-11 RX ORDER — INSULIN LISPRO 100/ML
VIAL (ML) SUBCUTANEOUS AT BEDTIME
Refills: 0 | Status: DISCONTINUED | OUTPATIENT
Start: 2021-06-11 | End: 2021-06-11

## 2021-06-11 RX ADMIN — AMLODIPINE BESYLATE 5 MILLIGRAM(S): 2.5 TABLET ORAL at 05:47

## 2021-06-11 RX ADMIN — AMPICILLIN SODIUM AND SULBACTAM SODIUM 200 GRAM(S): 250; 125 INJECTION, POWDER, FOR SUSPENSION INTRAMUSCULAR; INTRAVENOUS at 11:50

## 2021-06-11 RX ADMIN — AMPICILLIN SODIUM AND SULBACTAM SODIUM 200 GRAM(S): 250; 125 INJECTION, POWDER, FOR SUSPENSION INTRAMUSCULAR; INTRAVENOUS at 17:27

## 2021-06-11 RX ADMIN — Medication 1 APPLICATION(S): at 12:06

## 2021-06-11 RX ADMIN — Medication 1 APPLICATION(S): at 12:07

## 2021-06-11 RX ADMIN — AMPICILLIN SODIUM AND SULBACTAM SODIUM 200 GRAM(S): 250; 125 INJECTION, POWDER, FOR SUSPENSION INTRAMUSCULAR; INTRAVENOUS at 02:14

## 2021-06-11 RX ADMIN — Medication 650 MILLIGRAM(S): at 21:57

## 2021-06-11 RX ADMIN — Medication 4 UNIT(S): at 17:17

## 2021-06-11 RX ADMIN — Medication 650 MILLIGRAM(S): at 15:46

## 2021-06-11 RX ADMIN — Medication 3 MILLIGRAM(S): at 23:38

## 2021-06-11 RX ADMIN — Medication 650 MILLIGRAM(S): at 22:27

## 2021-06-11 RX ADMIN — ATORVASTATIN CALCIUM 80 MILLIGRAM(S): 80 TABLET, FILM COATED ORAL at 21:57

## 2021-06-11 RX ADMIN — Medication 8: at 17:18

## 2021-06-11 RX ADMIN — Medication 4 UNIT(S): at 11:46

## 2021-06-11 RX ADMIN — INSULIN GLARGINE 35 UNIT(S): 100 INJECTION, SOLUTION SUBCUTANEOUS at 21:56

## 2021-06-11 RX ADMIN — Medication 650 MILLIGRAM(S): at 15:16

## 2021-06-11 RX ADMIN — AMPICILLIN SODIUM AND SULBACTAM SODIUM 200 GRAM(S): 250; 125 INJECTION, POWDER, FOR SUSPENSION INTRAMUSCULAR; INTRAVENOUS at 23:17

## 2021-06-11 RX ADMIN — Medication 2: at 21:57

## 2021-06-11 RX ADMIN — Medication 4: at 12:03

## 2021-06-11 NOTE — CONSULT NOTE ADULT - ASSESSMENT
50y old  Male who presents with history of DM type 2, CAD s/p stents x 2, longstanding history of diabetic foot ulcers, admitted for worsening R foot ulcer and fever to 102F at home.   Patient now s/p RF partial 2nd ray resection w ADDISON and free flap open medial. Patient treated with IV antibiotics, now in ATN.   Endocrine consult requested for management of DM2 in patient with TEENA. A1c 9.8%    Uncontrolled DM2 c/b CAD, retinopathy neurpopathy and DM foot ulcers  Currently in ATN per renal due to Antibx  A1c 9.8%, (possibly due to medication nonadherence documented in outpt endocrine notes)    Fs goal 100-180  Fs premeal and qhs   Agree with decreased dose of Lantus units qhs, given decreased renal clearance  Recommend continue Admelog units TIDac, hold if NPO   Recommend low correctional scale premeal and qhs   If patient to be NPO for any further procedures, recommend decrease basal insulin by 80% the evening before procedure.     Recommend basal/bolus insulin, dose to be determined on discharge given uncontrolled BG on PO medication/insulin and acute change in GFR  Discharge on metformin to be determined by improvement in GFR. Will discontinue Prandin if patient agreeable to basal/bolus  Will need to clarify if patient is on Trulcity as outpt   Patient to follow up with Dr London on discharge ( no appt scheduled at this time)    HTN   goal< 130/80  bp slightly above goal  Would continue to monitor and optimize pain management    HLD  goal LDL<70  Continue Atorvastatin      50y old  Male who presents with history of DM type 2, CAD s/p stents x 2, longstanding history of diabetic foot ulcers and multiple toe amputations now s/p TMA  Endocrine consult requested for management of DM2. A1C 10.5%      FS goal 100-180  Fs premeal and qhs   Recommend Lantus 35 Units HS  Recommend continue Admelog 4 units TIDAC, hold if NPO   Recommend low correctional scale premeal and qhs   Patient may require increased doses as renal function has improved  Will monitor FS and continue to adjust    Recommend basal/bolus insulin, dose to be determined on discharge   Patient to follow up with Dr London on discharge (recommend that appt be scheduled prior to discharge)  At outpt follow up, can determine if GLP-1 and metformin should be restarted    HTN   goal< 130/80  mgmt per primary team    HLD  goal LDL<70  Continue Atorvastatin     Magdalene Owen (pager [phone])  On evenings and weekends, please call [phone] or page endocrine fellow on call.   Please note that this patient may be followed by different provider tomorrow. If no answer, contact endocrine fellow on call.

## 2021-06-11 NOTE — PHYSICAL THERAPY INITIAL EVALUATION ADULT - DISCHARGE DISPOSITION, PT EVAL
Pt requesting to be taken off physical therapy services, PT to abide by pt's wishes./home w/ outpatient services/outpatient PT

## 2021-06-11 NOTE — PROGRESS NOTE ADULT - PROBLEM SELECTOR PLAN 5
CAD s/p stents x 2   - refused EKG on admission  - on ASA and plavix at home  - hold ASA and plavix now  - c/w atorvastatin 80mg CAD s/p stents x 2, as per chart review >10 year ago   - refused EKG on admission  - on ASA and plavix at home  - hold ASA and plavix for now, will reintroduce ASA in AM  - c/w atorvastatin 80mg  - outpatient cards f/u re: need for DAPT

## 2021-06-11 NOTE — PROGRESS NOTE ADULT - PROBLEM SELECTOR PLAN 2
TEENA/ATN 2/2 vanco during lasta dmission (6/4)  - Cr 1.78 -> 1.88 -> 1.52  - baseline ~1  - monitor I/Os, trend Cr  - renally dose medications TEENA/ATN 2/2 vanco during last  admission (6/4)  - Cr 1.78 -> 1.88 -> 1.52  - baseline ~1  - monitor I/Os, trend Cr  - renally dose medications

## 2021-06-11 NOTE — PHYSICAL THERAPY INITIAL EVALUATION ADULT - PERTINENT HX OF CURRENT PROBLEM, REHAB EVAL
Pt is a 50M with PMH CAD s/p stents x 2 (>10years ago), uncontrolled T2DM on insulin, HTN, longstanding h/o diabetic foot ulcers s/p R 3rd and 4th partial ray resections (4/15/21) with recent admission for infected foot ulcer/OM s/p R 2nd partial ray resection w/ ADDISON w/ free flap open medially (5/25/21) presenting for flap necrosis. Now s/p R TMA 6/11/21. (-) CXR 6/10/21.

## 2021-06-11 NOTE — PRE-OP CHECKLIST - ISOLATION PRECAUTIONS
0929 Pt arrived to CT holding. Skin natural for race, warm, dry. Respirations even and unlabored. Denies c/o pain at this time. 9113 Dr. Joanne Bettencourtbbles in to evaluate pt and explain procedure. 0272 Consent obtained. 1002 Pt positioned prone on CT table. Monitor applied. 1012 Procedure started. 1026 Procedure complete. Bandaid to left  lower back puncture site  1039 Pt transported to \[facility]\"" via cart. Report called to Roselle Collet, RN. Skin natural for race, warm, dry. Respirations even and unlabored. Denies c/o pain at this time. none

## 2021-06-11 NOTE — DISCHARGE NOTE PROVIDER - CARE PROVIDER_API CALL
Jenifer London ()  Internal Medicine  [address], [address]  [place] [address]  Phone: [phone]  Fax: [phone]  Scheduled Appointment: 07/01/2021 10:30 AM

## 2021-06-11 NOTE — BRIEF OPERATIVE NOTE - OPERATION/FINDINGS
pt is s/p RF TMA closed w drain  -Good intraop bleeding, low concern for viability  -hard bone stack at resection level, low concern for residual bone infection, stable for discharge tomorrow on PO Augmentin x 1 week pending appearance

## 2021-06-11 NOTE — DISCHARGE NOTE PROVIDER - NSDCFUSCHEDAPPT_GEN_ALL_CORE_FT
TIKI HOUSTON ; 06/13/2021 ; [facility]  WILD, TIKI ; 07/01/2021 ; [facility] [address] [facility]

## 2021-06-11 NOTE — PROGRESS NOTE ADULT - SUBJECTIVE AND OBJECTIVE BOX
Patient is a 50y old  Male who presents with a chief complaint of R foot infection (10 Herman 2021 15:35)      INTERVAL HPI/OVERNIGHT EVENTS:   Pt is scheduled for R foot transmetatarsal amputation with Dr. Lott today at 730 . Patient is aware of procedure and is NPO since midnight.    MEDICATIONS  (STANDING):  amLODIPine   Tablet 5 milliGRAM(s) Oral daily  ampicillin/sulbactam  IVPB      ampicillin/sulbactam  IVPB 3 Gram(s) IV Intermittent every 6 hours  aspirin enteric coated 81 milliGRAM(s) Oral daily  atorvastatin 80 milliGRAM(s) Oral at bedtime  cadexomer iodine 0.9% Gel 1 Application(s) Topical daily  collagenase Ointment 1 Application(s) Topical daily  dextrose 40% Gel 15 Gram(s) Oral once  dextrose 5%. 1000 milliLiter(s) (50 mL/Hr) IV Continuous <Continuous>  dextrose 5%. 1000 milliLiter(s) (100 mL/Hr) IV Continuous <Continuous>  dextrose 50% Injectable 25 Gram(s) IV Push once  dextrose 50% Injectable 12.5 Gram(s) IV Push once  dextrose 50% Injectable 25 Gram(s) IV Push once  enoxaparin Injectable 40 milliGRAM(s) SubCutaneous daily  glucagon  Injectable 1 milliGRAM(s) IntraMuscular once  insulin glargine Injectable (LANTUS) 35 Unit(s) SubCutaneous at bedtime  insulin lispro (ADMELOG) corrective regimen sliding scale   SubCutaneous three times a day before meals  insulin lispro (ADMELOG) corrective regimen sliding scale   SubCutaneous at bedtime  insulin lispro Injectable (ADMELOG) 4 Unit(s) SubCutaneous three times a day before meals    MEDICATIONS  (PRN):  acetaminophen   Tablet .. 650 milliGRAM(s) Oral every 6 hours PRN Temp greater or equal to 38C (100.4F), Mild Pain (1 - 3), Moderate Pain (4 - 6)      Allergies    No Known Allergies    Intolerances        Vital Signs Last 24 Hrs  T(C): 36.4 (11 Jun 2021 05:20), Max: 36.4 (10 Herman 2021 15:46)  T(F): 97.5 (11 Jun 2021 05:20), Max: 97.6 (10 Herman 2021 15:46)  HR: 84 (11 Jun 2021 05:20) (73 - 84)  BP: 125/77 (11 Jun 2021 05:20) (125/77 - 160/92)  BP(mean): --  RR: 16 (11 Jun 2021 05:20) (16 - 18)  SpO2: 97% (11 Jun 2021 05:20) (95% - 98%)    LABS:                        12.3   7.56  )-----------( 185      ( 11 Jun 2021 05:33 )             36.6     06-11    137  |  105  |  35<H>  ----------------------------<  256<H>  4.5   |  20<L>  |  1.52<H>    Ca    9.6      11 Jun 2021 05:33  Mg     2.1     06-10    TPro  8.9<H>  /  Alb  4.2  /  TBili  0.4  /  DBili  x   /  AST  25  /  ALT  38  /  AlkPhos  34<L>  06-09    PT/INR - ( 11 Jun 2021 05:33 )   PT: 13.4 sec;   INR: 1.12 ratio         PTT - ( 11 Jun 2021 05:33 )  PTT:33.5 sec    CAPILLARY BLOOD GLUCOSE      POCT Blood Glucose.: 275 mg/dL (10 Herman 2021 21:56)  POCT Blood Glucose.: 221 mg/dL (10 Herman 2021 16:56)  POCT Blood Glucose.: 189 mg/dL (10 Herman 2021 15:02)  POCT Blood Glucose.: 300 mg/dL (10 Herman 2021 09:17)      RADIOLOGY & ADDITIONAL TESTS:

## 2021-06-11 NOTE — CHART NOTE - NSCHARTNOTEFT_GEN_A_CORE
Medical note of necessity:    Transportation wheelchair:    Due to patients deconditioning and generalized weakness, secondary to right meta-tarsal amputation, patient will require a transport chair. Patient is unable to self propel in a standard wheelchair. This is necessary to achieve daily tasks and therapies which cannot be achieved with the use of a cane or rolling walker. Patient and family are in agreement with transport chair use at home and assistance will be provided if needed.     Key Williamson, PGY2

## 2021-06-11 NOTE — DISCHARGE NOTE PROVIDER - HOSPITAL COURSE
50M with PMH CAD s/p stents x 2 (>10years ago), uncontrolled T2DM on insulin, HTN, longstanding h/o diabetic foot ulcers s/p R 3rd and 4th partial ray resections (4/15/21) with recent admission for infected foot ulcer/OM s/p R 2nd partial ray resection w/ ADDISON w/ free flap open medially (5/25/21) presented for flap necrosis. Pt recently admitted from 5/26/-6/4 for above procedure, with course c/b by TEENA/ATN (presumed 2/2 vancomycin toxicity, hypotension, ACEi/ARB use) with improving Cr on discharge. Pt went for routine follow-up this past Monday 6/7/21 with podiatry and told his flap was failing with necrosis and told to come to ED for planned surgery on 6/11/21.  Patient presented to ED on 6/9/21.    He was admitted for management of his R foot flap necrosis/infection. R foot wound culture grew Staphylococcus aureus and Corynebacterium amycolatum. R foot TMA on 6/11. 50M with PMH CAD s/p stents x 2 (>10years ago), uncontrolled T2DM on insulin, HTN, longstanding h/o diabetic foot ulcers s/p R 3rd and 4th partial ray resections (4/15/21) with recent admission for infected foot ulcer/OM s/p R 2nd partial ray resection w/ ADDISON w/ free flap open medially (5/25/21) presented for flap necrosis. Pt recently admitted from 5/26/-6/4 for above procedure, with course c/b by TEENA/ATN (presumed 2/2 vancomycin toxicity, hypotension, ACEi/ARB use) with improving Cr on discharge. Pt went for routine follow-up this past Monday 6/7/21 with podiatry and told his flap was failing with necrosis and told to come to ED for planned surgery on 6/11/21.  Patient presented to ED on 6/9/21.    He was admitted for management of his R foot flap necrosis/infection. Patient found to also have a 2x2 cm L foot submet 4 wound. R foot wound culture grew Staphylococcus aureus and Corynebacterium amycolatum. Pt received unasyn x3days during his stay. R foot transmetatarsal amputation was performed on 6/11.     Pt's Cr improved throughout his stay following the TEENA/ATN during his admission from 5/26-6/4. Cr 2.66 (6/4) ->  ___ on day of discharge.     Patient stable on day of discharge. Discharged home with augmentin x7days. 50M with PMH CAD s/p stents x 2 (>10years ago), uncontrolled T2DM on insulin, HTN, longstanding h/o diabetic foot ulcers s/p R 3rd and 4th partial ray resections (4/15/21) with recent admission for infected foot ulcer/OM s/p R 2nd partial ray resection w/ ADDISON w/ free flap open medially (5/25/21) presented for flap necrosis. Pt recently admitted from 5/26/-6/4 for above procedure, with course c/b by TEENA/ATN (presumed 2/2 vancomycin toxicity, hypotension, ACEi/ARB use) with improving Cr on discharge. Pt went for routine follow-up this past Monday 6/7/21 with podiatry and told his flap was failing with necrosis and told to come to ED for planned surgery on 6/11/21.  Patient presented to ED on 6/9/21.    He was admitted for management of his R foot flap necrosis. Patient found to also have a 2x2 cm L foot submet 4 wound. R foot wound culture grew Staphylococcus aureus and Corynebacterium amycolatum. Pt received unasyn x___days during his stay. R foot transmetatarsal amputation was performed on 6/11 with low c/f residual bone infection.     Pt's Cr improved throughout his stay following the TEENA/ATN during his admission from 5/26-6/4. Cr 2.66 (6/4) ->  ___ on day of discharge.     Patient currently stable and ready for discharge home. Discharged home with augmentin x7days. Outpatient follow-up with podiatry and endocrinology. 50M with PMH CAD s/p stents x 2 (>10years ago), uncontrolled T2DM on insulin, HTN, longstanding h/o diabetic foot ulcers s/p R 3rd and 4th partial ray resections (4/15/21) with recent admission for infected foot ulcer/OM s/p R 2nd partial ray resection w/ ADDISON w/ free flap open medially (5/25/21) presented for flap necrosis. Pt recently admitted from 5/26/-6/4 for above procedure, with course c/b by TEENA/ATN (presumed 2/2 vancomycin toxicity, hypotension, ACEi/ARB use) with improving Cr on discharge. Pt went for routine follow-up this past Monday 6/7/21 with podiatry and told his flap was failing with necrosis and told to come to ED for planned surgery on 6/11/21.  Patient presented to ED on 6/9/21.    He was admitted for management of his R foot flap necrosis. Patient found to also have a 2x2 cm L foot submet 4 wound. R foot wound culture grew Staphylococcus aureus and Corynebacterium amycolatum. Pt received unasyn x___days during his stay. R foot transmetatarsal amputation was performed on 6/11 with low c/f residual bone infection.     Endocrinology consulted for diabetes management during stay. Pt's Cr improved throughout his stay following the TEENA/ATN during his admission from 5/26-6/4. Cr 2.66 (6/4) ->  ___ on day of discharge.     Patient currently stable and ready for discharge home. Discharged home with augmentin x7days. Outpatient follow-up with podiatry and endocrinology. 50M with PMH CAD s/p stents x 2 (>10years ago), uncontrolled T2DM on insulin, HTN, longstanding h/o diabetic foot ulcers s/p R 3rd and 4th partial ray resections (4/15/21) with recent admission for infected foot ulcer/OM s/p R 2nd partial ray resection w/ ADDISON w/ free flap open medially (5/25/21) presented for flap necrosis. Pt recently admitted from 5/26/-6/4 for above procedure, with course c/b by TEENA/ATN (presumed 2/2 vancomycin toxicity, hypotension, ACEi/ARB use) with improving Cr on discharge. Pt went for routine follow-up this past Monday 6/7/21 with podiatry and told his flap was failing with necrosis and told to come to ED for planned surgery on 6/11/21.  Patient presented to ED on 6/9/21.    He was admitted for management of his R foot flap necrosis. Patient found to also have a 2x2 cm L foot submet 4 wound. R foot wound culture grew Staphylococcus aureus and Corynebacterium amycolatum. Pt started on unasyn x 2 days. Underwent R foot transmetatarsal amputation 6/11 with low c/f residual bone infection (clean margins)     Endocrinology consulted for diabetes management during stay. Continued on home regimen w/ premeal 6U. Metformin on hold due to prior Cr elevation iso ATN.     Patient currently stable and ready for discharge home. Discharged home with augmentin x7days. ID recs pending. Patient declined to stay for ID recs. Will f/u abx recs and send to patient's pharmacy when available.      Outpatient follow-up with podiatry and endocrinology. 50M with PMH CAD s/p stents x 2 (>10years ago), uncontrolled T2DM on insulin, HTN, longstanding h/o diabetic foot ulcers s/p R 3rd and 4th partial ray resections (4/15/21) with recent admission for infected foot ulcer/OM s/p R 2nd partial ray resection w/ ADDISON w/ free flap open medially (5/25/21) presented for flap necrosis. Pt recently admitted from 5/26/-6/4 for above procedure, with course c/b by TEENA/ATN (presumed 2/2 vancomycin toxicity, hypotension, ACEi/ARB use) with improving Cr on discharge. Pt went for routine follow-up this past Monday 6/7/21 with podiatry and told his flap was failing with necrosis and told to come to ED for planned surgery on 6/11/21.  Patient presented to ED on 6/9/21.    He was admitted for management of his R foot flap necrosis. Patient found to also have a 2x2 cm L foot submet 4 wound. R foot wound culture grew Staphylococcus aureus and Corynebacterium amycolatum. Pt started on unasyn x 2 days. Underwent R foot transmetatarsal amputation 6/11 with low c/f residual bone infection (clean margins)     Endocrinology consulted for diabetes management during stay. Continued on home regimen w/ premeal 6U. Metformin on hold due to prior Cr elevation iso ATN.     Patient currently stable and ready for discharge home. Discharged home with augmentin x7days. ID recs pending. Patient declined to stay for ID recs. Will f/u abx recs and send to patient's pharmacy if different and available.     Outpatient follow-up with podiatry and endocrinology.

## 2021-06-11 NOTE — DISCHARGE NOTE PROVIDER - NSDCCPCAREPLAN_GEN_ALL_CORE_FT
PRINCIPAL DISCHARGE DIAGNOSIS  Diagnosis: Gangrene  Assessment and Plan of Treatment: You were admitted for an infection of your right foot following surgery      SECONDARY DISCHARGE DIAGNOSES  Diagnosis: Diabetic foot ulcer  Assessment and Plan of Treatment:      PRINCIPAL DISCHARGE DIAGNOSIS  Diagnosis: Gangrene  Assessment and Plan of Treatment: You were admitted for an infection of your right foot following your prior surgery on 5/25. This infection is most likely related o your diabetes. You were given ampicillin/sulbactam for antibiotic coverage. On 6/11, you had a transmetatarsal amputation of your right foot performed by podiatry. On discharge you were prescirbed augmentin, an antibiotic to be taken at home. You will also be given a wheelchair. Please continue to take your antibiotics. Please follow up with podiatry outpatient. If you have a fever, shortness of breath, new pain in your foot, or drainage of pus from the surgical site, please return to the hospital.      SECONDARY DISCHARGE DIAGNOSES  Diagnosis: Diabetic foot ulcer  Assessment and Plan of Treatment: You have a history of foot ulcer related to your diabetes. The infection of the right surgical site and the left foot ucler are also related to your diabetes. Continue to montor the ulcer on the left foot and keep the wound clean and dry. Please follow up with your endocrinologist and podiatrist outpatient. Please continue to take your diabetes medications. If you notice draining of pus from either foot, new onset redness, new onset darkening of the skin/necrosis, please rreturn to the hospital.     PRINCIPAL DISCHARGE DIAGNOSIS  Diagnosis: Gangrene  Assessment and Plan of Treatment: You were admitted for an infection of your right foot following your prior surgery on 5/25. This infection is most likely related o your diabetes. On 6/11, you had a transmetatarsal amputation of your right foot performed by podiatry. On discharge you were prescirbed augmentin, an antibiotic to be taken at home. Please take 2 times a day for 7 days. We are consulting our infectious disease specialists to tailor antibiotics. You will receive a phone call if an adjustment in antibiotics is needed and a new script will be sent to your [facility] if necessary. Please follow up with podiatry outpatient. If you have a fever, shortness of breath, new pain in your foot, or drainage of pus from the surgical site, please return to the hospital.      SECONDARY DISCHARGE DIAGNOSES  Diagnosis: Diabetes mellitus  Assessment and Plan of Treatment: You were treated for uncontrolled diabetes. Our endocrinologists saw you. Please continue to take your lantus 44 Units. We recommend 8Units of pre-meal insulin (can tailor to sliding scale). You may restart your home Trulicity. Follow up with your endocrinologist. An appointment was made for you.

## 2021-06-11 NOTE — PROVIDER CONTACT NOTE (MEDICATION) - SITUATION
Pt NPO after mn for OR in am, pt scheduled to receive lantus 35units sq @ 2200, . Should complete lantus dose be given.

## 2021-06-11 NOTE — PROGRESS NOTE ADULT - PROBLEM SELECTOR PLAN 3
uncontrolled T2DM, last A1c 9.8  - insulin regimen - lantus 35units at night, admelog 4units with meals, ISS  - Endo consulted uncontrolled T2DM, last A1c 9.8  - c/w home insulin regimen lantus 35units at night, admelog 4units with meals, ISS  - Endo consulted, possible d/c on metformin pending Cr improvement

## 2021-06-11 NOTE — PHYSICAL THERAPY INITIAL EVALUATION ADULT - ADDITIONAL COMMENTS
Pt lives alone in a house on the 2nd floor with 2-3 steps to enter, +HR. and 12-13 steps inside, +HR. Pt was independent with all ADLs and ambulation PTA. Pt did not use a AD for ambulation PTA. Pt owns 2 RWs and a scooter. +drives. +eyeglasses when using computer.

## 2021-06-11 NOTE — PROGRESS NOTE ADULT - ASSESSMENT
Plan:   Pt is scheduled for R foot transmetatarsal amputation with Dr. Lott today at 730 .    CXR on sunrise.  EKG on sunrise.  Medical/Cardiac clearance since 6/10 and documented in chart.  Consent signed and in chart.  Procedure was explained to patient in detail. All alternatives, risks and complications were discussed. All questions answered.

## 2021-06-11 NOTE — PROGRESS NOTE ADULT - ASSESSMENT
50M with PMH CAD s/p stents x 2 (>10years ago), uncontrolled T2DM on insulin, HTN, longstanding h/o diabetic foot ulcers s/p R 3rd and 4th partial ray resections (4/15/21) with recent admission for infected foot ulcer/OM s/p R 2nd partial ray resection w/ ADDISON w/ free flap open medially (5/25/21) presenting for flap necrosis. Now s/p R TMA 6/11/21

## 2021-06-11 NOTE — DISCHARGE NOTE PROVIDER - NSDCFUADDINST_GEN_ALL_CORE_FT
Podiatry Discharge Instructions:  Follow up: Please follow up with Dr. Lott within 1 week of discharge from the hospital, please call [phone] for appointment and discuss that you recently were seen in the hospital.  Wound Care: Please leave your dressing clean dry intact until your follow up appointment   Weight bearing: Please stay non weight bearing in a CAM boot to right foot.  Antibiotics: Please continue as instructed. Podiatry Discharge Instructions:  Follow up: Please follow up with Dr. Lott within 1 week of discharge from the hospital, please call [phone] for appointment and discuss that you recently were seen in the hospital.  Wound Care: Please leave your R foot dressing clean dry intact until your follow up appointment.   Please apply santyl to L foot wound and dress with 4x4 gauze and suzy daily.   Weight bearing: Please stay non-weight bearing in a CAM boot to right foot.  Antibiotics: Please continue as instructed.

## 2021-06-11 NOTE — CONSULT NOTE ADULT - SUBJECTIVE AND OBJECTIVE BOX
HPI:  50M with PMH CAD s/p stents x 2 (>10years ago), uncontrolled T2DM on insulin, HTN, longstanding h/o diabetic foot ulcers s/p R 3rd and 4th partial ray resections (4/15/21) with recent admission for infected foot ulcer/OM s/p R 2nd partial ray resection w/ ADDISON w/ free flap open medially (5/25/21) presenting for flap necrosis with plan for likely TMA. Pt just recent admitted from 5/26/-6/4 for above procedure, with course c/b by TEENA/ATN (presumed 2/2 vancomycin toxicity, hypotension, ACEi/ARB use) with improving Cr on discharge. Pt went for routine follow-up this past Monday 6/7/21 with podiatry and told his flap was failing with e/o necrosis and told to come to ED for planned surgery on 6/11/21.  Pt states he has been staying off his feet as he was instructed; denies any pain, swelling, purulence or warmth at site of recent surgery. Denies any fevers, chills, nausea/vomiting; rest of ROS negative.   Pt with h/o CAD about 10 years ago, denies any CP or SOB with exertional activity, tolerated recent surgery well without any adverse reaction to anesthesia.  (10 Herman 2021 03:31)      PAST MEDICAL & SURGICAL HISTORY:  CAD (coronary artery disease)    Diabetes    HTN (hypertension)    Diabetic foot ulcer    Status post amputation of toe  s/p R 3/4th partial ray resection (4/2021; s/p 2nd partial ray resection (5/2021)        FAMILY HISTORY:  No pertinent family history in first degree relatives        Social History:    Outpatient Medications:    MEDICATIONS  (STANDING):  amLODIPine   Tablet 5 milliGRAM(s) Oral daily  ampicillin/sulbactam  IVPB 3 Gram(s) IV Intermittent every 6 hours  atorvastatin 80 milliGRAM(s) Oral at bedtime  cadexomer iodine 0.9% Gel 1 Application(s) Topical daily  collagenase Ointment 1 Application(s) Topical daily  dextrose 40% Gel 15 Gram(s) Oral once  dextrose 5%. 1000 milliLiter(s) (50 mL/Hr) IV Continuous <Continuous>  dextrose 5%. 1000 milliLiter(s) (100 mL/Hr) IV Continuous <Continuous>  dextrose 50% Injectable 25 Gram(s) IV Push once  dextrose 50% Injectable 12.5 Gram(s) IV Push once  dextrose 50% Injectable 25 Gram(s) IV Push once  enoxaparin Injectable 40 milliGRAM(s) SubCutaneous daily  glucagon  Injectable 1 milliGRAM(s) IntraMuscular once  insulin glargine Injectable (LANTUS) 35 Unit(s) SubCutaneous at bedtime  insulin lispro (ADMELOG) corrective regimen sliding scale   SubCutaneous three times a day before meals  insulin lispro (ADMELOG) corrective regimen sliding scale   SubCutaneous at bedtime  insulin lispro Injectable (ADMELOG) 4 Unit(s) SubCutaneous three times a day before meals    MEDICATIONS  (PRN):  acetaminophen   Tablet .. 650 milliGRAM(s) Oral every 6 hours PRN Temp greater or equal to 38C (100.4F), Mild Pain (1 - 3), Moderate Pain (4 - 6)  oxycodone    5 mG/acetaminophen 325 mG 1 Tablet(s) Oral every 4 hours PRN Severe Pain (7 - 10)      Allergies    No Known Allergies    Intolerances      Review of Systems:  Constitutional: No fever  Eyes: No blurry vision  Neuro: No tremors  HEENT: No pain  Cardiovascular: No chest pain, palpitations  Respiratory: No SOB, no cough  GI: No nausea, vomiting, abdominal pain  : No dysuria  Skin: no rash  Psych: no depression  Endocrine: no polyuria, polydipsia  Hem/lymph: no swelling  Osteoporosis: no fractures    ALL OTHER SYSTEMS REVIEWED AND NEGATIVE    UNABLE TO OBTAIN    PHYSICAL EXAM:  VITALS: T(C): 36.7 (06-11-21 @ 11:30)  T(F): 98.1 (06-11-21 @ 11:30), Max: 98.1 (06-11-21 @ 11:30)  HR: 81 (06-11-21 @ 13:24) (73 - 84)  BP: 129/85 (06-11-21 @ 13:24) (100/66 - 160/92)  RR:  (16 - 18)  SpO2:  (92% - 98%)  Wt(kg): --  GENERAL: NAD, well-groomed, well-developed  EYES: No proptosis, no lid lag, anicteric  HEENT:  Atraumatic, Normocephalic, moist mucous membranes  THYROID: Normal size, no palpable nodules  RESPIRATORY: Clear to auscultation bilaterally; No rales, rhonchi, wheezing, or rubs  CARDIOVASCULAR: Regular rate and rhythm; No murmurs; no peripheral edema  GI: Soft, nontender, non distended, normal bowel sounds  SKIN: Dry, intact, No rashes or lesions  MUSCULOSKELETAL: Full range of motion, normal strength  NEURO: sensation intact, extraocular movements intact, no tremor, normal reflexes  PSYCH: Alert and oriented x 3, normal affect, normal mood  CUSHING'S SIGNS: no striae    POCT Blood Glucose.: 248 mg/dL (06-11-21 @ 11:43)  POCT Blood Glucose.: 275 mg/dL (06-10-21 @ 21:56)  POCT Blood Glucose.: 221 mg/dL (06-10-21 @ 16:56)  POCT Blood Glucose.: 189 mg/dL (06-10-21 @ 15:02)  POCT Blood Glucose.: 300 mg/dL (06-10-21 @ 09:17)  POCT Blood Glucose.: 219 mg/dL (06-10-21 @ 02:22)                            10.9   7.47  )-----------( 161      ( 11 Jun 2021 09:59 )             33.2       06-11    137  |  105  |  35<H>  ----------------------------<  256<H>  4.5   |  20<L>  |  1.52<H>    EGFR if : 62  EGFR if non : 53<L>    Ca    9.6      06-11  Mg     2.1     06-10    TPro  8.9<H>  /  Alb  4.2  /  TBili  0.4  /  DBili  x   /  AST  25  /  ALT  38  /  AlkPhos  34<L>  06-09      Thyroid Function Tests:              Radiology:                  HPI:  50M with PMH CAD s/p stents x 2 (>10years ago), uncontrolled T2DM on insulin, HTN, longstanding h/o diabetic foot ulcers s/p R 3rd and 4th partial ray resections (4/15/21) with recent admission for infected foot ulcer/OM s/p R 2nd partial ray resection w/ ADDISON w/ free flap open medially (5/25/21) presenting for flap necrosis with plan for likely TMA. Pt just recent admitted from 5/26/-6/4 for above procedure, with course c/b by TEENA/ATN (presumed 2/2 vancomycin toxicity, hypotension, ACEi/ARB use) with improving Cr on discharge. Pt went for routine follow-up this past Monday 6/7/21 with podiatry and told his flap was failing with e/o necrosis and told to come to ED for planned surgery on 6/11/21.  Pt states he has been staying off his feet as he was instructed; denies any pain, swelling, purulence or warmth at site of recent surgery. Denies any fevers, chills, nausea/vomiting; rest of ROS negative.   Pt with h/o CAD about 10 years ago, denies any CP or SOB with exertional activity, tolerated recent surgery well without any adverse reaction to anesthesia.  (10 Herman 2021 03:31)        Endocrine History: 50M with PMH CAD s/p stents x 2 (>10years ago), uncontrolled T2DM on insulin, HTN, longstanding h/o diabetic foot ulcers s/p R 3rd and 4th partial ray resections (4/15/21) with recent admission for infected foot ulcer/OM s/p R 2nd partial ray resection w/ ADDISON w/ free flap open medially (5/25/21) presenting for flap necrosis now s/p TMA today.   Endocrinologist Dr London, DM2 is uncontrolled A1C 10.5%  Reports having DM for over 10 years. Reports adherence to Lantus 44 units qhs at home. He had TEENA on last admission and was asked to stop orals. Unclear if he was adherent to Trulicity.  He was asked to take Lantus 35 units daily and Admelog 4 Units TIDAC at home. Has history of nonadherence to medication.    Patient has freestyle luciana, reports BG variable, however did not want to quantify   Report history of DM retinopathy and neuropathy, with multiple toe amputations. + CAD        PAST MEDICAL & SURGICAL HISTORY:  CAD (coronary artery disease)    Diabetes    HTN (hypertension)    Diabetic foot ulcer    Status post amputation of toe  s/p R 3/4th partial ray resection (4/2021; s/p 2nd partial ray resection (5/2021)        FAMILY HISTORY:   No FH of diabetic foot ulcer          Social History: No illicit drug use, No ETOH abuse    Outpatient Medications:  · 	amLODIPine 5 mg oral tablet: Last Dose Taken:  , 1 tab(s) orally once a day  · 	Plavix 75 mg oral tablet: Last Dose Taken:  , 1 tab(s) orally once a day  · 	aspirin 81 mg oral delayed release tablet: Last Dose Taken:  , 1 tab(s) orally once a day  · 	atorvastatin 80 mg oral tablet: Last Dose Taken:  , 1 tab(s) orally once a day (at bedtime)  · 	NovoLOG FlexPen 100 units/mL injectable solution: Last Dose Taken:  , 4 unit(s) injectable 3 times a day (before meals) (Unclear if taking)  ·Lantus Solostar Pen 100 units/mL subcutaneous solution: Last Dose Taken:  , 44 unit(s) subcutaneous once a day (at bedtime)  · 	nicotine 14 mg/24 hr transdermal film, extended release: Last Dose Taken:  , 1 patch transdermal 2 times a day   · 	melatonin 3 mg oral tablet: Last Dose Taken:  , 1 tab(s) orally once a day (at bedtime), As needed, Insomnia    MEDICATIONS  (STANDING):  amLODIPine   Tablet 5 milliGRAM(s) Oral daily  ampicillin/sulbactam  IVPB 3 Gram(s) IV Intermittent every 6 hours  atorvastatin 80 milliGRAM(s) Oral at bedtime  cadexomer iodine 0.9% Gel 1 Application(s) Topical daily  collagenase Ointment 1 Application(s) Topical daily  dextrose 40% Gel 15 Gram(s) Oral once  dextrose 5%. 1000 milliLiter(s) (50 mL/Hr) IV Continuous <Continuous>  dextrose 5%. 1000 milliLiter(s) (100 mL/Hr) IV Continuous <Continuous>  dextrose 50% Injectable 25 Gram(s) IV Push once  dextrose 50% Injectable 12.5 Gram(s) IV Push once  dextrose 50% Injectable 25 Gram(s) IV Push once  enoxaparin Injectable 40 milliGRAM(s) SubCutaneous daily  glucagon  Injectable 1 milliGRAM(s) IntraMuscular once  insulin glargine Injectable (LANTUS) 35 Unit(s) SubCutaneous at bedtime  insulin lispro (ADMELOG) corrective regimen sliding scale   SubCutaneous three times a day before meals  insulin lispro (ADMELOG) corrective regimen sliding scale   SubCutaneous at bedtime  insulin lispro Injectable (ADMELOG) 4 Unit(s) SubCutaneous three times a day before meals    MEDICATIONS  (PRN):  acetaminophen   Tablet .. 650 milliGRAM(s) Oral every 6 hours PRN Temp greater or equal to 38C (100.4F), Mild Pain (1 - 3), Moderate Pain (4 - 6)  oxycodone    5 mG/acetaminophen 325 mG 1 Tablet(s) Oral every 4 hours PRN Severe Pain (7 - 10)      Allergies    No Known Allergies    Intolerances      Review of Systems:  Constitutional: No fever  Eyes: No blurry vision  Neuro: No tremors  HEENT: No pain  Cardiovascular: No chest pain, palpitations  Respiratory: No SOB, no cough  GI: No nausea, vomiting, abdominal pain  : No dysuria  Skin: no rash  Psych: no depression  Endocrine: no polyuria, polydipsia      ALL OTHER SYSTEMS REVIEWED AND NEGATIVE      PHYSICAL EXAM:  VITALS: T(C): 36.7 (06-11-21 @ 11:30)  T(F): 98.1 (06-11-21 @ 11:30), Max: 98.1 (06-11-21 @ 11:30)  HR: 81 (06-11-21 @ 13:24) (73 - 84)  BP: 129/85 (06-11-21 @ 13:24) (100/66 - 160/92)  RR:  (16 - 18)  SpO2:  (92% - 98%)  Wt(kg): --  GENERAL: NAD, well-groomed, well-developed  EYES: No proptosis, no lid lag, anicteric  HEENT:  Atraumatic, Normocephalic, moist mucous membranes  RESPIRATORY: Clear to auscultation bilaterally  CARDIOVASCULAR: Regular rate and rhythm  GI: Soft, nontender, non distended, normal bowel sounds  SKIN: R foot dressing clean and dry  PSYCH: Alert and oriented x 3    POCT Blood Glucose.: 248 mg/dL (06-11-21 @ 11:43)  POCT Blood Glucose.: 275 mg/dL (06-10-21 @ 21:56)  POCT Blood Glucose.: 221 mg/dL (06-10-21 @ 16:56)  POCT Blood Glucose.: 189 mg/dL (06-10-21 @ 15:02)  POCT Blood Glucose.: 300 mg/dL (06-10-21 @ 09:17)  POCT Blood Glucose.: 219 mg/dL (06-10-21 @ 02:22)                            10.9   7.47  )-----------( 161      ( 11 Jun 2021 09:59 )             33.2       06-11    137  |  105  |  35<H>  ----------------------------<  256<H>  4.5   |  20<L>  |  1.52<H>    EGFR if : 62  EGFR if non : 53<L>    Ca    9.6      06-11  Mg     2.1     06-10    TPro  8.9<H>  /  Alb  4.2  /  TBili  0.4  /  DBili  x   /  AST  25  /  ALT  38  /  AlkPhos  34<L>  06-09

## 2021-06-11 NOTE — PROGRESS NOTE ADULT - PROBLEM SELECTOR PLAN 6
- DVT prophylaxis: lovenox  - Diet: DASH/CC  - fall, aspiration precautions - DVT prophylaxis: lovenox  - Diet: DASH/CC  - Dispo: outpatient PT, d/c likely tomorrow

## 2021-06-11 NOTE — DISCHARGE NOTE PROVIDER - NSDCMRMEDTOKEN_GEN_ALL_CORE_FT
amLODIPine 5 mg oral tablet: 1 tab(s) orally once a day  aspirin 81 mg oral delayed release tablet: 1 tab(s) orally once a day  atorvastatin 80 mg oral tablet: 1 tab(s) orally once a day (at bedtime)  Lantus Solostar Pen 100 units/mL subcutaneous solution: 35 unit(s) subcutaneous once a day (at bedtime)  price check [id number]   melatonin 3 mg oral tablet: 1 tab(s) orally once a day (at bedtime), As needed, Insomnia  nicotine 14 mg/24 hr transdermal film, extended release: 1 patch transdermal 2 times a day   NovoLOG FlexPen 100 units/mL injectable solution: 4 unit(s) injectable 3 times a day (before meals)   price check [id number]  Plavix 75 mg oral tablet: 1 tab(s) orally once a day   amLODIPine 5 mg oral tablet: 1 tab(s) orally once a day  aspirin 81 mg oral delayed release tablet: 1 tab(s) orally once a day  atorvastatin 80 mg oral tablet: 1 tab(s) orally once a day (at bedtime)  CAM boot: Rx CAM boot to RLE, please use as directed  Dx: s/p RF TMA w ADDISON  Lantus Solostar Pen 100 units/mL subcutaneous solution: 35 unit(s) subcutaneous once a day (at bedtime)  price check [id number]   melatonin 3 mg oral tablet: 1 tab(s) orally once a day (at bedtime), As needed, Insomnia  nicotine 14 mg/24 hr transdermal film, extended release: 1 patch transdermal 2 times a day   NovoLOG FlexPen 100 units/mL injectable solution: 4 unit(s) injectable 3 times a day (before meals)   price check [id number]  Plavix 75 mg oral tablet: 1 tab(s) orally once a day   amLODIPine 5 mg oral tablet: 1 tab(s) orally once a day  aspirin 81 mg oral delayed release tablet: 1 tab(s) orally once a day  atorvastatin 80 mg oral tablet: 1 tab(s) orally once a day (at bedtime)  CAM boot: Rx CAM boot to RLE, please use as directed  Dx: s/p RF TMA w ADDISON  Lantus Solostar Pen 100 units/mL subcutaneous solution: 35 unit(s) subcutaneous once a day (at bedtime)  price check [id number]   melatonin 3 mg oral tablet: 1 tab(s) orally once a day (at bedtime), As needed, Insomnia  NovoLOG FlexPen 100 units/mL injectable solution: 4 unit(s) injectable 3 times a day (before meals)   price check [id number]  Plavix 75 mg oral tablet: 1 tab(s) orally once a day  Precription for transport wheelchair : [id number]  [id number]      amLODIPine 5 mg oral tablet: 1 tab(s) orally once a day  amoxicillin-clavulanate 875 mg-125 mg oral tablet: 1  orally 2 times a day   aspirin 81 mg oral delayed release tablet: 1 tab(s) orally once a day  atorvastatin 80 mg oral tablet: 1 tab(s) orally once a day (at bedtime)  Lantus Solostar Pen 100 units/mL subcutaneous solution: 44 unit(s) subcutaneous once a day (at bedtime)  price check [id number]   melatonin 3 mg oral tablet: 1 tab(s) orally once a day (at bedtime), As needed, Insomnia  NovoLOG FlexPen 100 units/mL injectable solution: 8 unit(s) injectable 3 times a day (before meals)  price check [id number]   Plavix 75 mg oral tablet: 1 tab(s) orally once a day

## 2021-06-11 NOTE — PROGRESS NOTE ADULT - SUBJECTIVE AND OBJECTIVE BOX
***************************************************************  Alan Geurra, MS4  Senior Resident Dr. Key Williamson  Internal Medicine   Senior Resident [facility] Pager: [phone]  MS4 [facility] Pager: [phone]  ***************************************************************    TIKI HOUSTON  50y  MRN: [id number]    Subjective:    Patient is a 50y old  Male who presents with a chief complaint of R foot infection (11 Jun 2021 09:40)      Interval history/overnight events:  R foot transmetatarsal amputation this am. Patient refused to speak with team and would only speak with attending.     Requesting a new endocrinologist.      MEDICATIONS  (STANDING):  amLODIPine   Tablet 5 milliGRAM(s) Oral daily  ampicillin/sulbactam  IVPB 3 Gram(s) IV Intermittent every 6 hours  atorvastatin 80 milliGRAM(s) Oral at bedtime  cadexomer iodine 0.9% Gel 1 Application(s) Topical daily  collagenase Ointment 1 Application(s) Topical daily  dextrose 40% Gel 15 Gram(s) Oral once  dextrose 5%. 1000 milliLiter(s) (50 mL/Hr) IV Continuous <Continuous>  dextrose 5%. 1000 milliLiter(s) (100 mL/Hr) IV Continuous <Continuous>  dextrose 50% Injectable 25 Gram(s) IV Push once  dextrose 50% Injectable 12.5 Gram(s) IV Push once  dextrose 50% Injectable 25 Gram(s) IV Push once  enoxaparin Injectable 40 milliGRAM(s) SubCutaneous daily  glucagon  Injectable 1 milliGRAM(s) IntraMuscular once  insulin glargine Injectable (LANTUS) 35 Unit(s) SubCutaneous at bedtime  insulin lispro (ADMELOG) corrective regimen sliding scale   SubCutaneous three times a day before meals  insulin lispro (ADMELOG) corrective regimen sliding scale   SubCutaneous at bedtime  insulin lispro Injectable (ADMELOG) 4 Unit(s) SubCutaneous three times a day before meals    MEDICATIONS  (PRN):  acetaminophen   Tablet .. 650 milliGRAM(s) Oral every 6 hours PRN Temp greater or equal to 38C (100.4F), Mild Pain (1 - 3), Moderate Pain (4 - 6)  oxycodone    5 mG/acetaminophen 325 mG 1 Tablet(s) Oral every 4 hours PRN Severe Pain (7 - 10)      12 Point ROS negative with the exception of the above      Objective:    Vitals: Vital Signs Last 24 Hrs  T(C): 36.7 (06-11-21 @ 11:30), Max: 36.7 (06-11-21 @ 11:30)  T(F): 98.1 (06-11-21 @ 11:30), Max: 98.1 (06-11-21 @ 11:30)  HR: 77 (06-11-21 @ 11:30) (73 - 84)  BP: 125/73 (06-11-21 @ 11:30) (100/66 - 160/92)  BP(mean): 80 (06-11-21 @ 10:30) (80 - 85)  RR: 17 (06-11-21 @ 11:30) (16 - 18)  SpO2: 93% (06-11-21 @ 11:30) (92% - 98%)           \     I&O's Summary    10 Herman 2021 07:01  -  11 Jun 2021 07:00  --------------------------------------------------------  IN: 50 mL / OUT: 2200 mL / NET: -2150 mL    11 Jun 2021 07:01  -  11 Jun 2021 13:06  --------------------------------------------------------  IN: 0 mL / OUT: 455 mL / NET: -455 mL        PHYSICAL EXAM:  GENERAL: NAD  HEENT: PERRL, no scleral icterus, no head and neck lad   CHEST/LUNG: CTAB, no wheezing, crackles, or rhonchi   HEART: RRR, normal S1, S2, no murmurs, gallops, or rubs appreciated   ABDOMEN: soft, nondistended, non-tender, normoactive, no rigidity  SKIN: No rashes or lesions  EXTREMITY: R foot wrapped s/p surgery this am; L foot with ulcer  NERVOUS SYSTEM: Alert & Oriented X3    LABS:    06-11    137  |  105  |  35<H>  ----------------------------<  256<H>  4.5   |  20<L>  |  1.52<H>  06-10    137  |  100  |  43<H>  ----------------------------<  287<H>  4.5   |  22  |  1.88<H>  06-09    132<L>  |  94<L>  |  42<H>  ----------------------------<  316<H>  >9.0<HH>   |  18<L>  |  1.78<H>    Ca    9.6      11 Jun 2021 05:33  Ca    10.2      10 Herman 2021 00:08  Ca    <3.0<LL>      09 Jun 2021 23:18  Mg     2.1     06-10    TPro  8.9<H>  /  Alb  4.2  /  TBili  0.4  /  DBili  x   /  AST  25  /  ALT  38  /  AlkPhos  34<L>  06-09    PT/INR - ( 11 Jun 2021 05:33 )   PT: 13.4 sec;   INR: 1.12 ratio         PTT - ( 11 Jun 2021 05:33 )  PTT:33.5 sec                                        10.9   7.47  )-----------( 161      ( 11 Jun 2021 09:59 )             33.2                         12.3   7.56  )-----------( 185      ( 11 Jun 2021 05:33 )             36.6                         13.4   7.73  )-----------( 200      ( 09 Jun 2021 23:18 )             42.0     CAPILLARY BLOOD GLUCOSE      POCT Blood Glucose.: 248 mg/dL (11 Jun 2021 11:43)  POCT Blood Glucose.: 275 mg/dL (10 Herman 2021 21:56)  POCT Blood Glucose.: 221 mg/dL (10 Herman 2021 16:56)  POCT Blood Glucose.: 189 mg/dL (10 Herman 2021 15:02)    Culture - Abscess with Gram Stain (06.10.21 @ 04:11)   Specimen Source: .Abscess R foot wound   Culture Results:   Numerous Staphylococcus aureus   Numerous Corynebacterium amycolatum "Susceptibilities not performed"       RADIOLOGY & ADDITIONAL TESTS:            Imaging Personally Reviewed:  [ ] YES  [ ] NO    Consultants involved in case:   Consultant(s) Notes Reviewed:  [ ] YES  [ ] NO:   Care Discussed with Consultants/Other Providers [ ] YES  [ ] NO         ***************************************************************  Alan Guerra, MS4  Senior Resident Dr. Key Williamson  Internal Medicine   Senior Resident [facility] Pager: [phone]  MS4 [facility] Pager: [phone]  ***************************************************************    TIKI HOUSTON  50y  MRN: [id number]    Subjective:    Patient is a 50y old  Male who presents with a chief complaint of R foot infection (11 Jun 2021 09:40)      Interval history/overnight events:  R foot transmetatarsal amputation this am. Patient declines any pain or parasthesias (controlled on percocet). No fevers, chills.     Requesting a new endocrinologist. Endocrine consulted.       MEDICATIONS  (STANDING):  amLODIPine   Tablet 5 milliGRAM(s) Oral daily  ampicillin/sulbactam  IVPB 3 Gram(s) IV Intermittent every 6 hours  atorvastatin 80 milliGRAM(s) Oral at bedtime  cadexomer iodine 0.9% Gel 1 Application(s) Topical daily  collagenase Ointment 1 Application(s) Topical daily  dextrose 40% Gel 15 Gram(s) Oral once  dextrose 5%. 1000 milliLiter(s) (50 mL/Hr) IV Continuous <Continuous>  dextrose 5%. 1000 milliLiter(s) (100 mL/Hr) IV Continuous <Continuous>  dextrose 50% Injectable 25 Gram(s) IV Push once  dextrose 50% Injectable 12.5 Gram(s) IV Push once  dextrose 50% Injectable 25 Gram(s) IV Push once  enoxaparin Injectable 40 milliGRAM(s) SubCutaneous daily  glucagon  Injectable 1 milliGRAM(s) IntraMuscular once  insulin glargine Injectable (LANTUS) 35 Unit(s) SubCutaneous at bedtime  insulin lispro (ADMELOG) corrective regimen sliding scale   SubCutaneous three times a day before meals  insulin lispro (ADMELOG) corrective regimen sliding scale   SubCutaneous at bedtime  insulin lispro Injectable (ADMELOG) 4 Unit(s) SubCutaneous three times a day before meals    MEDICATIONS  (PRN):  acetaminophen   Tablet .. 650 milliGRAM(s) Oral every 6 hours PRN Temp greater or equal to 38C (100.4F), Mild Pain (1 - 3), Moderate Pain (4 - 6)  oxycodone    5 mG/acetaminophen 325 mG 1 Tablet(s) Oral every 4 hours PRN Severe Pain (7 - 10)      12 Point ROS negative with the exception of the above      Objective:    Vitals: Vital Signs Last 24 Hrs  T(C): 36.7 (06-11-21 @ 11:30), Max: 36.7 (06-11-21 @ 11:30)  T(F): 98.1 (06-11-21 @ 11:30), Max: 98.1 (06-11-21 @ 11:30)  HR: 77 (06-11-21 @ 11:30) (73 - 84)  BP: 125/73 (06-11-21 @ 11:30) (100/66 - 160/92)  BP(mean): 80 (06-11-21 @ 10:30) (80 - 85)  RR: 17 (06-11-21 @ 11:30) (16 - 18)  SpO2: 93% (06-11-21 @ 11:30) (92% - 98%)           \     I&O's Summary    10 Herman 2021 07:01  -  11 Jun 2021 07:00  --------------------------------------------------------  IN: 50 mL / OUT: 2200 mL / NET: -2150 mL    11 Jun 2021 07:01  -  11 Jun 2021 13:06  --------------------------------------------------------  IN: 0 mL / OUT: 455 mL / NET: -455 mL      PHYSICAL EXAM:  GENERAL: NAD  HEENT: PERRL, no scleral icterus, no head and neck lad   CHEST/LUNG: CTAB, no wheezing, crackles, or rhonchi   HEART: RRR, normal S1, S2, no murmurs, gallops, or rubs appreciated   ABDOMEN: soft, nondistended, non-tender, normoactive, no rigidity  SKIN: No rashes or lesions  EXTREMITY: R foot wrapped s/p surgery this am; L foot with ulcer  NERVOUS SYSTEM: Alert & Oriented X3  PSCYH: irritable     LABS:    06-11    137  |  105  |  35<H>  ----------------------------<  256<H>  4.5   |  20<L>  |  1.52<H>  06-10    137  |  100  |  43<H>  ----------------------------<  287<H>  4.5   |  22  |  1.88<H>  06-09    132<L>  |  94<L>  |  42<H>  ----------------------------<  316<H>  >9.0<HH>   |  18<L>  |  1.78<H>    Ca    9.6      11 Jun 2021 05:33  Ca    10.2      10 Herman 2021 00:08  Ca    <3.0<LL>      09 Jun 2021 23:18  Mg     2.1     06-10    TPro  8.9<H>  /  Alb  4.2  /  TBili  0.4  /  DBili  x   /  AST  25  /  ALT  38  /  AlkPhos  34<L>  06-09    PT/INR - ( 11 Jun 2021 05:33 )   PT: 13.4 sec;   INR: 1.12 ratio         PTT - ( 11 Jun 2021 05:33 )  PTT:33.5 sec                                        10.9   7.47  )-----------( 161      ( 11 Jun 2021 09:59 )             33.2                         12.3   7.56  )-----------( 185      ( 11 Jun 2021 05:33 )             36.6                         13.4   7.73  )-----------( 200      ( 09 Jun 2021 23:18 )             42.0     CAPILLARY BLOOD GLUCOSE      POCT Blood Glucose.: 248 mg/dL (11 Jun 2021 11:43)  POCT Blood Glucose.: 275 mg/dL (10 Herman 2021 21:56)  POCT Blood Glucose.: 221 mg/dL (10 Herman 2021 16:56)  POCT Blood Glucose.: 189 mg/dL (10 Herman 2021 15:02)    Culture - Abscess with Gram Stain (06.10.21 @ 04:11)   Specimen Source: .Abscess R foot wound   Culture Results:   Numerous Staphylococcus aureus   Numerous Corynebacterium amycolatum "Susceptibilities not performed"       RADIOLOGY & ADDITIONAL TESTS:            Imaging Personally Reviewed:  [ ] YES  [ ] NO    Consultants involved in case:   Consultant(s) Notes Reviewed:  [ ] YES  [ ] NO:   Care Discussed with Consultants/Other Providers [ ] YES  [ ] NO

## 2021-06-11 NOTE — PRE-ANESTHESIA EVALUATION ADULT - NSANTHPMHFT_GEN_ALL_CORE
50M PMHx CAD s/p stents ~ 10 yr ago, HTN, ID TD2M with poor glycemic control, s/p right ray amputation at end of may, now with flap necrosis for right TMA

## 2021-06-11 NOTE — PRE-ANESTHESIA EVALUATION ADULT - NSANTHOSAYNRD_GEN_A_CORE
No. RAMIREZ screening performed.  STOP BANG Legend: 0-2 = LOW Risk; 3-4 = INTERMEDIATE Risk; 5-8 = HIGH Risk

## 2021-06-11 NOTE — PROGRESS NOTE ADULT - PROBLEM SELECTOR PLAN 1
h/o diabetic foot ulcers s/p R 3rd and 4th partial ray resections (4/15/21) w/ recent admission for infected foot ulcer/OM s/p R 2nd partial ray resection w/ ADDISON w/ free flap open medially (5/25/21)  - s/p R TMA this am  - R abscess wound Cx - staph aureus  - c/w Unasyn q6h for now  - pain control as needed  - podiatry following  - plan for PO augmentin x7 days on discharge

## 2021-06-12 ENCOUNTER — TRANSCRIPTION ENCOUNTER (OUTPATIENT)
Age: 51
End: 2021-06-12

## 2021-06-12 VITALS
OXYGEN SATURATION: 96 % | SYSTOLIC BLOOD PRESSURE: 113 MMHG | RESPIRATION RATE: 18 BRPM | DIASTOLIC BLOOD PRESSURE: 78 MMHG | TEMPERATURE: 98 F | HEART RATE: 73 BPM

## 2021-06-12 LAB
-  AMPICILLIN/SULBACTAM: SIGNIFICANT CHANGE UP
-  CEFAZOLIN: SIGNIFICANT CHANGE UP
-  CLINDAMYCIN: SIGNIFICANT CHANGE UP
-  ERYTHROMYCIN: SIGNIFICANT CHANGE UP
-  GENTAMICIN: SIGNIFICANT CHANGE UP
-  OXACILLIN: SIGNIFICANT CHANGE UP
-  PENICILLIN: SIGNIFICANT CHANGE UP
-  RIFAMPIN: SIGNIFICANT CHANGE UP
-  TETRACYCLINE: SIGNIFICANT CHANGE UP
-  TRIMETHOPRIM/SULFAMETHOXAZOLE: SIGNIFICANT CHANGE UP
-  VANCOMYCIN: SIGNIFICANT CHANGE UP
ANION GAP SERPL CALC-SCNC: 11 MMOL/L — SIGNIFICANT CHANGE UP (ref 5–17)
BUN SERPL-MCNC: 31 MG/DL — HIGH (ref 7–23)
CALCIUM SERPL-MCNC: 9.5 MG/DL — SIGNIFICANT CHANGE UP (ref 8.4–10.5)
CHLORIDE SERPL-SCNC: 104 MMOL/L — SIGNIFICANT CHANGE UP (ref 96–108)
CO2 SERPL-SCNC: 22 MMOL/L — SIGNIFICANT CHANGE UP (ref 22–31)
CREAT SERPL-MCNC: 1.79 MG/DL — HIGH (ref 0.5–1.3)
GLUCOSE BLDC GLUCOMTR-MCNC: 257 MG/DL — HIGH (ref 70–99)
GLUCOSE SERPL-MCNC: 225 MG/DL — HIGH (ref 70–99)
HCT VFR BLD CALC: 30.8 % — LOW (ref 39–50)
HGB BLD-MCNC: 10.3 G/DL — LOW (ref 13–17)
MAGNESIUM SERPL-MCNC: 1.8 MG/DL — SIGNIFICANT CHANGE UP (ref 1.6–2.6)
MCHC RBC-ENTMCNC: 29.3 PG — SIGNIFICANT CHANGE UP (ref 27–34)
MCHC RBC-ENTMCNC: 33.4 GM/DL — SIGNIFICANT CHANGE UP (ref 32–36)
MCV RBC AUTO: 87.7 FL — SIGNIFICANT CHANGE UP (ref 80–100)
METHOD TYPE: SIGNIFICANT CHANGE UP
NRBC # BLD: 0 /100 WBCS — SIGNIFICANT CHANGE UP (ref 0–0)
PHOSPHATE SERPL-MCNC: 3.8 MG/DL — SIGNIFICANT CHANGE UP (ref 2.5–4.5)
PLATELET # BLD AUTO: 164 K/UL — SIGNIFICANT CHANGE UP (ref 150–400)
POTASSIUM SERPL-MCNC: 5 MMOL/L — SIGNIFICANT CHANGE UP (ref 3.5–5.3)
POTASSIUM SERPL-SCNC: 5 MMOL/L — SIGNIFICANT CHANGE UP (ref 3.5–5.3)
RBC # BLD: 3.51 M/UL — LOW (ref 4.2–5.8)
RBC # FLD: 12.6 % — SIGNIFICANT CHANGE UP (ref 10.3–14.5)
SODIUM SERPL-SCNC: 137 MMOL/L — SIGNIFICANT CHANGE UP (ref 135–145)
WBC # BLD: 8.97 K/UL — SIGNIFICANT CHANGE UP (ref 3.8–10.5)
WBC # FLD AUTO: 8.97 K/UL — SIGNIFICANT CHANGE UP (ref 3.8–10.5)

## 2021-06-12 PROCEDURE — 99239 HOSP IP/OBS DSCHRG MGMT >30: CPT | Mod: GC

## 2021-06-12 PROCEDURE — 99232 SBSQ HOSP IP/OBS MODERATE 35: CPT

## 2021-06-12 RX ORDER — INSULIN ASPART 100 [IU]/ML
24 INJECTION, SOLUTION SUBCUTANEOUS
Qty: 0 | Refills: 0 | DISCHARGE
Start: 2021-06-12 | End: 2021-07-11

## 2021-06-12 RX ORDER — INSULIN ASPART 100 [IU]/ML
8 INJECTION, SOLUTION SUBCUTANEOUS
Qty: 1 | Refills: 0
Start: 2021-06-12 | End: 2021-07-11

## 2021-06-12 RX ORDER — INSULIN ASPART 100 [IU]/ML
4 INJECTION, SOLUTION SUBCUTANEOUS
Qty: 0 | Refills: 0 | DISCHARGE
Start: 2021-06-12 | End: 2021-07-11

## 2021-06-12 RX ORDER — INSULIN LISPRO 100/ML
6 VIAL (ML) SUBCUTANEOUS
Refills: 0 | Status: DISCONTINUED | OUTPATIENT
Start: 2021-06-12 | End: 2021-06-12

## 2021-06-12 RX ORDER — INSULIN ASPART 100 [IU]/ML
8 INJECTION, SOLUTION SUBCUTANEOUS
Qty: 0 | Refills: 0 | DISCHARGE
Start: 2021-06-12 | End: 2021-07-11

## 2021-06-12 RX ORDER — ENOXAPARIN SODIUM 100 MG/ML
44 INJECTION SUBCUTANEOUS
Qty: 1 | Refills: 0
Start: 2021-06-12 | End: 2021-07-11

## 2021-06-12 RX ORDER — INSULIN GLARGINE 100 [IU]/ML
45 INJECTION, SOLUTION SUBCUTANEOUS AT BEDTIME
Refills: 0 | Status: DISCONTINUED | OUTPATIENT
Start: 2021-06-12 | End: 2021-06-12

## 2021-06-12 RX ORDER — INSULIN ASPART 100 [IU]/ML
20 INJECTION, SOLUTION SUBCUTANEOUS
Qty: 0 | Refills: 0 | DISCHARGE
Start: 2021-06-12 | End: 2021-07-11

## 2021-06-12 RX ORDER — INSULIN GLARGINE 100 [IU]/ML
40 INJECTION, SOLUTION SUBCUTANEOUS AT BEDTIME
Refills: 0 | Status: DISCONTINUED | OUTPATIENT
Start: 2021-06-12 | End: 2021-06-12

## 2021-06-12 RX ORDER — INSULIN GLARGINE 100 [IU]/ML
44 INJECTION, SOLUTION SUBCUTANEOUS AT BEDTIME
Refills: 0 | Status: DISCONTINUED | OUTPATIENT
Start: 2021-06-12 | End: 2021-06-12

## 2021-06-12 RX ADMIN — OXYCODONE AND ACETAMINOPHEN 1 TABLET(S): 5; 325 TABLET ORAL at 03:00

## 2021-06-12 RX ADMIN — AMLODIPINE BESYLATE 5 MILLIGRAM(S): 2.5 TABLET ORAL at 06:23

## 2021-06-12 RX ADMIN — Medication 6 UNIT(S): at 09:10

## 2021-06-12 RX ADMIN — AMPICILLIN SODIUM AND SULBACTAM SODIUM 200 GRAM(S): 250; 125 INJECTION, POWDER, FOR SUSPENSION INTRAMUSCULAR; INTRAVENOUS at 06:23

## 2021-06-12 RX ADMIN — Medication 81 MILLIGRAM(S): at 11:21

## 2021-06-12 RX ADMIN — OXYCODONE AND ACETAMINOPHEN 1 TABLET(S): 5; 325 TABLET ORAL at 02:32

## 2021-06-12 RX ADMIN — Medication 6: at 09:09

## 2021-06-12 RX ADMIN — Medication 650 MILLIGRAM(S): at 10:36

## 2021-06-12 RX ADMIN — Medication 650 MILLIGRAM(S): at 09:42

## 2021-06-12 NOTE — PROGRESS NOTE ADULT - PROBLEM SELECTOR PLAN 3
uncontrolled T2DM, last A1c 9.8  home insulin regimen lantus 35units at night, admelog 4units with meals, ISS  - POCT Glucose 200s-300s  - uptitrate lantus to 40units at night, admelog 6units with meals, ISS  - Endo consulted, possible d/c on metformin pending Cr improvement

## 2021-06-12 NOTE — PROGRESS NOTE ADULT - ASSESSMENT
50M Now s/p R foot TMA (DOS 6/11/21)  - Pt seen and evaluated  - Afebrile, no leukocytosis  - Moderate strikethrough on dressing, sutures intact with no dehiscence; penrose drain in place, mild maceration along incision sites, plantar flap with CFT <3s, no signs of infection  - Using aseptic technique the penrose drain was removed using a sterile suture removal kit demonstrating minimal sanguinous drainage with hemostasis achieved  - per intra-op findings low concern for residual infection / viability with hard bone stock observed  - R foot dressing to remain clean dry and intact, L foot dressing to be changed every day - wound care instructions provided in discharge note provider  - Patient is stable for discharge from podiatric perspective & [facility] ([phone])  - Will follow-up OR wound culture microbiology & pathology as outpatient  - Recommend discharge on 1 week course of PO Augmentin 875mg BID  - Seen w/ attending

## 2021-06-12 NOTE — PROGRESS NOTE ADULT - PROBLEM SELECTOR PLAN 1
-test BG AC/HS  Discharge plan:  Restart Lantus, can increase back to previous home regimen of 44 units QHS  Restart Admelog, can increase to 8 units TID w/meals  Hold off on Metformin given borderline GFR. No need for Prandin w/premeal insulin  Can restart Trulicity 1.5mg weekly  Follow up outpt July 1st 10:30am w/Dr London @ [address] [place] [phone]

## 2021-06-12 NOTE — PROGRESS NOTE ADULT - SUBJECTIVE AND OBJECTIVE BOX
Patient is a 50y old  Male who presents with a chief complaint of R foot infection (11 Jun 2021 14:11)       INTERVAL HPI/OVERNIGHT EVENTS:  Patient seen and evaluated at bedside.  Pt is resting comfortable in NAD. Denies N/V/F/C.    Allergies    No Known Allergies    Intolerances        Vital Signs Last 24 Hrs  T(C): 36.7 (12 Jun 2021 06:16), Max: 37.1 (11 Jun 2021 20:35)  T(F): 98.1 (12 Jun 2021 06:16), Max: 98.7 (11 Jun 2021 20:35)  HR: 73 (12 Jun 2021 06:16) (73 - 86)  BP: 113/78 (12 Jun 2021 06:16) (100/66 - 137/87)  BP(mean): 80 (11 Jun 2021 10:30) (80 - 85)  RR: 18 (12 Jun 2021 06:16) (16 - 18)  SpO2: 96% (12 Jun 2021 06:16) (92% - 96%)    LABS:                        10.3   8.97  )-----------( 164      ( 12 Jun 2021 06:41 )             30.8     06-12    137  |  104  |  31<H>  ----------------------------<  225<H>  5.0   |  22  |  1.79<H>    Ca    9.5      12 Jun 2021 06:41  Phos  3.8     06-12  Mg     1.8     06-12      PT/INR - ( 11 Jun 2021 05:33 )   PT: 13.4 sec;   INR: 1.12 ratio         PTT - ( 11 Jun 2021 05:33 )  PTT:33.5 sec    CAPILLARY BLOOD GLUCOSE      POCT Blood Glucose.: 256 mg/dL (11 Jun 2021 21:49)  POCT Blood Glucose.: 340 mg/dL (11 Jun 2021 17:10)  POCT Blood Glucose.: 248 mg/dL (11 Jun 2021 11:43)      Lower Extremity Physical Exam:  Vascular: DP/PT 2/4, B/L, CFT <3 seconds B/L, Temperature gradient warm to cool, B/L.   Neuro: Epicritic sensation absent to the level of forefoot, B/L.  Musculoskeletal/Ortho: s/p R foot P2RR w/ ADDISON  Wound #1: s/p RF partial 2nd ray resection w ADDISON and free flap open medially with necrosis, 8x5cm, depth to bone, wound bed fibronecrotic, no drainage, mild malodor, periwound sloughing, etiology 2/2 infection  Wound #2: L foot submet 4 wound to subq, 2.0x2.0cm, no drainage, no malodor, no signs of infection, wound bed fibrogranular, etiology 2/2 pressure    Now s/p R foot TMA (DOS 6/11/21)  - Moderate strikethrough on dressing  - Sutures intact with no dehiscence; penrose drain in place  - mild maceration along incision sites  - plantar flap with CFT <3s  - No signs of infection      RADIOLOGY & ADDITIONAL TESTS:

## 2021-06-12 NOTE — PROGRESS NOTE ADULT - PROBLEM SELECTOR PROBLEM 1
Type 2 diabetes mellitus with foot ulcer, with long-term current use of insulin
Diabetic foot infection

## 2021-06-12 NOTE — PROGRESS NOTE ADULT - PROBLEM SELECTOR PLAN 4
was previously on ACEi, which was discontinue on prior admission 2/2 TEENA and started on norvasc 5mg daily  c/w norvasc for now, with hold parameters   monitor BP routinely  MAY INCREASE Norvasc if BP is not at goal
- previously on ACEi, d/c on prior admission 2/2 TEENA  - c/w norvasc 5mg daily  - CTM
- previously on ACEi, d/c on prior admission 2/2 TEENA  - c/w norvasc 5mg daily  - CTM

## 2021-06-12 NOTE — PROGRESS NOTE ADULT - PROBLEM SELECTOR PROBLEM 2
Essential hypertension
TEENA (acute kidney injury)

## 2021-06-12 NOTE — PROVIDER CONTACT NOTE (OTHER) - SITUATION
Pt refused Lovenox and Unasyn.
Pt refusing AM medication/abx/vitals/fingerstick until he receives a bed upstairs
Pt refusing finger sticks to be done, IV antibiotics to be given, blood pressure medications to be given, vitals to be taken, and BL LE wound skin assessment to be completed

## 2021-06-12 NOTE — PROGRESS NOTE ADULT - SUBJECTIVE AND OBJECTIVE BOX
Diabetes Follow up note:    Chief complaint: T2DM    Interval Hx: BG values remain elevated above goal. Asked by team to see patient early this AM to recommend discharge regimen. Pt seen at bedside. Reports tolerating POs. Going home today. Had taken Basal/bolus after discharge from [facility] last week but glucose values were elevated at home. Follows w/Dr London as outpt. Uses Freestyle Joy but noticed his is currently very far off (over 100mg/dL) from hospital glucometer. Plans to change sensor and correlate with home FS at home after discharge.     Review of Systems:  General: no complaints.   GI: Tolerating POs. Denies N/V/D/Abd pain  CV: Denies CP/SOB  ENDO: No S&Sx of hypoglycemia    MEDS:  atorvastatin 80 milliGRAM(s) Oral at bedtime  insulin lispro (ADMELOG) corrective regimen sliding scale   SubCutaneous three times a day before meals  insulin lispro (ADMELOG) corrective regimen sliding scale   SubCutaneous at bedtime  insulin lispro Injectable (ADMELOG) 6 Unit(s) SubCutaneous three times a day before meals    ampicillin/sulbactam  IVPB 3 Gram(s) IV Intermittent every 6 hours    Allergies    No Known Allergies        PE:  General: Male lying in bed. NAD.   Vital Signs Last 24 Hrs  T(C): 36.7 (12 Jun 2021 06:16), Max: 37.1 (11 Jun 2021 20:35)  T(F): 98.1 (12 Jun 2021 06:16), Max: 98.7 (11 Jun 2021 20:35)  HR: 73 (12 Jun 2021 06:16) (73 - 86)  BP: 113/78 (12 Jun 2021 06:16) (113/78 - 137/87)  BP(mean): --  RR: 18 (12 Jun 2021 06:16) (17 - 18)  SpO2: 96% (12 Jun 2021 06:16) (95% - 96%)  Abd: Soft, NT,ND,   Extremities: Warm. R foot TMA dsg c/d/i. L foot dsg c/d/i  Neuro: A&O X3    LABS:  POCT Blood Glucose.: 257 mg/dL (06-12-21 @ 08:44)  POCT Blood Glucose.: 256 mg/dL (06-11-21 @ 21:49)  POCT Blood Glucose.: 340 mg/dL (06-11-21 @ 17:10)  POCT Blood Glucose.: 248 mg/dL (06-11-21 @ 11:43)  POCT Blood Glucose.: 275 mg/dL (06-10-21 @ 21:56)  POCT Blood Glucose.: 221 mg/dL (06-10-21 @ 16:56)  POCT Blood Glucose.: 189 mg/dL (06-10-21 @ 15:02)  POCT Blood Glucose.: 300 mg/dL (06-10-21 @ 09:17)  POCT Blood Glucose.: 219 mg/dL (06-10-21 @ 02:22)                            10.3   8.97  )-----------( 164      ( 12 Jun 2021 06:41 )             30.8       06-12    137  |  104  |  31<H>  ----------------------------<  225<H>  5.0   |  22  |  1.79<H>    Ca    9.5      12 Jun 2021 06:41  Phos  3.8     06-12  Mg     1.8     06-12    :      A1C with Estimated Average Glucose Result: 9.8 % (05-27-21 @ 06:56)          Contact number: cameron [phone] or [phone]

## 2021-06-12 NOTE — PROGRESS NOTE ADULT - PROBLEM SELECTOR PROBLEM 3
Type 2 diabetes mellitus with foot ulcer, with long-term current use of insulin

## 2021-06-12 NOTE — PROGRESS NOTE ADULT - NUTRITIONAL ASSESSMENT
Diet, Regular:   Consistent Carbohydrate {No Snacks} (CSTCHO)  DASH/TLC {Sodium & Cholesterol Restricted} (DASH) (06-11-21 @ 09:30) [Active]

## 2021-06-12 NOTE — PROGRESS NOTE ADULT - PROBLEM SELECTOR PLAN 2
TEENA/ATN 2/2 vanco during last  admission (6/4)  - Cr 1.78 -> 1.88 -> 1.52 -> 1.79  - baseline ~1  - monitor I/Os, trend Cr  - renally dose medications

## 2021-06-12 NOTE — PROGRESS NOTE ADULT - SUBJECTIVE AND OBJECTIVE BOX
***************************************************************  Alan Guerra, MS4  Senior Resident Dr. Key Williamson  Internal Medicine   Senior Resident [facility] Pager: [phone]  MS4 [facility] Pager: [phone]  ***************************************************************    TIKI HOUSTON  50y  MRN: [id number]    Subjective:    Patient is a 50y old  Male who presents with a chief complaint of R foot infection (11 Jun 2021 14:11)      Interval history/overnight events:      MEDICATIONS  (STANDING):  amLODIPine   Tablet 5 milliGRAM(s) Oral daily  ampicillin/sulbactam  IVPB 3 Gram(s) IV Intermittent every 6 hours  aspirin  chewable 81 milliGRAM(s) Oral daily  atorvastatin 80 milliGRAM(s) Oral at bedtime  cadexomer iodine 0.9% Gel 1 Application(s) Topical daily  collagenase Ointment 1 Application(s) Topical daily  dextrose 40% Gel 15 Gram(s) Oral once  dextrose 5%. 1000 milliLiter(s) (50 mL/Hr) IV Continuous <Continuous>  dextrose 5%. 1000 milliLiter(s) (100 mL/Hr) IV Continuous <Continuous>  dextrose 50% Injectable 25 Gram(s) IV Push once  dextrose 50% Injectable 12.5 Gram(s) IV Push once  dextrose 50% Injectable 25 Gram(s) IV Push once  enoxaparin Injectable 40 milliGRAM(s) SubCutaneous daily  glucagon  Injectable 1 milliGRAM(s) IntraMuscular once  insulin glargine Injectable (LANTUS) 40 Unit(s) SubCutaneous at bedtime  insulin lispro (ADMELOG) corrective regimen sliding scale   SubCutaneous three times a day before meals  insulin lispro (ADMELOG) corrective regimen sliding scale   SubCutaneous at bedtime  insulin lispro Injectable (ADMELOG) 6 Unit(s) SubCutaneous three times a day before meals    MEDICATIONS  (PRN):  acetaminophen   Tablet .. 650 milliGRAM(s) Oral every 6 hours PRN Temp greater or equal to 38C (100.4F), Mild Pain (1 - 3), Moderate Pain (4 - 6)  oxycodone    5 mG/acetaminophen 325 mG 1 Tablet(s) Oral every 4 hours PRN Severe Pain (7 - 10)      12 Point ROS negative with the exception of the above      Objective:    Vitals: Vital Signs Last 24 Hrs  T(C): 36.7 (06-12-21 @ 06:16), Max: 37.1 (06-11-21 @ 20:35)  T(F): 98.1 (06-12-21 @ 06:16), Max: 98.7 (06-11-21 @ 20:35)  HR: 73 (06-12-21 @ 06:16) (73 - 86)  BP: 113/78 (06-12-21 @ 06:16) (100/66 - 137/87)  BP(mean): 80 (06-11-21 @ 10:30) (80 - 85)  RR: 18 (06-12-21 @ 06:16) (16 - 18)  SpO2: 96% (06-12-21 @ 06:16) (92% - 96%)            I&O's Summary    11 Jun 2021 07:01  -  12 Jun 2021 07:00  --------------------------------------------------------  IN: 0 mL / OUT: 455 mL / NET: -455 mL        PHYSICAL EXAM:  GENERAL: NAD  HEENT: PERRL, no scleral icterus, no head and neck lad   CHEST/LUNG: CTAB, no wheezing, crackles, or ronchi   HEART: RRR, normal S1, S2, no murmurs, gallops, or rubs appreciated   ABDOMEN: soft, nondistended, non-tender, normoactive, no HSM, no rebound, no guarding, no rigidity  SKIN: No rashes or lesions  EXTREMITY: warm and well perfused, no pedal edema bl  NERVOUS SYSTEM: Alert & Oriented X3  PSYCH: calm and cooperative     LABS:    06-12    137  |  104  |  31<H>  ----------------------------<  225<H>  5.0   |  22  |  1.79<H>  06-11    137  |  105  |  35<H>  ----------------------------<  256<H>  4.5   |  20<L>  |  1.52<H>  06-10    137  |  100  |  43<H>  ----------------------------<  287<H>  4.5   |  22  |  1.88<H>    Ca    9.5      12 Jun 2021 06:41  Ca    9.6      11 Jun 2021 05:33  Ca    10.2      10 Herman 2021 00:08  Phos  3.8     06-12  Mg     1.8     06-12    TPro  8.9<H>  /  Alb  4.2  /  TBili  0.4  /  DBili  x   /  AST  25  /  ALT  38  /  AlkPhos  34<L>  06-09    PT/INR - ( 11 Jun 2021 05:33 )   PT: 13.4 sec;   INR: 1.12 ratio         PTT - ( 11 Jun 2021 05:33 )  PTT:33.5 sec                                        10.3   8.97  )-----------( 164      ( 12 Jun 2021 06:41 )             30.8                         10.9   7.47  )-----------( 161      ( 11 Jun 2021 09:59 )             33.2                         12.3   7.56  )-----------( 185      ( 11 Jun 2021 05:33 )             36.6     CAPILLARY BLOOD GLUCOSE      POCT Blood Glucose.: 256 mg/dL (11 Jun 2021 21:49)  POCT Blood Glucose.: 340 mg/dL (11 Jun 2021 17:10)  POCT Blood Glucose.: 248 mg/dL (11 Jun 2021 11:43)          RADIOLOGY & ADDITIONAL TESTS:            Imaging Personally Reviewed:  [ ] YES  [ ] NO    Consultants involved in case:   Consultant(s) Notes Reviewed:  [ ] YES  [ ] NO:   Care Discussed with Consultants/Other Providers [ ] YES  [ ] NO         ***************************************************************  Alan Guerra, MS4  Senior Resident Dr. Key Williamson  Internal Medicine   Senior Resident [facility] Pager: [phone]  MS4 [facility] Pager: [phone]  ***************************************************************    TIKI HOUSTON  50y  MRN: [id number]    Subjective:    Patient is a 50y old  Male who presents with a chief complaint of R foot infection (11 Jun 2021 14:11)      Interval history/overnight events:  Patient threatening to leave [employer] as he reports that his mother is in town from [place] to care for him and he needs to get home to take care of his son (). Patient irritable this morning. Reports that percocet makes him nausea and in the past has caused vomiting. Refuses to answer other ROS questions.     MEDICATIONS  (STANDING):  amLODIPine   Tablet 5 milliGRAM(s) Oral daily  ampicillin/sulbactam  IVPB 3 Gram(s) IV Intermittent every 6 hours  aspirin  chewable 81 milliGRAM(s) Oral daily  atorvastatin 80 milliGRAM(s) Oral at bedtime  cadexomer iodine 0.9% Gel 1 Application(s) Topical daily  collagenase Ointment 1 Application(s) Topical daily  dextrose 40% Gel 15 Gram(s) Oral once  dextrose 5%. 1000 milliLiter(s) (50 mL/Hr) IV Continuous <Continuous>  dextrose 5%. 1000 milliLiter(s) (100 mL/Hr) IV Continuous <Continuous>  dextrose 50% Injectable 25 Gram(s) IV Push once  dextrose 50% Injectable 12.5 Gram(s) IV Push once  dextrose 50% Injectable 25 Gram(s) IV Push once  enoxaparin Injectable 40 milliGRAM(s) SubCutaneous daily  glucagon  Injectable 1 milliGRAM(s) IntraMuscular once  insulin glargine Injectable (LANTUS) 40 Unit(s) SubCutaneous at bedtime  insulin lispro (ADMELOG) corrective regimen sliding scale   SubCutaneous three times a day before meals  insulin lispro (ADMELOG) corrective regimen sliding scale   SubCutaneous at bedtime  insulin lispro Injectable (ADMELOG) 6 Unit(s) SubCutaneous three times a day before meals    MEDICATIONS  (PRN):  acetaminophen   Tablet .. 650 milliGRAM(s) Oral every 6 hours PRN Temp greater or equal to 38C (100.4F), Mild Pain (1 - 3), Moderate Pain (4 - 6)  oxycodone    5 mG/acetaminophen 325 mG 1 Tablet(s) Oral every 4 hours PRN Severe Pain (7 - 10)      12 Point ROS negative with the exception of the above      Objective:    Vitals: Vital Signs Last 24 Hrs  T(C): 36.7 (06-12-21 @ 06:16), Max: 37.1 (06-11-21 @ 20:35)  T(F): 98.1 (06-12-21 @ 06:16), Max: 98.7 (06-11-21 @ 20:35)  HR: 73 (06-12-21 @ 06:16) (73 - 86)  BP: 113/78 (06-12-21 @ 06:16) (100/66 - 137/87)  BP(mean): 80 (06-11-21 @ 10:30) (80 - 85)  RR: 18 (06-12-21 @ 06:16) (16 - 18)  SpO2: 96% (06-12-21 @ 06:16) (92% - 96%)            I&O's Summary    11 Jun 2021 07:01  -  12 Jun 2021 07:00  --------------------------------------------------------  IN: 0 mL / OUT: 455 mL / NET: -455 mL        PHYSICAL EXAM:  Patient refusing physical exam.  B/l feet are wrapped.   Psych: irritable    LABS:    06-12    137  |  104  |  31<H>  ----------------------------<  225<H>  5.0   |  22  |  1.79<H>  06-11    137  |  105  |  35<H>  ----------------------------<  256<H>  4.5   |  20<L>  |  1.52<H>  06-10    137  |  100  |  43<H>  ----------------------------<  287<H>  4.5   |  22  |  1.88<H>    Ca    9.5      12 Jun 2021 06:41  Ca    9.6      11 Jun 2021 05:33  Ca    10.2      10 Herman 2021 00:08  Phos  3.8     06-12  Mg     1.8     06-12    TPro  8.9<H>  /  Alb  4.2  /  TBili  0.4  /  DBili  x   /  AST  25  /  ALT  38  /  AlkPhos  34<L>  06-09    PT/INR - ( 11 Jun 2021 05:33 )   PT: 13.4 sec;   INR: 1.12 ratio         PTT - ( 11 Jun 2021 05:33 )  PTT:33.5 sec                                        10.3   8.97  )-----------( 164      ( 12 Jun 2021 06:41 )             30.8                         10.9   7.47  )-----------( 161      ( 11 Jun 2021 09:59 )             33.2                         12.3   7.56  )-----------( 185      ( 11 Jun 2021 05:33 )             36.6     CAPILLARY BLOOD GLUCOSE      POCT Blood Glucose.: 256 mg/dL (11 Jun 2021 21:49)  POCT Blood Glucose.: 340 mg/dL (11 Jun 2021 17:10)  POCT Blood Glucose.: 248 mg/dL (11 Jun 2021 11:43)          RADIOLOGY & ADDITIONAL TESTS:            Imaging Personally Reviewed:  [ ] YES  [ ] NO    Consultants involved in case:   Consultant(s) Notes Reviewed:  [ ] YES  [ ] NO:   Care Discussed with Consultants/Other Providers [ ] YES  [ ] NO         ***************************************************************  Alan Guerra, MS4  Senior Resident Dr. Key Williamson  Internal Medicine   Senior Resident [facility] Pager: [phone]  MS4 [facility] Pager: [phone]  ***************************************************************    TIKI HOUSTON  50y  MRN: [id number]    Subjective:    Patient is a 50y old  Male who presents with a chief complaint of R foot infection (11 Jun 2021 14:11)      Interval history/overnight events:  Patient threatening to leave [place] as he reports that his mother is in town from [place] to care for him and he needs to get home to take care of his son (). Patient irritable this morning. Reports that percocet makes him nausea and in the past has caused vomiting. Refuses to answer other ROS questions.     MEDICATIONS  (STANDING):  amLODIPine   Tablet 5 milliGRAM(s) Oral daily  ampicillin/sulbactam  IVPB 3 Gram(s) IV Intermittent every 6 hours  aspirin  chewable 81 milliGRAM(s) Oral daily  atorvastatin 80 milliGRAM(s) Oral at bedtime  cadexomer iodine 0.9% Gel 1 Application(s) Topical daily  collagenase Ointment 1 Application(s) Topical daily  dextrose 40% Gel 15 Gram(s) Oral once  dextrose 5%. 1000 milliLiter(s) (50 mL/Hr) IV Continuous <Continuous>  dextrose 5%. 1000 milliLiter(s) (100 mL/Hr) IV Continuous <Continuous>  dextrose 50% Injectable 25 Gram(s) IV Push once  dextrose 50% Injectable 12.5 Gram(s) IV Push once  dextrose 50% Injectable 25 Gram(s) IV Push once  enoxaparin Injectable 40 milliGRAM(s) SubCutaneous daily  glucagon  Injectable 1 milliGRAM(s) IntraMuscular once  insulin glargine Injectable (LANTUS) 40 Unit(s) SubCutaneous at bedtime  insulin lispro (ADMELOG) corrective regimen sliding scale   SubCutaneous three times a day before meals  insulin lispro (ADMELOG) corrective regimen sliding scale   SubCutaneous at bedtime  insulin lispro Injectable (ADMELOG) 6 Unit(s) SubCutaneous three times a day before meals    MEDICATIONS  (PRN):  acetaminophen   Tablet .. 650 milliGRAM(s) Oral every 6 hours PRN Temp greater or equal to 38C (100.4F), Mild Pain (1 - 3), Moderate Pain (4 - 6)  oxycodone    5 mG/acetaminophen 325 mG 1 Tablet(s) Oral every 4 hours PRN Severe Pain (7 - 10)      12 Point ROS negative with the exception of the above      Objective:    Vitals: Vital Signs Last 24 Hrs  T(C): 36.7 (06-12-21 @ 06:16), Max: 37.1 (06-11-21 @ 20:35)  T(F): 98.1 (06-12-21 @ 06:16), Max: 98.7 (06-11-21 @ 20:35)  HR: 73 (06-12-21 @ 06:16) (73 - 86)  BP: 113/78 (06-12-21 @ 06:16) (100/66 - 137/87)  BP(mean): 80 (06-11-21 @ 10:30) (80 - 85)  RR: 18 (06-12-21 @ 06:16) (16 - 18)  SpO2: 96% (06-12-21 @ 06:16) (92% - 96%)            I&O's Summary    11 Jun 2021 07:01  -  12 Jun 2021 07:00  --------------------------------------------------------  IN: 0 mL / OUT: 455 mL / NET: -455 mL        PHYSICAL EXAM:  Patient refusing physical exam.  B/l feet are wrapped.   Psych: irritable    LABS:    06-12    137  |  104  |  31<H>  ----------------------------<  225<H>  5.0   |  22  |  1.79<H>  06-11    137  |  105  |  35<H>  ----------------------------<  256<H>  4.5   |  20<L>  |  1.52<H>  06-10    137  |  100  |  43<H>  ----------------------------<  287<H>  4.5   |  22  |  1.88<H>    Ca    9.5      12 Jun 2021 06:41  Ca    9.6      11 Jun 2021 05:33  Ca    10.2      10 Herman 2021 00:08  Phos  3.8     06-12  Mg     1.8     06-12    TPro  8.9<H>  /  Alb  4.2  /  TBili  0.4  /  DBili  x   /  AST  25  /  ALT  38  /  AlkPhos  34<L>  06-09    PT/INR - ( 11 Jun 2021 05:33 )   PT: 13.4 sec;   INR: 1.12 ratio         PTT - ( 11 Jun 2021 05:33 )  PTT:33.5 sec                                        10.3   8.97  )-----------( 164      ( 12 Jun 2021 06:41 )             30.8                         10.9   7.47  )-----------( 161      ( 11 Jun 2021 09:59 )             33.2                         12.3   7.56  )-----------( 185      ( 11 Jun 2021 05:33 )             36.6     CAPILLARY BLOOD GLUCOSE      POCT Blood Glucose.: 256 mg/dL (11 Jun 2021 21:49)  POCT Blood Glucose.: 340 mg/dL (11 Jun 2021 17:10)  POCT Blood Glucose.: 248 mg/dL (11 Jun 2021 11:43)

## 2021-06-12 NOTE — PROVIDER CONTACT NOTE (OTHER) - ACTION/TREATMENT ORDERED:
Pt education completed. NP aware.
MD aware, no new action
NP at bedside with pt and aware. Continue to monitor and notify of any changes

## 2021-06-12 NOTE — PROGRESS NOTE ADULT - ATTENDING COMMENTS
Patient seen and evaluted. Patient adament about leaving at noon. I discussed with him since failed augmentin therapy last time and are growing different organisms than prior would like ID to weigh in to see if cephalosporins would be better. He was adament about not waiting. At this time, given clearance by podiatry, and cultures just returning showing S. aureus sensitive to PCN, will not make patient leave AMA, though suboptimal he will not wait for ID.  Patient to follow up with podiatry for OR culturs and for management of wound.     d/c planning 35 minutes.

## 2021-06-12 NOTE — PROGRESS NOTE ADULT - PROBLEM SELECTOR PLAN 6
- DVT prophylaxis: lovenox  - Diet: DASH/CC  - Dispo: outpatient PT, wheelchair, d/c likely today - DVT prophylaxis: lovenox  - Diet: DASH/CC  - Dispo: outpatient PT, wheelchair, d/c today

## 2021-06-12 NOTE — PROGRESS NOTE ADULT - PROBLEM SELECTOR PROBLEM 5
CAD (coronary artery disease)
(0) indicator not present

## 2021-06-12 NOTE — DISCHARGE NOTE NURSING/CASE MANAGEMENT/SOCIAL WORK - PATIENT PORTAL LINK FT
You can access the FollowMyHealth Patient Portal offered by [facility] by registering at the following website: [email or web address]. By joining [email or web address]’s FollowMyHealth portal, you will also be able to view your health information using other applications (apps) compatible with our system.

## 2021-06-12 NOTE — PROGRESS NOTE ADULT - PROBLEM SELECTOR PLAN 5
CAD s/p stents x 2, as per chart review >10 year ago   - refused EKG on admission  - on ASA and plavix at home  - c/w ASA 81mg (6/12-)  - hold plavix for now  - c/w atorvastatin 80mg  - outpatient cards f/u re: need for DAPT

## 2021-06-12 NOTE — PROGRESS NOTE ADULT - PROBLEM SELECTOR PLAN 1
h/o diabetic foot ulcers s/p R 3rd and 4th partial ray resections (4/15/21) w/ recent admission for infected foot ulcer/OM s/p R 2nd partial ray resection w/ ADDISON w/ free flap open medially (5/25/21)  - s/p R TMA 6/11  - R abscess wound Cx - staph aureus  - c/w Unasyn q6h for now  - pain control as needed  - podiatry following  - plan for PO augmentin x7 days on discharge h/o diabetic foot ulcers s/p R 3rd and 4th partial ray resections (4/15/21) w/ recent admission for infected foot ulcer/OM s/p R 2nd partial ray resection w/ ADDISON w/ free flap open medially (5/25/21)  - s/p R TMA 6/11  - R abscess wound Cx - staph aureus  - c/w Unasyn q6h for now  - pain control as needed  - podiatry following  - ID consulted for d/c med recs

## 2021-06-12 NOTE — CHART NOTE - NSCHARTNOTEFT_GEN_A_CORE
Wound Cx results:     Wound Cx from R foot abscess 6/10 growing MSSA SN to ampicillin. Suggestibilities not performed on Cornybacterium. Patient declined infectious disease evaluation to further tailor abx insisting on being discharged at 12pm.      Given Wound Cx results, will continue w/ plan to d/c on Augmentin x 7 days. Outpatient f/u with podiatry to assess wound.    Dr. Key Williamson, PGY2

## 2021-06-12 NOTE — PROVIDER CONTACT NOTE (OTHER) - BACKGROUND
Pt admitted with R foot infection. Hx of DM, CAD s/p stents, diabetic foot ulcers, partial ray resections
Pt admitted for gangrene
He plans on being discharged at noon & does not want the antibiotics to delay his discharge. RN explained they will be complete in 30 minutes, ending before noon, but pt still refused.

## 2021-06-12 NOTE — PROGRESS NOTE ADULT - PROBLEM SELECTOR PLAN 2
goal< 130/80  mgmt per primary team    discussed w/pt and team  pager: [phone]   office:  [phone] (M-F 9a-5pm)               [phone] (nights/weekends)

## 2021-06-12 NOTE — PROGRESS NOTE ADULT - ASSESSMENT
50y old  Male who presents with history of DM type 2, CAD s/p stents x 2, longstanding history of diabetic foot ulcers and multiple toe amputations now s/p TMA  Endocrine consult requested for management of DM2. A1C 10.5%. Tolerating PO w/BG values above goal on present insulin regimen. Pt going home today. Discussed need for basal/bolus insulin regimen at this time to improve glycemic control and prevent further complications. GFR improved from last week. BG goal (100-180mg/dl).

## 2021-06-12 NOTE — PROGRESS NOTE ADULT - REASON FOR ADMISSION
R foot infection

## 2021-06-13 LAB
-  AMPICILLIN/SULBACTAM: SIGNIFICANT CHANGE UP
-  CEFAZOLIN: SIGNIFICANT CHANGE UP
-  CLINDAMYCIN: SIGNIFICANT CHANGE UP
-  DAPTOMYCIN: SIGNIFICANT CHANGE UP
-  ERYTHROMYCIN: SIGNIFICANT CHANGE UP
-  GENTAMICIN: SIGNIFICANT CHANGE UP
-  LINEZOLID: SIGNIFICANT CHANGE UP
-  OXACILLIN: SIGNIFICANT CHANGE UP
-  PENICILLIN: SIGNIFICANT CHANGE UP
-  RIFAMPIN: SIGNIFICANT CHANGE UP
-  TETRACYCLINE: SIGNIFICANT CHANGE UP
-  TRIMETHOPRIM/SULFAMETHOXAZOLE: SIGNIFICANT CHANGE UP
-  VANCOMYCIN: SIGNIFICANT CHANGE UP
METHOD TYPE: SIGNIFICANT CHANGE UP

## 2021-06-14 PROBLEM — E11.621 TYPE 2 DIABETES MELLITUS WITH FOOT ULCER: Chronic | Status: ACTIVE | Noted: 2021-06-10

## 2021-06-14 PROBLEM — I10 ESSENTIAL (PRIMARY) HYPERTENSION: Chronic | Status: ACTIVE | Noted: 2021-06-10

## 2021-06-14 NOTE — ASSESSMENT
[FreeTextEntry1] : Pt was seen and evaluated \par - right foot s/p partial 2nd ray amputations and ADDISON \par - path result pending. Bone margin of 2nd met bone growing rare S. Hemolyticas\par - flap to the amputation site has necrosis, sutures were removed around the necrotic tissue.\par - dressed right foot sx site with iodosorb and DSD \par - patient showed frustration about going through the HBOT process and waiting for the necrosis to improve,\par which could take months. Patient mentioned that he would rather have TMA and heal the surgical site faster. Discussed with patient in details about risk and benefits between TMA and HBOT. Patient still prefers TMA \par - pt is sent to [facility] for TMA today \par

## 2021-06-14 NOTE — HISTORY OF PRESENT ILLNESS
[FreeTextEntry1] : pt presents for follow up after his amputation on 6/3. He kept the dressing on since discharge. Pt is taking Augmentin PO. Denies F/C/N/V/SOB.

## 2021-06-15 ENCOUNTER — NON-APPOINTMENT (OUTPATIENT)
Age: 51
End: 2021-06-15

## 2021-06-15 LAB
CULTURE RESULTS: SIGNIFICANT CHANGE UP
ORGANISM # SPEC MICROSCOPIC CNT: SIGNIFICANT CHANGE UP
ORGANISM # SPEC MICROSCOPIC CNT: SIGNIFICANT CHANGE UP
SPECIMEN SOURCE: SIGNIFICANT CHANGE UP

## 2021-06-21 ENCOUNTER — APPOINTMENT (OUTPATIENT)
Dept: WOUND CARE | Facility: CLINIC | Age: 51
End: 2021-06-21
Payer: COMMERCIAL

## 2021-06-21 PROCEDURE — 11042 DBRDMT SUBQ TIS 1ST 20SQCM/<: CPT | Mod: LT,59

## 2021-06-21 NOTE — LETTER BODY
[To Whom it May Concern:] : To Whom it May Concern: [FreeTextEntry1] : Mr. Villegas is being treated by me for complications of a diabetic foot infection requiring amputation of a portion of his right foot on 6/3/21.  His surgical site is necrosing and may require additional surgery to manage.  He is currently unable to bear weight on his feet as he has an ulcer on his left foot as well.  I have advised him not to travel.  If you have any questions or concerns do not hesitate to contact me.   [FreeTextEntry3] : Gavin Lott, [place], ACFAS

## 2021-06-21 NOTE — PLAN
[FreeTextEntry1] : - pt seen and evaluated\par -R TMA site stable; ADDISON sutures removed and central TMA sutures\par - L wound stable \par - excisional debridement carried out on L foot w/ #15 blade down to subQ, but not beyond suBQ, excising out loosely adhered fibrin \par - aquacel to R foot central wound w/ steri strips applied to lateral and medial, santyl to L foot \par - RTC 1 wk \par

## 2021-06-21 NOTE — HISTORY OF PRESENT ILLNESS
[FreeTextEntry1] : Pt recently discharged from [facility] 6/11/21 s/p R TMA. DC'd 6/12 w/ augmentin and had finished course on 6/19. Pathology report came back negative. Was seen last week at private office, but foot started bleeding so pt returning to [facility] for better care. Has been using the knee scooter to relieve pressure off RLE. Has been doing dressing changes himself. Denies any N/V/F/C/SOB.

## 2021-06-22 PROCEDURE — 85652 RBC SED RATE AUTOMATED: CPT

## 2021-06-22 PROCEDURE — 99285 EMERGENCY DEPT VISIT HI MDM: CPT | Mod: 25

## 2021-06-22 PROCEDURE — 82010 KETONE BODYS QUAN: CPT

## 2021-06-22 PROCEDURE — 82962 GLUCOSE BLOOD TEST: CPT

## 2021-06-22 PROCEDURE — 86140 C-REACTIVE PROTEIN: CPT

## 2021-06-22 PROCEDURE — 73630 X-RAY EXAM OF FOOT: CPT

## 2021-06-22 PROCEDURE — 87077 CULTURE AEROBIC IDENTIFY: CPT

## 2021-06-22 PROCEDURE — 94640 AIRWAY INHALATION TREATMENT: CPT

## 2021-06-22 PROCEDURE — 83735 ASSAY OF MAGNESIUM: CPT

## 2021-06-22 PROCEDURE — 80053 COMPREHEN METABOLIC PANEL: CPT

## 2021-06-22 PROCEDURE — 82435 ASSAY OF BLOOD CHLORIDE: CPT

## 2021-06-22 PROCEDURE — 86850 RBC ANTIBODY SCREEN: CPT

## 2021-06-22 PROCEDURE — 85014 HEMATOCRIT: CPT

## 2021-06-22 PROCEDURE — 82947 ASSAY GLUCOSE BLOOD QUANT: CPT

## 2021-06-22 PROCEDURE — 97161 PT EVAL LOW COMPLEX 20 MIN: CPT

## 2021-06-22 PROCEDURE — 85610 PROTHROMBIN TIME: CPT

## 2021-06-22 PROCEDURE — 84132 ASSAY OF SERUM POTASSIUM: CPT

## 2021-06-22 PROCEDURE — 71045 X-RAY EXAM CHEST 1 VIEW: CPT

## 2021-06-22 PROCEDURE — 84295 ASSAY OF SERUM SODIUM: CPT

## 2021-06-22 PROCEDURE — 87186 SC STD MICRODIL/AGAR DIL: CPT

## 2021-06-22 PROCEDURE — 83605 ASSAY OF LACTIC ACID: CPT

## 2021-06-22 PROCEDURE — 80048 BASIC METABOLIC PNL TOTAL CA: CPT

## 2021-06-22 PROCEDURE — 82330 ASSAY OF CALCIUM: CPT

## 2021-06-22 PROCEDURE — 86769 SARS-COV-2 COVID-19 ANTIBODY: CPT

## 2021-06-22 PROCEDURE — 82803 BLOOD GASES ANY COMBINATION: CPT

## 2021-06-22 PROCEDURE — 88307 TISSUE EXAM BY PATHOLOGIST: CPT

## 2021-06-22 PROCEDURE — U0003: CPT

## 2021-06-22 PROCEDURE — 85025 COMPLETE CBC W/AUTO DIFF WBC: CPT

## 2021-06-22 PROCEDURE — 87070 CULTURE OTHR SPECIMN AEROBIC: CPT

## 2021-06-22 PROCEDURE — U0005: CPT

## 2021-06-22 PROCEDURE — 85018 HEMOGLOBIN: CPT

## 2021-06-22 PROCEDURE — 85730 THROMBOPLASTIN TIME PARTIAL: CPT

## 2021-06-22 PROCEDURE — 86900 BLOOD TYPING SEROLOGIC ABO: CPT

## 2021-06-22 PROCEDURE — 87205 SMEAR GRAM STAIN: CPT

## 2021-06-22 PROCEDURE — 86901 BLOOD TYPING SEROLOGIC RH(D): CPT

## 2021-06-22 PROCEDURE — 85027 COMPLETE CBC AUTOMATED: CPT

## 2021-06-22 PROCEDURE — 84100 ASSAY OF PHOSPHORUS: CPT

## 2021-06-25 ENCOUNTER — APPOINTMENT (OUTPATIENT)
Dept: INTERNAL MEDICINE | Facility: CLINIC | Age: 51
End: 2021-06-25
Payer: COMMERCIAL

## 2021-06-25 VITALS
SYSTOLIC BLOOD PRESSURE: 130 MMHG | HEART RATE: 102 BPM | BODY MASS INDEX: 27.57 KG/M2 | DIASTOLIC BLOOD PRESSURE: 80 MMHG | WEIGHT: 208 LBS | HEIGHT: 73 IN | TEMPERATURE: 98.1 F

## 2021-06-25 PROCEDURE — 99214 OFFICE O/P EST MOD 30 MIN: CPT | Mod: 25

## 2021-06-25 NOTE — REVIEW OF SYSTEMS
[Negative] : Heme/Lymph [FreeTextEntry8] : Temporary elevation in creatinine [FreeTextEntry9] : Amputation right foot [de-identified] : Started smoking again in the hospital

## 2021-06-25 NOTE — HISTORY OF PRESENT ILLNESS
[de-identified] : Patient is an unfortunate 50-year-old male who comes in for follow-up following amputation at the metatarsal level of his right foot secondary to diabetes mellitus and peripheral neuropathy.  In the hospital his BUN and creatinine increased and he was changed from ramiprill to amlodipine.  Over the discharge she says that his levels went from 5 creatinine to 1-2 and he would like to be back on ramipril 10 mg.  He also started smoking again and he was advised to go on Wellbutrin.  He is to see the endocrinologist for medication change for his diabetes.  He also would like to get a permanent [employer] sticker for his parking.  At the present time he is doing much better and in a boot and has been seen by the podiatrist.  He is off antibiotics.  He has no cardiopulmonary symptoms at this time

## 2021-06-25 NOTE — ASSESSMENT
[FreeTextEntry1] : We feel that a form for a permanent parking spot for disability secondary to his amputation.  He is to see me after being on ramipril and will Bruton to recheck his renal function as well as a CBC and liver.  He is to see endocrinology.  We have taken him off atorvastatin as he has been on long-term rosuvastatin 40 mg.  He is started on will Bruton for smoking cessation and he is advised to stop smoking in 1 week and to continue the Wellbutrin at 150 mg/day for 3 months.  He is to see endocrinology I will have bloods drawn there as baseline

## 2021-06-25 NOTE — PHYSICAL EXAM
[No Acute Distress] : no acute distress [Well Nourished] : well nourished [Well Developed] : well developed [Well-Appearing] : well-appearing [Normal Sclera/Conjunctiva] : normal sclera/conjunctiva [PERRL] : pupils equal round and reactive to light [EOMI] : extraocular movements intact [Normal Outer Ear/Nose] : the outer ears and nose were normal in appearance [Normal Oropharynx] : the oropharynx was normal [No JVD] : no jugular venous distention [No Lymphadenopathy] : no lymphadenopathy [Supple] : supple [Thyroid Normal, No Nodules] : the thyroid was normal and there were no nodules present [No Respiratory Distress] : no respiratory distress  [No Accessory Muscle Use] : no accessory muscle use [Clear to Auscultation] : lungs were clear to auscultation bilaterally [Normal Rate] : normal rate  [Regular Rhythm] : with a regular rhythm [Normal S1, S2] : normal S1 and S2 [No Murmur] : no murmur heard [No Carotid Bruits] : no carotid bruits [No Abdominal Bruit] : a ~M bruit was not heard ~T in the abdomen [No Varicosities] : no varicosities [Pedal Pulses Present] : the pedal pulses are present [No Edema] : there was no peripheral edema [No Palpable Aorta] : no palpable aorta [No Extremity Clubbing/Cyanosis] : no extremity clubbing/cyanosis [Soft] : abdomen soft [Non Tender] : non-tender [Non-distended] : non-distended [No Masses] : no abdominal mass palpated [No HSM] : no HSM [Normal Bowel Sounds] : normal bowel sounds [Normal] : soft, non-tender, non-distended, no masses palpated, no HSM and normal bowel sounds [Normal Posterior Cervical Nodes] : no posterior cervical lymphadenopathy [Normal Anterior Cervical Nodes] : no anterior cervical lymphadenopathy [No CVA Tenderness] : no CVA  tenderness [No Spinal Tenderness] : no spinal tenderness [No Joint Swelling] : no joint swelling [Grossly Normal Strength/Tone] : grossly normal strength/tone [No Rash] : no rash [Coordination Grossly Intact] : coordination grossly intact [No Focal Deficits] : no focal deficits [Normal Gait] : normal gait [Deep Tendon Reflexes (DTR)] : deep tendon reflexes were 2+ and symmetric [Normal Affect] : the affect was normal [Normal Insight/Judgement] : insight and judgment were intact [de-identified] : Wearing a boot on his right foot

## 2021-06-26 LAB
ALBUMIN SERPL ELPH-MCNC: 4.4 G/DL
ALP BLD-CCNC: 76 U/L
ALT SERPL-CCNC: 23 U/L
ANION GAP SERPL CALC-SCNC: 12 MMOL/L
AST SERPL-CCNC: 15 U/L
BASOPHILS # BLD AUTO: 0.02 K/UL
BASOPHILS NFR BLD AUTO: 0.2 %
BILIRUB SERPL-MCNC: 0.3 MG/DL
BUN SERPL-MCNC: 25 MG/DL
CALCIUM SERPL-MCNC: 9.9 MG/DL
CHLORIDE SERPL-SCNC: 99 MMOL/L
CO2 SERPL-SCNC: 24 MMOL/L
CREAT SERPL-MCNC: 1.41 MG/DL
CULTURE RESULTS: SIGNIFICANT CHANGE UP
CULTURE RESULTS: SIGNIFICANT CHANGE UP
EOSINOPHIL # BLD AUTO: 0.27 K/UL
EOSINOPHIL NFR BLD AUTO: 3.1 %
GLUCOSE SERPL-MCNC: 214 MG/DL
HCT VFR BLD CALC: 37.9 %
HGB BLD-MCNC: 12 G/DL
IMM GRANULOCYTES NFR BLD AUTO: 0.5 %
LYMPHOCYTES # BLD AUTO: 2.45 K/UL
LYMPHOCYTES NFR BLD AUTO: 28.6 %
MAN DIFF?: NORMAL
MCHC RBC-ENTMCNC: 28.8 PG
MCHC RBC-ENTMCNC: 31.7 GM/DL
MCV RBC AUTO: 90.9 FL
MONOCYTES # BLD AUTO: 0.56 K/UL
MONOCYTES NFR BLD AUTO: 6.5 %
NEUTROPHILS # BLD AUTO: 5.24 K/UL
NEUTROPHILS NFR BLD AUTO: 61.1 %
PLATELET # BLD AUTO: 224 K/UL
POTASSIUM SERPL-SCNC: 4.6 MMOL/L
PROT SERPL-MCNC: 7.9 G/DL
RBC # BLD: 4.17 M/UL
RBC # FLD: 14.6 %
SODIUM SERPL-SCNC: 135 MMOL/L
SPECIMEN SOURCE: SIGNIFICANT CHANGE UP
SPECIMEN SOURCE: SIGNIFICANT CHANGE UP
WBC # FLD AUTO: 8.58 K/UL

## 2021-06-28 ENCOUNTER — APPOINTMENT (OUTPATIENT)
Dept: WOUND CARE | Facility: CLINIC | Age: 51
End: 2021-06-28
Payer: COMMERCIAL

## 2021-06-28 PROCEDURE — 11042 DBRDMT SUBQ TIS 1ST 20SQCM/<: CPT | Mod: LT

## 2021-06-28 NOTE — HISTORY OF PRESENT ILLNESS
[FreeTextEntry1] : Pt recently discharged from [facility] 6/11/21 s/p R TMA. DC'd 6/12 w/ augmentin and had finished course on 6/19. Pathology report came back negative. Was seen last week at private office, but foot started bleeding so pt returning to [facility] for better care. Has been using the knee scooter to relieve pressure off RLE. Has been doing dressing changes himself. Is currently awaiting fabrication of TMA filler pending authorization. States his AM sugar levels were 140 mg/dL. Denies any N/V/F/C/SOB.

## 2021-07-01 ENCOUNTER — APPOINTMENT (OUTPATIENT)
Dept: ENDOCRINOLOGY | Facility: CLINIC | Age: 51
End: 2021-07-01
Payer: COMMERCIAL

## 2021-07-01 VITALS
SYSTOLIC BLOOD PRESSURE: 130 MMHG | TEMPERATURE: 98 F | OXYGEN SATURATION: 98 % | DIASTOLIC BLOOD PRESSURE: 72 MMHG | HEART RATE: 97 BPM | WEIGHT: 210 LBS | BODY MASS INDEX: 27.83 KG/M2 | HEIGHT: 73 IN

## 2021-07-01 PROCEDURE — 99214 OFFICE O/P EST MOD 30 MIN: CPT

## 2021-07-01 PROCEDURE — 83036 HEMOGLOBIN GLYCOSYLATED A1C: CPT | Mod: QW

## 2021-07-01 NOTE — PHYSICAL THERAPY INITIAL EVALUATION ADULT - SITTING BALANCE: STATIC
Patient: Jamia Harmon    MRN: [id number]    Date of Procedure: 6/30/2021     Pre-Operative Diagnosis: Acute appendicitis    Post-Operative Diagnosis: Acute appendicitis    Procedure: Laparoscopic appendectomy    Surgeon: Shanel Payton MD, MS, FACS    Assistant: Chicho Castro MD    Assistant Tasks: Opening and closing, Dissecting tissue and Removing tissue    Anesthesia: General endotracheal    EBL: 10 ml    Specimen:   ID Type Source Tests Collected by Time   A : appendix Tissue Appendix SURGICAL PATHOLOGY Shanel Payton MD 6/30/2021 1142     Implant:   Implant Name Type Inv. Item Serial No.  Lot No. LRB No. Used Action   RELOAD TRI-STAPLE 30MM AVM - SNA Staple RELOAD TRI-STAPLE 30MM AVM NA [employer] [id number] N/A 1 Implanted     Complication: None    Drains: None    Findings: Dilated and inflamed appendix consistent with acute appendicitis.    Indication: This is a 29 year old woman who presented to the emergency department with acute onset right lower abdominal pain. CT of the abdomen and pelvis revealed a dilated and inflamed appendix consistent with acute appendicitis. Therefore, a laparoscopic possible open appendectomy was recommended. The risks, benefits, complication as well as details of the operation was discussed. All questions were answered to the patient's satisfaction, who wished to proceed with surgery.    Technique: After informed consent was obtained, the patient was brought to the operating room and placed on the operating table in supine position. Monitors and SCD's were applied. Antibiotics were infused. General endotracheal anesthesia was induced without complications. Then, the left arm was tucked in, and the abdomen was prepped and draped in standard sterile fashion. A time-out was performed.    Firstly, a supraumbilical incision was made sharpy with a #11 blade. The incision was carried down to the level of fascia, and the umbilical stalk was grasped with a Kocher clamp. The fascia  Speech Therapy:    Pt cleared from speech therapy standpoint for po medications and thin liquids. Full note to follow.    was then elevated and incised. A 12 mm trocar was inserted under direct vision and pneumoperitoneum to 15 mm Hg was obtained. Diagnostic laparoscopy revealed no injury to the underlying organs. Then, the patient was placed into Trendelenberg position with right side up. Two 5 mm trocars were placed in the suprapubic area and left lower abdomen under laparoscopic visualization. The redundant transverse colon was reflected superiorly as the small bowel was swept medially and the cecum was identified in the pelvis. The appendix appeared inflamed and dilated but without evidence of perforation. It was adherent to the cecum. A window was created between the cecum and appendix. Then, the mesoappendix was elevated and ligated with the Ligasure device. A 30 mm EndoGIA stapler with vascular load was fired across the base of the appendix. The specimen was placed into an EndoCatch bag. The staple line was inspected and appeared hemostatic with good purchase onto the cecum. The 12 mm trocar with the specimen in the EndoCatch bag was removed. Pneumoperitoneum was dissipated and the 5 mm trocars were also removed. The supraumbilical fascia was then closed with figure-of-eight stitch of 0 Vicryl. Skin of all incisions was closed with 4-0 Monocryl. Dermabond was applied to all wounds. The patient tolerated the procedure well, was extubated in the operating room and transferred to the post-anesthesia care unit in stable condition. All sponges, sharps and instrument counts were correct at the end of the case prior to closure.    I was scrubbed and present for the entire duration of the procedure.     good balance

## 2021-07-01 NOTE — ASSESSMENT
[FreeTextEntry1] : 50 year old male with history of uncontrolled DM Type 2 ( long term with insulin requirement), recent right foot toe amputation, HLD here for f/u\par HgA1C today is 8.3%\par carb consistent diet was discussed, has seen CDE previously \par HgA1C goal of <7% \par \par -Continue Basaglar to 44 units at bedtime \par -Continue Trulicity 1.5 mcg weekly \par -Novolog 14-14-16 with low dose correctional scale \par -Continue Carb consistent diet \par -For now medication compliance and diet compliance is heavily emphasized \par \par Diabetic retinopathy\par -Close follow up encouraged\par -Hypo and hyperglycemic states should be avoided \par -HgA1C goal of <7% \par \par Amputation \par -Keep HgA1C <7% \par -Close outpatient follow up with Dr. Lott \par \par HLD \par -LDL is well controlled \par \par Follow up in 4 months. \par  \par \par

## 2021-07-01 NOTE — HISTORY OF PRESENT ILLNESS
[FreeTextEntry1] : Diabetes F/u Patient HPI\par \par CC\par Patient referred by Dr. Hirsch for diabetes management.\par \par Recently has amputation of the right foot toes ( amputations) \par 3-4 weeks since then \par He has been with physical therapy since then.\par \par HPI:\par \par Duration of Diabetes: 18 years \par Is patient on Insulin? Yes \par If yes, how long on insulin? 10 years \par \par List Current Medications for Glycemic control and the doses:\par 1- Basaglar 44 units at bedtime \par 2- Novolog 10-12 units before a meal \par 2- Trulicity 1.5 mcg weekly\par \par \par SMBG (self monitored blood glucose) readings: \par - Name of glucometer: \par - How often does the patient check BG? 4 times per day \par - Does the patient keep a log? \par \par If detailed record is available, what is the range of most of the BG readings?\par - Before Breakfast: 100-130 \par - Before Lunch: 170s \par - Before Dinner: 170-200 \par - Before Bedtime:190-200\par \par Does patient get Hypoglycemic episodes? None \par If yes how frequent? once a week \par Hoe low do the BG readings reach? \par When do most of those episodes occur? In the morning \par What symptoms does the patient get during those episodes? \par \par Diabetic Complications: Is patient aware of having any of those complications?\par - Eyes: Retinopathy? Proliferative retinopathy (laser treatment in the right eye ( 3 tx) and left eye ( 3 tx) -completed and did 9 treatments \par  	When was the last fully dilated eye exam? Dr. Newsome 12/2020\par - Feet: 	Neuropathy? Yes \par  	Foot Ulcers? Yes \par 	When was the last time patient saw a Podiatrist? 2 weeks ago- developed a blister on the left foot \par - Kidneys: Nephropathy? None \par \par \par Diet: review patient's diet: \par Breakfast: Whole wheat bread, scrambled eggs, oatmeal, frozen fruit, Kashi cereal \par Lunch: [place] ( whole wheat bread) Turkey, ham, apple \par Dinner: Fish, vegetables, small portions of rice, black beans \par Juice or soda: Crystal light, diet soda \par \par \par \par Exercise: review patient exercise habits: Not much \par \par \par \par  \par

## 2021-07-05 LAB — HBA1C MFR BLD HPLC: 8.3

## 2021-07-15 ENCOUNTER — APPOINTMENT (OUTPATIENT)
Dept: INTERNAL MEDICINE | Facility: CLINIC | Age: 51
End: 2021-07-15

## 2021-07-17 LAB
CULTURE RESULTS: SIGNIFICANT CHANGE UP
SPECIMEN SOURCE: SIGNIFICANT CHANGE UP

## 2021-08-11 ENCOUNTER — APPOINTMENT (OUTPATIENT)
Dept: INTERNAL MEDICINE | Facility: CLINIC | Age: 51
End: 2021-08-11

## 2021-08-12 RX ORDER — BLOOD-GLUCOSE,RECEIVER,CONT
EACH MISCELLANEOUS
Qty: 1 | Refills: 1 | Status: ACTIVE | COMMUNITY
Start: 2021-08-03 | End: 1900-01-01

## 2021-08-23 ENCOUNTER — APPOINTMENT (OUTPATIENT)
Dept: ENDOCRINOLOGY | Facility: CLINIC | Age: 51
End: 2021-08-23

## 2021-08-23 ENCOUNTER — APPOINTMENT (OUTPATIENT)
Dept: ENDOCRINOLOGY | Facility: CLINIC | Age: 51
End: 2021-08-23
Payer: COMMERCIAL

## 2021-08-23 PROCEDURE — ZZZZZ: CPT

## 2021-09-09 ENCOUNTER — APPOINTMENT (OUTPATIENT)
Dept: INTERNAL MEDICINE | Facility: CLINIC | Age: 51
End: 2021-09-09
Payer: COMMERCIAL

## 2021-09-09 ENCOUNTER — NON-APPOINTMENT (OUTPATIENT)
Age: 51
End: 2021-09-09

## 2021-09-09 DIAGNOSIS — E11.40 TYPE 2 DIABETES MELLITUS WITH DIABETIC NEUROPATHY, UNSPECIFIED: ICD-10-CM

## 2021-09-09 DIAGNOSIS — L97.522 TYPE 2 DIABETES MELLITUS WITH FOOT ULCER: ICD-10-CM

## 2021-09-09 DIAGNOSIS — Z03.89 ENCOUNTER FOR OBSERVATION FOR OTHER SUSPECTED DISEASES AND CONDITIONS RULED OUT: ICD-10-CM

## 2021-09-09 DIAGNOSIS — L97.522 NON-PRESSURE CHRONIC ULCER OF OTHER PART OF LEFT FOOT WITH FAT LAYER EXPOSED: ICD-10-CM

## 2021-09-09 DIAGNOSIS — E11.621 TYPE 2 DIABETES MELLITUS WITH FOOT ULCER: ICD-10-CM

## 2021-09-09 PROCEDURE — 93000 ELECTROCARDIOGRAM COMPLETE: CPT

## 2021-09-09 PROCEDURE — 99215 OFFICE O/P EST HI 40 MIN: CPT | Mod: 25

## 2021-09-09 RX ORDER — INSULIN GLARGINE 100 [IU]/ML
100 INJECTION, SOLUTION SUBCUTANEOUS AT BEDTIME
Refills: 0 | Status: DISCONTINUED | COMMUNITY
Start: 2021-06-07 | End: 2021-09-09

## 2021-09-09 RX ORDER — METFORMIN HYDROCHLORIDE 1000 MG/1
1000 TABLET, COATED ORAL
Qty: 1 | Refills: 5 | Status: DISCONTINUED | COMMUNITY
End: 2021-09-09

## 2021-09-09 NOTE — ASSESSMENT
[Modify anticoagulant treatment prior to procedure] : Modify anticoagulant treatment prior to procedure [Modify anti-platelet treatment prior to procedure] : Modify anti-platelet treatment prior to procedure [Modify medications prior to procedure] : Modify medications prior to procedure [FreeTextEntry4] : pt is cleared for proposed surgery pending bloods. He is totke his nite time insulin per endocrine. He is to hold ASA for 1 wk and plavix for 5 d. He is to hold He is to hold buproprion the day of surgery and take ramapril the am of surgery. No motrin,nsaids,OTC or VIT for 1 wk.

## 2021-09-09 NOTE — PHYSICAL EXAM
[Normal] : normoactive bowel sounds, soft and nontender, no hepatosplenomegaly or masses appreciated [Normal Male:] : meatus normal, the bladder was normal on palpation, the scrotum was normal, there were no testicular masses and no prostate nodules. [de-identified] : decreased BS at bases [de-identified] : distal pulses not appreciated [FreeTextEntry1] : refused [de-identified] : neg [de-identified] : neg [de-identified] : amputation of R toes [de-identified] : neg [de-identified] : pt would not try to get up on toes [de-identified] : covered

## 2021-09-09 NOTE — HISTORY OF PRESENT ILLNESS
[Coronary Artery Disease] : coronary artery disease [COPD] : COPD [Smoker] : smoker [No Adverse Anesthesia Reaction] : no adverse anesthesia reaction in self or family member [Diabetes] : diabetes [(Patient denies any chest pain, claudication, dyspnea on exertion, orthopnea, palpitations or syncope)] : Patient denies any chest pain, claudication, dyspnea on exertion, orthopnea, palpitations or syncope [FreeTextEntry1] : tendoachilles lengthening [FreeTextEntry4] : for orthopedic correction achilles [FreeTextEntry7] : EKG -unchanged and non sp st-t changes only

## 2021-09-10 ENCOUNTER — NON-APPOINTMENT (OUTPATIENT)
Age: 51
End: 2021-09-10

## 2021-09-10 LAB
ALBUMIN SERPL ELPH-MCNC: 4.4 G/DL
ALP BLD-CCNC: 76 U/L
ALT SERPL-CCNC: 17 U/L
ANION GAP SERPL CALC-SCNC: 13 MMOL/L
AST SERPL-CCNC: 15 U/L
BASOPHILS # BLD AUTO: 0.03 K/UL
BASOPHILS NFR BLD AUTO: 0.4 %
BILIRUB SERPL-MCNC: 0.2 MG/DL
BUN SERPL-MCNC: 24 MG/DL
CALCIUM SERPL-MCNC: 9.2 MG/DL
CHLORIDE SERPL-SCNC: 100 MMOL/L
CO2 SERPL-SCNC: 26 MMOL/L
CREAT SERPL-MCNC: 0.99 MG/DL
EOSINOPHIL # BLD AUTO: 0.14 K/UL
EOSINOPHIL NFR BLD AUTO: 1.7 %
GLUCOSE SERPL-MCNC: 171 MG/DL
HCT VFR BLD CALC: 41.8 %
HGB BLD-MCNC: 14.4 G/DL
IMM GRANULOCYTES NFR BLD AUTO: 0.4 %
LYMPHOCYTES # BLD AUTO: 1.88 K/UL
LYMPHOCYTES NFR BLD AUTO: 22.8 %
MAN DIFF?: NORMAL
MCHC RBC-ENTMCNC: 30.6 PG
MCHC RBC-ENTMCNC: 34.4 GM/DL
MCV RBC AUTO: 88.7 FL
MONOCYTES # BLD AUTO: 0.79 K/UL
MONOCYTES NFR BLD AUTO: 9.6 %
NEUTROPHILS # BLD AUTO: 5.36 K/UL
NEUTROPHILS NFR BLD AUTO: 65.1 %
PLATELET # BLD AUTO: 168 K/UL
POTASSIUM SERPL-SCNC: 4.3 MMOL/L
PROT SERPL-MCNC: 6.9 G/DL
RBC # BLD: 4.71 M/UL
RBC # FLD: 12.8 %
SODIUM SERPL-SCNC: 139 MMOL/L
WBC # FLD AUTO: 8.23 K/UL

## 2021-09-14 LAB
ALBUMIN SERPL ELPH-MCNC: 4.6 G/DL
ALP BLD-CCNC: 66 U/L
ALT SERPL-CCNC: 24 U/L
ANION GAP SERPL CALC-SCNC: 11 MMOL/L
AST SERPL-CCNC: 18 U/L
BILIRUB SERPL-MCNC: 0.3 MG/DL
BUN SERPL-MCNC: 23 MG/DL
CALCIUM SERPL-MCNC: 9.7 MG/DL
CHLORIDE SERPL-SCNC: 101 MMOL/L
CO2 SERPL-SCNC: 25 MMOL/L
CREAT SERPL-MCNC: 0.87 MG/DL
GLUCOSE SERPL-MCNC: 203 MG/DL
POTASSIUM SERPL-SCNC: 4.6 MMOL/L
PROT SERPL-MCNC: 7.5 G/DL
SODIUM SERPL-SCNC: 138 MMOL/L

## 2021-10-20 ENCOUNTER — APPOINTMENT (OUTPATIENT)
Dept: ENDOCRINOLOGY | Facility: CLINIC | Age: 51
End: 2021-10-20
Payer: COMMERCIAL

## 2021-10-20 VITALS
BODY MASS INDEX: 28.89 KG/M2 | HEART RATE: 96 BPM | HEIGHT: 73 IN | WEIGHT: 218 LBS | DIASTOLIC BLOOD PRESSURE: 80 MMHG | SYSTOLIC BLOOD PRESSURE: 128 MMHG | OXYGEN SATURATION: 96 % | TEMPERATURE: 97.8 F

## 2021-10-20 LAB — GLUCOSE BLDC GLUCOMTR-MCNC: 254

## 2021-10-20 PROCEDURE — 83036 HEMOGLOBIN GLYCOSYLATED A1C: CPT | Mod: QW

## 2021-10-20 PROCEDURE — 99214 OFFICE O/P EST MOD 30 MIN: CPT | Mod: 25

## 2021-10-20 PROCEDURE — 82962 GLUCOSE BLOOD TEST: CPT

## 2021-10-20 NOTE — PHYSICAL EXAM
[Alert] : alert [Well Nourished] : well nourished [No Acute Distress] : no acute distress [Normal Sclera/Conjunctiva] : normal sclera/conjunctiva [PERRL] : pupils equal, round and reactive to light [Normal Outer Ear/Nose] : the ears and nose were normal in appearance [Normal TMs] : both tympanic membranes were normal [No Neck Mass] : no neck mass was observed [Thyroid Not Enlarged] : the thyroid was not enlarged [No Respiratory Distress] : no respiratory distress [Clear to Auscultation] : lungs were clear to auscultation bilaterally [Normal S1, S2] : normal S1 and S2 [Normal Rate] : heart rate was normal [Regular Rhythm] : with a regular rhythm [Normal Bowel Sounds] : normal bowel sounds [Not Tender] : non-tender [Soft] : abdomen soft [Normal Gait] : normal gait [No Clubbing, Cyanosis] : no clubbing  or cyanosis of the fingernails [No Joint Swelling] : no joint swelling seen [Normal Strength/Tone] : muscle strength and tone were normal [No Rash] : no rash [No Skin Lesions] : no skin lesions [No Motor Deficits] : the motor exam was normal [Normal Reflexes] : deep tendon reflexes were 2+ and symmetric [No Tremors] : no tremors [Oriented x3] : oriented to person, place, and time [Normal Affect] : the affect was normal [Normal Insight/Judgement] : insight and judgment were intact [Normal Mood] : the mood was normal

## 2021-10-24 LAB — HBA1C MFR BLD HPLC: 9.5

## 2021-10-27 ENCOUNTER — APPOINTMENT (OUTPATIENT)
Dept: SURGERY | Facility: CLINIC | Age: 51
End: 2021-10-27
Payer: COMMERCIAL

## 2021-10-27 VITALS
HEIGHT: 73 IN | WEIGHT: 218 LBS | HEART RATE: 93 BPM | TEMPERATURE: 97.2 F | OXYGEN SATURATION: 96 % | DIASTOLIC BLOOD PRESSURE: 85 MMHG | BODY MASS INDEX: 28.89 KG/M2 | RESPIRATION RATE: 16 BRPM | SYSTOLIC BLOOD PRESSURE: 132 MMHG

## 2021-10-27 DIAGNOSIS — L02.214 CUTANEOUS ABSCESS OF GROIN: ICD-10-CM

## 2021-10-27 PROCEDURE — 10061 I&D ABSCESS COMP/MULTIPLE: CPT

## 2021-10-27 PROCEDURE — 99203 OFFICE O/P NEW LOW 30 MIN: CPT | Mod: 25

## 2021-10-28 RX ORDER — CEPHALEXIN 500 MG/1
500 TABLET ORAL EVERY 8 HOURS
Qty: 15 | Refills: 0 | Status: DISCONTINUED | COMMUNITY
Start: 2021-10-27 | End: 2021-10-28

## 2021-10-28 RX ORDER — DULAGLUTIDE 0.75 MG/.5ML
0.75 INJECTION, SOLUTION SUBCUTANEOUS
Qty: 1 | Refills: 5 | Status: DISCONTINUED | COMMUNITY
Start: 2020-10-30 | End: 2021-10-28

## 2021-10-28 NOTE — HISTORY OF PRESENT ILLNESS
[FreeTextEntry1] : Diabetes F/u Patient HPI\par \par CC\par Patient referred by Dr. Hirsch for diabetes management.\par \par He recently had a achilles tendon- surgery \par \par Recently has amputation of the right foot toes ( amputations) \par 3-4 weeks since then \par He has been with physical therapy since then.\par \par HPI:\par \par Duration of Diabetes: 18 years \par Is patient on Insulin? Yes \par If yes, how long on insulin? 10 years \par \par List Current Medications for Glycemic control and the doses:\par 1- Tresiba 45 units at bedtime \par 2- Novolog 24-30 units before a meal \par 3- Trulicity 1.5 mcg weekly\par \par \par SMBG (self monitored blood glucose) readings: \par - Name of glucometer: \par - How often does the patient check BG? 4 times per day \par - Does the patient keep a log? \par \par If detailed record is available, what is the range of most of the BG readings?\par - Before Breakfast: 180-200\par - Before Lunch: 180-200\par - Before Dinner: 180-200\par - Before Bedtime:190-200\par \par Does patient get Hypoglycemic episodes? 49 this am \par If yes how frequent? once a week \par Hoe low do the BG readings reach? \par When do most of those episodes occur? In the morning \par What symptoms does the patient get during those episodes? \par \par Diabetic Complications: Is patient aware of having any of those complications?\par - Eyes: Retinopathy? Proliferative retinopathy (laser treatment in the right eye ( 3 tx) and left eye ( 3 tx) -completed and did 9 treatments \par  	When was the last fully dilated eye exam? Dr. Newsome 06/2021\par - Feet: 	Neuropathy? Yes \par  	Foot Ulcers? Yes \par 	When was the last time patient saw a Podiatrist? 2 weeks ago- developed a blister on the left foot \par - Kidneys: Nephropathy? None \par \par \par Diet: review patient's diet: \par Breakfast: Whole wheat bread, scrambled eggs, oatmeal, frozen fruit, Kashi cereal \par Lunch: [place] ( whole wheat bread) Turkey, ham, apple \par Dinner: Fish, vegetables, small portions of rice, black beans \par Juice or soda: Crystal light, diet soda \par \par \par \par Exercise: review patient exercise habits: Not much \par \par

## 2021-10-28 NOTE — REVIEW OF SYSTEMS
[Fatigue] : no fatigue [Decreased Appetite] : appetite not decreased [Recent Weight Gain (___ Lbs)] : no recent weight gain [Recent Weight Loss (___ Lbs)] : no recent weight loss [Visual Field Defect] : no visual field defect [Dry Eyes] : no dryness [Dysphagia] : no dysphagia [Neck Pain] : no neck pain [Dysphonia] : no dysphonia [Nasal Congestion] : no nasal congestion [Chest Pain] : no chest pain [Slow Heart Rate] : heart rate is not slow [Palpitations] : no palpitations [Fast Heart Rate] : heart rate is not fast [Shortness Of Breath] : no shortness of breath [Cough] : no cough [Nausea] : no nausea [Constipation] : no constipation [Vomiting] : no vomiting [Diarrhea] : no diarrhea [Polyuria] : no polyuria [Hesistancy] : no hesitancy [Headaches] : no headaches [Dizziness] : no dizziness [Tremors] : no tremors [Pain/Numbness of Digits] : no pain/numbness of digits [Depression] : no depression [Polydipsia] : no polydipsia [Cold Intolerance] : no cold intolerance [Easy Bleeding] : no ~M tendency for easy bleeding [Easy Bruising] : no tendency for easy bruising

## 2021-10-28 NOTE — ASSESSMENT
[FreeTextEntry1] : 51 year old male with history of uncontrolled DM Type 2 ( long term with insulin requirement), recent right foot toe amputation, HLD here for f/u\par HgA1C today is 9.5%\par carb consistent diet was discussed, has seen CDE previously \par HgA1C goal of <7% \par \par -Increase Basaglar to 50 units at bedtime\par -Increase Trulicity 3.0 mcg weekly \par -Novolog 24-24-24 with low dose correctional scale \par -Start Metformin 500 mg BID \par -Continue Carb consistent diet \par -For now medication compliance and diet compliance is heavily emphasized \par \par Diabetic retinopathy\par -Close follow up encouraged\par -Hypo and hyperglycemic states should be avoided \par -HgA1C goal of <7% \par \par Amputation \par -Keep HgA1C <7% \par -Close outpatient follow up with Dr. Lott \par \par HLD \par -LDL is well controlled \par \par Follow up in 4 months. \par  \par

## 2021-11-09 RX ORDER — DULAGLUTIDE 1.5 MG/.5ML
1.5 INJECTION, SOLUTION SUBCUTANEOUS
Qty: 1 | Refills: 3 | Status: DISCONTINUED | COMMUNITY
Start: 2021-10-20 | End: 2021-11-09

## 2021-12-11 NOTE — ED PROVIDER NOTE - COVID-19 RESULT
Received patient in ICU; Afebrile; O2 on 3L; Fluid bolus infusing. Levo gtt titrate per protocol. Afib, rate controlled within defined parameter by cards. Alert and oriented ; drowsy. Very little urine production. Bladder scan very low. NPO.  Swallow eval t NEGATIVE

## 2022-01-06 NOTE — PROGRESS NOTE ADULT - PROBLEM SELECTOR PLAN 2
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 - Podiatry recs appreciated, s/p RF partial 2nd ray resection w ADDISON and free flap open medial (5/28/21)  - Per podiatry, low concern for residual bone infection, moderate concern for flap viability  - NWB to RLE in a CAM boot  - Prelim bone culture staph hemolyticus  - If clean bone cx s2 days, then can discharge on PO Augmentin 1 x 1week  - C/w zosyn for now and DC vanco as abscess cx w/ strep   - BCx NGTD, wound cultures w/ mod strep and coag neg staph Samel

## 2022-02-11 ENCOUNTER — APPOINTMENT (OUTPATIENT)
Dept: ENDOCRINOLOGY | Facility: CLINIC | Age: 52
End: 2022-02-11
Payer: COMMERCIAL

## 2022-02-11 VITALS
TEMPERATURE: 97.1 F | SYSTOLIC BLOOD PRESSURE: 140 MMHG | OXYGEN SATURATION: 96 % | HEIGHT: 73 IN | DIASTOLIC BLOOD PRESSURE: 80 MMHG | HEART RATE: 99 BPM | BODY MASS INDEX: 30.22 KG/M2 | WEIGHT: 228 LBS

## 2022-02-11 PROCEDURE — 99214 OFFICE O/P EST MOD 30 MIN: CPT | Mod: 25

## 2022-02-11 PROCEDURE — 83036 HEMOGLOBIN GLYCOSYLATED A1C: CPT | Mod: QW

## 2022-02-11 PROCEDURE — 82962 GLUCOSE BLOOD TEST: CPT

## 2022-02-11 NOTE — ASSESSMENT
[FreeTextEntry1] : 51 year old male with history of uncontrolled DM Type 2 ( long term with insulin requirement), recent right foot toe amputation, HLD here for f/u\par HgA1C today is 9.5%\par carb consistent diet was discussed, has seen CDE previously \par HgA1C goal of <7% \par \par -Increase Tresiba to 60 units at bedtime\par -Continue  Trulicity 3.0 mcg weekly \par -Novolog 24-24-24 with low dose correctional scale \par -Start Metformin 500 mg BID \par -Continue Carb consistent diet \par -For now medication compliance and diet compliance is heavily emphasized \par \par Diabetic retinopathy\par -Close follow up encouraged\par -Hypo and hyperglycemic states should be avoided \par -HgA1C goal of <7% \par \par Amputation \par -Keep HgA1C <7% \par -Close outpatient follow up with Dr. Lott \par \par HLD \par -LDL is well controlled \par \par Follow up in 4 months. \par  \par

## 2022-02-11 NOTE — PHYSICAL EXAM
[Alert] : alert [Well Nourished] : well nourished [No Acute Distress] : no acute distress [Normal Sclera/Conjunctiva] : normal sclera/conjunctiva [EOMI] : extra ocular movement intact [PERRL] : pupils equal, round and reactive to light [Normal Outer Ear/Nose] : the ears and nose were normal in appearance [Normal Hearing] : hearing was normal [Normal TMs] : both tympanic membranes were normal [No Neck Mass] : no neck mass was observed [Thyroid Not Enlarged] : the thyroid was not enlarged [No Respiratory Distress] : no respiratory distress [Clear to Auscultation] : lungs were clear to auscultation bilaterally [Normal S1, S2] : normal S1 and S2 [Normal Rate] : heart rate was normal [Regular Rhythm] : with a regular rhythm [Normal Bowel Sounds] : normal bowel sounds [Not Tender] : non-tender [Soft] : abdomen soft [Normal Gait] : normal gait [No Rash] : no rash [No Skin Lesions] : no skin lesions [No Motor Deficits] : the motor exam was normal [Normal Reflexes] : deep tendon reflexes were 2+ and symmetric [No Tremors] : no tremors [Oriented x3] : oriented to person, place, and time [Normal Affect] : the affect was normal [Normal Insight/Judgement] : insight and judgment were intact [Normal Mood] : the mood was normal

## 2022-02-11 NOTE — HISTORY OF PRESENT ILLNESS
[FreeTextEntry1] : Diabetes F/u Patient HPI\par \par He reported that he got COVID at end of December \par He was off of insulin in January for 2 weeks \par He reported that he has been missing trulicity \par \par \par CC\par Patient referred by Dr. Hirsch for diabetes management.\par \par He recently had a achilles tendon- surgery \par \par Recently has amputation of the right foot toes ( amputations) \par 3-4 weeks since then \par He has been with physical therapy since then.\par \par HPI:\par \par Duration of Diabetes: 18 years \par Is patient on Insulin? Yes \par If yes, how long on insulin? 10 years \par \par List Current Medications for Glycemic control and the doses:\par 1- Tresiba 55-60 units at bedtime \par 2- Novolog 26-30 units before a meal \par 3- Trulicity 3.0 mcg weekly\par 4- Metformin 500 mg BID \par \par \par SMBG (self monitored blood glucose) readings: \par - Name of glucometer: \par - How often does the patient check BG? 4 times per day \par - Does the patient keep a log? \par \par If detailed record is available, what is the range of most of the BG readings?\par - Before Breakfast: 180-210\par - Before Lunch: 180-200\par - Before Dinner: 180-200\par - Before Bedtime:190-200\par \par Does patient get Hypoglycemic episodes?None \par \par Diabetic Complications: Is patient aware of having any of those complications?\par - Eyes: Retinopathy? Proliferative retinopathy (laser treatment in the right eye ( 3 tx) and left eye ( 3 tx) -completed and did 9 treatments. Eye injection in the retina ( and may need laser again) \par  	When was the last fully dilated eye exam? Dr. Newsome 02/2022- will be following up in 6-8 weeks \par - Feet: 	Neuropathy? Yes \par  	Foot Ulcers? Yes \par 	When was the last time patient saw a Podiatrist? 12/2021\par - Kidneys: Nephropathy? None \par \par \par Diet: review patient's diet: \par Breakfast: Whole wheat bread, scrambled eggs, oatmeal, frozen fruit, Kashi cereal \par Lunch: [place] ( whole wheat bread) Turkey, ham, apple \par Dinner: Fish, vegetables, small portions of rice, black beans \par Juice or soda: Crystal light, diet soda \par \par \par \par Exercise: review patient exercise habits: Not much \par \par \par

## 2022-02-12 LAB
ALBUMIN SERPL ELPH-MCNC: 4.8 G/DL
ALP BLD-CCNC: 61 U/L
ALT SERPL-CCNC: 33 U/L
ANION GAP SERPL CALC-SCNC: 13 MMOL/L
AST SERPL-CCNC: 8 U/L
BILIRUB SERPL-MCNC: 0.4 MG/DL
BUN SERPL-MCNC: 27 MG/DL
CALCIUM SERPL-MCNC: 9.9 MG/DL
CHLORIDE SERPL-SCNC: 99 MMOL/L
CHOLEST SERPL-MCNC: 203 MG/DL
CO2 SERPL-SCNC: 24 MMOL/L
CREAT SERPL-MCNC: 1.16 MG/DL
ESTIMATED AVERAGE GLUCOSE: 235 MG/DL
GLUCOSE BLDC GLUCOMTR-MCNC: 179
GLUCOSE SERPL-MCNC: 142 MG/DL
HBA1C MFR BLD HPLC: 9.5
HBA1C MFR BLD HPLC: 9.8 %
HDLC SERPL-MCNC: 33 MG/DL
LDLC SERPL CALC-MCNC: 116 MG/DL
NONHDLC SERPL-MCNC: 170 MG/DL
POTASSIUM SERPL-SCNC: 4.8 MMOL/L
PROT SERPL-MCNC: 7.2 G/DL
SODIUM SERPL-SCNC: 136 MMOL/L
TRIGL SERPL-MCNC: 274 MG/DL

## 2022-02-28 ENCOUNTER — APPOINTMENT (OUTPATIENT)
Dept: INTERNAL MEDICINE | Facility: CLINIC | Age: 52
End: 2022-02-28

## 2022-04-28 ENCOUNTER — APPOINTMENT (OUTPATIENT)
Dept: INTERNAL MEDICINE | Facility: CLINIC | Age: 52
End: 2022-04-28
Payer: COMMERCIAL

## 2022-04-28 ENCOUNTER — NON-APPOINTMENT (OUTPATIENT)
Age: 52
End: 2022-04-28

## 2022-04-28 VITALS
WEIGHT: 218 LBS | HEART RATE: 92 BPM | DIASTOLIC BLOOD PRESSURE: 70 MMHG | BODY MASS INDEX: 28.89 KG/M2 | HEIGHT: 73 IN | OXYGEN SATURATION: 96 % | SYSTOLIC BLOOD PRESSURE: 110 MMHG | TEMPERATURE: 97.8 F

## 2022-04-28 DIAGNOSIS — Z87.891 PERSONAL HISTORY OF NICOTINE DEPENDENCE: ICD-10-CM

## 2022-04-28 DIAGNOSIS — M25.512 PAIN IN RIGHT SHOULDER: ICD-10-CM

## 2022-04-28 DIAGNOSIS — Z00.00 ENCOUNTER FOR GENERAL ADULT MEDICAL EXAMINATION W/OUT ABNORMAL FINDINGS: ICD-10-CM

## 2022-04-28 DIAGNOSIS — Z72.0 TOBACCO USE: ICD-10-CM

## 2022-04-28 DIAGNOSIS — Z23 ENCOUNTER FOR IMMUNIZATION: ICD-10-CM

## 2022-04-28 DIAGNOSIS — M25.511 PAIN IN RIGHT SHOULDER: ICD-10-CM

## 2022-04-28 PROCEDURE — 99396 PREV VISIT EST AGE 40-64: CPT | Mod: 25

## 2022-04-28 PROCEDURE — 90471 IMMUNIZATION ADMIN: CPT

## 2022-04-28 PROCEDURE — 90715 TDAP VACCINE 7 YRS/> IM: CPT

## 2022-04-28 RX ORDER — BUPROPION HYDROCHLORIDE 150 MG/1
150 TABLET, EXTENDED RELEASE ORAL DAILY
Qty: 90 | Refills: 0 | Status: DISCONTINUED | COMMUNITY
Start: 2021-06-25 | End: 2022-04-28

## 2022-04-28 RX ORDER — REPAGLINIDE 2 MG/1
2 TABLET ORAL
Qty: 90 | Refills: 0 | Status: DISCONTINUED | COMMUNITY
Start: 2021-01-12 | End: 2022-04-28

## 2022-04-28 NOTE — REVIEW OF SYSTEMS
[FreeTextEntry3] : Treated with laser for diabetic retinopathy by retinologist [FreeTextEntry9] : See the HPI [de-identified] : Peripheral neuropathy [de-identified] : Anger management

## 2022-04-28 NOTE — ASSESSMENT
[FreeTextEntry1] : The patient has anger issues and appears to have a somewhat depressed affect and I will start him on Zoloft and he will be referred to psychiatry.  He has severe diabetes with retinopathy , neuropathy status post amputation partial of the right foot.  We are checking his hemoglobin A1c as well as his sugars.  He has approximately 50 pack years of smoking and will need screening CAT scan of the chest for cancer which she can do next year.  He will need a colonoscopy which she is encouraged to get.  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  All screening tests were done as well as an EKG.  He sees endocrinology and he is encouraged to take Aleve twice a day for 10 days for shoulders and if he does not get better he will go to orthopedics.  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  His EKG shows nonspecific ST-T wave changes in the inferior lateral leads somewhat more marked than previously and in the future he should undergo stress thallium and be seen by the cardiologist

## 2022-04-28 NOTE — HISTORY OF PRESENT ILLNESS
[de-identified] : Patient is a 51-year-old male who comes in for complete physical examination.  He has been treated for type 1 diabetes mellitus complicated by retinopathy, partial amputation of the right foot and neuropathy.  He is under the care of endocrinology and uses a Dexcom and his sugars have been approximately 200 over the last 2 to 3 weeks.  He continues to smoke and has a total of approximately 30 pack years but denies smoker's cough or shortness of breath.  He is to get a masters in tax administration and is to get an immunization form filled out as well as get a DTaP and QuantiFERON gold.  His major complaint at this point is anger management.  He had a problem with his son that he will go into in great detail but he would like to be placed on medication and see psychiatric evaluation.  He can walk approximately a quarter of a mile plus and has no chest pain palpitations swelling or fainting.  He denies GI symptoms and denies change in bowel blood in his stool or melena and has never had a colonoscopy.  He denies polydipsia polyuria polyphagia blood in his urine burning of urination but has had ED.  He has had bilateral Achilles surgery and finds it hard to get up on his toes.  He has chronic pain in both anterior shoulders for which she has not received treatment.  Strength is good.  He has had his COVID shots

## 2022-04-28 NOTE — PHYSICAL EXAM
[de-identified] : The patient is somewhat combative [de-identified] : Fundi as seen in the discs and vessels look good/no diabetic retinopathy is appreciated at this time [de-identified] : Status post uvulectomy [de-identified] : Increased expiratory phase without rales or rhonchi [de-identified] : No bruits and good peripheral pulses [FreeTextEntry1] : Refused [de-identified] : Refused [de-identified] : Pain on palpation of the anterior shoulders–probable tendinitis.  Good range of motion/partial amputation of the right foot [de-identified] : 00 reflexes in the legs but reasonable sensation to light touch throughout the legs [de-identified] : Patient has a somewhat depressed affect and has anger issues which he we will talk about to some degree [de-identified] : Patient will not take off his socks to be examined

## 2022-04-29 LAB
ALBUMIN SERPL ELPH-MCNC: 4.5 G/DL
ALP BLD-CCNC: 67 U/L
ALT SERPL-CCNC: 36 U/L
ANION GAP SERPL CALC-SCNC: 15 MMOL/L
APPEARANCE: CLEAR
AST SERPL-CCNC: 17 U/L
BACTERIA: NEGATIVE
BASOPHILS # BLD AUTO: 0.02 K/UL
BASOPHILS NFR BLD AUTO: 0.3 %
BILIRUB SERPL-MCNC: 0.4 MG/DL
BILIRUBIN URINE: NEGATIVE
BLOOD URINE: NORMAL
BUN SERPL-MCNC: 23 MG/DL
CALCIUM SERPL-MCNC: 9.4 MG/DL
CHLORIDE SERPL-SCNC: 101 MMOL/L
CHOLEST SERPL-MCNC: 206 MG/DL
CK SERPL-CCNC: 99 U/L
CO2 SERPL-SCNC: 22 MMOL/L
COLOR: YELLOW
CREAT SERPL-MCNC: 0.86 MG/DL
CRP SERPL HS-MCNC: 4.91 MG/L
CRP SERPL-MCNC: 6 MG/L
EGFR: 106 ML/MIN/1.73M2
EOSINOPHIL # BLD AUTO: 0.08 K/UL
EOSINOPHIL NFR BLD AUTO: 1.3 %
ESTIMATED AVERAGE GLUCOSE: 258 MG/DL
GLUCOSE QUALITATIVE U: ABNORMAL
GLUCOSE SERPL-MCNC: 276 MG/DL
HBA1C MFR BLD HPLC: 10.6 %
HCT VFR BLD CALC: 52.2 %
HDLC SERPL-MCNC: 34 MG/DL
HGB BLD-MCNC: 17.7 G/DL
HYALINE CASTS: 0 /LPF
IMM GRANULOCYTES NFR BLD AUTO: 0.5 %
KETONES URINE: NEGATIVE
LDLC SERPL CALC-MCNC: 140 MG/DL
LEUKOCYTE ESTERASE URINE: NEGATIVE
LYMPHOCYTES # BLD AUTO: 1.42 K/UL
LYMPHOCYTES NFR BLD AUTO: 22.4 %
MAN DIFF?: NORMAL
MCHC RBC-ENTMCNC: 30.7 PG
MCHC RBC-ENTMCNC: 33.9 GM/DL
MCV RBC AUTO: 90.5 FL
MEV IGG FLD QL IA: 7.4 AU/ML
MEV IGG+IGM SER-IMP: NEGATIVE
MICROSCOPIC-UA: NORMAL
MONOCYTES # BLD AUTO: 0.53 K/UL
MONOCYTES NFR BLD AUTO: 8.4 %
MUV AB SER-ACNC: POSITIVE
MUV IGG SER QL IA: 67.9 AU/ML
NEUTROPHILS # BLD AUTO: 4.25 K/UL
NEUTROPHILS NFR BLD AUTO: 67.1 %
NITRITE URINE: NEGATIVE
NONHDLC SERPL-MCNC: 172 MG/DL
PH URINE: 6
PLATELET # BLD AUTO: 137 K/UL
POTASSIUM SERPL-SCNC: 4.3 MMOL/L
PROT SERPL-MCNC: 7.1 G/DL
PROTEIN URINE: ABNORMAL
PSA SERPL-MCNC: 0.41 NG/ML
RBC # BLD: 5.77 M/UL
RBC # FLD: 12.5 %
RED BLOOD CELLS URINE: 1 /HPF
RUBV IGG FLD-ACNC: 17.4 INDEX
RUBV IGG SER-IMP: POSITIVE
SODIUM SERPL-SCNC: 139 MMOL/L
SPECIFIC GRAVITY URINE: 1.04
SQUAMOUS EPITHELIAL CELLS: 0 /HPF
TRIGL SERPL-MCNC: 161 MG/DL
TSH SERPL-ACNC: 2.94 UIU/ML
UROBILINOGEN URINE: NORMAL
WBC # FLD AUTO: 6.33 K/UL
WHITE BLOOD CELLS URINE: 1 /HPF

## 2022-05-02 LAB
M TB IFN-G BLD-IMP: NEGATIVE
QUANTIFERON TB PLUS MITOGEN MINUS NIL: 6.7 IU/ML
QUANTIFERON TB PLUS NIL: 0.04 IU/ML
QUANTIFERON TB PLUS TB1 MINUS NIL: -0.01 IU/ML
QUANTIFERON TB PLUS TB2 MINUS NIL: 0 IU/ML

## 2022-05-05 ENCOUNTER — APPOINTMENT (OUTPATIENT)
Dept: INTERNAL MEDICINE | Facility: CLINIC | Age: 52
End: 2022-05-05
Payer: COMMERCIAL

## 2022-05-05 VITALS
DIASTOLIC BLOOD PRESSURE: 80 MMHG | SYSTOLIC BLOOD PRESSURE: 140 MMHG | HEART RATE: 88 BPM | BODY MASS INDEX: 28.89 KG/M2 | WEIGHT: 218 LBS | TEMPERATURE: 98 F | HEIGHT: 73 IN | OXYGEN SATURATION: 98 %

## 2022-05-05 DIAGNOSIS — J20.9 ACUTE BRONCHITIS, UNSPECIFIED: ICD-10-CM

## 2022-05-05 PROCEDURE — 90471 IMMUNIZATION ADMIN: CPT

## 2022-05-05 PROCEDURE — 90707 MMR VACCINE SC: CPT

## 2022-05-05 PROCEDURE — 99213 OFFICE O/P EST LOW 20 MIN: CPT | Mod: 25

## 2022-05-05 NOTE — ASSESSMENT
[FreeTextEntry1] : The patient is given his MRI and evaluated for a productive cough in a smoker and diabetic who has taken amoxicillin for 2 days on his own.  He does appear to be getting better with lighter sputum and he has had no fever.  He does not have symptoms consistent with COVID.  We will treat him for 7 further days with full dose of amoxicillin and if he is not better he will get tested for COVID and get a chest x-ray

## 2022-05-05 NOTE — HISTORY OF PRESENT ILLNESS
[FreeTextEntry8] : Patient is given an MMR vaccine as rubeola titer was negative.  He is given his slip for [facility].  While the patient was being given a shot he states he wants to be seen as he had developed a productive cough without other focalizing symptoms.  He is a smoker and has gotten his COVID vaccines and has no other symptoms of COVID such as loss of sense of taste or smell aches and pains headaches or fever.  The patient is taking amoxicillin at zone just 1 tablet a day for 2 days and his sputum is getting lighter.  He does note wheeze but no shortness of breath

## 2022-05-05 NOTE — PHYSICAL EXAM
[No Acute Distress] : no acute distress [Well Nourished] : well nourished [Well Developed] : well developed [Well-Appearing] : well-appearing [Normal Sclera/Conjunctiva] : normal sclera/conjunctiva [PERRL] : pupils equal round and reactive to light [EOMI] : extraocular movements intact [Normal Outer Ear/Nose] : the outer ears and nose were normal in appearance [Normal Oropharynx] : the oropharynx was normal [No JVD] : no jugular venous distention [No Lymphadenopathy] : no lymphadenopathy [Supple] : supple [Thyroid Normal, No Nodules] : the thyroid was normal and there were no nodules present [No Respiratory Distress] : no respiratory distress  [No Accessory Muscle Use] : no accessory muscle use [Normal] : no respiratory distress, lungs were clear to auscultation bilaterally and no accessory muscle use [Normal Rate] : normal rate  [Regular Rhythm] : with a regular rhythm [Normal S1, S2] : normal S1 and S2 [No Murmur] : no murmur heard [No Carotid Bruits] : no carotid bruits [No Abdominal Bruit] : a ~M bruit was not heard ~T in the abdomen [No Varicosities] : no varicosities [Pedal Pulses Present] : the pedal pulses are present [No Edema] : there was no peripheral edema [No Palpable Aorta] : no palpable aorta [No Extremity Clubbing/Cyanosis] : no extremity clubbing/cyanosis [Soft] : abdomen soft [Non Tender] : non-tender [Non-distended] : non-distended [No Masses] : no abdominal mass palpated [No HSM] : no HSM [Normal Bowel Sounds] : normal bowel sounds [Normal Posterior Cervical Nodes] : no posterior cervical lymphadenopathy [Normal Anterior Cervical Nodes] : no anterior cervical lymphadenopathy [No CVA Tenderness] : no CVA  tenderness [No Spinal Tenderness] : no spinal tenderness [No Joint Swelling] : no joint swelling [Grossly Normal Strength/Tone] : grossly normal strength/tone [No Rash] : no rash [Coordination Grossly Intact] : coordination grossly intact [No Focal Deficits] : no focal deficits [Normal Gait] : normal gait [Deep Tendon Reflexes (DTR)] : deep tendon reflexes were 2+ and symmetric [Normal Affect] : the affect was normal [Normal Insight/Judgement] : insight and judgment were intact [de-identified] : Nontoxic afebrile but coughing [de-identified] : Coarse breath sounds and prolonged expiratory phase

## 2022-05-11 ENCOUNTER — RX RENEWAL (OUTPATIENT)
Age: 52
End: 2022-05-11

## 2022-05-24 ENCOUNTER — RX CHANGE (OUTPATIENT)
Age: 52
End: 2022-05-24

## 2022-05-24 RX ORDER — SERTRALINE HYDROCHLORIDE 50 MG/1
50 TABLET, FILM COATED ORAL
Qty: 90 | Refills: 2 | Status: ACTIVE | COMMUNITY
Start: 2022-05-24 | End: 1900-01-01

## 2022-05-24 RX ORDER — SERTRALINE HYDROCHLORIDE 50 MG/1
50 TABLET, FILM COATED ORAL DAILY
Qty: 30 | Refills: 3 | Status: DISCONTINUED | COMMUNITY
Start: 2022-04-28 | End: 2022-05-24

## 2022-06-06 ENCOUNTER — RX RENEWAL (OUTPATIENT)
Age: 52
End: 2022-06-06

## 2022-06-21 ENCOUNTER — RESULT CHARGE (OUTPATIENT)
Age: 52
End: 2022-06-21

## 2022-06-22 ENCOUNTER — APPOINTMENT (OUTPATIENT)
Dept: CARDIOLOGY | Facility: CLINIC | Age: 52
End: 2022-06-22
Payer: COMMERCIAL

## 2022-06-22 ENCOUNTER — INPATIENT (INPATIENT)
Facility: HOSPITAL | Age: 52
LOS: 2 days | Discharge: HOME | DRG: 270 | End: 2022-06-25
Attending: HOSPITALIST | Admitting: INTERNAL MEDICINE
Payer: MEDICAID

## 2022-06-22 ENCOUNTER — NON-APPOINTMENT (OUTPATIENT)
Age: 52
End: 2022-06-22

## 2022-06-22 VITALS
OXYGEN SATURATION: 96 % | SYSTOLIC BLOOD PRESSURE: 107 MMHG | DIASTOLIC BLOOD PRESSURE: 72 MMHG | BODY MASS INDEX: 27.71 KG/M2 | HEART RATE: 98 BPM | WEIGHT: 210 LBS

## 2022-06-22 VITALS
HEIGHT: 73 IN | RESPIRATION RATE: 18 BRPM | HEART RATE: 95 BPM | OXYGEN SATURATION: 97 % | TEMPERATURE: 98 F | DIASTOLIC BLOOD PRESSURE: 73 MMHG | SYSTOLIC BLOOD PRESSURE: 103 MMHG

## 2022-06-22 DIAGNOSIS — Z89.429 ACQUIRED ABSENCE OF OTHER TOE(S), UNSPECIFIED SIDE: Chronic | ICD-10-CM

## 2022-06-22 DIAGNOSIS — R07.9 CHEST PAIN, UNSPECIFIED: ICD-10-CM

## 2022-06-22 DIAGNOSIS — I21.3 ST ELEVATION (STEMI) MYOCARDIAL INFARCTION OF UNSPECIFIED SITE: ICD-10-CM

## 2022-06-22 LAB
ALBUMIN SERPL ELPH-MCNC: 4.5 G/DL — SIGNIFICANT CHANGE UP (ref 3.3–5)
ALP SERPL-CCNC: 73 U/L — SIGNIFICANT CHANGE UP (ref 40–120)
ALT FLD-CCNC: 23 U/L — SIGNIFICANT CHANGE UP (ref 10–45)
ANION GAP SERPL CALC-SCNC: 14 MMOL/L — SIGNIFICANT CHANGE UP (ref 5–17)
APTT BLD: 31.9 SEC — SIGNIFICANT CHANGE UP (ref 27.5–35.5)
AST SERPL-CCNC: 36 U/L — SIGNIFICANT CHANGE UP (ref 10–40)
BASOPHILS # BLD AUTO: 0.02 K/UL — SIGNIFICANT CHANGE UP (ref 0–0.2)
BASOPHILS NFR BLD AUTO: 0.2 % — SIGNIFICANT CHANGE UP (ref 0–2)
BILIRUB SERPL-MCNC: 0.8 MG/DL — SIGNIFICANT CHANGE UP (ref 0.2–1.2)
BLD GP AB SCN SERPL QL: NEGATIVE — SIGNIFICANT CHANGE UP
BUN SERPL-MCNC: 19 MG/DL — SIGNIFICANT CHANGE UP (ref 7–23)
CALCIUM SERPL-MCNC: 9.6 MG/DL — SIGNIFICANT CHANGE UP (ref 8.4–10.5)
CHLORIDE SERPL-SCNC: 96 MMOL/L — SIGNIFICANT CHANGE UP (ref 96–108)
CO2 SERPL-SCNC: 24 MMOL/L — SIGNIFICANT CHANGE UP (ref 22–31)
CREAT SERPL-MCNC: 0.94 MG/DL — SIGNIFICANT CHANGE UP (ref 0.5–1.3)
EGFR: 99 ML/MIN/1.73M2 — SIGNIFICANT CHANGE UP
EOSINOPHIL # BLD AUTO: 0.03 K/UL — SIGNIFICANT CHANGE UP (ref 0–0.5)
EOSINOPHIL NFR BLD AUTO: 0.4 % — SIGNIFICANT CHANGE UP (ref 0–6)
GLUCOSE SERPL-MCNC: 218 MG/DL — HIGH (ref 70–99)
HCT VFR BLD CALC: 48.6 % — SIGNIFICANT CHANGE UP (ref 39–50)
HGB BLD-MCNC: 16 G/DL — SIGNIFICANT CHANGE UP (ref 13–17)
IMM GRANULOCYTES NFR BLD AUTO: 0.6 % — SIGNIFICANT CHANGE UP (ref 0–1.5)
INR BLD: 1.1 RATIO — SIGNIFICANT CHANGE UP (ref 0.88–1.16)
LYMPHOCYTES # BLD AUTO: 1.39 K/UL — SIGNIFICANT CHANGE UP (ref 1–3.3)
LYMPHOCYTES # BLD AUTO: 16.3 % — SIGNIFICANT CHANGE UP (ref 13–44)
MCHC RBC-ENTMCNC: 30.3 PG — SIGNIFICANT CHANGE UP (ref 27–34)
MCHC RBC-ENTMCNC: 32.9 GM/DL — SIGNIFICANT CHANGE UP (ref 32–36)
MCV RBC AUTO: 92 FL — SIGNIFICANT CHANGE UP (ref 80–100)
MONOCYTES # BLD AUTO: 0.92 K/UL — HIGH (ref 0–0.9)
MONOCYTES NFR BLD AUTO: 10.8 % — SIGNIFICANT CHANGE UP (ref 2–14)
NEUTROPHILS # BLD AUTO: 6.14 K/UL — SIGNIFICANT CHANGE UP (ref 1.8–7.4)
NEUTROPHILS NFR BLD AUTO: 71.7 % — SIGNIFICANT CHANGE UP (ref 43–77)
NRBC # BLD: 0 /100 WBCS — SIGNIFICANT CHANGE UP (ref 0–0)
PLATELET # BLD AUTO: 141 K/UL — LOW (ref 150–400)
POTASSIUM SERPL-MCNC: 4.4 MMOL/L — SIGNIFICANT CHANGE UP (ref 3.5–5.3)
POTASSIUM SERPL-SCNC: 4.4 MMOL/L — SIGNIFICANT CHANGE UP (ref 3.5–5.3)
PROT SERPL-MCNC: 8.2 G/DL — SIGNIFICANT CHANGE UP (ref 6–8.3)
PROTHROM AB SERPL-ACNC: 12.7 SEC — SIGNIFICANT CHANGE UP (ref 10.5–13.4)
RBC # BLD: 5.28 M/UL — SIGNIFICANT CHANGE UP (ref 4.2–5.8)
RBC # FLD: 12.6 % — SIGNIFICANT CHANGE UP (ref 10.3–14.5)
RH IG SCN BLD-IMP: POSITIVE — SIGNIFICANT CHANGE UP
SARS-COV-2 RNA SPEC QL NAA+PROBE: SIGNIFICANT CHANGE UP
SODIUM SERPL-SCNC: 134 MMOL/L — LOW (ref 135–145)
TROPONIN T, HIGH SENSITIVITY RESULT: 696 NG/L — HIGH (ref 0–51)
WBC # BLD: 8.55 K/UL — SIGNIFICANT CHANGE UP (ref 3.8–10.5)
WBC # FLD AUTO: 8.55 K/UL — SIGNIFICANT CHANGE UP (ref 3.8–10.5)

## 2022-06-22 PROCEDURE — 99291 CRITICAL CARE FIRST HOUR: CPT | Mod: 25,GC

## 2022-06-22 PROCEDURE — 99291 CRITICAL CARE FIRST HOUR: CPT

## 2022-06-22 PROCEDURE — 99222 1ST HOSP IP/OBS MODERATE 55: CPT | Mod: 25

## 2022-06-22 PROCEDURE — 93000 ELECTROCARDIOGRAM COMPLETE: CPT

## 2022-06-22 PROCEDURE — 92941 PRQ TRLML REVSC TOT OCCL AMI: CPT | Mod: RC

## 2022-06-22 PROCEDURE — 33967 INSERT I-AORT PERCUT DEVICE: CPT

## 2022-06-22 PROCEDURE — 71045 X-RAY EXAM CHEST 1 VIEW: CPT | Mod: 26

## 2022-06-22 PROCEDURE — 93458 L HRT ARTERY/VENTRICLE ANGIO: CPT | Mod: 26,59

## 2022-06-22 PROCEDURE — 99152 MOD SED SAME PHYS/QHP 5/>YRS: CPT

## 2022-06-22 PROCEDURE — 99205 OFFICE O/P NEW HI 60 MIN: CPT | Mod: 25

## 2022-06-22 RX ORDER — DEXTROSE 50 % IN WATER 50 %
12.5 SYRINGE (ML) INTRAVENOUS ONCE
Refills: 0 | Status: DISCONTINUED | OUTPATIENT
Start: 2022-06-22 | End: 2022-06-25

## 2022-06-22 RX ORDER — SODIUM CHLORIDE 9 MG/ML
1000 INJECTION, SOLUTION INTRAVENOUS
Refills: 0 | Status: DISCONTINUED | OUTPATIENT
Start: 2022-06-22 | End: 2022-06-25

## 2022-06-22 RX ORDER — CHLORHEXIDINE GLUCONATE 213 G/1000ML
1 SOLUTION TOPICAL AT BEDTIME
Refills: 0 | Status: DISCONTINUED | OUTPATIENT
Start: 2022-06-22 | End: 2022-06-24

## 2022-06-22 RX ORDER — INSULIN GLARGINE 100 [IU]/ML
30 INJECTION, SOLUTION SUBCUTANEOUS AT BEDTIME
Refills: 0 | Status: DISCONTINUED | OUTPATIENT
Start: 2022-06-22 | End: 2022-06-25

## 2022-06-22 RX ORDER — DEXTROSE 50 % IN WATER 50 %
15 SYRINGE (ML) INTRAVENOUS ONCE
Refills: 0 | Status: DISCONTINUED | OUTPATIENT
Start: 2022-06-22 | End: 2022-06-25

## 2022-06-22 RX ORDER — INSULIN LISPRO 100/ML
VIAL (ML) SUBCUTANEOUS AT BEDTIME
Refills: 0 | Status: DISCONTINUED | OUTPATIENT
Start: 2022-06-22 | End: 2022-06-25

## 2022-06-22 RX ORDER — GLUCAGON INJECTION, SOLUTION 0.5 MG/.1ML
1 INJECTION, SOLUTION SUBCUTANEOUS ONCE
Refills: 0 | Status: DISCONTINUED | OUTPATIENT
Start: 2022-06-22 | End: 2022-06-25

## 2022-06-22 RX ORDER — DEXTROSE 50 % IN WATER 50 %
25 SYRINGE (ML) INTRAVENOUS ONCE
Refills: 0 | Status: DISCONTINUED | OUTPATIENT
Start: 2022-06-22 | End: 2022-06-25

## 2022-06-22 RX ORDER — TICAGRELOR 90 MG/1
90 TABLET ORAL EVERY 12 HOURS
Refills: 0 | Status: DISCONTINUED | OUTPATIENT
Start: 2022-06-23 | End: 2022-06-25

## 2022-06-22 RX ORDER — INSULIN LISPRO 100/ML
VIAL (ML) SUBCUTANEOUS
Refills: 0 | Status: DISCONTINUED | OUTPATIENT
Start: 2022-06-22 | End: 2022-06-25

## 2022-06-22 RX ORDER — TICAGRELOR 90 MG/1
180 TABLET ORAL ONCE
Refills: 0 | Status: COMPLETED | OUTPATIENT
Start: 2022-06-22 | End: 2022-06-22

## 2022-06-22 RX ORDER — HEPARIN SODIUM 5000 [USP'U]/ML
5000 INJECTION INTRAVENOUS; SUBCUTANEOUS ONCE
Refills: 0 | Status: COMPLETED | OUTPATIENT
Start: 2022-06-22 | End: 2022-06-22

## 2022-06-22 RX ORDER — ASPIRIN/CALCIUM CARB/MAGNESIUM 324 MG
324 TABLET ORAL ONCE
Refills: 0 | Status: COMPLETED | OUTPATIENT
Start: 2022-06-22 | End: 2022-06-22

## 2022-06-22 RX ORDER — ATORVASTATIN CALCIUM 80 MG/1
80 TABLET, FILM COATED ORAL AT BEDTIME
Refills: 0 | Status: DISCONTINUED | OUTPATIENT
Start: 2022-06-22 | End: 2022-06-25

## 2022-06-22 RX ORDER — ASPIRIN/CALCIUM CARB/MAGNESIUM 324 MG
81 TABLET ORAL DAILY
Refills: 0 | Status: DISCONTINUED | OUTPATIENT
Start: 2022-06-23 | End: 2022-06-25

## 2022-06-22 RX ORDER — LANOLIN ALCOHOL/MO/W.PET/CERES
5 CREAM (GRAM) TOPICAL AT BEDTIME
Refills: 0 | Status: DISCONTINUED | OUTPATIENT
Start: 2022-06-22 | End: 2022-06-25

## 2022-06-22 RX ORDER — INSULIN LISPRO 100/ML
10 VIAL (ML) SUBCUTANEOUS
Refills: 0 | Status: DISCONTINUED | OUTPATIENT
Start: 2022-06-22 | End: 2022-06-25

## 2022-06-22 RX ADMIN — TICAGRELOR 180 MILLIGRAM(S): 90 TABLET ORAL at 17:22

## 2022-06-22 RX ADMIN — Medication 10 UNIT(S): at 21:37

## 2022-06-22 RX ADMIN — HEPARIN SODIUM 5000 UNIT(S): 5000 INJECTION INTRAVENOUS; SUBCUTANEOUS at 17:22

## 2022-06-22 RX ADMIN — Medication 324 MILLIGRAM(S): at 17:16

## 2022-06-22 RX ADMIN — Medication 5 MILLIGRAM(S): at 22:52

## 2022-06-22 RX ADMIN — ATORVASTATIN CALCIUM 80 MILLIGRAM(S): 80 TABLET, FILM COATED ORAL at 21:35

## 2022-06-22 RX ADMIN — CHLORHEXIDINE GLUCONATE 1 APPLICATION(S): 213 SOLUTION TOPICAL at 21:40

## 2022-06-22 RX ADMIN — Medication 2: at 21:35

## 2022-06-22 RX ADMIN — INSULIN GLARGINE 30 UNIT(S): 100 INJECTION, SOLUTION SUBCUTANEOUS at 21:39

## 2022-06-22 NOTE — ED PROVIDER NOTE - PROGRESS NOTE DETAILS
Cards at bedside Spoke to cards, they are aware of STEMI. Will be at beside shortly. Kelli Reddy M.D. Tox Fellow   Pt on way on cath lab with ED RN, tech and cards fellow. Vitals stable. Cards fellow at bedside

## 2022-06-22 NOTE — ED PROVIDER NOTE - NSICDXPASTMEDICALHX_GEN_ALL_CORE_FT
PAST MEDICAL HISTORY:  CAD (coronary artery disease)     Diabetes     Diabetic foot ulcer     HTN (hypertension)        The patient is a 89y Male complaining of abdominal pain.

## 2022-06-22 NOTE — ED PROVIDER NOTE - ATTENDING CONTRIBUTION TO CARE
Patient seen and evaluated with resident/NP/PA, however HPI, ROS, PE and MDM as documented authored by myself unless otherwise noted- Robson Gomez MD    Patient acutely ill with STEMI with active chest pain, requiring emergent medication loading, cardiology consultation and admission for emergent cath.

## 2022-06-22 NOTE — ED ADULT NURSE NOTE - OBJECTIVE STATEMENT
Pt is a 50 y/o male c/o midsternal chest pain worse on exertion intermittently x1 wk worst today, went to outpatient provider today who sent pt to ED for concern for STEMI. Pt has hx CAD with stents, takes aspirin and plavix. Cardiology at bedside came to bring pt emergently to Cath Lab after IVs placed, labs and covid swab sent. Medicated pt as per MD orders of brilinta, aspirin, and heparin IVP state no drip at this time as pt being brought upstairs.

## 2022-06-22 NOTE — ED PROVIDER NOTE - CLINICAL SUMMARY MEDICAL DECISION MAKING FREE TEXT BOX
Robson Gomez (MD): 51 M with PMHx of CAD s/p LAD stent 12 years ago on Plavix and 81mg ASA presents to the ED with exertional intermittent chest pain x1 week. EKG here is concerning for STEMI. Vital signs stable. Given Hx, cards consult obtained, heparin loaded, already on Plavix. Will require admission vs cath lab. Will reassess.

## 2022-06-22 NOTE — H&P ADULT - NSICDXFAMILYHX_GEN_ALL_CORE_FT
FAMILY HISTORY:  No pertinent family history in first degree relatives     FAMILY HISTORY:  Father  Still living? Unknown  Family history of early CAD, Age at diagnosis: Age Unknown

## 2022-06-22 NOTE — H&P ADULT - NSHPREVIEWOFSYSTEMS_GEN_ALL_CORE
REVIEW OF SYSTEMS:    CONSTITUTIONAL:  No weight loss, fever, chills, weakness or fatigue.  HEENT:  Eyes:  No visual loss, blurred vision, double vision or yellow sclerae. Ears, Nose, Throat:  No hearing loss, sneezing, congestion, runny nose or sore throat.  SKIN:  No rash or itching.  CARDIOVASCULAR:  No chest pain, chest pressure or chest discomfort. No palpitations or edema.  RESPIRATORY:  No shortness of breath, cough or sputum.  GASTROINTESTINAL:  No anorexia, nausea, vomiting or diarrhea. No abdominal pain or blood.  NEUROLOGICAL:  No headache, dizziness, syncope, paralysis, ataxia, numbness or tingling in the extremities. No change in bowel or bladder control.  MUSCULOSKELETAL:  No muscle, back pain, joint pain or stiffness.  HEMATOLOGIC:  No anemia, bleeding or bruising.  LYMPHATICS:  No enlarged nodes. No history of splenectomy.  PSYCHIATRIC:  No history of depression or anxiety.  ENDOCRINOLOGIC:  No reports of sweating, cold or heat intolerance. No polyuria or polydipsia.  ALLERGIES:  No history of asthma, hives, eczema or rhinitis.

## 2022-06-22 NOTE — PATIENT PROFILE ADULT - FALL HARM RISK - HARM RISK INTERVENTIONS

## 2022-06-22 NOTE — CHART NOTE - NSCHARTNOTEFT_GEN_A_CORE
Removal of Radial Band    Pulses in the right upper extremity are palpable. The patient was placed in the supine position. The insertion site was identified and the band deflated per protocol. The radial band was removed slowly. Direct pressure was applied for  ___0___ minutes/not applicable.      Monitoring of the right wrists and both upper extremities including neuro-vascular checks and vital signs every 15 minutes x 4.    Complications: None/Other    Comments: pt tolerated well, no hematoma or cyanosis noted.

## 2022-06-22 NOTE — H&P ADULT - ASSESSMENT
52 y/o active smoking male with PMH of CAD s/p stent x2 (LAD >10 years), DM, diabetic foot ulcer s/p amputation, and HTN presented with intermittent chest pain for 1 week. Had LUIS on EKG and went emergently to the cath lab.  s/p PCI with SIERRA x1 pRCA 95%, SIERRA x1 dRCA, LAD 70% and Cx 70%, IABP was placed for hypotension.  Transferred to CICU for further management.    #Neuro  A+Ox3  -no focal deficits    #Resp  Saturating >94% on room air  -no active issues     #Cardiac   STEMI  -s/p PCI with SIERRA x1 pRCA 95%, SIERRA x1 dRCA, LAD 70% and Cx 70%, IABP was placed for hypotension.   -maintain IABP 1:1, trending lactate/CMP, daily CXR for placement  -will resume heparin gtt once radial band is removed  -c/w DAPT and high dose statin   -holding off on BB due to hypotension in the lab   -trending CE till peak  -TTE in the AM     #Renal   no active issues     #GI  started PO diet     #Heme  Stable H/H  -no active issues     #ID  afebrile, no leukocytosis  -no active issues     #Endo  DM  -f/u hgb A1c on admission  -started lantus 30U and pre meal novolog coverage 10U  -started ISS pre meal and at bedtime

## 2022-06-22 NOTE — H&P ADULT - NSHPPHYSICALEXAM_GEN_ALL_CORE
CONSTITUTIONAL: Well groomed, no apparent distress    EYES: PERRLA and symmetric, EOMI, No conjunctival or scleral injection, non-icteric  ENMT: Oral mucosa with moist membranes. No external nasal lesions; nasal mucosa not inflamed; normal dentition; no pharyngeal injection or exudates.   NECK: Supple, symmetric and without tracheal deviation; thyroid gland not enlarged and without palpable masses  RESPIRATORY: No respiratory distress, no use of accessory muscles; CTA b/l, no wheezes, rales or rhonchi, no dullness or hyperresonance to percussion, no tactile fremitus, no subcutaneous emphysema  CARDIOVASCULAR: RRRR, +S1S2, no murmurs, no rubs, no gallops; no JVD; no peripheral edema  Vascular: no carotid bruits; no abdominal bruit; carotid pulse palpable, radial pulse palpable, femoral pulse palpable, dorsalis pedis pulse palpable, posterior tibialis pulse palpable  GASTROINTESTINAL: Soft, non tender, non distended, no rebound, no guarding; No palpable masses; no hepatosplenomegaly;   LYMPHATIC: No cervical LAD or tenderness; no axillary LAD or tenderness; no inguinal LAD or tenderness  MUSCULOSKELETAL:  no digital clubbing or cyanosis; examination of the (head/neck, spine/ribs/pelvis, RUE, LUE, RLE, LLE) without misalignment, no spinal tenderness, normal muscle strength/tone  SKIN: No rashes or ulcers noted; no subcutaneous nodules or induration palpable  NEUROLOGIC: CN II-XII intact; normal reflexes in upper and lower extremities, sensation intact in upper and lower extremities b/l to light touch;   PSYCHIATRIC: Appropriate insight/judgment; A+O x 3, mood and affect appropriate, recent/remote memory intact

## 2022-06-22 NOTE — ED PROVIDER NOTE - OBJECTIVE STATEMENT
51 M with PMHx of CAD s/p stent x2 (12 years) on Plavix and 81mg asa, DMT2, HTN, longstanding h/o diabetic foot ulcers s/p R 3rd and 4th partial ray resections presents to the ED c/o L sided exertional chest pain worsening today. Pt reports endorsing CP one week ago that has been intermittent in nature and began while at work. Went to see cardiologist outpatient today and was referred to the ED to r/o MI. States pain is not similar to when he needed stent in the past. 51 M with PMHx of CAD s/p stent x2 (12 years) on Plavix and 81mg asa, DMT2, HTN, longstanding h/o diabetic foot ulcers s/p R 3rd and 4th partial ray resections presents to the ED c/o L sided exertional chest pain worsening today. Pt reports endorsing CP one week ago that has been intermittent in nature and began while at work. Went to see cardiologist outpatient today and was referred to the ED to r/o MI. States pain is not similar to when he needed stent in the past.  Now pain constant.  No associated nausea/vomiting/dyspnea.

## 2022-06-22 NOTE — HISTORY OF PRESENT ILLNESS
[FreeTextEntry1] : 51-year-old male with known CAD being seen in urgent cardiac evaluation because of episodes of chest pain.  He has been having episodes during the past few days.  These have primarily occurred at rest, last about 15 minutes at a time, and are described as pressing and retrosternal.  There is no shortness of breath or diaphoresis.  He called his internist who suggested he go to the ER.  He was reluctant but agreed to come to a cardiologist. \par \par  He has a history of an LAD stent in the past.  He is a poorly controlled insulin-dependent diabetic with numerous complications from his diabetes.  His most recent A1c was 10.6.  He has retinopathy and peripheral vascular disease with a transmetatarsal amputation for osteomyelitis.\par \par Is also hypercholesterolemic with a non-HDL cholesterol of 170 on rosuvastatin 40.

## 2022-06-22 NOTE — DISCUSSION/SUMMARY
[FreeTextEntry1] : In summary, Mr. García is a 51-year-old male with known CAD who has been experiencing episodes of chest pressure for the past few days.  He has no chest pressure at present.  His exam shows regular rhythm, clear lungs, and a normal cardiac exam.  His EKG shows Q waves and ST segment elevations in leads II, 3, and F which are new.\par \par He has had an acute STEMI sometime in the past few days.  He needs urgent hospitalization and angiography and was told to go directly to the emergency room.\par \par

## 2022-06-22 NOTE — H&P ADULT - NSHPLABSRESULTS_GEN_ALL_CORE
16.0   8.55  )-----------( 141      ( 22 Jun 2022 17:40 )             48.6       06-22    134<L>  |  96  |  19  ----------------------------<  218<H>  4.4   |  24  |  0.94    Ca    9.6      22 Jun 2022 17:40    TPro  8.2  /  Alb  4.5  /  TBili  0.8  /  DBili  x   /  AST  36  /  ALT  23  /  AlkPhos  73  06-22                  PT/INR - ( 22 Jun 2022 17:40 )   PT: 12.7 sec;   INR: 1.10 ratio         PTT - ( 22 Jun 2022 17:40 )  PTT:31.9 sec    Lactate Trend            CAPILLARY BLOOD GLUCOSE

## 2022-06-22 NOTE — CHART NOTE - NSCHARTNOTEFT_GEN_A_CORE
Registration Time:  Initial EC:04 pm  Called by ED: 5:08 pm  Saw patient at bedside: 5:11 pm  Called Cath Attendin:18    Patient seen and evaluated at bedside    Chief Complaint:    HPI:  51M with PMH CAD s/p stents x 2 (>10years ago), uncontrolled T2DM on insulin, HTN, longstanding h/o diabetic foot ulcers s/p amputation presenting with recurrent chest pain x1 week. Patient reports worsening substernal pain with exertion. Has had daily episodes x1 week. Longest episode was today x2 hours with another recurrence that brought him to the ED. Patient reports being on Plavix/ASA, was loaded with ASA, Brilinta, and started on heparin drip.    EKG shows ST elevations in inferior leads with R sided EKG showing v4 elevation. Patient taken emergently for cath due to ongoing chest pain.       PMHx:   CAD (coronary artery disease)    Diabetes    HTN (hypertension)    Diabetic foot ulcer        PSHx:   No significant past surgical history    Status post amputation of toe      Allergies:  No Known Allergies    Physical Exam:  T(F): 98.2 (-), Max: 98.2 (-)  HR: 87 (-) (87 - 95)  BP: 123/88 (-) (103/73 - 123/88)  RR: 22 (-)  SpO2: 94% (-)  GENERAL: No acute distress, well-developed  HEAD:  Atraumatic  ENT: No JVD,  CHEST/LUNG: Clear to auscultation bilaterally; No wheeze, equal breath sounds bilaterally   BACK: No spinal tenderness  HEART: Regular rate and rhythm;   ABDOMEN: Soft, Nontender, Nondistended; Bowel sounds present  EXTREMITIES:  lower ext amputation   PSYCH: Nl behavior, nl affect  NEUROLOGY: AAOx3, non-focal, cranial nerves intact  SKIN: Normal color, No rashes or lesions  LINES:    Cardiovascular Diagnostic Testing:    ECG: as above     Labs: Personally reviewed Registration Time: 4:58 pm  Initial EC:04 pm  Called by ED: 5:08 pm  Saw patient at bedside: 5:11 pm  Called Cath Attendin:18 PM         Patient seen and evaluated at bedside    Chief Complaint:    HPI:  51M with PMH CAD s/p stents x 2 (>10years ago), uncontrolled T2DM on insulin, HTN, longstanding h/o diabetic foot ulcers s/p amputation presenting with recurrent chest pain x1 week. Patient reports worsening substernal pain with exertion. Has had daily episodes x1 week. Longest episode was today x2 hours with another recurrence that brought him to the ED. Patient reports being on Plavix/ASA, was loaded with ASA, Brilinta, and started on heparin drip.    EKG shows ST elevations in inferior leads with R sided EKG showing v4 elevation. Patient taken emergently for cath due to ongoing chest pain.       PMHx:   CAD (coronary artery disease)    Diabetes    HTN (hypertension)    Diabetic foot ulcer        PSHx:   No significant past surgical history    Status post amputation of toe      Allergies:  No Known Allergies    Physical Exam:  T(F): 98.2 (-), Max: 98.2 (-)  HR: 87 (-) (87 - 95)  BP: 123/88 (-) (103/73 - 123/88)  RR: 22 (-)  SpO2: 94% (-)  GENERAL: No acute distress, well-developed  HEAD:  Atraumatic  ENT: No JVD,  CHEST/LUNG: Clear to auscultation bilaterally; No wheeze, equal breath sounds bilaterally   BACK: No spinal tenderness  HEART: Regular rate and rhythm;   ABDOMEN: Soft, Nontender, Nondistended; Bowel sounds present  EXTREMITIES:  lower ext amputation   PSYCH: Nl behavior, nl affect  NEUROLOGY: AAOx3, non-focal, cranial nerves intact  SKIN: Normal color, No rashes or lesions  LINES:    Cardiovascular Diagnostic Testing:    ECG: as above     Labs: Personally reviewed Registration Time: 4:58 pm  Initial EC:04 pm  Called by ED: 5:08 pm  Saw patient at bedside: 5:11 pm  Called Cath Attendin:18 PM         Patient seen and evaluated at bedside    Chief Complaint:    HPI:  51M with PMH CAD s/p stents x 2 (>10years ago), uncontrolled T2DM on insulin, HTN, longstanding h/o diabetic foot ulcers s/p amputation presenting with recurrent chest pain x1 week. Patient reports worsening substernal pain with exertion. Has had daily episodes x1 week. Longest episode was today x2 hours with another recurrence that brought him to the ED. Patient reports being on Plavix/ASA, was loaded with ASA, Brilinta, and started on heparin drip.    EKG shows ST elevations in inferior leads with R sided EKG showing v4 elevation. Patient taken emergently for cath due to ongoing chest pain.       PMHx:   CAD (coronary artery disease)    Diabetes    HTN (hypertension)    Diabetic foot ulcer        PSHx:   No significant past surgical history    Status post amputation of toe      Allergies:  No Known Allergies    Physical Exam:  T(F): 98.2 (-), Max: 98.2 (-)  HR: 87 (-) (87 - 95)  BP: 123/88 (-) (103/73 - 123/88)  RR: 22 (-)  SpO2: 94% (-)  GENERAL: No acute distress, well-developed  HEAD:  Atraumatic  ENT: No JVD,  CHEST/LUNG: Clear to auscultation bilaterally; No wheeze, equal breath sounds bilaterally   BACK: No spinal tenderness  HEART: Regular rate and rhythm;   ABDOMEN: Soft, Nontender, Nondistended; Bowel sounds present  EXTREMITIES:  toe amputation   PSYCH: Nl behavior, nl affect  NEUROLOGY: AAOx3, non-focal, cranial nerves intact  SKIN: Normal color, No rashes or lesions  LINES:    Cardiovascular Diagnostic Testing:    ECG: as above     Labs: Personally reviewed Registration Time: 4:58 pm  Initial EC:04 pm  Called by ED: 5:08 pm  Saw patient at bedside: 5:11 pm  Called Cath Attendin:18 PM         Patient seen and evaluated at bedside    Chief Complaint:    HPI:  51M with PMH CAD s/p stents x 2 (>10years ago), uncontrolled T2DM on insulin, HTN, longstanding h/o diabetic foot ulcers s/p amputation presenting with recurrent chest pain x1 week. Patient reports worsening substernal pain with exertion. Has had daily episodes x1 week. Longest episode was today x2 hours with another recurrence that brought him to the ED. Patient reports being on Plavix/ASA, was loaded with ASA, Brilinta, and started on heparin drip.    EKG shows ST elevations in inferior leads with R sided EKG showing v4 elevation. Patient taken emergently for cath due to ongoing chest pain.       PMHx:   CAD (coronary artery disease)    Diabetes    HTN (hypertension)    Diabetic foot ulcer        PSHx:   No significant past surgical history    Status post amputation of toe      Allergies:  No Known Allergies    Physical Exam:  T(F): 98.2 (-), Max: 98.2 (-)  HR: 87 (-) (87 - 95)  BP: 123/88 (-) (103/73 - 123/88)  RR: 22 (-)  SpO2: 94% (-)  GENERAL: No acute distress, well-developed  HEAD:  Atraumatic  ENT: No JVD,  CHEST/LUNG: Clear to auscultation bilaterally; No wheeze, equal breath sounds bilaterally   BACK: No spinal tenderness  HEART: Regular rate and rhythm;   ABDOMEN: Soft, Nontender, Nondistended; Bowel sounds present  EXTREMITIES:  toe amputation   PSYCH: Nl behavior, nl affect  NEUROLOGY: AAOx3, non-focal, cranial nerves intact  SKIN: Normal color, No rashes or lesions  LINES:    Cardiovascular Diagnostic Testing:    ECG: as above     Labs: Personally reviewed    Attending Addendum:    I have personally seen, examined and participated in the care of this patient. I have reviewed all pertinent clinical information, including history, physical exam, plan and the fellow's note. I agree with the fellow's note with the following additions:    Admitted with inferior wall STEMI s/p PCI complicated by hypotension requiring IABP  Residual LAD disease that may go for staging  DAPT with ASA, Ticagrelor   Lipitor 80  Blood pressure is borderline low despite IABP support, chest pain free - defer beta blocker, maintain IABP  Check TTE  Trend cardiac enzymes until peak  O2 sats mid to high 90s on nasal cannula  Normal renal function  H/H acceptable  COVID negative, no antibiotics   Sugars borderline controlled, check Hgb A1c   IABP     The patient required critical care management and I personally provided 35 minutes of non-continuous care to the patient concurrently with the resident/fellow/nurse practitioner, excluding separate procedures and time spent teaching, in addition to discussing the patient and plan at length with the CICU staff and helping coordinate care.

## 2022-06-22 NOTE — ED PROVIDER NOTE - PHYSICAL EXAMINATION
General: Patient well appearing, vital signs within normal limits  HEENT: airway patent with moist mucous membranes  Cardiac: RRR S1/S2 with strong peripheral pulses  Respiratory: lungs clear without respiratory distress  GI: abdomen soft, non tender, non distended  Neuro: no gross neurologic deficits  Skin: warm, well perfused  Psych: normal mood and affect

## 2022-06-22 NOTE — H&P ADULT - HISTORY OF PRESENT ILLNESS
51 year old active smoking male with PMH of CAD s/p stent x2 (LAD >10 years), DM, diabetic foot ulcer s/p amputation, and HTN presented with intermittent chest pain for 1 week.  Pt indicates it was a dull, non radiating pain, which was different from his previous MI pain.  He called his PCP who then got him an appointment with a cardiologist today.  EKG at the office showed EKG changes and was told to present to the ED.  Pt drove himself to [facility] ED, where EKG showed LUIS in inferior leads and was emergently taken to the cath lab.  s/p PCI with SIERRA x1 pRCA 95%, SIERRA x1 dRCA, LAD 70% and Cx 70%, IABP was placed for hypotension.

## 2022-06-23 LAB
A1C WITH ESTIMATED AVERAGE GLUCOSE RESULT: 11.7 % — HIGH (ref 4–5.6)
ALBUMIN SERPL ELPH-MCNC: 3.8 G/DL — SIGNIFICANT CHANGE UP (ref 3.3–5)
ALBUMIN SERPL ELPH-MCNC: 3.8 G/DL — SIGNIFICANT CHANGE UP (ref 3.3–5)
ALP SERPL-CCNC: 60 U/L — SIGNIFICANT CHANGE UP (ref 40–120)
ALP SERPL-CCNC: 60 U/L — SIGNIFICANT CHANGE UP (ref 40–120)
ALT FLD-CCNC: 19 U/L — SIGNIFICANT CHANGE UP (ref 10–45)
ALT FLD-CCNC: 19 U/L — SIGNIFICANT CHANGE UP (ref 10–45)
ANION GAP SERPL CALC-SCNC: 12 MMOL/L — SIGNIFICANT CHANGE UP (ref 5–17)
ANION GAP SERPL CALC-SCNC: 13 MMOL/L — SIGNIFICANT CHANGE UP (ref 5–17)
APTT BLD: 31.5 SEC — SIGNIFICANT CHANGE UP (ref 27.5–35.5)
AST SERPL-CCNC: 31 U/L — SIGNIFICANT CHANGE UP (ref 10–40)
AST SERPL-CCNC: 33 U/L — SIGNIFICANT CHANGE UP (ref 10–40)
BILIRUB SERPL-MCNC: 0.5 MG/DL — SIGNIFICANT CHANGE UP (ref 0.2–1.2)
BILIRUB SERPL-MCNC: 0.6 MG/DL — SIGNIFICANT CHANGE UP (ref 0.2–1.2)
BUN SERPL-MCNC: 23 MG/DL — SIGNIFICANT CHANGE UP (ref 7–23)
BUN SERPL-MCNC: 26 MG/DL — HIGH (ref 7–23)
CALCIUM SERPL-MCNC: 9 MG/DL — SIGNIFICANT CHANGE UP (ref 8.4–10.5)
CALCIUM SERPL-MCNC: 9 MG/DL — SIGNIFICANT CHANGE UP (ref 8.4–10.5)
CHLORIDE SERPL-SCNC: 96 MMOL/L — SIGNIFICANT CHANGE UP (ref 96–108)
CHLORIDE SERPL-SCNC: 98 MMOL/L — SIGNIFICANT CHANGE UP (ref 96–108)
CHOLEST SERPL-MCNC: 97 MG/DL — SIGNIFICANT CHANGE UP
CK MB BLD-MCNC: 3.1 % — SIGNIFICANT CHANGE UP (ref 0–3.5)
CK MB BLD-MCNC: 3.5 % — SIGNIFICANT CHANGE UP (ref 0–3.5)
CK MB CFR SERPL CALC: 10.3 NG/ML — HIGH (ref 0–6.7)
CK MB CFR SERPL CALC: 8.7 NG/ML — HIGH (ref 0–6.7)
CK SERPL-CCNC: 281 U/L — HIGH (ref 30–200)
CK SERPL-CCNC: 293 U/L — HIGH (ref 30–200)
CO2 SERPL-SCNC: 22 MMOL/L — SIGNIFICANT CHANGE UP (ref 22–31)
CO2 SERPL-SCNC: 23 MMOL/L — SIGNIFICANT CHANGE UP (ref 22–31)
CREAT SERPL-MCNC: 0.93 MG/DL — SIGNIFICANT CHANGE UP (ref 0.5–1.3)
CREAT SERPL-MCNC: 0.94 MG/DL — SIGNIFICANT CHANGE UP (ref 0.5–1.3)
EGFR: 101 ML/MIN/1.73M2 — SIGNIFICANT CHANGE UP
EGFR: 99 ML/MIN/1.73M2 — SIGNIFICANT CHANGE UP
ESTIMATED AVERAGE GLUCOSE: 289 MG/DL — HIGH (ref 68–114)
GLUCOSE BLDC GLUCOMTR-MCNC: 117 MG/DL — HIGH (ref 70–99)
GLUCOSE BLDC GLUCOMTR-MCNC: 152 MG/DL — HIGH (ref 70–99)
GLUCOSE BLDC GLUCOMTR-MCNC: 168 MG/DL — HIGH (ref 70–99)
GLUCOSE BLDC GLUCOMTR-MCNC: 215 MG/DL — HIGH (ref 70–99)
GLUCOSE BLDC GLUCOMTR-MCNC: 319 MG/DL — HIGH (ref 70–99)
GLUCOSE SERPL-MCNC: 229 MG/DL — HIGH (ref 70–99)
GLUCOSE SERPL-MCNC: 251 MG/DL — HIGH (ref 70–99)
HCT VFR BLD CALC: 40.6 % — SIGNIFICANT CHANGE UP (ref 39–50)
HCT VFR BLD CALC: 42.4 % — SIGNIFICANT CHANGE UP (ref 39–50)
HDLC SERPL-MCNC: 31 MG/DL — LOW
HGB BLD-MCNC: 13.9 G/DL — SIGNIFICANT CHANGE UP (ref 13–17)
HGB BLD-MCNC: 14.5 G/DL — SIGNIFICANT CHANGE UP (ref 13–17)
LACTATE SERPL-SCNC: 1.1 MMOL/L — SIGNIFICANT CHANGE UP (ref 0.7–2)
LIPID PNL WITH DIRECT LDL SERPL: 34 MG/DL — SIGNIFICANT CHANGE UP
MAGNESIUM SERPL-MCNC: 2 MG/DL — SIGNIFICANT CHANGE UP (ref 1.6–2.6)
MAGNESIUM SERPL-MCNC: 2 MG/DL — SIGNIFICANT CHANGE UP (ref 1.6–2.6)
MCHC RBC-ENTMCNC: 31.1 PG — SIGNIFICANT CHANGE UP (ref 27–34)
MCHC RBC-ENTMCNC: 31.1 PG — SIGNIFICANT CHANGE UP (ref 27–34)
MCHC RBC-ENTMCNC: 34.2 GM/DL — SIGNIFICANT CHANGE UP (ref 32–36)
MCHC RBC-ENTMCNC: 34.2 GM/DL — SIGNIFICANT CHANGE UP (ref 32–36)
MCV RBC AUTO: 90.8 FL — SIGNIFICANT CHANGE UP (ref 80–100)
MCV RBC AUTO: 91 FL — SIGNIFICANT CHANGE UP (ref 80–100)
NON HDL CHOLESTEROL: 65 MG/DL — SIGNIFICANT CHANGE UP
NRBC # BLD: 0 /100 WBCS — SIGNIFICANT CHANGE UP (ref 0–0)
NRBC # BLD: 0 /100 WBCS — SIGNIFICANT CHANGE UP (ref 0–0)
PHOSPHATE SERPL-MCNC: 2.8 MG/DL — SIGNIFICANT CHANGE UP (ref 2.5–4.5)
PHOSPHATE SERPL-MCNC: 3.3 MG/DL — SIGNIFICANT CHANGE UP (ref 2.5–4.5)
PLATELET # BLD AUTO: 138 K/UL — LOW (ref 150–400)
PLATELET # BLD AUTO: 140 K/UL — LOW (ref 150–400)
POTASSIUM SERPL-MCNC: 4.1 MMOL/L — SIGNIFICANT CHANGE UP (ref 3.5–5.3)
POTASSIUM SERPL-MCNC: 4.4 MMOL/L — SIGNIFICANT CHANGE UP (ref 3.5–5.3)
POTASSIUM SERPL-SCNC: 4.1 MMOL/L — SIGNIFICANT CHANGE UP (ref 3.5–5.3)
POTASSIUM SERPL-SCNC: 4.4 MMOL/L — SIGNIFICANT CHANGE UP (ref 3.5–5.3)
PROT SERPL-MCNC: 6.8 G/DL — SIGNIFICANT CHANGE UP (ref 6–8.3)
PROT SERPL-MCNC: 7 G/DL — SIGNIFICANT CHANGE UP (ref 6–8.3)
RBC # BLD: 4.47 M/UL — SIGNIFICANT CHANGE UP (ref 4.2–5.8)
RBC # BLD: 4.66 M/UL — SIGNIFICANT CHANGE UP (ref 4.2–5.8)
RBC # FLD: 12.5 % — SIGNIFICANT CHANGE UP (ref 10.3–14.5)
RBC # FLD: 12.6 % — SIGNIFICANT CHANGE UP (ref 10.3–14.5)
SODIUM SERPL-SCNC: 132 MMOL/L — LOW (ref 135–145)
SODIUM SERPL-SCNC: 132 MMOL/L — LOW (ref 135–145)
TRIGL SERPL-MCNC: 156 MG/DL — HIGH
TROPONIN T, HIGH SENSITIVITY RESULT: 1290 NG/L — HIGH (ref 0–51)
TROPONIN T, HIGH SENSITIVITY RESULT: 1402 NG/L — HIGH (ref 0–51)
TSH SERPL-MCNC: 2.52 UIU/ML — SIGNIFICANT CHANGE UP (ref 0.27–4.2)
WBC # BLD: 8.27 K/UL — SIGNIFICANT CHANGE UP (ref 3.8–10.5)
WBC # BLD: 8.95 K/UL — SIGNIFICANT CHANGE UP (ref 3.8–10.5)
WBC # FLD AUTO: 8.27 K/UL — SIGNIFICANT CHANGE UP (ref 3.8–10.5)
WBC # FLD AUTO: 8.95 K/UL — SIGNIFICANT CHANGE UP (ref 3.8–10.5)

## 2022-06-23 PROCEDURE — 71045 X-RAY EXAM CHEST 1 VIEW: CPT | Mod: 26,77

## 2022-06-23 PROCEDURE — 99292 CRITICAL CARE ADDL 30 MIN: CPT | Mod: GC,25

## 2022-06-23 PROCEDURE — 71045 X-RAY EXAM CHEST 1 VIEW: CPT | Mod: 26

## 2022-06-23 PROCEDURE — 99291 CRITICAL CARE FIRST HOUR: CPT | Mod: GC,25

## 2022-06-23 PROCEDURE — 93010 ELECTROCARDIOGRAM REPORT: CPT

## 2022-06-23 PROCEDURE — 99152 MOD SED SAME PHYS/QHP 5/>YRS: CPT

## 2022-06-23 PROCEDURE — 92928 PRQ TCAT PLMT NTRAC ST 1 LES: CPT | Mod: LD

## 2022-06-23 PROCEDURE — 93306 TTE W/DOPPLER COMPLETE: CPT | Mod: 26

## 2022-06-23 PROCEDURE — 99291 CRITICAL CARE FIRST HOUR: CPT

## 2022-06-23 RX ORDER — ACETAMINOPHEN 500 MG
975 TABLET ORAL ONCE
Refills: 0 | Status: COMPLETED | OUTPATIENT
Start: 2022-06-23 | End: 2022-06-23

## 2022-06-23 RX ORDER — HEPARIN SODIUM 5000 [USP'U]/ML
1500 INJECTION INTRAVENOUS; SUBCUTANEOUS
Qty: 25000 | Refills: 0 | Status: DISCONTINUED | OUTPATIENT
Start: 2022-06-23 | End: 2022-06-25

## 2022-06-23 RX ORDER — MIRTAZAPINE 45 MG/1
15 TABLET, ORALLY DISINTEGRATING ORAL AT BEDTIME
Refills: 0 | Status: DISCONTINUED | OUTPATIENT
Start: 2022-06-23 | End: 2022-06-25

## 2022-06-23 RX ADMIN — INSULIN GLARGINE 30 UNIT(S): 100 INJECTION, SOLUTION SUBCUTANEOUS at 22:12

## 2022-06-23 RX ADMIN — Medication 975 MILLIGRAM(S): at 10:03

## 2022-06-23 RX ADMIN — TICAGRELOR 90 MILLIGRAM(S): 90 TABLET ORAL at 05:58

## 2022-06-23 RX ADMIN — ATORVASTATIN CALCIUM 80 MILLIGRAM(S): 80 TABLET, FILM COATED ORAL at 22:13

## 2022-06-23 RX ADMIN — HEPARIN SODIUM 18 UNIT(S)/HR: 5000 INJECTION INTRAVENOUS; SUBCUTANEOUS at 10:58

## 2022-06-23 RX ADMIN — HEPARIN SODIUM 15 UNIT(S)/HR: 5000 INJECTION INTRAVENOUS; SUBCUTANEOUS at 03:01

## 2022-06-23 RX ADMIN — Medication 10 UNIT(S): at 08:38

## 2022-06-23 RX ADMIN — Medication 2: at 12:39

## 2022-06-23 RX ADMIN — TICAGRELOR 90 MILLIGRAM(S): 90 TABLET ORAL at 17:27

## 2022-06-23 RX ADMIN — Medication 10 UNIT(S): at 12:35

## 2022-06-23 RX ADMIN — Medication 975 MILLIGRAM(S): at 10:40

## 2022-06-23 RX ADMIN — Medication 2: at 08:39

## 2022-06-23 RX ADMIN — CHLORHEXIDINE GLUCONATE 1 APPLICATION(S): 213 SOLUTION TOPICAL at 22:41

## 2022-06-23 RX ADMIN — Medication 10 UNIT(S): at 17:27

## 2022-06-23 RX ADMIN — MIRTAZAPINE 15 MILLIGRAM(S): 45 TABLET, ORALLY DISINTEGRATING ORAL at 23:35

## 2022-06-23 RX ADMIN — Medication 81 MILLIGRAM(S): at 12:35

## 2022-06-23 NOTE — PROGRESS NOTE ADULT - ASSESSMENT
52 y/o active smoking male with PMH of CAD s/p stent x2 (LAD >10 years), DM, diabetic foot ulcer s/p amputation, and HTN presented with intermittent chest pain for 1 week. Had LUIS on EKG and went emergently to the cath lab.  s/p PCI with SIERRA x1 pRCA 95%, SIERRA x1 dRCA, LAD 70% and Cx 70%, IABP was placed for hypotension.  Transferred to CICU for further management.    #Neuro  A+Ox3  -no focal deficits    #Resp  Saturating >94% on room air  -no active issues     #Cardiac   STEMI  -s/p PCI with SIERRA x1 pRCA 95%, SIERRA x1 dRCA, LAD 70% and Cx 70%, IABP was placed for hypotension.   -maintain IABP 1:1, trending lactate/CMP, daily CXR for placement  -will resume heparin gtt once radial band is removed  -c/w DAPT and high dose statin   -holding off on BB due to hypotension in the lab   -trending Cardiac Enzymes till peak  -TTE in the AM  -CXR for balloon pump position    #Renal   no active issues     #GI  started PO diet     #Heme  Stable H/H  -no active issues     #ID  afebrile, no leukocytosis  -no active issues     #Endo  DM  -started lantus 30U and pre meal novolog coverage 10U  -started ISS pre meal and at bedtime   52 y/o active smoking male with PMH of CAD s/p stent x2 (LAD >10 years), DM, diabetic foot ulcer s/p amputation, and HTN presented with intermittent chest pain for 1 week. Had LUIS on EKG and went emergently to the cath lab.  s/p PCI with SIERRA x1 pRCA 95%, SIERRA x1 dRCA, LAD 70% and Cx 70%, IABP was placed for hypotension.  Transferred to CICU for further management.    #Neuro  A+Ox4  -no focal deficits    #Resp  Saturating >94% on room air  -no active issues    #Cardiac   STEMI  -s/p PCI with SIERRA x1 pRCA 95%, SIERRA x1 dRCA, LAD 70% and Cx 70%, IABP was placed for hypotension.   -maintain IABP 1:1, trending lactate/CMP, daily CXR for placement  -will resume heparin gtt once radial band is removed  -c/w DAPT and high dose statin   -holding off on BB due to hypotension in the lab   -trend Cardiac Enzymes till peak  -TTE in the AM    #Renal   no active issues     #GI  started PO diet     #Heme  Stable H/H  -no active issues     #ID  afebrile, no leukocytosis  -no active issues     #Endo  DM  -started lantus 30U and pre meal novolog coverage 10U  -started ISS pre meal and at bedtime   50 y/o active smoking male with PMH of CAD s/p stent x2 (LAD >10 years), DM, diabetic foot ulcer s/p amputation, and HTN presented with intermittent chest pain for 1 week. Had LUIS on EKG and went emergently to the cath lab.  s/p PCI with SIERRA x1 pRCA 95%, SIERRA x1 dRCA, LAD 70% and Cx 70%, IABP was placed for hypotension.  Transferred to CICU for further management.    #Neuro  A+Ox4  -no focal deficits    #Resp  Saturating >94% on room air  -no active issues    #Cardiac   STEMI  -s/p PCI with SIERRA x1 pRCA 95%, SIERRA x1 dRCA, LAD 70% and Cx 70%, IABP was placed for hypotension.   -maintain IABP 1:1, trending lactate/CMP, daily CXR for placement  -will resume heparin gtt once radial band is removed  -c/w DAPT and high dose statin   -holding off on BB due to hypotension in the lab   -trend Cardiac Enzymes till peak  -TTE 6/23 EF 53%  - LAD staging  - Wean balloon pump once therapeutic on heparin    #Renal   no active issues     #GI  started PO diet     #Heme  Stable H/H  -no active issues   -need heparin therapeutic    #ID  afebrile, no leukocytosis  -no active issues     #Endo  DM  -started lantus 30U and pre meal novolog coverage 10U  -started ISS pre meal and at bedtime

## 2022-06-23 NOTE — PROGRESS NOTE ADULT - SUBJECTIVE AND OBJECTIVE BOX
PATIENT:  TIKI HOUSTON  [id number]    CHIEF COMPLAINT:  Patient is a 51y old  Male who presents with a chief complaint of Chest pain (2022 19:42)      INTERVAL HISTORY/OVERNIGHT EVENTS:  ***    MEDICATIONS:  MEDICATIONS  (STANDING):  aspirin  chewable 81 milliGRAM(s) Oral daily  atorvastatin 80 milliGRAM(s) Oral at bedtime  chlorhexidine 2% Cloths 1 Application(s) Topical at bedtime  dextrose 5%. 1000 milliLiter(s) (50 mL/Hr) IV Continuous <Continuous>  dextrose 5%. 1000 milliLiter(s) (100 mL/Hr) IV Continuous <Continuous>  dextrose 50% Injectable 25 Gram(s) IV Push once  dextrose 50% Injectable 12.5 Gram(s) IV Push once  dextrose 50% Injectable 25 Gram(s) IV Push once  glucagon  Injectable 1 milliGRAM(s) IntraMuscular once  heparin  Infusion 1500 Unit(s)/Hr (15 mL/Hr) IV Continuous <Continuous>  insulin glargine Injectable (LANTUS) 30 Unit(s) SubCutaneous at bedtime  insulin lispro (ADMELOG) corrective regimen sliding scale   SubCutaneous three times a day before meals  insulin lispro (ADMELOG) corrective regimen sliding scale   SubCutaneous at bedtime  insulin lispro Injectable (ADMELOG) 10 Unit(s) SubCutaneous three times a day before meals  ticagrelor 90 milliGRAM(s) Oral every 12 hours    MEDICATIONS  (PRN):  dextrose Oral Gel 15 Gram(s) Oral once PRN Blood Glucose LESS THAN 70 milliGRAM(s)/deciliter  melatonin 5 milliGRAM(s) Oral at bedtime PRN Sleep      ALLERGIES:  Allergies    No Known Allergies    Intolerances        OBJECTIVE:  ICU Vital Signs Last 24 Hrs  T(C): 37.2 (2022 07:00), Max: 37.2 (2022 07:00)  T(F): 99 (2022 07:00), Max: 99 (2022 07:00)  HR: 77 (2022 08:00) (77 - 95)  BP: 123/88 (2022 17:20) (103/73 - 123/88)  BP(mean): --  ABP: --  ABP(mean): --  RR: 22 (2022 08:00) (16 - 26)  SpO2: 93% (2022 08:00) (91% - 97%)      Adult Advanced Hemodynamics Last 24 Hrs  CVP(mm Hg): --  CVP(cm H2O): --  CO: --  CI: --  PA: --  PA(mean): --  PCWP: --  SVR: --  SVRI: --  PVR: --  PVRI: --  CAPILLARY BLOOD GLUCOSE        CAPILLARY BLOOD GLUCOSE        I&O's Summary    2022 07:01  -  2022 07:00  --------------------------------------------------------  IN: 780 mL / OUT: 300 mL / NET: 480 mL      Daily Height in cm: 185.42 (2022 19:00)    Daily Weight in k.3 (2022 05:00)    PHYSICAL EXAMINATION:  General: WN/WD NAD  HEENT: PERRLA, EOMI, moist mucous membranes  Neurology: A&Ox3, nonfocal, THAKUR x 4  Respiratory: CTA B/L, normal respiratory effort, no wheezes, crackles, rales  CV: RRR, S1S2, no murmurs, rubs or gallops  Abdominal: Soft, NT, ND +BS, Last BM  Extremities: No edema, + peripheral pulses  Incisions:   Tubes:    LABS:                          13.9   8.95  )-----------( 138      ( 2022 05:11 )             40.6     06-23    132<L>  |  98  |  26<H>  ----------------------------<  229<H>  4.1   |  22  |  0.94    Ca    9.0      2022 05:11  Phos  3.3     06-23  Mg     2.0     06-23    TPro  7.0  /  Alb  3.8  /  TBili  0.6  /  DBili  x   /  AST  31  /  ALT  19  /  AlkPhos  60  06-23    LIVER FUNCTIONS - ( 2022 05:11 )  Alb: 3.8 g/dL / Pro: 7.0 g/dL / ALK PHOS: 60 U/L / ALT: 19 U/L / AST: 31 U/L / GGT: x           PT/INR - ( 2022 17:40 )   PT: 12.7 sec;   INR: 1.10 ratio         PTT - ( 2022 17:40 )  PTT:31.9 sec  CKMB Units: 8.7 ng/mL ( @ 05:11)  Creatine Kinase, Serum: 281 U/L ( @ 05:11)  CKMB Units: 10.3 ng/mL ( @ 23:39)  Creatine Kinase, Serum: 293 U/L ( @ 23:39)    CARDIAC MARKERS ( 2022 05:11 )  x     / x     / 281 U/L / x     / 8.7 ng/mL  CARDIAC MARKERS ( 2022 23:39 )  x     / x     / 293 U/L / x     / 10.3 ng/mL          TELEMETRY:     EKG:     IMAGING:       PATIENT:  TIKI HOUSTON  [id number]    CHIEF COMPLAINT:  Patient is a 51y old  Male who presents with a chief complaint of Chest pain (2022 19:42)      HPI:  51 year old active smoking male with PMH of CAD s/p stent x2 (LAD >10 years), DM, diabetic foot ulcer s/p amputation, and HTN presented with intermittent chest pain for 1 week.  Pt indicates it was a dull, non radiating pain, which was different from his previous MI pain.  He called his PCP who then got him an appointment with a cardiologist today.  EKG at the office showed EKG changes and was told to present to the ED.  Pt drove himself to [facility] ED, where EKG showed LUIS in inferior leads and was emergently taken to the cath lab.  s/p PCI with SIERRA x1 pRCA 95%, SIERRA x1 dRCA, LAD 70% and Cx 70%, IABP was placed for hypotension.   (2022 19:42)    INTERVAL HISTORY/OVERNIGHT EVENTS:  - pRCA stent  - dRCA stent    MEDICATIONS:  MEDICATIONS  (STANDING):  aspirin  chewable 81 milliGRAM(s) Oral daily  atorvastatin 80 milliGRAM(s) Oral at bedtime  chlorhexidine 2% Cloths 1 Application(s) Topical at bedtime  dextrose 5%. 1000 milliLiter(s) (50 mL/Hr) IV Continuous <Continuous>  dextrose 5%. 1000 milliLiter(s) (100 mL/Hr) IV Continuous <Continuous>  dextrose 50% Injectable 25 Gram(s) IV Push once  dextrose 50% Injectable 12.5 Gram(s) IV Push once  dextrose 50% Injectable 25 Gram(s) IV Push once  glucagon  Injectable 1 milliGRAM(s) IntraMuscular once  heparin  Infusion 1500 Unit(s)/Hr (15 mL/Hr) IV Continuous <Continuous>  insulin glargine Injectable (LANTUS) 30 Unit(s) SubCutaneous at bedtime  insulin lispro (ADMELOG) corrective regimen sliding scale   SubCutaneous three times a day before meals  insulin lispro (ADMELOG) corrective regimen sliding scale   SubCutaneous at bedtime  insulin lispro Injectable (ADMELOG) 10 Unit(s) SubCutaneous three times a day before meals  ticagrelor 90 milliGRAM(s) Oral every 12 hours    MEDICATIONS  (PRN):  dextrose Oral Gel 15 Gram(s) Oral once PRN Blood Glucose LESS THAN 70 milliGRAM(s)/deciliter  melatonin 5 milliGRAM(s) Oral at bedtime PRN Sleep      ALLERGIES:  Allergies    No Known Allergies    Intolerances    OBJECTIVE:  ICU Vital Signs Last 24 Hrs  T(C): 37.2 (2022 07:00), Max: 37.2 (2022 07:00)  T(F): 99 (2022 07:00), Max: 99 (2022 07:00)  HR: 77 (2022 08:00) (77 - 95)  BP: 123/88 (2022 17:20) (103/73 - 123/88)  BP(mean): --  ABP: --  ABP(mean): --  RR: 22 (2022 08:00) (16 - 26)  SpO2: 93% (2022 08:00) (91% - 97%)      Adult Advanced Hemodynamics Last 24 Hrs  CVP(mm Hg): --  CVP(cm H2O): --  CO: --  CI: --  PA: --  PA(mean): --  PCWP: --  SVR: --  SVRI: --  PVR: --  PVRI: --  CAPILLARY BLOOD GLUCOSE      I&O's Summary    2022 07:01  -  2022 07:00  --------------------------------------------------------  IN: 780 mL / OUT: 300 mL / NET: 480 mL      Daily Height in cm: 185.42 (2022 19:00)    Daily Weight in k.3 (2022 05:00)    PHYSICAL EXAMINATION:  General: WN/WD NAD  HEENT: PERRLA, EOMI, moist mucous membranes  Neurology: A&Ox4, THAKUR x 4  Respiratory: CTA B/L  CV: RRR, S1S2, no murmurs  Abdominal: Soft, NT, ND +BS, Last BM  Extremities: No edema, + peripheral pulses  Incisions:   Tubes:    LABS:                          13.9   8.95  )-----------( 138      ( 2022 05:11 )             40.6     06-23    132<L>  |  98  |  26<H>  ----------------------------<  229<H>  4.1   |  22  |  0.94    Ca    9.0      2022 05:11  Phos  3.3       Mg     2.0         TPro  7.0  /  Alb  3.8  /  TBili  0.6  /  DBili  x   /  AST  31  /  ALT  19  /  AlkPhos  60      LIVER FUNCTIONS - ( 2022 05:11 )  Alb: 3.8 g/dL / Pro: 7.0 g/dL / ALK PHOS: 60 U/L / ALT: 19 U/L / AST: 31 U/L / GGT: x           PT/INR - ( 2022 17:40 )   PT: 12.7 sec;   INR: 1.10 ratio         PTT - ( 2022 17:40 )  PTT:31.9 sec  CKMB Units: 8.7 ng/mL ( @ 05:11)  Creatine Kinase, Serum: 281 U/L ( @ 05:11)  CKMB Units: 10.3 ng/mL ( @ 23:39)  Creatine Kinase, Serum: 293 U/L ( @ 23:39)    CARDIAC MARKERS ( 2022 05:11 )  x     / x     / 281 U/L / x     / 8.7 ng/mL  CARDIAC MARKERS ( 2022 23:39 )  x     / x     / 293 U/L / x     / 10.3 ng/mL          TELEMETRY:     EKG:     IMAGING:       PATIENT:  TIKI HOUSTON  [id number]    CHIEF COMPLAINT:  Patient is a 51y old  Male who presents with a chief complaint of Chest pain (2022 19:42)      HPI:  51 year old active smoking male with PMH of CAD s/p stent x2 (LAD >10 years), DM, diabetic foot ulcer s/p amputation, and HTN presented with intermittent chest pain for 1 week.  Pt indicates it was a dull, non radiating pain, which was different from his previous MI pain.  He called his PCP who then got him an appointment with a cardiologist today.  EKG at the office showed EKG changes and was told to present to the ED.  Pt drove himself to [facility] ED, where EKG showed LUIS in inferior leads and was emergently taken to the cath lab.  s/p PCI with SIERRA x1 pRCA 95%, SIERRA x1 dRCA, LAD 70% and Cx 70%, IABP was placed for hypotension.   (2022 19:42)    INTERVAL HISTORY/OVERNIGHT EVENTS:  - pRCA stent  - dRCA stent    MEDICATIONS:  MEDICATIONS  (STANDING):  aspirin  chewable 81 milliGRAM(s) Oral daily  atorvastatin 80 milliGRAM(s) Oral at bedtime  chlorhexidine 2% Cloths 1 Application(s) Topical at bedtime  dextrose 5%. 1000 milliLiter(s) (50 mL/Hr) IV Continuous <Continuous>  dextrose 5%. 1000 milliLiter(s) (100 mL/Hr) IV Continuous <Continuous>  dextrose 50% Injectable 25 Gram(s) IV Push once  dextrose 50% Injectable 12.5 Gram(s) IV Push once  dextrose 50% Injectable 25 Gram(s) IV Push once  glucagon  Injectable 1 milliGRAM(s) IntraMuscular once  heparin  Infusion 1500 Unit(s)/Hr (15 mL/Hr) IV Continuous <Continuous>  insulin glargine Injectable (LANTUS) 30 Unit(s) SubCutaneous at bedtime  insulin lispro (ADMELOG) corrective regimen sliding scale   SubCutaneous three times a day before meals  insulin lispro (ADMELOG) corrective regimen sliding scale   SubCutaneous at bedtime  insulin lispro Injectable (ADMELOG) 10 Unit(s) SubCutaneous three times a day before meals  ticagrelor 90 milliGRAM(s) Oral every 12 hours    MEDICATIONS  (PRN):  dextrose Oral Gel 15 Gram(s) Oral once PRN Blood Glucose LESS THAN 70 milliGRAM(s)/deciliter  melatonin 5 milliGRAM(s) Oral at bedtime PRN Sleep      ALLERGIES:  Allergies    No Known Allergies    Intolerances    OBJECTIVE:  ICU Vital Signs Last 24 Hrs  T(C): 37.2 (2022 07:00), Max: 37.2 (2022 07:00)  T(F): 99 (2022 07:00), Max: 99 (2022 07:00)  HR: 77 (2022 08:00) (77 - 95)  BP: 123/88 (2022 17:20) (103/73 - 123/88)  BP(mean): --  ABP: --  ABP(mean): --  RR: 22 (2022 08:00) (16 - 26)  SpO2: 93% (2022 08:00) (91% - 97%)      Adult Advanced Hemodynamics Last 24 Hrs  CVP(mm Hg): --  CVP(cm H2O): --  CO: --  CI: --  PA: --  PA(mean): --  PCWP: --  SVR: --  SVRI: --  PVR: --  PVRI: --  CAPILLARY BLOOD GLUCOSE      I&O's Summary    2022 07:01  -  2022 07:00  --------------------------------------------------------  IN: 780 mL / OUT: 300 mL / NET: 480 mL      Daily Height in cm: 185.42 (2022 19:00)    Daily Weight in k.3 (2022 05:00)    PHYSICAL EXAMINATION:  General: WN/WD NAD  HEENT: PERRLA, EOMI, moist mucous membranes  Neurology: A&Ox4, THAKUR x 4  Respiratory: CTA B/L  CV: RRR, S1S2, no murmurs  Abdominal: Soft, NT, ND +BS, Last BM  Extremities: No edema, + peripheral pulses  Incisions: n/a  Tubes:    LABS:                          13.9   8.95  )-----------( 138      ( 2022 05:11 )             40.6     06-23    132<L>  |  98  |  26<H>  ----------------------------<  229<H>  4.1   |  22  |  0.94    Ca    9.0      2022 05:11  Phos  3.3       Mg     2.0         TPro  7.0  /  Alb  3.8  /  TBili  0.6  /  DBili  x   /  AST  31  /  ALT  19  /  AlkPhos  60      LIVER FUNCTIONS - ( 2022 05:11 )  Alb: 3.8 g/dL / Pro: 7.0 g/dL / ALK PHOS: 60 U/L / ALT: 19 U/L / AST: 31 U/L / GGT: x           PT/INR - ( 2022 17:40 )   PT: 12.7 sec;   INR: 1.10 ratio         PTT - ( 2022 17:40 )  PTT:31.9 sec  CKMB Units: 8.7 ng/mL ( @ 05:11)  Creatine Kinase, Serum: 281 U/L ( @ 05:11)  CKMB Units: 10.3 ng/mL ( @ 23:39)  Creatine Kinase, Serum: 293 U/L ( @ 23:39)    CARDIAC MARKERS ( 2022 05:11 )  x     / x     / 281 U/L / x     / 8.7 ng/mL  CARDIAC MARKERS ( 2022 23:39 )  x     / x     / 293 U/L / x     / 10.3 ng/mL          TELEMETRY:   no acute events    EKG:     IMAGING:  -CXR w/o acute pathology

## 2022-06-23 NOTE — CHART NOTE - NSCHARTNOTEFT_GEN_A_CORE
Removal of Femoral Sheath    Pulses in the left lower extremity are palpable & audible by doppler. The patient was placed in the supine position. The insertion site was identified and the sutures were removed per protocol.  The ___6_ Syriac femoral sheath was then removed. Direct pressure was applied for  ____20__ minutes.     Monitoring of the (right/left) groin and both lower extremities including neuro-vascular checks and vital signs every 15 minutes x 4, then every 30 minutes x 2, then every 1 hour was ordered.    Complications: None    Comments: Patient tolerated procedure well

## 2022-06-23 NOTE — CHART NOTE - NSCHARTNOTEFT_GEN_A_CORE
52 y/o active smoking male with PMH of CAD s/p stent x2 (LAD >10 years), DM, diabetic foot ulcer s/p amputation, and HTN presented with intermittent chest pain for 1 week. Had LUIS on EKG and went emergently to the cath lab.  s/p PCI with SIERRA x1 pRCA 95%, SIERRA x1 dRCA, LAD 70% and Cx 70%, IABP was placed for hypotension.  Transferred to CICU for further management.  -c/w DAPT and high dose statin   -holding off on BB due to hypotension in the lab   - heparin Gtt  - pt appears very angry as his sleep is disturbed, reports no chest pain & refuses groin site to be checked   - C/W current medical management by Primary team.  Agueda Linda Anp-c

## 2022-06-23 NOTE — PROVIDER CONTACT NOTE (OTHER) - ACTION/TREATMENT ORDERED:
Monitor post void residual. Patient adamantly refusing intermittent urinary catheterization. Patient educated on the importance of strict Is+Os in the ICU and potential complications of full bladder.

## 2022-06-23 NOTE — PROGRESS NOTE ADULT - SUBJECTIVE AND OBJECTIVE BOX
TIKI HOUSTON  MRN-[id number]  Patient is a 51y old  Male who presents with a chief complaint of Chest pain (23 Jun 2022 08:03)    HPI:  51 year old active smoking male with PMH of CAD s/p stent x2 (LAD >10 years), DM, diabetic foot ulcer s/p amputation, and HTN presented with intermittent chest pain for 1 week.  Pt indicates it was a dull, non radiating pain, which was different from his previous MI pain.  He called his PCP who then got him an appointment with a cardiologist today.  EKG at the office showed EKG changes and was told to present to the ED.  Pt drove himself to [facility] ED, where EKG showed LUIS in inferior leads and was emergently taken to the cath lab.  s/p PCI with SIERRA x1 pRCA 95%, SIERRA x1 dRCA, LAD 70% and Cx 70%, IABP was placed for hypotension.   (22 Jun 2022 19:42)      Hospital Course:    24 HOUR EVENTS:    REVIEW OF SYSTEMS:    CONSTITUTIONAL: No weakness, fevers or chills  EYES/ENT: No visual changes;  No vertigo or throat pain   NECK: No pain or stiffness  RESPIRATORY: No cough, wheezing, hemoptysis; No shortness of breath  CARDIOVASCULAR: No chest pain or palpitations  GASTROINTESTINAL: No abdominal or epigastric pain. No nausea, vomiting, or hematemesis; No diarrhea or constipation. No melena or hematochezia.  GENITOURINARY: No dysuria, frequency or hematuria  NEUROLOGICAL: No numbness or weakness  SKIN: No itching, rashes      ICU Vital Signs Last 24 Hrs  T(C): 37.1 (23 Jun 2022 15:00), Max: 37.2 (23 Jun 2022 07:00)  T(F): 98.8 (23 Jun 2022 15:00), Max: 99 (23 Jun 2022 07:00)  HR: 83 (23 Jun 2022 18:00) (74 - 89)  RR: 22 (23 Jun 2022 18:00) (16 - 26)  SpO2: 94% (23 Jun 2022 18:00) (91% - 95%)    I&O's Summary    22 Jun 2022 07:01  -  23 Jun 2022 07:00  --------------------------------------------------------  IN: 780 mL / OUT: 300 mL / NET: 480 mL    23 Jun 2022 07:01  -  23 Jun 2022 19:17  --------------------------------------------------------  IN: 1224 mL / OUT: 700 mL / NET: 524 mL    CAPILLARY BLOOD GLUCOSE      POCT Blood Glucose.: 117 mg/dL (23 Jun 2022 17:19)      PHYSICAL EXAM:  GENERAL: No acute distress, well-developed  HEAD:  Atraumatic, Normocephalic  EYES: EOMI, PERRLA, conjunctiva and sclera clear  NECK: Supple, no lymphadenopathy, no JVD  CHEST/LUNG: CTAB; No wheezes, rales, or rhonchi  HEART: Regular rate and rhythm. Normal S1/S2. No murmurs, rubs, or gallops  ABDOMEN: Soft, non-tender, non-distended; normal bowel sounds, no organomegaly  EXTREMITIES:  2+ peripheral pulses b/l, No clubbing, cyanosis, or edema  NEUROLOGY: A&O x 3, no focal deficits  SKIN: No rashes or lesions    ============================I/O===========================   I&O's Detail    22 Jun 2022 07:01  -  23 Jun 2022 07:00  --------------------------------------------------------  IN:    Heparin: 60 mL    Oral Fluid: 720 mL  Total IN: 780 mL    OUT:    Voided (mL): 300 mL  Total OUT: 300 mL    Total NET: 480 mL      23 Jun 2022 07:01  -  23 Jun 2022 19:17  --------------------------------------------------------  IN:    Heparin: 114 mL    Oral Fluid: 1110 mL  Total IN: 1224 mL    OUT:    Voided (mL): 700 mL  Total OUT: 700 mL    Total NET: 524 mL        ============================ LABS =========================                        13.9   8.95  )-----------( 138      ( 23 Jun 2022 05:11 )             40.6     06-23    132<L>  |  98  |  26<H>  ----------------------------<  229<H>  4.1   |  22  |  0.94    Ca    9.0      23 Jun 2022 05:11  Phos  3.3     06-23  Mg     2.0     06-23    TPro  7.0  /  Alb  3.8  /  TBili  0.6  /  DBili  x   /  AST  31  /  ALT  19  /  AlkPhos  60  06-23    Troponin T, High Sensitivity Result: 1290 ng/L (06-23-22 @ 05:11)  Troponin T, High Sensitivity Result: 1402 ng/L (06-22-22 @ 23:39)  Troponin T, High Sensitivity Result: 696 ng/L (06-22-22 @ 17:40)    CKMB Units: 8.7 ng/mL (06-23-22 @ 05:11)  CKMB Units: 10.3 ng/mL (06-22-22 @ 23:39)    Creatine Kinase, Serum: 281 U/L (06-23-22 @ 05:11)  Creatine Kinase, Serum: 293 U/L (06-22-22 @ 23:39)    CPK Mass Assay %: 3.1 % (06-23-22 @ 05:11)  CPK Mass Assay %: 3.5 % (06-22-22 @ 23:39)        LIVER FUNCTIONS - ( 23 Jun 2022 05:11 )  Alb: 3.8 g/dL / Pro: 7.0 g/dL / ALK PHOS: 60 U/L / ALT: 19 U/L / AST: 31 U/L / GGT: x           PT/INR - ( 22 Jun 2022 17:40 )   PT: 12.7 sec;   INR: 1.10 ratio         PTT - ( 23 Jun 2022 10:14 )  PTT:31.5 sec    Lactate, Blood: 1.1 mmol/L (06-22-22 @ 23:39)      ======================Micro/Rad/Cardio=================  Telemtry: Reviewed   EKG: Reviewed  CXR: Reviewed  Culture: Reviewed   Echo:   Cath:   ======================================================  PAST MEDICAL & SURGICAL HISTORY:  CAD (coronary artery disease)      Diabetes      HTN (hypertension)      Diabetic foot ulcer      Status post amputation of toe  s/p R 3/4th partial ray resection (4/2021; s/p 2nd partial ray resection (5/2021)        ====================ASSESSMENT ==============  50 y/o active smoking male with PMH of CAD s/p stent x2 (LAD >10 years), DM, diabetic foot ulcer s/p amputation, and HTN presented with intermittent chest pain for 1 week. Had LUIS on EKG and went emergently to the cath lab.  s/p PCI with SIERRA x1 pRCA 95%, SIERRA x1 dRCA, LAD 70% and Cx 70%, IABP was placed for hypotension.  Transferred to CICU for further management.    Plan:  ====================== NEUROLOGY=====================  A+Ox4  -no focal deficits  - c/w melatonin for sleep regimen     ==================== RESPIRATORY======================  Saturating >94% on room air  -no active issues    ====================CARDIOVASCULAR==================  STEMI  -s/p PCI with SIERRA x1 pRCA 95%, SIERRA x1 dRCA, LAD 70% and Cx 70%, IABP was placed for hypotension.   -maintain IABP 1:1, trending lactate/CMP, daily CXR for placement  -will resume heparin gtt once radial band is removed  -c/w DAPT and high dose statin   -holding off on BB due to hypotension in the lab   -trend Cardiac Enzymes till peak  -TTE 6/23 EF 53%  - LAD staging  - Wean balloon pump once therapeutic on heparin    ===================HEMATOLOGIC/ONC ===================  Stable H/H  -no active issues   -c/w heparin therapeutic    ===================== RENAL =========================  - no active issues     ==================== GASTROINTESTINAL===================  - Tolerating a PO diet   =======================    ENDOCRINE  =====================  DM  -started lantus 30U and pre meal novolog coverage 10U  -started ISS pre meal and at bedtime    ========================INFECTIOUS DISEASE================  afebrile, no leukocytosis  -no active issues         Patient requires continuous monitoring with bedside rhythm monitoring, pulse ox monitoring, and intermittent blood gas analysis. Care plan discussed with ICU care team. Patient remained critical and at risk for life threatening decompensation.  Patient seen, examined and plan discussed with CCU team during rounds.     I have personally provided ____ minutes of critical care time excluding time spent on separate procedures, in addition to initial critical care time provided by the CICU Attending, Dr. Newman.     By signing my name below, I, Liliane Zimmerman, attest that this documentation has been prepared under the direction and in the presence of MICKIE Posadas.  Electronically signed: Srinivas Chong, 06-23-22 @ 19:17    I,  Beth Ayala, personally performed the services described in this documentation. all medical record entries made by the scribe were at my direction and in my presence. I have reviewed the chart and agree that the record reflects my personal performance and is accurate and complete  Electronically signed:  MICKIE Posadas.       TIKI HOUSTON  MRN-[id number]  Patient is a 51y old  Male who presents with a chief complaint of Chest pain (23 Jun 2022 08:03)    HPI:  51 year old active smoking male with PMH of CAD s/p stent x2 (LAD >10 years), DM, diabetic foot ulcer s/p amputation, and HTN presented with intermittent chest pain for 1 week.  Pt indicates it was a dull, non radiating pain, which was different from his previous MI pain.  He called his PCP who then got him an appointment with a cardiologist today.  EKG at the office showed EKG changes and was told to present to the ED.  Pt drove himself to [facility] ED, where EKG showed LUIS in inferior leads and was emergently taken to the cath lab.  s/p PCI with SIERRA x1 pRCA 95%, SIERRA x1 dRCA, LAD 70% and Cx 70%, IABP was placed for hypotension.   (22 Jun 2022 19:42)      24 HOUR EVENTS:  s/p Staged PCI with SIERRA x1 to LAD, left sheath removed       REVIEW OF SYSTEMS:    CONSTITUTIONAL: No weakness, fevers or chills  EYES/ENT: No visual changes;  No vertigo or throat pain   NECK: No pain or stiffness  RESPIRATORY: No cough, wheezing, hemoptysis; No shortness of breath  CARDIOVASCULAR: No chest pain or palpitations  GASTROINTESTINAL: No abdominal or epigastric pain. No nausea, vomiting, or hematemesis; No diarrhea or constipation. No melena or hematochezia.  GENITOURINARY: No dysuria, frequency or hematuria  NEUROLOGICAL: No numbness or weakness  SKIN: No itching, rashes      ICU Vital Signs Last 24 Hrs  T(C): 37.1 (23 Jun 2022 15:00), Max: 37.2 (23 Jun 2022 07:00)  T(F): 98.8 (23 Jun 2022 15:00), Max: 99 (23 Jun 2022 07:00)  HR: 83 (23 Jun 2022 18:00) (74 - 89)  RR: 22 (23 Jun 2022 18:00) (16 - 26)  SpO2: 94% (23 Jun 2022 18:00) (91% - 95%)    I&O's Summary    22 Jun 2022 07:01  -  23 Jun 2022 07:00  --------------------------------------------------------  IN: 780 mL / OUT: 300 mL / NET: 480 mL    23 Jun 2022 07:01  -  23 Jun 2022 19:17  --------------------------------------------------------  IN: 1224 mL / OUT: 700 mL / NET: 524 mL    CAPILLARY BLOOD GLUCOSE      POCT Blood Glucose.: 117 mg/dL (23 Jun 2022 17:19)      PHYSICAL EXAM:  GENERAL: No acute distress, well-developed  HEAD:  Atraumatic, Normocephalic  EYES: EOMI, PERRLA, conjunctiva and sclera clear  NECK: Supple, no lymphadenopathy, no JVD  CHEST/LUNG: CTAB; No wheezes, rales, or rhonchi  HEART: Regular rate and rhythm. Normal S1/S2. No murmurs, rubs, or gallops  ABDOMEN: Soft, non-tender, non-distended; normal bowel sounds, no organomegaly  EXTREMITIES:  2+ peripheral pulses b/l, No clubbing, cyanosis, or edema. R IABP dressing c/d/i, soft, nontender   NEUROLOGY: A&O x 3, no focal deficits  SKIN: No rashes or lesions    ============================I/O===========================   I&O's Detail    22 Jun 2022 07:01  -  23 Jun 2022 07:00  --------------------------------------------------------  IN:    Heparin: 60 mL    Oral Fluid: 720 mL  Total IN: 780 mL    OUT:    Voided (mL): 300 mL  Total OUT: 300 mL    Total NET: 480 mL      23 Jun 2022 07:01  -  23 Jun 2022 19:17  --------------------------------------------------------  IN:    Heparin: 114 mL    Oral Fluid: 1110 mL  Total IN: 1224 mL    OUT:    Voided (mL): 700 mL  Total OUT: 700 mL    Total NET: 524 mL        ============================ LABS =========================                        13.9   8.95  )-----------( 138      ( 23 Jun 2022 05:11 )             40.6     06-23    132<L>  |  98  |  26<H>  ----------------------------<  229<H>  4.1   |  22  |  0.94    Ca    9.0      23 Jun 2022 05:11  Phos  3.3     06-23  Mg     2.0     06-23    TPro  7.0  /  Alb  3.8  /  TBili  0.6  /  DBili  x   /  AST  31  /  ALT  19  /  AlkPhos  60  06-23    Troponin T, High Sensitivity Result: 1290 ng/L (06-23-22 @ 05:11)  Troponin T, High Sensitivity Result: 1402 ng/L (06-22-22 @ 23:39)  Troponin T, High Sensitivity Result: 696 ng/L (06-22-22 @ 17:40)    CKMB Units: 8.7 ng/mL (06-23-22 @ 05:11)  CKMB Units: 10.3 ng/mL (06-22-22 @ 23:39)    Creatine Kinase, Serum: 281 U/L (06-23-22 @ 05:11)  Creatine Kinase, Serum: 293 U/L (06-22-22 @ 23:39)    CPK Mass Assay %: 3.1 % (06-23-22 @ 05:11)  CPK Mass Assay %: 3.5 % (06-22-22 @ 23:39)        LIVER FUNCTIONS - ( 23 Jun 2022 05:11 )  Alb: 3.8 g/dL / Pro: 7.0 g/dL / ALK PHOS: 60 U/L / ALT: 19 U/L / AST: 31 U/L / GGT: x           PT/INR - ( 22 Jun 2022 17:40 )   PT: 12.7 sec;   INR: 1.10 ratio         PTT - ( 23 Jun 2022 10:14 )  PTT:31.5 sec    Lactate, Blood: 1.1 mmol/L (06-22-22 @ 23:39)      ======================Micro/Rad/Cardio=================  Telemtry: Reviewed   EKG: Reviewed  CXR: Reviewed  Culture: Reviewed   Echo:   Cath:   ======================================================  PAST MEDICAL & SURGICAL HISTORY:  CAD (coronary artery disease)      Diabetes      HTN (hypertension)      Diabetic foot ulcer      Status post amputation of toe  s/p R 3/4th partial ray resection (4/2021; s/p 2nd partial ray resection (5/2021)        ====================ASSESSMENT ==============  50 y/o active smoking male with PMH of CAD s/p stent x2 (LAD >10 years), DM, diabetic foot ulcer s/p amputation, and HTN presented with intermittent chest pain for 1 week. Had LUIS on EKG and went emergently to the cath lab.  s/p PCI with SIERRA x1 pRCA 95%, SIERRA x1 dRCA, LAD 70% and Cx 70%, IABP was placed for hypotension.  Transferred to CICU for further management.    Plan:  ====================== NEUROLOGY=====================  A+Ox4  -no focal deficits  - c/w melatonin for sleep regimen     ==================== RESPIRATORY======================  Comfortable on room air  - continue to monitor SpO2 with goal >94%, NC PRN     ====================CARDIOVASCULAR==================  STEMI  -s/p PCI with SIERRA x1 pRCA 95%, SIERRA x1 dRCA, LAD 70% and Cx 70%, IABP was placed for hypotension.   -maintain IABP 1:1, trending lactate and other perfusion indices daily. Monitor site and peripheral pulses as per protocol. daily CXR for placement  -will resume heparin gtt 4hrs post sheath removal if site is stable. Monitor PTT for therapeutic range. Once therapeutic plan to wean IABP  -c/w DAPT and high dose statin   -holding off on BB due to hypotension in the lab, consider initiation tomorrow if able to wean IABP in AM   -Cardiac Enzymes peaked   -TTE 6/23 EF 53%  - s/p staged PCI to LAD 6/23. L sheath removed- monitor site as per protocol   - Wean balloon pump once therapeutic on heparin    ===================HEMATOLOGIC/ONC ===================  Stable H/H  -continue to monitor and trend CBC  -c/w heparin gtt for full AC IABP and DVT PPX    ===================== RENAL =========================  SCr wnl, stable  - continue to monitor and trend SCr, BUN, lytes, and I&Os  - replete lytes prn with goal K 4-4.5 and Mg >2     ==================== GASTROINTESTINAL===================  - Tolerating a PO diet, continue as tolerated     =======================    ENDOCRINE  =====================  DM  -c/w lantus 30U and pre meal novolog coverage 10U  -c/w ISS pre meal and at bedtime  - Monitor FS, adjust basal/bolus insulin prn. Goal -180     ========================INFECTIOUS DISEASE================  afebrile, no leukocytosis  - continue to monitor and trend temp curve and wbc       Patient requires continuous monitoring with bedside rhythm monitoring, pulse ox monitoring, and intermittent blood gas analysis. Care plan discussed with ICU care team. Patient remained critical and at risk for life threatening decompensation.  Patient seen, examined and plan discussed with CCU team during rounds.     I have personally provided __>35__ minutes of critical care time excluding time spent on separate procedures, in addition to initial critical care time provided by the CICU Attending, Dr. Newman.     By signing my name below, I, Liliane Zimmerman, attest that this documentation has been prepared under the direction and in the presence of MICKIE Posadas.  Electronically signed: Srinivas Chong, 06-23-22 @ 19:17    I,  Beth Ayala, personally performed the services described in this documentation. all medical record entries made by the jaxonibe were at my direction and in my presence. I have reviewed the chart and agree that the record reflects my personal performance and is accurate and complete  Electronically signed:  MICKIE Posadas.

## 2022-06-23 NOTE — PROGRESS NOTE ADULT - ATTENDING COMMENTS
Admitted with inferior wall STEMI s/p PCI complicated by hypotension requiring IABP  Residual LAD disease that may go for staging  DAPT with ASA, Ticagrelor   Lipitor 80  Blood pressure is borderline low despite IABP support, chest pain free - defer beta blocker, wean IABP  TTE with low normal LVEF  Cardiac enzymes have peaked  O2 sats mid 90s on nasal cannula  Normal renal function  H/H acceptable on Heparin drip for IABP  COVID negative, no antibiotics   Sugars borderline controlled on Lantus/Admelog; hgb A1c elevated  IABP 6/22 Admitted with inferior wall STEMI s/p PCI complicated by hypotension requiring IABP  Residual LAD disease that will go for staging today  DAPT with ASA, Ticagrelor   Lipitor 80  Cardiogenic shock requiring IABP  Blood pressure is borderline low despite IABP support, chest pain free - defer beta blocker, wean IABP  TTE with low normal LVEF  Cardiac enzymes have peaked  O2 sats mid 90s on nasal cannula  Normal renal function  H/H acceptable on Heparin drip for IABP  COVID negative, no antibiotics   Sugars borderline controlled on Lantus/Admelog; hgb A1c elevated  IABP 6/22

## 2022-06-24 DIAGNOSIS — Z29.9 ENCOUNTER FOR PROPHYLACTIC MEASURES, UNSPECIFIED: ICD-10-CM

## 2022-06-24 DIAGNOSIS — I25.10 ATHEROSCLEROTIC HEART DISEASE OF NATIVE CORONARY ARTERY WITHOUT ANGINA PECTORIS: ICD-10-CM

## 2022-06-24 DIAGNOSIS — I21.3 ST ELEVATION (STEMI) MYOCARDIAL INFARCTION OF UNSPECIFIED SITE: ICD-10-CM

## 2022-06-24 DIAGNOSIS — E11.9 TYPE 2 DIABETES MELLITUS WITHOUT COMPLICATIONS: ICD-10-CM

## 2022-06-24 DIAGNOSIS — I10 ESSENTIAL (PRIMARY) HYPERTENSION: ICD-10-CM

## 2022-06-24 LAB
ALBUMIN SERPL ELPH-MCNC: 3.8 G/DL — SIGNIFICANT CHANGE UP (ref 3.3–5)
ALP SERPL-CCNC: 63 U/L — SIGNIFICANT CHANGE UP (ref 40–120)
ALT FLD-CCNC: 19 U/L — SIGNIFICANT CHANGE UP (ref 10–45)
ANION GAP SERPL CALC-SCNC: 12 MMOL/L — SIGNIFICANT CHANGE UP (ref 5–17)
APTT BLD: 42.1 SEC — HIGH (ref 27.5–35.5)
AST SERPL-CCNC: 23 U/L — SIGNIFICANT CHANGE UP (ref 10–40)
BILIRUB SERPL-MCNC: 0.6 MG/DL — SIGNIFICANT CHANGE UP (ref 0.2–1.2)
BUN SERPL-MCNC: 18 MG/DL — SIGNIFICANT CHANGE UP (ref 7–23)
CALCIUM SERPL-MCNC: 9.2 MG/DL — SIGNIFICANT CHANGE UP (ref 8.4–10.5)
CHLORIDE SERPL-SCNC: 98 MMOL/L — SIGNIFICANT CHANGE UP (ref 96–108)
CO2 SERPL-SCNC: 23 MMOL/L — SIGNIFICANT CHANGE UP (ref 22–31)
CREAT SERPL-MCNC: 0.81 MG/DL — SIGNIFICANT CHANGE UP (ref 0.5–1.3)
EGFR: 108 ML/MIN/1.73M2 — SIGNIFICANT CHANGE UP
GLUCOSE BLDC GLUCOMTR-MCNC: 183 MG/DL — HIGH (ref 70–99)
GLUCOSE BLDC GLUCOMTR-MCNC: 190 MG/DL — HIGH (ref 70–99)
GLUCOSE BLDC GLUCOMTR-MCNC: 286 MG/DL — HIGH (ref 70–99)
GLUCOSE SERPL-MCNC: 228 MG/DL — HIGH (ref 70–99)
HCT VFR BLD CALC: 41.3 % — SIGNIFICANT CHANGE UP (ref 39–50)
HGB BLD-MCNC: 14 G/DL — SIGNIFICANT CHANGE UP (ref 13–17)
INR BLD: 1.09 RATIO — SIGNIFICANT CHANGE UP (ref 0.88–1.16)
LACTATE SERPL-SCNC: 0.8 MMOL/L — SIGNIFICANT CHANGE UP (ref 0.7–2)
MAGNESIUM SERPL-MCNC: 2 MG/DL — SIGNIFICANT CHANGE UP (ref 1.6–2.6)
MCHC RBC-ENTMCNC: 30.2 PG — SIGNIFICANT CHANGE UP (ref 27–34)
MCHC RBC-ENTMCNC: 33.9 GM/DL — SIGNIFICANT CHANGE UP (ref 32–36)
MCV RBC AUTO: 89.2 FL — SIGNIFICANT CHANGE UP (ref 80–100)
NRBC # BLD: 0 /100 WBCS — SIGNIFICANT CHANGE UP (ref 0–0)
PHOSPHATE SERPL-MCNC: 3.2 MG/DL — SIGNIFICANT CHANGE UP (ref 2.5–4.5)
PLATELET # BLD AUTO: 144 K/UL — LOW (ref 150–400)
POTASSIUM SERPL-MCNC: 4.1 MMOL/L — SIGNIFICANT CHANGE UP (ref 3.5–5.3)
POTASSIUM SERPL-SCNC: 4.1 MMOL/L — SIGNIFICANT CHANGE UP (ref 3.5–5.3)
PROT SERPL-MCNC: 7 G/DL — SIGNIFICANT CHANGE UP (ref 6–8.3)
PROTHROM AB SERPL-ACNC: 12.7 SEC — SIGNIFICANT CHANGE UP (ref 10.5–13.4)
RBC # BLD: 4.63 M/UL — SIGNIFICANT CHANGE UP (ref 4.2–5.8)
RBC # FLD: 12.3 % — SIGNIFICANT CHANGE UP (ref 10.3–14.5)
SODIUM SERPL-SCNC: 133 MMOL/L — LOW (ref 135–145)
WBC # BLD: 6.8 K/UL — SIGNIFICANT CHANGE UP (ref 3.8–10.5)
WBC # FLD AUTO: 6.8 K/UL — SIGNIFICANT CHANGE UP (ref 3.8–10.5)

## 2022-06-24 PROCEDURE — 93010 ELECTROCARDIOGRAM REPORT: CPT

## 2022-06-24 PROCEDURE — 99291 CRITICAL CARE FIRST HOUR: CPT | Mod: GC,25

## 2022-06-24 PROCEDURE — 33968 REMOVE AORTIC ASSIST DEVICE: CPT

## 2022-06-24 PROCEDURE — 71045 X-RAY EXAM CHEST 1 VIEW: CPT | Mod: 26

## 2022-06-24 PROCEDURE — 99223 1ST HOSP IP/OBS HIGH 75: CPT

## 2022-06-24 PROCEDURE — 99233 SBSQ HOSP IP/OBS HIGH 50: CPT | Mod: GC,25

## 2022-06-24 RX ORDER — FENTANYL CITRATE 50 UG/ML
25 INJECTION INTRAVENOUS ONCE
Refills: 0 | Status: DISCONTINUED | OUTPATIENT
Start: 2022-06-24 | End: 2022-06-24

## 2022-06-24 RX ORDER — METOPROLOL TARTRATE 50 MG
12.5 TABLET ORAL
Refills: 0 | Status: DISCONTINUED | OUTPATIENT
Start: 2022-06-24 | End: 2022-06-25

## 2022-06-24 RX ADMIN — Medication 10 UNIT(S): at 18:25

## 2022-06-24 RX ADMIN — Medication 12.5 MILLIGRAM(S): at 23:42

## 2022-06-24 RX ADMIN — FENTANYL CITRATE 25 MICROGRAM(S): 50 INJECTION INTRAVENOUS at 10:15

## 2022-06-24 RX ADMIN — Medication 81 MILLIGRAM(S): at 18:25

## 2022-06-24 RX ADMIN — TICAGRELOR 90 MILLIGRAM(S): 90 TABLET ORAL at 18:25

## 2022-06-24 RX ADMIN — Medication 1: at 21:53

## 2022-06-24 RX ADMIN — Medication 2: at 08:50

## 2022-06-24 RX ADMIN — Medication 6: at 18:26

## 2022-06-24 RX ADMIN — Medication 10 UNIT(S): at 12:49

## 2022-06-24 RX ADMIN — TICAGRELOR 90 MILLIGRAM(S): 90 TABLET ORAL at 05:50

## 2022-06-24 RX ADMIN — INSULIN GLARGINE 30 UNIT(S): 100 INJECTION, SOLUTION SUBCUTANEOUS at 23:41

## 2022-06-24 RX ADMIN — Medication 10 UNIT(S): at 08:49

## 2022-06-24 RX ADMIN — Medication 2: at 12:49

## 2022-06-24 RX ADMIN — Medication 12.5 MILLIGRAM(S): at 08:14

## 2022-06-24 RX ADMIN — ATORVASTATIN CALCIUM 80 MILLIGRAM(S): 80 TABLET, FILM COATED ORAL at 23:42

## 2022-06-24 NOTE — DIETITIAN INITIAL EVALUATION ADULT - ENERGY INTAKE
Fair (50-75%) Pt states he has fair appetite and PO intake, will not detail any further however has not touched meal tray at bedside.

## 2022-06-24 NOTE — DIETITIAN INITIAL EVALUATION ADULT - NS FNS DIET ORDER
Diet, DASH/TLC:   Sodium & Cholesterol Restricted  Consistent Carbohydrate {No Snacks} (CSTCHO) (06-22-22 @ 19:28)

## 2022-06-24 NOTE — DIETITIAN INITIAL EVALUATION ADULT - PERTINENT MEDS FT
MEDICATIONS  (STANDING):  aspirin  chewable 81 milliGRAM(s) Oral daily  atorvastatin 80 milliGRAM(s) Oral at bedtime  chlorhexidine 2% Cloths 1 Application(s) Topical at bedtime  dextrose 5%. 1000 milliLiter(s) (50 mL/Hr) IV Continuous <Continuous>  dextrose 5%. 1000 milliLiter(s) (100 mL/Hr) IV Continuous <Continuous>  dextrose 50% Injectable 25 Gram(s) IV Push once  dextrose 50% Injectable 12.5 Gram(s) IV Push once  dextrose 50% Injectable 25 Gram(s) IV Push once  glucagon  Injectable 1 milliGRAM(s) IntraMuscular once  heparin  Infusion 1500 Unit(s)/Hr (20 mL/Hr) IV Continuous <Continuous>  insulin glargine Injectable (LANTUS) 30 Unit(s) SubCutaneous at bedtime  insulin lispro (ADMELOG) corrective regimen sliding scale   SubCutaneous three times a day before meals  insulin lispro (ADMELOG) corrective regimen sliding scale   SubCutaneous at bedtime  insulin lispro Injectable (ADMELOG) 10 Unit(s) SubCutaneous three times a day before meals  metoprolol tartrate 12.5 milliGRAM(s) Oral two times a day  mirtazapine 15 milliGRAM(s) Oral at bedtime  ticagrelor 90 milliGRAM(s) Oral every 12 hours    MEDICATIONS  (PRN):  dextrose Oral Gel 15 Gram(s) Oral once PRN Blood Glucose LESS THAN 70 milliGRAM(s)/deciliter  melatonin 5 milliGRAM(s) Oral at bedtime PRN Sleep

## 2022-06-24 NOTE — DIETITIAN INITIAL EVALUATION ADULT - REASON INDICATOR FOR ASSESSMENT
Consult received for "Hx of uncontrolled DM. Current A1C pending. Uses Dexcom for glucose monitoring. Patient appears to have poor insight regarding his disease and the effects of uncontrolled DM."  Information obtained from pt, EMR.

## 2022-06-24 NOTE — PROGRESS NOTE ADULT - SUBJECTIVE AND OBJECTIVE BOX
HPI:  51 year old active smoking male with PMH of CAD s/p stent x2 (LAD >10 years), DM, diabetic foot ulcer s/p amputation, and HTN presented with intermittent chest pain for 1 week.  Pt indicates it was a dull, non radiating pain, which was different from his previous MI pain.  He called his PCP who then got him an appointment with a cardiologist today.  EKG at the office showed EKG changes and was told to present to the ED.  Pt drove himself to [facility] ED, where EKG showed LUIS in inferior leads and was emergently taken to the cath lab.  s/p PCI with SIERRA x1 pRCA 95%, SIERRA x1 dRCA, LAD 70% and Cx 70%, IABP was placed for hypotension.   (22 Jun 2022 19:42)    Hospital course:   Patient admitted for chest pain found to have a STEMI, taken emergently to cath lab s/p PCI to SIERRA X1 pRCA 95%. Course complicated by hypotension requiring IABP and CICU stay. Patient went for SIERRA to LAD the following day. IAPB removed and patient transferred to the floors. Heparin drip started.     Patient currently upset that he has not been seen since being transferred to the floors until when he is ready to sleep. Patient denies any chest pain. Refused to talk further and refused physical exam.     PAST MEDICAL & SURGICAL HISTORY:  CAD (coronary artery disease)  Diabetes  HTN (hypertension)  Diabetic foot ulcer  Status post amputation of toe  s/p R 3/4th partial ray resection (4/2021; s/p 2nd partial ray resection (5/2021)    FAMILY HISTORY:  Family history of early CAD (Father)    Social History:  Pt admits to smoking 1pack/day for last 30 years.  Denies any recreational drug use (22 Jun 2022 19:42)    Home Medications:  metFORMIN 1000 mg oral tablet: 1 tab(s) orally 2 times a day (22 Jun 2022 19:42)  NovoLOG FlexPen 100 units/mL injectable solution: 20 unit(s) injectable 3 times a day (before meals) (22 Jun 2022 19:42)  ramipril 10 mg oral capsule: 1 cap(s) orally once a day (22 Jun 2022 19:42)  rosuvastatin 40 mg oral tablet: 1 tab(s) orally once a day (22 Jun 2022 19:42)  sertraline 50 mg oral tablet: 1 tab(s) orally once a day (22 Jun 2022 19:42)  Tresiba 100 units/mL subcutaneous solution: 60 unit(s) subcutaneous once a day (at bedtime) (22 Jun 2022 19:42)  Trulicity Pen 3 mg/0.5 mL subcutaneous solution: subcutaneous once a week (22 Jun 2022 19:42)    Vital Signs Last 24 Hrs  T(C): 36.9 (24 Jun 2022 20:31), Max: 37.2 (24 Jun 2022 17:28)  T(F): 98.4 (24 Jun 2022 20:31), Max: 99 (24 Jun 2022 17:28)  HR: 84 (24 Jun 2022 20:31) (77 - 95)  BP: 136/77 (24 Jun 2022 20:31) (102/63 - 138/81)  BP(mean): 96 (24 Jun 2022 16:00) (79 - 104)  RR: 19 (24 Jun 2022 20:31) (17 - 27)  SpO2: 94% (24 Jun 2022 20:31) (90% - 95%)    Patient refused physical exam. Patient however resting comfortably, does not appear to be in respiratory distress. Moving all extremities.     CBC Full  -  ( 24 Jun 2022 04:40 )  WBC Count : 6.80 K/uL  Hemoglobin : 14.0 g/dL  Hematocrit : 41.3 %  Platelet Count - Automated : 144 K/uL  Mean Cell Volume : 89.2 fl  Mean Cell Hemoglobin : 30.2 pg  Mean Cell Hemoglobin Concentration : 33.9 gm/dL  Auto Neutrophil # : x  Auto Lymphocyte # : x  Auto Monocyte # : x  Auto Eosinophil # : x  Auto Basophil # : x  Auto Neutrophil % : x  Auto Lymphocyte % : x  Auto Monocyte % : x  Auto Eosinophil % : x  Auto Basophil % : x    06-24    133<L>  |  98  |  18  ----------------------------<  228<H>  4.1   |  23  |  0.81    Ca    9.2      24 Jun 2022 04:40  Phos  3.2     06-24  Mg     2.0     06-24    TPro  7.0  /  Alb  3.8  /  TBili  0.6  /  DBili  x   /  AST  23  /  ALT  19  /  AlkPhos  63  06-24    LIVER FUNCTIONS - ( 24 Jun 2022 04:40 )  Alb: 3.8 g/dL / Pro: 7.0 g/dL / ALK PHOS: 63 U/L / ALT: 19 U/L / AST: 23 U/L / GGT: x             PT/INR - ( 24 Jun 2022 04:40 )   PT: 12.7 sec;   INR: 1.09 ratio         PTT - ( 24 Jun 2022 04:40 )  PTT:42.1 sec      EKG, imaging, labs reviewed.   EKG NSR, inferior infarct      < from: Xray Chest 1 View- PORTABLE-Urgent (Xray Chest 1 View- PORTABLE-Urgent .) (06.23.22 @ 05:38) >    CLINICAL INDICATION: IABP adjustment    TECHNIQUE: Single frontal, portable view of the chest was obtained.    COMPARISON: Chest x-ray 6/23/2022 4:18 AM.    FINDINGS:  Aortic balloon pump is positioned 3 cm below the aortic knob.  The heart is normal in size.  The lungs are clear.  There is no pneumothorax or pleural effusion.    IMPRESSION:  Aortic balloon pump is positioned 3 cm below the aortic knob.    --- End of Report ---    < end of copied text >

## 2022-06-24 NOTE — PROGRESS NOTE ADULT - SUBJECTIVE AND OBJECTIVE BOX
PATIENT:  TIKI HOUSTON  [id number]    CHIEF COMPLAINT:  Patient is a 51y old  Male who presents with a chief complaint of Chest pain (2022 19:17)      INTERVAL HISTORY/OVERNIGHT EVENTS:  ***    MEDICATIONS:  MEDICATIONS  (STANDING):  aspirin  chewable 81 milliGRAM(s) Oral daily  atorvastatin 80 milliGRAM(s) Oral at bedtime  chlorhexidine 2% Cloths 1 Application(s) Topical at bedtime  dextrose 5%. 1000 milliLiter(s) (50 mL/Hr) IV Continuous <Continuous>  dextrose 5%. 1000 milliLiter(s) (100 mL/Hr) IV Continuous <Continuous>  dextrose 50% Injectable 25 Gram(s) IV Push once  dextrose 50% Injectable 12.5 Gram(s) IV Push once  dextrose 50% Injectable 25 Gram(s) IV Push once  glucagon  Injectable 1 milliGRAM(s) IntraMuscular once  heparin  Infusion 1500 Unit(s)/Hr (20 mL/Hr) IV Continuous <Continuous>  insulin glargine Injectable (LANTUS) 30 Unit(s) SubCutaneous at bedtime  insulin lispro (ADMELOG) corrective regimen sliding scale   SubCutaneous three times a day before meals  insulin lispro (ADMELOG) corrective regimen sliding scale   SubCutaneous at bedtime  insulin lispro Injectable (ADMELOG) 10 Unit(s) SubCutaneous three times a day before meals  metoprolol tartrate 12.5 milliGRAM(s) Oral two times a day  mirtazapine 15 milliGRAM(s) Oral at bedtime  ticagrelor 90 milliGRAM(s) Oral every 12 hours    MEDICATIONS  (PRN):  dextrose Oral Gel 15 Gram(s) Oral once PRN Blood Glucose LESS THAN 70 milliGRAM(s)/deciliter  melatonin 5 milliGRAM(s) Oral at bedtime PRN Sleep      ALLERGIES:  Allergies    No Known Allergies    Intolerances        OBJECTIVE:  ICU Vital Signs Last 24 Hrs  T(C): 36.6 (2022 07:00), Max: 37.1 (2022 15:00)  T(F): 97.9 (2022 07:00), Max: 98.8 (2022 15:00)  HR: 86 (2022 09:00) (74 - 90)  BP: --  BP(mean): --  ABP: --  ABP(mean): --  RR: 22 (2022 09:00) (18 - 27)  SpO2: 92% (2022 09:00) (91% - 95%)      Adult Advanced Hemodynamics Last 24 Hrs  CVP(mm Hg): --  CVP(cm H2O): --  CO: --  CI: --  PA: --  PA(mean): --  PCWP: --  SVR: --  SVRI: --  PVR: --  PVRI: --  CAPILLARY BLOOD GLUCOSE      POCT Blood Glucose.: 190 mg/dL (2022 08:43)  POCT Blood Glucose.: 215 mg/dL (2022 22:11)  POCT Blood Glucose.: 117 mg/dL (2022 17:19)  POCT Blood Glucose.: 152 mg/dL (2022 12:31)    CAPILLARY BLOOD GLUCOSE      POCT Blood Glucose.: 190 mg/dL (2022 08:43)    I&O's Summary    2022 07:01  -  2022 07:00  --------------------------------------------------------  IN: 1474 mL / OUT: 1700 mL / NET: -226 mL      Daily     Daily Weight in k.6 (2022 06:00)    PHYSICAL EXAMINATION:  General: WN/WD NAD  HEENT: PERRLA, EOMI, moist mucous membranes  Neurology: A&Ox3, nonfocal, THAKUR x 4  Respiratory: CTA B/L, normal respiratory effort, no wheezes, crackles, rales  CV: RRR, S1S2, no murmurs, rubs or gallops  Abdominal: Soft, NT, ND +BS, Last BM  Extremities: No edema, + peripheral pulses  Incisions:   Tubes:    LABS:                          14.0   6.80  )-----------( 144      ( 2022 04:40 )             41.3     06-24    133<L>  |  98  |  18  ----------------------------<  228<H>  4.1   |  23  |  0.81    Ca    9.2      2022 04:40  Phos  3.2     06-24  Mg     2.0     06-24    TPro  7.0  /  Alb  3.8  /  TBili  0.6  /  DBili  x   /  AST  23  /  ALT  19  /  AlkPhos  63  06-24    LIVER FUNCTIONS - ( 2022 04:40 )  Alb: 3.8 g/dL / Pro: 7.0 g/dL / ALK PHOS: 63 U/L / ALT: 19 U/L / AST: 23 U/L / GGT: x           PT/INR - ( 2022 04:40 )   PT: 12.7 sec;   INR: 1.09 ratio         PTT - ( 2022 04:40 )  PTT:42.1 sec    CARDIAC MARKERS ( 2022 05:11 )  x     / x     / 281 U/L / x     / 8.7 ng/mL  CARDIAC MARKERS ( 2022 23:39 )  x     / x     / 293 U/L / x     / 10.3 ng/mL          TELEMETRY:     EKG:     IMAGING:

## 2022-06-24 NOTE — DIETITIAN INITIAL EVALUATION ADULT - ADD RECOMMEND
Provide encouragement with PO intake, menu selections, and assistance with meals as needed. Continue to monitor nutritional intake, labs, weights, BM, skin, clinical course.

## 2022-06-24 NOTE — PROGRESS NOTE ADULT - ASSESSMENT
50 y/o active smoking male with PMH of CAD s/p stent x2 (LAD >10 years), DM, diabetic foot ulcer s/p amputation, and HTN presented with intermittent chest pain for 1 week. Had LUIS on EKG and went emergently to the cath lab.  s/p PCI with SIERRA x1 pRCA 95%, SIERRA x1 dRCA, LAD 70% and Cx 70%, IABP was placed for hypotension.  Transferred to CICU for further management.    #Neuro  A+Ox4  -no focal deficits    #Resp  Saturating >94% on room air  -no active issues    #Cardiac   STEMI  -s/p PCI with SIERRA x1 pRCA 95%, SIERRA x1 dRCA, LAD 70% and Cx 70%, IABP was placed for hypotension.   -maintain IABP 1:1, trending lactate/CMP, daily CXR for placement  -will resume heparin gtt once radial band is removed  -c/w DAPT and high dose statin   -holding off on BB due to hypotension in the lab   -trend Cardiac Enzymes till peak  -TTE 6/23 EF 53%  - LAD staging  - Wean balloon pump once therapeutic on heparin    #Renal   no active issues     #GI  started PO diet     #Heme  Stable H/H  -no active issues   -need heparin therapeutic    #ID  afebrile, no leukocytosis  -no active issues     #Endo  DM  -started lantus 30U and pre meal novolog coverage 10U  -started ISS pre meal and at bedtime

## 2022-06-24 NOTE — CHART NOTE - NSCHARTNOTEFT_GEN_A_CORE
CCU Transfer Note    Transfer from: CCU  Transfer to:  (  ) Medicine    ( X ) Telemetry    (  ) RCU    (  ) Palliative    (  ) Stroke Unit    (  ) _______________    Accepting physician:    CCU COURSE:  50yo M multiple cardiac risk factors here in CCU for inferior stemi. Went to cath lab, s/p pRCA and dRCA SIERRA. IABP placed for intracath hypotension. Pt had additional LAD SIERRA placed the following day. Stable BPs so IABP removed on 6/24. Stable for floors.      MEDICATIONS:  STANDING MEDICATIONS  aspirin  chewable 81 milliGRAM(s) Oral daily  atorvastatin 80 milliGRAM(s) Oral at bedtime  chlorhexidine 2% Cloths 1 Application(s) Topical at bedtime  dextrose 5%. 1000 milliLiter(s) IV Continuous <Continuous>  dextrose 5%. 1000 milliLiter(s) IV Continuous <Continuous>  dextrose 50% Injectable 25 Gram(s) IV Push once  dextrose 50% Injectable 12.5 Gram(s) IV Push once  dextrose 50% Injectable 25 Gram(s) IV Push once  glucagon  Injectable 1 milliGRAM(s) IntraMuscular once  heparin  Infusion 1500 Unit(s)/Hr IV Continuous <Continuous>  insulin glargine Injectable (LANTUS) 30 Unit(s) SubCutaneous at bedtime  insulin lispro (ADMELOG) corrective regimen sliding scale   SubCutaneous three times a day before meals  insulin lispro (ADMELOG) corrective regimen sliding scale   SubCutaneous at bedtime  insulin lispro Injectable (ADMELOG) 10 Unit(s) SubCutaneous three times a day before meals  metoprolol tartrate 12.5 milliGRAM(s) Oral two times a day  mirtazapine 15 milliGRAM(s) Oral at bedtime  ticagrelor 90 milliGRAM(s) Oral every 12 hours    PRN MEDICATIONS  dextrose Oral Gel 15 Gram(s) Oral once PRN  melatonin 5 milliGRAM(s) Oral at bedtime PRN      VITAL SIGNS: Last 24 Hours  T(C): 36.7 (24 Jun 2022 13:00), Max: 36.8 (23 Jun 2022 23:00)  T(F): 98 (24 Jun 2022 13:00), Max: 98.3 (23 Jun 2022 23:00)  HR: 83 (24 Jun 2022 16:00) (77 - 95)  BP: 124/78 (24 Jun 2022 16:00) (102/63 - 138/81)  BP(mean): 96 (24 Jun 2022 16:00) (79 - 104)  RR: 24 (24 Jun 2022 16:00) (17 - 27)  SpO2: 92% (24 Jun 2022 16:00) (90% - 95%)    LABS:                        14.0   6.80  )-----------( 144      ( 24 Jun 2022 04:40 )             41.3     06-24    133<L>  |  98  |  18  ----------------------------<  228<H>  4.1   |  23  |  0.81    Ca    9.2      24 Jun 2022 04:40  Phos  3.2     06-24  Mg     2.0     06-24    TPro  7.0  /  Alb  3.8  /  TBili  0.6  /  DBili  x   /  AST  23  /  ALT  19  /  AlkPhos  63  06-24    PT/INR - ( 24 Jun 2022 04:40 )   PT: 12.7 sec;   INR: 1.09 ratio         PTT - ( 24 Jun 2022 04:40 )  PTT:42.1 sec        CARDIAC MARKERS ( 23 Jun 2022 05:11 )  x     / x     / 281 U/L / x     / 8.7 ng/mL  CARDIAC MARKERS ( 22 Jun 2022 23:39 )  x     / x     / 293 U/L / x     / 10.3 ng/mL      RADIOLOGY:    ASSESSMENT & PLAN:   50 y/o active smoking male with PMH of CAD s/p stent x2 (LAD >10 years), DM, diabetic foot ulcer s/p amputation, and HTN presented with intermittent chest pain for 1 week. Had LUIS on EKG and went emergently to the cath lab.  s/p PCI with SIERRA x1 pRCA 95%, SIERRA x1 dRCA, LAD 70% and Cx 70%, IABP was placed for hypotension.  Transferred to CICU for further management.    #Neuro  A+Ox4  -no focal deficits    #Resp  Saturating >94% on room air  -no active issues    #Cardiac   STEMI  -s/p PCI with SIERRA x1 pRCA 95%, SIERRA x1 dRCA, LAD 70% and Cx 70%, IABP was placed for hypotension.   -maintain IABP 1:1, trending lactate/CMP, daily CXR for placement  -will resume heparin gtt once radial band is removed  -c/w DAPT and high dose statin   -holding off on BB due to hypotension in the lab   -trend Cardiac Enzymes till peak  -TTE 6/23 EF 53%  - LAD staging, LAD angioplasty and SIERRA placed 6/23  - Balloon pump out 6/24    #Renal   no active issues     #GI  started PO diet     #Heme  Stable H/H  -no active issues     #ID  afebrile, no leukocytosis  -no active issues     #Endo  DM  -started Lantus 30U and pre meal novolog coverage 10U  -started ISS pre meal and at bedtime        For Follow-Up:  [] monitor groin site s/p IABP  [] continue lopressor  [] Continue DAPT, lipitor  [] Tight blood glucose control

## 2022-06-24 NOTE — PROVIDER CONTACT NOTE (OTHER) - BACKGROUND
See H&P
51 year old active smoking male with PMH of CAD s/p stent x2 (LAD >10 years), DM, diabetic foot ulcer s/p amputation, and HTN presented with intermittent chest pain for 1 week

## 2022-06-24 NOTE — PROVIDER CONTACT NOTE (OTHER) - SITUATION
Patient voided 200mL of urine. Post void residual per ultrasound 510mL. Patient adamantly refusing intermittent urinary catheterization.
Pt reporting to want to pull out IABP himself. Pt reports "What happens if I pull it (IABP) out?" Pt educated on the dangers of pulling out an IABP and the process of weaning the IABP.

## 2022-06-24 NOTE — DIETITIAN INITIAL EVALUATION ADULT - EDUCATION DIETARY MODIFICATIONS
Discussed elevated HbA1c and need for dietary changes. Pt deferring nutrition education at this time, would like to sleep, but acknowledges need for education. Pt amenable to written materials, provided at bedside. Pt made aware RD remains available./(1) partially meets; needs review/practice/verbalization

## 2022-06-24 NOTE — CHART NOTE - NSCHARTNOTEFT_GEN_A_CORE
MAR Accept Note  Transfer to:  6[facility]  Accepting Attending Physician:  Adrianne  Assigned Room:  622W    Patient seen and examined.   Labs and data reviewed.   No findings precluding transfer of service.       HPI/MICU COURSE:   Please refer to CICU transfer note for full details; as of time of this note, official sign out has not been given by the unit. Briefly, 51M smoker, CAD s/p stent x2, DM, HTN, admitted for CP, found to have STEMI, now s/p PCI w/ SIERRA x1 to pRCA and SIERRA x1 to dRCA. s/p IABP for BP support, now stable for floors.       FOR FOLLOW-UP:  -DAPT  -monitor IABP site     Joycelyn Park  PGY3

## 2022-06-24 NOTE — DIETITIAN INITIAL EVALUATION ADULT - ORAL INTAKE PTA/DIET HISTORY
Pt c/o tiredness, disinterested in RD visit. Limited information obtained. Pt reports consuming 2 meals per day PTA, denies any changes in PO intake, refuses to provide dietary recall at this time.

## 2022-06-24 NOTE — DIETITIAN INITIAL EVALUATION ADULT - OTHER INFO
-- Reports UBW 220lbs, reports he has lost weight in past "few months" unintentionally but does not know why, denies decrease in PO intake. Dosing weight 207lbs (6/22), most recent weight 215.1lbs (6/24). Pt currently not on diuretics, will continue to monitor.   -- HbA1c 11.7% indicating poor glycemic control PTA. Pt owns Dexcom for continuous glucose monitoring. Pt confirms use of Tresiba, Trulicity, Metformin and Novolog PTA. Not yet seen by Endocrinology this admission. Off insulin gtt.   -- IABP removed this morning.

## 2022-06-24 NOTE — PROVIDER CONTACT NOTE (OTHER) - RECOMMENDATIONS
Please educate patient
Patient adamantly refusing intermittent urinary catheterization.  Continue to monitor post void residual.

## 2022-06-24 NOTE — CHART NOTE - NSCHARTNOTEFT_GEN_A_CORE
52 y/o active smoking male with PMH of CAD s/p stent x2 (LAD >10 years), DM, diabetic foot ulcer s/p amputation, and HTN presented with intermittent chest pain for 1 week. Had LUIS on EKG and went emergently to the cath lab.      s/p PCI with SIERRA x1 pRCA 95%, SIERRA x1 dRCA, LAD 70% and Cx 70%, IABP was placed for hypotension.  Transferred to CICU for further management.  -c/w DAPT and high dose statin   - heparin Gtt  - C/W current medical management by Primary team.    Radha Landry [facility] 50 y/o active smoking male with PMH of CAD s/p stent x2 (LAD >10 years), DM, diabetic foot ulcer s/p amputation, and HTN presented with intermittent chest pain for 1 week. Had LUIS on EKG and went emergently to the cath lab.      6/22 s/p PCI with SIERRA x1 pRCA 95%, SIERRA x1 dRCA, LAD 70% and Cx 70%, IABP was placed for hypotension.  Transferred to CICU for further management.  6/23 PCI of pLAD (80%) X 1 SIERRA  IABP 1:1    Right radial site-Right wrist stable w/ bleeding or hematoma; site soft, non tender.  Right radial pulse palpable +2. denies right wrist/arm/hand:  pain, numbness, or tingling   Left Femoral site benign, no hematoma, soft, non-tender.  Pulses in the (right/left) lower extremity are (palpable/audible by doppler/absent).  denies groin/leg/foot: pain, numbness or tingling   -c/w DAPT and high dose statin   - heparin Gtt  - C/W current medical management by Primary team.    Radha Landry [facility]-BC

## 2022-06-24 NOTE — PROGRESS NOTE ADULT - ASSESSMENT
51M active smoker with PMH of CAD s/p stent x2 (LAD >10 years), DM, diabetic foot ulcer s/p amputation, and HTN presented chest pain found to have a STEMI s/p urgent cath with stents.

## 2022-06-24 NOTE — DIETITIAN INITIAL EVALUATION ADULT - PERTINENT LABORATORY DATA
06-24    133<L>  |  98  |  18  ----------------------------<  228<H>  4.1   |  23  |  0.81    Ca    9.2      24 Jun 2022 04:40  Phos  3.2     06-24  Mg     2.0     06-24    TPro  7.0  /  Alb  3.8  /  TBili  0.6  /  DBili  x   /  AST  23  /  ALT  19  /  AlkPhos  63  06-24  POCT Blood Glucose.: 183 mg/dL (06-24-22 @ 12:14)  A1C with Estimated Average Glucose Result: 11.7 % (06-22-22 @ 23:39)

## 2022-06-24 NOTE — PROGRESS NOTE ADULT - ATTENDING COMMENTS
Admitted with inferior wall STEMI s/p PCI complicated by hypotension requiring IABP  Residual LAD disease that will go for staging today  DAPT with ASA, Ticagrelor   Lipitor 80  Cardiogenic shock requiring IABP s/p successful wean - remove IABP  Blood pressure is acceptable, chest pain free - start beta blocker when IABP is out  TTE with low normal LVEF  Cardiac enzymes have peaked  O2 sats mid 90s on nasal cannula  Normal renal function  H/H acceptable on Heparin drip for IABP  COVID negative, no antibiotics   Sugars borderline controlled on Lantus/Admelog  IABP 6/22 - will remove

## 2022-06-25 ENCOUNTER — TRANSCRIPTION ENCOUNTER (OUTPATIENT)
Age: 52
End: 2022-06-25

## 2022-06-25 VITALS
RESPIRATION RATE: 18 BRPM | OXYGEN SATURATION: 95 % | DIASTOLIC BLOOD PRESSURE: 63 MMHG | HEART RATE: 78 BPM | TEMPERATURE: 98 F | SYSTOLIC BLOOD PRESSURE: 128 MMHG

## 2022-06-25 LAB
GLUCOSE BLDC GLUCOMTR-MCNC: 164 MG/DL — HIGH (ref 70–99)
GLUCOSE BLDC GLUCOMTR-MCNC: 241 MG/DL — HIGH (ref 70–99)

## 2022-06-25 PROCEDURE — 71045 X-RAY EXAM CHEST 1 VIEW: CPT

## 2022-06-25 PROCEDURE — 96374 THER/PROPH/DIAG INJ IV PUSH: CPT

## 2022-06-25 PROCEDURE — 80061 LIPID PANEL: CPT

## 2022-06-25 PROCEDURE — C8929: CPT

## 2022-06-25 PROCEDURE — 84100 ASSAY OF PHOSPHORUS: CPT

## 2022-06-25 PROCEDURE — 82550 ASSAY OF CK (CPK): CPT

## 2022-06-25 PROCEDURE — 93458 L HRT ARTERY/VENTRICLE ANGIO: CPT | Mod: 59

## 2022-06-25 PROCEDURE — 84443 ASSAY THYROID STIM HORMONE: CPT

## 2022-06-25 PROCEDURE — 85610 PROTHROMBIN TIME: CPT

## 2022-06-25 PROCEDURE — 84484 ASSAY OF TROPONIN QUANT: CPT

## 2022-06-25 PROCEDURE — C1725: CPT

## 2022-06-25 PROCEDURE — 85027 COMPLETE CBC AUTOMATED: CPT

## 2022-06-25 PROCEDURE — 80053 COMPREHEN METABOLIC PANEL: CPT

## 2022-06-25 PROCEDURE — 83605 ASSAY OF LACTIC ACID: CPT

## 2022-06-25 PROCEDURE — 82553 CREATINE MB FRACTION: CPT

## 2022-06-25 PROCEDURE — 86901 BLOOD TYPING SEROLOGIC RH(D): CPT

## 2022-06-25 PROCEDURE — 33967 INSERT I-AORT PERCUT DEVICE: CPT

## 2022-06-25 PROCEDURE — 99153 MOD SED SAME PHYS/QHP EA: CPT

## 2022-06-25 PROCEDURE — C1874: CPT

## 2022-06-25 PROCEDURE — 83036 HEMOGLOBIN GLYCOSYLATED A1C: CPT

## 2022-06-25 PROCEDURE — 99239 HOSP IP/OBS DSCHRG MGMT >30: CPT

## 2022-06-25 PROCEDURE — C9606: CPT | Mod: RC

## 2022-06-25 PROCEDURE — C1769: CPT

## 2022-06-25 PROCEDURE — 93005 ELECTROCARDIOGRAM TRACING: CPT

## 2022-06-25 PROCEDURE — 82962 GLUCOSE BLOOD TEST: CPT

## 2022-06-25 PROCEDURE — C9600: CPT | Mod: LD

## 2022-06-25 PROCEDURE — 85025 COMPLETE CBC W/AUTO DIFF WBC: CPT

## 2022-06-25 PROCEDURE — 99291 CRITICAL CARE FIRST HOUR: CPT

## 2022-06-25 PROCEDURE — C1894: CPT

## 2022-06-25 PROCEDURE — 99233 SBSQ HOSP IP/OBS HIGH 50: CPT

## 2022-06-25 PROCEDURE — 36415 COLL VENOUS BLD VENIPUNCTURE: CPT

## 2022-06-25 PROCEDURE — C1887: CPT

## 2022-06-25 PROCEDURE — 99152 MOD SED SAME PHYS/QHP 5/>YRS: CPT

## 2022-06-25 PROCEDURE — U0003: CPT

## 2022-06-25 PROCEDURE — 86900 BLOOD TYPING SEROLOGIC ABO: CPT

## 2022-06-25 PROCEDURE — 83735 ASSAY OF MAGNESIUM: CPT

## 2022-06-25 PROCEDURE — C1889: CPT

## 2022-06-25 PROCEDURE — 85730 THROMBOPLASTIN TIME PARTIAL: CPT

## 2022-06-25 PROCEDURE — 86850 RBC ANTIBODY SCREEN: CPT

## 2022-06-25 RX ORDER — CLOPIDOGREL BISULFATE 75 MG/1
1 TABLET, FILM COATED ORAL
Qty: 0 | Refills: 0 | DISCHARGE
Start: 2022-06-25

## 2022-06-25 RX ORDER — CLOPIDOGREL BISULFATE 75 MG/1
75 TABLET, FILM COATED ORAL DAILY
Refills: 0 | Status: DISCONTINUED | OUTPATIENT
Start: 2022-06-26 | End: 2022-06-25

## 2022-06-25 RX ORDER — CLOPIDOGREL BISULFATE 75 MG/1
1 TABLET, FILM COATED ORAL
Qty: 30 | Refills: 0
Start: 2022-06-25 | End: 2022-07-24

## 2022-06-25 RX ORDER — ASPIRIN/CALCIUM CARB/MAGNESIUM 324 MG
1 TABLET ORAL
Qty: 30 | Refills: 0
Start: 2022-06-25 | End: 2022-07-24

## 2022-06-25 RX ORDER — CLOPIDOGREL BISULFATE 75 MG/1
8 TABLET, FILM COATED ORAL
Qty: 0 | Refills: 0 | DISCHARGE
Start: 2022-06-25

## 2022-06-25 RX ORDER — TICAGRELOR 90 MG/1
1 TABLET ORAL
Qty: 60 | Refills: 0
Start: 2022-06-25 | End: 2022-07-24

## 2022-06-25 RX ORDER — CLOPIDOGREL BISULFATE 75 MG/1
600 TABLET, FILM COATED ORAL ONCE
Refills: 0 | Status: DISCONTINUED | OUTPATIENT
Start: 2022-06-25 | End: 2022-06-25

## 2022-06-25 RX ORDER — METOPROLOL TARTRATE 50 MG
1 TABLET ORAL
Qty: 30 | Refills: 0
Start: 2022-06-25 | End: 2022-07-24

## 2022-06-25 RX ADMIN — Medication 4: at 11:59

## 2022-06-25 RX ADMIN — Medication 10 UNIT(S): at 08:23

## 2022-06-25 RX ADMIN — Medication 81 MILLIGRAM(S): at 11:52

## 2022-06-25 RX ADMIN — Medication 10 UNIT(S): at 11:58

## 2022-06-25 RX ADMIN — TICAGRELOR 90 MILLIGRAM(S): 90 TABLET ORAL at 06:39

## 2022-06-25 RX ADMIN — Medication 2: at 08:24

## 2022-06-25 RX ADMIN — Medication 12.5 MILLIGRAM(S): at 06:39

## 2022-06-25 NOTE — PROGRESS NOTE ADULT - ATTENDING COMMENTS
Agree with plan as outlined above.  Multivessel disease  Discussed post MI meds/RF reduction  OK to go from Cardiac standpoint                                                                      Forty-1 Minutes Spent in Patient Management

## 2022-06-25 NOTE — DISCHARGE NOTE PROVIDER - NSDCFUSCHEDAPPT_GEN_ALL_CORE_FT
Jenifer London  [facility] Physician [facility]  Med Endocr [address]  Scheduled Appointment: 07/13/2022     Tato Uribe  [facility] Physician [facility]  INTMED [address]  Scheduled Appointment: 06/30/2022    Jenifer London  [facility] Physician [facility]  Med Endocr [address]  Scheduled Appointment: 07/13/2022

## 2022-06-25 NOTE — DISCHARGE NOTE PROVIDER - CARE PROVIDERS DIRECT ADDRESSES
[email or web address]

## 2022-06-25 NOTE — PROGRESS NOTE ADULT - PROBLEM SELECTOR PLAN 4
- resume metoprolol 12.5mg BID  - monitor blood pressures
- resume metoprolol 12.5mg BID  - monitor blood pressures

## 2022-06-25 NOTE — DISCHARGE NOTE NURSING/CASE MANAGEMENT/SOCIAL WORK - PATIENT PORTAL LINK FT
You can access the FollowMyHealth Patient Portal offered by [facility] by registering at the following website: [email or web address]. By joining [facility]’s FollowMyHealth portal, you will also be able to view your health information using other applications (apps) compatible with our system.

## 2022-06-25 NOTE — DISCHARGE NOTE PROVIDER - NSDCMRMEDTOKEN_GEN_ALL_CORE_FT
aspirin 81 mg oral delayed release tablet: 1 tab(s) orally once a day  metFORMIN 1000 mg oral tablet: 1 tab(s) orally 2 times a day  NovoLOG FlexPen 100 units/mL injectable solution: 20 unit(s) injectable 3 times a day (before meals)  Plavix 75 mg oral tablet: 1 tab(s) orally once a day  ramipril 10 mg oral capsule: 1 cap(s) orally once a day  rosuvastatin 40 mg oral tablet: 1 tab(s) orally once a day  sertraline 50 mg oral tablet: 1 tab(s) orally once a day  ticagrelor 90 mg oral tablet: 1 tab(s) orally every 12 hours    test script  Tresiba 100 units/mL subcutaneous solution: 60 unit(s) subcutaneous once a day (at bedtime)  Trulicity Pen 3 mg/0.5 mL subcutaneous solution: subcutaneous once a week   metFORMIN 1000 mg oral tablet: 1 tab(s) orally 2 times a day  NovoLOG FlexPen 100 units/mL injectable solution: 20 unit(s) injectable 3 times a day (before meals)  ramipril 10 mg oral capsule: 1 cap(s) orally once a day  rosuvastatin 40 mg oral tablet: 1 tab(s) orally once a day  sertraline 50 mg oral tablet: 1 tab(s) orally once a day  Tresiba 100 units/mL subcutaneous solution: 60 unit(s) subcutaneous once a day (at bedtime)  Trulicity Pen 3 mg/0.5 mL subcutaneous solution: subcutaneous once a week   aspirin 81 mg oral delayed release tablet: 1 tab(s) orally once a day  clopidogrel 75 mg oral tablet: 8 tab(s) orally once  metFORMIN 1000 mg oral tablet: 1 tab(s) orally 2 times a day  NovoLOG FlexPen 100 units/mL injectable solution: 20 unit(s) injectable 3 times a day (before meals)  Plavix 75 mg oral tablet: 1 tab(s) orally once a day  ramipril 10 mg oral capsule: 1 cap(s) orally once a day  rosuvastatin 40 mg oral tablet: 1 tab(s) orally once a day  sertraline 50 mg oral tablet: 1 tab(s) orally once a day  Toprol-XL 25 mg oral tablet, extended release: 1 tab(s) orally once a day   Tresiba 100 units/mL subcutaneous solution: 60 unit(s) subcutaneous once a day (at bedtime)  Trulicity Pen 3 mg/0.5 mL subcutaneous solution: subcutaneous once a week

## 2022-06-25 NOTE — PROGRESS NOTE ADULT - SUBJECTIVE AND OBJECTIVE BOX
Interval History: Overnight events noted     Medications:  aspirin  chewable 81 milliGRAM(s) Oral daily  atorvastatin 80 milliGRAM(s) Oral at bedtime  dextrose 5%. 1000 milliLiter(s) IV Continuous <Continuous>  dextrose 5%. 1000 milliLiter(s) IV Continuous <Continuous>  dextrose 50% Injectable 25 Gram(s) IV Push once  dextrose 50% Injectable 12.5 Gram(s) IV Push once  dextrose 50% Injectable 25 Gram(s) IV Push once  dextrose Oral Gel 15 Gram(s) Oral once PRN  glucagon  Injectable 1 milliGRAM(s) IntraMuscular once  heparin  Infusion 1500 Unit(s)/Hr IV Continuous <Continuous>  insulin glargine Injectable (LANTUS) 30 Unit(s) SubCutaneous at bedtime  insulin lispro (ADMELOG) corrective regimen sliding scale   SubCutaneous three times a day before meals  insulin lispro (ADMELOG) corrective regimen sliding scale   SubCutaneous at bedtime  insulin lispro Injectable (ADMELOG) 10 Unit(s) SubCutaneous three times a day before meals  melatonin 5 milliGRAM(s) Oral at bedtime PRN  metoprolol tartrate 12.5 milliGRAM(s) Oral two times a day  mirtazapine 15 milliGRAM(s) Oral at bedtime  ticagrelor 90 milliGRAM(s) Oral every 12 hours      Vitals:  T(C): 36.9 (06-25-22 @ 04:58), Max: 37.2 (06-24-22 @ 17:28)  HR: 78 (06-25-22 @ 04:58) (77 - 95)  BP: 128/63 (06-25-22 @ 04:58) (102/63 - 138/81)  BP(mean): 96 (06-24-22 @ 16:00) (79 - 104)  RR: 18 (06-25-22 @ 04:58) (17 - 24)  SpO2: 95% (06-25-22 @ 04:58) (90% - 95%)    I&O's Summary    24 Jun 2022 07:01  -  25 Jun 2022 07:00  --------------------------------------------------------  IN: 600 mL / OUT: 625 mL / NET: -25 mL        Physical Exam:  Appearance: No Acute Distress  HEENT: No JVD  Cardiovascular: Normal S1 S2, No murmurs/rubs/gallops  Respiratory: Clear to auscultation bilaterally  Gastrointestinal: Soft, Non-tender	  Skin: No cyanosis	  Neurologic: Non-focal  Extremities: No LE edema  Psychiatry: A & O x 3, Mood & affect appropriate    Labs:                        14.0   6.80  )-----------( 144      ( 24 Jun 2022 04:40 )             41.3     06-24    133<L>  |  98  |  18  ----------------------------<  228<H>  4.1   |  23  |  0.81    Ca    9.2      24 Jun 2022 04:40  Phos  3.2     06-24  Mg     2.0     06-24    TPro  7.0  /  Alb  3.8  /  TBili  0.6  /  DBili  x   /  AST  23  /  ALT  19  /  AlkPhos  63  06-24    PT/INR - ( 24 Jun 2022 04:40 )   PT: 12.7 sec;   INR: 1.09 ratio         PTT - ( 24 Jun 2022 04:40 )  PTT:42.1 sec            Lactate, Blood: 0.8 mmol/L (06-24 @ 04:40)  Lactate, Blood: 1.1 mmol/L (06-22 @ 23:39)        TELEMETRY:  80 -90

## 2022-06-25 NOTE — DISCHARGE NOTE PROVIDER - NSDCCPCAREPLAN_GEN_ALL_CORE_FT
PRINCIPAL DISCHARGE DIAGNOSIS  Diagnosis: STEMI (ST elevation myocardial infarction)  Assessment and Plan of Treatment: You had an inferior wall STEMI and were taken to the cath lab emergently. You are s/p PCI with stent x 1 pRCA 95%, stent x 1 dRCA, LAD 70% and Cx 70%. You had Intraaortic balloon pump placed for hypotension.  You were cleared by cardiology to be discharged, but it was recommended to stay for endocrinology consult which you decided to leave before seeing the endocrinologist.  HOME CARE INSTRUCTIONS  For the next few days, avoid physical activities that bring on chest pain. Continue physical activities as directed.  Do not smoke.  Avoid drinking alcohol.   Only take over-the-counter or prescription medicine for pain, discomfort, or fever as directed by your caregiver.  Follow your caregiver's suggestions for further testing if your chest pain does not go away.  Keep any follow-up appointments you made. If you do not go to an appointment, you could develop lasting (chronic) problems with pain. If there is any problem keeping an appointment, you must call to reschedule.   SEEK MEDICAL CARE IF:  You think you are having problems from the medicine you are taking. Read your medicine instructions carefully.  Your chest pain does not go away, even after treatment.  You develop a rash with blisters on your chest.  SEEK IMMEDIATE MEDICAL CARE IF:  You have increased chest pain or pain that spreads to your arm, neck, jaw, back, or abdomen.   You develop shortness of breath, an increasing cough, or you are coughing up blood.  You have severe back or abdominal pain, feel nauseous, or vomit.  You develop severe weakness, fainting, or chills.  You have a fever.      SECONDARY DISCHARGE DIAGNOSES  Diagnosis: T2DM (type 2 diabetes mellitus)  Assessment and Plan of Treatment: Continue the recommended diabetic medication regimen. You decided to leave against medical advice before endocrinology had the chance to review your medications.    Diagnosis: CAD (coronary artery disease)  Assessment and Plan of Treatment: Coronary artery disease is a condition where the arteries the supply the heart muscle get clogges with fatty deposits & puts you at risk for a heart attack  Call your doctor if you have any new pain, pressure, or discomfort in the center of your chest, pain, tingling or discomfort in arms, back, neck, jaw, or stomach, shortness of breath, nausea, vomiting, burping or heartburn, sweating, cold and clammy skin, racing or abnormal heartbeat for more than 10 minutes or if they keep coming & going.  Call 911 and do not tr to get to hospital by care  You can help yourself with lefestyle changes (quitting smoking if you smoke), eat lots of fruits & vegetables & low fat dairy products, not a lot of meat & fatty foods, walk or some form of physical activity most days of the week, lose weight if you are overweight  Take your cardiac medication as prescribed to lower cholesterol, to lower blood pressure, aspirin to prevent blood clots, and diabetes control  Make sure to keep appointments with doctor for cardiac follow up care    Diagnosis: Hypertension  Assessment and Plan of Treatment: Take medications for your blood pressure as recommended.  Eat a heart healthy diet that is low in saturated fats and salt, and includes whole grains, fruits, vegetables and lean protein   Exercise regularly (consult with your physician or cardiologist first); maintain a heart healthy weight.   If you smoke - quit (A resource to help you stop smoking is the [facility] – phone number 708-094-2664.). Continue to follow with your primary physician or cardiologist.   Seek medical help for dizziness, Lightheadedness, Blurry vision, Headache, Chest pain, Shortness of breath  Follow up with your medical doctor to establish long term blood pressure treatment goals.     PRINCIPAL DISCHARGE DIAGNOSIS  Diagnosis: STEMI (ST elevation myocardial infarction)  Assessment and Plan of Treatment: You had an inferior wall STEMI and were taken to the cath lab emergently. You are s/p PCI with stent x 1 pRCA 95%, stent x 1 dRCA, LAD 70% and Cx 70%. You had Intraaortic balloon pump placed for hypotension.  Please take Plavix 600 mg once at 6pm tonight (okay per Dr. Jiangissar to take at that time). THEN start Plavix 75 mg daily starting tomorrow.  Continue Aspirin.  Continue Toprol XL, Rosuvastatin.  You were cleared by cardiology to be discharged, but it was recommended to stay for endocrinology consult and you decided to leave before seeing the endocrinologist.  HOME CARE INSTRUCTIONS  For the next few days, avoid physical activities that bring on chest pain. Continue physical activities as directed.  Do not smoke.  Avoid drinking alcohol.   Only take over-the-counter or prescription medicine for pain, discomfort, or fever as directed by your caregiver.  Follow your caregiver's suggestions for further testing if your chest pain does not go away.  Keep any follow-up appointments you made. If you do not go to an appointment, you could develop lasting (chronic) problems with pain. If there is any problem keeping an appointment, you must call to reschedule.   SEEK MEDICAL CARE IF:  You think you are having problems from the medicine you are taking. Read your medicine instructions carefully.  Your chest pain does not go away, even after treatment.  You develop a rash with blisters on your chest.  SEEK IMMEDIATE MEDICAL CARE IF:  You have increased chest pain or pain that spreads to your arm, neck, jaw, back, or abdomen.   You develop shortness of breath, an increasing cough, or you are coughing up blood.  You have severe back or abdominal pain, feel nauseous, or vomit.  You develop severe weakness, fainting, or chills.  You have a fever.      SECONDARY DISCHARGE DIAGNOSES  Diagnosis: T2DM (type 2 diabetes mellitus)  Assessment and Plan of Treatment: Continue the recommended diabetic medication regimen. You decided to leave against medical advice before endocrinology had the chance to review your medications.    Diagnosis: CAD (coronary artery disease)  Assessment and Plan of Treatment: Continue Rosuvastatin, Aspirin, and Plavix.  Coronary artery disease is a condition where the arteries the supply the heart muscle get clogges with fatty deposits & puts you at risk for a heart attack  Call your doctor if you have any new pain, pressure, or discomfort in the center of your chest, pain, tingling or discomfort in arms, back, neck, jaw, or stomach, shortness of breath, nausea, vomiting, burping or heartburn, sweating, cold and clammy skin, racing or abnormal heartbeat for more than 10 minutes or if they keep coming & going.  Call 911 and do not tr to get to hospital by care  You can help yourself with lefestyle changes (quitting smoking if you smoke), eat lots of fruits & vegetables & low fat dairy products, not a lot of meat & fatty foods, walk or some form of physical activity most days of the week, lose weight if you are overweight  Take your cardiac medication as prescribed to lower cholesterol, to lower blood pressure, aspirin to prevent blood clots, and diabetes control  Make sure to keep appointments with doctor for cardiac follow up care    Diagnosis: Hypertension  Assessment and Plan of Treatment: Continue Toprol XL.  Take medications for your blood pressure as recommended.  Eat a heart healthy diet that is low in saturated fats and salt, and includes whole grains, fruits, vegetables and lean protein   Exercise regularly (consult with your physician or cardiologist first); maintain a heart healthy weight.   If you smoke - quit (A resource to help you stop smoking is the [facility] – phone number 871-794-9375.). Continue to follow with your primary physician or cardiologist.   Seek medical help for dizziness, Lightheadedness, Blurry vision, Headache, Chest pain, Shortness of breath  Follow up with your medical doctor to establish long term blood pressure treatment goals.     PRINCIPAL DISCHARGE DIAGNOSIS  Diagnosis: STEMI (ST elevation myocardial infarction)  Assessment and Plan of Treatment: You had an inferior wall STEMI and were taken to the cath lab emergently. You are s/p PCI with stent x 1 pRCA 95%, stent x 1 dRCA, LAD 70% and Cx 70%. You had Intraaortic balloon pump placed for hypotension.  Please take Plavix 600 mg once at 6pm tonight (okay per Dr. Brady to take at that time). THEN start Plavix 75 mg daily starting tomorrow.  Continue Aspirin.  Continue Toprol XL, Rosuvastatin.  Continue your BP medications, Toprol XL and Ramipril.  You were cleared by cardiology to be discharged, but it was recommended to stay for endocrinology consult but after conversation with Lynette Evans from endocrinology, you can be managed as an outpatient as you have an appointment scheduled already for July 13.  HOME CARE INSTRUCTIONS  For the next few days, avoid physical activities that bring on chest pain. Continue physical activities as directed.  Do not smoke.  Avoid drinking alcohol.   Only take over-the-counter or prescription medicine for pain, discomfort, or fever as directed by your caregiver.  Follow your caregiver's suggestions for further testing if your chest pain does not go away.  Keep any follow-up appointments you made. If you do not go to an appointment, you could develop lasting (chronic) problems with pain. If there is any problem keeping an appointment, you must call to reschedule.   SEEK MEDICAL CARE IF:  You think you are having problems from the medicine you are taking. Read your medicine instructions carefully.  Your chest pain does not go away, even after treatment.  You develop a rash with blisters on your chest.  SEEK IMMEDIATE MEDICAL CARE IF:  You have increased chest pain or pain that spreads to your arm, neck, jaw, back, or abdomen.   You develop shortness of breath, an increasing cough, or you are coughing up blood.  You have severe back or abdominal pain, feel nauseous, or vomit.  You develop severe weakness, fainting, or chills.  You have a fever.      SECONDARY DISCHARGE DIAGNOSES  Diagnosis: T2DM (type 2 diabetes mellitus)  Assessment and Plan of Treatment: Continue the recommended diabetic medication regimen. It was recommended to stay for endocrinology consult but after conversation with Lynette Evans from endocrinology, she stated that you can be managed as an outpatient as you have an appointment scheduled already. Please attend your appointment with Dr. London on July 13.    Diagnosis: CAD (coronary artery disease)  Assessment and Plan of Treatment: Continue Rosuvastatin, Aspirin, and Plavix.  Coronary artery disease is a condition where the arteries the supply the heart muscle get clogges with fatty deposits & puts you at risk for a heart attack  Call your doctor if you have any new pain, pressure, or discomfort in the center of your chest, pain, tingling or discomfort in arms, back, neck, jaw, or stomach, shortness of breath, nausea, vomiting, burping or heartburn, sweating, cold and clammy skin, racing or abnormal heartbeat for more than 10 minutes or if they keep coming & going.  Call 911 and do not tr to get to hospital by care  You can help yourself with lefestyle changes (quitting smoking if you smoke), eat lots of fruits & vegetables & low fat dairy products, not a lot of meat & fatty foods, walk or some form of physical activity most days of the week, lose weight if you are overweight  Take your cardiac medication as prescribed to lower cholesterol, to lower blood pressure, aspirin to prevent blood clots, and diabetes control  Make sure to keep appointments with doctor for cardiac follow up care    Diagnosis: Hypertension  Assessment and Plan of Treatment: Continue Toprol XL. Continue Ramipril.  Take medications for your blood pressure as recommended.  Eat a heart healthy diet that is low in saturated fats and salt, and includes whole grains, fruits, vegetables and lean protein   Exercise regularly (consult with your physician or cardiologist first); maintain a heart healthy weight.   If you smoke - quit (A resource to help you stop smoking is the [facility] – phone number [phone].). Continue to follow with your primary physician or cardiologist.   Seek medical help for dizziness, Lightheadedness, Blurry vision, Headache, Chest pain, Shortness of breath  Follow up with your medical doctor to establish long term blood pressure treatment goals.

## 2022-06-25 NOTE — DISCHARGE NOTE NURSING/CASE MANAGEMENT/SOCIAL WORK - NSDCPEFALRISK_GEN_ALL_CORE
For information on Fall & Injury Prevention, visit: [email or web address] OR  [email or web address] OR  https://www.cdc.gov/steadi/patient.html

## 2022-06-25 NOTE — PROGRESS NOTE ADULT - SUBJECTIVE AND OBJECTIVE BOX
[facility] Division of Hospital Medicine  Yazan Brady DO  Pager (ELENA, 6R-9D): [phone]  Other Times:  [phone]    Patient is a 51y old  Male who presents with a chief complaint of Chest pain (25 Jun 2022 09:17)    SUBJECTIVE / OVERNIGHT EVENTS: No acute events overnight. Patient seen and examined at bedside this morning, denies chest pain, shortness of breath or cough.    REVIEW OF SYSTEMS:    CONSTITUTIONAL: No weakness, fevers or chills  EYES/ENT: No visual changes;  No vertigo or throat pain   NECK: No pain or stiffness  RESPIRATORY: No cough, wheezing, hemoptysis; No shortness of breath  CARDIOVASCULAR: No chest pain or palpitations  GASTROINTESTINAL: No abdominal or epigastric pain. No nausea, vomiting, or hematemesis; No diarrhea or constipation. No melena or hematochezia.  GENITOURINARY: No dysuria, frequency or hematuria  NEUROLOGICAL: No numbness or weakness  SKIN: No itching, burning, rashes, or lesions  MSK: No joint pain, no back pain  HEME: No easy bleeding, no easy bruising  All other review of systems is negative unless indicated above.    MEDICATIONS  (STANDING):  aspirin  chewable 81 milliGRAM(s) Oral daily  atorvastatin 80 milliGRAM(s) Oral at bedtime  clopidogrel Tablet 600 milliGRAM(s) Oral once  dextrose 5%. 1000 milliLiter(s) (50 mL/Hr) IV Continuous <Continuous>  dextrose 5%. 1000 milliLiter(s) (100 mL/Hr) IV Continuous <Continuous>  dextrose 50% Injectable 25 Gram(s) IV Push once  dextrose 50% Injectable 12.5 Gram(s) IV Push once  dextrose 50% Injectable 25 Gram(s) IV Push once  glucagon  Injectable 1 milliGRAM(s) IntraMuscular once  insulin glargine Injectable (LANTUS) 30 Unit(s) SubCutaneous at bedtime  insulin lispro (ADMELOG) corrective regimen sliding scale   SubCutaneous three times a day before meals  insulin lispro (ADMELOG) corrective regimen sliding scale   SubCutaneous at bedtime  insulin lispro Injectable (ADMELOG) 10 Unit(s) SubCutaneous three times a day before meals  metoprolol tartrate 12.5 milliGRAM(s) Oral two times a day  mirtazapine 15 milliGRAM(s) Oral at bedtime    MEDICATIONS  (PRN):  dextrose Oral Gel 15 Gram(s) Oral once PRN Blood Glucose LESS THAN 70 milliGRAM(s)/deciliter  melatonin 5 milliGRAM(s) Oral at bedtime PRN Sleep      CAPILLARY BLOOD GLUCOSE      POCT Blood Glucose.: 241 mg/dL (25 Jun 2022 11:56)  POCT Blood Glucose.: 164 mg/dL (25 Jun 2022 08:08)  POCT Blood Glucose.: 286 mg/dL (24 Jun 2022 17:39)    I&O's Summary    24 Jun 2022 07:01  -  25 Jun 2022 07:00  --------------------------------------------------------  IN: 600 mL / OUT: 625 mL / NET: -25 mL        PHYSICAL EXAM:  Vital Signs Last 24 Hrs  T(C): 36.9 (25 Jun 2022 04:58), Max: 37.2 (24 Jun 2022 17:28)  T(F): 98.5 (25 Jun 2022 04:58), Max: 99 (24 Jun 2022 17:28)  HR: 78 (25 Jun 2022 04:58) (78 - 88)  BP: 128/63 (25 Jun 2022 04:58) (119/80 - 136/77)  BP(mean): 96 (24 Jun 2022 16:00) (93 - 96)  RR: 18 (25 Jun 2022 04:58) (18 - 24)  SpO2: 95% (25 Jun 2022 04:58) (92% - 95%)    CONSTITUTIONAL: NAD, well-developed, well-groomed  EYES: EOMI; conjunctiva and sclera clear  ENMT: Moist oral mucosa, no pharyngeal injection or exudates; normal dentition  RESPIRATORY: Normal respiratory effort; lungs are clear to auscultation bilaterally  CARDIOVASCULAR: Regular rate and rhythm, normal S1 and S2, no murmur/rub/gallop; No lower extremity edema  ABDOMEN: Nontender to palpation, normoactive bowel sounds, no rebound/guarding  MUSCULOSKELETAL: No clubbing or cyanosis of digits; no joint swelling or tenderness to palpation  PSYCH: A+O to person, place, and time; affect appropriate  NEUROLOGY: CN 2-12 are intact and symmetric; no gross sensory deficits   SKIN: No rashes; no palpable lesions    LABS:                        14.0   6.80  )-----------( 144      ( 24 Jun 2022 04:40 )             41.3     06-24    133<L>  |  98  |  18  ----------------------------<  228<H>  4.1   |  23  |  0.81    Ca    9.2      24 Jun 2022 04:40  Phos  3.2     06-24  Mg     2.0     06-24    TPro  7.0  /  Alb  3.8  /  TBili  0.6  /  DBili  x   /  AST  23  /  ALT  19  /  AlkPhos  63  06-24    PT/INR - ( 24 Jun 2022 04:40 )   PT: 12.7 sec;   INR: 1.09 ratio         PTT - ( 24 Jun 2022 04:40 )  PTT:42.1 sec

## 2022-06-25 NOTE — PROGRESS NOTE ADULT - PROBLEM SELECTOR PLAN 1
Patient found to have STEMI on admission s/p pRCA and dRCA SIERRA and SIERRA X1 LAD  - briefly required IABP for hypotension (removed 6/24). TTE 6/23 showed EF of 52%  - c/w DAPT and high dose statin   - monitor pressures on BB  - f/u cards recs  - monitor groin sites
Patient found to have STEMI on admission s/p pRCA and dRCA SIERRA and SIERRA X1 LAD  - briefly required IABP for hypotension (removed 6/24). TTE 6/23 showed EF of 52%  - resume heparin gtt  - monitor CBC, CMP  - c/w DAPT and high dose statin   - monitor pressures on BB  - f/u cards recs  - monitor groin sites

## 2022-06-25 NOTE — DISCHARGE NOTE PROVIDER - CARE PROVIDER_API CALL
Jenifer London (DO)  Internal Medicine  [address], [address]  [place] [address]  Phone: [phone]  Fax: [phone]  Follow Up Time: 1-3 days    Moise Chu)  Cardiology; Internal Medicine  [address], [address]  [place] [address]  Phone: [phone]  Fax: [phone]  Follow Up Time: 1-3 days    JAGUAR QUINTANA  Internal Medicine  [address] [place] [address]  Phone: [phone]  Fax: ()-  Follow Up Time: 1-3 days   Jenifer London (DO)  Internal Medicine  [address] [facility], [address]  [place] [address]  Phone: [phone]  Fax: [phone]  Follow Up Time: 1 week    Moise Chu)  Cardiology; Internal Medicine  [address], [address]  [place] [address]  Phone: [phone]  Fax: [phone]  Follow Up Time: 1 week    JAGUAR QUINTANA  Internal Medicine  [address] [place] [address]  Phone: [phone]  Fax: ()-  Follow Up Time: 1 week

## 2022-06-25 NOTE — DISCHARGE NOTE NURSING/CASE MANAGEMENT/SOCIAL WORK - NSDCFUADDAPPT_GEN_ALL_CORE_FT
APPTS ARE READY TO BE MADE: [X] YES    Best Family or Patient Contact (if needed):    Additional Information about above appointments (if needed):    1: CARDIOLOGY  2: PRIMARY CARE  3: ENDOCRINOLOGY    Other comments or requests:

## 2022-06-25 NOTE — PROGRESS NOTE ADULT - PROBLEM SELECTOR PLAN 2
- CAD s/p stents  - continue aspirin 81mg qd, Brilinta BID, atorvastatin 80mg qd  - started on BB
- CAD s/p stents  - continue aspirin 81mg qd, Brilinta BID, atorvastatin 80mg qd  - started on BB

## 2022-06-25 NOTE — PROGRESS NOTE ADULT - NSPROGADDITIONALINFOA_GEN_ALL_CORE
Patient medically stable for discharge per cardiology recommendations with outpatient follow up. Discussed with patient and [facility] ACP. Total time spent discharge planning 43 minutes.

## 2022-06-25 NOTE — DISCHARGE NOTE PROVIDER - HOSPITAL COURSE
51M active smoker with PMH of CAD s/p stent x2 (LAD >10 years), DM, diabetic foot ulcer s/p amputation, and HTN presented chest pain found to have a STEMI s/p urgent cath with stents.     STEMI (ST elevation myocardial infarction).   - Patient found to have STEMI on admission s/p pRCA and dRCA SIERRA and SIERRA X1 LAD  - briefly required IABP for hypotension (removed 6/24). TTE 6/23 showed EF of 52%  - s/p heparin gtt  - c/w DAPT and high dose statin   - Brilinta not covered by insurance, loaded with Plavix 600 mg and started on Plavix 75 mg daily. Discussed with cardiology.    CAD (coronary artery disease).   - CAD s/p stents  - continue aspirin 81mg qd, Brilinta BID, atorvastatin 80mg qd  - started on BB.    T2DM (type 2 diabetes mellitus).   - on Lantus 30U and pre meal novolog coverage 10U TID while inpatient  - A1c 11.7  - endo consult recommended and discussed risks/benefits of not staying for endocrine workup and he decided to defer to outpatient management    Hypertension.   - resume metoprolol 12.5mg BID, switched to Toprol XL for discharge    Pt wished to leave AMA. Patient seen at bedside to further discuss plan of care. Discussed risks of leaving against medical advice, which includes worsening of current medical condition, up to and including death. The pt is able to make decisions, A&Ox4, free from distracting injury. Discussions included the potential outcomes of leaving AMA, including worsening of condition, becoming permanently disabled/in pain/critically ill, and death.  Despite these efforts, unable to convince the pt to stay. The pt is refusing any further care and is leaving against medical advice. Attempts have been made to offer tx/rx/guidance for any dangerous conditions which are most likely and/or dangerous. All questions have been answered and the pt understands risks and still wishes to leave. AMA paperwork signed and placed in chart. Dr. Brady made aware. 51M active smoker with PMH of CAD s/p stent x2 (LAD >10 years), DM, diabetic foot ulcer s/p amputation, and HTN presented chest pain found to have a STEMI s/p urgent cath with stents.     STEMI (ST elevation myocardial infarction).   - Patient found to have STEMI on admission s/p pRCA and dRCA SIERRA and SIERRA X1 LAD  - briefly required IABP for hypotension (removed 6/24). TTE 6/23 showed EF of 52%  - s/p heparin gtt  - c/w DAPT and high dose statin   - Brilinta not covered by insurance, loaded with Plavix 600 mg and started on Plavix 75 mg daily. Discussed with cardiology. Pt unwilling to stay to take Plavix load. He states he "just refilled all his medications and he is good." Per Dr. Brady, okay to give Plavix load 600 mg for pt to take at home since he is refusing to stay for dose.  - Cardiology did clear pt for discharge 6/25. Discussed with Dr. Alonzo.    CAD (coronary artery disease).   - CAD s/p stents  - continue aspirin 81mg qd, Brilinta BID, atorvastatin 80mg qd  - started on BB.    T2DM (type 2 diabetes mellitus).   - on Lantus 30U and pre meal novolog coverage 10U TID while inpatient  - A1c 11.7  - endo consult recommended and discussed risks/benefits of not staying for endocrine workup and he decided to defer to outpatient management. Refused to stay for endocrinology workup, provided name of his outpatient endocrinologist on d/c paperwork    Hypertension.   - resume metoprolol 12.5mg BID, switched to Toprol XL for discharge    Pt wished to leave AMA. Patient seen at bedside to further discuss plan of care. Discussed risks of leaving against medical advice, which includes worsening of current medical condition, up to and including death. The pt is able to make decisions, A&Ox4, free from distracting injury. Discussions included the potential outcomes of leaving AMA, including worsening of condition, becoming permanently disabled/in pain/critically ill, and death.  Despite these efforts, unable to convince the pt to stay. The pt is refusing any further care and is leaving against medical advice. Attempts have been made to offer tx/rx/guidance for any dangerous conditions which are most likely and/or dangerous. Offered for endocrinology consult which he refused to stay for. All questions have been answered and the pt understands risks and still wishes to leave. AMA paperwork signed and placed in chart. Dr. Brady made aware. Per Dr. Brady, okay to give Plavix load 600 mg for pt to take at home. 51M active smoker with PMH of CAD s/p stent x2 (LAD >10 years), DM, diabetic foot ulcer s/p amputation, and HTN presented chest pain found to have a STEMI s/p urgent cath with stents.     STEMI (ST elevation myocardial infarction).   - Patient found to have STEMI on admission s/p pRCA and dRCA SIERRA and SIERRA X1 LAD  - briefly required IABP for hypotension (removed 6/24). TTE 6/23 showed EF of 52%  - s/p heparin gtt  - c/w DAPT and high dose statin   - Brilinta not covered by insurance, loaded with Plavix 600 mg and started on Plavix 75 mg daily. Discussed with cardiology. Pt unwilling to stay to take Plavix load here. He states he "just refilled all his medications and he is good." Per lauren Dubois to give Plavix load 600 mg for pt to take at home since he is refusing to stay for dose.  - Cardiology cleared pt for discharge 6/25. Discussed with Dr. Alonzo cardiology.    CAD (coronary artery disease).   - CAD s/p stents  - continue aspirin 81mg qd, Brilinta BID, atorvastatin 80mg qd  - started on BB.    T2DM (type 2 diabetes mellitus).   - on Lantus 30U and pre meal novolog coverage 10U TID while inpatient  - A1c 11.7  - It was recommended to stay for endocrinology consult but after conversation with Lynette Evans from endocrinology, she stated that pt can be managed as an outpatient as he has an appointment scheduled for July 13 and there are not any changes to his medication regimen that she would make at this time,    Hypertension.   - resume metoprolol 12.5mg BID, switched to Toprol XL for discharge    Medically cleared for discharge home by cardiology and medical attending. It was recommended to stay for endocrinology consult but after conversation with Lynette Evans from endocrinology, she stated that pt can be managed as an outpatient as he has an appointment scheduled already for July 13 and there are not any changes to his medication regimen that she would make at this time. Per lauren Dubois to give Plavix load 600 mg for pt to take at home.

## 2022-06-25 NOTE — CHART NOTE - NSCHARTNOTEFT_GEN_A_CORE
In AM, informed by RN that pt wanted to leave AMA. Informed pt that it is recommended to stay for endocrinology consult for further DMII management in the setting of his STEMI. Pt was very upset and saying he just wants to follow up as an outpatient and he is unwilling to stay and he wants to know exactly what time endocrinology will come. Pt cursing, agitated, and rude to staff. Asking to eat his breakfast and only willing to let charge nurse James speak to him. Fired his bedside RN. Called endocrinology to expedite consult but as it is weekend service, they would be unable to see pt until late afternoon. Pt unwilling to stay. Then received call from Endocrine Lynette Evans who reviewed pt's chart and she stated that pt can be managed as an outpatient as he has an appointment scheduled already for July 13 and there are not any changes to his medication regimen that she would make at this time. Encouraged pt to keep appointment. As cardiology had cleared the pt and endocrine recommended outpatient follow up, Dr. Brady medical attending cleared pt for discharge. Discharge document was reviewed with pt. Please see discharge note for further information.    Lexi Davis PA-C  [id number]

## 2022-06-25 NOTE — DISCHARGE NOTE PROVIDER - NSDCFUADDAPPT_GEN_ALL_CORE_FT
APPTS ARE READY TO BE MADE: [X] YES    Best Family or Patient Contact (if needed):    Additional Information about above appointments (if needed):    1: CARDIOLOGY  2: PRIMARY CARE  3: ENDOCRINOLOGY    Other comments or requests:    APPTS ARE READY TO BE MADE: [X] YES    Best Family or Patient Contact (if needed):    Additional Information about above appointments (if needed):    1: CARDIOLOGY  2: PRIMARY CARE  3: ENDOCRINOLOGY    Other comments or requests:     Patient was previously scheduled with Dr. Uribe on 6/30 2 pm at [address]  Patient was previously scheduled with Dr. London on 7/13 10 am at [address] [place] [address]  Patient was provided with follow up request details and was advised to call to schedule follow up within specified time frame.

## 2022-06-25 NOTE — DISCHARGE NOTE PROVIDER - PROVIDER TOKENS
PROVIDER:[TOKEN:[[id number]],FOLLOWUP:[1-3 days]],PROVIDER:[TOKEN:[[id number]],FOLLOWUP:[1-3 days]],PROVIDER:[TOKEN:[[id number]],FOLLOWUP:[1-3 days]] PROVIDER:[TOKEN:[[id number]],FOLLOWUP:[1 week]],PROVIDER:[TOKEN:[[id number]],FOLLOWUP:[1 week]],PROVIDER:[TOKEN:[[id number]],FOLLOWUP:[1 week]]

## 2022-06-25 NOTE — PROGRESS NOTE ADULT - PROBLEM SELECTOR PLAN 5
- DVT ppx: DAPT  - Diet: DASH/CC  - Dispo: pending clinical improvement
- DVT ppx: Heparin gtt  - Diet: DASH/CC  - Dispo: pending clinical improvement

## 2022-06-25 NOTE — PROGRESS NOTE ADULT - PROBLEM SELECTOR PLAN 3
DM  - continue Lantus 30U and pre meal novolog coverage 10U TID  - ISS premeal and qhs  - A1c 11.7
DM  - continue Lantus 30U and pre meal novolog coverage 10U TID  - ISS premeal and qhs  - A1c 11.7  - endo consult in am

## 2022-06-25 NOTE — PROGRESS NOTE ADULT - ASSESSMENT
52 y/o active smoking male with PMH of CAD s/p stent x2 (LAD >10 years), DM, diabetic foot ulcer s/p amputation, and HTN presented with stemi s/p PCI with SIERRA x1 pRCA 95%, SIERRA x1 dRCA, SIERRA X 1 LAD 70%     STEMI  PCI with SIERRA x1 pRCA 95%, SIERRA x1 dRCA, SIERRA X 1 LAD 70%   -c/w DAPT and high dose statin   -Transition to Toprol 25 mg on discharge   -6/23 ECHO low normal EF   -Discharge planning today with close outpatient follow up with OP cardiologist within 2 weeks     Jamie Alonzo MD  Cardiology Fellow  [phone]    Please check [email or web address]: [email or web address]" for cardiology service schedule and contact information.

## 2022-06-27 ENCOUNTER — NON-APPOINTMENT (OUTPATIENT)
Age: 52
End: 2022-06-27

## 2022-06-27 RX ORDER — CEPHALEXIN 500 MG/1
500 TABLET ORAL EVERY 8 HOURS
Qty: 15 | Refills: 0 | Status: COMPLETED | COMMUNITY
Start: 2021-10-28 | End: 2022-06-27

## 2022-06-27 RX ORDER — AMOXICILLIN 500 MG/1
500 CAPSULE ORAL 3 TIMES DAILY
Qty: 21 | Refills: 0 | Status: COMPLETED | COMMUNITY
Start: 2022-05-05 | End: 2022-06-27

## 2022-06-27 RX ORDER — METFORMIN HYDROCHLORIDE 500 MG/1
500 TABLET, COATED ORAL
Qty: 1 | Refills: 2 | Status: COMPLETED | COMMUNITY
Start: 2021-10-20 | End: 2022-06-27

## 2022-06-27 RX ORDER — ASPIRIN ENTERIC COATED TABLETS 81 MG 81 MG/1
81 TABLET, DELAYED RELEASE ORAL
Refills: 0 | Status: ACTIVE | COMMUNITY
Start: 2022-06-27

## 2022-06-27 RX ORDER — METFORMIN HYDROCHLORIDE 500 MG/1
500 TABLET, COATED ORAL
Qty: 60 | Refills: 0 | Status: COMPLETED | COMMUNITY
End: 2022-06-27

## 2022-06-30 ENCOUNTER — APPOINTMENT (OUTPATIENT)
Dept: INTERNAL MEDICINE | Facility: CLINIC | Age: 52
End: 2022-06-30

## 2022-07-05 ENCOUNTER — APPOINTMENT (OUTPATIENT)
Dept: CARDIOLOGY | Facility: CLINIC | Age: 52
End: 2022-07-05

## 2022-07-13 ENCOUNTER — NON-APPOINTMENT (OUTPATIENT)
Age: 52
End: 2022-07-13

## 2022-07-13 ENCOUNTER — APPOINTMENT (OUTPATIENT)
Dept: CARDIOLOGY | Facility: CLINIC | Age: 52
End: 2022-07-13
Payer: COMMERCIAL

## 2022-07-13 ENCOUNTER — APPOINTMENT (OUTPATIENT)
Dept: ENDOCRINOLOGY | Facility: CLINIC | Age: 52
End: 2022-07-13

## 2022-07-13 VITALS
OXYGEN SATURATION: 93 % | WEIGHT: 220 LBS | BODY MASS INDEX: 29.16 KG/M2 | HEIGHT: 73 IN | DIASTOLIC BLOOD PRESSURE: 66 MMHG | RESPIRATION RATE: 16 BRPM | HEART RATE: 91 BPM | SYSTOLIC BLOOD PRESSURE: 108 MMHG

## 2022-07-13 DIAGNOSIS — I21.19 ST ELEVATION (STEMI) MYOCARDIAL INFARCTION INVOLVING OTHER CORONARY ARTERY OF INFERIOR WALL: ICD-10-CM

## 2022-07-13 DIAGNOSIS — I77.810 THORACIC AORTIC ECTASIA: ICD-10-CM

## 2022-07-13 PROCEDURE — 99215 OFFICE O/P EST HI 40 MIN: CPT | Mod: 25

## 2022-07-13 PROCEDURE — 93000 ELECTROCARDIOGRAM COMPLETE: CPT

## 2022-07-13 NOTE — HISTORY OF PRESENT ILLNESS
[FreeTextEntry1] : Presents for initial office visit following his admission from our office after seeing Dr. Harrington on June 22 with recent inferior wall STEMI.  He reports that a few days before the visit, [place] [facility] [place], experienced anterior chest pain and near syncope with intermittent chest pain thereafter.\par \par Emergent cath at [place] revealed moderate circumflex disease with more severe disease distally.  There was a 70% LAD lesion, 95% proximal right lesion and total occlusion of the distal right.  He underwent PCI with drug-eluting stents of the 2 right lesions in the proximal LAD lesion.  Required short-term intra-aortic balloon pump support.  \par \par Transthoracic echo revealed low normal overall left ventricular function without regional wall motion abnormalities.  Aortic root dimension was 4.3 cm.  Diastolic function was normal.\par \par A1c in the hospital was 11.7.  He saw his endocrinologist this morning and A1c was 9.2.\par \par He feels well since discharge.  No groin pain.  He has already hit balls at the golf range and bicycles outdoors without any effort provoked symptoms.  Stair climbs without difficulty.  Some fatigue and nausea immediately post discharge has resolved.\par \par No c/o chest, throat,jaw, arm or upper back discomfort.  No dyspnea, orthopnea or PND.  No palpitations, dizziness or syncope.  No edema or claudication.

## 2022-07-15 NOTE — PROVIDER CONTACT NOTE (OTHER) - DATE AND TIME:
Patient with longstanding history of bilateral lower extremity lymphedema and wounds to lower extremities  · With multiple recent admissions at [facility], [facility], and most recently [facility], discharged on 6/20  · Rehab was recommended however patient wanted to go home  · Now with worsening lower extremity wounds/associated cellulitis  · Has been taking clindamycin at home, will discontinue clindamycin  · Initiated on IV Rocephin in the ED  · Podiatry consultation, appreciate recommendations  · Continue IV cefepime and vancomycin given history of MRSA  · Pharmacy consultation  · PT/OT consulted  · Unfortunately patient is a very difficult stick, will need lab draws given vancomycin use as well as daily monitoring of labs    PICC line consent obtained, order placed
23-Jun-2022 23:00
23-Jun-2022 03:00
Billing Type: Third-Party Bill

## 2022-07-18 ENCOUNTER — APPOINTMENT (OUTPATIENT)
Dept: ORTHOPEDIC SURGERY | Facility: CLINIC | Age: 52
End: 2022-07-18
Payer: COMMERCIAL

## 2022-07-18 VITALS
DIASTOLIC BLOOD PRESSURE: 93 MMHG | SYSTOLIC BLOOD PRESSURE: 148 MMHG | WEIGHT: 215 LBS | BODY MASS INDEX: 28.49 KG/M2 | HEIGHT: 73 IN | HEART RATE: 90 BPM

## 2022-07-18 DIAGNOSIS — M75.01 ADHESIVE CAPSULITIS OF RIGHT SHOULDER: ICD-10-CM

## 2022-07-18 DIAGNOSIS — M75.02 ADHESIVE CAPSULITIS OF LEFT SHOULDER: ICD-10-CM

## 2022-07-18 DIAGNOSIS — M67.911 UNSPECIFIED DISORDER OF SYNOVIUM AND TENDON, RIGHT SHOULDER: ICD-10-CM

## 2022-07-18 DIAGNOSIS — M67.912 UNSPECIFIED DISORDER OF SYNOVIUM AND TENDON, LEFT SHOULDER: ICD-10-CM

## 2022-07-18 PROCEDURE — 73030 X-RAY EXAM OF SHOULDER: CPT | Mod: LT,RT

## 2022-07-18 PROCEDURE — 99203 OFFICE O/P NEW LOW 30 MIN: CPT

## 2022-07-18 RX ORDER — TRAMADOL HYDROCHLORIDE 50 MG/1
50 TABLET, COATED ORAL
Qty: 15 | Refills: 0 | Status: ACTIVE | COMMUNITY
Start: 2022-07-18 | End: 1900-01-01

## 2022-07-19 ENCOUNTER — APPOINTMENT (OUTPATIENT)
Dept: ORTHOPEDIC SURGERY | Facility: CLINIC | Age: 52
End: 2022-07-19

## 2022-08-08 ENCOUNTER — APPOINTMENT (OUTPATIENT)
Dept: WOUND CARE | Facility: CLINIC | Age: 52
End: 2022-08-08
Payer: COMMERCIAL

## 2022-08-08 DIAGNOSIS — L97.511 NON-PRESSURE CHRONIC ULCER OF OTHER PART OF RIGHT FOOT LIMITED TO BREAKDOWN OF SKIN: ICD-10-CM

## 2022-08-08 PROCEDURE — 99213 OFFICE O/P EST LOW 20 MIN: CPT

## 2022-08-08 RX ORDER — MUPIROCIN 20 MG/G
2 OINTMENT TOPICAL
Qty: 1 | Refills: 2 | Status: ACTIVE | COMMUNITY
Start: 2022-08-08 | End: 1900-01-01

## 2022-08-15 PROBLEM — L97.511 CHRONIC ULCER OF GREAT TOE OF RIGHT FOOT, LIMITED TO BREAKDOWN OF SKIN: Status: ACTIVE | Noted: 2022-08-15

## 2022-08-15 NOTE — HISTORY OF PRESENT ILLNESS
[FreeTextEntry1] : Pt discharged from [facility] 6/11/21 s/p R TMA. DC'd 6/12 w/ augmentin and had finished course on 6/19. Pathology report came back negative. Noticed right foot blister over medial right foot TMA stump. He believes the top part of his shoe filler became detached and his right foot TMA site rubbed against the foam creating a blister. States his AM sugar levels were 118 mg/dL. Denies any N/V/F/C/SOB.

## 2022-08-15 NOTE — PLAN
[FreeTextEntry1] : 50M s/p R foot TMA & ADDISON (DOS 6/11/21)\par - pt seen and evaluated.\par - R TMA site wound to dermis, fluctuance noted in bulla, macerated periwound.\par - R bulla was deroofed through excisional debridement with a sterile #15 blade and serosanguinous drainage noted. \par - Aquacel applied to R foot wound with DSD.\par - Rx for mupirocin.\par - Recommend activity modification ans use of the CAM boot.\par - RTC in 2 wks.\par

## 2022-09-21 ENCOUNTER — LABORATORY RESULT (OUTPATIENT)
Age: 52
End: 2022-09-21

## 2022-09-21 ENCOUNTER — OUTPATIENT (OUTPATIENT)
Dept: OUTPATIENT SERVICES | Facility: HOSPITAL | Age: 52
LOS: 1 days | End: 2022-09-21
Payer: COMMERCIAL

## 2022-09-21 ENCOUNTER — APPOINTMENT (OUTPATIENT)
Dept: UROLOGY | Facility: CLINIC | Age: 52
End: 2022-09-21

## 2022-09-21 VITALS
TEMPERATURE: 98 F | RESPIRATION RATE: 17 BRPM | DIASTOLIC BLOOD PRESSURE: 85 MMHG | HEIGHT: 73 IN | HEART RATE: 92 BPM | BODY MASS INDEX: 27.83 KG/M2 | SYSTOLIC BLOOD PRESSURE: 164 MMHG | WEIGHT: 210 LBS

## 2022-09-21 DIAGNOSIS — N49.2 INFLAMMATORY DISORDERS OF SCROTUM: ICD-10-CM

## 2022-09-21 DIAGNOSIS — Z89.429 ACQUIRED ABSENCE OF OTHER TOE(S), UNSPECIFIED SIDE: Chronic | ICD-10-CM

## 2022-09-21 PROCEDURE — 10060 I&D ABSCESS SIMPLE/SINGLE: CPT

## 2022-09-21 PROCEDURE — 99205 OFFICE O/P NEW HI 60 MIN: CPT

## 2022-09-21 RX ORDER — AMOXICILLIN AND CLAVULANATE POTASSIUM 875; 125 MG/1; MG/1
875-125 TABLET, COATED ORAL
Qty: 14 | Refills: 0 | Status: ACTIVE | COMMUNITY
Start: 2022-09-21 | End: 1900-01-01

## 2022-09-21 NOTE — PHYSICAL EXAM
[General Appearance - Well Developed] : well developed [General Appearance - Well Nourished] : well nourished [Normal Appearance] : normal appearance [Well Groomed] : well groomed [General Appearance - In No Acute Distress] : no acute distress [] : no rash [No Focal Deficits] : no focal deficits [Oriented To Time, Place, And Person] : oriented to person, place, and time [Affect] : the affect was normal [Mood] : the mood was normal [Not Anxious] : not anxious [FreeTextEntry1] : Pt has a limp

## 2022-09-21 NOTE — PATIENT PROFILE ADULT - NSPROPTRIGHTBILLOFRIGHTS_GEN_A_NUR
Form completed and ready for    
Form faxed   
Patient is presented to clinic requesting a new  Exam Confirmation form.    Patient states that it is okay to leave a detailed message.      Patient Name: Khanh Hernandez  Caller Name: self  Callback Number:  Mobile [phone]     Fax Number: Patient will   Additional Info: Form was dropped off and placed in provider's mail box.    Caller was advised that they will receive a call back from our clinic within 24-48 hours.     
patient

## 2022-09-21 NOTE — REVIEW OF SYSTEMS
[Genital Lesion] : genital lesion [Testicular Pain] : testicular pain [Skin Lesions] : skin lesion [Negative] : Heme/Lymph

## 2022-09-21 NOTE — HISTORY OF PRESENT ILLNESS
[FreeTextEntry1] : 09/21/2022: Reason for Visit: Evaluation of scrotum cysts, infection, abscess\par \par Reason for Visit: ABscess \par \par \par The patient is a 52 year y/o male presenting today for evaluation of a cyst in his right scrotum that appeared two days ago. Pt reports he noticed a pimple and tried popping it. He says blood and pus came out. He then went to the hospital where it was drained. Hx of DM. TI, Indulin dependant He had amputated toes. He reports his blood sugar is now well managed and is around 110-115 in the morning. Pt says his last HGB A1C was 9 and he has an upcoming test.  Hx of CAD, MI in June 2022. Denies Hx of CVA.  \par \par On examination patient is in a moderate amount of pain is slightly combative is not cooperative angry complaining and yelling.  He has a fluctuant tender area between the right inner thigh and his scrotum consistent with an abscess.  It appears to be localized there is no spreading or worsening erythema\par \par Assessment: Scrotal abscess and a type 1 insulin-dependent diabetic no fever no chills normal blood pressure vital signs are stable\par \par After informed consent was obtained the area was cleansed with iodine numbed with lidocaine and then a 3 cm incision was made through the skin the abscess cavity was under high amount of pressure foul-smelling purulent material was cultured it was drained and then packed with half-inch Nu Gauze\par \par Patient was recommended to go to the emergency room for antibiotics he refused. \par \par Plan: ID of scrotal abscess today with cultures, wound care consultation sent \par \par I counseled the patient. I discussed the various etiologies of his symptoms. Risks and alternatives were discussed. I answered the patient questions. The patient will follow-up as directed and will contact me with any questions or concerns. Thank you for the opportunity to participate in the care of this patient. I'll keep you updated on his progress.\par

## 2022-09-22 ENCOUNTER — APPOINTMENT (OUTPATIENT)
Dept: WOUND CARE | Facility: CLINIC | Age: 52
End: 2022-09-22

## 2022-09-30 DIAGNOSIS — R35.0 FREQUENCY OF MICTURITION: ICD-10-CM

## 2022-10-03 DIAGNOSIS — N49.2 INFLAMMATORY DISORDERS OF SCROTUM: ICD-10-CM

## 2022-10-18 ENCOUNTER — APPOINTMENT (OUTPATIENT)
Dept: CARDIOLOGY | Facility: CLINIC | Age: 52
End: 2022-10-18

## 2022-10-21 NOTE — PHYSICAL THERAPY INITIAL EVALUATION ADULT - PERSONAL SAFETY AND JUDGMENT, REHAB EVAL
Is This A New Presentation, Or A Follow-Up?: Skin Lesions Have Your Skin Lesions Been Treated?: not been treated intact

## 2022-10-24 ENCOUNTER — APPOINTMENT (OUTPATIENT)
Dept: ENDOCRINOLOGY | Facility: CLINIC | Age: 52
End: 2022-10-24

## 2022-10-27 RX ORDER — BLOOD-GLUCOSE TRANSMITTER
EACH MISCELLANEOUS
Qty: 1 | Refills: 3 | Status: DISCONTINUED | COMMUNITY
Start: 2021-08-03 | End: 2022-10-27

## 2022-12-27 NOTE — ED ADULT NURSE NOTE - IS THE PATIENT ABLE TO BE SCREENED?
UNABLE TO WARM TRANSFER     PT WOULD LIKE TO SPEAK W/ MED STAFF     POSSIBLY WORKED IN TODAY     [phone]   Yes

## 2023-01-04 ENCOUNTER — APPOINTMENT (OUTPATIENT)
Dept: ENDOCRINOLOGY | Facility: CLINIC | Age: 53
End: 2023-01-04

## 2023-01-19 ENCOUNTER — APPOINTMENT (OUTPATIENT)
Dept: ENDOCRINOLOGY | Facility: CLINIC | Age: 53
End: 2023-01-19
Payer: COMMERCIAL

## 2023-01-19 VITALS
OXYGEN SATURATION: 97 % | DIASTOLIC BLOOD PRESSURE: 88 MMHG | HEIGHT: 73 IN | HEART RATE: 106 BPM | BODY MASS INDEX: 29.16 KG/M2 | WEIGHT: 220 LBS | SYSTOLIC BLOOD PRESSURE: 132 MMHG

## 2023-01-19 PROCEDURE — 95251 CONT GLUC MNTR ANALYSIS I&R: CPT

## 2023-01-19 PROCEDURE — 99214 OFFICE O/P EST MOD 30 MIN: CPT | Mod: 25

## 2023-01-19 PROCEDURE — 83036 HEMOGLOBIN GLYCOSYLATED A1C: CPT | Mod: NC,QW

## 2023-01-19 NOTE — HISTORY OF PRESENT ILLNESS
[FreeTextEntry1] : 52 year old presents for follow up management of type 2 DM.\par Patient referred by Dr. Hirsch for diabetes management.\par \par Occupation: works as a CPA. \par \par Diabetes Disease Course:\par Diagnosed 10 years ago, has been on insulin since. \par He reported that he got COVID at end of December \par He was off of insulin in January for 2 weeks \par Had amputation of the right foot toes ( amputations) \par 3-4 weeks since then \par He has been with physical therapy since then.\par had achilles tendon- surgery \par \par Medication Regimen:\par -Tresiba to 60 units at bedtime\par - Trulicity 3.0 mg weekly \par -Novolog 24-24-24 with low dose correctional scale -- unsure what that means likely, 1:50 >150\par -Start Metformin 500 mg BID \par \par Meter Readings:\par Dexcom reviewed:we did not glucose readings. \par \par Diabetic Complications:\par - Eyes: Retinopathy: Proliferative retinopathy (laser treatment in the right eye ( 3 tx) and left eye ( 3 tx) -completed and did 9 treatments. Eye injection in the retina ( and may need laser again) \par  	When was the last fully dilated eye exam? Dr. Newsome 02/2022- will be following up in 6-8 weeks \par - Feet: 	Neuropathy Yes \par  	Foot Ulcers Yes \par 	 Podiatrist 12/2021\par - Kidneys: Nephropathy None \par \par Diet:\par Breakfast: Whole wheat bread, scrambled eggs, oatmeal, frozen fruit, Kashi cereal \par Lunch: [place] ( whole wheat bread) Turkey, ham, apple \par Dinner: Fish, vegetables, small portions of rice, black beans \par Juice or soda: Crystal light, diet soda \par \par Exercise: review patient exercise habits: Not much \par

## 2023-01-19 NOTE — ASSESSMENT
[FreeTextEntry1] : 52 year old male with history of uncontrolled DM Type 2 ( long term with insulin requirement), recent right foot toe amputation, HLD here for follow up. \par \par HgA1C 9.5% previously\par A1c today 1/19/23: 11.5%\par Patient attributes increases to recent social stressors. \par Patient had to leave the visit early because he needed to go to work, and I could not get a dexcom code from him until after the visit.  I got it afterwards and made changes. \par Dexcom reviewed as follows: consistently high. 2% in range. The patient was out of Trulicity? or was not taking it. \par \par Plan:\par - Increase Tresiba to 60 units at bedtime -- increase to 66 units. \par - Restart Trulicity 3.0 mg weekly -- he does not take this weekly. Restart. \par - Continue with scale-- Novolog 24-24-24 with low dose correctional scale -- he takes about 36-38 units pre-meals. \par -Continue Metformin 1000 mg BID \par \par -Continue Carb consistent diet \par -For now medication compliance and diet compliance is heavily emphasized \par carb consistent diet was discussed, has seen CDE previously \par HgA1C goal of <7% \par \par Diabetic retinopathy\par -Close follow up encouraged\par -Hypo and hyperglycemic states should be avoided \par -HgA1C goal of <7% \par \par Amputation \par -Keep HgA1C <7% \par -Close outpatient follow up with Dr. Lott \par \par HLD :  4/2022-- will confirm if on statin?\par \par Follow up Dr. London as scheduled. \par

## 2023-01-25 RX ORDER — FLASH GLUCOSE SENSOR
KIT MISCELLANEOUS
Qty: 2 | Refills: 5 | Status: DISCONTINUED | COMMUNITY
Start: 2021-07-15 | End: 2023-01-25

## 2023-01-25 RX ORDER — FLASH GLUCOSE SENSOR
KIT MISCELLANEOUS
Qty: 2 | Refills: 5 | Status: DISCONTINUED | COMMUNITY
Start: 2020-12-30 | End: 2023-01-25

## 2023-01-25 RX ORDER — FLASH GLUCOSE SCANNING READER
EACH MISCELLANEOUS
Qty: 1 | Refills: 0 | Status: DISCONTINUED | COMMUNITY
Start: 2021-07-01 | End: 2023-01-25

## 2023-01-25 RX ORDER — INSULIN ASPART 100 [IU]/ML
100 INJECTION, SOLUTION INTRAVENOUS; SUBCUTANEOUS
Refills: 0 | Status: DISCONTINUED | COMMUNITY
Start: 2021-06-07 | End: 2023-01-25

## 2023-01-25 RX ORDER — METFORMIN HYDROCHLORIDE 1000 MG/1
1000 TABLET, COATED ORAL
Qty: 1 | Refills: 5 | Status: DISCONTINUED | COMMUNITY
Start: 2022-02-11 | End: 2023-01-25

## 2023-01-25 RX ORDER — INSULIN ASPART 100 [IU]/ML
100 INJECTION, SOLUTION INTRAVENOUS; SUBCUTANEOUS
Qty: 100 | Refills: 3 | Status: DISCONTINUED | COMMUNITY
Start: 2021-07-01 | End: 2023-01-25

## 2023-01-25 RX ORDER — INSULIN DEGLUDEC INJECTION 100 U/ML
100 INJECTION, SOLUTION SUBCUTANEOUS
Qty: 3 | Refills: 3 | Status: DISCONTINUED | COMMUNITY
Start: 2021-03-12 | End: 2023-01-25

## 2023-01-27 ENCOUNTER — TRANSCRIPTION ENCOUNTER (OUTPATIENT)
Age: 53
End: 2023-01-27

## 2023-01-31 RX ORDER — DULAGLUTIDE 3 MG/.5ML
3 INJECTION, SOLUTION SUBCUTANEOUS
Qty: 6 | Refills: 2 | Status: DISCONTINUED | COMMUNITY
Start: 2021-10-20 | End: 2023-01-31

## 2023-02-03 LAB — HBA1C MFR BLD HPLC: 11.5

## 2023-02-13 DIAGNOSIS — M10.071 IDIOPATHIC GOUT, RIGHT ANKLE AND FOOT: ICD-10-CM

## 2023-02-15 ENCOUNTER — INPATIENT (INPATIENT)
Facility: HOSPITAL | Age: 53
LOS: 15 days | Discharge: HOME HEALTH | DRG: 854 | End: 2023-03-03
Attending: STUDENT IN AN ORGANIZED HEALTH CARE EDUCATION/TRAINING PROGRAM | Admitting: STUDENT IN AN ORGANIZED HEALTH CARE EDUCATION/TRAINING PROGRAM
Payer: COMMERCIAL

## 2023-02-15 VITALS
RESPIRATION RATE: 20 BRPM | HEART RATE: 114 BPM | WEIGHT: 214.95 LBS | DIASTOLIC BLOOD PRESSURE: 83 MMHG | HEIGHT: 73 IN | SYSTOLIC BLOOD PRESSURE: 148 MMHG | OXYGEN SATURATION: 95 % | TEMPERATURE: 103 F

## 2023-02-15 DIAGNOSIS — Z89.429 ACQUIRED ABSENCE OF OTHER TOE(S), UNSPECIFIED SIDE: Chronic | ICD-10-CM

## 2023-02-15 DIAGNOSIS — I25.10 ATHEROSCLEROTIC HEART DISEASE OF NATIVE CORONARY ARTERY WITHOUT ANGINA PECTORIS: ICD-10-CM

## 2023-02-15 DIAGNOSIS — F41.9 ANXIETY DISORDER, UNSPECIFIED: ICD-10-CM

## 2023-02-15 DIAGNOSIS — E78.5 HYPERLIPIDEMIA, UNSPECIFIED: ICD-10-CM

## 2023-02-15 DIAGNOSIS — T14.8XXA OTHER INJURY OF UNSPECIFIED BODY REGION, INITIAL ENCOUNTER: ICD-10-CM

## 2023-02-15 DIAGNOSIS — E11.8 TYPE 2 DIABETES MELLITUS WITH UNSPECIFIED COMPLICATIONS: ICD-10-CM

## 2023-02-15 DIAGNOSIS — E11.9 TYPE 2 DIABETES MELLITUS WITHOUT COMPLICATIONS: ICD-10-CM

## 2023-02-15 DIAGNOSIS — E87.29 OTHER ACIDOSIS: ICD-10-CM

## 2023-02-15 DIAGNOSIS — A41.9 SEPSIS, UNSPECIFIED ORGANISM: ICD-10-CM

## 2023-02-15 DIAGNOSIS — I10 ESSENTIAL (PRIMARY) HYPERTENSION: ICD-10-CM

## 2023-02-15 LAB
ALBUMIN SERPL ELPH-MCNC: 3.2 G/DL — LOW (ref 3.3–5)
ALBUMIN SERPL ELPH-MCNC: 3.5 G/DL — SIGNIFICANT CHANGE UP (ref 3.3–5)
ALP SERPL-CCNC: 68 U/L — SIGNIFICANT CHANGE UP (ref 40–120)
ALP SERPL-CCNC: 79 U/L — SIGNIFICANT CHANGE UP (ref 40–120)
ALT FLD-CCNC: 27 U/L — SIGNIFICANT CHANGE UP (ref 10–45)
ALT FLD-CCNC: 31 U/L — SIGNIFICANT CHANGE UP (ref 10–45)
ANION GAP SERPL CALC-SCNC: 18 MMOL/L — HIGH (ref 5–17)
ANION GAP SERPL CALC-SCNC: 18 MMOL/L — HIGH (ref 5–17)
APTT BLD: 31.1 SEC — SIGNIFICANT CHANGE UP (ref 27.5–35.5)
AST SERPL-CCNC: 13 U/L — SIGNIFICANT CHANGE UP (ref 10–40)
AST SERPL-CCNC: 22 U/L — SIGNIFICANT CHANGE UP (ref 10–40)
BASE EXCESS BLDV CALC-SCNC: -3.6 MMOL/L — LOW (ref -2–3)
BASOPHILS # BLD AUTO: 0.02 K/UL — SIGNIFICANT CHANGE UP (ref 0–0.2)
BASOPHILS NFR BLD AUTO: 0.2 % — SIGNIFICANT CHANGE UP (ref 0–2)
BILIRUB SERPL-MCNC: 1 MG/DL — SIGNIFICANT CHANGE UP (ref 0.2–1.2)
BILIRUB SERPL-MCNC: 1.1 MG/DL — SIGNIFICANT CHANGE UP (ref 0.2–1.2)
BUN SERPL-MCNC: 18 MG/DL — SIGNIFICANT CHANGE UP (ref 7–23)
BUN SERPL-MCNC: 25 MG/DL — HIGH (ref 7–23)
CA-I SERPL-SCNC: 1.16 MMOL/L — SIGNIFICANT CHANGE UP (ref 1.15–1.33)
CALCIUM SERPL-MCNC: 8.7 MG/DL — SIGNIFICANT CHANGE UP (ref 8.4–10.5)
CALCIUM SERPL-MCNC: 9.2 MG/DL — SIGNIFICANT CHANGE UP (ref 8.4–10.5)
CHLORIDE BLDV-SCNC: 95 MMOL/L — LOW (ref 96–108)
CHLORIDE SERPL-SCNC: 91 MMOL/L — LOW (ref 96–108)
CHLORIDE SERPL-SCNC: 92 MMOL/L — LOW (ref 96–108)
CO2 BLDV-SCNC: 24 MMOL/L — SIGNIFICANT CHANGE UP (ref 22–26)
CO2 SERPL-SCNC: 17 MMOL/L — LOW (ref 22–31)
CO2 SERPL-SCNC: 19 MMOL/L — LOW (ref 22–31)
CREAT SERPL-MCNC: 1.15 MG/DL — SIGNIFICANT CHANGE UP (ref 0.5–1.3)
CREAT SERPL-MCNC: 1.32 MG/DL — HIGH (ref 0.5–1.3)
CRP SERPL-MCNC: 343 MG/L — HIGH (ref 0–4)
EGFR: 66 ML/MIN/1.73M2 — SIGNIFICANT CHANGE UP
EGFR: 78 ML/MIN/1.73M2 — SIGNIFICANT CHANGE UP
EOSINOPHIL # BLD AUTO: 0.01 K/UL — SIGNIFICANT CHANGE UP (ref 0–0.5)
EOSINOPHIL NFR BLD AUTO: 0.1 % — SIGNIFICANT CHANGE UP (ref 0–6)
GAS PNL BLDV: 127 MMOL/L — LOW (ref 136–145)
GAS PNL BLDV: SIGNIFICANT CHANGE UP
GAS PNL BLDV: SIGNIFICANT CHANGE UP
GLUCOSE BLDV-MCNC: 331 MG/DL — HIGH (ref 70–99)
GLUCOSE SERPL-MCNC: 334 MG/DL — HIGH (ref 70–99)
GLUCOSE SERPL-MCNC: 436 MG/DL — HIGH (ref 70–99)
HCO3 BLDV-SCNC: 23 MMOL/L — SIGNIFICANT CHANGE UP (ref 22–29)
HCT VFR BLD CALC: 37.2 % — LOW (ref 39–50)
HCT VFR BLDA CALC: 37 % — LOW (ref 39–51)
HGB BLD CALC-MCNC: 12.4 G/DL — LOW (ref 12.6–17.4)
HGB BLD-MCNC: 12.1 G/DL — LOW (ref 13–17)
IMM GRANULOCYTES NFR BLD AUTO: 0.7 % — SIGNIFICANT CHANGE UP (ref 0–0.9)
INR BLD: 1.42 RATIO — HIGH (ref 0.88–1.16)
LACTATE BLDV-MCNC: 3.3 MMOL/L — HIGH (ref 0.5–2)
LACTATE SERPL-SCNC: 1.8 MMOL/L — SIGNIFICANT CHANGE UP (ref 0.5–2)
LIDOCAIN IGE QN: 16 U/L — SIGNIFICANT CHANGE UP (ref 7–60)
LYMPHOCYTES # BLD AUTO: 0.63 K/UL — LOW (ref 1–3.3)
LYMPHOCYTES # BLD AUTO: 5.6 % — LOW (ref 13–44)
MCHC RBC-ENTMCNC: 30 PG — SIGNIFICANT CHANGE UP (ref 27–34)
MCHC RBC-ENTMCNC: 32.5 GM/DL — SIGNIFICANT CHANGE UP (ref 32–36)
MCV RBC AUTO: 92.3 FL — SIGNIFICANT CHANGE UP (ref 80–100)
MONOCYTES # BLD AUTO: 0.32 K/UL — SIGNIFICANT CHANGE UP (ref 0–0.9)
MONOCYTES NFR BLD AUTO: 2.8 % — SIGNIFICANT CHANGE UP (ref 2–14)
NEUTROPHILS # BLD AUTO: 10.27 K/UL — HIGH (ref 1.8–7.4)
NEUTROPHILS NFR BLD AUTO: 90.6 % — HIGH (ref 43–77)
NRBC # BLD: 0 /100 WBCS — SIGNIFICANT CHANGE UP (ref 0–0)
PCO2 BLDV: 45 MMHG — SIGNIFICANT CHANGE UP (ref 42–55)
PH BLDV: 7.31 — LOW (ref 7.32–7.43)
PLATELET # BLD AUTO: 210 K/UL — SIGNIFICANT CHANGE UP (ref 150–400)
PO2 BLDV: 27 MMHG — SIGNIFICANT CHANGE UP (ref 25–45)
POTASSIUM BLDV-SCNC: 4.9 MMOL/L — SIGNIFICANT CHANGE UP (ref 3.5–5.1)
POTASSIUM SERPL-MCNC: 4.5 MMOL/L — SIGNIFICANT CHANGE UP (ref 3.5–5.3)
POTASSIUM SERPL-MCNC: 4.8 MMOL/L — SIGNIFICANT CHANGE UP (ref 3.5–5.3)
POTASSIUM SERPL-SCNC: 4.5 MMOL/L — SIGNIFICANT CHANGE UP (ref 3.5–5.3)
POTASSIUM SERPL-SCNC: 4.8 MMOL/L — SIGNIFICANT CHANGE UP (ref 3.5–5.3)
PROT SERPL-MCNC: 7.4 G/DL — SIGNIFICANT CHANGE UP (ref 6–8.3)
PROT SERPL-MCNC: 8.1 G/DL — SIGNIFICANT CHANGE UP (ref 6–8.3)
PROTHROM AB SERPL-ACNC: 16.5 SEC — HIGH (ref 10.5–13.4)
RAPID RVP RESULT: SIGNIFICANT CHANGE UP
RBC # BLD: 4.03 M/UL — LOW (ref 4.2–5.8)
RBC # FLD: 12.6 % — SIGNIFICANT CHANGE UP (ref 10.3–14.5)
SAO2 % BLDV: 27.7 % — LOW (ref 67–88)
SARS-COV-2 RNA SPEC QL NAA+PROBE: SIGNIFICANT CHANGE UP
SODIUM SERPL-SCNC: 126 MMOL/L — LOW (ref 135–145)
SODIUM SERPL-SCNC: 129 MMOL/L — LOW (ref 135–145)
URATE SERPL-MCNC: 5 MG/DL — SIGNIFICANT CHANGE UP (ref 3.4–8.8)
WBC # BLD: 11.33 K/UL — HIGH (ref 3.8–10.5)
WBC # FLD AUTO: 11.33 K/UL — HIGH (ref 3.8–10.5)

## 2023-02-15 PROCEDURE — 73610 X-RAY EXAM OF ANKLE: CPT | Mod: 26,RT

## 2023-02-15 PROCEDURE — 73701 CT LOWER EXTREMITY W/DYE: CPT | Mod: 26,RT

## 2023-02-15 PROCEDURE — 71045 X-RAY EXAM CHEST 1 VIEW: CPT | Mod: 26

## 2023-02-15 PROCEDURE — 99285 EMERGENCY DEPT VISIT HI MDM: CPT

## 2023-02-15 PROCEDURE — 99223 1ST HOSP IP/OBS HIGH 75: CPT

## 2023-02-15 RX ORDER — INSULIN LISPRO 100/ML
8 VIAL (ML) SUBCUTANEOUS
Refills: 0 | Status: DISCONTINUED | OUTPATIENT
Start: 2023-02-15 | End: 2023-02-15

## 2023-02-15 RX ORDER — VANCOMYCIN HCL 1 G
1250 VIAL (EA) INTRAVENOUS EVERY 12 HOURS
Refills: 0 | Status: DISCONTINUED | OUTPATIENT
Start: 2023-02-15 | End: 2023-02-17

## 2023-02-15 RX ORDER — INSULIN LISPRO 100/ML
7 VIAL (ML) SUBCUTANEOUS
Refills: 0 | Status: DISCONTINUED | OUTPATIENT
Start: 2023-02-15 | End: 2023-02-15

## 2023-02-15 RX ORDER — INSULIN LISPRO 100/ML
VIAL (ML) SUBCUTANEOUS
Refills: 0 | Status: DISCONTINUED | OUTPATIENT
Start: 2023-02-15 | End: 2023-02-16

## 2023-02-15 RX ORDER — INSULIN GLARGINE 100 [IU]/ML
30 INJECTION, SOLUTION SUBCUTANEOUS ONCE
Refills: 0 | Status: DISCONTINUED | OUTPATIENT
Start: 2023-02-15 | End: 2023-02-15

## 2023-02-15 RX ORDER — SODIUM CHLORIDE 9 MG/ML
1000 INJECTION INTRAMUSCULAR; INTRAVENOUS; SUBCUTANEOUS ONCE
Refills: 0 | Status: COMPLETED | OUTPATIENT
Start: 2023-02-15 | End: 2023-02-15

## 2023-02-15 RX ORDER — ACETAMINOPHEN 500 MG
650 TABLET ORAL ONCE
Refills: 0 | Status: COMPLETED | OUTPATIENT
Start: 2023-02-15 | End: 2023-02-15

## 2023-02-15 RX ORDER — LISINOPRIL 2.5 MG/1
40 TABLET ORAL DAILY
Refills: 0 | Status: DISCONTINUED | OUTPATIENT
Start: 2023-02-15 | End: 2023-02-17

## 2023-02-15 RX ORDER — NALOXONE HYDROCHLORIDE 4 MG/.1ML
0.4 SPRAY NASAL ONCE
Refills: 0 | Status: DISCONTINUED | OUTPATIENT
Start: 2023-02-15 | End: 2023-03-03

## 2023-02-15 RX ORDER — GLUCAGON INJECTION, SOLUTION 0.5 MG/.1ML
1 INJECTION, SOLUTION SUBCUTANEOUS ONCE
Refills: 0 | Status: DISCONTINUED | OUTPATIENT
Start: 2023-02-15 | End: 2023-02-24

## 2023-02-15 RX ORDER — PIPERACILLIN AND TAZOBACTAM 4; .5 G/20ML; G/20ML
3.38 INJECTION, POWDER, LYOPHILIZED, FOR SOLUTION INTRAVENOUS ONCE
Refills: 0 | Status: COMPLETED | OUTPATIENT
Start: 2023-02-15 | End: 2023-02-15

## 2023-02-15 RX ORDER — ATORVASTATIN CALCIUM 80 MG/1
80 TABLET, FILM COATED ORAL AT BEDTIME
Refills: 0 | Status: DISCONTINUED | OUTPATIENT
Start: 2023-02-15 | End: 2023-02-24

## 2023-02-15 RX ORDER — SODIUM CHLORIDE 9 MG/ML
1000 INJECTION, SOLUTION INTRAVENOUS
Refills: 0 | Status: DISCONTINUED | OUTPATIENT
Start: 2023-02-15 | End: 2023-02-24

## 2023-02-15 RX ORDER — DEXTROSE 50 % IN WATER 50 %
12.5 SYRINGE (ML) INTRAVENOUS ONCE
Refills: 0 | Status: DISCONTINUED | OUTPATIENT
Start: 2023-02-15 | End: 2023-02-24

## 2023-02-15 RX ORDER — INSULIN GLARGINE 100 [IU]/ML
50 INJECTION, SOLUTION SUBCUTANEOUS ONCE
Refills: 0 | Status: COMPLETED | OUTPATIENT
Start: 2023-02-15 | End: 2023-02-15

## 2023-02-15 RX ORDER — SODIUM CHLORIDE 9 MG/ML
1000 INJECTION INTRAMUSCULAR; INTRAVENOUS; SUBCUTANEOUS
Refills: 0 | Status: DISCONTINUED | OUTPATIENT
Start: 2023-02-15 | End: 2023-02-18

## 2023-02-15 RX ORDER — METOPROLOL TARTRATE 50 MG
25 TABLET ORAL DAILY
Refills: 0 | Status: DISCONTINUED | OUTPATIENT
Start: 2023-02-15 | End: 2023-02-24

## 2023-02-15 RX ORDER — PIPERACILLIN AND TAZOBACTAM 4; .5 G/20ML; G/20ML
3.38 INJECTION, POWDER, LYOPHILIZED, FOR SOLUTION INTRAVENOUS EVERY 8 HOURS
Refills: 0 | Status: DISCONTINUED | OUTPATIENT
Start: 2023-02-15 | End: 2023-02-16

## 2023-02-15 RX ORDER — DEXTROSE 50 % IN WATER 50 %
15 SYRINGE (ML) INTRAVENOUS ONCE
Refills: 0 | Status: DISCONTINUED | OUTPATIENT
Start: 2023-02-15 | End: 2023-02-24

## 2023-02-15 RX ORDER — POLYETHYLENE GLYCOL 3350 17 G/17G
17 POWDER, FOR SOLUTION ORAL DAILY
Refills: 0 | Status: DISCONTINUED | OUTPATIENT
Start: 2023-02-15 | End: 2023-02-20

## 2023-02-15 RX ORDER — CLOPIDOGREL BISULFATE 75 MG/1
75 TABLET, FILM COATED ORAL DAILY
Refills: 0 | Status: DISCONTINUED | OUTPATIENT
Start: 2023-02-15 | End: 2023-02-24

## 2023-02-15 RX ORDER — ASPIRIN/CALCIUM CARB/MAGNESIUM 324 MG
81 TABLET ORAL DAILY
Refills: 0 | Status: DISCONTINUED | OUTPATIENT
Start: 2023-02-15 | End: 2023-02-24

## 2023-02-15 RX ORDER — ENOXAPARIN SODIUM 100 MG/ML
40 INJECTION SUBCUTANEOUS EVERY 24 HOURS
Refills: 0 | Status: DISCONTINUED | OUTPATIENT
Start: 2023-02-15 | End: 2023-02-17

## 2023-02-15 RX ORDER — DEXTROSE 50 % IN WATER 50 %
25 SYRINGE (ML) INTRAVENOUS ONCE
Refills: 0 | Status: DISCONTINUED | OUTPATIENT
Start: 2023-02-15 | End: 2023-02-24

## 2023-02-15 RX ORDER — ACETAMINOPHEN 500 MG
650 TABLET ORAL EVERY 6 HOURS
Refills: 0 | Status: DISCONTINUED | OUTPATIENT
Start: 2023-02-15 | End: 2023-02-24

## 2023-02-15 RX ORDER — LANOLIN ALCOHOL/MO/W.PET/CERES
3 CREAM (GRAM) TOPICAL AT BEDTIME
Refills: 0 | Status: DISCONTINUED | OUTPATIENT
Start: 2023-02-15 | End: 2023-02-24

## 2023-02-15 RX ORDER — INSULIN LISPRO 100/ML
20 VIAL (ML) SUBCUTANEOUS
Refills: 0 | Status: DISCONTINUED | OUTPATIENT
Start: 2023-02-15 | End: 2023-02-16

## 2023-02-15 RX ORDER — VANCOMYCIN HCL 1 G
1000 VIAL (EA) INTRAVENOUS ONCE
Refills: 0 | Status: COMPLETED | OUTPATIENT
Start: 2023-02-15 | End: 2023-02-15

## 2023-02-15 RX ORDER — SERTRALINE 25 MG/1
50 TABLET, FILM COATED ORAL DAILY
Refills: 0 | Status: DISCONTINUED | OUTPATIENT
Start: 2023-02-15 | End: 2023-02-24

## 2023-02-15 RX ORDER — ONDANSETRON 8 MG/1
4 TABLET, FILM COATED ORAL EVERY 8 HOURS
Refills: 0 | Status: DISCONTINUED | OUTPATIENT
Start: 2023-02-15 | End: 2023-02-24

## 2023-02-15 RX ORDER — INSULIN LISPRO 100/ML
VIAL (ML) SUBCUTANEOUS AT BEDTIME
Refills: 0 | Status: DISCONTINUED | OUTPATIENT
Start: 2023-02-15 | End: 2023-02-16

## 2023-02-15 RX ORDER — MORPHINE SULFATE 50 MG/1
2 CAPSULE, EXTENDED RELEASE ORAL EVERY 4 HOURS
Refills: 0 | Status: DISCONTINUED | OUTPATIENT
Start: 2023-02-15 | End: 2023-02-18

## 2023-02-15 RX ORDER — INSULIN GLARGINE 100 [IU]/ML
66 INJECTION, SOLUTION SUBCUTANEOUS AT BEDTIME
Refills: 0 | Status: DISCONTINUED | OUTPATIENT
Start: 2023-02-16 | End: 2023-02-19

## 2023-02-15 RX ORDER — MORPHINE SULFATE 50 MG/1
4 CAPSULE, EXTENDED RELEASE ORAL EVERY 4 HOURS
Refills: 0 | Status: DISCONTINUED | OUTPATIENT
Start: 2023-02-15 | End: 2023-02-18

## 2023-02-15 RX ORDER — SODIUM CHLORIDE 9 MG/ML
500 INJECTION, SOLUTION INTRAVENOUS ONCE
Refills: 0 | Status: COMPLETED | OUTPATIENT
Start: 2023-02-15 | End: 2023-02-15

## 2023-02-15 RX ORDER — INSULIN DEGLUDEC 100 U/ML
60 INJECTION, SOLUTION SUBCUTANEOUS
Qty: 0 | Refills: 0 | DISCHARGE

## 2023-02-15 RX ORDER — INSULIN LISPRO 100/ML
14 VIAL (ML) SUBCUTANEOUS
Refills: 0 | Status: DISCONTINUED | OUTPATIENT
Start: 2023-02-15 | End: 2023-02-15

## 2023-02-15 RX ORDER — SENNA PLUS 8.6 MG/1
2 TABLET ORAL AT BEDTIME
Refills: 0 | Status: DISCONTINUED | OUTPATIENT
Start: 2023-02-15 | End: 2023-02-20

## 2023-02-15 RX ADMIN — SODIUM CHLORIDE 500 MILLILITER(S): 9 INJECTION, SOLUTION INTRAVENOUS at 21:46

## 2023-02-15 RX ADMIN — Medication 250 MILLIGRAM(S): at 21:46

## 2023-02-15 RX ADMIN — PIPERACILLIN AND TAZOBACTAM 200 GRAM(S): 4; .5 INJECTION, POWDER, LYOPHILIZED, FOR SOLUTION INTRAVENOUS at 20:14

## 2023-02-15 RX ADMIN — SODIUM CHLORIDE 1000 MILLILITER(S): 9 INJECTION INTRAMUSCULAR; INTRAVENOUS; SUBCUTANEOUS at 20:13

## 2023-02-15 RX ADMIN — Medication 650 MILLIGRAM(S): at 13:26

## 2023-02-15 NOTE — H&P ADULT - REASON FOR ADMISSION
r foot pain/tenderness, warmth, swelling over diabetic foot ulcer r foot pain/tenderness, warmth, swelling

## 2023-02-15 NOTE — H&P ADULT - PROBLEM SELECTOR PLAN 3
lactic acidosis 3.3->1.8 (resolved)  stress hyperglycemia iso severe sepsis + poorly controlled dm  Monitor UO, BUN/Cr, volume status, acid-base balance, electrolytes  follow up bhb, ua  IVF + electrolyte supplementation as needed in the mean time

## 2023-02-15 NOTE — H&P ADULT - PROBLEM SELECTOR PLAN 1
leukocytosis, febrile, tachycardic, + lactic acidosis; meets severe sepsis criteria  otherwise, hds  source based on infectious work up thus far is soft tissue infxn of right foot  s/p 1.5 L IVF + vanc and zosyn in ER  Monitor for fever, changes in white count  broad spec empirical abx therapy as described below  ivf resusci + lytes as needed.

## 2023-02-15 NOTE — H&P ADULT - PROBLEM SELECTOR PLAN 4
corrected na 133  hold home metformin 1g bid, trulicity 3 mg weekly, glargine 66 u hs, lispro 24 u prandial + low dose correctional scale  last a1c 1/2023 ~11.5; follow up a1c  carb consistent diet  fingersticks tidac + hs  start basal glargine 60 u hs + prandial lispro 20 u tidac + low dose correctional scale lispro tidac and hs; adjust to maintain goal bg 140-180

## 2023-02-15 NOTE — CONSULT NOTE ADULT - SUBJECTIVE AND OBJECTIVE BOX
Patient is a 52y old  Male who presents with a chief complaint of right foot wound and ankle pain.    HPI: 52-year-old male history of CAD status post stents, diabetes, diabetic foot ulcer status post amputation and hypertension, active smoker presenting to the ED with complaints of right lower foot pain and swelling.  Patient reports that he had debridement of his foot approximately 2 weeks ago return to see his podiatrist last week due to swelling in the ankle and increased pain.  Was told likely gout given a cortisone injection with improvement of pain until 2 days following when pain and swelling returned.  States that he has been feeling unwell at home with swelling and pain in the leg no fevers or chills and he has been checking his temperature.  Spoken with his podiatrist and was referred to the ED for further evaluation.  Found to be febrile and tachycardic on arrival to the ED and given Tylenol.  Patient seen by podiatry while in triage had debridement and culture of wound sent to the lab.      PAST MEDICAL & SURGICAL HISTORY:  CAD (coronary artery disease)      Diabetes      HTN (hypertension)      Diabetic foot ulcer      Status post amputation of toe  s/p R 3/4th partial ray resection (4/2021; s/p 2nd partial ray resection (5/2021)          MEDICATIONS  (STANDING):  lactated ringers Bolus 500 milliLiter(s) IV Bolus once  vancomycin  IVPB. 1000 milliGRAM(s) IV Intermittent once    MEDICATIONS  (PRN):      Allergies    No Known Allergies    Intolerances        VITALS:    Vital Signs Last 24 Hrs  T(C): 37.2 (15 Feb 2023 20:15), Max: 39.3 (15 Feb 2023 12:42)  T(F): 99 (15 Feb 2023 20:15), Max: 102.7 (15 Feb 2023 12:42)  HR: 92 (15 Feb 2023 20:15) (63 - 114)  BP: 102/69 (15 Feb 2023 20:15) (102/69 - 148/83)  BP(mean): --  RR: 18 (15 Feb 2023 20:15) (18 - 20)  SpO2: 95% (15 Feb 2023 20:15) (95% - 98%)    Parameters below as of 15 Feb 2023 20:15  Patient On (Oxygen Delivery Method): room air        LABS:                          12.1   11.33 )-----------( 210      ( 15 Feb 2023 14:15 )             37.2       02-15    129<L>  |  92<L>  |  18  ----------------------------<  334<H>  4.8   |  19<L>  |  1.15    Ca    9.2      15 Feb 2023 14:15    TPro  8.1  /  Alb  3.5  /  TBili  1.1  /  DBili  x   /  AST  22  /  ALT  31  /  AlkPhos  79  02-15      CAPILLARY BLOOD GLUCOSE          PT/INR - ( 15 Feb 2023 14:15 )   PT: 16.5 sec;   INR: 1.42 ratio         PTT - ( 15 Feb 2023 14:15 )  PTT:31.1 sec    LOWER EXTREMITY PHYSICAL EXAM:    Vascular: DP/PT _/4, B/L, CFT <_ seconds B/L, Temperature gradient _, B/L.   Neuro: Epicritic sensation _ to the level of _, B/L.  Musculoskeletal/Ortho:  Skin:  Wound #1:   Location:  Size:  Depth:  Wound bed:   Drainage:   Odor:   Periwound:  Etiology:     RADIOLOGY & ADDITIONAL STUDIES:     Patient is a 52y old  Male who presents with a chief complaint of right foot wound and ankle pain.    HPI: 52-year-old male history of CAD status post stents, diabetes, diabetic foot ulcer status post amputation and hypertension, active smoker presenting to the ED with complaints of right lower foot pain and swelling.  Patient reports that he had debridement of his foot approximately 2 weeks ago return to see his podiatrist last week due to swelling in the ankle and increased pain.  Was told likely gout given a cortisone injection with improvement of pain until 2 days following when pain and swelling returned.  States that he has been feeling unwell at home with swelling and pain in the leg no fevers or chills and he has been checking his temperature.  Spoken with his podiatrist and was referred to the ED for further evaluation.  Found to be febrile and tachycardic on arrival to the ED and given Tylenol.  Patient seen by podiatry while in triage had debridement and culture of wound sent to the lab.      PAST MEDICAL & SURGICAL HISTORY:  CAD (coronary artery disease)      Diabetes      HTN (hypertension)      Diabetic foot ulcer      Status post amputation of toe  s/p R 3/4th partial ray resection (4/2021; s/p 2nd partial ray resection (5/2021)          MEDICATIONS  (STANDING):  lactated ringers Bolus 500 milliLiter(s) IV Bolus once  vancomycin  IVPB. 1000 milliGRAM(s) IV Intermittent once    MEDICATIONS  (PRN):      Allergies    No Known Allergies    Intolerances        VITALS:    Vital Signs Last 24 Hrs  T(C): 37.2 (15 Feb 2023 20:15), Max: 39.3 (15 Feb 2023 12:42)  T(F): 99 (15 Feb 2023 20:15), Max: 102.7 (15 Feb 2023 12:42)  HR: 92 (15 Feb 2023 20:15) (63 - 114)  BP: 102/69 (15 Feb 2023 20:15) (102/69 - 148/83)  BP(mean): --  RR: 18 (15 Feb 2023 20:15) (18 - 20)  SpO2: 95% (15 Feb 2023 20:15) (95% - 98%)    Parameters below as of 15 Feb 2023 20:15  Patient On (Oxygen Delivery Method): room air        LABS:                          12.1   11.33 )-----------( 210      ( 15 Feb 2023 14:15 )             37.2       02-15    129<L>  |  92<L>  |  18  ----------------------------<  334<H>  4.8   |  19<L>  |  1.15    Ca    9.2      15 Feb 2023 14:15    TPro  8.1  /  Alb  3.5  /  TBili  1.1  /  DBili  x   /  AST  22  /  ALT  31  /  AlkPhos  79  02-15      CAPILLARY BLOOD GLUCOSE          PT/INR - ( 15 Feb 2023 14:15 )   PT: 16.5 sec;   INR: 1.42 ratio         PTT - ( 15 Feb 2023 14:15 )  PTT:31.1 sec    LOWER EXTREMITY PHYSICAL EXAM:    Vascular: DP/PT 0/4, B/L, CFT <3 seconds B/L, Temperature gradient warm to cool, B/L.   Neuro: Epicritic sensation diminished to the level of toes, B/L.  Musculoskeletal/Ortho: Right ankle no pain with active or passive dorsiflexion/plantarflexion/inversion/eversion, pain on palpation localized to the medial and lateral malleoli.  Skin: Right foot submet 1 wound to bone, mild sanguinous drainage, no purulence, edema to the global foot, redness to the forefoot. Left foot no wounds, no acute signs of infection.      RADIOLOGY & ADDITIONAL STUDIES:

## 2023-02-15 NOTE — ED PROVIDER NOTE - CLINICAL SUMMARY MEDICAL DECISION MAKING FREE TEXT BOX
Morena att-year-old male history of CAD status post stents, diabetes, diabetic foot ulcer status post amputation and hypertension, active smoker presenting to the ED with complaints of right lower foot pain and swelling.  Patient reports that he had debridement of his foot approximately 2 weeks ago return to see his podiatrist last week due to swelling in the ankle and increased pain.  Was told likely gout given a cortisone injection with improvement of pain until 2 days following when pain and swelling returned.  States that he has been feeling unwell at home with swelling and pain in the leg no fevers or chills and he has been checking his temperature.  Spoken with his podiatrist and was referred to the ED for further evaluation.  Found to be febrile and tachycardic on arrival to the ED and given Tylenol.  Patient seen by podiatry while in triage had debridement and culture of wound sent to the lab.    Well-appearing in no acute distress resting comfortably on stretcher.  Regular rate and rhythm, lungs clear to auscultation bilaterally speaking in full sentences without increased work of breathing.  Saturating well on room air.  Abdomen soft nontender no rebound or guarding.  No CVA tenderness.  No lower extremity edema.  Edema of the right foot and ankle with distal foot in dressing from podiatry.  Pulse intact.  Will send ESR, CRP, blood cultures and cover with empiric antibiotics discussed with podiatry and likely admission.

## 2023-02-15 NOTE — H&P ADULT - PROBLEM SELECTOR PLAN 5
s/p pci w stents  prior recent ekg, tte, lhc reviewed  monitor for chest pain, ekg changes, volume status changes  dapt, statin, toprol 25  cards consult for periop risk strat + opti

## 2023-02-15 NOTE — ED PROVIDER NOTE - ATTENDING CONTRIBUTION TO CARE
I, Della Berg, performed a history and physical exam of the patient and discussed their management with the resident and /or advanced care provider. I reviewed the resident and /or ACP's note and agree with the documented findings and plan of care. I was present and available for all procedures.   see MDM no

## 2023-02-15 NOTE — H&P ADULT - ASSESSMENT
53 yo m w pmh dm, cad s/p pci w stents, htn, hld, anx/dep, ed, p/w right foot redness, swelling, pain/tenderness, warmth over dm foot ulcer, found to be in severe sepsis (fever, leukocytosis, tachycardia, lactic acidosis), likely source of soft tissue infection (cellulitis vs OM) iso poorly controlled dm, admitted to medicine for further mgmt    severe sepsis    dm foot    dm  hold home regimen of met    cad    htn    hld    anx/dep   51 yo m w pmh dm, cad s/p pci w stents, htn, hld, anx/dep, ed, p/w right foot redness, swelling, pain/tenderness, found to be in severe sepsis (fever, leukocytosis, tachycardia, lactic acidosis), likely source is soft tissue infection (cellulitis vs OM) iso poorly controlled dm, admitted to medicine for further mgmt

## 2023-02-15 NOTE — ED PROVIDER NOTE - RAPID ASSESSMENT
52 year old male with pmhx of dm, cad with stents, gout here with right ankle pain x 2 weeks. Podiatrist was treating patient for gout but it is worsening. not aware of fever today. no cough, difficulty breathing. vomited x 1 in the waiting room.   reports no toes on right foot.     Patient was rapidly assessed via a telemedicine and/or role of Quick Triage Doctor; a limited history, physical exam and assessment was performed. The patient will be seen and further evaluated in the main emergency department. The remainder of care and evaluation will be conducted by the primary emergency medicine team. Receiving team will follow up on labs, imaging and serially reassess patient as indicated. All further decisions regarding patient care, evaluation and disposition are at the discretion of the receiving primary emergency department team.

## 2023-02-15 NOTE — ED PROVIDER NOTE - CARDIAC, MLM
?appendicitis/abdominal pain
Normal rate, regular rhythm.  Heart sounds S1, S2.  No murmurs, rubs or gallops.

## 2023-02-15 NOTE — ED ADULT TRIAGE NOTE - HAVE YOU HAD COVID IN THE LAST 60 DAYS?
VIRTUAL NURSE 2 HOUR ROUNDS:  Cued into patient's room.  Patient resting in bed at this time; respirations even and unlabored.  No distress noted. Staff in room. Will cont to monitor.     No

## 2023-02-15 NOTE — ED PROVIDER NOTE - PROGRESS NOTE DETAILS
Sergio Villareal MD (PGY-2): serology w/ e/o hyperglycemia, mild acidosis, will fluid hydrate and trend gas and cmp. lactated elevated, will trend following fluids. admission for iv abx. pending ct scan.

## 2023-02-15 NOTE — CONSULT NOTE ADULT - ASSESSMENT
52M presents with right foot wound and right ankle pain.  - Pt seen and evaluated.  - Temp 99, WBC 11.33, ESR/CRP pending, uric acid pending.  - Right Ankle X-Rays: No gas, cortical irregularity at the distal aspect of the first metatarsal stump, heterotopic ossification abutting the medial malleolus, and extensive lower leg/dorsal foot/bimalleolar edema.  - Physical Exam:  - Right Ankle CT:  - Recommend admit to medicine.  - Recommend IV vancomycin and cefepime.   52M presents with right foot wound and right ankle pain.  - Pt seen and evaluated.  - Temp 99, WBC 11.33, ESR/CRP pending, uric acid pending.  - Right Ankle X-Rays: No gas, cortical irregularity at the distal aspect of the first metatarsal stump, heterotopic ossification abutting the medial malleolus, and extensive lower leg/dorsal foot/bimalleolar edema.  - Physical Exam:  - Right Ankle CT:  - Right foot wound cultured.  - Recommend admit to medicine.  - Recommend IV vancomycin and cefepime.   52M presents with right foot wound and right ankle pain.  - Pt seen and evaluated.  - Temp 99, WBC 11.33, ESR/CRP pending, uric acid pending.  - Right Ankle X-Rays: No gas, cortical irregularity at the distal aspect of the first metatarsal stump, heterotopic ossification abutting the medial malleolus, and extensive lower leg/dorsal foot/bimalleolar edema.  - Right ankle no pain with active or passive dorsiflexion/plantarflexion/inversion/eversion, pain on palpation localized to the medial and lateral malleoli. Right foot submet 1 wound to bone, mild sanguinous drainage, no purulence, edema to the global foot, redness to the forefoot. Left foot no wounds, no acute signs of infection.  - Right Ankle CT pending.   - Right foot wound cultured.  - Recommend admit to medicine.  - Recommend IV vancomycin and zosyn.  - Ordered SAL/PVRs.  - Recommend vascular consult.  - Recommend pain management consult.  - Pod Plan: Gout workup pending ESR, CRP, uric acid, and right ankle CT.  - Discussed with attending.

## 2023-02-15 NOTE — ED PROVIDER NOTE - MUSCULOSKELETAL, MLM
Spine appears normal, RLE with swelling of the foot and ankle, dressing in place from podiatry s/p debridement and culture

## 2023-02-15 NOTE — H&P ADULT - NSHPPHYSICALEXAM_GEN_ALL_CORE
T(C): 37.2 (02-15-23 @ 20:15), Max: 39.3 (02-15-23 @ 12:42)  HR: 92 (02-15-23 @ 20:15) (63 - 114)  BP: 102/69 (02-15-23 @ 20:15) (102/69 - 148/83)  RR: 18 (02-15-23 @ 20:15) (18 - 20)  SpO2: 95% (02-15-23 @ 20:15) (95% - 98%)  GENERAL: NAD, lying in bed with pain over right foot  EYES: EOMI, PERRLA; conjunctiva and sclera clear  ENMT: Moist oral mucosa, no pharyngeal injection or exudates   NECK: Supple, no palpable masses; no JVD  RESPIRATORY: Normal respiratory effort; lungs are clear to auscultation bilaterally  CARDIOVASCULAR: Regular rate and rhythm, normal S1 and S2, no murmur/rub/gallop; No lower extremity edema; difficult to appreciate pulses over RLE  ABDOMEN: Nontender to palpation, normoactive bowel sounds, no rebound/guarding   MUSCULOSKELETAL: Right foot submet 1 wound to bone, mild sanguinous drainage, no purulence, edema to the global foot, redness to the forefoot  PSYCH: A+O to person, place, and time; affect appropriate  NEUROLOGY: CN 2-12 are intact and symmetric; decreased sensation over bl le  SKIN: No rashes; no palpable lesions T(C): 37.2 (02-15-23 @ 20:15), Max: 39.3 (02-15-23 @ 12:42)  HR: 92 (02-15-23 @ 20:15) (63 - 114)  BP: 102/69 (02-15-23 @ 20:15) (102/69 - 148/83)  RR: 18 (02-15-23 @ 20:15) (18 - 20)  SpO2: 95% (02-15-23 @ 20:15) (95% - 98%)  GENERAL: NAD, lying in bed with pain over right foot  EYES: EOMI, PERRLA; conjunctiva and sclera clear  ENMT: Moist oral mucosa, no pharyngeal injection or exudates   NECK: Supple, no palpable masses; no JVD  RESPIRATORY: Normal respiratory effort; lungs are clear to auscultation bilaterally  CARDIOVASCULAR: Regular rate and rhythm, normal S1 and S2, no murmur/rub/gallop; No lower extremity edema; difficult to appreciate pulses over RLE  ABDOMEN: Nontender to palpation, normoactive bowel sounds, no rebound/guarding   MUSCULOSKELETAL: Right foot 1st metatarsal wound to bone, mild sanguinous drainage, no purulence, edema to the global foot, redness to the forefoot  PSYCH: A+O to person, place, and time; affect appropriate  NEUROLOGY: CN 2-12 are intact and symmetric; decreased sensation over bl le  SKIN: No rashes; no palpable lesions

## 2023-02-15 NOTE — ED PROVIDER NOTE - OBJECTIVE STATEMENT
Morena att-year-old male history of CAD status post stents, diabetes, diabetic foot ulcer status post amputation and hypertension, active smoker presenting to the ED with complaints of right lower foot pain and swelling.  Patient reports that he had debridement of his foot approximately 2 weeks ago return to see his podiatrist last week due to swelling in the ankle and increased pain.  Was told likely gout given a cortisone injection with improvement of pain until 2 days following when pain and swelling returned.  States that he has been feeling unwell at home with swelling and pain in the leg no fevers or chills and he has been checking his temperature.  Spoken with his podiatrist and was referred to the ED for further evaluation.  Found to be febrile and tachycardic on arrival to the ED and given Tylenol.  Patient seen by podiatry while in triage had debridement and culture of wound sent to the lab.

## 2023-02-15 NOTE — H&P ADULT - HISTORY OF PRESENT ILLNESS
51 yo m w pmh dm, cad s/p pci w stents, htn, hld, anx/dep, ed, p/w right foot redness, swelling, pain/tenderness, warmth. Patient reports that he had debridement of his foot approximately 2 weeks ago, returned to his podiatrist 1 week ago due to right foot redness, swelling, pain/tenderness, warmth over dm foot ulcer at 1st metatarsal stump. at the time, suspected to be 2/2 gout, so was administered steroid injection with improvement, until 2 days ago when symptoms returned. he contacted his podiatrist who advised patient to go to er for further evaluation. so, he presents to [facility] er for further mgmt.    in er found to be in severe sepsis (fever, leukocytosis, tachycardia, lactic acidosis), likely source of soft tissue infection (cellulitis vs OM) iso poorly controlled dm, admitted to medicine for further mgmt

## 2023-02-15 NOTE — H&P ADULT - NSHPREVIEWOFSYSTEMS_GEN_ALL_CORE
CONSTITUTIONAL: No fever. no weakness  ENMT:  No sinus or throat pain  RESPIRATORY: No cough, wheezing, chills or hemoptysis; No shortness of breath  CARDIOVASCULAR: No chest pain, palpitations, dizziness, or leg swelling  GASTROINTESTINAL: No abdominal or epigastric pain. No nausea, vomiting, or hematemesis; No diarrhea or constipation. No melena or hematochezia.  GENITOURINARY: No dysuria or incontinence  NEUROLOGICAL: No headaches, memory loss, loss of strength, numbness, or tremors  SKIN: No rashes,  No hives or eczema  ENDOCRINE: No heat or cold intolerance; No hair loss  MUSCULOSKELETAL: Right foot 1st metatarsal wound to bone, with redness, warmth, pain, swelling  PSYCHIATRIC: No depression, anxiety, mood swings, or difficulty sleeping  HEME/LYMPH: No easy bruising, or bleeding gums; no enlarged LN

## 2023-02-15 NOTE — H&P ADULT - PROBLEM SELECTOR PLAN 2
h/o poorly controlled dm, pad s/p right toe amputations  clinically with right foot redness, swelling, warmth, pain/tenderness, consistent with cellulitis; no evidence of necrotizing infxn  xray with no evidence of gas; however, shows possible OM over first metatarsal stump  follow up inflammatory markers, vascular studies, blood and wound cultures; podiatry recommended CT right foot  s/p vanc and zosyn in ER   continue broad spec empirical coverage with vanc and zosyn; deescalate according to infectious work up  analgesics and antipyretics as needed   elevate extremity + ice packs  podiatry follow up in am  vascular surg consult in am  id consult in am.

## 2023-02-16 DIAGNOSIS — E11.65 TYPE 2 DIABETES MELLITUS WITH HYPERGLYCEMIA: ICD-10-CM

## 2023-02-16 LAB
A1C WITH ESTIMATED AVERAGE GLUCOSE RESULT: 11.2 % — HIGH (ref 4–5.6)
ALBUMIN SERPL ELPH-MCNC: 2.9 G/DL — LOW (ref 3.3–5)
ALP SERPL-CCNC: 74 U/L — SIGNIFICANT CHANGE UP (ref 40–120)
ALT FLD-CCNC: 35 U/L — SIGNIFICANT CHANGE UP (ref 10–45)
ANION GAP SERPL CALC-SCNC: 11 MMOL/L — SIGNIFICANT CHANGE UP (ref 5–17)
APPEARANCE UR: CLEAR — SIGNIFICANT CHANGE UP
APTT BLD: 30.7 SEC — SIGNIFICANT CHANGE UP (ref 27.5–35.5)
AST SERPL-CCNC: 28 U/L — SIGNIFICANT CHANGE UP (ref 10–40)
B-OH-BUTYR SERPL-SCNC: 0.3 MMOL/L — SIGNIFICANT CHANGE UP
BACTERIA # UR AUTO: NEGATIVE — SIGNIFICANT CHANGE UP
BASE EXCESS BLDV CALC-SCNC: -0.9 MMOL/L — SIGNIFICANT CHANGE UP (ref -2–3)
BASOPHILS # BLD AUTO: 0.02 K/UL — SIGNIFICANT CHANGE UP (ref 0–0.2)
BASOPHILS NFR BLD AUTO: 0.2 % — SIGNIFICANT CHANGE UP (ref 0–2)
BILIRUB SERPL-MCNC: 0.7 MG/DL — SIGNIFICANT CHANGE UP (ref 0.2–1.2)
BILIRUB UR-MCNC: NEGATIVE — SIGNIFICANT CHANGE UP
BUN SERPL-MCNC: 24 MG/DL — HIGH (ref 7–23)
CA-I SERPL-SCNC: 1.11 MMOL/L — LOW (ref 1.15–1.33)
CALCIUM SERPL-MCNC: 8.2 MG/DL — LOW (ref 8.4–10.5)
CHLORIDE BLDV-SCNC: 95 MMOL/L — LOW (ref 96–108)
CHLORIDE SERPL-SCNC: 93 MMOL/L — LOW (ref 96–108)
CHOLEST SERPL-MCNC: 71 MG/DL — SIGNIFICANT CHANGE UP
CO2 BLDV-SCNC: 25 MMOL/L — SIGNIFICANT CHANGE UP (ref 22–26)
CO2 SERPL-SCNC: 21 MMOL/L — LOW (ref 22–31)
COLOR SPEC: YELLOW — SIGNIFICANT CHANGE UP
CREAT ?TM UR-MCNC: 90 MG/DL — SIGNIFICANT CHANGE UP
CREAT SERPL-MCNC: 1.14 MG/DL — SIGNIFICANT CHANGE UP (ref 0.5–1.3)
DIFF PNL FLD: ABNORMAL
EGFR: 78 ML/MIN/1.73M2 — SIGNIFICANT CHANGE UP
EOSINOPHIL # BLD AUTO: 0.04 K/UL — SIGNIFICANT CHANGE UP (ref 0–0.5)
EOSINOPHIL NFR BLD AUTO: 0.3 % — SIGNIFICANT CHANGE UP (ref 0–6)
EPI CELLS # UR: 1 /HPF — SIGNIFICANT CHANGE UP
ERYTHROCYTE [SEDIMENTATION RATE] IN BLOOD: 120 MM/HR — HIGH (ref 0–20)
ESTIMATED AVERAGE GLUCOSE: 275 MG/DL — HIGH (ref 68–114)
GAS PNL BLDV: 125 MMOL/L — LOW (ref 136–145)
GAS PNL BLDV: SIGNIFICANT CHANGE UP
GAS PNL BLDV: SIGNIFICANT CHANGE UP
GLUCOSE BLDC GLUCOMTR-MCNC: 121 MG/DL — HIGH (ref 70–99)
GLUCOSE BLDC GLUCOMTR-MCNC: 169 MG/DL — HIGH (ref 70–99)
GLUCOSE BLDC GLUCOMTR-MCNC: 336 MG/DL — HIGH (ref 70–99)
GLUCOSE BLDC GLUCOMTR-MCNC: 430 MG/DL — HIGH (ref 70–99)
GLUCOSE BLDC GLUCOMTR-MCNC: 457 MG/DL — CRITICAL HIGH (ref 70–99)
GLUCOSE BLDC GLUCOMTR-MCNC: 465 MG/DL — CRITICAL HIGH (ref 70–99)
GLUCOSE BLDC GLUCOMTR-MCNC: 485 MG/DL — CRITICAL HIGH (ref 70–99)
GLUCOSE BLDV-MCNC: 457 MG/DL — CRITICAL HIGH (ref 70–99)
GLUCOSE SERPL-MCNC: 449 MG/DL — HIGH (ref 70–99)
GLUCOSE UR QL: ABNORMAL
GRAM STN FLD: SIGNIFICANT CHANGE UP
GRAM STN FLD: SIGNIFICANT CHANGE UP
HCO3 BLDV-SCNC: 24 MMOL/L — SIGNIFICANT CHANGE UP (ref 22–29)
HCT VFR BLD CALC: 29.5 % — LOW (ref 39–50)
HCT VFR BLDA CALC: 28 % — LOW (ref 39–51)
HDLC SERPL-MCNC: 16 MG/DL — LOW
HGB BLD CALC-MCNC: 9.4 G/DL — LOW (ref 12.6–17.4)
HGB BLD-MCNC: 9.6 G/DL — LOW (ref 13–17)
HYALINE CASTS # UR AUTO: 0 /LPF — SIGNIFICANT CHANGE UP (ref 0–7)
IMM GRANULOCYTES NFR BLD AUTO: 1.2 % — HIGH (ref 0–0.9)
INR BLD: 1.34 RATIO — HIGH (ref 0.88–1.16)
KETONES UR-MCNC: SIGNIFICANT CHANGE UP
LACTATE BLDV-MCNC: 1.3 MMOL/L — SIGNIFICANT CHANGE UP (ref 0.5–2)
LEUKOCYTE ESTERASE UR-ACNC: NEGATIVE — SIGNIFICANT CHANGE UP
LIPID PNL WITH DIRECT LDL SERPL: 33 MG/DL — SIGNIFICANT CHANGE UP
LYMPHOCYTES # BLD AUTO: 0.89 K/UL — LOW (ref 1–3.3)
LYMPHOCYTES # BLD AUTO: 7.4 % — LOW (ref 13–44)
MAGNESIUM SERPL-MCNC: 2 MG/DL — SIGNIFICANT CHANGE UP (ref 1.6–2.6)
MCHC RBC-ENTMCNC: 29.6 PG — SIGNIFICANT CHANGE UP (ref 27–34)
MCHC RBC-ENTMCNC: 32.5 GM/DL — SIGNIFICANT CHANGE UP (ref 32–36)
MCV RBC AUTO: 91 FL — SIGNIFICANT CHANGE UP (ref 80–100)
METHOD TYPE: SIGNIFICANT CHANGE UP
MONOCYTES # BLD AUTO: 0.66 K/UL — SIGNIFICANT CHANGE UP (ref 0–0.9)
MONOCYTES NFR BLD AUTO: 5.5 % — SIGNIFICANT CHANGE UP (ref 2–14)
MRSA PCR RESULT.: SIGNIFICANT CHANGE UP
MSSA DNA SPEC QL NAA+PROBE: SIGNIFICANT CHANGE UP
NEUTROPHILS # BLD AUTO: 10.35 K/UL — HIGH (ref 1.8–7.4)
NEUTROPHILS NFR BLD AUTO: 85.4 % — HIGH (ref 43–77)
NITRITE UR-MCNC: NEGATIVE — SIGNIFICANT CHANGE UP
NON HDL CHOLESTEROL: 56 MG/DL — SIGNIFICANT CHANGE UP
NRBC # BLD: 0 /100 WBCS — SIGNIFICANT CHANGE UP (ref 0–0)
OSMOLALITY SERPL: 290 MOSMOL/KG — SIGNIFICANT CHANGE UP (ref 275–300)
OSMOLALITY UR: 648 MOS/KG — SIGNIFICANT CHANGE UP (ref 300–900)
PCO2 BLDV: 37 MMHG — LOW (ref 42–55)
PH BLDV: 7.41 — SIGNIFICANT CHANGE UP (ref 7.32–7.43)
PH UR: 6 — SIGNIFICANT CHANGE UP (ref 5–8)
PHOSPHATE SERPL-MCNC: 3.2 MG/DL — SIGNIFICANT CHANGE UP (ref 2.5–4.5)
PLATELET # BLD AUTO: 187 K/UL — SIGNIFICANT CHANGE UP (ref 150–400)
PO2 BLDV: 62 MMHG — HIGH (ref 25–45)
POTASSIUM BLDV-SCNC: 4.6 MMOL/L — SIGNIFICANT CHANGE UP (ref 3.5–5.1)
POTASSIUM SERPL-MCNC: 4.7 MMOL/L — SIGNIFICANT CHANGE UP (ref 3.5–5.3)
POTASSIUM SERPL-SCNC: 4.7 MMOL/L — SIGNIFICANT CHANGE UP (ref 3.5–5.3)
POTASSIUM UR-SCNC: 37 MMOL/L — SIGNIFICANT CHANGE UP
PROT ?TM UR-MCNC: 139 MG/DL — HIGH (ref 0–12)
PROT SERPL-MCNC: 6.8 G/DL — SIGNIFICANT CHANGE UP (ref 6–8.3)
PROT UR-MCNC: ABNORMAL
PROT/CREAT UR-RTO: 1.5 RATIO — HIGH (ref 0–0.2)
PROTHROM AB SERPL-ACNC: 15.6 SEC — HIGH (ref 10.5–13.4)
RBC # BLD: 3.24 M/UL — LOW (ref 4.2–5.8)
RBC # FLD: 12.8 % — SIGNIFICANT CHANGE UP (ref 10.3–14.5)
RBC CASTS # UR COMP ASSIST: 8 /HPF — HIGH (ref 0–4)
S AUREUS DNA NOSE QL NAA+PROBE: DETECTED
SAO2 % BLDV: 92.4 % — HIGH (ref 67–88)
SODIUM SERPL-SCNC: 125 MMOL/L — LOW (ref 135–145)
SODIUM UR-SCNC: 18 MMOL/L — SIGNIFICANT CHANGE UP
SP GR SPEC: 1.04 — HIGH (ref 1.01–1.02)
SPECIMEN SOURCE: SIGNIFICANT CHANGE UP
SPECIMEN SOURCE: SIGNIFICANT CHANGE UP
TRIGL SERPL-MCNC: 116 MG/DL — SIGNIFICANT CHANGE UP
TSH SERPL-MCNC: 2.47 UIU/ML — SIGNIFICANT CHANGE UP (ref 0.27–4.2)
UROBILINOGEN FLD QL: ABNORMAL
UUN UR-MCNC: 559 MG/DL — SIGNIFICANT CHANGE UP
WBC # BLD: 12.1 K/UL — HIGH (ref 3.8–10.5)
WBC # FLD AUTO: 12.1 K/UL — HIGH (ref 3.8–10.5)
WBC UR QL: 2 /HPF — SIGNIFICANT CHANGE UP (ref 0–5)

## 2023-02-16 PROCEDURE — 99222 1ST HOSP IP/OBS MODERATE 55: CPT

## 2023-02-16 PROCEDURE — 93925 LOWER EXTREMITY STUDY: CPT | Mod: 26

## 2023-02-16 PROCEDURE — 99223 1ST HOSP IP/OBS HIGH 75: CPT

## 2023-02-16 PROCEDURE — 99233 SBSQ HOSP IP/OBS HIGH 50: CPT

## 2023-02-16 RX ORDER — INSULIN LISPRO 100/ML
VIAL (ML) SUBCUTANEOUS AT BEDTIME
Refills: 0 | Status: DISCONTINUED | OUTPATIENT
Start: 2023-02-16 | End: 2023-02-20

## 2023-02-16 RX ORDER — INSULIN LISPRO 100/ML
4 VIAL (ML) SUBCUTANEOUS ONCE
Refills: 0 | Status: COMPLETED | OUTPATIENT
Start: 2023-02-16 | End: 2023-02-16

## 2023-02-16 RX ORDER — PIPERACILLIN AND TAZOBACTAM 4; .5 G/20ML; G/20ML
3.38 INJECTION, POWDER, LYOPHILIZED, FOR SOLUTION INTRAVENOUS EVERY 8 HOURS
Refills: 0 | Status: DISCONTINUED | OUTPATIENT
Start: 2023-02-16 | End: 2023-02-17

## 2023-02-16 RX ORDER — INSULIN LISPRO 100/ML
VIAL (ML) SUBCUTANEOUS
Refills: 0 | Status: DISCONTINUED | OUTPATIENT
Start: 2023-02-16 | End: 2023-02-20

## 2023-02-16 RX ORDER — INSULIN LISPRO 100/ML
24 VIAL (ML) SUBCUTANEOUS
Refills: 0 | Status: DISCONTINUED | OUTPATIENT
Start: 2023-02-16 | End: 2023-02-17

## 2023-02-16 RX ADMIN — Medication 166.67 MILLIGRAM(S): at 18:02

## 2023-02-16 RX ADMIN — CLOPIDOGREL BISULFATE 75 MILLIGRAM(S): 75 TABLET, FILM COATED ORAL at 12:38

## 2023-02-16 RX ADMIN — ENOXAPARIN SODIUM 40 MILLIGRAM(S): 100 INJECTION SUBCUTANEOUS at 22:36

## 2023-02-16 RX ADMIN — Medication 81 MILLIGRAM(S): at 12:38

## 2023-02-16 RX ADMIN — MORPHINE SULFATE 4 MILLIGRAM(S): 50 CAPSULE, EXTENDED RELEASE ORAL at 09:26

## 2023-02-16 RX ADMIN — PIPERACILLIN AND TAZOBACTAM 25 GRAM(S): 4; .5 INJECTION, POWDER, LYOPHILIZED, FOR SOLUTION INTRAVENOUS at 14:10

## 2023-02-16 RX ADMIN — PIPERACILLIN AND TAZOBACTAM 25 GRAM(S): 4; .5 INJECTION, POWDER, LYOPHILIZED, FOR SOLUTION INTRAVENOUS at 05:15

## 2023-02-16 RX ADMIN — MORPHINE SULFATE 2 MILLIGRAM(S): 50 CAPSULE, EXTENDED RELEASE ORAL at 18:02

## 2023-02-16 RX ADMIN — Medication 4 UNIT(S): at 05:40

## 2023-02-16 RX ADMIN — Medication 25 MILLIGRAM(S): at 05:15

## 2023-02-16 RX ADMIN — Medication 24 UNIT(S): at 11:49

## 2023-02-16 RX ADMIN — MORPHINE SULFATE 2 MILLIGRAM(S): 50 CAPSULE, EXTENDED RELEASE ORAL at 03:45

## 2023-02-16 RX ADMIN — Medication 650 MILLIGRAM(S): at 12:38

## 2023-02-16 RX ADMIN — MORPHINE SULFATE 4 MILLIGRAM(S): 50 CAPSULE, EXTENDED RELEASE ORAL at 09:56

## 2023-02-16 RX ADMIN — Medication 8: at 11:50

## 2023-02-16 RX ADMIN — INSULIN GLARGINE 66 UNIT(S): 100 INJECTION, SOLUTION SUBCUTANEOUS at 23:07

## 2023-02-16 RX ADMIN — Medication 3 MILLIGRAM(S): at 23:02

## 2023-02-16 RX ADMIN — Medication 3 MILLIGRAM(S): at 03:09

## 2023-02-16 RX ADMIN — ATORVASTATIN CALCIUM 80 MILLIGRAM(S): 80 TABLET, FILM COATED ORAL at 22:36

## 2023-02-16 RX ADMIN — MORPHINE SULFATE 2 MILLIGRAM(S): 50 CAPSULE, EXTENDED RELEASE ORAL at 22:36

## 2023-02-16 RX ADMIN — LISINOPRIL 40 MILLIGRAM(S): 2.5 TABLET ORAL at 05:15

## 2023-02-16 RX ADMIN — Medication 4: at 01:09

## 2023-02-16 RX ADMIN — Medication 166.67 MILLIGRAM(S): at 05:43

## 2023-02-16 RX ADMIN — MORPHINE SULFATE 4 MILLIGRAM(S): 50 CAPSULE, EXTENDED RELEASE ORAL at 13:33

## 2023-02-16 RX ADMIN — MORPHINE SULFATE 4 MILLIGRAM(S): 50 CAPSULE, EXTENDED RELEASE ORAL at 14:00

## 2023-02-16 RX ADMIN — PIPERACILLIN AND TAZOBACTAM 25 GRAM(S): 4; .5 INJECTION, POWDER, LYOPHILIZED, FOR SOLUTION INTRAVENOUS at 22:36

## 2023-02-16 RX ADMIN — INSULIN GLARGINE 50 UNIT(S): 100 INJECTION, SOLUTION SUBCUTANEOUS at 01:09

## 2023-02-16 RX ADMIN — MORPHINE SULFATE 4 MILLIGRAM(S): 50 CAPSULE, EXTENDED RELEASE ORAL at 02:43

## 2023-02-16 RX ADMIN — SERTRALINE 50 MILLIGRAM(S): 25 TABLET, FILM COATED ORAL at 18:02

## 2023-02-16 RX ADMIN — Medication 650 MILLIGRAM(S): at 23:02

## 2023-02-16 RX ADMIN — SODIUM CHLORIDE 100 MILLILITER(S): 9 INJECTION INTRAMUSCULAR; INTRAVENOUS; SUBCUTANEOUS at 04:16

## 2023-02-16 RX ADMIN — SENNA PLUS 2 TABLET(S): 8.6 TABLET ORAL at 22:36

## 2023-02-16 RX ADMIN — Medication 24 UNIT(S): at 18:53

## 2023-02-16 RX ADMIN — MORPHINE SULFATE 2 MILLIGRAM(S): 50 CAPSULE, EXTENDED RELEASE ORAL at 03:09

## 2023-02-16 RX ADMIN — Medication 650 MILLIGRAM(S): at 13:00

## 2023-02-16 RX ADMIN — Medication 12: at 08:14

## 2023-02-16 NOTE — CONSULT NOTE ADULT - SUBJECTIVE AND OBJECTIVE BOX
HPI:  51 y/o M w/ hx of Type 2 DM x >20 years complicated by neuropathy with right toe amputations, retinopathy, CAD. Follows with Dr. London. Last seen at the office 1/2023 with dose adjustment of Toujeo to 66 untis qhs and metformin increased to 1000mg BID from 500mg BID. Has been on Trulicity 3mg weekly and takes Humalog around 40 units with meals. Glucose at home 100-200 without reported hypoglycemia or symptoms of hypoglycemia. More recently glucose values have been > 300 with infection. Denies polyuria or polydipsia. No nausea or vomiting. Mother with Type 2 DM. Nonadherent to diet at times.   Also hx of cad s/p pci w stents, htn, hld, anx/dep, ed, p/w right foot redness, swelling, pain/tenderness, warmth. Patient reports that he had debridement of his foot approximately 2 weeks ago, returned to his podiatrist 1 week ago due to right foot redness, swelling, pain/tenderness, warmth over dm foot ulcer at 1st metatarsal stump. at the time, suspected to be 2/2 gout, so was administered steroid injection with improvement, until 2 days ago when symptoms returned. he contacted his podiatrist who advised patient to go to er for further evaluation. so, he presents to [facility] er for further mgmt.      PAST MEDICAL & SURGICAL HISTORY:  CAD (coronary artery disease)      Diabetes      HTN (hypertension)      Diabetic foot ulcer      Status post amputation of toe  s/p R 3/4th partial ray resection (4/2021; s/p 2nd partial ray resection (5/2021)          FAMILY HISTORY:  Family history of early CAD (Father)  Mother- Type 2 DM         Social History: + current tobacco use. +social alcohol use    Outpatient Medications:  · 	clopidogrel 75 mg oral tablet: Last Dose Taken:  , 1 tab(s) orally once a day  · 	Toprol-XL 25 mg oral tablet, extended release: Last Dose Taken:  , 1 tab(s) orally once a day   · 	aspirin 81 mg oral delayed release tablet: Last Dose Taken:  , 1 tab(s) orally once a day  · 	rosuvastatin 40 mg oral tablet: Last Dose Taken:  , 1 tab(s) orally once a day  · 	ramipril 10 mg oral capsule: Last Dose Taken:  , 1 cap(s) orally once a day  · 	sertraline 50 mg oral tablet: Last Dose Taken:  , 1 tab(s) orally once a day  · 	Trulicity Pen 3 mg/0.5 mL subcutaneous solution: Last Dose Taken:  , subcutaneous once a week  · 	metFORMIN 1000 mg oral tablet: Last Dose Taken:  , 1 tab(s) orally 2 times a day  · 	Toujeo 100 units/mL subcutaneous solution: Last Dose Taken:  , 66 unit(s) subcutaneous once a day (at bedtime)  · 	HumaLOG 100 units/mL injectable solution: Last Dose Taken:  , 40 units with meals + prandial low dose correctional scale, +1 for every fingerstick glu 50 mg/dl above 150 mg/dl  · 	sildenafil 100 mg oral tablet: 1 tab(s) orally once a day, As Needed  · 	traMADol 50 mg oral tablet: 1 tab(s) orally once a day (at bedtime), As Needed    MEDICATIONS  (STANDING):  aspirin enteric coated 81 milliGRAM(s) Oral daily  atorvastatin 80 milliGRAM(s) Oral at bedtime  clopidogrel Tablet 75 milliGRAM(s) Oral daily  dextrose 5%. 1000 milliLiter(s) (50 mL/Hr) IV Continuous <Continuous>  dextrose 5%. 1000 milliLiter(s) (100 mL/Hr) IV Continuous <Continuous>  dextrose 50% Injectable 25 Gram(s) IV Push once  dextrose 50% Injectable 12.5 Gram(s) IV Push once  dextrose 50% Injectable 25 Gram(s) IV Push once  enoxaparin Injectable 40 milliGRAM(s) SubCutaneous every 24 hours  glucagon  Injectable 1 milliGRAM(s) IntraMuscular once  insulin glargine Injectable (LANTUS) 66 Unit(s) SubCutaneous at bedtime  insulin lispro (ADMELOG) corrective regimen sliding scale   SubCutaneous three times a day before meals  insulin lispro (ADMELOG) corrective regimen sliding scale   SubCutaneous at bedtime  insulin lispro Injectable (ADMELOG) 24 Unit(s) SubCutaneous three times a day before meals  lisinopril 40 milliGRAM(s) Oral daily  metoprolol succinate ER 25 milliGRAM(s) Oral daily  naloxone Injectable 0.4 milliGRAM(s) IV Push once  piperacillin/tazobactam IVPB.. 3.375 Gram(s) IV Intermittent every 8 hours  polyethylene glycol 3350 17 Gram(s) Oral daily  senna 2 Tablet(s) Oral at bedtime  sertraline 50 milliGRAM(s) Oral daily  sodium chloride 0.9%. 1000 milliLiter(s) (100 mL/Hr) IV Continuous <Continuous>  vancomycin  IVPB 1250 milliGRAM(s) IV Intermittent every 12 hours    MEDICATIONS  (PRN):  acetaminophen     Tablet .. 650 milliGRAM(s) Oral every 6 hours PRN Temp greater or equal to 38C (100.4F), Mild Pain (1 - 3)  aluminum hydroxide/magnesium hydroxide/simethicone Suspension 30 milliLiter(s) Oral every 4 hours PRN Dyspepsia  bisacodyl 5 milliGRAM(s) Oral daily PRN Constipation  dextrose Oral Gel 15 Gram(s) Oral once PRN Blood Glucose LESS THAN 70 milliGRAM(s)/deciliter  melatonin 3 milliGRAM(s) Oral at bedtime PRN Insomnia  morphine  - Injectable 2 milliGRAM(s) IV Push every 4 hours PRN Moderate Pain (4 - 6)  morphine  - Injectable 4 milliGRAM(s) IV Push every 4 hours PRN Severe Pain (7 - 10)  ondansetron Injectable 4 milliGRAM(s) IV Push every 8 hours PRN Nausea and/or Vomiting      Allergies    No Known Allergies    Intolerances      Review of Systems:  Constitutional: No fever, No change in weight  Eyes: +retinopathy  Neuro: No headache, +neuropathy  HEENT: No throat pain  Cardiovascular: No chest pain  Respiratory: No SOB  GI: No nausea or vomiting  : No polyuria  Skin: +right foot ulcer  Psych: no depression  Endocrine: No polydipsia, No heat or cold intolerance, rest as noted in HPI  Hem/lymph: no swelling    All other review of systems negative      PHYSICAL EXAM:  VITALS: T(C): 38.2 (02-16-23 @ 12:00)  T(F): 100.7 (02-16-23 @ 12:00), Max: 100.7 (02-16-23 @ 12:00)  HR: 95 (02-16-23 @ 12:00) (63 - 96)  BP: 107/69 (02-16-23 @ 12:00) (102/69 - 151/87)  RR:  (16 - 19)  SpO2:  (95% - 99%)  Wt(kg): --  GENERAL: NAD at this time  EYES: No proptosis, EOMI  HEENT:  Atraumatic, Normocephalic,   THYROID: Normal size, no palpable nodules  RESPIRATORY: Clear to auscultation bilaterally, full excursion, non-labored  CARDIOVASCULAR: Regular rhythm; No murmurs; no peripheral edema  GI: Soft, nontender, non distended, normal bowel sounds  SKIN: Dry, intact, +dressing on right foot with prior amputations  MUSCULOSKELETAL: normal strength  NEURO: follows commands  PSYCH: Alert and oriented x 3, normal affect, normal mood  CUSHING'S SIGNS: no striae      POCT Blood Glucose.: 336 mg/dL (02-16-23 @ 11:35)  POCT Blood Glucose.: 457 mg/dL (02-16-23 @ 07:47)  POCT Blood Glucose.: 465 mg/dL (02-16-23 @ 05:35)  POCT Blood Glucose.: 430 mg/dL (02-16-23 @ 03:01)  POCT Blood Glucose.: 485 mg/dL (02-16-23 @ 01:07)                              9.6    12.10 )-----------( 187      ( 16 Feb 2023 05:39 )             29.5       02-16    125<L>  |  93<L>  |  24<H>  ----------------------------<  449<H>  4.7   |  21<L>  |  1.14    eGFR: 78    Ca    8.2<L>      02-16  Mg     2.0     02-16  Phos  3.2     02-16    TPro  6.8  /  Alb  2.9<L>  /  TBili  0.7  /  DBili  x   /  AST  28  /  ALT  35  /  AlkPhos  74  02-16    Thyroid Function Tests:  02-16 @ 05:39 TSH 2.47 FreeT4 -- T3 -- Anti TPO -- Anti Thyroglobulin Ab -- TSI --          02-16 Chol 71 Direct LDL -- LDL calculated 33 HDL 16<L> Trig 116  Radiology:

## 2023-02-16 NOTE — PROGRESS NOTE ADULT - PROBLEM SELECTOR PLAN 1
meets sespis criteria on admission with leukocytosis, fever, tachycardia, + lactic acidosis  - 2/2 cellulitis, osteo, possible septic arthritis  - s/p 1.5L IVF and vanco + zosyn in ED  - treatment of infection as below

## 2023-02-16 NOTE — PROGRESS NOTE ADULT - ASSESSMENT
51 yo male with PMH of CAD s/p PCI with stent, HTN, HLD, uncontrolled IDDM2, anxiety and depression who presents from outpatient podiatry office with worsening R foot pain, swelling, pain/tenderness found to be in severe sepsis 2/2 cellulitis and osteomyelitis with CT of R ankle concerning for possible septic arthritis and gas-forming/necrotizing infection.

## 2023-02-16 NOTE — PROGRESS NOTE ADULT - SUBJECTIVE AND OBJECTIVE BOX
Karime Bueno MD  Division of Hospital Medicine  [facility]   Available on [employer] Teams (Mon-Fri 8am-5pm)    * messages preferred prior to calls  Other Times:  [phone]      Patient is a 52y old  Male who presents with a chief complaint of r foot pain/tenderness, warmth, swelling (2023 16:20)      SUBJECTIVE / OVERNIGHT EVENTS: hyperglycemic overnight to 400s. no fever, chills. still with ongoing R foot/ankle pain. angry at time of my exam as he feels he is being mistreated as he is still in ED hallway and has not gotten room yet. reviewed CT findings and importance of podiatry eval and arthrocentesis but patient refusing any evaluation/procedures until he is in a room upstairs.  ADDITIONAL REVIEW OF SYSTEMS:    MEDICATIONS  (STANDING):  aspirin enteric coated 81 milliGRAM(s) Oral daily  atorvastatin 80 milliGRAM(s) Oral at bedtime  clopidogrel Tablet 75 milliGRAM(s) Oral daily  dextrose 5%. 1000 milliLiter(s) (50 mL/Hr) IV Continuous <Continuous>  dextrose 5%. 1000 milliLiter(s) (100 mL/Hr) IV Continuous <Continuous>  dextrose 50% Injectable 25 Gram(s) IV Push once  dextrose 50% Injectable 12.5 Gram(s) IV Push once  dextrose 50% Injectable 25 Gram(s) IV Push once  enoxaparin Injectable 40 milliGRAM(s) SubCutaneous every 24 hours  glucagon  Injectable 1 milliGRAM(s) IntraMuscular once  insulin glargine Injectable (LANTUS) 66 Unit(s) SubCutaneous at bedtime  insulin lispro (ADMELOG) corrective regimen sliding scale   SubCutaneous three times a day before meals  insulin lispro (ADMELOG) corrective regimen sliding scale   SubCutaneous at bedtime  insulin lispro Injectable (ADMELOG) 24 Unit(s) SubCutaneous three times a day before meals  lisinopril 40 milliGRAM(s) Oral daily  metoprolol succinate ER 25 milliGRAM(s) Oral daily  naloxone Injectable 0.4 milliGRAM(s) IV Push once  piperacillin/tazobactam IVPB.. 3.375 Gram(s) IV Intermittent every 8 hours  polyethylene glycol 3350 17 Gram(s) Oral daily  senna 2 Tablet(s) Oral at bedtime  sertraline 50 milliGRAM(s) Oral daily  sodium chloride 0.9%. 1000 milliLiter(s) (100 mL/Hr) IV Continuous <Continuous>  vancomycin  IVPB 1250 milliGRAM(s) IV Intermittent every 12 hours    MEDICATIONS  (PRN):  acetaminophen     Tablet .. 650 milliGRAM(s) Oral every 6 hours PRN Temp greater or equal to 38C (100.4F), Mild Pain (1 - 3)  aluminum hydroxide/magnesium hydroxide/simethicone Suspension 30 milliLiter(s) Oral every 4 hours PRN Dyspepsia  bisacodyl 5 milliGRAM(s) Oral daily PRN Constipation  dextrose Oral Gel 15 Gram(s) Oral once PRN Blood Glucose LESS THAN 70 milliGRAM(s)/deciliter  melatonin 3 milliGRAM(s) Oral at bedtime PRN Insomnia  morphine  - Injectable 2 milliGRAM(s) IV Push every 4 hours PRN Moderate Pain (4 - 6)  morphine  - Injectable 4 milliGRAM(s) IV Push every 4 hours PRN Severe Pain (7 - 10)  ondansetron Injectable 4 milliGRAM(s) IV Push every 8 hours PRN Nausea and/or Vomiting      CAPILLARY BLOOD GLUCOSE      POCT Blood Glucose.: 336 mg/dL (2023 11:35)  POCT Blood Glucose.: 457 mg/dL (2023 07:47)  POCT Blood Glucose.: 465 mg/dL (2023 05:35)  POCT Blood Glucose.: 430 mg/dL (2023 03:01)  POCT Blood Glucose.: 485 mg/dL (2023 01:07)    I&O's Summary      PHYSICAL EXAM:  Vital Signs Last 24 Hrs  T(C): 38.2 (2023 12:00), Max: 38.2 (2023 12:00)  T(F): 100.7 (2023 12:00), Max: 100.7 (2023 12:00)  HR: 95 (2023 12:00) (63 - 96)  BP: 107/69 (2023 12:00) (102/69 - 151/87)  BP(mean): 90 (2023 05:41) (90 - 90)  RR: 17 (2023 12:00) (16 - 19)  SpO2: 98% (2023 12:00) (95% - 99%)    Parameters below as of 2023 12:00  Patient On (Oxygen Delivery Method): room air      CONSTITUTIONAL: pbese male in NAD, angry  EYES: PERRLA; conjunctiva and sclera clear  ENMT: Moist oral mucosa, no pharyngeal injection or exudates; normal dentition  NECK: Supple, no palpable masses; no thyromegaly  RESPIRATORY: Normal respiratory effort; lungs are clear to auscultation bilaterally  CARDIOVASCULAR: Regular rate and rhythm, normal S1 and S2, no murmur/rub/gallop; No lower extremity edema; Peripheral pulses are 2+ bilaterally  ABDOMEN: Soft, Nondistended, Nontender to palpation, normoactive bowel sounds  MUSCULOSKELETAL:  No clubbing or cyanosis of digits; no joint swelling or tenderness to palpation  PSYCH: A+O to person, place, and time; angry and verbally abrasive  NEUROLOGY: CN 2-12 are intact and symmetric; no gross sensory deficits   SKIN: +RLE with warmth and tenderness to palpation, patient refusing unwrapping of dressing to examine further    LABS:                        9.6    12.10 )-----------( 187      ( 2023 05:39 )             29.5     02-16    125<L>  |  93<L>  |  24<H>  ----------------------------<  449<H>  4.7   |  21<L>  |  1.14    Ca    8.2<L>      2023 05:39  Phos  3.2     02-16  Mg     2.0     02-16    TPro  6.8  /  Alb  2.9<L>  /  TBili  0.7  /  DBili  x   /  AST  28  /  ALT  35  /  AlkPhos  74  02-16    PT/INR - ( 2023 05:39 )   PT: 15.6 sec;   INR: 1.34 ratio         PTT - ( 2023 05:39 )  PTT:30.7 sec      Urinalysis Basic - ( 2023 02:14 )    Color: Yellow / Appearance: Clear / S.044 / pH: x  Gluc: x / Ketone: Trace  / Bili: Negative / Urobili: 3 mg/dL   Blood: x / Protein: 100 mg/dL / Nitrite: Negative   Leuk Esterase: Negative / RBC: 8 /hpf / WBC 2 /HPF   Sq Epi: x / Non Sq Epi: 1 /hpf / Bacteria: Negative      Culture - Tissue with Gram Stain (collected 15 Feb 2023 20:43)  Source: .Tissue right foot wound  Gram Stain (2023 05:26):    Few polymorphonuclear leukocytes seen per low power field    Few Gram Negative Rods seen per oil power field    Moderate Gram Variable Cocci seen per oil power field      RADIOLOGY & ADDITIONAL TESTS:  Results Reviewed: hyperglycemia to 40s  Imaging Personally Reviewed:  23 CT R Ankle:  IMPRESSION:    Please note that CT is not the test of choice to evaluate for septic   arthritis or osteomyelitis. MRI would be a better test to evaluate for   osteomyelitis and septic arthritis.    Incompletely evaluated permeative appearance with cortical erosion of the   base of the first metatarsal, concerning for osteomyelitis. Given the   location of this osteomyelitis, there is likely septic arthritis of the   underlying first tarsometatarsal joint. Foci of gas along the dorsal   aspect of the first tarsometatarsal joint, likely within the joint, which   may represent infection with a gas-forming/necrotizing infection.    Tenosynovitis of the anterior tibialis tendon. Foci of gas along the   tenosynovial sheath of the anterior tibialis tendon may represent   infection with a gas-forming/necrotizing infection.    Trace tibiotalar joint effusion. If there is clinical concern for septic   arthritis of the tibiotalar joint, joint aspiration should be performed.    Subcutaneous edema about the ankle, likely cellulitis.    23 Arterial Duplex:  IMPRESSION:    Low impedance waveforms throughout the right lower extremity suggests the   possibility of flow-limiting lesion upstream, possibly in the iliac   artery.    Moderate stenoses of the right tibioperoneal trunk and the left anterior   tibial artery.    Nonvisualization and possible occlusion of the peroneal arteries   bilaterally.      Electrocardiogram Personally Reviewed:    COORDINATION OF CARE:  Care Discussed with Consultants/Other Providers [Y]: medicine MICKIE Quiros  Prior or Outpatient Records Reviewed [Y/N]:

## 2023-02-16 NOTE — PHYSICAL THERAPY INITIAL EVALUATION ADULT - PLANNED THERAPY INTERVENTIONS, PT EVAL
GOAL: Pt will negotiate up/down 10 steps with handrail ascending independently in 2 weeks/balance training/gait training/strengthening/transfer training

## 2023-02-16 NOTE — CONSULT NOTE ADULT - ASSESSMENT
#Abnormal imaging of the right lower extremity  #Right foot ulcer with associated right ankle pain and swelling  #Concern for septic arthritis    #Leukocytosis    #History of amputation of toe with right third and fourth partial ray resection and April 2021 and second partial ray resection in May 2021    Recommendations     52-year-old male with a past medical history most significant for diabetes, CAD status post PCI, gout who is admitted to the hospital due to right ankle pain.    #Abnormal imaging of the right lower extremity  #Right foot ulcer with associated right ankle pain and swelling  #Concern for septic arthritis  Fever and leukocytosis upon admission  Evaluated by podiatry - refusing further workup on admission  Blood cultures pending   History of MRSA foot infection    #Leukocytosis  #Elevated inflammatory markers    #History of amputation of toe with right third and fourth partial ray resection and April 2021 and second partial ray resection in May 2021    Recommendations  Continue broad coverage with vancomycin and piperacillin/tazobactam  Follow Podiatry input  Obtain MRI of the RLE  Follow pending blood cultures  Follow fever curve and WBC count    Nirav Simmons MD  Division of Infectious Diseases  Available on Teams

## 2023-02-16 NOTE — CHART NOTE - NSCHARTNOTEFT_GEN_A_CORE
Called by RN as pt with blood glucose of 430. Pt admitted with sepsis due to diabetic foot ulcer and hyperglycemia, had received Lantus 50 units and admelog 4 units. Now with fingerstick of 430. Last insulin dose around 3 hours ago. Will give Admelog 4 units X 1. BMP, Beta hydroxy and VBG checked. Beta hydroxy neg, Anion gap normal, normal PH. Likely hyperglycemia due to infection. Pt started on IVF NS @ 100cc/hr. Pt refusing to be NPO. Will place pt on moderate sliding scale, continue pre meal ademlog 20 withs with meal and lantus 66 units at bedtime. Will continue to monitor finger stick.  F/U with primary team in AM.     Jayesh Alonso   [id number] Called by RN as pt with blood glucose of 430. Pt admitted with sepsis due to diabetic foot ulcer and hyperglycemia, had received Lantus 50 units and admelog 4 units. Now with fingerstick of 430. Last insulin dose around 3 hours ago. Will give Admelog 4 units X 1. BMP, Beta hydroxy and VBG checked. Beta hydroxy neg, Anion gap normal, normal PH. Likely hyperglycemia due to infection. Pt started on IVF NS @ 100cc/hr. Pt refusing to be NPO. Will start consistent carb diet.  Will place pt on moderate sliding scale, continue pre meal ademlog 24 with with meal and lantus 66 units at bedtime. Will continue to monitor finger stick.  F/U with primary team in AM.     Jayesh Alonso   [id number] Called by RN as pt with blood glucose of 430. Pt admitted with sepsis due to diabetic foot ulcer and hyperglycemia, had received Lantus 50 units and admelog 4 units. Now with fingerstick of 430. Last insulin dose around 3 hours ago. Will give Admelog 4 units X 1. BMP, Beta hydroxy and VBG checked. Beta hydroxy neg, Anion gap normal, normal PH. Likely hyperglycemia due to infection. Pt started on IVF NS @ 100cc/hr. Pt refusing to be NPO. Will start consistent carb diet.  Will place pt on moderate sliding scale, continue pre meal ademlog 24 with with meal and lantus 66 units at bedtime. Will continue to monitor finger stick.  D/W Dr. Montague. Consider endo eval today. F/U with primary team in .     Jayesh Alonso   [id number] No

## 2023-02-16 NOTE — CONSULT NOTE ADULT - ASSESSMENT
53 y/o M w/ hx of uncontrolled Type 2 DM w/ hyperglycemia complicated by neuropathy, amputations, retinopathy and CAD, HTN and HLD admitted for sepsis 2/2 right foot infection (high risk patient with severely uncontrolled diabetes with A1c of 11.2% at high risk of CAD and CVA with high level decision-making).

## 2023-02-16 NOTE — PROGRESS NOTE ADULT - PROBLEM SELECTOR PLAN 6
stable.  - takes ramipril 10mg daily at home, continue with formulary interchange lisinopril 40mg daily  - c/w toprol XL 25mg PO daily  - monitor vitals

## 2023-02-16 NOTE — CHART NOTE - NSCHARTNOTEFT_GEN_A_CORE
MEDICINE NP    ROSEMARIE TIKI  52y Male    Patient is a 52y old  Male who presents with a chief complaint of r foot pain/tenderness, warmth, swelling (16 Feb 2023 17:07)         > Event Summary:   Notified by RN, Patient with critical value,  +BCX    Culture - Blood (02.15.23 @ 20:00)    -  Methicillin SENSITIVE Staphylococcus aureus (MSSA): Detec Any isolate of Staphylococcus aureus from a blood culture is NOT considered a contaminant.    Gram Stain:   Growth in aerobic bottle: Gram Positive Cocci in Clusters    Specimen Source: .Blood Blood-Peripheral    Organism: Blood Culture PCR    Culture Results:   Growth in aerobic bottle: Gram Positive Cocci in Clusters        -Vital Signs Last 24 Hrs  T(C): 38.6 (16 Feb 2023 22:46), Max: 38.6 (16 Feb 2023 22:46)  T(F): 101.4 (16 Feb 2023 22:46), Max: 101.4 (16 Feb 2023 22:46)  HR: 89 (16 Feb 2023 22:46) (75 - 96)  BP: 99/61 (16 Feb 2023 22:46) (99/61 - 151/87)  BP(mean): 90 (16 Feb 2023 05:41) (90 - 90)  RR: 18 (16 Feb 2023 22:46) (16 - 19)  SpO2: 92% (16 Feb 2023 22:46) (92% - 99%)    Parameters below as of 16 Feb 2023 22:46  Patient On (Oxygen Delivery Method): room air                   XAVIER Medina-BC  Medicine Department MEDICINE NP    TIKI HOUSTON  52y Male    Patient is a 52y old  Male who presents with a chief complaint of r foot pain/tenderness, warmth, swelling (16 Feb 2023 17:07)         > Event Summary:   Notified by RN, Patient with critical value,  +BCX - Aerobic Bottle w/ GPC in Clusters  Patient is a 51y/o Male admitted with Rt. Foot Diabetic Wound on Zosyn and Vancomycin  -Follow-up PCR with MSSA.  C/w current Management of Vancomycin and Zosyn  -Rpt BCX x2 ordered  -ID Following  -Trend fever and wbc curve  -Will endorse to Day Provider in AM and Attending to follow      >>MICROBIOLOGY   Culture - Blood (02.15.23 @ 20:00)    -  Methicillin SENSITIVE Staphylococcus aureus (MSSA): Detec Any isolate of Staphylococcus aureus from a blood culture is NOT considered a contaminant.    Gram Stain:   Growth in aerobic bottle: Gram Positive Cocci in Clusters    Specimen Source: .Blood Blood-Peripheral    Organism: Blood Culture PCR    Culture Results:  Growth in aerobic bottle: Gram Positive Cocci in Clusters        -Vital Signs Last 24 Hrs  T(C): 38.6 (16 Feb 2023 22:46), Max: 38.6 (16 Feb 2023 22:46)  T(F): 101.4 (16 Feb 2023 22:46), Max: 101.4 (16 Feb 2023 22:46)  HR: 89 (16 Feb 2023 22:46) (75 - 96)  BP: 99/61 (16 Feb 2023 22:46) (99/61 - 151/87)  BP(mean): 90 (16 Feb 2023 05:41) (90 - 90)  RR: 18 (16 Feb 2023 22:46) (16 - 19)  SpO2: 92% (16 Feb 2023 22:46) (92% - 99%)    Parameters below as of 16 Feb 2023 22:46  Patient On (Oxygen Delivery Method): room air         XAVIER Medina-BC  Medicine Department  #[id number]

## 2023-02-16 NOTE — PROGRESS NOTE ADULT - PROBLEM SELECTOR PLAN 3
lactic acidosis 3.3->1.8 (resolved)  - stress hyperglycemia iso severe sepsis + poorly controlled dm  - resolved  - beta hydroxy negative

## 2023-02-16 NOTE — PHYSICAL THERAPY INITIAL EVALUATION ADULT - PERTINENT HX OF CURRENT PROBLEM, REHAB EVAL
51 yo m w pmh dm, cad s/p pci w stents, htn, hld, anx/dep, ed, p/w right foot redness, swelling, pain/tenderness, warmth. Patient reports that he had debridement of his foot approximately 2 wks ago, returned to his podiatrist 1 week ago due to right foot redness, swelling, pain/tenderness, warmth over dm foot ulcer at 1st metatarsal stump. at the time, suspected to be 2/2 gout, so was administered steroid injection with improvement, until 2 days ago when symptoms returned. he contacted his podiatrist who advised patient to go to er for further evaluation. so, he presents to [facility] er for further mgmt. In er found to be in severe sepsis (fever, leukocytosis, tachycardia, lactic acidosis), likely source of soft tissue infection (cellulitis vs OM) iso poorly controlled dm, admitted to medicine for further mgmt. CTAnkle: Please note that CT is not the test of choice to evaluate for septic arthritis or osteomyelitis. MRI would be a better test to evaluate for osteomyelitis and septic arthritis.Incompletely evaluated permeative appearance with cortical erosion of the base of the first metatarsal, concerning for osteomyelitis. Given the location of this osteomyelitis, there is likely septic arthritis of the underlying first tarsometatarsal joint. Foci of gas along the dorsal aspect of the first tarsometatarsal joint, likely within the joint, which may represent infection with a gas-forming/necrotizing infection. Tenosynovitis of the anterior tibialis tendon. Foci of gas along the tenosynovial sheath of the anterior tibialis tendon may represent infection with a gas-forming/necrotizing infection. Trace tibiotalar joint effusion. If there is clinical concern for septic   arthritis of the tibiotalar joint, joint aspiration should be performed. Subcutaneous edema about the ankle, likely cellulitis. CXR; (-) XRayRAnkle: Cortical irregularity at the distal aspect of the first metatarsal stump seen on the lateral view. Further evaluation with foot radiographs may be   helpful if there is concern for osteomyelitis in this location. No acute fracture or dislocation. Extensive soft tissue edema of the lower leg, hindfoot and midfoot status post TMA. 53 yo male with PMH of CAD s/p PCI with stent, HTN, HLD, uncontrolled IDDM2, anxiety and depression who presents from outpatient podiatry office with worsening R foot pain found to be in severe sepsis 2/2 cellulitis and osteomyelitis with CT of R ankle concerning for possible septic arthritis and gas-forming/necrotizing infection with course c/b MSSA bacteremia, s/p OR 2/24: right foot 1st met amputation and right ankle I&D.

## 2023-02-16 NOTE — ED ADULT NURSE REASSESSMENT NOTE - NS ED NURSE REASSESS COMMENT FT1
Pt f/s 485, ACP Jayesh notified and aware. Pt given 50 units of lantus and 4 units of lispro as per sliding scale.

## 2023-02-16 NOTE — PROGRESS NOTE ADULT - PROBLEM SELECTOR PLAN 2
h/o poorly controlled dm, pad s/p right toe amputations  - XR siwiht no evidence of gas; however, showing possible OM over first metatarsal stump  - CT R ankle with concern for 1st metatarsal OM and likely septic arthritis of underlying 1st tarsometatarsal joint; foci of gas concerning for gas-forming/necrotizing infection; tenosynovitis of anterior tib tendon with also foci of gas along tenosynovial sheath of anterior tib tendon; trace tibiotalar joint effusion; subc edema about the ankle likely cellulitis  - podiatry following, recs appreciated  - planned for arthrocentesis but patient refusing until he is in a room (currently in ED [facility])  - c/w vanco + zosyn  - ID consulted, will f/u recs  - Arterial duplex suggestive of possible flow-limiting lesion upstream, possibly in iliac artery; mod stenoses of R tibioperoneal trunk and L anterior tibial artery, non-visualization and poss occlusion of peroneal arteries b/l  - will need vascular surgery consult given above findings h/o poorly controlled dm, pad s/p right toe amputations  - XR with no evidence of gas; however, showing possible OM over first metatarsal stump  - CT R ankle with concern for 1st metatarsal OM and likely septic arthritis of underlying 1st tarsometatarsal joint; foci of gas concerning for gas-forming/necrotizing infection; tenosynovitis of anterior tib tendon with also foci of gas along tenosynovial sheath of anterior tib tendon; trace tibiotalar joint effusion; subc edema about the ankle likely cellulitis  - check MR of RLE  - podiatry following, recs appreciated  - planned for arthrocentesis but patient refusing until he is in a room (currently in ED [facility])  - c/w empiric vanco + zosyn  - ID consulted, will f/u recs  - Arterial duplex suggestive of possible flow-limiting lesion upstream, possibly in iliac artery; mod stenoses of R tibioperoneal trunk and L anterior tibial artery, non-visualization and poss occlusion of peroneal arteries b/l  - will need vascular surgery consult given above findings

## 2023-02-16 NOTE — ED ADULT NURSE NOTE - OBJECTIVE STATEMENT
52 y.o M PMH CAD status post stents, diabetes, diabetic foot ulcer status post amputation and hypertension, active smoker presenting to the ED for right foot pain and swelling s/p debridement of foot 2 weeks ago. Pt states that he recently saw his podiatrist for the symptoms and was given cortisone injections for possible gout. Pt experienced some relief but the symptoms has returned. Pt spoke with podiatrist who recommended him to come into the ED for further eval. Denies fevers, chills, c/p, sob, n/v/d, dizziness, weakness, urinary changes. AOx4, MAEx4, respirations even and unlabored, skin warm dry and normal for race. Bed in lowest position, wheels locked, appropriate side rails up. Safety maintained, comfort provided

## 2023-02-16 NOTE — PHYSICAL THERAPY INITIAL EVALUATION ADULT - ADDITIONAL COMMENTS
Pt refusing to participate in social history interview. History obtained from DERREK Nieto. Pt lives alone in a second floor walk up. Pt was independent with all functional mobility and ADLs prior to admission. As per pt, he owns a knee scooter.

## 2023-02-16 NOTE — CHART NOTE - NSCHARTNOTEFT_GEN_A_CORE
Patient refusing evaluation and ankle arthrocentesis in ED today, extensive discussion bedside regarding prognosis and the importance of doing an ankle arthrocentesis, patient refusing adamantly because of lack of privacy, offered private room in ED which patient refused again. Patient complaining that he hasn't been fed, and is treated like a second class citizen. Will attempt at a later time.

## 2023-02-16 NOTE — PHYSICAL THERAPY INITIAL EVALUATION ADULT - IMPAIRED TRANSFERS: SIT/STAND, REHAB EVAL
What Type Of Note Output Would You Prefer (Optional)?: Standard Output How Severe Are Your Spot(S)?: moderate Have Your Spot(S) Been Treated In The Past?: has not been treated Hpi Title: Evaluation of a Skin Lesion decreased strength

## 2023-02-16 NOTE — CONSULT NOTE ADULT - SUBJECTIVE AND OBJECTIVE BOX
Patient is a 52y old  Male who presents with a chief complaint of r foot pain/tenderness, warmth, swelling (15 Feb 2023 23:03)    HPI:  52-year-old male with a past medical history most significant for diabetes, CAD status post PCI, gout who is admitted to the hospital due to right ankle pain.    Patient reports that he has had right ankle pain for the past 2 weeks.  Has followed up with podiatry since most recent debridement about 2 weeks prior to current presentation.  Since debridement has had increased swelling and increased pain of the right foot.  Patient was given cortisone injection due to presumed gout which he reports had improved his symptoms up until 2 days prior to presentation.  Patient denies any systemic symptoms such as fever, chills.  After further evaluation and discussion with podiatry–referred to the ED for further management.    In the ED, patient is afebrile hemodynamically stable.  Initial labs show leukocytosis of 11.3, BMP with renal function within normal limits, hepatic function within normal limits.  Urinalysis without evidence for infection.  X-ray of the right ankle did not show acute fracture dislocation, soft tissue edema was noted.  This was followed by a CT of the right ankle showing erosion of the first metatarsal base, concern for osteomyelitis.  Foci of gas also noted in the dorsal aspect of the first tarsometatarsal joint.    Evaluated by podiatry on admission.  Culture from wound collected showing gram-negative rods, gram variable cocci on Gram stain–growth pending. Started on vancomycin and piperacillin/tazobactam.  Patient refusing further evaluation including arthrocentesis.    Historical culture data reviewed–patient with previous cultures growing MRSA, MSSA.     prior hospital charts reviewed [ x ]  primary team notes reviewed [ x ]  other consultant notes reviewed [x  ]    PAST MEDICAL & SURGICAL HISTORY:  CAD (coronary artery disease)      Diabetes      HTN (hypertension)      Diabetic foot ulcer      Status post amputation of toe  s/p R 3/4th partial ray resection (2021; s/p 2nd partial ray resection (2021)          Allergies  No Known Allergies    ANTIMICROBIALS (past 90 days)  MEDICATIONS  (STANDING):  piperacillin/tazobactam IVPB..   25 mL/Hr IV Intermittent (23 @ 05:15)    piperacillin/tazobactam IVPB...   200 mL/Hr IV Intermittent (02-15-23 @ 20:14)    vancomycin  IVPB   166.67 mL/Hr IV Intermittent (23 @ 05:43)    vancomycin  IVPB.   250 mL/Hr IV Intermittent (02-15-23 @ 21:46)        piperacillin/tazobactam IVPB.. 3.375 every 8 hours  vancomycin  IVPB 1250 every 12 hours    MEDICATIONS  (STANDING):  acetaminophen     Tablet .. 650 every 6 hours PRN  aluminum hydroxide/magnesium hydroxide/simethicone Suspension 30 every 4 hours PRN  aspirin enteric coated 81 daily  atorvastatin 80 at bedtime  bisacodyl 5 daily PRN  clopidogrel Tablet 75 daily  dextrose 50% Injectable 25 once  dextrose 50% Injectable 12.5 once  dextrose 50% Injectable 25 once  dextrose Oral Gel 15 once PRN  enoxaparin Injectable 40 every 24 hours  glucagon  Injectable 1 once  insulin glargine Injectable (LANTUS) 66 at bedtime  insulin lispro (ADMELOG) corrective regimen sliding scale  three times a day before meals  insulin lispro (ADMELOG) corrective regimen sliding scale  at bedtime  insulin lispro Injectable (ADMELOG) 24 three times a day before meals  lisinopril 40 daily  melatonin 3 at bedtime PRN  metoprolol succinate ER 25 daily  morphine  - Injectable 2 every 4 hours PRN  morphine  - Injectable 4 every 4 hours PRN  ondansetron Injectable 4 every 8 hours PRN  polyethylene glycol 3350 17 daily  senna 2 at bedtime  sertraline 50 daily    SOCIAL HISTORY:       FAMILY HISTORY:  Family history of early CAD (Father)      REVIEW OF SYSTEMS  [  ] ROS unobtainable because:    [  x] All other systems negative except as noted below:	    Constitutional:  [ ] fever [ ] chills  [ ] weight loss  [ ] weakness  Skin:  [ ] rash [ ] phlebitis	  Eyes: [ ] icterus [ ] pain  [ ] discharge	  ENMT: [ ] sore throat  [ ] thrush [ ] ulcers [ ] exudates  Respiratory: [ ] dyspnea [ ] hemoptysis [ ] cough [ ] sputum	  Cardiovascular:  [ ] chest pain [ ] palpitations [ ] edema	  Gastrointestinal:  [ ] nausea [ ] vomiting [ ] diarrhea [ ] constipation [ ] pain	  Genitourinary:  [ ] dysuria [ ] frequency [ ] hematuria [ ] discharge [ ] flank pain  [ ] incontinence  Musculoskeletal:  [ ] myalgias [ ] arthralgias [ ] arthritis  [ x pain  Neurological:  [ ] headache [ ] seizures  [ ] confusion/altered mental status  Psychiatric:  [ ] anxiety [ ] depression	  Hematology/Lymphatics:  [ ] lymphadenopathy  Endocrine:  [ ] adrenal [ ] thyroid  Allergic/Immunologic:	 [ ] transplant [ ] seasonal    Vital Signs Last 24 Hrs  T(F): 100.7 (23 @ 12:00), Max: 102.7 (02-15-23 @ 12:42)  Vital Signs Last 24 Hrs  HR: 95 (23 @ 12:00) (63 - 96)  BP: 107/69 (23 @ 12:00) (102/69 - 151/87)  RR: 17 (23 @ 12:00)  SpO2: 98% (23 @ 12:00) (95% - 99%)  Wt(kg): --    PHYSICAL EXAM:  Constitutional: non-toxic, no distress  HEAD/EYES: anicteric, no conjunctival injection  ENT:  supple, no thrush  Cardiovascular:   normal S1, S2, no murmur, no edema  Respiratory:  clear BS bilaterally, no wheezes, no rales  GI:  soft, non-tender, normal bowel sounds  :  no gardner, no CVA tenderness  Musculoskeletal:  no synovitis, normal ROM  Neurologic: awake and alert, normal strength, no focal findings  Skin:  no rash, no erythema, no phlebitis  Heme/Onc: no lymphadenopathy   Psychiatric:  awake, alert, appropriate mood                            9.6    12.10 )-----------( 187      ( 2023 05:39 )             29.5   02-16    125<L>  |  93<L>  |  24<H>  ----------------------------<  449<H>  4.7   |  21<L>  |  1.14    Ca    8.2<L>      2023 05:39  Phos  3.2     02-16  Mg     2.0     -16    TPro  6.8  /  Alb  2.9<L>  /  TBili  0.7  /  DBili  x   /  AST  28  /  ALT  35  /  AlkPhos  74  02-16    Urinalysis Basic - ( 2023 02:14 )    Color: Yellow / Appearance: Clear / S.044 / pH: x  Gluc: x / Ketone: Trace  / Bili: Negative / Urobili: 3 mg/dL   Blood: x / Protein: 100 mg/dL / Nitrite: Negative   Leuk Esterase: Negative / RBC: 8 /hpf / WBC 2 /HPF   Sq Epi: x / Non Sq Epi: 1 /hpf / Bacteria: Negative    MICROBIOLOGY:  Culture - Tissue with Gram Stain (collected 15 Feb 2023 20:43)  Source: .Tissue right foot wound  Gram Stain (2023 05:26):    Few polymorphonuclear leukocytes seen per low power field    Few Gram Negative Rods seen per oil power field    Moderate Gram Variable Cocci seen per oil power field              Rapid RVP Result: NotDetec (02-15 @ 14:14)      RADIOLOGY:  imaging below personally reviewed and agree with findings

## 2023-02-16 NOTE — PHYSICAL THERAPY INITIAL EVALUATION ADULT - MODALITIES TREATMENT COMMENTS
Dressing removed CDI, no signs of infection, serosanguinous drainage, anterior tib tendon exposed. wound bed with viable pink/red subcutaneous tissue, wound measuring [id number], adaptic placed over wound bed and over sutures at distal part of wound, white foam placed over wound bed, black foam placed over top tracking down over sutures, VAC to 125mmHG continous with good seal.

## 2023-02-16 NOTE — CONSULT NOTE ADULT - PROBLEM SELECTOR RECOMMENDATION 9
Diabetes Education and Nutrition Eval  Change Zosyn from dextrose to NS  Not enough information at this time to adjust current insulin regimen.   Monitor on Lantus 66 units qhs  Monitor on Admelog 24 units qac  Moderate correction scale qac + bedtime  Goal glucose 100-180  Outpt. endo follow-up with Dr. London  Outpt. optho, podiatry, nephrology  Plan to d/c on basal bolus +metformin and GLP-1

## 2023-02-17 DIAGNOSIS — R78.81 BACTEREMIA: ICD-10-CM

## 2023-02-17 DIAGNOSIS — N17.9 ACUTE KIDNEY FAILURE, UNSPECIFIED: ICD-10-CM

## 2023-02-17 LAB
A1C WITH ESTIMATED AVERAGE GLUCOSE RESULT: 10.8 % — HIGH (ref 4–5.6)
ANION GAP SERPL CALC-SCNC: 12 MMOL/L — SIGNIFICANT CHANGE UP (ref 5–17)
BASOPHILS # BLD AUTO: 0.03 K/UL — SIGNIFICANT CHANGE UP (ref 0–0.2)
BASOPHILS NFR BLD AUTO: 0.3 % — SIGNIFICANT CHANGE UP (ref 0–2)
BUN SERPL-MCNC: 38 MG/DL — HIGH (ref 7–23)
CALCIUM SERPL-MCNC: 8.6 MG/DL — SIGNIFICANT CHANGE UP (ref 8.4–10.5)
CHLORIDE SERPL-SCNC: 95 MMOL/L — LOW (ref 96–108)
CO2 SERPL-SCNC: 21 MMOL/L — LOW (ref 22–31)
CREAT ?TM UR-MCNC: 167 MG/DL — SIGNIFICANT CHANGE UP
CREAT SERPL-MCNC: 2.44 MG/DL — HIGH (ref 0.5–1.3)
CULTURE RESULTS: SIGNIFICANT CHANGE UP
EGFR: 31 ML/MIN/1.73M2 — LOW
EOSINOPHIL # BLD AUTO: 0.16 K/UL — SIGNIFICANT CHANGE UP (ref 0–0.5)
EOSINOPHIL NFR BLD AUTO: 1.4 % — SIGNIFICANT CHANGE UP (ref 0–6)
ESTIMATED AVERAGE GLUCOSE: 263 MG/DL — HIGH (ref 68–114)
GLUCOSE BLDC GLUCOMTR-MCNC: 103 MG/DL — HIGH (ref 70–99)
GLUCOSE BLDC GLUCOMTR-MCNC: 182 MG/DL — HIGH (ref 70–99)
GLUCOSE BLDC GLUCOMTR-MCNC: 189 MG/DL — HIGH (ref 70–99)
GLUCOSE BLDC GLUCOMTR-MCNC: 68 MG/DL — LOW (ref 70–99)
GLUCOSE BLDC GLUCOMTR-MCNC: 87 MG/DL — SIGNIFICANT CHANGE UP (ref 70–99)
GLUCOSE BLDC GLUCOMTR-MCNC: 91 MG/DL — SIGNIFICANT CHANGE UP (ref 70–99)
GLUCOSE SERPL-MCNC: 165 MG/DL — HIGH (ref 70–99)
GRAM STN FLD: SIGNIFICANT CHANGE UP
GRAM STN FLD: SIGNIFICANT CHANGE UP
HCT VFR BLD CALC: 27.1 % — LOW (ref 39–50)
HGB BLD-MCNC: 8.9 G/DL — LOW (ref 13–17)
IMM GRANULOCYTES NFR BLD AUTO: 1.1 % — HIGH (ref 0–0.9)
LYMPHOCYTES # BLD AUTO: 1.3 K/UL — SIGNIFICANT CHANGE UP (ref 1–3.3)
LYMPHOCYTES # BLD AUTO: 11.2 % — LOW (ref 13–44)
MAGNESIUM SERPL-MCNC: 2.4 MG/DL — SIGNIFICANT CHANGE UP (ref 1.6–2.6)
MCHC RBC-ENTMCNC: 29.9 PG — SIGNIFICANT CHANGE UP (ref 27–34)
MCHC RBC-ENTMCNC: 32.8 GM/DL — SIGNIFICANT CHANGE UP (ref 32–36)
MCV RBC AUTO: 90.9 FL — SIGNIFICANT CHANGE UP (ref 80–100)
MONOCYTES # BLD AUTO: 0.59 K/UL — SIGNIFICANT CHANGE UP (ref 0–0.9)
MONOCYTES NFR BLD AUTO: 5.1 % — SIGNIFICANT CHANGE UP (ref 2–14)
NEUTROPHILS # BLD AUTO: 9.36 K/UL — HIGH (ref 1.8–7.4)
NEUTROPHILS NFR BLD AUTO: 80.9 % — HIGH (ref 43–77)
NRBC # BLD: 0 /100 WBCS — SIGNIFICANT CHANGE UP (ref 0–0)
PHOSPHATE SERPL-MCNC: 4.3 MG/DL — SIGNIFICANT CHANGE UP (ref 2.5–4.5)
PLATELET # BLD AUTO: 195 K/UL — SIGNIFICANT CHANGE UP (ref 150–400)
POTASSIUM SERPL-MCNC: 4 MMOL/L — SIGNIFICANT CHANGE UP (ref 3.5–5.3)
POTASSIUM SERPL-SCNC: 4 MMOL/L — SIGNIFICANT CHANGE UP (ref 3.5–5.3)
POTASSIUM UR-SCNC: 33 MMOL/L — SIGNIFICANT CHANGE UP
RBC # BLD: 2.98 M/UL — LOW (ref 4.2–5.8)
RBC # FLD: 13.1 % — SIGNIFICANT CHANGE UP (ref 10.3–14.5)
SODIUM SERPL-SCNC: 128 MMOL/L — LOW (ref 135–145)
SODIUM UR-SCNC: 20 MMOL/L — SIGNIFICANT CHANGE UP
SPECIMEN SOURCE: SIGNIFICANT CHANGE UP
SPECIMEN SOURCE: SIGNIFICANT CHANGE UP
VANCOMYCIN TROUGH SERPL-MCNC: 16.2 UG/ML — SIGNIFICANT CHANGE UP (ref 10–20)
WBC # BLD: 11.57 K/UL — HIGH (ref 3.8–10.5)
WBC # FLD AUTO: 11.57 K/UL — HIGH (ref 3.8–10.5)

## 2023-02-17 PROCEDURE — 99232 SBSQ HOSP IP/OBS MODERATE 35: CPT

## 2023-02-17 PROCEDURE — 99233 SBSQ HOSP IP/OBS HIGH 50: CPT

## 2023-02-17 PROCEDURE — 73723 MRI JOINT LWR EXTR W/O&W/DYE: CPT | Mod: 26,RT

## 2023-02-17 PROCEDURE — 99223 1ST HOSP IP/OBS HIGH 75: CPT

## 2023-02-17 RX ORDER — INSULIN LISPRO 100/ML
22 VIAL (ML) SUBCUTANEOUS
Refills: 0 | Status: DISCONTINUED | OUTPATIENT
Start: 2023-02-17 | End: 2023-02-18

## 2023-02-17 RX ORDER — HEPARIN SODIUM 5000 [USP'U]/ML
5000 INJECTION INTRAVENOUS; SUBCUTANEOUS EVERY 8 HOURS
Refills: 0 | Status: DISCONTINUED | OUTPATIENT
Start: 2023-02-17 | End: 2023-02-24

## 2023-02-17 RX ORDER — CEFAZOLIN SODIUM 1 G
2000 VIAL (EA) INJECTION EVERY 8 HOURS
Refills: 0 | Status: DISCONTINUED | OUTPATIENT
Start: 2023-02-17 | End: 2023-02-24

## 2023-02-17 RX ORDER — SODIUM CHLORIDE 9 MG/ML
1000 INJECTION INTRAMUSCULAR; INTRAVENOUS; SUBCUTANEOUS
Refills: 0 | Status: DISCONTINUED | OUTPATIENT
Start: 2023-02-17 | End: 2023-02-18

## 2023-02-17 RX ORDER — INFLUENZA VIRUS VACCINE 15; 15; 15; 15 UG/.5ML; UG/.5ML; UG/.5ML; UG/.5ML
0.5 SUSPENSION INTRAMUSCULAR ONCE
Refills: 0 | Status: DISCONTINUED | OUTPATIENT
Start: 2023-02-17 | End: 2023-03-03

## 2023-02-17 RX ADMIN — MORPHINE SULFATE 4 MILLIGRAM(S): 50 CAPSULE, EXTENDED RELEASE ORAL at 21:47

## 2023-02-17 RX ADMIN — Medication 25 MILLIGRAM(S): at 07:34

## 2023-02-17 RX ADMIN — CLOPIDOGREL BISULFATE 75 MILLIGRAM(S): 75 TABLET, FILM COATED ORAL at 11:53

## 2023-02-17 RX ADMIN — Medication 81 MILLIGRAM(S): at 11:53

## 2023-02-17 RX ADMIN — MORPHINE SULFATE 4 MILLIGRAM(S): 50 CAPSULE, EXTENDED RELEASE ORAL at 22:47

## 2023-02-17 RX ADMIN — Medication 24 UNIT(S): at 13:40

## 2023-02-17 RX ADMIN — MORPHINE SULFATE 4 MILLIGRAM(S): 50 CAPSULE, EXTENDED RELEASE ORAL at 11:52

## 2023-02-17 RX ADMIN — PIPERACILLIN AND TAZOBACTAM 25 GRAM(S): 4; .5 INJECTION, POWDER, LYOPHILIZED, FOR SOLUTION INTRAVENOUS at 07:52

## 2023-02-17 RX ADMIN — MORPHINE SULFATE 4 MILLIGRAM(S): 50 CAPSULE, EXTENDED RELEASE ORAL at 12:07

## 2023-02-17 RX ADMIN — LISINOPRIL 40 MILLIGRAM(S): 2.5 TABLET ORAL at 07:43

## 2023-02-17 RX ADMIN — MORPHINE SULFATE 4 MILLIGRAM(S): 50 CAPSULE, EXTENDED RELEASE ORAL at 17:46

## 2023-02-17 RX ADMIN — INSULIN GLARGINE 66 UNIT(S): 100 INJECTION, SOLUTION SUBCUTANEOUS at 21:45

## 2023-02-17 RX ADMIN — MORPHINE SULFATE 2 MILLIGRAM(S): 50 CAPSULE, EXTENDED RELEASE ORAL at 07:34

## 2023-02-17 RX ADMIN — Medication 22 UNIT(S): at 17:48

## 2023-02-17 RX ADMIN — SERTRALINE 50 MILLIGRAM(S): 25 TABLET, FILM COATED ORAL at 11:54

## 2023-02-17 RX ADMIN — HEPARIN SODIUM 5000 UNIT(S): 5000 INJECTION INTRAVENOUS; SUBCUTANEOUS at 21:31

## 2023-02-17 RX ADMIN — ATORVASTATIN CALCIUM 80 MILLIGRAM(S): 80 TABLET, FILM COATED ORAL at 21:31

## 2023-02-17 RX ADMIN — Medication 100 MILLIGRAM(S): at 21:31

## 2023-02-17 RX ADMIN — Medication 2: at 09:09

## 2023-02-17 RX ADMIN — Medication 24 UNIT(S): at 09:09

## 2023-02-17 RX ADMIN — ONDANSETRON 4 MILLIGRAM(S): 8 TABLET, FILM COATED ORAL at 17:31

## 2023-02-17 RX ADMIN — MORPHINE SULFATE 4 MILLIGRAM(S): 50 CAPSULE, EXTENDED RELEASE ORAL at 17:31

## 2023-02-17 RX ADMIN — Medication 166.67 MILLIGRAM(S): at 09:11

## 2023-02-17 NOTE — PROGRESS NOTE ADULT - PROBLEM SELECTOR PLAN 6
hold home metformin 1g bid, trulicity 3 mg weekly, glargine 66 u hs, lispro 24 u prandial + low dose correctional scale  - last a1c 1/2023 ~11.5; repeat HbA1c 10.8%  - c/w lantus 66 units qHS  - c/w pre-meal lispro 24 units TID qAC, hold if NPO  - c/w sliding scale coverage  - Endo consulted, recs appreciated - not enough data to adjust basal/bolus yet hold home metformin 1g bid, trulicity 3 mg weekly, glargine 66 u hs, lispro 24 u prandial + low dose correctional scale  - last a1c 1/2023 ~11.5; repeat HbA1c 10.8%  - c/w lantus 66 units qHS  - c/w pre-meal lispro 24 units TID qAC, hold if NPO  - c/w sliding scale coverage  - Endo consulted, recs appreciated - basal/bolus adjustments as per Endo

## 2023-02-17 NOTE — DIETITIAN INITIAL EVALUATION ADULT - ENERGY INTAKE
Pt reports decreased PO intake yesterday, states he didn't consume dinner. Reports dislike of institutionalized foods. Did consume > 50% of eggs this morning when visiting patient during breakfast. Discussed menu ordering procedures, provided patient with appropriate menu to review. Pt encouraged to participate in meal selection to optimize preference to meals. Attempted to review carbohydrate consistent diet with patient and diet recommendations however pt declining at this time. Reports he had received education in the past. Did accept written materials. Patient made aware RD remains available upon request as needed.  Fair (50-75%)

## 2023-02-17 NOTE — PROGRESS NOTE ADULT - ASSESSMENT
53 yo male with PMH of CAD s/p PCI with stent, HTN, HLD, uncontrolled IDDM2, anxiety and depression who presents from outpatient podiatry office with worsening R foot pain, swelling, pain/tenderness found to be in severe sepsis 2/2 cellulitis and osteomyelitis with CT of R ankle concerning for possible septic arthritis and gas-forming/necrotizing infection with course c/b MSSA bacteremia.

## 2023-02-17 NOTE — PROGRESS NOTE ADULT - PROBLEM SELECTOR PLAN 8
stable  - c/w toprol XL 25mg PO daily  - hold lisinopril given new TEENA and marignal BP  - monitor vitals stable  - c/w toprol XL 25mg PO daily  - hold lisinopril given new TEENA and marginal BP  - monitor vitals

## 2023-02-17 NOTE — PROGRESS NOTE ADULT - PROBLEM SELECTOR PLAN 3
- 2/15 blood cx +MSSA  - abx de-escalated to ancef on 2/17  - f/u repeat blood cx sent 2/17 - pending in lab  - serial blood cx until clear  - TTE to r/o vegetation/endocarditis

## 2023-02-17 NOTE — PROVIDER CONTACT NOTE (OTHER) - ACTION/TREATMENT ORDERED:
PA notified and aware. No further interventions @ this time. Will continue to monitor and maintain safety.

## 2023-02-17 NOTE — PROVIDER CONTACT NOTE (OTHER) - RECOMMENDATIONS
Left leg medial knee incision site with gradual increased edema. Perimeter marked with blue surgical site marker for comparrison with upcoming assessments. Pt denies pain, states she has numbness at site. Area with small amount of redness and mildly increased warmth posteriorly under knee on medial side. Incision well approximated and without drainage. Left groin incision no longer bleeding, dressing remains clean and dry. Left leg elevated with assist of bed where knee is above hip, ice remains in place above knee as MD requests. Pt reports this position assists in her feeling better. Will continue to closely monitor. PA notification.

## 2023-02-17 NOTE — DIETITIAN INITIAL EVALUATION ADULT - PERTINENT MEDS FT
MEDICATIONS  (STANDING):  aspirin enteric coated 81 milliGRAM(s) Oral daily  atorvastatin 80 milliGRAM(s) Oral at bedtime  clopidogrel Tablet 75 milliGRAM(s) Oral daily  dextrose 5%. 1000 milliLiter(s) (100 mL/Hr) IV Continuous <Continuous>  dextrose 5%. 1000 milliLiter(s) (50 mL/Hr) IV Continuous <Continuous>  dextrose 50% Injectable 25 Gram(s) IV Push once  dextrose 50% Injectable 12.5 Gram(s) IV Push once  dextrose 50% Injectable 25 Gram(s) IV Push once  glucagon  Injectable 1 milliGRAM(s) IntraMuscular once  heparin   Injectable 5000 Unit(s) SubCutaneous every 8 hours  influenza   Vaccine 0.5 milliLiter(s) IntraMuscular once  insulin glargine Injectable (LANTUS) 66 Unit(s) SubCutaneous at bedtime  insulin lispro (ADMELOG) corrective regimen sliding scale   SubCutaneous three times a day before meals  insulin lispro (ADMELOG) corrective regimen sliding scale   SubCutaneous at bedtime  insulin lispro Injectable (ADMELOG) 24 Unit(s) SubCutaneous three times a day before meals  metoprolol succinate ER 25 milliGRAM(s) Oral daily  naloxone Injectable 0.4 milliGRAM(s) IV Push once  piperacillin/tazobactam IVPB.. 3.375 Gram(s) IV Intermittent every 8 hours  polyethylene glycol 3350 17 Gram(s) Oral daily  senna 2 Tablet(s) Oral at bedtime  sertraline 50 milliGRAM(s) Oral daily  sodium chloride 0.9%. 1000 milliLiter(s) (100 mL/Hr) IV Continuous <Continuous>  sodium chloride 0.9%. 1000 milliLiter(s) (100 mL/Hr) IV Continuous <Continuous>  vancomycin  IVPB 1250 milliGRAM(s) IV Intermittent every 12 hours    MEDICATIONS  (PRN):  acetaminophen     Tablet .. 650 milliGRAM(s) Oral every 6 hours PRN Temp greater or equal to 38C (100.4F), Mild Pain (1 - 3)  aluminum hydroxide/magnesium hydroxide/simethicone Suspension 30 milliLiter(s) Oral every 4 hours PRN Dyspepsia  bisacodyl 5 milliGRAM(s) Oral daily PRN Constipation  dextrose Oral Gel 15 Gram(s) Oral once PRN Blood Glucose LESS THAN 70 milliGRAM(s)/deciliter  melatonin 3 milliGRAM(s) Oral at bedtime PRN Insomnia  morphine  - Injectable 4 milliGRAM(s) IV Push every 4 hours PRN Severe Pain (7 - 10)  morphine  - Injectable 2 milliGRAM(s) IV Push every 4 hours PRN Moderate Pain (4 - 6)  ondansetron Injectable 4 milliGRAM(s) IV Push every 8 hours PRN Nausea and/or Vomiting

## 2023-02-17 NOTE — PROGRESS NOTE ADULT - ASSESSMENT
52-year-old male with a past medical history most significant for diabetes, CAD status post PCI, gout who is admitted to the hospital due to right ankle pain.    #MSSA bacteremia  Blood cultures obtained on admission from 2/15 growing MSSA (1/2 sets, 1/4 bottles)  Source likely lower extremity    #Abnormal imaging of the right lower extremity  #Right foot ulcer with associated right ankle pain and swelling  #Concern for septic arthritis  Fever and leukocytosis upon admission  History of MRSA foot infection    #Leukocytosis  #Elevated inflammatory markers    #History of amputation of toe with right third and fourth partial ray resection and April 2021 and second partial ray resection in May 2021    Recommendations  Would discontinue vancomycin and piperacillin/tazobactam  Start cefazolin 2g q8hr  Obtain repeat blood cultures  Obtain TTE  Follow Podiatry, vascular surgery  input  Obtain MRI of the RLE  Follow fever curve and WBC count    Nirav Simmons MD  Division of Infectious Diseases  Available on Teams       52-year-old male with a past medical history most significant for diabetes, CAD status post PCI, gout who is admitted to the hospital due to right ankle pain.    #MSSA bacteremia  Blood cultures obtained on admission from 2/15 growing MSSA (1/2 sets, 1/4 bottles)  Source likely lower extremity wound    #Abnormal imaging of the right lower extremity  #Right foot ulcer with associated right ankle pain and swelling  #Concern for septic arthritis  Fever and leukocytosis upon admission  History of MRSA foot infection  Aspirated by podiatry, bone biopsy attempted however unsuccessful    #Leukocytosis  #Elevated inflammatory markers    #History of amputation of toe with right third and fourth partial ray resection and April 2021 and second partial ray resection in May 2021    Recommendations  Would discontinue vancomycin and piperacillin/tazobactam  Start cefazolin 2g q8hr  Obtain repeat blood cultures  Obtain TTE  Follow synovial fluid cultures  Follow Podiatry, vascular surgery  input  Obtain MRI of the RLE  Follow fever curve and WBC count    Nirav Simmons MD  Division of Infectious Diseases  Available on Teams

## 2023-02-17 NOTE — CONSULT NOTE ADULT - SUBJECTIVE AND OBJECTIVE BOX
HPI: 51 yo m w pmh dm, cad s/p pci w stents, htn, hld, anx/dep, ed, p/w right foot redness, swelling, pain/tenderness, warmth. Patient reports that he had debridement of his foot approximately 2 weeks ago, returned to his podiatrist 1 week ago due to right foot redness, swelling, pain/tenderness, warmth over dm foot ulcer at 1st metatarsal stump. at the time, suspected to be 2/2 gout, so was administered steroid injection with improvement, until 2 days ago when symptoms returned. he contacted his podiatrist who advised patient to go to er for further evaluation. so, he presents to [facility] er for further mgmt.    in er found to be in severe sepsis (fever, leukocytosis, tachycardia, lactic acidosis), likely source of soft tissue infection (cellulitis vs OM) iso poorly controlled dm, admitted to medicine for further mgmt      Vascular Surgery is consulted i/s/o septic first toe wound. SAL/PVR's not available however duplex showing areas of stenosis. Per the patient he does not follow with a vascular surgeon and has never had issues with his blood flow, however he has had second and third toe amputations in the past on chart review. Podiatry at bedside, arthrocentesis to be done tomorrow. Xray showing some gas in 1st MTP.    PMH: As above    PSH: As above    Allergies: None    Physical exam:    /66 HR 88 SpO2 97 T 97.5 RR 18    General- Irate younger man  Extremities- Foot exam limited 2/2 extensive bandage  Lungs- Comfortable on room air    Imaging:    ACC: [id number] EXAM:  ART DUPLEX LOWER EXT BILATERAL   ORDERED BY: SREE CADE     PROCEDURE DATE:  02/16/2023          INTERPRETATION:  CLINICAL INFORMATION: History of smoking, diabetes,   hypertension, hyperlipidemia, coronary artery disease. Presents with   nonhealing foot ulcer.    COMPARISON: Lower extremity arterial Doppler study dated 4/15/2020.    TECHNIQUE: Grayscale, color and spectral Doppler imaging was performed at   the arteries of BILATERAL lower extremities.    FINDINGS:    Plaque Description: Mild calcified atheromatous plaque.    Waveform Morphology: Monophasic waveforms are present throughout the   right lower extremity. Triphasic waveforms are present on the left.    Stenosis/Occlusion: The arteries are patent, although the peroneal artery   is not visualized bilaterally. Low-grade stenoses are seen at the right   tibioperoneal trunk and the mid left anterior tibial artery.    The peak systolic velocities of the right lower extremity are as follows:    Distal external iliac artery: 128 cm/s  Common femoral artery: 108 cm/s  Deep femoral artery: 83 cm/s  Superficial femoral artery: 183 cm/s proximal,  124 cm/s mid, 126 cm/s   distal  Popliteal artery: 73 cm/s  Tibioperoneal trunk: 126 cm/s  Posterior tibial artery: 61 cm/s proximal,  72 cm/s mid, 68 cm/s distal  Peroneal artery: Not visualized  Anterior tibial artery: 69 cm/s proximal,  28 cm/s mid, 53 cm/s distal  Dorsalis pedis artery: 39 cm/s    The peak systolic velocities of the left lower extremity are as follows:    Distal external iliac artery: 133 cm/s  Common femoral artery: 103 cm/s  Deep femoral artery: 74 cm/s  Superficial femoral artery: 183 cm/s proximal,  131 cm/s mid, 148 cm/s   distal  Popliteal artery: 55 cm/s  Tibioperoneal trunk: 58 cm/s  Posterior tibial artery: 46 cm/s proximal,  64 cm/s mid, 36 cm/s distal  Peroneal artery: Not visualized  Anterior tibial artery: 62 cm/s proximal,  159 cm/s mid, 35 cm/s distal  Dorsalis pedis artery: 26 cm/s      IMPRESSION:    Low impedance waveforms throughout the right lower extremity suggests the   possibility of flow-limiting lesion upstream, possibly in the iliac   artery.    Moderate stenoses of the right tibioperoneal trunk and the left anterior   tibial artery.    Nonvisualization and possible occlusion of the peroneal arteries   bilaterally.    --- End of Report ---        HPI: 51 yo m w pmh dm, cad s/p pci w stents, htn, hld, anx/dep, ed, p/w right foot redness, swelling, pain/tenderness, warmth. Patient reports that he had debridement of his foot approximately 2 weeks ago, returned to his podiatrist 1 week ago due to right foot redness, swelling, pain/tenderness, warmth over dm foot ulcer at 1st metatarsal stump. at the time, suspected to be 2/2 gout, so was administered steroid injection with improvement, until 2 days ago when symptoms returned. he contacted his podiatrist who advised patient to go to er for further evaluation. so, he presents to [facility] er for further mgmt.    in er found to be in severe sepsis (fever, leukocytosis, tachycardia, lactic acidosis), likely source of soft tissue infection (cellulitis vs OM) iso poorly controlled dm, admitted to medicine for further mgmt      Vascular Surgery is consulted i/s/o septic first toe wound. SAL/PVR's not available however duplex showing areas of stenosis. Per the patient he does not follow with a vascular surgeon and has never had issues with his blood flow, however he has had second and third toe amputations in the past on chart review. Podiatry at bedside, arthrocentesis to be done tomorrow. Xray showing some gas in 1st MTP.    PMH: As above    PSH: As above    Allergies: None    Physical exam:    /66 HR 88 SpO2 97 T 97.5 RR 18    General- Irate younger man  Extremities- R foot TMA. First MTP with punched out ulcer on posterior aspect of toe. No cellulitis or erythema noted. AT/PT pulses noted dopplerably. DP not dopplerable. Femoral pulses palpable bilaterally.   Lungs- Comfortable on room air    Imaging:    ACC: [id number] EXAM:  ART DUPLEX LOWER EXT BILATERAL   ORDERED BY: SREE CADE     PROCEDURE DATE:  02/16/2023          INTERPRETATION:  CLINICAL INFORMATION: History of smoking, diabetes,   hypertension, hyperlipidemia, coronary artery disease. Presents with   nonhealing foot ulcer.    COMPARISON: Lower extremity arterial Doppler study dated 4/15/2020.    TECHNIQUE: Grayscale, color and spectral Doppler imaging was performed at   the arteries of BILATERAL lower extremities.    FINDINGS:    Plaque Description: Mild calcified atheromatous plaque.    Waveform Morphology: Monophasic waveforms are present throughout the   right lower extremity. Triphasic waveforms are present on the left.    Stenosis/Occlusion: The arteries are patent, although the peroneal artery   is not visualized bilaterally. Low-grade stenoses are seen at the right   tibioperoneal trunk and the mid left anterior tibial artery.    The peak systolic velocities of the right lower extremity are as follows:    Distal external iliac artery: 128 cm/s  Common femoral artery: 108 cm/s  Deep femoral artery: 83 cm/s  Superficial femoral artery: 183 cm/s proximal,  124 cm/s mid, 126 cm/s   distal  Popliteal artery: 73 cm/s  Tibioperoneal trunk: 126 cm/s  Posterior tibial artery: 61 cm/s proximal,  72 cm/s mid, 68 cm/s distal  Peroneal artery: Not visualized  Anterior tibial artery: 69 cm/s proximal,  28 cm/s mid, 53 cm/s distal  Dorsalis pedis artery: 39 cm/s    The peak systolic velocities of the left lower extremity are as follows:    Distal external iliac artery: 133 cm/s  Common femoral artery: 103 cm/s  Deep femoral artery: 74 cm/s  Superficial femoral artery: 183 cm/s proximal,  131 cm/s mid, 148 cm/s   distal  Popliteal artery: 55 cm/s  Tibioperoneal trunk: 58 cm/s  Posterior tibial artery: 46 cm/s proximal,  64 cm/s mid, 36 cm/s distal  Peroneal artery: Not visualized  Anterior tibial artery: 62 cm/s proximal,  159 cm/s mid, 35 cm/s distal  Dorsalis pedis artery: 26 cm/s      IMPRESSION:    Low impedance waveforms throughout the right lower extremity suggests the   possibility of flow-limiting lesion upstream, possibly in the iliac   artery.    Moderate stenoses of the right tibioperoneal trunk and the left anterior   tibial artery.    Nonvisualization and possible occlusion of the peroneal arteries   bilaterally.    --- End of Report ---

## 2023-02-17 NOTE — CONSULT NOTE ADULT - ASSESSMENT
53 y/o M p/w likely septic toe.    -Admit to Medicine  -C/w IV abx for toe wound  -Will require debridement with podiatry prior to any Vascular Surgery intervention    D/w Vascular Surgery fellow on call    Aria Rice MD  PGY-2  Vascular Surgery

## 2023-02-17 NOTE — PATIENT PROFILE ADULT - FALL HARM RISK - HARM RISK INTERVENTIONS
stable from 2016, continue to monitor -pharmacy med rec indicates patient is on plavix, clarify indication for med with daughter (CAD vs PVD?)  -c/w statin Assistance with ambulation/Assistance OOB with selected safe patient handling equipment/Communicate Risk of Fall with Harm to all staff/Discuss with provider need for PT consult/Monitor gait and stability/Reinforce activity limits and safety measures with patient and family/Sit up slowly, dangle for a short time, stand at bedside before walking/Tailored Fall Risk Interventions/Use of alarms - bed, chair and/or voice tab/Visual Cue: Yellow wristband and red socks/Bed in lowest position, wheels locked, appropriate side rails in place/Call bell, personal items and telephone in reach/Instruct patient to call for assistance before getting out of bed or chair/Non-slip footwear when patient is out of bed/[place] to call system/Physically safe environment - no spills, clutter or unnecessary equipment/Purposeful Proactive Rounding/Room/bathroom lighting operational, light cord in reach

## 2023-02-17 NOTE — DIETITIAN INITIAL EVALUATION ADULT - PERTINENT LABORATORY DATA
02-17    128<L>  |  95<L>  |  38<H>  ----------------------------<  165<H>  4.0   |  21<L>  |  2.44<H>    Ca    8.6      17 Feb 2023 07:08  Phos  4.3     02-17  Mg     2.4     02-17    TPro  6.8  /  Alb  2.9<L>  /  TBili  0.7  /  DBili  x   /  AST  28  /  ALT  35  /  AlkPhos  74  02-16  POCT Blood Glucose.: 103 mg/dL (02-17-23 @ 13:33)  A1C with Estimated Average Glucose Result: 10.8 % (02-17-23 @ 07:07)  A1C with Estimated Average Glucose Result: 11.2 % (02-16-23 @ 05:39)  A1C with Estimated Average Glucose Result: 11.7 % (06-22-22 @ 23:39)

## 2023-02-17 NOTE — PROGRESS NOTE ADULT - PROBLEM SELECTOR PLAN 1
meets sepsis criteria on admission with leukocytosis, fever, tachycardia, + lactic acidosis  - 2/2 cellulitis, osteo, possible septic arthritis, and now MSSA bacteremia  - s/p 1.5L IVF and vanco + zosyn in ED  - treatment of infection as below

## 2023-02-17 NOTE — PROGRESS NOTE ADULT - SUBJECTIVE AND OBJECTIVE BOX
Karime Bueno MD  Division of Hospital Medicine  [facility]   Available on [employer] Teams (Mon-Fri 8am-5pm)    * messages preferred prior to calls  Other Times:  [phone]      Patient is a 52y old  Male who presents with a chief complaint of Other injury of unspecified body region, initial encounter    Chart reviewed, events noted. This is a " 53 y/o M w/ hx of uncontrolled Type 2 DM w/ hyperglycemia complicated by neuropathy, amputations, retinopathy and CAD, HTN and HLD admitted for sepsis 2/2 right foot infection (high risk patient with severely uncontrolled diabetes with A1c of 11.2% at high risk of CAD and CVA with high level decision-making). Pending podiatry eval /vascular studies" (2023 13:55)      SUBJECTIVE / OVERNIGHT EVENTS: no acute events overnight. disgruntled as he was waiting for podiatry team to return for arthrocentesis again, refuses to let me examine his foot/leg "because you're not the foot doctor". no fever, chills, chest pain, dyspnea, nor dysuria. pain improved from day prior.  ADDITIONAL REVIEW OF SYSTEMS:    MEDICATIONS  (STANDING):  aspirin enteric coated 81 milliGRAM(s) Oral daily  atorvastatin 80 milliGRAM(s) Oral at bedtime  ceFAZolin   IVPB 2000 milliGRAM(s) IV Intermittent every 8 hours  clopidogrel Tablet 75 milliGRAM(s) Oral daily  dextrose 5%. 1000 milliLiter(s) (100 mL/Hr) IV Continuous <Continuous>  dextrose 5%. 1000 milliLiter(s) (50 mL/Hr) IV Continuous <Continuous>  dextrose 50% Injectable 25 Gram(s) IV Push once  dextrose 50% Injectable 12.5 Gram(s) IV Push once  dextrose 50% Injectable 25 Gram(s) IV Push once  glucagon  Injectable 1 milliGRAM(s) IntraMuscular once  heparin   Injectable 5000 Unit(s) SubCutaneous every 8 hours  influenza   Vaccine 0.5 milliLiter(s) IntraMuscular once  insulin glargine Injectable (LANTUS) 66 Unit(s) SubCutaneous at bedtime  insulin lispro (ADMELOG) corrective regimen sliding scale   SubCutaneous three times a day before meals  insulin lispro (ADMELOG) corrective regimen sliding scale   SubCutaneous at bedtime  insulin lispro Injectable (ADMELOG) 24 Unit(s) SubCutaneous three times a day before meals  metoprolol succinate ER 25 milliGRAM(s) Oral daily  naloxone Injectable 0.4 milliGRAM(s) IV Push once  polyethylene glycol 3350 17 Gram(s) Oral daily  senna 2 Tablet(s) Oral at bedtime  sertraline 50 milliGRAM(s) Oral daily  sodium chloride 0.9%. 1000 milliLiter(s) (100 mL/Hr) IV Continuous <Continuous>  sodium chloride 0.9%. 1000 milliLiter(s) (100 mL/Hr) IV Continuous <Continuous>    MEDICATIONS  (PRN):  acetaminophen     Tablet .. 650 milliGRAM(s) Oral every 6 hours PRN Temp greater or equal to 38C (100.4F), Mild Pain (1 - 3)  aluminum hydroxide/magnesium hydroxide/simethicone Suspension 30 milliLiter(s) Oral every 4 hours PRN Dyspepsia  bisacodyl 5 milliGRAM(s) Oral daily PRN Constipation  dextrose Oral Gel 15 Gram(s) Oral once PRN Blood Glucose LESS THAN 70 milliGRAM(s)/deciliter  melatonin 3 milliGRAM(s) Oral at bedtime PRN Insomnia  morphine  - Injectable 4 milliGRAM(s) IV Push every 4 hours PRN Severe Pain (7 - 10)  morphine  - Injectable 2 milliGRAM(s) IV Push every 4 hours PRN Moderate Pain (4 - 6)  ondansetron Injectable 4 milliGRAM(s) IV Push every 8 hours PRN Nausea and/or Vomiting      CAPILLARY BLOOD GLUCOSE      POCT Blood Glucose.: 103 mg/dL (2023 13:33)  POCT Blood Glucose.: 182 mg/dL (2023 08:24)  POCT Blood Glucose.: 189 mg/dL (2023 02:08)  POCT Blood Glucose.: 169 mg/dL (2023 21:35)  POCT Blood Glucose.: 121 mg/dL (2023 17:58)    I&O's Summary    2023 07:01  -  2023 07:00  --------------------------------------------------------  IN: 1375 mL / OUT: 800 mL / NET: 575 mL    2023 07:01  -  2023 16:14  --------------------------------------------------------  IN: 590 mL / OUT: 200 mL / NET: 390 mL        PHYSICAL EXAM:  Vital Signs Last 24 Hrs  T(C): 38.1 (2023 13:27), Max: 38.6 (2023 22:46)  T(F): 100.6 (:), Max: 101.4 (2023 22:46)  HR: 89 (:) (72 - 89)  BP: 121/75 (:) (98/61 - 121/75)  BP(mean): --  RR: 18 (:) (18 - 18)  SpO2: 93% (:) (92% - 97%)    Parameters below as of 2023 13:27  Patient On (Oxygen Delivery Method): room air      CONSTITUTIONAL: obese male in NAD  EYES: PERRLA; conjunctiva and sclera clear  ENMT: Moist oral mucosa, no pharyngeal injection or exudates; normal dentition  NECK: Supple, no palpable masses; no thyromegaly  RESPIRATORY: Normal respiratory effort; lungs are clear to auscultation bilaterally  CARDIOVASCULAR: Regular rate and rhythm, normal S1 and S2, no murmur/rub/gallop  ABDOMEN: Soft, Nondistended, Nontender to palpation, normoactive bowel sounds  MUSCULOSKELETAL:  No clubbing or cyanosis of digits; no joint swelling or tenderness to palpation  PSYCH: A+O to person, place, and time; irritable  NEUROLOGY: CN 2-12 are intact and symmetric; no gross sensory deficits   SKIN: +RLE with warmth and tenderness to palpation, patient refusing to allow me to examine his wounds any further "because you're not the foot doctor"    LABS:                        8.9    11.57 )-----------( 195      ( 2023 07:07 )             27.1     02-17    128<L>  |  95<L>  |  38<H>  ----------------------------<  165<H>  4.0   |  21<L>  |  2.44<H>    Ca    8.6      2023 07:08  Phos  4.3     02-  Mg     2.4     -17    TPro  6.8  /  Alb  2.9<L>  /  TBili  0.7  /  DBili  x   /  AST  28  /  ALT  35  /  AlkPhos  74  02-16    PT/INR - ( 2023 05:39 )   PT: 15.6 sec;   INR: 1.34 ratio         PTT - ( 2023 05:39 )  PTT:30.7 sec      Urinalysis Basic - ( 2023 02:14 )    Color: Yellow / Appearance: Clear / S.044 / pH: x  Gluc: x / Ketone: Trace  / Bili: Negative / Urobili: 3 mg/dL   Blood: x / Protein: 100 mg/dL / Nitrite: Negative   Leuk Esterase: Negative / RBC: 8 /hpf / WBC 2 /HPF   Sq Epi: x / Non Sq Epi: 1 /hpf / Bacteria: Negative        Culture - Urine (collected 2023 02:14)  Source: Clean Catch Clean Catch (Midstream)  Final Report (2023 07:16):    <10,000 CFU/mL Normal Urogenital Lizz    Culture - Tissue with Gram Stain (collected 15 Feb 2023 20:43)  Source: .Tissue right foot wound  Gram Stain (2023 05:26):    Few polymorphonuclear leukocytes seen per low power field    Few Gram Negative Rods seen per oil power field    Moderate Gram Variable Cocci seen per oil power field  Preliminary Report (2023 16:05):    Moderate Staphylococcus aureus    Susceptibility to follow.    Culture - Blood (collected 15 Feb 2023 20:15)  Source: .Blood Blood  Preliminary Report (2023 03:02):    No growth to date.    Culture - Blood (collected 15 Feb 2023 20:00)  Source: .Blood Blood-Peripheral  Gram Stain (2023 14:40):    Growth in aerobic bottle: Gram Positive Cocci in Clusters    Growth in anaerobic bottle: Gram Positive Cocci in Clusters  Preliminary Report (2023 16:08):    Growth in aerobic bottle: Staphylococcus aureus    Growth in anaerobic bottle: Gram Positive Cocci in Clusters    ***Blood Panel PCR results on this specimen are available    approximately 3 hours after the Gram stain result.***    Gram stain, PCR, and/or culture results may not always    correspond due to difference in methodologies.    ************************************************************    This PCR assay was performed by multiplex PCR. This    Assay tests for 66 bacterial and resistance gene targets.    Please refer to the [facility] test directory    at [email or web address] for details.  Organism: Blood Culture PCR (2023 23:14)  Organism: Blood Culture PCR (2023 23:14)        RADIOLOGY & ADDITIONAL TESTS:  Results Reviewed: blood cx positive for MSSA, new TEENA  Imaging Personally Reviewed:  Electrocardiogram Personally Reviewed:    COORDINATION OF CARE:  Care Discussed with Consultants/Other Providers [Y]: medicine MICKIE Griffith  Prior or Outpatient Records Reviewed [Y]: ID, Podiatry, Vascular Sx, and Endo consult notes

## 2023-02-17 NOTE — DIETITIAN INITIAL EVALUATION ADULT - EDUCATION DIETARY MODIFICATIONS
pt declining verbal education at this time/unable to verbalize/demonstrate/(0) unable to meet; needs instruction

## 2023-02-17 NOTE — PROGRESS NOTE ADULT - PROBLEM SELECTOR PLAN 2
h/o poorly controlled dm, pad s/p right toe amputations  - XR with no evidence of gas; however, showing possible OM over first metatarsal stump  - CT R ankle with concern for 1st metatarsal OM and likely septic arthritis of underlying 1st tarsometatarsal joint; foci of gas concerning for gas-forming/necrotizing infection; tenosynovitis of anterior tib tendon with also foci of gas along tenosynovial sheath of anterior tib tendon; trace tibiotalar joint effusion; subc edema about the ankle likely cellulitis  - f/u MR of RLE - pending  - podiatry following, recs appreciated  - f/u wound cx   - s/p arthrocentesis by podiatry today - f/u joint aspiration studies and culture  - podiatry attempted bone bx bedside but was unsuccessful  - s/p empiric vanco + zosyn changed to IV ancef given MSSA bacteremia  - ID following recs appreciated  - Arterial duplex suggestive of possible flow-limiting lesion upstream, possibly in iliac artery; mod stenoses of R tibioperoneal trunk and L anterior tibial artery, non-visualization and poss occlusion of peroneal arteries b/l  - refusing SAL/PVR multiple times now - will need to reorder when patient amenable  - vasc sx consult appreciated, plan pending podiatry plan

## 2023-02-17 NOTE — PROGRESS NOTE ADULT - SUBJECTIVE AND OBJECTIVE BOX
Follow Up:  bacteremia    Interval History/ROS:  Overnight: No acute events.  Patient with Tmax 101.4.  Otherwise hemodynamically stable on room air.  Latest labs show leukocytosis of 11.5, BMP with hyponatremia, elevated creatinine to 2.4.  Blood cultures growing MSSA.    Patient seen and examined bedside.  Patient continues to complain of lower extremity pain.    Allergies  No Known Allergies    ANTIMICROBIALS:  piperacillin/tazobactam IVPB.. 3.375 every 8 hours  vancomycin  IVPB 1250 every 12 hours      OTHER MEDS:  MEDICATIONS  (STANDING):  acetaminophen     Tablet .. 650 every 6 hours PRN  aluminum hydroxide/magnesium hydroxide/simethicone Suspension 30 every 4 hours PRN  aspirin enteric coated 81 daily  atorvastatin 80 at bedtime  bisacodyl 5 daily PRN  clopidogrel Tablet 75 daily  dextrose 50% Injectable 25 once  dextrose 50% Injectable 12.5 once  dextrose 50% Injectable 25 once  dextrose Oral Gel 15 once PRN  enoxaparin Injectable 40 every 24 hours  glucagon  Injectable 1 once  influenza   Vaccine 0.5 once  insulin glargine Injectable (LANTUS) 66 at bedtime  insulin lispro (ADMELOG) corrective regimen sliding scale  three times a day before meals  insulin lispro (ADMELOG) corrective regimen sliding scale  at bedtime  insulin lispro Injectable (ADMELOG) 24 three times a day before meals  melatonin 3 at bedtime PRN  metoprolol succinate ER 25 daily  morphine  - Injectable 4 every 4 hours PRN  morphine  - Injectable 2 every 4 hours PRN  ondansetron Injectable 4 every 8 hours PRN  polyethylene glycol 3350 17 daily  senna 2 at bedtime  sertraline 50 daily      Vital Signs Last 24 Hrs  T(C): 36.8 (2023 09:56), Max: 38.6 (2023 22:46)  T(F): 98.3 (2023 09:56), Max: 101.4 (2023 22:46)  HR: 74 (2023 09:56) (72 - 95)  BP: 98/61 (2023 09:56) (98/61 - 109/74)  BP(mean): --  RR: 18 (2023 09:56) (17 - 18)  SpO2: 93% (2023 09:56) (92% - 98%)    Parameters below as of 2023 09:56  Patient On (Oxygen Delivery Method): room air        PHYSICAL EXAM:  Constitutional: comfortable  Cardiovascular:   normal S1, S2, no murmur, no edema  Respiratory:  clear BS bilaterally, no wheezes, no rales  GI:  soft, non-tender, normal bowel sounds  Musculoskeletal:  no synovitis, normal ROM  Neurologic: awake and alert, normal strength, no focal findings  Skin:  no rash, no erythema, no phlebitis                            8.9    11.57 )-----------( 195      ( 2023 07:07 )             27.1       02-17    128<L>  |  95<L>  |  38<H>  ----------------------------<  165<H>  4.0   |  21<L>  |  2.44<H>    Ca    8.6      2023 07:08  Phos  4.3     02-  Mg     2.4     -    TPro  6.8  /  Alb  2.9<L>  /  TBili  0.7  /  DBili  x   /  AST  28  /  ALT  35  /  AlkPhos  74  02-16      Urinalysis Basic - ( 2023 02:14 )    Color: Yellow / Appearance: Clear / S.044 / pH: x  Gluc: x / Ketone: Trace  / Bili: Negative / Urobili: 3 mg/dL   Blood: x / Protein: 100 mg/dL / Nitrite: Negative   Leuk Esterase: Negative / RBC: 8 /hpf / WBC 2 /HPF   Sq Epi: x / Non Sq Epi: 1 /hpf / Bacteria: Negative        MICROBIOLOGY:  Vancomycin Level, Trough: 16.2 ug/mL (23 @ 05:26)  v    Culture - Urine (collected 2023 02:14)  Source: Clean Catch Clean Catch (Midstream)  Final Report (2023 07:16):    <10,000 CFU/mL Normal Urogenital Lizz    Culture - Tissue with Gram Stain (collected 15 Feb 2023 20:43)  Source: .Tissue right foot wound  Gram Stain (2023 05:26):    Few polymorphonuclear leukocytes seen per low power field    Few Gram Negative Rods seen per oil power field    Moderate Gram Variable Cocci seen per oil power field    Culture - Blood (collected 15 Feb 2023 20:15)  Source: .Blood Blood  Preliminary Report (2023 03:02):    No growth to date.    Culture - Blood (collected 15 Feb 2023 20:00)  Source: .Blood Blood-Peripheral  Gram Stain (2023 21:30):    Growth in aerobic bottle: Gram Positive Cocci in Clusters  Preliminary Report (2023 21:31):    Growth in aerobic bottle: Gram Positive Cocci in Clusters    ***Blood Panel PCR results on this specimen are available    approximately 3 hours after the Gram stain result.***    Gram stain, PCR, and/or culture results may not always    correspond due to difference in methodologies.    ************************************************************    This PCR assay was performed by multiplex PCR. This    Assay tests for 66 bacterial and resistance gene targets.    Please refer to the [facility] Labs test directory    at [email or web address] for details.  Organism: Blood Culture PCR (2023 23:14)  Organism: Blood Culture PCR (2023 23:14)      -  Methicillin SENSITIVE Staphylococcus aureus (MSSA): Detec Any isolate of Staphylococcus aureus from a blood culture is NOT considered a contaminant.      Method Type: PCR              Rapid RVP Result: Faraz (02-15 @ 14:14)       Follow Up:  bacteremia    Interval History/ROS:  Overnight: No acute events.  Patient with Tmax 101.4.  Otherwise hemodynamically stable on room air.  Latest labs show leukocytosis of 11.5, BMP with hyponatremia, elevated creatinine to 2.4.  Blood cultures growing MSSA. Podiatry performed bedside ankle drainage, unsuccesful bone biiopsy.    Patient seen and examined bedside.  Patient continues to complain of lower extremity pain.    Allergies  No Known Allergies    ANTIMICROBIALS:  piperacillin/tazobactam IVPB.. 3.375 every 8 hours  vancomycin  IVPB 1250 every 12 hours      OTHER MEDS:  MEDICATIONS  (STANDING):  acetaminophen     Tablet .. 650 every 6 hours PRN  aluminum hydroxide/magnesium hydroxide/simethicone Suspension 30 every 4 hours PRN  aspirin enteric coated 81 daily  atorvastatin 80 at bedtime  bisacodyl 5 daily PRN  clopidogrel Tablet 75 daily  dextrose 50% Injectable 25 once  dextrose 50% Injectable 12.5 once  dextrose 50% Injectable 25 once  dextrose Oral Gel 15 once PRN  enoxaparin Injectable 40 every 24 hours  glucagon  Injectable 1 once  influenza   Vaccine 0.5 once  insulin glargine Injectable (LANTUS) 66 at bedtime  insulin lispro (ADMELOG) corrective regimen sliding scale  three times a day before meals  insulin lispro (ADMELOG) corrective regimen sliding scale  at bedtime  insulin lispro Injectable (ADMELOG) 24 three times a day before meals  melatonin 3 at bedtime PRN  metoprolol succinate ER 25 daily  morphine  - Injectable 4 every 4 hours PRN  morphine  - Injectable 2 every 4 hours PRN  ondansetron Injectable 4 every 8 hours PRN  polyethylene glycol 3350 17 daily  senna 2 at bedtime  sertraline 50 daily      Vital Signs Last 24 Hrs  T(C): 36.8 (2023 09:56), Max: 38.6 (2023 22:46)  T(F): 98.3 (2023 09:56), Max: 101.4 (2023 22:46)  HR: 74 (2023 09:56) (72 - 95)  BP: 98/61 (2023 09:56) (98/61 - 109/74)  BP(mean): --  RR: 18 (2023 09:56) (17 - 18)  SpO2: 93% (2023 09:56) (92% - 98%)    Parameters below as of 2023 09:56  Patient On (Oxygen Delivery Method): room air    PHYSICAL EXAM: limited per patient preference  Constitutional: comfortable  Cardiovascular:   normal S1, S2, no murmur, no edema  Respiratory:  no resp distress  GI:  soft, non-tender,   Neurologic: awake and alert, normal strength, no focal findings  Skin:  foot wound bandaged- normal ROM                            8.9    11.57 )-----------( 195      ( 2023 07:07 )             27.1       02-17    128<L>  |  95<L>  |  38<H>  ----------------------------<  165<H>  4.0   |  21<L>  |  2.44<H>    Ca    8.6      2023 07:08  Phos  4.3     02-17  Mg     2.4     02-17    TPro  6.8  /  Alb  2.9<L>  /  TBili  0.7  /  DBili  x   /  AST  28  /  ALT  35  /  AlkPhos  74  02-16      Urinalysis Basic - ( 2023 02:14 )    Color: Yellow / Appearance: Clear / S.044 / pH: x  Gluc: x / Ketone: Trace  / Bili: Negative / Urobili: 3 mg/dL   Blood: x / Protein: 100 mg/dL / Nitrite: Negative   Leuk Esterase: Negative / RBC: 8 /hpf / WBC 2 /HPF   Sq Epi: x / Non Sq Epi: 1 /hpf / Bacteria: Negative        MICROBIOLOGY:  Vancomycin Level, Trough: 16.2 ug/mL (23 @ 05:26)  v    Culture - Urine (collected 2023 02:14)  Source: Clean Catch Clean Catch (Midstream)  Final Report (2023 07:16):    <10,000 CFU/mL Normal Urogenital Lizz    Culture - Tissue with Gram Stain (collected 15 Feb 2023 20:43)  Source: .Tissue right foot wound  Gram Stain (2023 05:26):    Few polymorphonuclear leukocytes seen per low power field    Few Gram Negative Rods seen per oil power field    Moderate Gram Variable Cocci seen per oil power field    Culture - Blood (collected 15 Feb 2023 20:15)  Source: .Blood Blood  Preliminary Report (2023 03:02):    No growth to date.    Culture - Blood (collected 15 Feb 2023 20:00)  Source: .Blood Blood-Peripheral  Gram Stain (2023 21:30):    Growth in aerobic bottle: Gram Positive Cocci in Clusters  Preliminary Report (2023 21:31):    Growth in aerobic bottle: Gram Positive Cocci in Clusters    ***Blood Panel PCR results on this specimen are available    approximately 3 hours after the Gram stain result.***    Gram stain, PCR, and/or culture results may not always    correspond due to difference in methodologies.    ************************************************************    This PCR assay was performed by multiplex PCR. This    Assay tests for 66 bacterial and resistance gene targets.    Please refer to the [facility] Labs test directory    at [email or web address] for details.  Organism: Blood Culture PCR (2023 23:14)  Organism: Blood Culture PCR (2023 23:14)      -  Methicillin SENSITIVE Staphylococcus aureus (MSSA): Detec Any isolate of Staphylococcus aureus from a blood culture is NOT considered a contaminant.      Method Type: PCR              Rapid RVP Result: Faraz (02-15 @ 14:14)

## 2023-02-17 NOTE — PROGRESS NOTE ADULT - SUBJECTIVE AND OBJECTIVE BOX
seen earlier today     Chief Complaint: Type 2 Diabetes Mellitus     INTERVAL HX: pt reports he had poor po intake in Er due to dislike of food however received full doses of admelog with BG at goal pt expressed frustration with food choices ; noted pt refused vascular study this AM       Review of Systems:  General: As above  Cardiovascular: No chest pain  Respiratory: No SOB  GI: No nausea, vomiting  Endocrine: no  S&Sx of hypoglycemia    Allergies    No Known Allergies    Intolerances      MEDICATIONS  (STANDING):  aspirin enteric coated 81 milliGRAM(s) Oral daily  atorvastatin 80 milliGRAM(s) Oral at bedtime  clopidogrel Tablet 75 milliGRAM(s) Oral daily  dextrose 5%. 1000 milliLiter(s) (50 mL/Hr) IV Continuous <Continuous>  dextrose 5%. 1000 milliLiter(s) (100 mL/Hr) IV Continuous <Continuous>  dextrose 50% Injectable 25 Gram(s) IV Push once  dextrose 50% Injectable 12.5 Gram(s) IV Push once  dextrose 50% Injectable 25 Gram(s) IV Push once  enoxaparin Injectable 40 milliGRAM(s) SubCutaneous every 24 hours  glucagon  Injectable 1 milliGRAM(s) IntraMuscular once  influenza   Vaccine 0.5 milliLiter(s) IntraMuscular once  insulin glargine Injectable (LANTUS) 66 Unit(s) SubCutaneous at bedtime  insulin lispro (ADMELOG) corrective regimen sliding scale   SubCutaneous three times a day before meals  insulin lispro (ADMELOG) corrective regimen sliding scale   SubCutaneous at bedtime  insulin lispro Injectable (ADMELOG) 24 Unit(s) SubCutaneous three times a day before meals  metoprolol succinate ER 25 milliGRAM(s) Oral daily  naloxone Injectable 0.4 milliGRAM(s) IV Push once  piperacillin/tazobactam IVPB.. 3.375 Gram(s) IV Intermittent every 8 hours  polyethylene glycol 3350 17 Gram(s) Oral daily  senna 2 Tablet(s) Oral at bedtime  sertraline 50 milliGRAM(s) Oral daily  sodium chloride 0.9%. 1000 milliLiter(s) (100 mL/Hr) IV Continuous <Continuous>  sodium chloride 0.9%. 1000 milliLiter(s) (100 mL/Hr) IV Continuous <Continuous>  vancomycin  IVPB 1250 milliGRAM(s) IV Intermittent every 12 hours      atorvastatin   80 milliGRAM(s) Oral (02-16-23 @ 22:36)    insulin glargine Injectable (LANTUS)   66 Unit(s) SubCutaneous (02-16-23 @ 23:07)    insulin lispro (ADMELOG) corrective regimen sliding scale   2 Unit(s) SubCutaneous (02-17-23 @ 09:09)    insulin lispro Injectable (ADMELOG)   24 Unit(s) SubCutaneous (02-17-23 @ 09:09)   24 Unit(s) SubCutaneous (02-16-23 @ 18:53)        PHYSICAL EXAM:  VITALS: T(C): 36.8 (02-17-23 @ 09:56)  T(F): 98.3 (02-17-23 @ 09:56), Max: 101.4 (02-16-23 @ 22:46)  HR: 74 (02-17-23 @ 09:56) (72 - 95)  BP: 98/61 (02-17-23 @ 09:56) (98/61 - 109/74)  RR:  (17 - 18)  SpO2:  (92% - 98%)  Wt(kg): --  GENERAL: male laying in bed in NAD  Respiratory: Respirations unlabored   Extremities: Warm, RLE warm  NEURO: Alert , appropriate     LABS:  POCT Blood Glucose.: 182 mg/dL (02-17-23 @ 08:24)  POCT Blood Glucose.: 189 mg/dL (02-17-23 @ 02:08)  POCT Blood Glucose.: 169 mg/dL (02-16-23 @ 21:35)  POCT Blood Glucose.: 121 mg/dL (02-16-23 @ 17:58)  POCT Blood Glucose.: 336 mg/dL (02-16-23 @ 11:35)  POCT Blood Glucose.: 457 mg/dL (02-16-23 @ 07:47)  POCT Blood Glucose.: 465 mg/dL (02-16-23 @ 05:35)  POCT Blood Glucose.: 430 mg/dL (02-16-23 @ 03:01)  POCT Blood Glucose.: 485 mg/dL (02-16-23 @ 01:07)                          8.9    11.57 )-----------( 195      ( 17 Feb 2023 07:07 )             27.1     02-17    128<L>  |  95<L>  |  38<H>  ----------------------------<  165<H>  4.0   |  21<L>  |  2.44<H>    Ca    8.6      17 Feb 2023 07:08  Phos  4.3     02-17  Mg     2.4     02-17    TPro  6.8  /  Alb  2.9<L>  /  TBili  0.7  /  DBili  x   /  AST  28  /  ALT  35  /  AlkPhos  74  02-16    eGFR: 31 mL/min/1.73m2 (17 Feb 2023 07:08)    02-16 Chol 71 Direct LDL -- LDL calculated 33 HDL 16<L> Trig 116  Thyroid Function Tests:  02-16 @ 05:39 TSH 2.47 FreeT4 -- T3 -- Anti TPO -- Anti Thyroglobulin Ab -- TSI --      A1C with Estimated Average Glucose Result: 10.8 % (02-17-23 @ 07:07)  A1C with Estimated Average Glucose Result: 11.2 % (02-16-23 @ 05:39)    Estimated Average Glucose: 263 mg/dL (02-17-23 @ 07:07)  Estimated Average Glucose: 275 mg/dL (02-16-23 @ 05:39)        Diet, Consistent Carbohydrate/No Snacks (02-16-23 @ 06:52) [Active]

## 2023-02-17 NOTE — PROGRESS NOTE ADULT - SUBJECTIVE AND OBJECTIVE BOX
Patient is a 52y old  Male who presents with a chief complaint of Other injury of unspecified body region, initial encounter    Chart reviewed, events noted. This is a " 53 y/o M w/ hx of uncontrolled Type 2 DM w/ hyperglycemia complicated by neuropathy, amputations, retinopathy and CAD, HTN and HLD admitted for sepsis 2/2 right foot infection (high risk patient with severely uncontrolled diabetes with A1c of 11.2% at high risk of CAD and CVA with high level decision-making). Pending podiatry eval /vascular studies" (2023 13:55)       INTERVAL HPI/OVERNIGHT EVENTS:  Patient seen and evaluated at bedside.  Pt is resting comfortable in NAD. Denies N/V/F/C.  Pain rated at X/10    Allergies    No Known Allergies    Intolerances        Vital Signs Last 24 Hrs  T(C): 38.1 (2023 13:27), Max: 38.6 (2023 22:46)  T(F): 100.6 (2023 13:27), Max: 101.4 (2023 22:46)  HR: 89 (2023 13:27) (72 - 89)  BP: 121/75 (2023 13:27) (98/61 - 121/75)  BP(mean): --  RR: 18 (2023 13:27) (18 - 18)  SpO2: 93% (2023 13:27) (92% - 97%)    Parameters below as of 2023 13:27  Patient On (Oxygen Delivery Method): room air        LABS:                        8.9    11.57 )-----------( 195      ( 2023 07:07 )             27.1     02-17    128<L>  |  95<L>  |  38<H>  ----------------------------<  165<H>  4.0   |  21<L>  |  2.44<H>    Ca    8.6      2023 07:08  Phos  4.3     02-17  Mg     2.4     02-17    TPro  6.8  /  Alb  2.9<L>  /  TBili  0.7  /  DBili  x   /  AST  28  /  ALT  35  /  AlkPhos  74  02-16    PT/INR - ( 2023 05:39 )   PT: 15.6 sec;   INR: 1.34 ratio         PTT - ( 2023 05:39 )  PTT:30.7 sec  Urinalysis Basic - ( 2023 02:14 )    Color: Yellow / Appearance: Clear / S.044 / pH: x  Gluc: x / Ketone: Trace  / Bili: Negative / Urobili: 3 mg/dL   Blood: x / Protein: 100 mg/dL / Nitrite: Negative   Leuk Esterase: Negative / RBC: 8 /hpf / WBC 2 /HPF   Sq Epi: x / Non Sq Epi: 1 /hpf / Bacteria: Negative      CAPILLARY BLOOD GLUCOSE      POCT Blood Glucose.: 103 mg/dL (2023 13:33)  POCT Blood Glucose.: 182 mg/dL (2023 08:24)  POCT Blood Glucose.: 189 mg/dL (2023 02:08)  POCT Blood Glucose.: 169 mg/dL (2023 21:35)  POCT Blood Glucose.: 121 mg/dL (2023 17:58)      Lower Extremity Physical Exam:  Vascular: DP/PT 0/4, B/L, CFT <3 seconds B/L, Temperature gradient warm to cool, B/L.   Neuro: Epicritic sensation diminished to the level of toes, B/L.  Musculoskeletal/Ortho: Right ankle no pain with active or passive dorsiflexion/plantarflexion/inversion/eversion, pain on palpation localized to the medial and lateral malleoli.  Skin: Right foot submet 1 wound to bone, mild sanguinous drainage, no purulence, edema to the global foot, redness to the forefoot. Left foot no wounds, no acute signs of infection.    RADIOLOGY & ADDITIONAL TESTS:

## 2023-02-17 NOTE — DIETITIAN INITIAL EVALUATION ADULT - ORAL INTAKE PTA/DIET HISTORY
Pt reports good PO intake and appetite PTA. Reports he does follow a diet for his diabetes but did not wish to elaborate further when asked what foods he consumes/avoids. NKFA. Pt denies chewing/swallowing difficulty, nausea, vomiting, diarrhea, constipation.

## 2023-02-17 NOTE — PROGRESS NOTE ADULT - ASSESSMENT
52M presents with right foot wound and right ankle pain.  - Pt seen and evaluated.  - Temp 99.1, WBC 11.33, uric acid 5.0  - Right ankle ROM wnl, pain on palpation localized to the medial and lateral malleoli. Right foot submet 1 wound to bone, mild sanguinous drainage, fibrogranular wound bed, no purulence, edema to the global foot, erytyema mildly improved to the forefoot. Left foot no wounds, no acute signs of infection.  - Right Ankle X-Rays: No gas, cortical irregularity at the distal aspect of the first metatarsal stump, heterotopic ossification abutting the medial malleolus, and extensive lower leg/dorsal foot/bimalleolar edema.  - Right Ankle CT: tibiotalar effusion.   - Right foot wound cultured pending  - Continue IV antibiotics   - SAL/PVRs pending   - Recommend pain management consult  - Aseptic right ankle tap performed with sanguinous drainage aspirated, attempted bedside bone biopsy with no success of obtaining optimal specimen. Pt tolerated procedure well and consent was obtained.   - Pod plan local wound care pending ankle tap data results and vasc recs   - Seen with attending    52M presents with right foot wound and right ankle pain.  - Pt seen and evaluated.  - Temp 99.1, WBC 11.33, uric acid 5.0  - Right ankle ROM wnl, pain on palpation localized to the medial and lateral malleoli. Right foot submet 1 wound to bone, mild sanguinous drainage, fibrogranular wound bed, no purulence, edema to the global foot, erytyema mildly improved to the forefoot. Left foot no wounds, no acute signs of infection.  - Right Ankle X-Rays: No gas, cortical irregularity at the distal aspect of the first metatarsal stump, heterotopic ossification abutting the medial malleolus, and extensive lower leg/dorsal foot/bimalleolar edema.  - Right Ankle CT: tibiotalar effusion.   - Right foot wound cultured pending  - Continue IV antibiotics   - SAL/PVRs pending   - Recommend pain management consult  - Aseptic right ankle tap performed with sanguinous fluid and no purulence noted, attempted bedside percutaneous bone biopsy with 11g bone marrow aspiration needle with no success of obtaining optimal specimen (bone soft consistent with osteomyelitis). Pt tolerated procedure well and consent was obtained.   - Pod plan local wound care pending ankle tap data results and vasc recs   - patient refusing 1st met resection and will require long term antibiotics to manage the osteo.  He is made aware that this is suboptimal treatment and will decrease the potential for wound healing.  - Seen with attending

## 2023-02-17 NOTE — PROGRESS NOTE ADULT - PROBLEM SELECTOR PLAN 9
rosuva 40 -> atorva 80 takes rosuva 40 at at home  - c/w atorva 80mg qHS while inpatient  - resume home rosuva on d/c

## 2023-02-17 NOTE — PROGRESS NOTE ADULT - ASSESSMENT
51 y/o M w/ hx of uncontrolled Type 2 DM w/ hyperglycemia complicated by neuropathy, amputations, retinopathy and CAD, HTN and HLD admitted for sepsis 2/2 right foot infection (high risk patient with severely uncontrolled diabetes with A1c of 11.2% at high risk of CAD and CVA with high level decision-making). Pending podiatry eval /vascular studies.     Uncontrolled type 2 diabetes mellitus with hyperglycemia, with long-term current use of insulin.   ·  Recommendation: Diabetes Education and Nutrition Eval  Monitor on Lantus 66 units qhs  Monitor on Admelog 24 units qac;  hold if not tolerating po, can reduce dose by 50% if eating less than 50% of meal ; further adjustments based on glycemic pattern  Moderate correction scale qac + bedtime  Goal glucose 100-180  Outpt. endo follow-up with Dr. London  Outpt. optho, podiatry, nephrology  Plan to d/c on basal bolus +metformin and GLP-1.  F/u with  Dr London     Problem/Recommendation - 2:  ·  Problem: HTN (hypertension).   ·  Recommendation: Goal BP <130/80 c/w lisinopril.     Problem/Recommendation - 3:  ·  Problem: HLD (hyperlipidemia).   ·  Recommendation: statin therapy      discussed with patient and primary team Dr Bueno  Contact via [employer] Teams during business hours  To reach covering provider access [employer] via sunrise tools  For Urgent matters/after-hours/weekends/holidays please page endocrine fellow on call   For nonurgent matters please email [email or web address]    Please note that this patient may be followed by different provider tomorrow.  Notify endocrine 24 hours prior to discharge for final recommendations    greater than 50% of the encounter was spent counseling and/or coordination of care.  27 minutes spent on total encounter; The necessity of the time spent during the encounter on this date of service was due to development of plan of care/coordination of care/glycemic control through review of labs, blood glucose values and vital signs.  51 y/o M w/ hx of uncontrolled Type 2 DM w/ hyperglycemia complicated by neuropathy, amputations, retinopathy and CAD, HTN and HLD admitted for sepsis 2/2 right foot infection (high risk patient with severely uncontrolled diabetes with A1c of 11.2% at high risk of CAD and CVA with high level decision-making). Pending podiatry eval /vascular studies.     Uncontrolled type 2 diabetes mellitus with hyperglycemia, with long-term current use of insulin.   ·  Recommendation:  BG Goal 100-180mg/dl   FS TID AC qhs   Monitor on Lantus 66 units qhs  Monitor on Admelog 24 units qac;  hold if not tolerating po, can reduce dose by 50% if eating less than 50% of meal ; further adjustments based on glycemic pattern  Moderate correction scale qac + bedtime  Outpt. endo follow-up with Dr. London  Outpt. optho, podiatry, nephrology  Plan to d/c on basal bolus +metformin and GLP-1.  F/u with  Dr London     Problem/Recommendation - 2:  ·  Problem: HTN (hypertension).   ·  Recommendation: Goal BP <130/80 c/w lisinopril.     Problem/Recommendation - 3:  ·  Problem: HLD (hyperlipidemia).   ·  Recommendation: statin therapy      discussed with patient and primary team Dr Bueno  Contact via Microsoft Teams during business hours  To reach covering provider access [employer] via sunrise tools  For Urgent matters/after-hours/weekends/holidays please page endocrine fellow on call   For nonurgent matters please email [email or web address]    Please note that this patient may be followed by different provider tomorrow.  Notify endocrine 24 hours prior to discharge for final recommendations    greater than 50% of the encounter was spent counseling and/or coordination of care.  27 minutes spent on total encounter; The necessity of the time spent during the encounter on this date of service was due to development of plan of care/coordination of care/glycemic control through review of labs, blood glucose values and vital signs.

## 2023-02-17 NOTE — DIETITIAN INITIAL EVALUATION ADULT - REASON FOR ADMISSION
Other injury of unspecified body region, initial encounter    Chart reviewed, events noted. This is a " 53 y/o M w/ hx of uncontrolled Type 2 DM w/ hyperglycemia complicated by neuropathy, amputations, retinopathy and CAD, HTN and HLD admitted for sepsis 2/2 right foot infection (high risk patient with severely uncontrolled diabetes with A1c of 11.2% at high risk of CAD and CVA with high level decision-making). Pending podiatry eval /vascular studies"

## 2023-02-17 NOTE — DIETITIAN INITIAL EVALUATION ADULT - ADD RECOMMEND
1) Continue current diet as tolerated. 2) Encourage PO intake of protein-rich foods. Monitor adequacy of PO intake and need for nutrition oral supplement 3) Encouraged participation in meal selection to optimize preference to meal, menu provided. 4) Obtain/honor food preferences as able. 5) RD to remain available and follow-up as medically appropriate.

## 2023-02-17 NOTE — DIETITIAN INITIAL EVALUATION ADULT - OTHER INFO
Weight: pt reports weight stable PTA. Current dosing weight is 214.5lbs. Overall weight appears relatively stable +/-10lbs over the last 2 years. Will continue to monitor.

## 2023-02-17 NOTE — PROVIDER CONTACT NOTE (OTHER) - ASSESSMENT
Transport arrived to take pt to vascular lab, pt refusing. Pt states "it is not a vascular problem".

## 2023-02-18 LAB
-  AMPICILLIN/SULBACTAM: SIGNIFICANT CHANGE UP
-  AMPICILLIN/SULBACTAM: SIGNIFICANT CHANGE UP
-  CEFAZOLIN: SIGNIFICANT CHANGE UP
-  CEFAZOLIN: SIGNIFICANT CHANGE UP
-  CLINDAMYCIN: SIGNIFICANT CHANGE UP
-  CLINDAMYCIN: SIGNIFICANT CHANGE UP
-  ERYTHROMYCIN: SIGNIFICANT CHANGE UP
-  ERYTHROMYCIN: SIGNIFICANT CHANGE UP
-  GENTAMICIN: SIGNIFICANT CHANGE UP
-  GENTAMICIN: SIGNIFICANT CHANGE UP
-  OXACILLIN: SIGNIFICANT CHANGE UP
-  OXACILLIN: SIGNIFICANT CHANGE UP
-  RIFAMPIN: SIGNIFICANT CHANGE UP
-  RIFAMPIN: SIGNIFICANT CHANGE UP
-  TETRACYCLINE: SIGNIFICANT CHANGE UP
-  TETRACYCLINE: SIGNIFICANT CHANGE UP
-  TRIMETHOPRIM/SULFAMETHOXAZOLE: SIGNIFICANT CHANGE UP
-  TRIMETHOPRIM/SULFAMETHOXAZOLE: SIGNIFICANT CHANGE UP
-  VANCOMYCIN: SIGNIFICANT CHANGE UP
-  VANCOMYCIN: SIGNIFICANT CHANGE UP
ANION GAP SERPL CALC-SCNC: 16 MMOL/L — SIGNIFICANT CHANGE UP (ref 5–17)
BUN SERPL-MCNC: 41 MG/DL — HIGH (ref 7–23)
CALCIUM SERPL-MCNC: 8.5 MG/DL — SIGNIFICANT CHANGE UP (ref 8.4–10.5)
CHLORIDE SERPL-SCNC: 95 MMOL/L — LOW (ref 96–108)
CO2 SERPL-SCNC: 18 MMOL/L — LOW (ref 22–31)
CREAT SERPL-MCNC: 2.45 MG/DL — HIGH (ref 0.5–1.3)
CULTURE RESULTS: SIGNIFICANT CHANGE UP
CULTURE RESULTS: SIGNIFICANT CHANGE UP
EGFR: 31 ML/MIN/1.73M2 — LOW
GLUCOSE BLDC GLUCOMTR-MCNC: 111 MG/DL — HIGH (ref 70–99)
GLUCOSE BLDC GLUCOMTR-MCNC: 113 MG/DL — HIGH (ref 70–99)
GLUCOSE BLDC GLUCOMTR-MCNC: 170 MG/DL — HIGH (ref 70–99)
GLUCOSE BLDC GLUCOMTR-MCNC: 87 MG/DL — SIGNIFICANT CHANGE UP (ref 70–99)
GLUCOSE BLDC GLUCOMTR-MCNC: 88 MG/DL — SIGNIFICANT CHANGE UP (ref 70–99)
GLUCOSE SERPL-MCNC: 114 MG/DL — HIGH (ref 70–99)
GRAM STN FLD: SIGNIFICANT CHANGE UP
GRAM STN FLD: SIGNIFICANT CHANGE UP
HCT VFR BLD CALC: 26.6 % — LOW (ref 39–50)
HGB BLD-MCNC: 8.9 G/DL — LOW (ref 13–17)
MAGNESIUM SERPL-MCNC: 2.5 MG/DL — SIGNIFICANT CHANGE UP (ref 1.6–2.6)
MCHC RBC-ENTMCNC: 30.6 PG — SIGNIFICANT CHANGE UP (ref 27–34)
MCHC RBC-ENTMCNC: 33.5 GM/DL — SIGNIFICANT CHANGE UP (ref 32–36)
MCV RBC AUTO: 91.4 FL — SIGNIFICANT CHANGE UP (ref 80–100)
METHOD TYPE: SIGNIFICANT CHANGE UP
METHOD TYPE: SIGNIFICANT CHANGE UP
NRBC # BLD: 0 /100 WBCS — SIGNIFICANT CHANGE UP (ref 0–0)
ORGANISM # SPEC MICROSCOPIC CNT: SIGNIFICANT CHANGE UP
PHOSPHATE SERPL-MCNC: 4.2 MG/DL — SIGNIFICANT CHANGE UP (ref 2.5–4.5)
PLATELET # BLD AUTO: 176 K/UL — SIGNIFICANT CHANGE UP (ref 150–400)
POTASSIUM SERPL-MCNC: 4.2 MMOL/L — SIGNIFICANT CHANGE UP (ref 3.5–5.3)
POTASSIUM SERPL-SCNC: 4.2 MMOL/L — SIGNIFICANT CHANGE UP (ref 3.5–5.3)
RBC # BLD: 2.91 M/UL — LOW (ref 4.2–5.8)
RBC # FLD: 13.4 % — SIGNIFICANT CHANGE UP (ref 10.3–14.5)
SODIUM SERPL-SCNC: 129 MMOL/L — LOW (ref 135–145)
SPECIMEN SOURCE: SIGNIFICANT CHANGE UP
SPECIMEN SOURCE: SIGNIFICANT CHANGE UP
SYNOVIAL CRYSTALS CLARITY: ABNORMAL
SYNOVIAL CRYSTALS COLOR: ABNORMAL
SYNOVIAL CRYSTALS ID: SIGNIFICANT CHANGE UP
SYNOVIAL CRYSTALS TUBE: SIGNIFICANT CHANGE UP
WBC # BLD: 7.59 K/UL — SIGNIFICANT CHANGE UP (ref 3.8–10.5)
WBC # FLD AUTO: 7.59 K/UL — SIGNIFICANT CHANGE UP (ref 3.8–10.5)

## 2023-02-18 PROCEDURE — 99232 SBSQ HOSP IP/OBS MODERATE 35: CPT

## 2023-02-18 PROCEDURE — 99233 SBSQ HOSP IP/OBS HIGH 50: CPT

## 2023-02-18 RX ORDER — SODIUM CHLORIDE 9 MG/ML
1000 INJECTION, SOLUTION INTRAVENOUS
Refills: 0 | Status: DISCONTINUED | OUTPATIENT
Start: 2023-02-18 | End: 2023-02-26

## 2023-02-18 RX ORDER — INSULIN LISPRO 100/ML
20 VIAL (ML) SUBCUTANEOUS
Refills: 0 | Status: DISCONTINUED | OUTPATIENT
Start: 2023-02-18 | End: 2023-02-19

## 2023-02-18 RX ORDER — HYDROMORPHONE HYDROCHLORIDE 2 MG/ML
1 INJECTION INTRAMUSCULAR; INTRAVENOUS; SUBCUTANEOUS EVERY 4 HOURS
Refills: 0 | Status: DISCONTINUED | OUTPATIENT
Start: 2023-02-18 | End: 2023-02-19

## 2023-02-18 RX ORDER — INSULIN LISPRO 100/ML
10 VIAL (ML) SUBCUTANEOUS ONCE
Refills: 0 | Status: COMPLETED | OUTPATIENT
Start: 2023-02-18 | End: 2023-02-18

## 2023-02-18 RX ORDER — OXYCODONE HYDROCHLORIDE 5 MG/1
5 TABLET ORAL EVERY 6 HOURS
Refills: 0 | Status: DISCONTINUED | OUTPATIENT
Start: 2023-02-18 | End: 2023-02-24

## 2023-02-18 RX ADMIN — MORPHINE SULFATE 4 MILLIGRAM(S): 50 CAPSULE, EXTENDED RELEASE ORAL at 03:02

## 2023-02-18 RX ADMIN — SODIUM CHLORIDE 100 MILLILITER(S): 9 INJECTION, SOLUTION INTRAVENOUS at 19:34

## 2023-02-18 RX ADMIN — Medication 81 MILLIGRAM(S): at 13:23

## 2023-02-18 RX ADMIN — SODIUM CHLORIDE 100 MILLILITER(S): 9 INJECTION, SOLUTION INTRAVENOUS at 16:27

## 2023-02-18 RX ADMIN — Medication 25 MILLIGRAM(S): at 05:44

## 2023-02-18 RX ADMIN — HEPARIN SODIUM 5000 UNIT(S): 5000 INJECTION INTRAVENOUS; SUBCUTANEOUS at 21:33

## 2023-02-18 RX ADMIN — Medication 10 UNIT(S): at 18:05

## 2023-02-18 RX ADMIN — Medication 3 MILLIGRAM(S): at 02:06

## 2023-02-18 RX ADMIN — CLOPIDOGREL BISULFATE 75 MILLIGRAM(S): 75 TABLET, FILM COATED ORAL at 13:23

## 2023-02-18 RX ADMIN — MORPHINE SULFATE 4 MILLIGRAM(S): 50 CAPSULE, EXTENDED RELEASE ORAL at 10:25

## 2023-02-18 RX ADMIN — MORPHINE SULFATE 4 MILLIGRAM(S): 50 CAPSULE, EXTENDED RELEASE ORAL at 09:55

## 2023-02-18 RX ADMIN — SERTRALINE 50 MILLIGRAM(S): 25 TABLET, FILM COATED ORAL at 13:23

## 2023-02-18 RX ADMIN — HYDROMORPHONE HYDROCHLORIDE 1 MILLIGRAM(S): 2 INJECTION INTRAMUSCULAR; INTRAVENOUS; SUBCUTANEOUS at 17:37

## 2023-02-18 RX ADMIN — HYDROMORPHONE HYDROCHLORIDE 1 MILLIGRAM(S): 2 INJECTION INTRAMUSCULAR; INTRAVENOUS; SUBCUTANEOUS at 17:52

## 2023-02-18 RX ADMIN — Medication 100 MILLIGRAM(S): at 21:32

## 2023-02-18 RX ADMIN — OXYCODONE HYDROCHLORIDE 5 MILLIGRAM(S): 5 TABLET ORAL at 20:33

## 2023-02-18 RX ADMIN — Medication 2: at 09:03

## 2023-02-18 RX ADMIN — Medication 20 UNIT(S): at 14:34

## 2023-02-18 RX ADMIN — INSULIN GLARGINE 66 UNIT(S): 100 INJECTION, SOLUTION SUBCUTANEOUS at 21:32

## 2023-02-18 RX ADMIN — MORPHINE SULFATE 4 MILLIGRAM(S): 50 CAPSULE, EXTENDED RELEASE ORAL at 05:57

## 2023-02-18 RX ADMIN — ATORVASTATIN CALCIUM 80 MILLIGRAM(S): 80 TABLET, FILM COATED ORAL at 21:32

## 2023-02-18 RX ADMIN — HEPARIN SODIUM 5000 UNIT(S): 5000 INJECTION INTRAVENOUS; SUBCUTANEOUS at 13:24

## 2023-02-18 RX ADMIN — MORPHINE SULFATE 4 MILLIGRAM(S): 50 CAPSULE, EXTENDED RELEASE ORAL at 01:59

## 2023-02-18 RX ADMIN — Medication 100 MILLIGRAM(S): at 13:24

## 2023-02-18 RX ADMIN — OXYCODONE HYDROCHLORIDE 5 MILLIGRAM(S): 5 TABLET ORAL at 19:33

## 2023-02-18 RX ADMIN — HEPARIN SODIUM 5000 UNIT(S): 5000 INJECTION INTRAVENOUS; SUBCUTANEOUS at 05:44

## 2023-02-18 RX ADMIN — MORPHINE SULFATE 4 MILLIGRAM(S): 50 CAPSULE, EXTENDED RELEASE ORAL at 06:57

## 2023-02-18 RX ADMIN — Medication 100 MILLIGRAM(S): at 05:43

## 2023-02-18 NOTE — PROGRESS NOTE ADULT - ASSESSMENT
53 yo male with PMH of CAD s/p PCI with stent, HTN, HLD, uncontrolled IDDM2, anxiety and depression who presents from outpatient podiatry office with worsening R foot pain found to be in severe sepsis 2/2 cellulitis and osteomyelitis with CT of R ankle concerning for possible septic arthritis and gas-forming/necrotizing infection with course c/b MSSA bacteremia.

## 2023-02-18 NOTE — PROGRESS NOTE ADULT - PROBLEM SELECTOR PLAN 1
SEPSIS RESOLVING  meets sepsis criteria on admission with leukocytosis, fever, tachycardia, + lactic acidosis  2/2 cellulitis, osteo, possible septic arthritis, and now MSSA bacteremia  - treatment of infection as below

## 2023-02-18 NOTE — PROGRESS NOTE ADULT - SUBJECTIVE AND OBJECTIVE BOX
Ruth Puga MD  Division of Hospital Medicine  Available via MS Teams  Pager [phone], If no response/off hours [phone]    Patient is a 52y old  Male who presents with a chief complaint of r foot pain/tenderness, warmth, swelling (18 Feb 2023 12:20)        SUBJECTIVE / OVERNIGHT EVENTS:  asked how he is feeling and he states "what do you think?"   reports pain in foot  denies n/v/f/chills, cp, sob      I&O's Summary    17 Feb 2023 07:01  -  18 Feb 2023 07:00  --------------------------------------------------------  IN: 1690 mL / OUT: 1200 mL / NET: 490 mL    18 Feb 2023 07:01  -  18 Feb 2023 15:19  --------------------------------------------------------  IN: 300 mL / OUT: 800 mL / NET: -500 mL      Vital Signs Last 24 Hrs  T(C): 36.8 (18 Feb 2023 13:45), Max: 37.4 (18 Feb 2023 09:21)  T(F): 98.3 (18 Feb 2023 13:45), Max: 99.4 (18 Feb 2023 09:21)  HR: 77 (18 Feb 2023 13:45) (76 - 95)  BP: 106/59 (18 Feb 2023 13:45) (99/53 - 111/67)  BP(mean): --  RR: 18 (18 Feb 2023 13:45) (18 - 18)  SpO2: 95% (18 Feb 2023 13:45) (93% - 95%)    Parameters below as of 18 Feb 2023 13:45  Patient On (Oxygen Delivery Method): room air        PHYSICAL EXAM:  GENERAL:  Well appearing, m, in NAD  HEAD:  NCAT  EYES: PERRLA, conjunctiva clear  NECK: Supple, No JVD  CHEST/LUNG: CTA B/L. No w/r/r.  HEART: Reg rate. Normal S1, S2.  ABDOMEN: SNTND.  PSYCH: Alert, labile mood  SKIN: r foot dressed in kerlix    LABS:                        8.9    7.59  )-----------( 176      ( 18 Feb 2023 07:21 )             26.6     02-18    129<L>  |  95<L>  |  41<H>  ----------------------------<  114<H>  4.2   |  18<L>  |  2.45<H>    Ca    8.5      18 Feb 2023 07:16  Phos  4.2     02-18  Mg     2.5     02-18                Culture - Joint Fluid (collected 17 Feb 2023 13:58)  Source: Joint Fl Joint Fluid  Gram Stain (17 Feb 2023 23:16):    No polymorphonuclear cells seen per low power field    No organisms seen per oil power field    Culture - Blood (collected 17 Feb 2023 07:17)  Source: .Blood Blood-Peripheral  Preliminary Report (18 Feb 2023 09:01):    No growth to date.    Culture - Blood (collected 17 Feb 2023 07:17)  Source: .Blood Blood-Peripheral  Preliminary Report (18 Feb 2023 09:01):    No growth to date.    Culture - Urine (collected 16 Feb 2023 02:14)  Source: Clean Catch Clean Catch (Midstream)  Final Report (17 Feb 2023 07:16):    <10,000 CFU/mL Normal Urogenital Lizz    Culture - Tissue with Gram Stain (collected 15 Feb 2023 20:43)  Source: .Tissue right foot wound  Gram Stain (16 Feb 2023 05:26):    Few polymorphonuclear leukocytes seen per low power field    Few Gram Negative Rods seen per oil power field    Moderate Gram Variable Cocci seen per oil power field  Final Report (18 Feb 2023 13:58):    Moderate Staphylococcus aureus    Moderate Streptococcus anginosus "Susceptibilities not performed"    Rare Anaerobic Gram Positive Cocci Most closely resembling    Peptostreptococcus species "Susceptibilities not performed"  Organism: Staphylococcus aureus (18 Feb 2023 13:58)  Organism: Staphylococcus aureus (18 Feb 2023 13:58)    Culture - Blood (collected 15 Feb 2023 20:15)  Source: .Blood Blood  Gram Stain (18 Feb 2023 14:07):    Growth in aerobic bottle: Gram Positive Cocci in Clusters    Growth in anaerobic bottle: Gram Positive Cocci in Clusters  Preliminary Report (18 Feb 2023 14:07):    Growth in aerobic bottle: Gram Positive Cocci in Clusters    Growth in anaerobic bottle: Gram Positive Cocci in Clusters    Culture - Blood (collected 15 Feb 2023 20:00)  Source: .Blood Blood-Peripheral  Gram Stain (17 Feb 2023 14:40):    Growth in aerobic bottle: Gram Positive Cocci in Clusters    Growth in anaerobic bottle: Gram Positive Cocci in Clusters  Preliminary Report (18 Feb 2023 12:01):    Growth in aerobic and anaerobic bottles: Staphylococcus aureus    ***Blood Panel PCR results on this specimen are available    approximately 3 hours after the Gram stain result.***    Gram stain, PCR, and/or culture results may not always    correspond dueto difference in methodologies.    ************************************************************    This PCR assay was performed by multiplex PCR. This    Assay tests for 66 bacterial and resistance gene targets.    Please refer to the [facility] test directory    at [email or web address] for details.  Organism: Blood Culture PCR (16 Feb 2023 23:14)  Organism: Blood Culture PCR (16 Feb 2023 23:14)      SARS-CoV-2: NotDetec (15 Feb 2023 14:14)      CAPILLARY BLOOD GLUCOSE      POCT Blood Glucose.: 111 mg/dL (18 Feb 2023 14:26)  POCT Blood Glucose.: 113 mg/dL (18 Feb 2023 13:21)  POCT Blood Glucose.: 170 mg/dL (18 Feb 2023 08:59)  POCT Blood Glucose.: 87 mg/dL (17 Feb 2023 21:20)  POCT Blood Glucose.: 68 mg/dL (17 Feb 2023 21:18)  POCT Blood Glucose.: 91 mg/dL (17 Feb 2023 17:34)    MEDICATIONS  (STANDING):  aspirin enteric coated 81 milliGRAM(s) Oral daily  atorvastatin 80 milliGRAM(s) Oral at bedtime  ceFAZolin   IVPB 2000 milliGRAM(s) IV Intermittent every 8 hours  clopidogrel Tablet 75 milliGRAM(s) Oral daily  dextrose 5%. 1000 milliLiter(s) (50 mL/Hr) IV Continuous <Continuous>  dextrose 5%. 1000 milliLiter(s) (100 mL/Hr) IV Continuous <Continuous>  dextrose 50% Injectable 25 Gram(s) IV Push once  dextrose 50% Injectable 12.5 Gram(s) IV Push once  dextrose 50% Injectable 25 Gram(s) IV Push once  glucagon  Injectable 1 milliGRAM(s) IntraMuscular once  heparin   Injectable 5000 Unit(s) SubCutaneous every 8 hours  influenza   Vaccine 0.5 milliLiter(s) IntraMuscular once  insulin glargine Injectable (LANTUS) 66 Unit(s) SubCutaneous at bedtime  insulin lispro (ADMELOG) corrective regimen sliding scale   SubCutaneous three times a day before meals  insulin lispro (ADMELOG) corrective regimen sliding scale   SubCutaneous at bedtime  insulin lispro Injectable (ADMELOG) 20 Unit(s) SubCutaneous three times a day before meals  metoprolol succinate ER 25 milliGRAM(s) Oral daily  naloxone Injectable 0.4 milliGRAM(s) IV Push once  polyethylene glycol 3350 17 Gram(s) Oral daily  senna 2 Tablet(s) Oral at bedtime  sertraline 50 milliGRAM(s) Oral daily  sodium chloride 0.9%. 1000 milliLiter(s) (100 mL/Hr) IV Continuous <Continuous>  sodium chloride 0.9%. 1000 milliLiter(s) (100 mL/Hr) IV Continuous <Continuous>    MEDICATIONS  (PRN):  acetaminophen     Tablet .. 650 milliGRAM(s) Oral every 6 hours PRN Temp greater or equal to 38C (100.4F), Mild Pain (1 - 3)  aluminum hydroxide/magnesium hydroxide/simethicone Suspension 30 milliLiter(s) Oral every 4 hours PRN Dyspepsia  bisacodyl 5 milliGRAM(s) Oral daily PRN Constipation  dextrose Oral Gel 15 Gram(s) Oral once PRN Blood Glucose LESS THAN 70 milliGRAM(s)/deciliter  melatonin 3 milliGRAM(s) Oral at bedtime PRN Insomnia  morphine  - Injectable 2 milliGRAM(s) IV Push every 4 hours PRN Moderate Pain (4 - 6)  morphine  - Injectable 4 milliGRAM(s) IV Push every 4 hours PRN Severe Pain (7 - 10)  ondansetron Injectable 4 milliGRAM(s) IV Push every 8 hours PRN Nausea and/or Vomiting

## 2023-02-18 NOTE — PROGRESS NOTE ADULT - PROBLEM SELECTOR PLAN 10
- c/w sertraline 50    Dispo: pending TTE, c/w iv abx, and decision from podiatry re next steps, eventual SAL/PVR

## 2023-02-18 NOTE — PROGRESS NOTE ADULT - ASSESSMENT
51 y/o M w/ hx of uncontrolled Type 2 DM w/ hyperglycemia complicated by neuropathy, amputations, retinopathy and CAD, HTN and HLD admitted for sepsis 2/2 right foot infection (high risk patient with severely uncontrolled diabetes with A1c of 11.2% at high risk of CAD and CVA with high level decision-making). podiatry eval pt refusing discussion of amputation/debridement  .     Uncontrolled type 2 diabetes mellitus with hyperglycemia, with long-term current use of insulin.   Hypoglycemia 2/17 due to receiving prandial insulin with poor po intake   ·  Recommendation:  BG Goal 100-180mg/dl   FS TID AC qhs   Monitor on Lantus 66 units qhs  Reduce Admelog 20 units qac; MAKE SURE PT EATING 50% OF MEAL PRIOR TO ADMINISTRATION, IF BG <100 OR EATING 50% CAN REDUCE TO 10 UNITS X1 ; HOLD IF NPO!!!!  Moderate correction scale qac + bedtime  Outpt. endo follow-up with Dr. London  Outpt. optho, podiatry, nephrology  Plan to d/c on basal bolus +metformin and GLP-1.  F/u with  Dr London     Problem/Recommendation - 2:  ·  Problem: HTN (hypertension).   ·  Recommendation: Goal BP <130/80 c/w lisinopril.     Problem/Recommendation - 3:  ·  Problem: HLD (hyperlipidemia).   ·  Recommendation: statin therapy      discussed with patient and RN  Contact via Microsoft Teams during business hours  To reach covering provider access AMION via sunrise tools  For Urgent matters/after-hours/weekends/holidays please page endocrine fellow on call   For nonurgent matters please email [email or web address]    Please note that this patient may be followed by different provider tomorrow.  Notify endocrine 24 hours prior to discharge for final recommendations    greater than 50% of the encounter was spent counseling and/or coordination of care.  27 minutes spent on total encounter; The necessity of the time spent during the encounter on this date of service was due to development of plan of care/coordination of care/glycemic control through review of labs, blood glucose values and vital signs.  53 y/o M w/ hx of uncontrolled Type 2 DM w/ hyperglycemia complicated by neuropathy, amputations, retinopathy and CAD, HTN and HLD admitted for sepsis 2/2 right foot infection (high risk patient with severely uncontrolled diabetes with A1c of 11.2% at high risk of CAD and CVA with high level decision-making). podiatry eval pt refusing discussion of amputation/debridement  .  Pt with TEENA cautious insulin adjustment     Uncontrolled type 2 diabetes mellitus with hyperglycemia, with long-term current use of insulin.   Hypoglycemia 2/17 due to receiving prandial insulin with poor po intake &  decreased renal function   ·  Recommendation:  BG Goal 100-180mg/dl   FS TID AC qhs   Monitor on Lantus 66 units qhs  Reduce Admelog 20 units qac; MAKE SURE PT EATING 50% OF MEAL PRIOR TO ADMINISTRATION, IF BG <100 OR EATING 50% CAN REDUCE TO 10 UNITS X1 ; HOLD IF NPO!!!!  Moderate correction scale qac + bedtime  Outpt. endo follow-up with Dr. London  Outpt. optho, podiatry, nephrology  Plan to d/c on basal bolus +metformin and GLP-1.  F/u with  Dr London     Problem/Recommendation - 2:  ·  Problem: HTN (hypertension).   ·  Recommendation: Goal BP <130/80 c/w lisinopril.     Problem/Recommendation - 3:  ·  Problem: HLD (hyperlipidemia).   ·  Recommendation: statin therapy      discussed with patient and RN  Contact via Microsoft Teams during business hours  To reach covering provider access AMION via sunrise tools  For Urgent matters/after-hours/weekends/holidays please page endocrine fellow on call   For nonurgent matters please email [email or web address]    Please note that this patient may be followed by different provider tomorrow.  Notify endocrine 24 hours prior to discharge for final recommendations    greater than 50% of the encounter was spent counseling and/or coordination of care.  27 minutes spent on total encounter; The necessity of the time spent during the encounter on this date of service was due to development of plan of care/coordination of care/glycemic control through review of labs, blood glucose values and vital signs.

## 2023-02-18 NOTE — PROGRESS NOTE ADULT - SUBJECTIVE AND OBJECTIVE BOX
seen earlier today     Chief Complaint: Type 2 Diabetes Mellitus     INTERVAL HX: hypoglyecmia to 68 at bedtime after receiving dinner admelog but didnt eat much at dinner per pt report. pt denies symptoms of hypoglycemia reports he did not feel it  . FBG at goal .       Review of Systems:  General: As above  Cardiovascular: No chest pain  Respiratory: No SOB  GI: No nausea, vomiting  Endocrine: no  S&Sx of hypoglycemia when BG 68     Allergies    No Known Allergies    Intolerances      MEDICATIONS  (STANDING):  aspirin enteric coated 81 milliGRAM(s) Oral daily  atorvastatin 80 milliGRAM(s) Oral at bedtime  ceFAZolin   IVPB 2000 milliGRAM(s) IV Intermittent every 8 hours  clopidogrel Tablet 75 milliGRAM(s) Oral daily  dextrose 5%. 1000 milliLiter(s) (50 mL/Hr) IV Continuous <Continuous>  dextrose 5%. 1000 milliLiter(s) (100 mL/Hr) IV Continuous <Continuous>  dextrose 50% Injectable 25 Gram(s) IV Push once  dextrose 50% Injectable 12.5 Gram(s) IV Push once  dextrose 50% Injectable 25 Gram(s) IV Push once  glucagon  Injectable 1 milliGRAM(s) IntraMuscular once  heparin   Injectable 5000 Unit(s) SubCutaneous every 8 hours  influenza   Vaccine 0.5 milliLiter(s) IntraMuscular once  insulin glargine Injectable (LANTUS) 66 Unit(s) SubCutaneous at bedtime  insulin lispro (ADMELOG) corrective regimen sliding scale   SubCutaneous three times a day before meals  insulin lispro (ADMELOG) corrective regimen sliding scale   SubCutaneous at bedtime  insulin lispro Injectable (ADMELOG) 20 Unit(s) SubCutaneous three times a day before meals  metoprolol succinate ER 25 milliGRAM(s) Oral daily  naloxone Injectable 0.4 milliGRAM(s) IV Push once  polyethylene glycol 3350 17 Gram(s) Oral daily  senna 2 Tablet(s) Oral at bedtime  sertraline 50 milliGRAM(s) Oral daily  sodium chloride 0.9%. 1000 milliLiter(s) (100 mL/Hr) IV Continuous <Continuous>  sodium chloride 0.9%. 1000 milliLiter(s) (100 mL/Hr) IV Continuous <Continuous>      atorvastatin   80 milliGRAM(s) Oral (02-17-23 @ 21:31)    insulin glargine Injectable (LANTUS)   66 Unit(s) SubCutaneous (02-17-23 @ 21:45)    insulin lispro (ADMELOG) corrective regimen sliding scale   2 Unit(s) SubCutaneous (02-18-23 @ 09:03)    insulin lispro Injectable (ADMELOG)   24 Unit(s) SubCutaneous (02-17-23 @ 13:40)    insulin lispro Injectable (ADMELOG)   22 Unit(s) SubCutaneous (02-17-23 @ 17:48)        PHYSICAL EXAM:  VITALS: T(C): 37.4 (02-18-23 @ 09:21)  T(F): 99.4 (02-18-23 @ 09:21), Max: 100.6 (02-17-23 @ 13:27)  HR: 81 (02-18-23 @ 09:21) (76 - 95)  BP: 108/64 (02-18-23 @ 09:21) (99/53 - 121/75)  RR:  (18 - 18)  SpO2:  (93% - 94%)  Wt(kg): --  GENERAL: male laying in bed  in NAD  Respiratory: Respirations unlabored   Extremities: Warm, RLE warm, dressing cdi   NEURO: Alert , appropriate     LABS:  POCT Blood Glucose.: 170 mg/dL (02-18-23 @ 08:59)  POCT Blood Glucose.: 87 mg/dL (02-17-23 @ 21:20)  POCT Blood Glucose.: 68 mg/dL (02-17-23 @ 21:18)  POCT Blood Glucose.: 91 mg/dL (02-17-23 @ 17:34)  POCT Blood Glucose.: 103 mg/dL (02-17-23 @ 13:33)  POCT Blood Glucose.: 182 mg/dL (02-17-23 @ 08:24)  POCT Blood Glucose.: 189 mg/dL (02-17-23 @ 02:08)  POCT Blood Glucose.: 169 mg/dL (02-16-23 @ 21:35)  POCT Blood Glucose.: 121 mg/dL (02-16-23 @ 17:58)  POCT Blood Glucose.: 336 mg/dL (02-16-23 @ 11:35)  POCT Blood Glucose.: 457 mg/dL (02-16-23 @ 07:47)  POCT Blood Glucose.: 465 mg/dL (02-16-23 @ 05:35)  POCT Blood Glucose.: 430 mg/dL (02-16-23 @ 03:01)  POCT Blood Glucose.: 485 mg/dL (02-16-23 @ 01:07)                          8.9    7.59  )-----------( 176      ( 18 Feb 2023 07:21 )             26.6     02-18    129<L>  |  95<L>  |  41<H>  ----------------------------<  114<H>  4.2   |  18<L>  |  2.45<H>    Ca    8.5      18 Feb 2023 07:16  Phos  4.2     02-18  Mg     2.5     02-18      eGFR: 31 mL/min/1.73m2 (18 Feb 2023 07:16)    02-16 Chol 71 Direct LDL -- LDL calculated 33 HDL 16<L> Trig 116  Thyroid Function Tests:  02-16 @ 05:39 TSH 2.47 FreeT4 -- T3 -- Anti TPO -- Anti Thyroglobulin Ab -- TSI --      A1C with Estimated Average Glucose Result: 10.8 % (02-17-23 @ 07:07)  A1C with Estimated Average Glucose Result: 11.2 % (02-16-23 @ 05:39)    Estimated Average Glucose: 263 mg/dL (02-17-23 @ 07:07)  Estimated Average Glucose: 275 mg/dL (02-16-23 @ 05:39)        Diet, Consistent Carbohydrate/No Snacks (02-16-23 @ 06:52) [Active]

## 2023-02-18 NOTE — PROGRESS NOTE ADULT - SUBJECTIVE AND OBJECTIVE BOX
Patient is a 52y old  Male who presents with a chief complaint of r foot pain/tenderness, warmth, swelling (17 Feb 2023 16:13)       INTERVAL HPI/OVERNIGHT EVENTS:  Patient seen and evaluated at bedside.  Pt is resting comfortable in NAD. Denies N/V/F/C.     Allergies    No Known Allergies    Intolerances        Vital Signs Last 24 Hrs  T(C): 37.4 (18 Feb 2023 09:21), Max: 38.1 (17 Feb 2023 13:27)  T(F): 99.4 (18 Feb 2023 09:21), Max: 100.6 (17 Feb 2023 13:27)  HR: 81 (18 Feb 2023 09:21) (76 - 95)  BP: 108/64 (18 Feb 2023 09:21) (99/53 - 121/75)  BP(mean): --  RR: 18 (18 Feb 2023 09:21) (18 - 18)  SpO2: 94% (18 Feb 2023 09:21) (93% - 94%)    Parameters below as of 18 Feb 2023 09:21  Patient On (Oxygen Delivery Method): room air        LABS:                        8.9    7.59  )-----------( 176      ( 18 Feb 2023 07:21 )             26.6     02-18    129<L>  |  95<L>  |  41<H>  ----------------------------<  114<H>  4.2   |  18<L>  |  2.45<H>    Ca    8.5      18 Feb 2023 07:16  Phos  4.2     02-18  Mg     2.5     02-18          CAPILLARY BLOOD GLUCOSE      POCT Blood Glucose.: 170 mg/dL (18 Feb 2023 08:59)  POCT Blood Glucose.: 87 mg/dL (17 Feb 2023 21:20)  POCT Blood Glucose.: 68 mg/dL (17 Feb 2023 21:18)  POCT Blood Glucose.: 91 mg/dL (17 Feb 2023 17:34)  POCT Blood Glucose.: 103 mg/dL (17 Feb 2023 13:33)      Lower Extremity Physical Exam:  Vascular: DP/PT 0/4, B/L, CFT <3 seconds B/L, Temperature gradient warm to cool, B/L.   Neuro: Epicritic sensation diminished to the level of toes, B/L.  Musculoskeletal/Ortho: Right ankle no pain with active or passive dorsiflexion/plantarflexion/inversion/eversion, pain on palpation localized to the medial and lateral malleoli.  Skin: Right ankle ROM wnl, pain on palpation localized to the medial and lateral malleoli. Right foot submet 1 wound to bone, scant pus today, fibrogranular wound bed, tracks dorsal proximal along tenon 4cm, no fluctuance    RADIOLOGY & ADDITIONAL TESTS:

## 2023-02-18 NOTE — PROGRESS NOTE ADULT - PROBLEM SELECTOR PLAN 8
stable  - c/w toprol XL 25mg PO daily  - hold lisinopril given new TEENA and marginal BP  - monitor vitals

## 2023-02-18 NOTE — PROGRESS NOTE ADULT - PROBLEM SELECTOR PLAN 6
hypoglycemic overnight  hold home metformin 1g bid, trulicity 3 mg weekly, glargine 66 u hs, lispro 24 u prandial + low dose correctional scale  HbA1c 10.8%  - c/w lantus 66 units qHS  - c/w pre-meal lispro adjusted as per endo recs  - c/w sliding scale coverage

## 2023-02-18 NOTE — PROGRESS NOTE ADULT - PROBLEM SELECTOR PLAN 3
2/15 blood cx +MSSA, recent bld cx 2/17 NGTD  - abx de-escalated to ancef   - TTE to r/o vegetation/endocarditis

## 2023-02-18 NOTE — PROGRESS NOTE ADULT - SUBJECTIVE AND OBJECTIVE BOX
Overnight events:   - No acute events    SUBJECTIVE:  Patient was seen and examined on AM rounds.    OBJECTIVE:  Vital Signs Last 24 Hrs  T(C): 36.8 (18 Feb 2023 16:58), Max: 37.4 (18 Feb 2023 09:21)  T(F): 98.3 (18 Feb 2023 16:58), Max: 99.4 (18 Feb 2023 09:21)  HR: 69 (18 Feb 2023 16:58) (69 - 81)  BP: 100/61 (18 Feb 2023 16:58) (99/53 - 111/67)  BP(mean): --  RR: 18 (18 Feb 2023 16:58) (18 - 18)  SpO2: 93% (18 Feb 2023 16:58) (93% - 95%)    Parameters below as of 18 Feb 2023 16:58  Patient On (Oxygen Delivery Method): room air          02-17-23 @ 07:01  -  02-18-23 @ 07:00  --------------------------------------------------------  IN: 1690 mL / OUT: 1200 mL / NET: 490 mL    02-18-23 @ 07:01  -  02-18-23 @ 18:26  --------------------------------------------------------  IN: 300 mL / OUT: 1400 mL / NET: -1100 mL        Physical Examination:  General- NAD, A&Ox3  Extremities- R foot TMA. First MTP with punched out ulcer on posterior aspect of toe. No cellulitis or erythema noted. AT/PT pulses noted dopplerably. DP not dopplerable. Femoral pulses palpable bilaterally.   Lungs- unlabored breathing        LABS:                        8.9    7.59  )-----------( 176      ( 18 Feb 2023 07:21 )             26.6       02-18    129<L>  |  95<L>  |  41<H>  ----------------------------<  114<H>  4.2   |  18<L>  |  2.45<H>    Ca    8.5      18 Feb 2023 07:16  Phos  4.2     02-18  Mg     2.5     02-18         Overnight events:   - No acute events    SUBJECTIVE:  Patient was seen and examined on AM rounds. Denies fever/chills, SOB, N/V.    OBJECTIVE:  Vital Signs Last 24 Hrs  T(C): 36.8 (18 Feb 2023 16:58), Max: 37.4 (18 Feb 2023 09:21)  T(F): 98.3 (18 Feb 2023 16:58), Max: 99.4 (18 Feb 2023 09:21)  HR: 69 (18 Feb 2023 16:58) (69 - 81)  BP: 100/61 (18 Feb 2023 16:58) (99/53 - 111/67)  BP(mean): --  RR: 18 (18 Feb 2023 16:58) (18 - 18)  SpO2: 93% (18 Feb 2023 16:58) (93% - 95%)    Parameters below as of 18 Feb 2023 16:58  Patient On (Oxygen Delivery Method): room air        02-17-23 @ 07:01  -  02-18-23 @ 07:00  --------------------------------------------------------  IN: 1690 mL / OUT: 1200 mL / NET: 490 mL    02-18-23 @ 07:01  -  02-18-23 @ 18:26  --------------------------------------------------------  IN: 300 mL / OUT: 1400 mL / NET: -1100 mL      Physical Examination:  General- NAD, A&Ox3  Extremities- R foot TMA. First MTP with punched out ulcer on posterior aspect of toe. No cellulitis or erythema noted. AT/PT signals. DP signal not able to be doppler. Femoral pulses palpable bilaterally.   Lungs- unlabored breathing      LABS:                        8.9    7.59  )-----------( 176      ( 18 Feb 2023 07:21 )             26.6       02-18    129<L>  |  95<L>  |  41<H>  ----------------------------<  114<H>  4.2   |  18<L>  |  2.45<H>    Ca    8.5      18 Feb 2023 07:16  Phos  4.2     02-18  Mg     2.5     02-18

## 2023-02-18 NOTE — PROGRESS NOTE ADULT - PROBLEM SELECTOR PLAN 2
h/o poorly controlled dm, pad s/p right toe amputations  OM over first metatarsal stump confirmed on MRI, CT w/ septic arthritis of underlying 1st tarsometatarsal joint; f  - podiatry following advising debridement/amputation - pt currently refusing --> CT ankle ordered  - s/p arthrocentesis by podiatry - f/u joint aspiration studies and culture  - ID following - IV ancef given MSSA bacteremia  - Arterial duplex suggestive PAD - refusing SAL/PVR multiple times now - will need to reorder when patient amenable  - vasc sx consult appreciated, plan pending podiatry plan  - pain control - dc morphine, switch to dilaudid 1 mg iv prn severe pain, oxy prn moderate pain

## 2023-02-18 NOTE — PROGRESS NOTE ADULT - ASSESSMENT
52M presents with right foot wound and right ankle pain.  - Pt seen and evaluated.  - Temp 99.4, WBC 7.59  - Right ankle ROM wnl, pain on palpation localized to the medial and lateral malleoli. Right foot submet 1 wound to bone, scant pus today, fibrogranular wound bed, tracks dorsal proximal along tenon 4cm, no fluctuance  - Right Ankle X-Rays: No gas, cortical irregularity at the distal aspect of the first metatarsal stump, heterotopic ossification abutting the medial malleolus, and extensive lower leg/dorsal foot/bimalleolar edema.  - Right Ankle CT: tibiotalar effusion.   - Right foot wound culture growing MSSA, prelim  - R ankle MR: OM of 1st met, bases of 2nd and 3rd met, all cuneiforms and likely cuboid  - Right ankle joint aspirate: Negative for organisms and crystals  - SAL/PVRs pending, pt continues to refuse  - Recommend pain management consult  - Pod plan local wound care pt w/ likely acute soft tissue and bone infection risking salvageability of right foot, this was explained to the pt several times, states he is aware, and is unwilling to discuss surgical intervention until his ankle pain and swelling is resolved. It was communicated to the pt that the ankle symptoms he is experiencing may be related to the severe infection of his right foot, pt acknowledged and continued to refuse discussion of debridement/amputation.  - Pt will likely need long term course of IV abx  - Discussed with attending

## 2023-02-18 NOTE — PROGRESS NOTE ADULT - ASSESSMENT
51 yo male with PMH of CAD s/p PCI with stent, HTN, HLD, uncontrolled IDDM2, anxiety and depression who presents from outpatient podiatry office with worsening R foot pain, likely septic toe.    Recs:  - Please obtain SAL/PVRs  - C/w IV abx for toe wound  - Will require debridement with podiatry prior to any Vascular Surgery intervention  - Patient refusing surgical intervention via podiatry  - care per primary team    Vascular Surgery  [id number]

## 2023-02-19 ENCOUNTER — TRANSCRIPTION ENCOUNTER (OUTPATIENT)
Age: 53
End: 2023-02-19

## 2023-02-19 LAB
ANION GAP SERPL CALC-SCNC: 14 MMOL/L — SIGNIFICANT CHANGE UP (ref 5–17)
BUN SERPL-MCNC: 34 MG/DL — HIGH (ref 7–23)
CALCIUM SERPL-MCNC: 9.1 MG/DL — SIGNIFICANT CHANGE UP (ref 8.4–10.5)
CHLORIDE SERPL-SCNC: 98 MMOL/L — SIGNIFICANT CHANGE UP (ref 96–108)
CO2 SERPL-SCNC: 21 MMOL/L — LOW (ref 22–31)
CREAT SERPL-MCNC: 2.06 MG/DL — HIGH (ref 0.5–1.3)
CULTURE RESULTS: SIGNIFICANT CHANGE UP
EGFR: 39 ML/MIN/1.73M2 — LOW
GLUCOSE BLDC GLUCOMTR-MCNC: 70 MG/DL — SIGNIFICANT CHANGE UP (ref 70–99)
GLUCOSE BLDC GLUCOMTR-MCNC: 71 MG/DL — SIGNIFICANT CHANGE UP (ref 70–99)
GLUCOSE BLDC GLUCOMTR-MCNC: 72 MG/DL — SIGNIFICANT CHANGE UP (ref 70–99)
GLUCOSE BLDC GLUCOMTR-MCNC: 75 MG/DL — SIGNIFICANT CHANGE UP (ref 70–99)
GLUCOSE SERPL-MCNC: 65 MG/DL — LOW (ref 70–99)
HCT VFR BLD CALC: 28 % — LOW (ref 39–50)
HGB BLD-MCNC: 9.1 G/DL — LOW (ref 13–17)
MCHC RBC-ENTMCNC: 29.6 PG — SIGNIFICANT CHANGE UP (ref 27–34)
MCHC RBC-ENTMCNC: 32.5 GM/DL — SIGNIFICANT CHANGE UP (ref 32–36)
MCV RBC AUTO: 91.2 FL — SIGNIFICANT CHANGE UP (ref 80–100)
NRBC # BLD: 0 /100 WBCS — SIGNIFICANT CHANGE UP (ref 0–0)
PLATELET # BLD AUTO: 221 K/UL — SIGNIFICANT CHANGE UP (ref 150–400)
POTASSIUM SERPL-MCNC: 4.1 MMOL/L — SIGNIFICANT CHANGE UP (ref 3.5–5.3)
POTASSIUM SERPL-SCNC: 4.1 MMOL/L — SIGNIFICANT CHANGE UP (ref 3.5–5.3)
RBC # BLD: 3.07 M/UL — LOW (ref 4.2–5.8)
RBC # FLD: 13.6 % — SIGNIFICANT CHANGE UP (ref 10.3–14.5)
SODIUM SERPL-SCNC: 133 MMOL/L — LOW (ref 135–145)
SPECIMEN SOURCE: SIGNIFICANT CHANGE UP
WBC # BLD: 7.22 K/UL — SIGNIFICANT CHANGE UP (ref 3.8–10.5)
WBC # FLD AUTO: 7.22 K/UL — SIGNIFICANT CHANGE UP (ref 3.8–10.5)

## 2023-02-19 PROCEDURE — 99233 SBSQ HOSP IP/OBS HIGH 50: CPT

## 2023-02-19 PROCEDURE — 99232 SBSQ HOSP IP/OBS MODERATE 35: CPT

## 2023-02-19 RX ORDER — ALPRAZOLAM 0.25 MG
0.25 TABLET ORAL ONCE
Refills: 0 | Status: DISCONTINUED | OUTPATIENT
Start: 2023-02-19 | End: 2023-02-19

## 2023-02-19 RX ORDER — INSULIN GLARGINE 100 [IU]/ML
60 INJECTION, SOLUTION SUBCUTANEOUS AT BEDTIME
Refills: 0 | Status: DISCONTINUED | OUTPATIENT
Start: 2023-02-19 | End: 2023-02-19

## 2023-02-19 RX ORDER — TRAMADOL HYDROCHLORIDE 50 MG/1
1 TABLET ORAL
Qty: 0 | Refills: 0 | DISCHARGE

## 2023-02-19 RX ORDER — METFORMIN HYDROCHLORIDE 850 MG/1
1 TABLET ORAL
Qty: 0 | Refills: 0 | DISCHARGE

## 2023-02-19 RX ORDER — RAMIPRIL 5 MG
1 CAPSULE ORAL
Qty: 0 | Refills: 0 | DISCHARGE

## 2023-02-19 RX ORDER — DULAGLUTIDE 4.5 MG/.5ML
0 INJECTION, SOLUTION SUBCUTANEOUS
Qty: 0 | Refills: 0 | DISCHARGE

## 2023-02-19 RX ORDER — INSULIN LISPRO 100/ML
0 VIAL (ML) SUBCUTANEOUS
Qty: 0 | Refills: 0 | DISCHARGE

## 2023-02-19 RX ORDER — INSULIN GLARGINE 100 [IU]/ML
40 INJECTION, SOLUTION SUBCUTANEOUS AT BEDTIME
Refills: 0 | Status: DISCONTINUED | OUTPATIENT
Start: 2023-02-19 | End: 2023-02-20

## 2023-02-19 RX ORDER — HYDROMORPHONE HYDROCHLORIDE 2 MG/ML
1 INJECTION INTRAMUSCULAR; INTRAVENOUS; SUBCUTANEOUS EVERY 6 HOURS
Refills: 0 | Status: DISCONTINUED | OUTPATIENT
Start: 2023-02-19 | End: 2023-02-24

## 2023-02-19 RX ORDER — ROSUVASTATIN CALCIUM 5 MG/1
1 TABLET ORAL
Qty: 0 | Refills: 0 | DISCHARGE

## 2023-02-19 RX ORDER — LANOLIN ALCOHOL/MO/W.PET/CERES
3 CREAM (GRAM) TOPICAL ONCE
Refills: 0 | Status: COMPLETED | OUTPATIENT
Start: 2023-02-19 | End: 2023-02-19

## 2023-02-19 RX ORDER — INSULIN DEGLUDEC 100 U/ML
66 INJECTION, SOLUTION SUBCUTANEOUS
Qty: 0 | Refills: 0 | DISCHARGE

## 2023-02-19 RX ORDER — INSULIN LISPRO 100/ML
16 VIAL (ML) SUBCUTANEOUS
Refills: 0 | Status: DISCONTINUED | OUTPATIENT
Start: 2023-02-19 | End: 2023-02-24

## 2023-02-19 RX ORDER — SERTRALINE 25 MG/1
1 TABLET, FILM COATED ORAL
Qty: 0 | Refills: 0 | DISCHARGE

## 2023-02-19 RX ADMIN — HEPARIN SODIUM 5000 UNIT(S): 5000 INJECTION INTRAVENOUS; SUBCUTANEOUS at 06:13

## 2023-02-19 RX ADMIN — Medication 3 MILLIGRAM(S): at 01:34

## 2023-02-19 RX ADMIN — OXYCODONE HYDROCHLORIDE 5 MILLIGRAM(S): 5 TABLET ORAL at 02:34

## 2023-02-19 RX ADMIN — CLOPIDOGREL BISULFATE 75 MILLIGRAM(S): 75 TABLET, FILM COATED ORAL at 11:17

## 2023-02-19 RX ADMIN — Medication 3 MILLIGRAM(S): at 00:10

## 2023-02-19 RX ADMIN — Medication 100 MILLIGRAM(S): at 06:13

## 2023-02-19 RX ADMIN — OXYCODONE HYDROCHLORIDE 5 MILLIGRAM(S): 5 TABLET ORAL at 01:34

## 2023-02-19 RX ADMIN — Medication 100 MILLIGRAM(S): at 17:35

## 2023-02-19 RX ADMIN — SERTRALINE 50 MILLIGRAM(S): 25 TABLET, FILM COATED ORAL at 11:17

## 2023-02-19 RX ADMIN — OXYCODONE HYDROCHLORIDE 5 MILLIGRAM(S): 5 TABLET ORAL at 21:39

## 2023-02-19 RX ADMIN — Medication 25 MILLIGRAM(S): at 06:12

## 2023-02-19 RX ADMIN — HEPARIN SODIUM 5000 UNIT(S): 5000 INJECTION INTRAVENOUS; SUBCUTANEOUS at 17:35

## 2023-02-19 RX ADMIN — Medication 0.25 MILLIGRAM(S): at 01:33

## 2023-02-19 RX ADMIN — ATORVASTATIN CALCIUM 80 MILLIGRAM(S): 80 TABLET, FILM COATED ORAL at 22:17

## 2023-02-19 RX ADMIN — OXYCODONE HYDROCHLORIDE 5 MILLIGRAM(S): 5 TABLET ORAL at 22:10

## 2023-02-19 RX ADMIN — SENNA PLUS 2 TABLET(S): 8.6 TABLET ORAL at 22:16

## 2023-02-19 RX ADMIN — Medication 81 MILLIGRAM(S): at 11:17

## 2023-02-19 NOTE — DISCHARGE NOTE PROVIDER - CARE PROVIDERS DIRECT ADDRESSES
,DirectAddress_Unknown ,DirectAddress_Unknown,[email or web address],DirectAddress_Unknown ,[email or web address],DirectAddress_Unknown

## 2023-02-19 NOTE — DISCHARGE NOTE PROVIDER - NSDCFUSCHEDAPPT_GEN_ALL_CORE_FT
Jenifer London  [facility]  [address]  Scheduled Appointment: 05/03/2023     Gavin Lott  [facility]  WOUNDCARE [address]  Scheduled Appointment: 03/13/2023    Jenifer London  [facility] [address]  Scheduled Appointment: 05/03/2023

## 2023-02-19 NOTE — DISCHARGE NOTE PROVIDER - NSDCCPCAREPLAN_GEN_ALL_CORE_FT
PRINCIPAL DISCHARGE DIAGNOSIS  Diagnosis: Severe sepsis  Assessment and Plan of Treatment: If you are discharged on antibiotics, it is important to complete the course to reduce the likelyhood that the infection will return, and reduce the possiblity of developing resistance to antibiotics.   Nutrition is important, eat small frequent meals to help ensure you get adequate calories.  Do not stay in bed all day!  Increase your activity daily as tolerated.  If you develop fever, chills, malaise, or change in mental status, call your Health Care Provider or go to the Emergency Department.        SECONDARY DISCHARGE DIAGNOSES  Diagnosis: DM (diabetes mellitus)  Assessment and Plan of Treatment: HgA1C this admission.  Make sure you get your HgA1c checked every three months.  If you take oral diabetes medications, check your blood glucose two times a day.  If you take insulin, check your blood glucose before meals and at bedtime.  It's important not to skip any meals.  Keep a log of your blood glucose results and always take it with you to your doctor appointments.  Keep a list of your current medications including injectables and over the counter medications and bring this medication list with you to all your doctor appointments.  If you have not seen your ophthalmologist this year call for appointment.  Check your feet daily for redness, sores, or openings. Do not self treat. If no improvement in two days call your primary care physician for an appointment.  Low blood sugar (hypoglycemia) is a blood sugar below 70mg/dl. Check your blood sugar if you feel signs/symptoms of hypoglycemia. If your blood sugar is below 70 take 15 grams of carbohydrates (ex 4 oz of apple juice, 3-4 glucose tablets, or 4-6 oz of regular soda) wait 15 minutes and repeat blood sugar to make sure it comes up above 70.  If your blood sugar is above 70 and you are due for a meal, have a meal.  If you are not due for a meal have a snack.  This snack helps keeps your blood sugar at a safe range.      Diagnosis: CAD (coronary artery disease)  Assessment and Plan of Treatment: Coronary artery disease is a condition where the arteries the supply the heart muscle get clogges with fatty deposits & puts you at risk for a heart attack  Call your doctor if you have any new pain, pressure, or discomfort in the center of your chest, pain, tingling or discomfort in arms, back, neck, jaw, or stomach, shortness of breath, nausea, vomiting, burping or heartburn, sweating, cold and clammy skin, racing or abnormal heartbeat for more than 10 minutes or if they keep coming & going.  Call 911 and do not tr to get to hospital by care  You can help yourself with lefestyle changes (quitting smoking if you smoke), eat lots of fruits & vegetables & low fat dairy products, not a lot of meat & fatty foods, walk or some form of physical activity most days of the week, lose weight if you are overweight  Take your cardiac medication as prescribed to lower cholesterol, to lower blood pressure, aspirin to prevent blood clots, and diabetes control  Make sure to keep appointments with doctor for cardiac follow up care      Diagnosis: HTN (hypertension)  Assessment and Plan of Treatment: Low salt diet  Activity as tolerated.  Take all medication as prescribed.  Follow up with your medical doctor for routine blood pressure monitoring at your next visit.  Notify your doctor if you have any of the following symptoms:   Dizziness, Lightheadedness, Blurry vision, Headache, Chest pain, Shortness of breath      Diagnosis: HLD (hyperlipidemia)  Assessment and Plan of Treatment: Low salt, low fat, low cholesterol, diabetic diet if appropriate  Continue medication as prescribed  Exercise, increase your activity level  Pt. verbalized an understanding of all instructions.      Diagnosis: Anxiety and depression  Assessment and Plan of Treatment: SEEK IMMEDIATE CARE IF  You have thoughts about hurting yourself or others.  You lose touch with reality (have psychotic symptoms).  You are taking medicine for depression and have a serious side effect      Diagnosis: Diabetic foot  Assessment and Plan of Treatment: Continue to follow with your primary care MD or your endocrinologist.  Follow a heart healthy diabetic diet. If you check your fingerstick glucose at home, call your MD if it is greater than 250mg/dL on 2 occasions or less than 100mg/dL on 2 occasions. Know signs of low blood sugar, such as: dizziness, shakiness, sweating, confusion, hunger, nervousness-drink 4 ounces apple juice if occurs and call your doctor. Know early signs of high blood sugar, such as: frequent urination, increased thirst, blurry vision, fatigue, headache - call your doctor if this occurs. Follow with other practitioners to care for your diabetes, such as ophthamologist and podiatrist.      Diagnosis: DM type 2, goal A1C to be determined  Assessment and Plan of Treatment: Your hemoglobin A1C will be between 7-8

## 2023-02-19 NOTE — PROVIDER CONTACT NOTE (OTHER) - ACTION/TREATMENT ORDERED:
NP recommends psych consult; RN agrees. Holding insulin and premeal at this time. Will continue to monitor

## 2023-02-19 NOTE — DISCHARGE NOTE PROVIDER - PROVIDER TOKENS
PROVIDER:[TOKEN:[[id number]]] PROVIDER:[TOKEN:[[id number]]],PROVIDER:[TOKEN:[[id number]],FOLLOWUP:[1 week]],PROVIDER:[TOKEN:[[id number]],FOLLOWUP:[1 month]] PROVIDER:[TOKEN:[[id number]],FOLLOWUP:[1 week]],PROVIDER:[TOKEN:[[id number]],FOLLOWUP:[1 month]]

## 2023-02-19 NOTE — PROGRESS NOTE ADULT - ASSESSMENT
51 yo male with PMH of CAD s/p PCI with stent, HTN, HLD, uncontrolled IDDM2, anxiety and depression who presents from outpatient podiatry office with worsening R foot pain found to be in severe sepsis 2/2 cellulitis and osteomyelitis with CT of R ankle concerning for possible septic arthritis and gas-forming/necrotizing infection with course c/b MSSA bacteremia.

## 2023-02-19 NOTE — DISCHARGE NOTE PROVIDER - CARE PROVIDER_API CALL
Karime Bueno)  Internal Medicine  [address] [place] [address]  Phone: [phone]  Fax: [phone]  Follow Up Time:    Karime Bueno)  Internal Medicine  [address] [place] [address]  Phone: [phone]  Fax: [phone]  Follow Up Time:     Gavin Lott ([place])  Podiatric Medicine and Surgery  [address] [place] [id number]  Phone: [phone]  Fax: [phone]  Follow Up Time: 1 week    Sav Riley)  Internal Medicine  [address] [place], [place] [id number]  Phone: [phone]  Fax: [phone]  Follow Up Time: 1 month   Gavin Lott (DPM)  Podiatric Medicine and Surgery  [address] [place] [address]  Phone: [phone]  Fax: [phone]  Follow Up Time: 1 week    Sav Riley)  Internal Medicine  [address] [place] [id number]  Phone: [phone]  Fax: [phone]  Follow Up Time: 1 month

## 2023-02-19 NOTE — PROGRESS NOTE ADULT - ASSESSMENT
53 y/o M w/ hx of uncontrolled Type 2 DM w/ hyperglycemia complicated by neuropathy, amputations, retinopathy and CAD, HTN and HLD admitted for sepsis 2/2 right foot infection (high risk patient with severely uncontrolled diabetes with A1c of 11.2% at high risk of CAD and CVA with high level decision-making). podiatry eval pt refusing discussion of amputation/debridement. BG tightly controlled will decrease insulin until PO intake improves. BG goal (100-180mg/dl).

## 2023-02-19 NOTE — PROVIDER CONTACT NOTE (OTHER) - ACTION/TREATMENT ORDERED:
NP recommended RN holds premeal insulin at this time. depending how much food pt eats we will reassess need for insulin. Will continue to monitor

## 2023-02-19 NOTE — PROGRESS NOTE ADULT - PROBLEM SELECTOR PLAN 6
hypoglycemic 80s  HbA1c 10.8%  - endo reconsulted and insulin doses adjusted - lantus 60 units qHS, admelog 16  - c/w sliding scale coverage

## 2023-02-19 NOTE — PROGRESS NOTE ADULT - PROBLEM SELECTOR PLAN 2
h/o poorly controlled dm, pad s/p right toe amputations  OM over first metatarsal stump confirmed on MRI, CT w/ septic arthritis of underlying 1st tarsometatarsal joint; f  - d/w podiatry team. podiatry following advising debridement/amputation - pt currently refusing --> CT ankle ordered  - s/p arthrocentesis by podiatry - f/u joint aspiration studies and culture  - ID following - IV ancef given MSSA bacteremia  - Arterial duplex suggestive PAD -f/u SAL/PVR  - vasc sx consult appreciated, plan pending podiatry plan  - pain control - dilaudid 1 mg iv prn severe pain, oxy prn moderate pain

## 2023-02-19 NOTE — PROGRESS NOTE ADULT - PROBLEM SELECTOR PLAN 3
·  Recommendation: statin therapy      Can be reached via TEAMS/pager: [phone]   office:  [phone] (M-F 9a-5pm)               [phone] (nights/weekends)   Can access Amion coverage via sunrise/tools

## 2023-02-19 NOTE — PROGRESS NOTE ADULT - SUBJECTIVE AND OBJECTIVE BOX
Patient is a 52y old  Male who presents with a chief complaint of r foot pain/tenderness, warmth, swelling (19 Feb 2023 05:03)       INTERVAL HPI/OVERNIGHT EVENTS:  Patient seen and evaluated at bedside.  Pt is resting comfortable in NAD. Denies N/V/F/C.     Allergies    No Known Allergies    Intolerances        Vital Signs Last 24 Hrs  T(C): 36.7 (19 Feb 2023 09:41), Max: 37.3 (19 Feb 2023 06:00)  T(F): 98 (19 Feb 2023 09:41), Max: 99.1 (19 Feb 2023 06:00)  HR: 79 (19 Feb 2023 09:41) (69 - 85)  BP: 136/63 (19 Feb 2023 09:41) (100/61 - 146/84)  BP(mean): --  RR: 18 (19 Feb 2023 09:41) (18 - 18)  SpO2: 93% (19 Feb 2023 09:41) (93% - 99%)    Parameters below as of 19 Feb 2023 09:41  Patient On (Oxygen Delivery Method): room air        LABS:                        9.1    7.22  )-----------( 221      ( 19 Feb 2023 08:53 )             28.0     02-19    133<L>  |  98  |  34<H>  ----------------------------<  65<L>  4.1   |  21<L>  |  2.06<H>    Ca    9.1      19 Feb 2023 08:52  Phos  4.2     02-18  Mg     2.5     02-18          CAPILLARY BLOOD GLUCOSE      POCT Blood Glucose.: 72 mg/dL (19 Feb 2023 09:27)  POCT Blood Glucose.: 88 mg/dL (18 Feb 2023 21:19)  POCT Blood Glucose.: 87 mg/dL (18 Feb 2023 17:33)  POCT Blood Glucose.: 111 mg/dL (18 Feb 2023 14:26)  POCT Blood Glucose.: 113 mg/dL (18 Feb 2023 13:21)      Lower Extremity Physical Exam:  Vascular: DP/PT 0/4, B/L, CFT <3 seconds B/L, Temperature gradient warm to cool, B/L.   Neuro: Epicritic sensation diminished to the level of toes, B/L.  Musculoskeletal/Ortho: Right ankle no pain with active or passive dorsiflexion/plantarflexion/inversion/eversion, pain on palpation localized to the medial and lateral malleoli.  Skin: Right ankle ROM wnl, pain on palpation localized to the medial and lateral malleoli. Right foot submet 1 wound to bone, scant pus today, fibrogranular wound bed, tracks dorsal proximal along tenon 4cm, no fluctuance    RADIOLOGY & ADDITIONAL TESTS:

## 2023-02-19 NOTE — DISCHARGE NOTE PROVIDER - NSDCFUADDAPPT_GEN_ALL_CORE_FT
Podiatry Discharge Instructions:  Follow up: Please follow up with Dr. Gavin Lott within 1 week of discharge from the hospital, please call [phone] for appointment and discuss that you recently were seen in the hospital.  Wound Care: Please leave apply packing to right lower extremity followed by 4x4 gauze, ABD, and kerlix daily  Weight bearing: Please remain non weight bearing to right lower extremity in surgical shoe with weight bear privileges for transfer only  Antibiotics: Please continue as instructed. Podiatry Discharge Instructions:  Follow up: Please follow up with Dr. Gavin Lott within 1 week of discharge from the hospital, please call [phone] for appointment and discuss that you recently were seen in the hospital.  Wound Care: Please apply white foam wound VAC @ 125mmHg to the right lower extremity 3x per week.   Weight bearing: Please remain non weight bearing to right lower extremity with CAM boot and can weight bear for transfers only  Antibiotics: Please continue as instructed. APPTS ARE READY TO BE MADE: [x] YES    Best Family or Patient Contact (if needed):    Additional Information about above appointments (if needed):    1:   2:   3:     Other comments or requests:   Podiatry Discharge Instructions:  Follow up: Please follow up with Dr. Gavin Lott within 1 week of discharge from the hospital, please call [phone] for appointment and discuss that you recently were seen in the hospital.  Wound Care: Please apply white foam wound VAC @ 125mmHg to the right lower extremity 3x per week.   Weight bearing: Please remain non weight bearing to right lower extremity with CAM boot and can weight bear for transfers only  Antibiotics: Please continue as instructed. APPTS ARE READY TO BE MADE: [x] YES    Best Family or Patient Contact (if needed):    Additional Information about above appointments (if needed):    1:   2:   3:     Other comments or requests:   Podiatry Discharge Instructions:  Follow up: Please follow up with Dr. Gavin Lott within 1 week of discharge from the hospital, please call [phone] for appointment and discuss that you recently were seen in the hospital.  Wound Care: Please apply white foam wound VAC @ 125mmHg to the right lower extremity 3x per week.   Weight bearing: Please remain non weight bearing to right lower extremity with CAM boot and can weight bear for transfers only  Antibiotics: Please continue as instructed.  Patient was previously scheduled with Dr. Gavin Lott on 3/13/23 at 2PM at [address] [place] [address]. Patient advised they currently have a specialist that they will follow up with for Internal Medicine named Dr. Morocho.

## 2023-02-19 NOTE — DISCHARGE NOTE PROVIDER - NSDCMRMEDTOKEN_GEN_ALL_CORE_FT
Please dispense ONE R lower extremity prefabricated AFO walker: Please dispense ONE R lower extremity prefabricated AFO walker  Ind: s/p R foot complex Incision and Drainage with VAC  Disp: 1 prefabricated AFO walker  rolling walker: Rolling walker for mobility assistance Dx:diabetic foot wound, s/p toe resection   aspirin 81 mg oral delayed release tablet: 1 tab(s) orally once a day  cefpodoxime 200 mg oral tablet: 2.5 tab(s) orally every 12 hours   lactobacillus acidophilus oral capsule: 1 tab(s) orally 2 times a day  metFORMIN 1000 mg oral tablet: 1 tab(s) orally 2 times a day  metoprolol succinate 25 mg oral tablet, extended release: 1 tab(s) orally once a day  NovoLOG FlexPen 100 units/mL injectable solution: 14 unit(s) injectable 3 times a day (before meals)  rosuvastatin 40 mg oral tablet: 1 tab(s) orally once a day  senna leaf extract oral tablet: 2 tab(s) orally once a day (at bedtime), As needed, Constipation  sertraline 50 mg oral tablet: 1 tab(s) orally once a day  Tresiba FlexTouch 100 units/mL subcutaneous solution: 52 unit(s) subcutaneous once a day (at bedtime)  Trulicity Pen 3 mg/0.5 mL subcutaneous solution: 3 milligram(s) subcutaneous every 7 days   aspirin 81 mg oral delayed release tablet: 1 tab(s) orally once a day  cefadroxil 500 mg oral capsule: 1 cap(s) orally every 12 hours   clopidogrel 75 mg oral tablet: 1 tab(s) orally once a day  Dilaudid 4 mg oral tablet: 1 tab(s) orally every 6 hours MDD:4  lactobacillus acidophilus oral capsule: 1 tab(s) orally 2 times a day  metFORMIN 1000 mg oral tablet: 1 tab(s) orally 2 times a day  metoprolol succinate 25 mg oral tablet, extended release: 1 tab(s) orally once a day  NovoLOG FlexPen 100 units/mL injectable solution: 14 unit(s) injectable 3 times a day (before meals)  rosuvastatin 40 mg oral tablet: 1 tab(s) orally once a day  senna leaf extract oral tablet: 2 tab(s) orally once a day (at bedtime), As needed, Constipation  sertraline 50 mg oral tablet: 1 tab(s) orally once a day  Tresiba FlexTouch 100 units/mL subcutaneous solution: 52 unit(s) subcutaneous once a day (at bedtime)  Trulicity Pen 3 mg/0.5 mL subcutaneous solution: 3 milligram(s) subcutaneous every 7 days

## 2023-02-19 NOTE — PROVIDER CONTACT NOTE (OTHER) - ASSESSMENT
pt glucose 72. Insulin coverage held. Premeal 20 units ordered. In order set it states if glucose is under 100 to obtain one time order of 10 units premeal. Pt has not eaten any of his tray. All VSS

## 2023-02-19 NOTE — PROGRESS NOTE ADULT - PROBLEM SELECTOR PLAN 1
-test BG AC/HS  -Decrease Lantus 60 units QHS. If BG tightly controlled can reduce dose further.   -Decrease Admelog 16 units AC meals for now. Hold if not eating. Can give 8 units if eating and BG tightly controlled  -c/w Admelog moderate correction scale AC and mod HS scale  -cons carb diet  Outpt. endo follow-up with Dr. London  Outpt. optho, podiatry, nephrology  Plan to d/c on basal bolus +metformin and GLP-1.  F/u with  Dr London

## 2023-02-19 NOTE — PROVIDER CONTACT NOTE (OTHER) - ASSESSMENT
pt glucose 70. Pt refuses to eat any meals throughout the day and is insisting RN give him apple juice. RN educated patient on the risks of not eating meals as a diabetic. Pt states "I hate all the food here. you guys are poisoning me". RN attempted to reorient pt and pt got defensive and states all he wants is apple juice or orange juice

## 2023-02-19 NOTE — DISCHARGE NOTE PROVIDER - HOSPITAL COURSE
53 yo male with PMH of CAD s/p PCI with stent, HTN, HLD, uncontrolled IDDM2, anxiety and depression who presents from outpatient podiatry office with worsening R foot pain found to be in severe sepsis 2/2 cellulitis and osteomyelitis with a Severe sepsis. that's now resolving met with sepsis criteria on admission with leukocytosis, fever, tachycardia, + lactic acidosis  2/2 cellulitis, osteo, possible septic arthritis, and now MSSA bacteremia treatment of infection as below.    Diabetic foot.    h/o poorly controlled dm, pad s/p right toe amputations  OM over first metatarsal stump confirmed on MRI, CT w/ septic arthritis of underlying 1st tarsometatarsal joint; f  - podiatry following advising debridement/amputation - pt currently refusing --> CT ankle ordered  - s/p arthrocentesis by podiatry - f/u joint aspiration studies and culture  - ID following - IV ancef given MSSA bacteremia  - Arterial duplex suggestive PAD - refusing SAL/PVR multiple times now - will need to reorder when patient amenable  - vasc sx consult appreciated, plan pending podiatry plan  - pain control - dc morphine, switch to dilaudid 1 mg iv prn severe pain, oxy prn moderate pain.     MSSA bacteremia.    2/15 blood cx +MSSA, recent bld cx 2/17 NGTD  - abx de-escalated to ancef   - TTE to r/o vegetation/endocarditis.     TEENA (acute kidney injury).   pre-renal etiology given poor access to water in ED hallway until today + in setting of severe infection/sepsis  - check bladder scan for PVR  - c/w IVF - LR x 10 hrs  - monitor Cr on BMP  - dc morphine, hold ace inh.   High anion gap metabolic acidosis.    lactic acidosis 3.3->1.8 (resolved),beta hydroxy negative  - stress hyperglycemia iso severe sepsis + poorly controlled dm  - resolved.     DM (diabetes mellitus).    hypoglycemic overnight  hold home metformin 1g bid, trulicity 3 mg weekly, glargine 66 u hs, lispro 24 u prandial + low dose correctional scale  HbA1c 10.8%  - c/w lantus 66 units qHS  - c/w pre-meal lispro adjusted as per endo recs  - c/w sliding scale coverage.  CAD (coronary artery disease).   s/p pci w stents  - prior recent ekg, tte, lhc reviewed  - c/w DAPT, statin  - c/w Toprol XL 25mg daily.     HTN (hypertension).   stable  - c/w toprol XL 25mg PO daily  - hold lisinopril given new TEENA and marginal BP  - monitor vitals.     Problem/Plan - 9:  ·  Problem: HLD (hyperlipidemia).   ·  Plan: - c/w atorva 80mg qHS while inpatient  - resume home rosuva on d/c.     Anxiety and depression.    - c/w sertraline 50     51 yo male with PMH of CAD s/p PCI with stent, HTN, HLD, uncontrolled IDDM2, anxiety and depression who presents from outpatient podiatry office with worsening R foot pain found to be in severe sepsis 2/2 cellulitis and osteomyelitis with a Severe sepsis. that's now resolving met with sepsis criteria on admission with leukocytosis, fever, tachycardia, + lactic acidosis  2/2 cellulitis, osteo, possible septic arthritis, and now MSSA bacteremia treatment of infection as below.    Diabetic foot.    s/p right foot 1st met amputation and right ankle I&D (DOS 2/24/23): distal sutures intact, flaps warm and viable, proximal tib ant tendon exposed, proximal wound bed dusky with mild TA tendon dessication, scant sanguinous drainage, distal and proximal tracking 2cm.   - Right foot wound culture growing MSSA, strep anginosus, Peptostreptococcus  - R ankle MR: OM of 1st met, bases of 2nd and 3rd met, all cuneiforms and likely cuboid  - s/p LLE angio w 3 vessel r/o  - Per intraop findings, high concern for residual infection, low concern for viability   - OR clean bone culture no growth prelim   - ID recs PO Cefadroxil on dishcarge, appreciated  - VAC to be applied today   - Patient is stable for discharge from podiatry standpoint   - F/u information and instructions can be found in the f/u section of d/c provider note      MSSA bacteremia form blood clx 2/15     From TMA wound (first metatarsal) but also concern for osteomyelitis, septic arthritis and tenosynovitis.   Local culture grew MSSA, Strep anginosus, Peptostreptococcus.   Blood cleared 2/17, TTE negative but limited.   s/p OR 2/24 partial 1st ray resection, ankle I&D with pus.   High concern for residual infection but he refused further surgery and does not want IV antibiotics upon discharge.   He is aware of risk for future limb loss.   ID suggest to  stop Flagyl  and continue Cefazolin 2GM q8h    ID favor PICC line with 6 weeks IV antibiotics until 3/30 but if he declines, would give at least 4 weeks IV antibiotics until 3/16 and finish with oral Cefadroxil 500mg BID        TEENA (acute kidney injury).   pre-renal etiology given poor access to water in ED hallway until today + in setting of severe infection/sepsis  - check bladder scan for PVR  - c/w IVF - LR x 10 hrs  - monitor Cr on BMP  - dc morphine, hold ace inh.   High anion gap metabolic acidosis.    lactic acidosis 3.3->1.8 (resolved),beta hydroxy negative  - stress hyperglycemia iso severe sepsis + poorly controlled dm  - resolved.     DM (diabetes mellitus).    hypoglycemic overnight  hold home metformin 1g bid, trulicity 3 mg weekly, glargine 66 u hs, lispro 24 u prandial + low dose correctional scale  HbA1c 10.8%  - c/w lantus 66 units qHS  - c/w pre-meal lispro adjusted as per endo recs  - c/w sliding scale coverage.  Discharge recs by ENdocrine  continue Lantus 52 units sq qhs ; can continue Admelog 14 units TID AC for now with outpatient follow up for any BG <70s or persistently >200 for further adjustments   Metformin 1000mg BID  Trulicity 3mg/week injection  F/u with  Dr Jenifer London 5/3. Can schedule sooner w/NP if needed.  ensure pt has adequate DM supplies and all insulin supplies         CAD (coronary artery disease).   s/p pci w stents  - prior recent ekg, tte, lhc reviewed  - c/w DAPT, statin  - c/w Toprol XL 25mg daily.     HTN (hypertension).   stable  - c/w toprol XL 25mg PO daily  - hold lisinopril given new TEENA and marginal BP  - monitor vitals.     Problem/Plan - 9:  ·  Problem: HLD (hyperlipidemia).   ·  Plan: - c/w atorva 80mg qHS while inpatient  - resume home rosuva on d/c.     Anxiety and depression.    - c/w sertraline 50    Discharged Home with home care with Podiatry, Endocrine, PMD follow up.

## 2023-02-19 NOTE — PROGRESS NOTE ADULT - PROBLEM SELECTOR PLAN 10
- c/w sertraline 50    Dispo: pending TTE, c/w iv abx, and decision from podiatry re next steps, SAL/PVR

## 2023-02-19 NOTE — PROGRESS NOTE ADULT - SUBJECTIVE AND OBJECTIVE BOX
Diabetes Follow up note:    Chief complaint: T2DM    Interval Hx: BG values tightly controlled over past 24 hours. Pt seen at bedside this AM. Premeal held as glucose 72mg/dl and pt not eating. Pt reports plans to eat in a bit and falling asleep while interviewing him. Encouraged PO intake to prevent hypoglycemia. Pt endorsing decreased PO intake from baseline at this time.     Review of Systems:  General: + sleepy  GI: Denies N/V/D/Abd pain  CV: Denies CP/SOB  ENDO: No S&Sx of hypoglycemia    MEDS:  atorvastatin 80 milliGRAM(s) Oral at bedtime  insulin glargine Injectable (LANTUS) 60 Unit(s) SubCutaneous at bedtime  insulin lispro (ADMELOG) corrective regimen sliding scale   SubCutaneous three times a day before meals  insulin lispro (ADMELOG) corrective regimen sliding scale   SubCutaneous at bedtime  insulin lispro Injectable (ADMELOG) 16 Unit(s) SubCutaneous three times a day before meals    ceFAZolin   IVPB 2000 milliGRAM(s) IV Intermittent every 8 hours    Allergies    No Known Allergies      PE:  General: Male lying in bed. NAD.   Vital Signs Last 24 Hrs  T(C): 36.7 (19 Feb 2023 09:41), Max: 37.3 (19 Feb 2023 06:00)  T(F): 98 (19 Feb 2023 09:41), Max: 99.1 (19 Feb 2023 06:00)  HR: 79 (19 Feb 2023 09:41) (69 - 85)  BP: 136/63 (19 Feb 2023 09:41) (100/61 - 146/84)  BP(mean): --  RR: 18 (19 Feb 2023 09:41) (18 - 18)  SpO2: 93% (19 Feb 2023 09:41) (93% - 99%)    Parameters below as of 19 Feb 2023 09:41  Patient On (Oxygen Delivery Method): room air      Abd: Soft, NT,ND,   Extremities: Warm. R foot dsg c/d/i  Neuro: A&O X3    LABS:  POCT Blood Glucose.: 72 mg/dL (02-19-23 @ 09:27)  POCT Blood Glucose.: 88 mg/dL (02-18-23 @ 21:19)  POCT Blood Glucose.: 87 mg/dL (02-18-23 @ 17:33)  POCT Blood Glucose.: 111 mg/dL (02-18-23 @ 14:26)  POCT Blood Glucose.: 113 mg/dL (02-18-23 @ 13:21)  POCT Blood Glucose.: 170 mg/dL (02-18-23 @ 08:59)  POCT Blood Glucose.: 87 mg/dL (02-17-23 @ 21:20)  POCT Blood Glucose.: 68 mg/dL (02-17-23 @ 21:18)  POCT Blood Glucose.: 91 mg/dL (02-17-23 @ 17:34)  POCT Blood Glucose.: 103 mg/dL (02-17-23 @ 13:33)  POCT Blood Glucose.: 182 mg/dL (02-17-23 @ 08:24)  POCT Blood Glucose.: 189 mg/dL (02-17-23 @ 02:08)  POCT Blood Glucose.: 169 mg/dL (02-16-23 @ 21:35)  POCT Blood Glucose.: 121 mg/dL (02-16-23 @ 17:58)  POCT Blood Glucose.: 336 mg/dL (02-16-23 @ 11:35)                            9.1    7.22  )-----------( 221      ( 19 Feb 2023 08:53 )             28.0       02-19    133<L>  |  98  |  34<H>  ----------------------------<  65<L>  4.1   |  21<L>  |  2.06<H>    eGFR: 39<L>    Ca    9.1      02-19  Mg     2.5     02-18  Phos  4.2     02-18        Thyroid Function Tests:  02-16 @ 05:39 TSH 2.47 FreeT4 -- T3 -- Anti TPO -- Anti Thyroglobulin Ab -- TSI --      A1C with Estimated Average Glucose Result: 10.8 % (02-17-23 @ 07:07)  A1C with Estimated Average Glucose Result: 11.2 % (02-16-23 @ 05:39)  A1C with Estimated Average Glucose Result: 11.7 % (06-22-22 @ 23:39)          Contact number: cameron [phone] or [phone]

## 2023-02-19 NOTE — PROGRESS NOTE ADULT - ASSESSMENT
52M presents with right foot wound and right ankle pain.  - Pt seen and evaluated.  - Temp 99.4, WBC 7.59  - Right ankle ROM wnl, pain on palpation localized to the medial and lateral malleoli. Right foot submet 1 wound to bone, scant pus today, fibrogranular wound bed, tracks dorsal proximal along tenon 4cm, no fluctuance  - Right foot wound culture growing MSSA, strep anginosus, Peptostreptococcus  - R ankle MR: OM of 1st met, bases of 2nd and 3rd met, all cuneiforms and likely cuboid  - Right ankle joint aspirate: Negative for organisms and crystals  - SAL/PVRs pending, vasc recs debridement/amputation w/ pods prior to intvn appreciated  - Recommend pain management consult  - Pod plan local wound care, pt w/ acute soft tissue and bone infection risking salvageability of right foot requiring partial amputation and debridement for source control of infection, this was explained to the pt several times, states he is aware, and is unwilling to discuss surgical intervention until his ankle pain and swelling is resolved. It was communicated to the pt that the ankle symptoms he is experiencing may be related to the severe infection of his right foot that appears to be spreading along tendons, pt acknowledged and continued to refuse discussion of debridement/amputation.  - Pt will likely need long term course of IV abx   - Seen with attending 52M presents with right foot wound and right ankle pain.  - Pt seen and evaluated.  - Afebrile, no leukocytosis  - Right ankle ROM wnl, pain on palpation localized to the medial and lateral malleoli. Right foot submet 1 wound to bone, scant pus today, fibrogranular wound bed, tracks dorsal proximal along tenon 4cm, no fluctuance  - Right foot wound culture growing MSSA, strep anginosus, Peptostreptococcus  - R ankle MR: OM of 1st met, bases of 2nd and 3rd met, all cuneiforms and likely cuboid  - Right ankle joint aspirate: Negative for organisms and crystals  - SAL/PVRs pending, vasc recs debridement/amputation w/ pods prior to intvn appreciated  - Recommend pain management consult  - Pod plan local wound care, pt w/ acute soft tissue and bone infection risking salvageability of right foot requiring partial amputation and debridement for source control of infection, this was explained to the pt several times, states he is aware, and is unwilling to discuss surgical intervention until his ankle pain and swelling is resolved. It was communicated to the pt that the ankle symptoms he is experiencing may be related to the severe infection of his right foot that appears to be spreading along tendons, pt acknowledged and continued to refuse discussion of debridement/amputation.  - Pt will likely need long term course of IV abx   - Seen with attending

## 2023-02-19 NOTE — PROGRESS NOTE ADULT - SUBJECTIVE AND OBJECTIVE BOX
Ruth Puga MD  Division of Hospital Medicine  Available via MS Teams  Pager [phone], If no response/off hours [phone]    Patient is a 52y old  Male who presents with a chief complaint of r foot pain/tenderness, warmth, swelling (19 Feb 2023 11:08)        SUBJECTIVE / OVERNIGHT EVENTS:  overnight no events - noted to have FS 80s  no n/v/f/chills, cp, sob, abd pain  states he is sleepy and tired and does not want to talk  states he does not want a procedure because he has had it done before and he "doesn't need one"      I&O's Summary    18 Feb 2023 07:01  -  19 Feb 2023 07:00  --------------------------------------------------------  IN: 1400 mL / OUT: 2400 mL / NET: -1000 mL    19 Feb 2023 07:01  -  19 Feb 2023 11:52  --------------------------------------------------------  IN: 260 mL / OUT: 600 mL / NET: -340 mL      Vital Signs Last 24 Hrs  T(C): 36.7 (19 Feb 2023 09:41), Max: 37.3 (19 Feb 2023 06:00)  T(F): 98 (19 Feb 2023 09:41), Max: 99.1 (19 Feb 2023 06:00)  HR: 79 (19 Feb 2023 09:41) (69 - 85)  BP: 136/63 (19 Feb 2023 09:41) (100/61 - 146/84)  BP(mean): --  RR: 18 (19 Feb 2023 09:41) (18 - 18)  SpO2: 93% (19 Feb 2023 09:41) (93% - 99%)    Parameters below as of 19 Feb 2023 09:41  Patient On (Oxygen Delivery Method): room air      PHYSICAL EXAM:  GENERAL:  Well appearing, m, in NAD  HEAD:  NCAT  EYES: PERRLA, conjunctiva clear  NECK: Supple, No JVD  CHEST/LUNG: CTA B/L. No w/r/r.  HEART: Reg rate. Normal S1, S2.  ABDOMEN: SNTND.  PSYCH: Alert, labile mood  SKIN: r foot dressed in kerlix      LABS:                        9.1    7.22  )-----------( 221      ( 19 Feb 2023 08:53 )             28.0     02-19    133<L>  |  98  |  34<H>  ----------------------------<  65<L>  4.1   |  21<L>  |  2.06<H>    Ca    9.1      19 Feb 2023 08:52  Phos  4.2     02-18  Mg     2.5     02-18    Culture - Joint Fluid (collected 17 Feb 2023 13:58)  Source: Joint Fl Joint Fluid  Gram Stain (17 Feb 2023 23:16):    No polymorphonuclear cells seen per low power field    No organisms seen per oil power field  Preliminary Report (18 Feb 2023 18:51):    No growth    Culture - Blood (collected 17 Feb 2023 07:17)  Source: .Blood Blood-Peripheral  Preliminary Report (18 Feb 2023 09:01):    No growth to date.    Culture - Blood (collected 17 Feb 2023 07:17)  Source: .Blood Blood-Peripheral  Preliminary Report (18 Feb 2023 09:01):    No growth to date.      SARS-CoV-2: NotDetec (15 Feb 2023 14:14)      CAPILLARY BLOOD GLUCOSE      POCT Blood Glucose.: 72 mg/dL (19 Feb 2023 09:27)  POCT Blood Glucose.: 88 mg/dL (18 Feb 2023 21:19)  POCT Blood Glucose.: 87 mg/dL (18 Feb 2023 17:33)  POCT Blood Glucose.: 111 mg/dL (18 Feb 2023 14:26)  POCT Blood Glucose.: 113 mg/dL (18 Feb 2023 13:21)    MEDICATIONS  (STANDING):  aspirin enteric coated 81 milliGRAM(s) Oral daily  atorvastatin 80 milliGRAM(s) Oral at bedtime  ceFAZolin   IVPB 2000 milliGRAM(s) IV Intermittent every 8 hours  clopidogrel Tablet 75 milliGRAM(s) Oral daily  dextrose 5%. 1000 milliLiter(s) (50 mL/Hr) IV Continuous <Continuous>  dextrose 5%. 1000 milliLiter(s) (100 mL/Hr) IV Continuous <Continuous>  dextrose 50% Injectable 25 Gram(s) IV Push once  dextrose 50% Injectable 12.5 Gram(s) IV Push once  dextrose 50% Injectable 25 Gram(s) IV Push once  glucagon  Injectable 1 milliGRAM(s) IntraMuscular once  heparin   Injectable 5000 Unit(s) SubCutaneous every 8 hours  influenza   Vaccine 0.5 milliLiter(s) IntraMuscular once  insulin glargine Injectable (LANTUS) 60 Unit(s) SubCutaneous at bedtime  insulin lispro (ADMELOG) corrective regimen sliding scale   SubCutaneous three times a day before meals  insulin lispro (ADMELOG) corrective regimen sliding scale   SubCutaneous at bedtime  insulin lispro Injectable (ADMELOG) 16 Unit(s) SubCutaneous three times a day before meals  lactated ringers. 1000 milliLiter(s) (100 mL/Hr) IV Continuous <Continuous>  metoprolol succinate ER 25 milliGRAM(s) Oral daily  naloxone Injectable 0.4 milliGRAM(s) IV Push once  polyethylene glycol 3350 17 Gram(s) Oral daily  senna 2 Tablet(s) Oral at bedtime  sertraline 50 milliGRAM(s) Oral daily    MEDICATIONS  (PRN):  acetaminophen     Tablet .. 650 milliGRAM(s) Oral every 6 hours PRN Temp greater or equal to 38C (100.4F), Mild Pain (1 - 3)  aluminum hydroxide/magnesium hydroxide/simethicone Suspension 30 milliLiter(s) Oral every 4 hours PRN Dyspepsia  bisacodyl 5 milliGRAM(s) Oral daily PRN Constipation  dextrose Oral Gel 15 Gram(s) Oral once PRN Blood Glucose LESS THAN 70 milliGRAM(s)/deciliter  HYDROmorphone  Injectable 1 milliGRAM(s) IV Push every 4 hours PRN Severe Pain (7 - 10)  melatonin 3 milliGRAM(s) Oral at bedtime PRN Insomnia  ondansetron Injectable 4 milliGRAM(s) IV Push every 8 hours PRN Nausea and/or Vomiting  oxyCODONE    IR 5 milliGRAM(s) Oral every 6 hours PRN Moderate Pain (4 - 6)

## 2023-02-20 LAB
GLUCOSE BLDC GLUCOMTR-MCNC: 123 MG/DL — HIGH (ref 70–99)
GLUCOSE BLDC GLUCOMTR-MCNC: 159 MG/DL — HIGH (ref 70–99)
GLUCOSE BLDC GLUCOMTR-MCNC: 207 MG/DL — HIGH (ref 70–99)
GLUCOSE BLDC GLUCOMTR-MCNC: 236 MG/DL — HIGH (ref 70–99)
GLUCOSE BLDC GLUCOMTR-MCNC: 79 MG/DL — SIGNIFICANT CHANGE UP (ref 70–99)
GLUCOSE BLDC GLUCOMTR-MCNC: 80 MG/DL — SIGNIFICANT CHANGE UP (ref 70–99)
GLUCOSE BLDC GLUCOMTR-MCNC: 82 MG/DL — SIGNIFICANT CHANGE UP (ref 70–99)

## 2023-02-20 PROCEDURE — 99232 SBSQ HOSP IP/OBS MODERATE 35: CPT

## 2023-02-20 PROCEDURE — 99233 SBSQ HOSP IP/OBS HIGH 50: CPT

## 2023-02-20 RX ORDER — LACTOBACILLUS ACIDOPHILUS 100MM CELL
1 CAPSULE ORAL
Refills: 0 | Status: DISCONTINUED | OUTPATIENT
Start: 2023-02-20 | End: 2023-02-24

## 2023-02-20 RX ORDER — INSULIN GLARGINE 100 [IU]/ML
20 INJECTION, SOLUTION SUBCUTANEOUS AT BEDTIME
Refills: 0 | Status: DISCONTINUED | OUTPATIENT
Start: 2023-02-20 | End: 2023-02-20

## 2023-02-20 RX ORDER — SENNA PLUS 8.6 MG/1
2 TABLET ORAL AT BEDTIME
Refills: 0 | Status: DISCONTINUED | OUTPATIENT
Start: 2023-02-20 | End: 2023-02-24

## 2023-02-20 RX ORDER — INSULIN LISPRO 100/ML
VIAL (ML) SUBCUTANEOUS AT BEDTIME
Refills: 0 | Status: DISCONTINUED | OUTPATIENT
Start: 2023-02-20 | End: 2023-02-23

## 2023-02-20 RX ORDER — INSULIN GLARGINE 100 [IU]/ML
20 INJECTION, SOLUTION SUBCUTANEOUS AT BEDTIME
Refills: 0 | Status: DISCONTINUED | OUTPATIENT
Start: 2023-02-20 | End: 2023-02-21

## 2023-02-20 RX ORDER — INSULIN GLARGINE 100 [IU]/ML
40 INJECTION, SOLUTION SUBCUTANEOUS AT BEDTIME
Refills: 0 | Status: DISCONTINUED | OUTPATIENT
Start: 2023-02-20 | End: 2023-02-20

## 2023-02-20 RX ORDER — INSULIN LISPRO 100/ML
VIAL (ML) SUBCUTANEOUS
Refills: 0 | Status: DISCONTINUED | OUTPATIENT
Start: 2023-02-20 | End: 2023-02-23

## 2023-02-20 RX ORDER — INSULIN GLARGINE 100 [IU]/ML
66 INJECTION, SOLUTION SUBCUTANEOUS AT BEDTIME
Refills: 0 | Status: DISCONTINUED | OUTPATIENT
Start: 2023-02-20 | End: 2023-02-20

## 2023-02-20 RX ADMIN — ATORVASTATIN CALCIUM 80 MILLIGRAM(S): 80 TABLET, FILM COATED ORAL at 22:18

## 2023-02-20 RX ADMIN — Medication 16 UNIT(S): at 13:22

## 2023-02-20 RX ADMIN — HEPARIN SODIUM 5000 UNIT(S): 5000 INJECTION INTRAVENOUS; SUBCUTANEOUS at 22:18

## 2023-02-20 RX ADMIN — HEPARIN SODIUM 5000 UNIT(S): 5000 INJECTION INTRAVENOUS; SUBCUTANEOUS at 15:50

## 2023-02-20 RX ADMIN — Medication 100 MILLIGRAM(S): at 09:23

## 2023-02-20 RX ADMIN — ONDANSETRON 4 MILLIGRAM(S): 8 TABLET, FILM COATED ORAL at 03:45

## 2023-02-20 RX ADMIN — HEPARIN SODIUM 5000 UNIT(S): 5000 INJECTION INTRAVENOUS; SUBCUTANEOUS at 00:16

## 2023-02-20 RX ADMIN — OXYCODONE HYDROCHLORIDE 5 MILLIGRAM(S): 5 TABLET ORAL at 03:55

## 2023-02-20 RX ADMIN — CLOPIDOGREL BISULFATE 75 MILLIGRAM(S): 75 TABLET, FILM COATED ORAL at 10:41

## 2023-02-20 RX ADMIN — OXYCODONE HYDROCHLORIDE 5 MILLIGRAM(S): 5 TABLET ORAL at 20:49

## 2023-02-20 RX ADMIN — Medication 100 MILLIGRAM(S): at 00:14

## 2023-02-20 RX ADMIN — Medication 16 UNIT(S): at 15:49

## 2023-02-20 RX ADMIN — Medication 1 TABLET(S): at 17:40

## 2023-02-20 RX ADMIN — Medication 2: at 13:22

## 2023-02-20 RX ADMIN — OXYCODONE HYDROCHLORIDE 5 MILLIGRAM(S): 5 TABLET ORAL at 10:40

## 2023-02-20 RX ADMIN — OXYCODONE HYDROCHLORIDE 5 MILLIGRAM(S): 5 TABLET ORAL at 11:20

## 2023-02-20 RX ADMIN — SERTRALINE 50 MILLIGRAM(S): 25 TABLET, FILM COATED ORAL at 10:41

## 2023-02-20 RX ADMIN — HEPARIN SODIUM 5000 UNIT(S): 5000 INJECTION INTRAVENOUS; SUBCUTANEOUS at 09:23

## 2023-02-20 RX ADMIN — INSULIN GLARGINE 20 UNIT(S): 100 INJECTION, SOLUTION SUBCUTANEOUS at 23:57

## 2023-02-20 RX ADMIN — Medication 100 MILLIGRAM(S): at 17:40

## 2023-02-20 RX ADMIN — Medication 81 MILLIGRAM(S): at 10:40

## 2023-02-20 RX ADMIN — OXYCODONE HYDROCHLORIDE 5 MILLIGRAM(S): 5 TABLET ORAL at 04:25

## 2023-02-20 NOTE — PROGRESS NOTE ADULT - PROBLEM SELECTOR PLAN 6
poorly controlled, hypoglycemic this hosp course with poor po intake  HbA1c 10.8%  - endo following  - given persistent hypoglycemia and poor po intake, decr lantus further to 20 u bedtime, c/w lispro as per endo  - c/w sliding scale coverage poorly controlled, hypoglycemic this hosp course with poor po intake  HbA1c 10.8%  - endo following - d/w NP [place]   - given persistent hypoglycemia and poor po intake, dc'ed lantus, c/w lispro as per endo  - c/w sliding scale coverage

## 2023-02-20 NOTE — PROGRESS NOTE ADULT - ASSESSMENT
53 y/o M w/ hx of uncontrolled Type 2 DM w/ hyperglycemia complicated by neuropathy, amputations, retinopathy and CAD, HTN and HLD admitted for sepsis 2/2 right foot infection (high risk patient with severely uncontrolled diabetes with A1c of 11.2% at high risk of CAD and CVA with high level decision-making). Plan for angio ?wednesday, and podiatry amputation/debridement 2/24 if pt consents.   BG tightly controlled due to poor po intake.      Uncontrolled type 2 diabetes mellitus with hyperglycemia, with long-term current use of insulin.   A1C 11.2   Follows with Dr. London. Last seen at the office 1/2023 with dose adjustment of Toujeo to 66 units qhs and metformin increased to 1000mg BID from 500mg BID. Has been on Trulicity 3mg weekly and takes Humalog around 40 units with meals.  ·  Plan:   - BG Goal 100-180mg/dl   -test BG AC/HS  FBG <100 off basal insulin, hold basal insulin at this time (previously on lantus 66 with Fasting hypoglycemia) ; plan to add back adjusted basal dose  insulin when FBG >100  -Continue with  Admelog 16 units AC meals for now. Hold if not eating. Can give 8 units if eating and BG tightly controlled  -change to  Admelog low correction scale AC and low  HS scale  -cons carb diet  Outpt. endo follow-up with Dr. London  Outpt. optho, podiatry, nephrology  Plan to d/c on basal bolus +metformin and GLP-1.  F/u with  Dr London.     Problem/Plan - 2:  ·  Problem: HTN (hypertension).   ·  Plan: ·  Recommendation: Goal BP <130/80 c/w lisinopril.     Problem/Plan - 3:  ·  Problem: HLD (hyperlipidemia).   ·  Plan: ·  Recommendation: statin therapy        discussed with patient and RN   Contact via Microsoft Teams during business hours  To reach covering provider access AMION via sunrise tools  For Urgent matters/after-hours/weekends/holidays please page endocrine fellow on call   For nonurgent matters please email [email or web address]    Please note that this patient may be followed by different provider tomorrow.  Notify endocrine 24 hours prior to discharge for final recommendations    greater than 50% of the encounter was spent counseling and/or coordination of care.  28 minutes spent on total encounter; The necessity of the time spent during the encounter on this date of service was due to development of plan of care/coordination of care/glycemic control through review of labs, blood glucose values and vital signs.  51 y/o M w/ hx of uncontrolled Type 2 DM w/ hyperglycemia complicated by neuropathy, amputations, retinopathy and CAD, HTN and HLD admitted for sepsis 2/2 right foot infection (high risk patient with severely uncontrolled diabetes with A1c of 11.2% at high risk of CAD and CVA with high level decision-making). Plan for angio ?wednesday, and podiatry amputation/debridement 2/24 if pt consents.   BG tightly controlled due to poor po intake.      Uncontrolled type 2 diabetes mellitus with hyperglycemia, with long-term current use of insulin.   A1C 11.2   Follows with Dr. London. Last seen at the office 1/2023 with dose adjustment of Toujeo to 66 units qhs and metformin increased to 1000mg BID from 500mg BID. Has been on Trulicity 3mg weekly and takes Humalog around 40 units with meals.  ·  Plan:   - BG Goal 100-180mg/dl   -test BG AC/HS  FBG <100 off basal insulin, hold basal insulin at this time (previously on lantus 66 with Fasting hypoglycemia) ; plan to add back adjusted basal dose  insulin when FBG >100  -Continue with  Admelog 16 units AC meals for now. Hold if not eating. Can give 8 units if eating and BG tightly controlled  -change to  Admelog low correction scale AC and low  HS scale  -cons carb diet  Outpt. endo follow-up with Dr. London  Outpt. optho, podiatry, nephrology  Plan to d/c on basal bolus +metformin and GLP-1.  F/u with  Dr London.     Problem/Plan - 2:  ·  Problem: HTN (hypertension).   ·  Plan: ·  Recommendation: Goal BP <130/80 c/w lisinopril.     Problem/Plan - 3:  ·  Problem: HLD (hyperlipidemia).   ·  Plan: ·  Recommendation: statin therapy        discussed with patient and RN & Dr Puga  Contact via Microsoft Teams during business hours  To reach covering provider access AMION via sunrise tools  For Urgent matters/after-hours/weekends/holidays please page endocrine fellow on call   For nonurgent matters please email [email or web address]    Please note that this patient may be followed by different provider tomorrow.  Notify endocrine 24 hours prior to discharge for final recommendations    greater than 50% of the encounter was spent counseling and/or coordination of care.  28 minutes spent on total encounter; The necessity of the time spent during the encounter on this date of service was due to development of plan of care/coordination of care/glycemic control through review of labs, blood glucose values and vital signs.

## 2023-02-20 NOTE — PROGRESS NOTE ADULT - ASSESSMENT
51 yo male with PMH of CAD s/p PCI with stent, HTN, HLD, uncontrolled IDDM2, anxiety and depression who presents from outpatient podiatry office with worsening R foot pain, likely septic toe.    Recs:  - SAL/PVRs ordered  - C/w IV abx for toe wound  - Will require debridement with podiatry prior to any Vascular Surgery intervention  - timing of angiogram in process  - care per primary team    Vascular Surgery  [id number]     53 yo male with PMH of CAD s/p PCI with stent, HTN, HLD, uncontrolled IDDM2, anxiety and depression who presents from outpatient podiatry office with worsening R foot pain, likely septic toe.    Recs:  - SAL/PVRs ordered, patient is currently refusing  - C/w IV abx for toe wound  - Will require debridement with podiatry prior to any Vascular Surgery intervention  - Angiogram currently scheduled for Wednesday however still remains concern for septic joint vs non-salvageable foot  - Please document medical/cardiac clearance for RLE angiogram  - care per primary team    Vascular Surgery  [id number]

## 2023-02-20 NOTE — PROGRESS NOTE ADULT - ASSESSMENT
52-year-old male with a past medical history most significant for diabetes, CAD status post PCI, gout who is admitted to the hospital due to right ankle pain.    #MSSA bacteremia  Blood cultures obtained on admission from 2/15 growing MSSA (1/2 sets, 1/4 bottles)  Source likely lower extremity wound  Repeat from 2/17 ngtd  joint fluid culture from 2/17 ngtd  Tissue culture with MSSA, strep    #Abnormal imaging of the right lower extremity  #Right foot ulcer with associated right ankle pain and swelling  #Concern for septic arthritis  Fever and leukocytosis upon admission  History of MRSA foot infection  Aspirated by podiatry, bone biopsy attempted however unsuccessful    #Leukocytosis  #Elevated inflammatory markers    #History of amputation of toe with right third and fourth partial ray resection and April 2021 and second partial ray resection in May 2021    Recommendations  Conitnue cefazolin 2g q8hr  Obtain repeat blood cultures  Follow Podiatry, vascular surgery  input  Advised for surgery as low likelihood antibiotics alone can provide cure   Follow fever curve and WBC count    Nirav Simmons MD  Division of Infectious Diseases  Available on Teams       52-year-old male with a past medical history most significant for diabetes, CAD status post PCI, gout who is admitted to the hospital due to right ankle pain.    #MSSA bacteremia  Blood cultures obtained on admission from 2/15 growing MSSA (1/2 sets, 1/4 bottles)  Source likely lower extremity wound  Repeat from 2/17 ngtd  joint fluid culture from 2/17 ngtd  Tissue culture with MSSA, strep    #Abnormal imaging of the right lower extremity  #Right foot ulcer with associated right ankle pain and swelling  #Concern for septic arthritis  Fever and leukocytosis upon admission  History of MRSA foot infection  Aspirated by podiatry, bone biopsy attempted however unsuccessful    #Leukocytosis  #Elevated inflammatory markers    #History of amputation of toe with right third and fourth partial ray resection and April 2021 and second partial ray resection in May 2021    Recommendations  Conitnue cefazolin 2g q8hr  Follow pending blood cultures  Obtain repeat blood cultures  Follow Podiatry, vascular surgery  input  Advised for surgery as low likelihood antibiotics alone can provide cure, agreeable   Follow fever curve and WBC count    Nirav Simmons MD  Division of Infectious Diseases  Available on Teams

## 2023-02-20 NOTE — PROGRESS NOTE ADULT - PROBLEM SELECTOR PLAN 1
resolved  2/2 cellulitis, osteo, possible septic arthritis, MSSA bacteremia  - treatment of infection as below

## 2023-02-20 NOTE — PROGRESS NOTE ADULT - ASSESSMENT
52M presents with right foot wound and right ankle pain.  - Pt seen and evaluated.  - Afebrile, no leukocytosis  - Right foot submet 1 wound to bone, scant pus today, fibrogranular wound bed, tracks dorsal proximal along tenon 4cm, no fluctuance  - Right foot wound culture growing MSSA, strep anginosus, Peptostreptococcus  - R ankle MR: OM of 1st met, bases of 2nd and 3rd met, all cuneiforms and likely cuboid  - Right ankle joint aspirate: Negative for organisms and crystals  - ID recommends continuing Ancef, appreciated  - SAL/PVRs pending, f/u vasc plans for angio ?Wednesday  - Pt states that he is amenable to pod surgery but does not want to consent for podiatric surgery until sequence of vasc & pods intvn confirmed. Due to pt's surgeon preference, earliest possible surgery date w/ pods is Friday 2/24.  - If pt consents, podiatry plan is right foot first ray amputation & tibialis anterior tendon debridement. Given extent of infection, would likely have to be left open & be part of a staged procedure approach, will likely also require long term course of IV abx post op.  - Discussed with attending

## 2023-02-20 NOTE — PROGRESS NOTE ADULT - ASSESSMENT
53 yo male with PMH of CAD s/p PCI with stent, HTN, HLD, uncontrolled IDDM2, anxiety and depression who presents from outpatient podiatry office with worsening R foot pain found to be in severe sepsis 2/2 cellulitis and osteomyelitis with CT of R ankle concerning for possible septic arthritis and gas-forming/necrotizing infection with course c/b MSSA bacteremia, awaiting OR 2/24.

## 2023-02-20 NOTE — PROGRESS NOTE ADULT - PROBLEM SELECTOR PLAN 2
h/o poorly controlled dm, pad s/p right toe amputations  OM over first metatarsal stump confirmed on MRI, CT w/ septic arthritis of underlying 1st tarsometatarsal joint;   - podiatry following advising debridement/amputation of r 1st ray - earliest friday 2/24  - s/p arthrocentesis by podiatry ngtd  - ID following - IV ancef given MSSA bacteremia, bld cx cleared since 2/17  - Arterial duplex suggestive PAD -f/u SAL/PVR  - vasc sx consult appreciated, plan pending podiatry plan  - pain control - dilaudid 1 mg iv prn severe pain, oxy prn moderate pain

## 2023-02-20 NOTE — PROGRESS NOTE ADULT - SUBJECTIVE AND OBJECTIVE BOX
Follow Up:  bacteremia    Interval History/ROS:  Overnight: No acute events.  Patient remains afebrile otherwise on room air.  Blood cultures from 2/17/2023 remain negative to date.  Joint fluid culture from 2/17/2023 with no growth.    Patient seen and examined at bedside.    Allergies  No Known Allergies        ANTIMICROBIALS:  ceFAZolin   IVPB 2000 every 8 hours      OTHER MEDS:  MEDICATIONS  (STANDING):  acetaminophen     Tablet .. 650 every 6 hours PRN  aluminum hydroxide/magnesium hydroxide/simethicone Suspension 30 every 4 hours PRN  aspirin enteric coated 81 daily  atorvastatin 80 at bedtime  bisacodyl 5 daily PRN  clopidogrel Tablet 75 daily  dextrose 50% Injectable 25 once  dextrose 50% Injectable 12.5 once  dextrose 50% Injectable 25 once  dextrose Oral Gel 15 once PRN  glucagon  Injectable 1 once  heparin   Injectable 5000 every 8 hours  HYDROmorphone  Injectable 1 every 6 hours PRN  influenza   Vaccine 0.5 once  insulin glargine Injectable (LANTUS) 40 at bedtime  insulin lispro (ADMELOG) corrective regimen sliding scale  three times a day before meals  insulin lispro (ADMELOG) corrective regimen sliding scale  at bedtime  insulin lispro Injectable (ADMELOG) 16 three times a day before meals  melatonin 3 at bedtime PRN  metoprolol succinate ER 25 daily  ondansetron Injectable 4 every 8 hours PRN  oxyCODONE    IR 5 every 6 hours PRN  polyethylene glycol 3350 17 daily  senna 2 at bedtime  sertraline 50 daily      Vital Signs Last 24 Hrs  T(C): 37.4 (20 Feb 2023 10:29), Max: 37.6 (19 Feb 2023 14:08)  T(F): 99.4 (20 Feb 2023 10:29), Max: 99.7 (19 Feb 2023 14:08)  HR: 80 (20 Feb 2023 10:29) (74 - 80)  BP: 156/89 (20 Feb 2023 10:29) (116/63 - 156/89)  BP(mean): --  RR: 18 (20 Feb 2023 10:29) (18 - 18)  SpO2: 95% (20 Feb 2023 10:29) (93% - 95%)    Parameters below as of 20 Feb 2023 10:29  Patient On (Oxygen Delivery Method): room air        PHYSICAL EXAM:  Constitutional: comfortable  Cardiovascular:   normal S1, S2, no murmur, no edema  Respiratory:  no resp distress  GI:  soft, non-tender,   Neurologic: awake and alert, normal strength, no focal findings  Skin:  foot wound bandaged- normal ROM                                9.1    7.22  )-----------( 221      ( 19 Feb 2023 08:53 )             28.0       02-19    133<L>  |  98  |  34<H>  ----------------------------<  65<L>  4.1   |  21<L>  |  2.06<H>    Ca    9.1      19 Feb 2023 08:52            MICROBIOLOGY:  v    Culture - Joint Fluid (collected 17 Feb 2023 13:58)  Source: Joint Fl Joint Fluid  Gram Stain (17 Feb 2023 23:16):    No polymorphonuclear cells seen per low power field    No organisms seen per oil power field  Preliminary Report (18 Feb 2023 18:51):    No growth    Culture - Blood (collected 17 Feb 2023 07:17)  Source: .Blood Blood-Peripheral  Preliminary Report (18 Feb 2023 09:01):    No growth to date.    Culture - Blood (collected 17 Feb 2023 07:17)  Source: .Blood Blood-Peripheral  Preliminary Report (18 Feb 2023 09:01):    No growth to date.    Culture - Urine (collected 16 Feb 2023 02:14)  Source: Clean Catch Clean Catch (Midstream)  Final Report (17 Feb 2023 07:16):    <10,000 CFU/mL Normal Urogenital Lizz    Culture - Tissue with Gram Stain (collected 15 Feb 2023 20:43)  Source: .Tissue right foot wound  Gram Stain (16 Feb 2023 05:26):    Few polymorphonuclear leukocytes seen per low power field    Few Gram Negative Rods seen per oil power field    Moderate Gram Variable Cocci seen per oil power field  Final Report (18 Feb 2023 13:58):    Moderate Staphylococcus aureus    Moderate Streptococcus anginosus "Susceptibilities not performed"    Rare Anaerobic Gram Positive Cocci Most closely resembling    Peptostreptococcus species "Susceptibilities not performed"  Organism: Staphylococcus aureus (18 Feb 2023 13:58)  Organism: Staphylococcus aureus (18 Feb 2023 13:58)      -  Ampicillin/Sulbactam: S <=8/4      -  Cefazolin: S <=4      -  Clindamycin: R >4      -  Erythromycin: R >4      -  Gentamicin: S <=1 Should not be used as monotherapy      -  Oxacillin: S <=0.25 Oxacillin predicts susceptibility for dicloxacillin, methicillin, and nafcillin      -  Rifampin: S <=1 Should not be used as monotherapy      -  Tetracycline: S <=1      -  Trimethoprim/Sulfamethoxazole: S <=0.5/9.5      -  Vancomycin: S 2      Method Type: MIGUE    Culture - Blood (collected 15 Feb 2023 20:15)  Source: .Blood Blood  Gram Stain (18 Feb 2023 14:07):    Growth in aerobic bottle: Gram Positive Cocci in Clusters    Growth in anaerobic bottle: Gram Positive Cocci in Clusters  Final Report (19 Feb 2023 17:15):    Growth in aerobic and anaerobic bottles: Staphylococcus aureus    See previous culture [id number]    Culture - Blood (collected 15 Feb 2023 20:00)  Source: .Blood Blood-Peripheral  Gram Stain (17 Feb 2023 14:40):    Growth in aerobic bottle: Gram Positive Cocci in Clusters    Growth in anaerobic bottle: Gram Positive Cocci in Clusters  Final Report (18 Feb 2023 16:07):    Growth in aerobic and anaerobic bottles: Staphylococcus aureus    ***Blood Panel PCR results on this specimen are available    approximately 3 hours after the Gram stain result.***    Gram stain, PCR, and/or culture results may not always    correspond dueto difference in methodologies.    ************************************************************    This PCR assay was performed by multiplex PCR. This    Assay tests for 66 bacterial and resistance gene targets.    Please refer to the [facility] test directory    at [email or web address] for details.  Organism: Blood Culture PCR  Staphylococcus aureus (18 Feb 2023 16:07)  Organism: Staphylococcus aureus (18 Feb 2023 16:07)      -  Ampicillin/Sulbactam: S <=8/4      -  Cefazolin: S <=4      -  Clindamycin: S <=0.25      -  Erythromycin: S <=0.25      -  Gentamicin: S <=1 Should not be used as monotherapy      -  Oxacillin: S <=0.25 Oxacillin predicts susceptibility for dicloxacillin, methicillin, and nafcillin      -  Rifampin: S <=1 Should not be used as monotherapy      -  Tetracycline: S <=1      -  Trimethoprim/Sulfamethoxazole: S <=0.5/9.5      -  Vancomycin: S 2      Method Type: MIGUE  Organism: Blood Culture PCR (18 Feb 2023 16:07)      -  Methicillin SENSITIVE Staphylococcus aureus (MSSA): Detec Any isolate of Staphylococcus aureus from a blood culture is NOT considered a contaminant.      Method Type: PCR              Rapid RVP Result: NotDetec (02-15 @ 14:14)        RADIOLOGY:   Follow Up:  bacteremia    Interval History/ROS:  Overnight: No acute events.  Patient remains afebrile otherwise on room air.  Blood cultures from 2/17/2023 remain negative to date.  Joint fluid culture from 2/17/2023 with no growth.    Patient seen and examined at bedside. Reports pain in the lower extremities    Allergies  No Known Allergies        ANTIMICROBIALS:  ceFAZolin   IVPB 2000 every 8 hours      OTHER MEDS:  MEDICATIONS  (STANDING):  acetaminophen     Tablet .. 650 every 6 hours PRN  aluminum hydroxide/magnesium hydroxide/simethicone Suspension 30 every 4 hours PRN  aspirin enteric coated 81 daily  atorvastatin 80 at bedtime  bisacodyl 5 daily PRN  clopidogrel Tablet 75 daily  dextrose 50% Injectable 25 once  dextrose 50% Injectable 12.5 once  dextrose 50% Injectable 25 once  dextrose Oral Gel 15 once PRN  glucagon  Injectable 1 once  heparin   Injectable 5000 every 8 hours  HYDROmorphone  Injectable 1 every 6 hours PRN  influenza   Vaccine 0.5 once  insulin glargine Injectable (LANTUS) 40 at bedtime  insulin lispro (ADMELOG) corrective regimen sliding scale  three times a day before meals  insulin lispro (ADMELOG) corrective regimen sliding scale  at bedtime  insulin lispro Injectable (ADMELOG) 16 three times a day before meals  melatonin 3 at bedtime PRN  metoprolol succinate ER 25 daily  ondansetron Injectable 4 every 8 hours PRN  oxyCODONE    IR 5 every 6 hours PRN  polyethylene glycol 3350 17 daily  senna 2 at bedtime  sertraline 50 daily      Vital Signs Last 24 Hrs  T(C): 37.4 (20 Feb 2023 10:29), Max: 37.6 (19 Feb 2023 14:08)  T(F): 99.4 (20 Feb 2023 10:29), Max: 99.7 (19 Feb 2023 14:08)  HR: 80 (20 Feb 2023 10:29) (74 - 80)  BP: 156/89 (20 Feb 2023 10:29) (116/63 - 156/89)  BP(mean): --  RR: 18 (20 Feb 2023 10:29) (18 - 18)  SpO2: 95% (20 Feb 2023 10:29) (93% - 95%)    Parameters below as of 20 Feb 2023 10:29  Patient On (Oxygen Delivery Method): room air        PHYSICAL EXAM:  Constitutional: comfortable  Cardiovascular:   normal S1, S2, no murmur, no edema  Respiratory:  no resp distress  GI:  soft, non-tender,   Neurologic: awake and alert, normal strength, no focal findings  Skin:  foot wound bandaged- refusing exam stating only podiatry is allowed to examine               9.1    7.22  )-----------( 221      ( 19 Feb 2023 08:53 )             28.0       02-19    133<L>  |  98  |  34<H>  ----------------------------<  65<L>  4.1   |  21<L>  |  2.06<H>    Ca    9.1      19 Feb 2023 08:52            MICROBIOLOGY:  v    Culture - Joint Fluid (collected 17 Feb 2023 13:58)  Source: Joint Fl Joint Fluid  Gram Stain (17 Feb 2023 23:16):    No polymorphonuclear cells seen per low power field    No organisms seen per oil power field  Preliminary Report (18 Feb 2023 18:51):    No growth    Culture - Blood (collected 17 Feb 2023 07:17)  Source: .Blood Blood-Peripheral  Preliminary Report (18 Feb 2023 09:01):    No growth to date.    Culture - Blood (collected 17 Feb 2023 07:17)  Source: .Blood Blood-Peripheral  Preliminary Report (18 Feb 2023 09:01):    No growth to date.    Culture - Urine (collected 16 Feb 2023 02:14)  Source: Clean Catch Clean Catch (Midstream)  Final Report (17 Feb 2023 07:16):    <10,000 CFU/mL Normal Urogenital Lizz    Culture - Tissue with Gram Stain (collected 15 Feb 2023 20:43)  Source: .Tissue right foot wound  Gram Stain (16 Feb 2023 05:26):    Few polymorphonuclear leukocytes seen per low power field    Few Gram Negative Rods seen per oil power field    Moderate Gram Variable Cocci seen per oil power field  Final Report (18 Feb 2023 13:58):    Moderate Staphylococcus aureus    Moderate Streptococcus anginosus "Susceptibilities not performed"    Rare Anaerobic Gram Positive Cocci Most closely resembling    Peptostreptococcus species "Susceptibilities not performed"  Organism: Staphylococcus aureus (18 Feb 2023 13:58)  Organism: Staphylococcus aureus (18 Feb 2023 13:58)      -  Ampicillin/Sulbactam: S <=8/4      -  Cefazolin: S <=4      -  Clindamycin: R >4      -  Erythromycin: R >4      -  Gentamicin: S <=1 Should not be used as monotherapy      -  Oxacillin: S <=0.25 Oxacillin predicts susceptibility for dicloxacillin, methicillin, and nafcillin      -  Rifampin: S <=1 Should not be used as monotherapy      -  Tetracycline: S <=1      -  Trimethoprim/Sulfamethoxazole: S <=0.5/9.5      -  Vancomycin: S 2      Method Type: MIGUE    Culture - Blood (collected 15 Feb 2023 20:15)  Source: .Blood Blood  Gram Stain (18 Feb 2023 14:07):    Growth in aerobic bottle: Gram Positive Cocci in Clusters    Growth in anaerobic bottle: Gram Positive Cocci in Clusters  Final Report (19 Feb 2023 17:15):    Growth in aerobic and anaerobic bottles: Staphylococcus aureus    See previous culture [id number]    Culture - Blood (collected 15 Feb 2023 20:00)  Source: .Blood Blood-Peripheral  Gram Stain (17 Feb 2023 14:40):    Growth in aerobic bottle: Gram Positive Cocci in Clusters    Growth in anaerobic bottle: Gram Positive Cocci in Clusters  Final Report (18 Feb 2023 16:07):    Growth in aerobic and anaerobic bottles: Staphylococcus aureus    ***Blood Panel PCR results on this specimen are available    approximately 3 hours after the Gram stain result.***    Gram stain, PCR, and/or culture results may not always    correspond dueto difference in methodologies.    ************************************************************    This PCR assay was performed by multiplex PCR. This    Assay tests for 66 bacterial and resistance gene targets.    Please refer to the [facility] test directory    at [email or web address] for details.  Organism: Blood Culture PCR  Staphylococcus aureus (18 Feb 2023 16:07)  Organism: Staphylococcus aureus (18 Feb 2023 16:07)      -  Ampicillin/Sulbactam: S <=8/4      -  Cefazolin: S <=4      -  Clindamycin: S <=0.25      -  Erythromycin: S <=0.25      -  Gentamicin: S <=1 Should not be used as monotherapy      -  Oxacillin: S <=0.25 Oxacillin predicts susceptibility for dicloxacillin, methicillin, and nafcillin      -  Rifampin: S <=1 Should not be used as monotherapy      -  Tetracycline: S <=1      -  Trimethoprim/Sulfamethoxazole: S <=0.5/9.5      -  Vancomycin: S 2      Method Type: MIGUE  Organism: Blood Culture PCR (18 Feb 2023 16:07)      -  Methicillin SENSITIVE Staphylococcus aureus (MSSA): Detec Any isolate of Staphylococcus aureus from a blood culture is NOT considered a contaminant.      Method Type: PCR      Rapid RVP Result: Faraz (02-15 @ 14:14)

## 2023-02-20 NOTE — PROVIDER CONTACT NOTE (OTHER) - ASSESSMENT
Pt refuses to eat and is hypoglycemic to 79. Pt states he is tired and not hungry. Pt ordered for 16 units premeal

## 2023-02-20 NOTE — PROGRESS NOTE ADULT - SUBJECTIVE AND OBJECTIVE BOX
Vascular Surgery Progress Note    Interval: No acute overnight events.    SUBJECTIVE: Patient seen and examined at the bedside. Feeling well today.     VITALS  T(C): 36.9 (02-20-23 @ 14:11), Max: 37.4 (02-20-23 @ 10:29)  HR: 75 (02-20-23 @ 14:11) (75 - 80)  BP: 140/70 (02-20-23 @ 14:11) (140/70 - 156/89)  RR: 18 (02-20-23 @ 14:11) (18 - 18)  SpO2: 95% (02-20-23 @ 14:11) (93% - 95%)  CAPILLARY BLOOD GLUCOSE      POCT Blood Glucose.: 159 mg/dL (20 Feb 2023 13:14)  POCT Blood Glucose.: 79 mg/dL (20 Feb 2023 09:02)  POCT Blood Glucose.: 80 mg/dL (20 Feb 2023 06:20)  POCT Blood Glucose.: 82 mg/dL (20 Feb 2023 00:15)  POCT Blood Glucose.: 75 mg/dL (19 Feb 2023 21:48)  POCT Blood Glucose.: 70 mg/dL (19 Feb 2023 17:21)    Is/Os    02-19 @ 07:01  -  02-20 @ 07:00  --------------------------------------------------------  IN:    IV PiggyBack: 50 mL    Oral Fluid: 540 mL  Total IN: 590 mL    OUT:    Voided (mL): 950 mL  Total OUT: 950 mL    Total NET: -360 mL      02-20 @ 07:01  -  02-20 @ 14:17  --------------------------------------------------------  IN:    Oral Fluid: 480 mL  Total IN: 480 mL    OUT:  Total OUT: 0 mL    Total NET: 480 mL    PHYSICAL EXAM:   General- NAD, A&Ox3  Extremities- R foot TMA. First MTP with punched out ulcer on posterior aspect of toe. No cellulitis or erythema noted. AT/PT signals. DP signal not able to be doppler. Femoral pulses palpable bilaterally.   Lungs- unlabored breathing    MEDICATIONS (STANDING): aspirin enteric coated 81 milliGRAM(s) Oral daily  atorvastatin 80 milliGRAM(s) Oral at bedtime  ceFAZolin   IVPB 2000 milliGRAM(s) IV Intermittent every 8 hours  clopidogrel Tablet 75 milliGRAM(s) Oral daily  dextrose 5%. 1000 milliLiter(s) IV Continuous <Continuous>  dextrose 5%. 1000 milliLiter(s) IV Continuous <Continuous>  dextrose 50% Injectable 25 Gram(s) IV Push once  dextrose 50% Injectable 12.5 Gram(s) IV Push once  dextrose 50% Injectable 25 Gram(s) IV Push once  glucagon  Injectable 1 milliGRAM(s) IntraMuscular once  heparin   Injectable 5000 Unit(s) SubCutaneous every 8 hours  influenza   Vaccine 0.5 milliLiter(s) IntraMuscular once  insulin glargine Injectable (LANTUS) 40 Unit(s) SubCutaneous at bedtime  insulin lispro (ADMELOG) corrective regimen sliding scale   SubCutaneous three times a day before meals  insulin lispro (ADMELOG) corrective regimen sliding scale   SubCutaneous at bedtime  insulin lispro Injectable (ADMELOG) 16 Unit(s) SubCutaneous three times a day before meals  lactated ringers. 1000 milliLiter(s) IV Continuous <Continuous>  metoprolol succinate ER 25 milliGRAM(s) Oral daily  naloxone Injectable 0.4 milliGRAM(s) IV Push once  polyethylene glycol 3350 17 Gram(s) Oral daily  senna 2 Tablet(s) Oral at bedtime  sertraline 50 milliGRAM(s) Oral daily    MEDICATIONS (PRN):acetaminophen     Tablet .. 650 milliGRAM(s) Oral every 6 hours PRN Temp greater or equal to 38C (100.4F), Mild Pain (1 - 3)  aluminum hydroxide/magnesium hydroxide/simethicone Suspension 30 milliLiter(s) Oral every 4 hours PRN Dyspepsia  bisacodyl 5 milliGRAM(s) Oral daily PRN Constipation  dextrose Oral Gel 15 Gram(s) Oral once PRN Blood Glucose LESS THAN 70 milliGRAM(s)/deciliter  HYDROmorphone  Injectable 1 milliGRAM(s) IV Push every 6 hours PRN Severe Pain (7 - 10)  melatonin 3 milliGRAM(s) Oral at bedtime PRN Insomnia  ondansetron Injectable 4 milliGRAM(s) IV Push every 8 hours PRN Nausea and/or Vomiting  oxyCODONE    IR 5 milliGRAM(s) Oral every 6 hours PRN Moderate Pain (4 - 6)    LABS  CBC (02-19 @ 08:53)                              9.1<L>                         7.22    )----------------(  221        --    % Neutrophils, --    % Lymphocytes, ANC: --                                  28.0<L>    BMP (02-19 @ 08:52)             133<L>  |  98      |  34<H> 		Ca++ --      Ca 9.1                ---------------------------------( 65<L> 		Mg --                 4.1     |  21<L>   |  2.06<H>			Ph --            IMAGING STUDIES

## 2023-02-20 NOTE — PROGRESS NOTE ADULT - SUBJECTIVE AND OBJECTIVE BOX
Patient is a 52y old  Male who presents with a chief complaint of r foot pain/tenderness, warmth, swelling (19 Feb 2023 11:52)       INTERVAL HPI/OVERNIGHT EVENTS:  Patient seen and evaluated at bedside.  Pt is resting comfortable in NAD. Denies N/V/F/C.     Allergies    No Known Allergies    Intolerances        Vital Signs Last 24 Hrs  T(C): 37.4 (20 Feb 2023 10:29), Max: 37.6 (19 Feb 2023 14:08)  T(F): 99.4 (20 Feb 2023 10:29), Max: 99.7 (19 Feb 2023 14:08)  HR: 80 (20 Feb 2023 10:29) (74 - 80)  BP: 156/89 (20 Feb 2023 10:29) (116/63 - 156/89)  BP(mean): --  RR: 18 (20 Feb 2023 10:29) (18 - 18)  SpO2: 95% (20 Feb 2023 10:29) (93% - 95%)    Parameters below as of 20 Feb 2023 10:29  Patient On (Oxygen Delivery Method): room air        LABS:                        9.1    7.22  )-----------( 221      ( 19 Feb 2023 08:53 )             28.0     02-19    133<L>  |  98  |  34<H>  ----------------------------<  65<L>  4.1   |  21<L>  |  2.06<H>    Ca    9.1      19 Feb 2023 08:52          CAPILLARY BLOOD GLUCOSE      POCT Blood Glucose.: 79 mg/dL (20 Feb 2023 09:02)  POCT Blood Glucose.: 80 mg/dL (20 Feb 2023 06:20)  POCT Blood Glucose.: 82 mg/dL (20 Feb 2023 00:15)  POCT Blood Glucose.: 75 mg/dL (19 Feb 2023 21:48)  POCT Blood Glucose.: 70 mg/dL (19 Feb 2023 17:21)  POCT Blood Glucose.: 71 mg/dL (19 Feb 2023 14:04)      Lower Extremity Physical Exam:  Vascular: DP/PT 0/4, B/L, CFT <3 seconds B/L, Temperature gradient warm to cool, B/L.   Neuro: Epicritic sensation diminished to the level of toes, B/L.  Musculoskeletal/Ortho: Right ankle no pain with active or passive dorsiflexion/plantarflexion/inversion/eversion, pain on palpation localized to the medial and lateral malleoli.  Skin: Right ankle ROM wnl, pain on palpation localized to the medial and lateral malleoli. Right foot submet 1 wound to bone, scant pus today, fibrogranular wound bed, tracks dorsal proximal along tenon 4cm, no fluctuance    RADIOLOGY & ADDITIONAL TESTS:

## 2023-02-20 NOTE — PROGRESS NOTE ADULT - SUBJECTIVE AND OBJECTIVE BOX
seen earlier today     Chief Complaint: Type 2 Diabetes Mellitus     INTERVAL HX: pt endorses very poor to no po intake due to dislike of the food requesting glucerna shakes as he doesnt like the food provided endorses had a small amount of the [place] toast this morning  , BG tightly controlled.  Noted  lantus held last night per team with FBG <100 this am .       Review of Systems:  General: As above  Cardiovascular: No chest pain  Respiratory: No SOB  GI: No nausea, vomiting  Endocrine: no  S&Sx of hypoglycemia    Allergies    No Known Allergies    Intolerances      MEDICATIONS  (STANDING):  aspirin enteric coated 81 milliGRAM(s) Oral daily  atorvastatin 80 milliGRAM(s) Oral at bedtime  ceFAZolin   IVPB 2000 milliGRAM(s) IV Intermittent every 8 hours  clopidogrel Tablet 75 milliGRAM(s) Oral daily  dextrose 5%. 1000 milliLiter(s) (50 mL/Hr) IV Continuous <Continuous>  dextrose 5%. 1000 milliLiter(s) (100 mL/Hr) IV Continuous <Continuous>  dextrose 50% Injectable 25 Gram(s) IV Push once  dextrose 50% Injectable 12.5 Gram(s) IV Push once  dextrose 50% Injectable 25 Gram(s) IV Push once  glucagon  Injectable 1 milliGRAM(s) IntraMuscular once  heparin   Injectable 5000 Unit(s) SubCutaneous every 8 hours  influenza   Vaccine 0.5 milliLiter(s) IntraMuscular once  insulin glargine Injectable (LANTUS) 20 Unit(s) SubCutaneous at bedtime  insulin lispro (ADMELOG) corrective regimen sliding scale   SubCutaneous three times a day before meals  insulin lispro (ADMELOG) corrective regimen sliding scale   SubCutaneous at bedtime  insulin lispro Injectable (ADMELOG) 16 Unit(s) SubCutaneous three times a day before meals  lactated ringers. 1000 milliLiter(s) (100 mL/Hr) IV Continuous <Continuous>  lactobacillus acidophilus 1 Tablet(s) Oral two times a day with meals  metoprolol succinate ER 25 milliGRAM(s) Oral daily  naloxone Injectable 0.4 milliGRAM(s) IV Push once  sertraline 50 milliGRAM(s) Oral daily      atorvastatin   80 milliGRAM(s) Oral (02-19-23 @ 22:17)    insulin lispro (ADMELOG) corrective regimen sliding scale   2 Unit(s) SubCutaneous (02-20-23 @ 13:22)    insulin lispro Injectable (ADMELOG)   16 Unit(s) SubCutaneous (02-20-23 @ 15:49)   16 Unit(s) SubCutaneous (02-20-23 @ 13:22)        PHYSICAL EXAM:  VITALS: T(C): 37.4 (02-20-23 @ 16:10)  T(F): 99.3 (02-20-23 @ 16:10), Max: 99.4 (02-20-23 @ 10:29)  HR: 74 (02-20-23 @ 16:10) (74 - 80)  BP: 137/79 (02-20-23 @ 16:10) (137/79 - 156/89)  RR:  (18 - 18)  SpO2:  (93% - 95%)  Wt(kg): --  GENERAL: in NAD  Respiratory: Respirations unlabored   Extremities: Warm, no edema  NEURO: Alert , appropriate     LABS:  POCT Blood Glucose.: 123 mg/dL (02-20-23 @ 15:48)  POCT Blood Glucose.: 159 mg/dL (02-20-23 @ 13:14)  POCT Blood Glucose.: 79 mg/dL (02-20-23 @ 09:02)  POCT Blood Glucose.: 80 mg/dL (02-20-23 @ 06:20)  POCT Blood Glucose.: 82 mg/dL (02-20-23 @ 00:15)  POCT Blood Glucose.: 75 mg/dL (02-19-23 @ 21:48)  POCT Blood Glucose.: 70 mg/dL (02-19-23 @ 17:21)  POCT Blood Glucose.: 71 mg/dL (02-19-23 @ 14:04)  POCT Blood Glucose.: 72 mg/dL (02-19-23 @ 09:27)  POCT Blood Glucose.: 88 mg/dL (02-18-23 @ 21:19)  POCT Blood Glucose.: 87 mg/dL (02-18-23 @ 17:33)  POCT Blood Glucose.: 111 mg/dL (02-18-23 @ 14:26)  POCT Blood Glucose.: 113 mg/dL (02-18-23 @ 13:21)  POCT Blood Glucose.: 170 mg/dL (02-18-23 @ 08:59)  POCT Blood Glucose.: 87 mg/dL (02-17-23 @ 21:20)  POCT Blood Glucose.: 68 mg/dL (02-17-23 @ 21:18)  POCT Blood Glucose.: 91 mg/dL (02-17-23 @ 17:34)                          9.1    7.22  )-----------( 221      ( 19 Feb 2023 08:53 )             28.0     02-19    133<L>  |  98  |  34<H>  ----------------------------<  65<L>  4.1   |  21<L>  |  2.06<H>    Ca    9.1      19 Feb 2023 08:52        02-16 Chol 71 Direct LDL -- LDL calculated 33 HDL 16<L> Trig 116  Thyroid Function Tests:  02-16 @ 05:39 TSH 2.47 FreeT4 -- T3 -- Anti TPO -- Anti Thyroglobulin Ab -- TSI --      A1C with Estimated Average Glucose Result: 10.8 % (02-17-23 @ 07:07)  A1C with Estimated Average Glucose Result: 11.2 % (02-16-23 @ 05:39)    Estimated Average Glucose: 263 mg/dL (02-17-23 @ 07:07)  Estimated Average Glucose: 275 mg/dL (02-16-23 @ 05:39)        Diet, Consistent Carbohydrate Renal w/Evening Snack:   Supplement Feeding Modality:  Oral  Glucerna Shake Cans or Servings Per Day:  2       Frequency:  Daily (02-20-23 @ 12:16) [Active]

## 2023-02-20 NOTE — PROVIDER CONTACT NOTE (OTHER) - ACTION/TREATMENT ORDERED:
provider aware, attempt to take vitals @ 8am and push 6am metoprolol dose until 8am after vitals are taken

## 2023-02-20 NOTE — PROGRESS NOTE ADULT - SUBJECTIVE AND OBJECTIVE BOX
Ruth Puga MD  Division of Hospital Medicine  Available via MS Teams  Pager [phone], If no response/off hours [phone]    Patient is a 52y old  Male who presents with a chief complaint of r foot pain/tenderness, warmth, swelling (20 Feb 2023 14:17)        SUBJECTIVE / OVERNIGHT EVENTS:  overnight fs 70s, has not been eating much  not sleeping well bc neighbor overnight is loud  states he is thinking of having the podiatry procedure now  says "no one is listening" when things are not done his way  refused medications and vitals in the morning      I&O's Summary    19 Feb 2023 07:01  -  20 Feb 2023 07:00  --------------------------------------------------------  IN: 590 mL / OUT: 950 mL / NET: -360 mL    20 Feb 2023 07:01  -  20 Feb 2023 14:34  --------------------------------------------------------  IN: 480 mL / OUT: 0 mL / NET: 480 mL      Vital Signs Last 24 Hrs  T(C): 36.9 (20 Feb 2023 14:11), Max: 37.4 (20 Feb 2023 10:29)  T(F): 98.5 (20 Feb 2023 14:11), Max: 99.4 (20 Feb 2023 10:29)  HR: 75 (20 Feb 2023 14:11) (75 - 80)  BP: 140/70 (20 Feb 2023 14:11) (140/70 - 156/89)  BP(mean): --  RR: 18 (20 Feb 2023 14:11) (18 - 18)  SpO2: 95% (20 Feb 2023 14:11) (93% - 95%)    Parameters below as of 20 Feb 2023 14:11  Patient On (Oxygen Delivery Method): room air    refuses exam  PHYSICAL EXAM:  GENERAL:  Well appearing, m, in NAD  HEAD:  NCAT  EYES: conjunctiva clear  PSYCH: Alert, labile/disgruntled mood  SKIN: r foot dressed in kerlix    LABS:                        9.1    7.22  )-----------( 221      ( 19 Feb 2023 08:53 )             28.0     02-19    133<L>  |  98  |  34<H>  ----------------------------<  65<L>  4.1   |  21<L>  |  2.06<H>    Ca    9.1      19 Feb 2023 08:52                SARS-CoV-2: NotDetec (15 Feb 2023 14:14)      CAPILLARY BLOOD GLUCOSE      POCT Blood Glucose.: 159 mg/dL (20 Feb 2023 13:14)  POCT Blood Glucose.: 79 mg/dL (20 Feb 2023 09:02)  POCT Blood Glucose.: 80 mg/dL (20 Feb 2023 06:20)  POCT Blood Glucose.: 82 mg/dL (20 Feb 2023 00:15)  POCT Blood Glucose.: 75 mg/dL (19 Feb 2023 21:48)  POCT Blood Glucose.: 70 mg/dL (19 Feb 2023 17:21)    MEDICATIONS  (STANDING):  aspirin enteric coated 81 milliGRAM(s) Oral daily  atorvastatin 80 milliGRAM(s) Oral at bedtime  ceFAZolin   IVPB 2000 milliGRAM(s) IV Intermittent every 8 hours  clopidogrel Tablet 75 milliGRAM(s) Oral daily  dextrose 5%. 1000 milliLiter(s) (50 mL/Hr) IV Continuous <Continuous>  dextrose 5%. 1000 milliLiter(s) (100 mL/Hr) IV Continuous <Continuous>  dextrose 50% Injectable 25 Gram(s) IV Push once  dextrose 50% Injectable 12.5 Gram(s) IV Push once  dextrose 50% Injectable 25 Gram(s) IV Push once  glucagon  Injectable 1 milliGRAM(s) IntraMuscular once  heparin   Injectable 5000 Unit(s) SubCutaneous every 8 hours  influenza   Vaccine 0.5 milliLiter(s) IntraMuscular once  insulin glargine Injectable (LANTUS) 40 Unit(s) SubCutaneous at bedtime  insulin lispro (ADMELOG) corrective regimen sliding scale   SubCutaneous three times a day before meals  insulin lispro (ADMELOG) corrective regimen sliding scale   SubCutaneous at bedtime  insulin lispro Injectable (ADMELOG) 16 Unit(s) SubCutaneous three times a day before meals  lactated ringers. 1000 milliLiter(s) (100 mL/Hr) IV Continuous <Continuous>  metoprolol succinate ER 25 milliGRAM(s) Oral daily  naloxone Injectable 0.4 milliGRAM(s) IV Push once  polyethylene glycol 3350 17 Gram(s) Oral daily  senna 2 Tablet(s) Oral at bedtime  sertraline 50 milliGRAM(s) Oral daily    MEDICATIONS  (PRN):  acetaminophen     Tablet .. 650 milliGRAM(s) Oral every 6 hours PRN Temp greater or equal to 38C (100.4F), Mild Pain (1 - 3)  aluminum hydroxide/magnesium hydroxide/simethicone Suspension 30 milliLiter(s) Oral every 4 hours PRN Dyspepsia  bisacodyl 5 milliGRAM(s) Oral daily PRN Constipation  dextrose Oral Gel 15 Gram(s) Oral once PRN Blood Glucose LESS THAN 70 milliGRAM(s)/deciliter  HYDROmorphone  Injectable 1 milliGRAM(s) IV Push every 6 hours PRN Severe Pain (7 - 10)  melatonin 3 milliGRAM(s) Oral at bedtime PRN Insomnia  ondansetron Injectable 4 milliGRAM(s) IV Push every 8 hours PRN Nausea and/or Vomiting  oxyCODONE    IR 5 milliGRAM(s) Oral every 6 hours PRN Moderate Pain (4 - 6)

## 2023-02-21 LAB
ANION GAP SERPL CALC-SCNC: 13 MMOL/L — SIGNIFICANT CHANGE UP (ref 5–17)
BASOPHILS # BLD AUTO: 0.02 K/UL — SIGNIFICANT CHANGE UP (ref 0–0.2)
BASOPHILS NFR BLD AUTO: 0.3 % — SIGNIFICANT CHANGE UP (ref 0–2)
BUN SERPL-MCNC: 20 MG/DL — SIGNIFICANT CHANGE UP (ref 7–23)
CALCIUM SERPL-MCNC: 8.9 MG/DL — SIGNIFICANT CHANGE UP (ref 8.4–10.5)
CHLORIDE SERPL-SCNC: 98 MMOL/L — SIGNIFICANT CHANGE UP (ref 96–108)
CO2 SERPL-SCNC: 23 MMOL/L — SIGNIFICANT CHANGE UP (ref 22–31)
CREAT SERPL-MCNC: 1.51 MG/DL — HIGH (ref 0.5–1.3)
EGFR: 56 ML/MIN/1.73M2 — LOW
EOSINOPHIL # BLD AUTO: 0.16 K/UL — SIGNIFICANT CHANGE UP (ref 0–0.5)
EOSINOPHIL NFR BLD AUTO: 2.3 % — SIGNIFICANT CHANGE UP (ref 0–6)
GLUCOSE BLDC GLUCOMTR-MCNC: 101 MG/DL — HIGH (ref 70–99)
GLUCOSE BLDC GLUCOMTR-MCNC: 117 MG/DL — HIGH (ref 70–99)
GLUCOSE BLDC GLUCOMTR-MCNC: 199 MG/DL — HIGH (ref 70–99)
GLUCOSE SERPL-MCNC: 205 MG/DL — HIGH (ref 70–99)
HCT VFR BLD CALC: 30.9 % — LOW (ref 39–50)
HGB BLD-MCNC: 10.1 G/DL — LOW (ref 13–17)
IMM GRANULOCYTES NFR BLD AUTO: 1 % — HIGH (ref 0–0.9)
LYMPHOCYTES # BLD AUTO: 1.15 K/UL — SIGNIFICANT CHANGE UP (ref 1–3.3)
LYMPHOCYTES # BLD AUTO: 16.8 % — SIGNIFICANT CHANGE UP (ref 13–44)
MAGNESIUM SERPL-MCNC: 2 MG/DL — SIGNIFICANT CHANGE UP (ref 1.6–2.6)
MCHC RBC-ENTMCNC: 30.1 PG — SIGNIFICANT CHANGE UP (ref 27–34)
MCHC RBC-ENTMCNC: 32.7 GM/DL — SIGNIFICANT CHANGE UP (ref 32–36)
MCV RBC AUTO: 92 FL — SIGNIFICANT CHANGE UP (ref 80–100)
MONOCYTES # BLD AUTO: 0.6 K/UL — SIGNIFICANT CHANGE UP (ref 0–0.9)
MONOCYTES NFR BLD AUTO: 8.8 % — SIGNIFICANT CHANGE UP (ref 2–14)
NEUTROPHILS # BLD AUTO: 4.84 K/UL — SIGNIFICANT CHANGE UP (ref 1.8–7.4)
NEUTROPHILS NFR BLD AUTO: 70.8 % — SIGNIFICANT CHANGE UP (ref 43–77)
NRBC # BLD: 0 /100 WBCS — SIGNIFICANT CHANGE UP (ref 0–0)
PHOSPHATE SERPL-MCNC: 2.9 MG/DL — SIGNIFICANT CHANGE UP (ref 2.5–4.5)
PLATELET # BLD AUTO: 275 K/UL — SIGNIFICANT CHANGE UP (ref 150–400)
POTASSIUM SERPL-MCNC: 4.8 MMOL/L — SIGNIFICANT CHANGE UP (ref 3.5–5.3)
POTASSIUM SERPL-SCNC: 4.8 MMOL/L — SIGNIFICANT CHANGE UP (ref 3.5–5.3)
RBC # BLD: 3.36 M/UL — LOW (ref 4.2–5.8)
RBC # FLD: 13.3 % — SIGNIFICANT CHANGE UP (ref 10.3–14.5)
SODIUM SERPL-SCNC: 134 MMOL/L — LOW (ref 135–145)
WBC # BLD: 6.84 K/UL — SIGNIFICANT CHANGE UP (ref 3.8–10.5)
WBC # FLD AUTO: 6.84 K/UL — SIGNIFICANT CHANGE UP (ref 3.8–10.5)

## 2023-02-21 PROCEDURE — 99232 SBSQ HOSP IP/OBS MODERATE 35: CPT

## 2023-02-21 PROCEDURE — 93923 UPR/LXTR ART STDY 3+ LVLS: CPT | Mod: 26

## 2023-02-21 RX ORDER — INSULIN GLARGINE 100 [IU]/ML
26 INJECTION, SOLUTION SUBCUTANEOUS AT BEDTIME
Refills: 0 | Status: DISCONTINUED | OUTPATIENT
Start: 2023-02-21 | End: 2023-02-23

## 2023-02-21 RX ADMIN — Medication 100 MILLIGRAM(S): at 01:37

## 2023-02-21 RX ADMIN — Medication 16 UNIT(S): at 18:19

## 2023-02-21 RX ADMIN — HYDROMORPHONE HYDROCHLORIDE 1 MILLIGRAM(S): 2 INJECTION INTRAMUSCULAR; INTRAVENOUS; SUBCUTANEOUS at 20:14

## 2023-02-21 RX ADMIN — Medication 1 TABLET(S): at 09:08

## 2023-02-21 RX ADMIN — Medication 16 UNIT(S): at 12:55

## 2023-02-21 RX ADMIN — Medication 25 MILLIGRAM(S): at 09:07

## 2023-02-21 RX ADMIN — OXYCODONE HYDROCHLORIDE 5 MILLIGRAM(S): 5 TABLET ORAL at 09:07

## 2023-02-21 RX ADMIN — HEPARIN SODIUM 5000 UNIT(S): 5000 INJECTION INTRAVENOUS; SUBCUTANEOUS at 09:08

## 2023-02-21 RX ADMIN — Medication 81 MILLIGRAM(S): at 11:11

## 2023-02-21 RX ADMIN — OXYCODONE HYDROCHLORIDE 5 MILLIGRAM(S): 5 TABLET ORAL at 15:00

## 2023-02-21 RX ADMIN — Medication 100 MILLIGRAM(S): at 17:00

## 2023-02-21 RX ADMIN — CLOPIDOGREL BISULFATE 75 MILLIGRAM(S): 75 TABLET, FILM COATED ORAL at 11:11

## 2023-02-21 RX ADMIN — Medication 3 MILLIGRAM(S): at 03:13

## 2023-02-21 RX ADMIN — OXYCODONE HYDROCHLORIDE 5 MILLIGRAM(S): 5 TABLET ORAL at 03:13

## 2023-02-21 RX ADMIN — Medication 16 UNIT(S): at 09:10

## 2023-02-21 RX ADMIN — SERTRALINE 50 MILLIGRAM(S): 25 TABLET, FILM COATED ORAL at 11:11

## 2023-02-21 RX ADMIN — HEPARIN SODIUM 5000 UNIT(S): 5000 INJECTION INTRAVENOUS; SUBCUTANEOUS at 18:19

## 2023-02-21 RX ADMIN — HYDROMORPHONE HYDROCHLORIDE 1 MILLIGRAM(S): 2 INJECTION INTRAMUSCULAR; INTRAVENOUS; SUBCUTANEOUS at 10:00

## 2023-02-21 RX ADMIN — HYDROMORPHONE HYDROCHLORIDE 1 MILLIGRAM(S): 2 INJECTION INTRAMUSCULAR; INTRAVENOUS; SUBCUTANEOUS at 14:10

## 2023-02-21 RX ADMIN — Medication 100 MILLIGRAM(S): at 09:11

## 2023-02-21 RX ADMIN — Medication 1 TABLET(S): at 18:21

## 2023-02-21 RX ADMIN — HYDROMORPHONE HYDROCHLORIDE 1 MILLIGRAM(S): 2 INJECTION INTRAMUSCULAR; INTRAVENOUS; SUBCUTANEOUS at 21:14

## 2023-02-21 RX ADMIN — Medication 1: at 09:10

## 2023-02-21 NOTE — PROGRESS NOTE ADULT - ASSESSMENT
53 yo male with PMH of CAD s/p PCI with stent, HTN, HLD, uncontrolled IDDM2, anxiety and depression who presents from outpatient podiatry office with worsening R foot pain, likely septic toe.    Recs:  - SAL/PVRs ordered, please obtain   - C/w IV abx for toe wound  - pods debridement tentatively Friday  - Tentative angio Thursday RLE   - Please document medical/cardiac clearance for RLE angiogram  - care per primary team    Vascular Surgery  [id number]

## 2023-02-21 NOTE — PROGRESS NOTE ADULT - SUBJECTIVE AND OBJECTIVE BOX
Patient is a 52y old  Male who presents with a chief complaint of r foot pain/tenderness, warmth, swelling (21 Feb 2023 12:49)       INTERVAL HPI/OVERNIGHT EVENTS:  Patient seen and evaluated at bedside.  Pt is resting comfortable in NAD. Denies N/V/F/C.    Allergies    No Known Allergies    Intolerances        Vital Signs Last 24 Hrs  T(C): 36.7 (21 Feb 2023 13:36), Max: 37.3 (20 Feb 2023 22:14)  T(F): 98 (21 Feb 2023 13:36), Max: 99.1 (20 Feb 2023 22:14)  HR: 64 (21 Feb 2023 13:36) (64 - 88)  BP: 133/75 (21 Feb 2023 13:36) (133/72 - 158/87)  BP(mean): --  RR: 17 (21 Feb 2023 13:36) (17 - 18)  SpO2: 93% (21 Feb 2023 13:36) (93% - 98%)    Parameters below as of 21 Feb 2023 13:36  Patient On (Oxygen Delivery Method): room air        LABS:                        10.1   6.84  )-----------( 275      ( 21 Feb 2023 08:52 )             30.9     02-21    134<L>  |  98  |  20  ----------------------------<  205<H>  4.8   |  23  |  1.51<H>    Ca    8.9      21 Feb 2023 08:52  Phos  2.9     02-21  Mg     2.0     02-21          CAPILLARY BLOOD GLUCOSE      POCT Blood Glucose.: 101 mg/dL (21 Feb 2023 12:09)  POCT Blood Glucose.: 199 mg/dL (21 Feb 2023 09:05)  POCT Blood Glucose.: 236 mg/dL (20 Feb 2023 23:55)  POCT Blood Glucose.: 207 mg/dL (20 Feb 2023 22:10)      Lower Extremity Physical Exam:  Vascular: DP/PT 0/4, B/L, CFT <3 seconds B/L, Temperature gradient warm to cool, B/L.   Neuro: Epicritic sensation diminished to the level of toes, B/L.  Musculoskeletal/Ortho: Right ankle no pain with active or passive dorsiflexion/plantarflexion/inversion/eversion, pain on palpation localized to the medial and lateral malleoli.  Skin: Right ankle ROM wnl, pain on palpation localized to the medial and lateral malleoli. Right foot submet 1 wound to bone, scant pus today, fibrogranular wound bed, tracks dorsal proximal along tenon 4cm, no fluctuance    RADIOLOGY & ADDITIONAL TESTS:

## 2023-02-21 NOTE — PROGRESS NOTE ADULT - PROBLEM SELECTOR PLAN 6
poorly controlled, hypoglycemic this hosp course with poor po intake  HbA1c 10.8%  - endo following -  - Insulin as per endo  - c/w sliding scale coverage

## 2023-02-21 NOTE — PROGRESS NOTE ADULT - ASSESSMENT
52M presents with right foot wound and right ankle pain.  - Pt seen and evaluated.  - Afebrile, no leukocytosis  - Right foot submet 1 wound to bone, scant pus today, fibrogranular wound bed, tracks dorsal proximal along tenon 4cm, no fluctuance  - Right foot wound culture growing MSSA, strep anginosus, Peptostreptococcus  - R ankle MR: OM of 1st met, bases of 2nd and 3rd met, all cuneiforms and likely cuboid  - Right ankle joint aspirate: Negative for organisms and crystals  - ID recommends continuing Ancef, appreciated  - SAL/PVRs pending, vasc plan for angio Thurs, f/u recs  - Pt consented for R foot first ray amputation, tib ant tendon debridement, scheduled for Friday at 930AM  - Please document medical optimization for R foot surgery  - Seen with attending

## 2023-02-21 NOTE — PROGRESS NOTE ADULT - SUBJECTIVE AND OBJECTIVE BOX
Interval hx:  Patient denies any complaints this morning  Noted to receive Lantus 20 units last night.   No episodes of hypoglycemia noted  FBG this     MEDICATIONS  (STANDING):  aspirin enteric coated 81 milliGRAM(s) Oral daily  atorvastatin 80 milliGRAM(s) Oral at bedtime  ceFAZolin   IVPB 2000 milliGRAM(s) IV Intermittent every 8 hours  clopidogrel Tablet 75 milliGRAM(s) Oral daily  dextrose 5%. 1000 milliLiter(s) (100 mL/Hr) IV Continuous <Continuous>  dextrose 5%. 1000 milliLiter(s) (50 mL/Hr) IV Continuous <Continuous>  dextrose 50% Injectable 25 Gram(s) IV Push once  dextrose 50% Injectable 12.5 Gram(s) IV Push once  dextrose 50% Injectable 25 Gram(s) IV Push once  glucagon  Injectable 1 milliGRAM(s) IntraMuscular once  heparin   Injectable 5000 Unit(s) SubCutaneous every 8 hours  influenza   Vaccine 0.5 milliLiter(s) IntraMuscular once  insulin glargine Injectable (LANTUS) 20 Unit(s) SubCutaneous at bedtime  insulin lispro (ADMELOG) corrective regimen sliding scale   SubCutaneous three times a day before meals  insulin lispro (ADMELOG) corrective regimen sliding scale   SubCutaneous at bedtime  insulin lispro Injectable (ADMELOG) 16 Unit(s) SubCutaneous three times a day before meals  lactated ringers. 1000 milliLiter(s) (100 mL/Hr) IV Continuous <Continuous>  lactobacillus acidophilus 1 Tablet(s) Oral two times a day with meals  metoprolol succinate ER 25 milliGRAM(s) Oral daily  naloxone Injectable 0.4 milliGRAM(s) IV Push once  sertraline 50 milliGRAM(s) Oral daily    MEDICATIONS  (PRN):  acetaminophen     Tablet .. 650 milliGRAM(s) Oral every 6 hours PRN Temp greater or equal to 38C (100.4F), Mild Pain (1 - 3)  aluminum hydroxide/magnesium hydroxide/simethicone Suspension 30 milliLiter(s) Oral every 4 hours PRN Dyspepsia  bisacodyl 5 milliGRAM(s) Oral daily PRN Constipation  dextrose Oral Gel 15 Gram(s) Oral once PRN Blood Glucose LESS THAN 70 milliGRAM(s)/deciliter  HYDROmorphone  Injectable 1 milliGRAM(s) IV Push every 6 hours PRN Severe Pain (7 - 10)  melatonin 3 milliGRAM(s) Oral at bedtime PRN Insomnia  ondansetron Injectable 4 milliGRAM(s) IV Push every 8 hours PRN Nausea and/or Vomiting  oxyCODONE    IR 5 milliGRAM(s) Oral every 6 hours PRN Moderate Pain (4 - 6)  senna 2 Tablet(s) Oral at bedtime PRN Constipation      Allergies    No Known Allergies    Intolerances      Review of Systems:  Constitutional: No fever  Eyes: No blurry vision  Neuro: No tremors  HEENT: No pain  Cardiovascular: No chest pain, palpitations  Respiratory: No SOB, no cough  GI: No nausea, vomiting, abdominal pain  : No dysuria  Skin: no rash  Psych: no depression  Endocrine: no polyuria, polydipsia  Hem/lymph: no swelling  Osteoporosis: no fractures    ALL OTHER SYSTEMS REVIEWED AND NEGATIVE    UNABLE TO OBTAIN    PHYSICAL EXAM:  VITALS: T(C): 37.1 (02-21-23 @ 09:00)  T(F): 98.7 (02-21-23 @ 09:00), Max: 99.3 (02-20-23 @ 16:10)  HR: 88 (02-21-23 @ 09:00) (74 - 88)  BP: 133/72 (02-21-23 @ 09:00) (133/72 - 158/87)  RR:  (18 - 18)  SpO2:  (94% - 98%)  Wt(kg): --  GENERAL: NAD, well-groomed, well-developed  EYES: No proptosis, no lid lag, anicteric  HEENT:  Atraumatic, Normocephalic, moist mucous membranes  THYROID: Normal size, no palpable nodules  RESPIRATORY: Clear to auscultation bilaterally; No rales, rhonchi, wheezing, or rubs  CARDIOVASCULAR: Regular rate and rhythm; No murmurs; no peripheral edema  GI: Soft, nontender, non distended, normal bowel sounds  SKIN: Dry, intact, No rashes or lesions  MUSCULOSKELETAL: Full range of motion, normal strength  NEURO: sensation intact, extraocular movements intact, no tremor, normal reflexes  PSYCH: Alert and oriented x 3, normal affect, normal mood  CUSHING'S SIGNS: no striae    POCT Blood Glucose.: 199 mg/dL (02-21-23 @ 09:05)  POCT Blood Glucose.: 236 mg/dL (02-20-23 @ 23:55)  POCT Blood Glucose.: 207 mg/dL (02-20-23 @ 22:10)  POCT Blood Glucose.: 123 mg/dL (02-20-23 @ 15:48)  POCT Blood Glucose.: 159 mg/dL (02-20-23 @ 13:14)  POCT Blood Glucose.: 79 mg/dL (02-20-23 @ 09:02)  POCT Blood Glucose.: 80 mg/dL (02-20-23 @ 06:20)  POCT Blood Glucose.: 82 mg/dL (02-20-23 @ 00:15)  POCT Blood Glucose.: 75 mg/dL (02-19-23 @ 21:48)  POCT Blood Glucose.: 70 mg/dL (02-19-23 @ 17:21)  POCT Blood Glucose.: 71 mg/dL (02-19-23 @ 14:04)  POCT Blood Glucose.: 72 mg/dL (02-19-23 @ 09:27)  POCT Blood Glucose.: 88 mg/dL (02-18-23 @ 21:19)  POCT Blood Glucose.: 87 mg/dL (02-18-23 @ 17:33)  POCT Blood Glucose.: 111 mg/dL (02-18-23 @ 14:26)  POCT Blood Glucose.: 113 mg/dL (02-18-23 @ 13:21)      02-21    134<L>  |  98  |  20  ----------------------------<  205<H>  4.8   |  23  |  1.51<H>    eGFR: 56<L>    Ca    8.9      02-21  Mg     2.0     02-21  Phos  2.9     02-21            Thyroid Function Tests:  02-16 @ 05:39 TSH 2.47 FreeT4 -- T3 -- Anti TPO -- Anti Thyroglobulin Ab -- TSI --

## 2023-02-21 NOTE — PROGRESS NOTE ADULT - PROBLEM SELECTOR PLAN 2
h/o poorly controlled dm, pad s/p right toe amputations  OM over first metatarsal stump confirmed on MRI, CT w/ septic arthritis of underlying 1st tarsometatarsal joint;   - podiatry following advising debridement/amputation of r 1st ray - earliest friday 2/24  - s/p arthrocentesis by podiatry ngtd  - ID following - IV ancef given MSSA bacteremia, bld cx cleared since 2/17  - Arterial duplex suggestive PAD -f/u SAL/PVR  - vasc sx consult appreciated, plan for angio possibly Thursday. Will document medical optimization pending TTE result  - pain control - dilaudid 1 mg iv prn severe pain, oxy prn moderate pain

## 2023-02-21 NOTE — PROGRESS NOTE ADULT - SUBJECTIVE AND OBJECTIVE BOX
Vascular Surgery Progress Note    Interval: No acute overnight events.    SUBJECTIVE: Patient seen and examined at the bedside. Has foot and ankle pain, denies fevers     OBJECTIVE:  Vital Signs Last 24 Hrs  T(C): 37.1 (21 Feb 2023 09:00), Max: 37.4 (20 Feb 2023 16:10)  T(F): 98.7 (21 Feb 2023 09:00), Max: 99.3 (20 Feb 2023 16:10)  HR: 88 (21 Feb 2023 09:00) (74 - 88)  BP: 133/72 (21 Feb 2023 09:00) (133/72 - 158/87)  BP(mean): --  RR: 18 (21 Feb 2023 09:00) (18 - 18)  SpO2: 98% (21 Feb 2023 09:00) (94% - 98%)    Parameters below as of 21 Feb 2023 09:00  Patient On (Oxygen Delivery Method): room air    I&O's Detail    20 Feb 2023 07:01  -  21 Feb 2023 07:00  --------------------------------------------------------  IN:    IV PiggyBack: 100 mL    Oral Fluid: 480 mL  Total IN: 580 mL    OUT:    Voided (mL): 900 mL  Total OUT: 900 mL    Total NET: -320 mL      21 Feb 2023 07:01  -  21 Feb 2023 11:13  --------------------------------------------------------  IN:  Total IN: 0 mL    OUT:    Voided (mL): 600 mL  Total OUT: 600 mL    Total NET: -600 mL      PHYSICAL EXAM:   General- NAD, A&Ox3  Extremities- R foot TMA. First MTP with punched out ulcer on posterior aspect of toe. No cellulitis or erythema noted. AT/PT signals. DP signal not able to be doppler. Femoral pulses palpable bilaterally.   Lungs- unlabored breathing, room air     LABS                        10.1   6.84  )-----------( 275      ( 21 Feb 2023 08:52 )             30.9   02-21    134<L>  |  98  |  20  ----------------------------<  205<H>  4.8   |  23  |  1.51<H>    Ca    8.9      21 Feb 2023 08:52  Phos  2.9     02-21  Mg     2.0     02-21

## 2023-02-21 NOTE — PROGRESS NOTE ADULT - ASSESSMENT
51 y/o M w/ hx of uncontrolled Type 2 DM w/ hyperglycemia complicated by neuropathy, amputations, retinopathy and CAD, HTN and HLD admitted for sepsis 2/2 right foot infection (high risk patient with severely uncontrolled diabetes with A1c of 11.2% at high risk of CAD and CVA with high level decision-making). Plan for angio ?wednesday, and podiatry amputation/debridement 2/24 if pt consents.   BG tightly controlled due to poor po intake.      Uncontrolled type 2 diabetes mellitus with hyperglycemia, with long-term current use of insulin.   A1C 11.2   Last seen at the office 1/2023 with dose adjustment of Toujeo to 66 units qhs and metformin increased to 1000mg BID from 500mg BID. Has been on Trulicity 3mg weekly and takes Humalog around 40 units with meals.  ·  Plan:   - FBG slightly above goal this AM  - Recommend to increase Lantus to 26 units nightly   -Continue with  Admelog 16 units AC meals for now. Hold if not eating.   -continue wit low dose SSI   -cons carb diet    Outpt. endo follow-up with Dr. Jenifer London  Outpt. optho, podiatry, nephrology  Plan to d/c on basal bolus +metformin and GLP-1.  F/u with  Dr Jenifer London.     Problem/Plan - 2:  ·  Problem: HTN (hypertension).   ·  Plan: ·  Recommendation: Goal BP <130/80 c/w lisinopril.     Problem/Plan - 3:  ·  Problem: HLD (hyperlipidemia).   ·  Plan: ·  Recommendation: statin therapy    Aileen London DO   Attending Physician   Department of Endocrinology, Diabetes and Metabolism     If before 9AM or after 5PM, or on weekends/holidays, please call the Endocrine answering service for assistance ([phone]).  For nonurgent matters, please email [email or web address] for assistance.

## 2023-02-21 NOTE — PROGRESS NOTE ADULT - SUBJECTIVE AND OBJECTIVE BOX
Chico Torres MD  Division of Hospital Medicine  Available via MS teams  If no response or off hours page: [phone]  ---------------------------------------------------------    TIKI HOUSTON  52y  Male      Patient is a 52y old  Male who presents with a chief complaint of r foot pain/tenderness, warmth, swelling (21 Feb 2023 11:41)      INTERVAL HPI/OVERNIGHT EVENTS:  Seen at bedside. Is tired this am       REVIEW OF SYSTEMS: 10 point ROS negative unless listed above    T(C): 37.1 (02-21-23 @ 09:00), Max: 37.4 (02-20-23 @ 16:10)  HR: 88 (02-21-23 @ 09:00) (74 - 88)  BP: 133/72 (02-21-23 @ 09:00) (133/72 - 158/87)  RR: 18 (02-21-23 @ 09:00) (18 - 18)  SpO2: 98% (02-21-23 @ 09:00) (94% - 98%)  Wt(kg): --Vital Signs Last 24 Hrs  T(C): 37.1 (21 Feb 2023 09:00), Max: 37.4 (20 Feb 2023 16:10)  T(F): 98.7 (21 Feb 2023 09:00), Max: 99.3 (20 Feb 2023 16:10)  HR: 88 (21 Feb 2023 09:00) (74 - 88)  BP: 133/72 (21 Feb 2023 09:00) (133/72 - 158/87)  BP(mean): --  RR: 18 (21 Feb 2023 09:00) (18 - 18)  SpO2: 98% (21 Feb 2023 09:00) (94% - 98%)    Parameters below as of 21 Feb 2023 09:00  Patient On (Oxygen Delivery Method): room air        PHYSICAL EXAM:  GENERAL: NAD, well-groomed, well-developed  NECK: Supple, No JVD  CHEST/LUNG: Clear to auscultation bilaterally; No rales, rhonchi, wheezing, or rubs  HEART: Regular rate and rhythm; No murmurs, rubs, or gallops   EXTREMITIES:  RLE dressed      Consultant(s) Notes Reviewed:  [x ] YES  [ ] NO  Care Discussed with Consultants/Other Providers [ x] YES  [ ] NO    LABS:                        10.1   6.84  )-----------( 275      ( 21 Feb 2023 08:52 )             30.9     02-21    134<L>  |  98  |  20  ----------------------------<  205<H>  4.8   |  23  |  1.51<H>    Ca    8.9      21 Feb 2023 08:52  Phos  2.9     02-21  Mg     2.0     02-21          CAPILLARY BLOOD GLUCOSE      POCT Blood Glucose.: 101 mg/dL (21 Feb 2023 12:09)  POCT Blood Glucose.: 199 mg/dL (21 Feb 2023 09:05)  POCT Blood Glucose.: 236 mg/dL (20 Feb 2023 23:55)  POCT Blood Glucose.: 207 mg/dL (20 Feb 2023 22:10)  POCT Blood Glucose.: 123 mg/dL (20 Feb 2023 15:48)  POCT Blood Glucose.: 159 mg/dL (20 Feb 2023 13:14)            RADIOLOGY & ADDITIONAL TESTS:    Imaging Personally Reviewed:  [ ] YES  [ ] NO

## 2023-02-22 ENCOUNTER — TRANSCRIPTION ENCOUNTER (OUTPATIENT)
Age: 53
End: 2023-02-22

## 2023-02-22 DIAGNOSIS — Z01.818 ENCOUNTER FOR OTHER PREPROCEDURAL EXAMINATION: ICD-10-CM

## 2023-02-22 LAB
ANION GAP SERPL CALC-SCNC: 13 MMOL/L — SIGNIFICANT CHANGE UP (ref 5–17)
BLD GP AB SCN SERPL QL: NEGATIVE — SIGNIFICANT CHANGE UP
BUN SERPL-MCNC: 21 MG/DL — SIGNIFICANT CHANGE UP (ref 7–23)
CALCIUM SERPL-MCNC: 9.2 MG/DL — SIGNIFICANT CHANGE UP (ref 8.4–10.5)
CHLORIDE SERPL-SCNC: 99 MMOL/L — SIGNIFICANT CHANGE UP (ref 96–108)
CO2 SERPL-SCNC: 23 MMOL/L — SIGNIFICANT CHANGE UP (ref 22–31)
CREAT SERPL-MCNC: 1.4 MG/DL — HIGH (ref 0.5–1.3)
CULTURE RESULTS: SIGNIFICANT CHANGE UP
CULTURE RESULTS: SIGNIFICANT CHANGE UP
EGFR: 61 ML/MIN/1.73M2 — SIGNIFICANT CHANGE UP
GLUCOSE BLDC GLUCOMTR-MCNC: 105 MG/DL — HIGH (ref 70–99)
GLUCOSE BLDC GLUCOMTR-MCNC: 118 MG/DL — HIGH (ref 70–99)
GLUCOSE BLDC GLUCOMTR-MCNC: 162 MG/DL — HIGH (ref 70–99)
GLUCOSE BLDC GLUCOMTR-MCNC: 196 MG/DL — HIGH (ref 70–99)
GLUCOSE BLDC GLUCOMTR-MCNC: 98 MG/DL — SIGNIFICANT CHANGE UP (ref 70–99)
GLUCOSE SERPL-MCNC: 143 MG/DL — HIGH (ref 70–99)
HCT VFR BLD CALC: 32.4 % — LOW (ref 39–50)
HGB BLD-MCNC: 10.2 G/DL — LOW (ref 13–17)
MCHC RBC-ENTMCNC: 29 PG — SIGNIFICANT CHANGE UP (ref 27–34)
MCHC RBC-ENTMCNC: 31.5 GM/DL — LOW (ref 32–36)
MCV RBC AUTO: 92 FL — SIGNIFICANT CHANGE UP (ref 80–100)
NRBC # BLD: 0 /100 WBCS — SIGNIFICANT CHANGE UP (ref 0–0)
PLATELET # BLD AUTO: 284 K/UL — SIGNIFICANT CHANGE UP (ref 150–400)
POTASSIUM SERPL-MCNC: 4.4 MMOL/L — SIGNIFICANT CHANGE UP (ref 3.5–5.3)
POTASSIUM SERPL-SCNC: 4.4 MMOL/L — SIGNIFICANT CHANGE UP (ref 3.5–5.3)
RBC # BLD: 3.52 M/UL — LOW (ref 4.2–5.8)
RBC # FLD: 13.2 % — SIGNIFICANT CHANGE UP (ref 10.3–14.5)
RH IG SCN BLD-IMP: POSITIVE — SIGNIFICANT CHANGE UP
SARS-COV-2 RNA SPEC QL NAA+PROBE: SIGNIFICANT CHANGE UP
SODIUM SERPL-SCNC: 135 MMOL/L — SIGNIFICANT CHANGE UP (ref 135–145)
SPECIMEN SOURCE: SIGNIFICANT CHANGE UP
SPECIMEN SOURCE: SIGNIFICANT CHANGE UP
WBC # BLD: 6.81 K/UL — SIGNIFICANT CHANGE UP (ref 3.8–10.5)
WBC # FLD AUTO: 6.81 K/UL — SIGNIFICANT CHANGE UP (ref 3.8–10.5)

## 2023-02-22 PROCEDURE — 99232 SBSQ HOSP IP/OBS MODERATE 35: CPT | Mod: 57

## 2023-02-22 PROCEDURE — 99233 SBSQ HOSP IP/OBS HIGH 50: CPT

## 2023-02-22 PROCEDURE — 99232 SBSQ HOSP IP/OBS MODERATE 35: CPT

## 2023-02-22 RX ORDER — SODIUM CHLORIDE 9 MG/ML
1000 INJECTION, SOLUTION INTRAVENOUS
Refills: 0 | Status: DISCONTINUED | OUTPATIENT
Start: 2023-02-22 | End: 2023-02-23

## 2023-02-22 RX ADMIN — HYDROMORPHONE HYDROCHLORIDE 1 MILLIGRAM(S): 2 INJECTION INTRAMUSCULAR; INTRAVENOUS; SUBCUTANEOUS at 08:00

## 2023-02-22 RX ADMIN — Medication 1 TABLET(S): at 16:32

## 2023-02-22 RX ADMIN — Medication 100 MILLIGRAM(S): at 10:12

## 2023-02-22 RX ADMIN — Medication 81 MILLIGRAM(S): at 10:47

## 2023-02-22 RX ADMIN — Medication 1: at 07:32

## 2023-02-22 RX ADMIN — CLOPIDOGREL BISULFATE 75 MILLIGRAM(S): 75 TABLET, FILM COATED ORAL at 10:47

## 2023-02-22 RX ADMIN — Medication 16 UNIT(S): at 07:31

## 2023-02-22 RX ADMIN — Medication 100 MILLIGRAM(S): at 00:31

## 2023-02-22 RX ADMIN — HEPARIN SODIUM 5000 UNIT(S): 5000 INJECTION INTRAVENOUS; SUBCUTANEOUS at 07:27

## 2023-02-22 RX ADMIN — HEPARIN SODIUM 5000 UNIT(S): 5000 INJECTION INTRAVENOUS; SUBCUTANEOUS at 16:32

## 2023-02-22 RX ADMIN — ATORVASTATIN CALCIUM 80 MILLIGRAM(S): 80 TABLET, FILM COATED ORAL at 00:30

## 2023-02-22 RX ADMIN — HYDROMORPHONE HYDROCHLORIDE 1 MILLIGRAM(S): 2 INJECTION INTRAMUSCULAR; INTRAVENOUS; SUBCUTANEOUS at 19:52

## 2023-02-22 RX ADMIN — HYDROMORPHONE HYDROCHLORIDE 1 MILLIGRAM(S): 2 INJECTION INTRAMUSCULAR; INTRAVENOUS; SUBCUTANEOUS at 14:00

## 2023-02-22 RX ADMIN — OXYCODONE HYDROCHLORIDE 5 MILLIGRAM(S): 5 TABLET ORAL at 11:00

## 2023-02-22 RX ADMIN — SERTRALINE 50 MILLIGRAM(S): 25 TABLET, FILM COATED ORAL at 10:46

## 2023-02-22 RX ADMIN — Medication 16 UNIT(S): at 16:32

## 2023-02-22 RX ADMIN — Medication 3 MILLIGRAM(S): at 02:09

## 2023-02-22 RX ADMIN — OXYCODONE HYDROCHLORIDE 5 MILLIGRAM(S): 5 TABLET ORAL at 15:00

## 2023-02-22 RX ADMIN — OXYCODONE HYDROCHLORIDE 5 MILLIGRAM(S): 5 TABLET ORAL at 10:11

## 2023-02-22 RX ADMIN — Medication 1 TABLET(S): at 07:27

## 2023-02-22 RX ADMIN — ATORVASTATIN CALCIUM 80 MILLIGRAM(S): 80 TABLET, FILM COATED ORAL at 21:52

## 2023-02-22 RX ADMIN — HYDROMORPHONE HYDROCHLORIDE 1 MILLIGRAM(S): 2 INJECTION INTRAMUSCULAR; INTRAVENOUS; SUBCUTANEOUS at 20:52

## 2023-02-22 RX ADMIN — HYDROMORPHONE HYDROCHLORIDE 1 MILLIGRAM(S): 2 INJECTION INTRAMUSCULAR; INTRAVENOUS; SUBCUTANEOUS at 13:14

## 2023-02-22 RX ADMIN — Medication 16 UNIT(S): at 13:14

## 2023-02-22 RX ADMIN — Medication 25 MILLIGRAM(S): at 06:03

## 2023-02-22 RX ADMIN — HYDROMORPHONE HYDROCHLORIDE 1 MILLIGRAM(S): 2 INJECTION INTRAMUSCULAR; INTRAVENOUS; SUBCUTANEOUS at 03:10

## 2023-02-22 RX ADMIN — INSULIN GLARGINE 26 UNIT(S): 100 INJECTION, SOLUTION SUBCUTANEOUS at 21:52

## 2023-02-22 RX ADMIN — HYDROMORPHONE HYDROCHLORIDE 1 MILLIGRAM(S): 2 INJECTION INTRAMUSCULAR; INTRAVENOUS; SUBCUTANEOUS at 02:10

## 2023-02-22 RX ADMIN — HYDROMORPHONE HYDROCHLORIDE 1 MILLIGRAM(S): 2 INJECTION INTRAMUSCULAR; INTRAVENOUS; SUBCUTANEOUS at 07:24

## 2023-02-22 RX ADMIN — Medication 1: at 16:32

## 2023-02-22 RX ADMIN — Medication 100 MILLIGRAM(S): at 17:55

## 2023-02-22 RX ADMIN — OXYCODONE HYDROCHLORIDE 5 MILLIGRAM(S): 5 TABLET ORAL at 15:16

## 2023-02-22 RX ADMIN — HEPARIN SODIUM 5000 UNIT(S): 5000 INJECTION INTRAVENOUS; SUBCUTANEOUS at 00:31

## 2023-02-22 NOTE — PROVIDER CONTACT NOTE (OTHER) - ASSESSMENT
Pt complains of RLE pain. Pt is not due for pain meds at this time. Pt states pain is severe and requests RN to ask if I can give pain meds early. Pt rates pain 8/10 and throbbing. pt ordered for oxy but is not due for 1 hour

## 2023-02-22 NOTE — PROGRESS NOTE ADULT - ASSESSMENT
51 yo male with PMH of CAD s/p PCI with stent, HTN, HLD, uncontrolled IDDM2, anxiety and depression who presents from outpatient podiatry office with worsening R foot pain found to be in severe sepsis 2/2 cellulitis and osteomyelitis with CT of R ankle concerning for possible septic arthritis and gas-forming/necrotizing infection with course c/b MSSA bacteremia, awaiting OR 2/24.

## 2023-02-22 NOTE — PROGRESS NOTE ADULT - PROBLEM SELECTOR PLAN 2
h/o poorly controlled dm, pad s/p right toe amputations  OM over first metatarsal stump confirmed on MRI, CT w/ septic arthritis of underlying 1st tarsometatarsal joint;   - podiatry following advising debridement/amputation of r 1st ray - earliest friday 2/24  - s/p arthrocentesis by podiatry ngtd  - ID following - IV ancef given MSSA bacteremia, bld cx cleared since 2/17  - vasc sx consult appreciated, plan for angio possibly Thursday  - pain control - dilaudid 1 mg iv prn severe pain, oxy prn moderate pain

## 2023-02-22 NOTE — PROGRESS NOTE ADULT - SUBJECTIVE AND OBJECTIVE BOX
Follow Up:  bacteremia    Interval History/ROS:  Overnight: No acute events.  Patient patient remains a febrile, otherwise hemodynamically stable on room air.  Latest labs show no leukocytosis, elevated creatinine to 1.4, repeat blood cultures from 2/17/2023 remain negative.  Joint fluid culture from 2/17/2023 remains negative.  SAL obtained on 2/21/2023 with normal findings.    Patient seen examined at bedside.  Patient denies any new pain or discomfort.    Allergies  No Known Allergies        ANTIMICROBIALS:  ceFAZolin   IVPB 2000 every 8 hours      OTHER MEDS:  MEDICATIONS  (STANDING):  acetaminophen     Tablet .. 650 every 6 hours PRN  aluminum hydroxide/magnesium hydroxide/simethicone Suspension 30 every 4 hours PRN  aspirin enteric coated 81 daily  atorvastatin 80 at bedtime  bisacodyl 5 daily PRN  clopidogrel Tablet 75 daily  dextrose 50% Injectable 25 once  dextrose 50% Injectable 12.5 once  dextrose 50% Injectable 25 once  dextrose Oral Gel 15 once PRN  glucagon  Injectable 1 once  heparin   Injectable 5000 every 8 hours  HYDROmorphone  Injectable 1 every 6 hours PRN  influenza   Vaccine 0.5 once  insulin glargine Injectable (LANTUS) 26 at bedtime  insulin lispro (ADMELOG) corrective regimen sliding scale  three times a day before meals  insulin lispro (ADMELOG) corrective regimen sliding scale  at bedtime  insulin lispro Injectable (ADMELOG) 16 three times a day before meals  melatonin 3 at bedtime PRN  metoprolol succinate ER 25 daily  ondansetron Injectable 4 every 8 hours PRN  oxyCODONE    IR 5 every 6 hours PRN  senna 2 at bedtime PRN  sertraline 50 daily      Vital Signs Last 24 Hrs  T(C): 36.9 (22 Feb 2023 09:16), Max: 37.2 (22 Feb 2023 01:36)  T(F): 98.5 (22 Feb 2023 09:16), Max: 99 (22 Feb 2023 01:36)  HR: 75 (22 Feb 2023 09:16) (64 - 75)  BP: 107/70 (22 Feb 2023 09:16) (107/70 - 152/87)  BP(mean): --  RR: 18 (22 Feb 2023 09:16) (17 - 18)  SpO2: 95% (22 Feb 2023 09:16) (93% - 95%)    Parameters below as of 22 Feb 2023 09:16  Patient On (Oxygen Delivery Method): room air        PHYSICAL EXAM:  Constitutional: comfortable  Cardiovascular:   normal S1, S2, no murmur, no edema  Respiratory:  no resp distress  GI:  soft, non-tender,   Neurologic: awake and alert, normal strength, no focal findings  Skin:  foot wound bandaged- refusing exam stating only podiatry is allowed to examine               10.2   6.81  )-----------( 284      ( 22 Feb 2023 09:34 )             32.4       02-22    135  |  99  |  21  ----------------------------<  143<H>  4.4   |  23  |  1.40<H>    Ca    9.2      22 Feb 2023 09:34  Phos  2.9     02-21  Mg     2.0     02-21            MICROBIOLOGY:  v    Culture - Joint Fluid (collected 17 Feb 2023 13:58)  Source: Joint Fl Joint Fluid  Gram Stain (17 Feb 2023 23:16):    No polymorphonuclear cells seen per low power field    No organisms seen per oil power field  Preliminary Report (18 Feb 2023 18:51):    No growth    Rapid RVP Result: NotDetec (02-15 @ 14:14)       Follow Up:  bacteremia    Interval History/ROS:  Overnight: No acute events.  Patient patient remains a febrile, otherwise hemodynamically stable on room air.  Latest labs show no leukocytosis, elevated creatinine to 1.4, repeat blood cultures from 2/17/2023 remain negative.  Joint fluid culture from 2/17/2023 remains negative.  SAL obtained on 2/21/2023 with normal findings.    Patient seen examined at bedside.  Patient denies any new pain or discomfort.    Allergies  No Known Allergies        ANTIMICROBIALS:  ceFAZolin   IVPB 2000 every 8 hours      OTHER MEDS:  MEDICATIONS  (STANDING):  acetaminophen     Tablet .. 650 every 6 hours PRN  aluminum hydroxide/magnesium hydroxide/simethicone Suspension 30 every 4 hours PRN  aspirin enteric coated 81 daily  atorvastatin 80 at bedtime  bisacodyl 5 daily PRN  clopidogrel Tablet 75 daily  dextrose 50% Injectable 25 once  dextrose 50% Injectable 12.5 once  dextrose 50% Injectable 25 once  dextrose Oral Gel 15 once PRN  glucagon  Injectable 1 once  heparin   Injectable 5000 every 8 hours  HYDROmorphone  Injectable 1 every 6 hours PRN  influenza   Vaccine 0.5 once  insulin glargine Injectable (LANTUS) 26 at bedtime  insulin lispro (ADMELOG) corrective regimen sliding scale  three times a day before meals  insulin lispro (ADMELOG) corrective regimen sliding scale  at bedtime  insulin lispro Injectable (ADMELOG) 16 three times a day before meals  melatonin 3 at bedtime PRN  metoprolol succinate ER 25 daily  ondansetron Injectable 4 every 8 hours PRN  oxyCODONE    IR 5 every 6 hours PRN  senna 2 at bedtime PRN  sertraline 50 daily      Vital Signs Last 24 Hrs  T(C): 36.9 (22 Feb 2023 09:16), Max: 37.2 (22 Feb 2023 01:36)  T(F): 98.5 (22 Feb 2023 09:16), Max: 99 (22 Feb 2023 01:36)  HR: 75 (22 Feb 2023 09:16) (64 - 75)  BP: 107/70 (22 Feb 2023 09:16) (107/70 - 152/87)  BP(mean): --  RR: 18 (22 Feb 2023 09:16) (17 - 18)  SpO2: 95% (22 Feb 2023 09:16) (93% - 95%)    Parameters below as of 22 Feb 2023 09:16  Patient On (Oxygen Delivery Method): room air        PHYSICAL EXAM:  Constitutional: comfortable  Cardiovascular:   normal S1, S2, no murmur, no edema  Respiratory:  no resp distress  GI:  soft, non-tender,   Neurologic: awake and alert, normal strength, no focal findings  Skin:  foot wound bandaged- refusing exam however area continues to appear erythematous               10.2   6.81  )-----------( 284      ( 22 Feb 2023 09:34 )             32.4       02-22    135  |  99  |  21  ----------------------------<  143<H>  4.4   |  23  |  1.40<H>    Ca    9.2      22 Feb 2023 09:34  Phos  2.9     02-21  Mg     2.0     02-21            MICROBIOLOGY:  v    Culture - Joint Fluid (collected 17 Feb 2023 13:58)  Source: Joint Fl Joint Fluid  Gram Stain (17 Feb 2023 23:16):    No polymorphonuclear cells seen per low power field    No organisms seen per oil power field  Preliminary Report (18 Feb 2023 18:51):    No growth    Rapid RVP Result: NotDetec (02-15 @ 14:14)

## 2023-02-22 NOTE — PROGRESS NOTE ADULT - ASSESSMENT
51 yo male with PMH of CAD s/p PCI with stent, HTN, HLD, uncontrolled IDDM2, anxiety and depression who presents from outpatient podiatry office with worsening R foot pain, likely septic toe.    Recs:  - SAL/PVRs ordered, please obtain   - C/w IV abx for toe wound  - pods debridement tentatively Friday  - Tentative angio Thursday RLE   - Appreciate cardiology evaluation, TTE pending and perioperative risk stratification pending results  - Please document general medical risk for surgery   - care per primary team    Vascular Surgery  [id number]     51 yo male with PMH of CAD s/p PCI with stent, HTN, HLD, uncontrolled IDDM2, anxiety and depression who presents from outpatient podiatry office with worsening R foot pain, likely septic toe.    Recs:  - Appreciate ASL/PVRs  - C/w IV abx for toe wound  - pods debridement tentatively Friday  - Tentative angio Thursday RLE   - Appreciate cardiology evaluation, TTE pending and perioperative risk stratification pending results  - Please document general medical risk for surgery   - care per primary team    Vascular Surgery  [id number]

## 2023-02-22 NOTE — CHART NOTE - NSCHARTNOTEFT_GEN_A_CORE
Pre-operative Note    Surgeon: Gretchen  Procedure: RLE Angiogram    Vital Signs Last 24 Hrs  T(C): 36.4 (22 Feb 2023 06:07), Max: 37.2 (22 Feb 2023 01:36)  T(F): 97.6 (22 Feb 2023 06:07), Max: 99 (22 Feb 2023 01:36)  HR: 72 (22 Feb 2023 06:07) (64 - 88)  BP: 143/68 (22 Feb 2023 06:07) (133/72 - 152/87)  BP(mean): --  RR: 18 (22 Feb 2023 06:07) (17 - 18)  SpO2: 95% (22 Feb 2023 06:07) (93% - 98%)    Parameters below as of 22 Feb 2023 06:07  Patient On (Oxygen Delivery Method): room air                            10.1   6.84  )-----------( 275      ( 21 Feb 2023 08:52 )             30.9     02-21    134<L>  |  98  |  20  ----------------------------<  205<H>  4.8   |  23  |  1.51<H>    Ca    8.9      21 Feb 2023 08:52  Phos  2.9     02-21  Mg     2.0     02-21        A/P: 52y Male with PMH of CAD s/p PCI with stent, HTN, HLD, uncontrolled IDDM2, anxiety and depression who presents from outpatient podiatry office with worsening R foot pain, likely infected toe. Going for RLE Angio tomorrow    [x] NPO after midnight  [x] IVF  [x] T&S -- 1 ordered STAT, 1 ordered for AM  [x] AM CBC -- ordered  [x] AM BMP -- ordered  [x] AM PT, PTT, INR -- ordered  [x] COVID negative on 2/15, repeat pending 2/22  [ ] Documentation of medical optimization -- IM note states clearance pending TTE results  [x] Consent obtained and placed in chart    Vascular Surgery  [id number]

## 2023-02-22 NOTE — PROVIDER CONTACT NOTE (OTHER) - ASSESSMENT
Pt refuses PT eval. pt has been refusing for the past 2 days. pt states "A doctor told me I should not be walking on this foot unless I am walking to the bathroom". Pt educated by PT and RN that doctors are requesting to evaluate how he ambulates before surgery and he can be weight bearing as tolerated. Pt has been refusing multiple tests and treatment plans

## 2023-02-22 NOTE — PROGRESS NOTE ADULT - ASSESSMENT
52-year-old male with a past medical history most significant for diabetes, CAD status post PCI, gout who is admitted to the hospital due to right ankle pain.    #MSSA bacteremia  Blood cultures obtained on admission from 2/15 growing MSSA (1/2 sets, 1/4 bottles)  Source likely lower extremity wound  Repeat from 2/17 ngtd  joint fluid culture from 2/17 ngtd  Tissue culture with MSSA, strep    #Abnormal imaging of the right lower extremity  #Right foot ulcer with associated right ankle pain and swelling  #Concern for septic arthritis  Fever and leukocytosis upon admission  History of MRSA foot infection  Aspirated by podiatry, bone biopsy attempted however unsuccessful    #Leukocytosis  #Elevated inflammatory markers    #History of amputation of toe with right third and fourth partial ray resection and April 2021 and second partial ray resection in May 2021    Recommendations  Conitnue cefazolin 2g q8hr  Follow pending blood cultures  Follow Podiatry, vascular surgery  input - plan for angiogram, pod surgery, please obtain cultures in OR  Follow fever curve and WBC count    Nirav Simmons MD  Division of Infectious Diseases  Available on Teams       52-year-old male with a past medical history most significant for diabetes, CAD status post PCI, gout who is admitted to the hospital due to right ankle pain.    #MSSA bacteremia  Blood cultures obtained on admission from 2/15 growing MSSA (1/2 sets, 1/4 bottles)  Source likely lower extremity wound  Repeat from 2/17 ngtd  joint fluid culture from 2/17 ngtd  Tissue culture with MSSA, strep, peptostreptococcus    #Abnormal imaging of the right lower extremity  #Right foot ulcer with associated right ankle pain and swelling  #Concern for septic arthritis  Fever and leukocytosis upon admission  History of MRSA foot infection  Aspirated by podiatry, bone biopsy attempted however unsuccessful    #Leukocytosis  #Elevated inflammatory markers    #History of amputation of toe with right third and fourth partial ray resection and April 2021 and second partial ray resection in May 2021    Recommendations  Continue cefazolin 2g q8hr  Add metronidazole 500 mg BID  Follow pending blood cultures  Follow Podiatry, vascular surgery  input - plan for angiogram, pod surgery, please obtain cultures in OR  Follow fever curve and WBC count    Nirav Simmons MD  Division of Infectious Diseases  Available on Teams

## 2023-02-22 NOTE — PROGRESS NOTE ADULT - PROBLEM SELECTOR PLAN 3
Pt states he is able to walk up a flight of stairs without chest pain, exercise tolerance has been limited more by his foot. Of note he had late presentation MI in June 2022. s/p PCI with SIERRA x1 pRCA 95%, SIERRA x1 dRCA, LAD 70% and Cx 70%. TTE at that time had EF 52% but no WMA.   - Pt with RCRI of 2 (Class III risk) and 10.1% chance of 30 day MACE  - Overall appears optimized for planned angio, no need to obtain current TTE ordered prior to planned procedure

## 2023-02-22 NOTE — PROGRESS NOTE ADULT - SUBJECTIVE AND OBJECTIVE BOX
Patient is a 52y old  Male who presents with a chief complaint of r foot pain/tenderness, warmth, swelling (22 Feb 2023 07:48)       INTERVAL HPI/OVERNIGHT EVENTS:  Patient seen and evaluated at bedside.  Pt is resting comfortable in NAD. Denies N/V/F/C.    Allergies    No Known Allergies    Intolerances        Vital Signs Last 24 Hrs  T(C): 36.4 (22 Feb 2023 06:07), Max: 37.2 (22 Feb 2023 01:36)  T(F): 97.6 (22 Feb 2023 06:07), Max: 99 (22 Feb 2023 01:36)  HR: 72 (22 Feb 2023 06:07) (64 - 88)  BP: 143/68 (22 Feb 2023 06:07) (133/72 - 152/87)  BP(mean): --  RR: 18 (22 Feb 2023 06:07) (17 - 18)  SpO2: 95% (22 Feb 2023 06:07) (93% - 98%)    Parameters below as of 22 Feb 2023 06:07  Patient On (Oxygen Delivery Method): room air        LABS:                        10.1   6.84  )-----------( 275      ( 21 Feb 2023 08:52 )             30.9     02-21    134<L>  |  98  |  20  ----------------------------<  205<H>  4.8   |  23  |  1.51<H>    Ca    8.9      21 Feb 2023 08:52  Phos  2.9     02-21  Mg     2.0     02-21          CAPILLARY BLOOD GLUCOSE      POCT Blood Glucose.: 196 mg/dL (22 Feb 2023 07:30)  POCT Blood Glucose.: 98 mg/dL (22 Feb 2023 00:27)  POCT Blood Glucose.: 117 mg/dL (21 Feb 2023 18:16)  POCT Blood Glucose.: 101 mg/dL (21 Feb 2023 12:09)  POCT Blood Glucose.: 199 mg/dL (21 Feb 2023 09:05)      Lower Extremity Physical Exam:  Vascular: DP/PT 0/4, B/L, CFT <3 seconds B/L, Temperature gradient warm to cool, B/L.   Neuro: Epicritic sensation diminished to the level of toes, B/L.  Musculoskeletal/Ortho: Right ankle no pain with active or passive dorsiflexion/plantarflexion/inversion/eversion, pain on palpation localized to the medial and lateral malleoli.  Skin: Right ankle ROM wnl, pain on palpation localized to the medial and lateral malleoli. Right foot submet 1 wound to bone, scant pus today, fibrogranular wound bed, tracks dorsal proximal along tenon 4cm, no fluctuance    RADIOLOGY & ADDITIONAL TESTS:

## 2023-02-22 NOTE — PROGRESS NOTE ADULT - PROBLEM SELECTOR PLAN 1
Jimmy Slaughter is a 55 year old male presenting for   Chief Complaint   Patient presents with   • Physical   • Mass     R lower hip area, hard, non movable mass   • Derm Problem     would like a referral      Denies Latex allergy or sensitivity.    Medication verified and med list updated  Refills needed today: No    Health Maintenance Summary     Topic Due On Due Status Completed On    Colorectal Cancer Screening - Colonoscopy  Oct 1, 2019 Not Due Oct 1, 2016    IMMUNIZATION - DTaP/Tdap/Td Mar 31, 2024 Not Due Mar 31, 2014    Immunization-Influenza  Completed Oct 10, 2017    Depression Screening Feb 17, 1977 Overdue     Hepatitis C Screening Feb 17, 2016 Overdue           Patient is due for topics as listed above, he wishes to discuss with provider .                           resolved  2/2 cellulitis, osteo, possible septic arthritis, MSSA bacteremia  - treatment of infection as below

## 2023-02-22 NOTE — PROGRESS NOTE ADULT - PROBLEM SELECTOR PLAN 6
lactic acidosis 3.3->1.8 (resolved),beta hydroxy negative  - stress hyperglycemia iso severe sepsis and poorly controlled dm  - resolved

## 2023-02-22 NOTE — PROGRESS NOTE ADULT - SUBJECTIVE AND OBJECTIVE BOX
Chico Torres MD  Division of Hospital Medicine  Available via MS teams  If no response or off hours page: [phone]  ---------------------------------------------------------    TIKI HOUSTON  52y  Male      Patient is a 52y old  Male who presents with a chief complaint of r foot pain/tenderness, warmth, swelling (22 Feb 2023 11:26)      INTERVAL HPI/OVERNIGHT EVENTS:  Seen at bedside. Tired again this am. Has pain in feet      REVIEW OF SYSTEMS: 10 point ROS negative unless listed above    T(C): 36.8 (02-22-23 @ 12:13), Max: 37.2 (02-22-23 @ 01:36)  HR: 75 (02-22-23 @ 12:13) (64 - 76)  BP: 145/70 (02-22-23 @ 12:13) (107/70 - 152/87)  RR: 18 (02-22-23 @ 12:13) (17 - 18)  SpO2: 96% (02-22-23 @ 12:13) (93% - 96%)  Wt(kg): --Vital Signs Last 24 Hrs  T(C): 36.8 (22 Feb 2023 12:13), Max: 37.2 (22 Feb 2023 01:36)  T(F): 98.2 (22 Feb 2023 12:13), Max: 99 (22 Feb 2023 01:36)  HR: 75 (22 Feb 2023 12:13) (64 - 76)  BP: 145/70 (22 Feb 2023 12:13) (107/70 - 152/87)  BP(mean): --  RR: 18 (22 Feb 2023 12:13) (17 - 18)  SpO2: 96% (22 Feb 2023 12:13) (93% - 96%)    Parameters below as of 22 Feb 2023 12:13  Patient On (Oxygen Delivery Method): room air        PHYSICAL EXAM:  GENERAL: NAD, well-groomed, well-developed  NECK: Supple, No JVD  CHEST/LUNG: Clear to auscultation bilaterally; No rales, rhonchi, wheezing, or rubs  HEART: Regular rate and rhythm; No murmurs, rubs, or gallops   EXTREMITIES:  RLE dressed    Consultant(s) Notes Reviewed:  [x ] YES  [ ] NO  Care Discussed with Consultants/Other Providers [ x] YES  [ ] NO    LABS:                        10.2   6.81  )-----------( 284      ( 22 Feb 2023 09:34 )             32.4     02-22    135  |  99  |  21  ----------------------------<  143<H>  4.4   |  23  |  1.40<H>    Ca    9.2      22 Feb 2023 09:34  Phos  2.9     02-21  Mg     2.0     02-21          CAPILLARY BLOOD GLUCOSE      POCT Blood Glucose.: 196 mg/dL (22 Feb 2023 07:30)  POCT Blood Glucose.: 98 mg/dL (22 Feb 2023 00:27)  POCT Blood Glucose.: 117 mg/dL (21 Feb 2023 18:16)            RADIOLOGY & ADDITIONAL TESTS:    Imaging Personally Reviewed:  [ ] YES  [ ] NO

## 2023-02-22 NOTE — PROGRESS NOTE ADULT - SUBJECTIVE AND OBJECTIVE BOX
Vascular Surgery Progress Note    Interval: No acute overnight events.    SUBJECTIVE: Patient seen and examined at the bedside. Still c/o ankle pain.  Frustrated about hospital stay, no other complaints.     OBJECTIVE:  Vital Signs Last 24 Hrs  T(C): 36.4 (22 Feb 2023 06:07), Max: 37.2 (22 Feb 2023 01:36)  T(F): 97.6 (22 Feb 2023 06:07), Max: 99 (22 Feb 2023 01:36)  HR: 72 (22 Feb 2023 06:07) (64 - 88)  BP: 143/68 (22 Feb 2023 06:07) (133/72 - 152/87)  BP(mean): --  RR: 18 (22 Feb 2023 06:07) (17 - 18)  SpO2: 95% (22 Feb 2023 06:07) (93% - 98%)    Parameters below as of 22 Feb 2023 06:07  Patient On (Oxygen Delivery Method): room air    I&O's Detail    21 Feb 2023 07:01  -  22 Feb 2023 07:00  --------------------------------------------------------  IN:    IV PiggyBack: 50 mL    Oral Fluid: 900 mL  Total IN: 950 mL    OUT:    Voided (mL): 1600 mL  Total OUT: 1600 mL    Total NET: -650 mL        PHYSICAL EXAM:   General- NAD, A&Ox3  Extremities- R foot TMA. First MTP with punched out ulcer on posterior aspect of toe. No cellulitis or erythema noted. AT/PT signals. DP signal not able to be doppler. Femoral pulses palpable bilaterally.   Lungs- unlabored breathing, room air     LABS                                   10.1   6.84  )-----------( 275      ( 21 Feb 2023 08:52 )             30.9   02-21    134<L>  |  98  |  20  ----------------------------<  205<H>  4.8   |  23  |  1.51<H>    Ca    8.9      21 Feb 2023 08:52  Phos  2.9     02-21  Mg     2.0     02-21

## 2023-02-22 NOTE — PROGRESS NOTE ADULT - ASSESSMENT
52M presents with right foot wound and right ankle pain.  - Pt seen and evaluated.  - Afebrile, no leukocytosis  - Right foot submet 1 wound to bone, scant pus today, fibrogranular wound bed, tracks dorsal proximal along tenon 4cm, no fluctuance  - Right foot wound culture growing MSSA, strep anginosus, Peptostreptococcus  - R ankle MR: OM of 1st met, bases of 2nd and 3rd met, all cuneiforms and likely cuboid  - Right ankle joint aspirate: Negative for organisms and crystals  - ID recommends continuing Ancef, appreciated  - SAL/PVRs incomplete: RABI 0.98, LABI 0.97, waveforms normal to level of the ankle joint  - Pt consented for R foot first ray amputation, tib ant tendon debridement, scheduled for Friday at 930AM  - Please document medical optimization for R foot surgery  - Seen with attending

## 2023-02-23 ENCOUNTER — TRANSCRIPTION ENCOUNTER (OUTPATIENT)
Age: 53
End: 2023-02-23

## 2023-02-23 LAB
ANION GAP SERPL CALC-SCNC: 15 MMOL/L — SIGNIFICANT CHANGE UP (ref 5–17)
APTT BLD: 29.3 SEC — SIGNIFICANT CHANGE UP (ref 27.5–35.5)
BLD GP AB SCN SERPL QL: NEGATIVE — SIGNIFICANT CHANGE UP
BUN SERPL-MCNC: 22 MG/DL — SIGNIFICANT CHANGE UP (ref 7–23)
CALCIUM SERPL-MCNC: 8.7 MG/DL — SIGNIFICANT CHANGE UP (ref 8.4–10.5)
CHLORIDE SERPL-SCNC: 94 MMOL/L — LOW (ref 96–108)
CO2 SERPL-SCNC: 22 MMOL/L — SIGNIFICANT CHANGE UP (ref 22–31)
CREAT SERPL-MCNC: 1.34 MG/DL — HIGH (ref 0.5–1.3)
EGFR: 65 ML/MIN/1.73M2 — SIGNIFICANT CHANGE UP
GLUCOSE BLDC GLUCOMTR-MCNC: 277 MG/DL — HIGH (ref 70–99)
GLUCOSE BLDC GLUCOMTR-MCNC: 290 MG/DL — HIGH (ref 70–99)
GLUCOSE SERPL-MCNC: 268 MG/DL — HIGH (ref 70–99)
HCT VFR BLD CALC: 28 % — LOW (ref 39–50)
HGB BLD-MCNC: 8.8 G/DL — LOW (ref 13–17)
INR BLD: 1.24 RATIO — HIGH (ref 0.88–1.16)
MCHC RBC-ENTMCNC: 29.2 PG — SIGNIFICANT CHANGE UP (ref 27–34)
MCHC RBC-ENTMCNC: 31.4 GM/DL — LOW (ref 32–36)
MCV RBC AUTO: 93 FL — SIGNIFICANT CHANGE UP (ref 80–100)
NRBC # BLD: 0 /100 WBCS — SIGNIFICANT CHANGE UP (ref 0–0)
PLATELET # BLD AUTO: 297 K/UL — SIGNIFICANT CHANGE UP (ref 150–400)
POTASSIUM SERPL-MCNC: 4.8 MMOL/L — SIGNIFICANT CHANGE UP (ref 3.5–5.3)
POTASSIUM SERPL-SCNC: 4.8 MMOL/L — SIGNIFICANT CHANGE UP (ref 3.5–5.3)
PROTHROM AB SERPL-ACNC: 14.3 SEC — HIGH (ref 10.5–13.4)
RBC # BLD: 3.01 M/UL — LOW (ref 4.2–5.8)
RBC # FLD: 13.2 % — SIGNIFICANT CHANGE UP (ref 10.3–14.5)
RH IG SCN BLD-IMP: POSITIVE — SIGNIFICANT CHANGE UP
SODIUM SERPL-SCNC: 131 MMOL/L — LOW (ref 135–145)
WBC # BLD: 7.29 K/UL — SIGNIFICANT CHANGE UP (ref 3.8–10.5)
WBC # FLD AUTO: 7.29 K/UL — SIGNIFICANT CHANGE UP (ref 3.8–10.5)

## 2023-02-23 PROCEDURE — 36246 INS CATH ABD/L-EXT ART 2ND: CPT | Mod: RT

## 2023-02-23 PROCEDURE — 75625 CONTRAST EXAM ABDOMINL AORTA: CPT | Mod: 26

## 2023-02-23 PROCEDURE — 99232 SBSQ HOSP IP/OBS MODERATE 35: CPT

## 2023-02-23 PROCEDURE — 75710 ARTERY X-RAYS ARM/LEG: CPT | Mod: 26,RT

## 2023-02-23 PROCEDURE — 99233 SBSQ HOSP IP/OBS HIGH 50: CPT

## 2023-02-23 DEVICE — GUIDEWIRE RADIFOCUS GLIDEWIRE STANDARD ANGLED TIP 0.035" X 260CM: Type: IMPLANTABLE DEVICE | Status: FUNCTIONAL

## 2023-02-23 DEVICE — SHEATH INTRODUCER TERUMO PINNACLE PERIPHERAL 5FR X 10CM: Type: IMPLANTABLE DEVICE | Status: FUNCTIONAL

## 2023-02-23 DEVICE — INTRODUCER SET MICROPUNCTURE ACCESS 21G X 7CM: Type: IMPLANTABLE DEVICE | Status: FUNCTIONAL

## 2023-02-23 DEVICE — CATH ANGIO OMNI FLUSH 4FR X 65CM: Type: IMPLANTABLE DEVICE | Status: FUNCTIONAL

## 2023-02-23 DEVICE — GUIDEWIRE BENTSON 0.035" X 145CM: Type: IMPLANTABLE DEVICE | Status: FUNCTIONAL

## 2023-02-23 RX ORDER — INSULIN LISPRO 100/ML
VIAL (ML) SUBCUTANEOUS EVERY 6 HOURS
Refills: 0 | Status: DISCONTINUED | OUTPATIENT
Start: 2023-02-23 | End: 2023-02-24

## 2023-02-23 RX ORDER — INSULIN GLARGINE 100 [IU]/ML
30 INJECTION, SOLUTION SUBCUTANEOUS AT BEDTIME
Refills: 0 | Status: DISCONTINUED | OUTPATIENT
Start: 2023-02-23 | End: 2023-02-24

## 2023-02-23 RX ADMIN — HYDROMORPHONE HYDROCHLORIDE 1 MILLIGRAM(S): 2 INJECTION INTRAMUSCULAR; INTRAVENOUS; SUBCUTANEOUS at 09:17

## 2023-02-23 RX ADMIN — Medication 3: at 07:59

## 2023-02-23 RX ADMIN — SODIUM CHLORIDE 125 MILLILITER(S): 9 INJECTION, SOLUTION INTRAVENOUS at 00:21

## 2023-02-23 RX ADMIN — CLOPIDOGREL BISULFATE 75 MILLIGRAM(S): 75 TABLET, FILM COATED ORAL at 13:40

## 2023-02-23 RX ADMIN — HYDROMORPHONE HYDROCHLORIDE 1 MILLIGRAM(S): 2 INJECTION INTRAMUSCULAR; INTRAVENOUS; SUBCUTANEOUS at 01:14

## 2023-02-23 RX ADMIN — Medication 100 MILLIGRAM(S): at 00:21

## 2023-02-23 RX ADMIN — Medication 25 MILLIGRAM(S): at 13:41

## 2023-02-23 RX ADMIN — OXYCODONE HYDROCHLORIDE 5 MILLIGRAM(S): 5 TABLET ORAL at 22:30

## 2023-02-23 RX ADMIN — Medication 100 MILLIGRAM(S): at 18:47

## 2023-02-23 RX ADMIN — OXYCODONE HYDROCHLORIDE 5 MILLIGRAM(S): 5 TABLET ORAL at 21:26

## 2023-02-23 RX ADMIN — HEPARIN SODIUM 5000 UNIT(S): 5000 INJECTION INTRAVENOUS; SUBCUTANEOUS at 00:40

## 2023-02-23 RX ADMIN — HYDROMORPHONE HYDROCHLORIDE 1 MILLIGRAM(S): 2 INJECTION INTRAMUSCULAR; INTRAVENOUS; SUBCUTANEOUS at 18:47

## 2023-02-23 RX ADMIN — SERTRALINE 50 MILLIGRAM(S): 25 TABLET, FILM COATED ORAL at 13:40

## 2023-02-23 RX ADMIN — Medication 3 MILLIGRAM(S): at 01:09

## 2023-02-23 RX ADMIN — HYDROMORPHONE HYDROCHLORIDE 1 MILLIGRAM(S): 2 INJECTION INTRAMUSCULAR; INTRAVENOUS; SUBCUTANEOUS at 19:00

## 2023-02-23 RX ADMIN — Medication 3: at 13:40

## 2023-02-23 RX ADMIN — Medication 81 MILLIGRAM(S): at 13:41

## 2023-02-23 RX ADMIN — HYDROMORPHONE HYDROCHLORIDE 1 MILLIGRAM(S): 2 INJECTION INTRAMUSCULAR; INTRAVENOUS; SUBCUTANEOUS at 02:14

## 2023-02-23 RX ADMIN — Medication 100 MILLIGRAM(S): at 08:00

## 2023-02-23 RX ADMIN — HYDROMORPHONE HYDROCHLORIDE 1 MILLIGRAM(S): 2 INJECTION INTRAMUSCULAR; INTRAVENOUS; SUBCUTANEOUS at 09:50

## 2023-02-23 NOTE — PROGRESS NOTE ADULT - ASSESSMENT
53 y/o M w/ hx of uncontrolled Type 2 DM w/ hyperglycemia complicated by neuropathy, amputations, retinopathy and CAD, HTN and HLD admitted for sepsis 2/2 right foot infection (high risk patient with severely uncontrolled diabetes with A1c of 11.2% at high risk of CAD and CVA with high level decision-making). Plan for angio today, and podiatry amputation/debridement 2/24 if pt consents.   BG tightly controlled due to poor po intake.      Uncontrolled type 2 diabetes mellitus with hyperglycemia, with long-term current use of insulin.   A1C 11.2   Last seen at the office 1/2023 with dose adjustment of Toujeo to 66 units qhs and metformin increased to 1000mg BID from 500mg BID. Has been on Trulicity 3mg weekly and takes Humalog around 40 units with meals.  ·  Plan:   - FBG  above goal this AM while npo p mn  however plan for npo p mn again for podiatry tomorrow so increase lantus   - Recommend to increase Lantus to 30 units nightly   -Continue with  Admelog 16 units AC meals for now. Hold if not eating.   -continue wit low dose SSI   -cons carb diet    Outpt. endo follow-up with Dr. Jenifer London  Outpt. optho, podiatry, nephrology  Plan to d/c on basal bolus +metformin and GLP-1.  F/u with  Dr Jenifer London.     Problem/Plan - 2:  ·  Problem: HTN (hypertension).   ·  Plan: ·  Recommendation: Goal BP <130/80 c/w lisinopril.     Problem/Plan - 3:  ·  Problem: HLD (hyperlipidemia).   ·  Plan: ·  Recommendation: statin therapy        Contact via Microsoft Teams during business hours  To reach [facility] provider access AMION via Dibsie  For Urgent matters/after-hours/weekends/holidays please page endocrine fellow on call   For nonurgent matters please email [email or web address]    Please note that this patient may be followed by different provider tomorrow.  Notify endocrine 24 hours prior to discharge for final recommendations    greater than 50% of the encounter was spent counseling and/or coordination of care.  26 minutes spent on total encounter; The necessity of the time spent during the encounter on this date of service was due to development of plan of care/coordination of care/glycemic control through review of labs, blood glucose values and vital signs.  53 y/o M w/ hx of uncontrolled Type 2 DM w/ hyperglycemia complicated by neuropathy, amputations, retinopathy and CAD, HTN and HLD admitted for sepsis 2/2 right foot infection (high risk patient with severely uncontrolled diabetes with A1c of 11.2% at high risk of CAD and CVA with high level decision-making). Plan for angio today, and podiatry amputation/debridement 2/24 if pt consents.   BG tightly controlled due to poor po intake.      Uncontrolled type 2 diabetes mellitus with hyperglycemia, with long-term current use of insulin.   A1C 11.2   Last seen at the office 1/2023 with dose adjustment of Toujeo to 66 units qhs and metformin increased to 1000mg BID from 500mg BID. Has been on Trulicity 3mg weekly and takes Humalog around 40 units with meals.  ·  Plan:   - FBG  above goal this AM while npo p mn  however plan for npo p mn again for podiatry tomorrow so increase lantus   - Recommend to increase Lantus to 30 units nightly   -Continue with  Admelog 16 units AC meals for now. Hold if not eating.   -continue wit low dose SSI   -cons carb diet  - consider changing IVF to IVF w/o D5 if appropriate     Outpt. endo follow-up with Dr. Jenifer London  Outpt. optho, podiatry, nephrology  Plan to d/c on basal bolus +metformin and GLP-1.  F/u with  Dr Jenifer London.     Problem/Plan - 2:  ·  Problem: HTN (hypertension).   ·  Plan: ·  Recommendation: Goal BP <130/80 c/w lisinopril.     Problem/Plan - 3:  ·  Problem: HLD (hyperlipidemia).   ·  Plan: ·  Recommendation: statin therapy      discussed with patient and Eduin CORADO   Contact via Microsoft Teams during business hours  To reach covering provider access AMION via sunrise tools  For Urgent matters/after-hours/weekends/holidays please page endocrine fellow on call   For nonurgent matters please email [email or web address]    Please note that this patient may be followed by different provider tomorrow.  Notify endocrine 24 hours prior to discharge for final recommendations    greater than 50% of the encounter was spent counseling and/or coordination of care.  26 minutes spent on total encounter; The necessity of the time spent during the encounter on this date of service was due to development of plan of care/coordination of care/glycemic control through review of labs, blood glucose values and vital signs.

## 2023-02-23 NOTE — PROGRESS NOTE ADULT - PROBLEM SELECTOR PLAN 9
stable  - c/w toprol XL 25mg PO daily  - hold lisinopril given new TEENA and marginal BP  - monitor vitals stable  - c/w toprol XL 25mg PO daily  - hold lisinopril given TEENA and marginal BP  - monitor vitals

## 2023-02-23 NOTE — PROVIDER CONTACT NOTE (OTHER) - BACKGROUND
Pt admitted with R foot pain SP R metatarsal amputation. Scheduled for angiogram today that has been pushed back. Pending OR 2/24 for debridement

## 2023-02-23 NOTE — PROGRESS NOTE ADULT - PROBLEM SELECTOR PLAN 1
h/o poorly controlled dm, pad s/p right toe amputations  OM over first metatarsal stump confirmed on MRI, CT w/ septic arthritis of underlying 1st tarsometatarsal joint;   - podiatry following advising debridement/amputation of r 1st ray - earliest friday 2/24  - s/p arthrocentesis by podiatry ngtd  - ID following - IV ancef given MSSA bacteremia, bld cx cleared since 2/17  - vasc sx consult appreciated, plan for angio possibly today 2/23  - pain control - dilaudid 1 mg iv prn severe pain, oxy prn moderate pain

## 2023-02-23 NOTE — PROGRESS NOTE ADULT - SUBJECTIVE AND OBJECTIVE BOX
Vascular Surgery Progress Note    Interval: No acute overnight events.    SUBJECTIVE: Patient seen and examined at the bedside. NPO for angio today.  Refusing medications this AM.     OBJECTIVE:  Vital Signs Last 24 Hrs  T(C): 36.8 (23 Feb 2023 06:07), Max: 36.9 (22 Feb 2023 09:16)  T(F): 98.2 (23 Feb 2023 06:07), Max: 98.5 (22 Feb 2023 09:16)  HR: 66 (23 Feb 2023 06:07) (59 - 76)  BP: 130/72 (23 Feb 2023 06:07) (107/70 - 146/82)  BP(mean): --  RR: 20 (23 Feb 2023 06:07) (18 - 20)  SpO2: 93% (23 Feb 2023 06:07) (93% - 96%)    Parameters below as of 23 Feb 2023 06:07  Patient On (Oxygen Delivery Method): room air    I&O's Detail    22 Feb 2023 07:01  -  23 Feb 2023 07:00  --------------------------------------------------------  IN:    dextrose 5% + sodium chloride 0.45%: 875 mL    IV PiggyBack: 50 mL    Oral Fluid: 480 mL  Total IN: 1405 mL    OUT:    Voided (mL): 2450 mL  Total OUT: 2450 mL    Total NET: -1045 mL    PHYSICAL EXAM:   General- NAD, A&Ox3  Extremities- R foot TMA. First MTP with punched out ulcer on posterior aspect of toe. No cellulitis or erythema noted. AT/PT signals. DP signal not able to be doppler. Femoral pulses palpable bilaterally.   Lungs- unlabored breathing, room air     LABS                                            8.8    7.29  )-----------( 297      ( 23 Feb 2023 01:52 )             28.0   02-23    131<L>  |  94<L>  |  22  ----------------------------<  268<H>  4.8   |  22  |  1.34<H>    Ca    8.7      23 Feb 2023 01:52  Phos  2.9     02-21  Mg     2.0     02-21

## 2023-02-23 NOTE — PROGRESS NOTE ADULT - ASSESSMENT
Assessment: 51 yo male with PMH of CAD s/p PCI with stent, HTN, HLD, uncontrolled IDDM2, anxiety and depression who presents from outpatient podiatry office with worsening R foot pain, likely septic toe.    Recs:  - Appreciate SAL/PVRs  - C/w IV abx for toe wound  - pods debridement tentatively Friday  - Tentative angio today RLE, pending TTE and cardiology   - Appreciate cardiology evaluation, TTE pending and perioperative risk stratification pending results  - Please document general medical risk for surgery   - care per primary team    Vascular Surgery  [id number]     Assessment: 51 yo male with PMH of CAD s/p PCI with stent, HTN, HLD, uncontrolled IDDM2, anxiety and depression who presents from outpatient podiatry office with worsening R foot pain, likely septic toe.    Recs:  - Appreciate SAL/PVRs  - C/w IV abx for toe wound  - pods debridement tentatively Friday  - Tentative angio today RLE  - Appreciate medical evaluation   - Please document general medical risk for surgery   - care per primary team    Vascular Surgery  [id number]

## 2023-02-23 NOTE — PROGRESS NOTE ADULT - SUBJECTIVE AND OBJECTIVE BOX
Patient is a 52y old  Male who presents with a chief complaint of r foot pain/tenderness, warmth, swelling (23 Feb 2023 12:12)       INTERVAL HPI/OVERNIGHT EVENTS:  Patient seen and evaluated at bedside.  Pt is resting comfortable in NAD. Denies N/V/F/C.      Allergies    No Known Allergies    Intolerances        Vital Signs Last 24 Hrs  T(C): 37 (23 Feb 2023 09:00), Max: 37 (23 Feb 2023 09:00)  T(F): 98.6 (23 Feb 2023 09:00), Max: 98.6 (23 Feb 2023 09:00)  HR: 70 (23 Feb 2023 09:00) (59 - 70)  BP: 142/77 (23 Feb 2023 09:00) (130/72 - 146/82)  BP(mean): --  RR: 19 (23 Feb 2023 09:00) (18 - 20)  SpO2: 93% (23 Feb 2023 09:00) (93% - 95%)    Parameters below as of 23 Feb 2023 09:00  Patient On (Oxygen Delivery Method): room air        LABS:                        8.8    7.29  )-----------( 297      ( 23 Feb 2023 01:52 )             28.0     02-23    131<L>  |  94<L>  |  22  ----------------------------<  268<H>  4.8   |  22  |  1.34<H>    Ca    8.7      23 Feb 2023 01:52      PT/INR - ( 23 Feb 2023 01:52 )   PT: 14.3 sec;   INR: 1.24 ratio         PTT - ( 23 Feb 2023 01:52 )  PTT:29.3 sec    CAPILLARY BLOOD GLUCOSE      POCT Blood Glucose.: 277 mg/dL (23 Feb 2023 07:37)  POCT Blood Glucose.: 105 mg/dL (22 Feb 2023 20:43)  POCT Blood Glucose.: 162 mg/dL (22 Feb 2023 16:30)      Lower Extremity Physical Exam:  Vascular: DP/PT 0/4, B/L, CFT <3 seconds B/L, Temperature gradient warm to cool, B/L.   Neuro: Epicritic sensation diminished to the level of toes, B/L.  Musculoskeletal/Ortho: Right ankle no pain with active or passive dorsiflexion/plantarflexion/inversion/eversion, pain on palpation localized to the medial and lateral malleoli.  Skin: Right ankle ROM wnl, pain on palpation localized to the medial and lateral malleoli. Right foot submet 1 wound to bone, scant pus today, fibrogranular wound bed, tracks dorsal proximal along tenon 4cm, no fluctuance    RADIOLOGY & ADDITIONAL TESTS:

## 2023-02-23 NOTE — PROGRESS NOTE ADULT - PROBLEM SELECTOR PLAN 3
2/15 blood cx +MSSA, recent bld cx 2/17 NGTD  - c/w ancef   - TTE pending to r/o vegetation/endocarditis

## 2023-02-23 NOTE — PROGRESS NOTE ADULT - SUBJECTIVE AND OBJECTIVE BOX
Chico Torres MD  Division of Hospital Medicine  Available via MS teams  If no response or off hours page: [phone]  ---------------------------------------------------------    TIKI HOUSTON  52y  Male      Patient is a 52y old  Male who presents with a chief complaint of r foot pain/tenderness, warmth, swelling (23 Feb 2023 07:07)      INTERVAL HPI/OVERNIGHT EVENTS:        REVIEW OF SYSTEMS: 10 point ROS negative unless listed above    T(C): 36.8 (02-23-23 @ 06:07), Max: 36.9 (02-22-23 @ 09:16)  HR: 66 (02-23-23 @ 06:07) (59 - 76)  BP: 130/72 (02-23-23 @ 06:07) (107/70 - 146/82)  RR: 20 (02-23-23 @ 06:07) (18 - 20)  SpO2: 93% (02-23-23 @ 06:07) (93% - 96%)  Wt(kg): --Vital Signs Last 24 Hrs  T(C): 36.8 (23 Feb 2023 06:07), Max: 36.9 (22 Feb 2023 09:16)  T(F): 98.2 (23 Feb 2023 06:07), Max: 98.5 (22 Feb 2023 09:16)  HR: 66 (23 Feb 2023 06:07) (59 - 76)  BP: 130/72 (23 Feb 2023 06:07) (107/70 - 146/82)  BP(mean): --  RR: 20 (23 Feb 2023 06:07) (18 - 20)  SpO2: 93% (23 Feb 2023 06:07) (93% - 96%)    Parameters below as of 23 Feb 2023 06:07  Patient On (Oxygen Delivery Method): room air        PHYSICAL EXAM:  GENERAL: NAD, well-groomed, well-developed  NECK: Supple, No JVD  CHEST/LUNG: Clear to auscultation bilaterally; No rales, rhonchi, wheezing, or rubs  HEART: Regular rate and rhythm; No murmurs, rubs, or gallops   EXTREMITIES:  RLE dressed      Consultant(s) Notes Reviewed:  [x ] YES  [ ] NO  Care Discussed with Consultants/Other Providers [ x] YES  [ ] NO    LABS:                        8.8    7.29  )-----------( 297      ( 23 Feb 2023 01:52 )             28.0     02-23    131<L>  |  94<L>  |  22  ----------------------------<  268<H>  4.8   |  22  |  1.34<H>    Ca    8.7      23 Feb 2023 01:52  Phos  2.9     02-21  Mg     2.0     02-21      PT/INR - ( 23 Feb 2023 01:52 )   PT: 14.3 sec;   INR: 1.24 ratio         PTT - ( 23 Feb 2023 01:52 )  PTT:29.3 sec    CAPILLARY BLOOD GLUCOSE      POCT Blood Glucose.: 105 mg/dL (22 Feb 2023 20:43)  POCT Blood Glucose.: 162 mg/dL (22 Feb 2023 16:30)  POCT Blood Glucose.: 118 mg/dL (22 Feb 2023 13:08)            RADIOLOGY & ADDITIONAL TESTS:    Imaging Personally Reviewed:  [ ] YES  [ ] NO Chico Torres MD  Division of Hospital Medicine  Available via MS teams  If no response or off hours page: [phone]  ---------------------------------------------------------    TIKI HOUSTON  52y  Male      Patient is a 52y old  Male who presents with a chief complaint of r foot pain/tenderness, warmth, swelling (23 Feb 2023 07:07)      INTERVAL HPI/OVERNIGHT EVENTS:  Seen at bedside. Has continued pain in lower extremities, is tired      REVIEW OF SYSTEMS: 10 point ROS negative unless listed above    T(C): 36.8 (02-23-23 @ 06:07), Max: 36.9 (02-22-23 @ 09:16)  HR: 66 (02-23-23 @ 06:07) (59 - 76)  BP: 130/72 (02-23-23 @ 06:07) (107/70 - 146/82)  RR: 20 (02-23-23 @ 06:07) (18 - 20)  SpO2: 93% (02-23-23 @ 06:07) (93% - 96%)  Wt(kg): --Vital Signs Last 24 Hrs  T(C): 36.8 (23 Feb 2023 06:07), Max: 36.9 (22 Feb 2023 09:16)  T(F): 98.2 (23 Feb 2023 06:07), Max: 98.5 (22 Feb 2023 09:16)  HR: 66 (23 Feb 2023 06:07) (59 - 76)  BP: 130/72 (23 Feb 2023 06:07) (107/70 - 146/82)  BP(mean): --  RR: 20 (23 Feb 2023 06:07) (18 - 20)  SpO2: 93% (23 Feb 2023 06:07) (93% - 96%)    Parameters below as of 23 Feb 2023 06:07  Patient On (Oxygen Delivery Method): room air        PHYSICAL EXAM:  GENERAL: NAD, well-groomed, well-developed  NECK: Supple, No JVD  CHEST/LUNG: Clear to auscultation bilaterally; No rales, rhonchi, wheezing, or rubs  HEART: Regular rate and rhythm; No murmurs, rubs, or gallops   EXTREMITIES:  RLE dressed      Consultant(s) Notes Reviewed:  [x ] YES  [ ] NO  Care Discussed with Consultants/Other Providers [ x] YES  [ ] NO    LABS:                        8.8    7.29  )-----------( 297      ( 23 Feb 2023 01:52 )             28.0     02-23    131<L>  |  94<L>  |  22  ----------------------------<  268<H>  4.8   |  22  |  1.34<H>    Ca    8.7      23 Feb 2023 01:52  Phos  2.9     02-21  Mg     2.0     02-21      PT/INR - ( 23 Feb 2023 01:52 )   PT: 14.3 sec;   INR: 1.24 ratio         PTT - ( 23 Feb 2023 01:52 )  PTT:29.3 sec    CAPILLARY BLOOD GLUCOSE      POCT Blood Glucose.: 105 mg/dL (22 Feb 2023 20:43)  POCT Blood Glucose.: 162 mg/dL (22 Feb 2023 16:30)  POCT Blood Glucose.: 118 mg/dL (22 Feb 2023 13:08)            RADIOLOGY & ADDITIONAL TESTS:    Imaging Personally Reviewed:  [ ] YES  [ ] NO

## 2023-02-23 NOTE — PROVIDER CONTACT NOTE (OTHER) - ASSESSMENT
Patient T98.3,P66,R20 sats93%RA, /72. Patient appears comfortably asleep, awaken for morning medication. Patient refusing states, "let me sleep".

## 2023-02-23 NOTE — PROGRESS NOTE ADULT - PROBLEM SELECTOR PLAN 2
Pt states he is able to walk up a flight of stairs without chest pain, exercise tolerance has been limited more by his foot. Of note he had late presentation MI in June 2022. s/p PCI with SIERRA x1 pRCA 95%, SIERRA x1 dRCA, LAD 70% and Cx 70%. TTE at that time had EF 52% but no WMA.   - Pt with RCRI of 2 (Class III risk) and 10.1% chance of 30 day MACE  - Overall appears optimized for planned angio, no need to obtain current TTE ordered prior to planned procedure Pt states he is able to walk up a flight of stairs without chest pain, exercise tolerance has been limited more by his foot. Of note he had late presentation MI in June 2022. s/p PCI with SIERRA x1 pRCA 95%, SIERRA x1 dRCA, LAD 70% and Cx 70%. TTE at that time had EF 52% but no WMA.   - Pt with RCRI of 2 (Class III risk) and 10.1% chance of 30 day MACE. Intermediate risk for planned procedure  - Overall is optimized for planned angio, no need to obtain current TTE ordered prior to planned procedure

## 2023-02-23 NOTE — PROGRESS NOTE ADULT - ASSESSMENT
52M presents with right foot wound and right ankle pain.  - Pt seen and evaluated.  - Afebrile, no leukocytosis  - Right foot submet 1 wound to bone, scant pus today, fibrogranular wound bed, tracks dorsal proximal along tenon 4cm, no fluctuance  - Right foot wound culture growing MSSA, strep anginosus, Peptostreptococcus  - R ankle MR: OM of 1st met, bases of 2nd and 3rd met, all cuneiforms and likely cuboid  - Right ankle joint aspirate: Negative for organisms and crystals  - ID recommends continuing Ancef, appreciated  - SAL/PVRs incomplete: RABI 0.98, LABI 0.97, waveforms normal to level of the ankle joint  - Vasc plan for RLE angiogram today  - Pt consented for R foot first ray amputation, tib ant tendon debridement, scheduled for Friday at 930AM  - NPO midnight, AM labs, coags, type and screen  - Please document medical optimization for R foot surgery  - Seen with attending

## 2023-02-23 NOTE — PROGRESS NOTE ADULT - SUBJECTIVE AND OBJECTIVE BOX
seen earlier today     Chief Complaint: Type 2 Diabetes Mellitus     INTERVAL HX: npo for angio today, reports he ate dinner late around 8 pm but didnt finish due to disliking food, FBG above goal this am 277      Review of Systems:  General: As above  Cardiovascular: No chest pain  Respiratory: No SOB  GI: No nausea, vomiting  Endocrine: no  S&Sx of hypoglycemia    Allergies    No Known Allergies    Intolerances      MEDICATIONS  (STANDING):  aspirin enteric coated 81 milliGRAM(s) Oral daily  atorvastatin 80 milliGRAM(s) Oral at bedtime  ceFAZolin   IVPB 2000 milliGRAM(s) IV Intermittent every 8 hours  clopidogrel Tablet 75 milliGRAM(s) Oral daily  dextrose 5% + sodium chloride 0.45%. 1000 milliLiter(s) (125 mL/Hr) IV Continuous <Continuous>  dextrose 5%. 1000 milliLiter(s) (100 mL/Hr) IV Continuous <Continuous>  dextrose 5%. 1000 milliLiter(s) (50 mL/Hr) IV Continuous <Continuous>  dextrose 50% Injectable 25 Gram(s) IV Push once  dextrose 50% Injectable 12.5 Gram(s) IV Push once  dextrose 50% Injectable 25 Gram(s) IV Push once  glucagon  Injectable 1 milliGRAM(s) IntraMuscular once  heparin   Injectable 5000 Unit(s) SubCutaneous every 8 hours  influenza   Vaccine 0.5 milliLiter(s) IntraMuscular once  insulin glargine Injectable (LANTUS) 26 Unit(s) SubCutaneous at bedtime  insulin lispro (ADMELOG) corrective regimen sliding scale   SubCutaneous every 6 hours  insulin lispro Injectable (ADMELOG) 16 Unit(s) SubCutaneous three times a day before meals  lactated ringers. 1000 milliLiter(s) (100 mL/Hr) IV Continuous <Continuous>  lactobacillus acidophilus 1 Tablet(s) Oral two times a day with meals  metoprolol succinate ER 25 milliGRAM(s) Oral daily  naloxone Injectable 0.4 milliGRAM(s) IV Push once  sertraline 50 milliGRAM(s) Oral daily      atorvastatin   80 milliGRAM(s) Oral (02-22-23 @ 21:52)    insulin glargine Injectable (LANTUS)   26 Unit(s) SubCutaneous (02-22-23 @ 21:52)    insulin lispro (ADMELOG) corrective regimen sliding scale   3 Unit(s) SubCutaneous (02-23-23 @ 07:59)   1 Unit(s) SubCutaneous (02-22-23 @ 16:32)    insulin lispro Injectable (ADMELOG)   16 Unit(s) SubCutaneous (02-22-23 @ 16:32)   16 Unit(s) SubCutaneous (02-22-23 @ 13:14)        PHYSICAL EXAM:  VITALS: T(C): 37 (02-23-23 @ 09:00)  T(F): 98.6 (02-23-23 @ 09:00), Max: 98.6 (02-23-23 @ 09:00)  HR: 70 (02-23-23 @ 09:00) (59 - 75)  BP: 142/77 (02-23-23 @ 09:00) (130/72 - 146/82)  RR:  (18 - 20)  SpO2:  (93% - 96%)  Wt(kg): --  GENERAL: male laying in bed  in NAD  Respiratory: Respirations unlabored   Extremities: Warm, bilateral feet dressings cdi, RLE erythema   NEURO: Alert , appropriate     LABS:  POCT Blood Glucose.: 277 mg/dL (02-23-23 @ 07:37)  POCT Blood Glucose.: 105 mg/dL (02-22-23 @ 20:43)  POCT Blood Glucose.: 162 mg/dL (02-22-23 @ 16:30)  POCT Blood Glucose.: 118 mg/dL (02-22-23 @ 13:08)  POCT Blood Glucose.: 196 mg/dL (02-22-23 @ 07:30)  POCT Blood Glucose.: 98 mg/dL (02-22-23 @ 00:27)  POCT Blood Glucose.: 117 mg/dL (02-21-23 @ 18:16)  POCT Blood Glucose.: 101 mg/dL (02-21-23 @ 12:09)  POCT Blood Glucose.: 199 mg/dL (02-21-23 @ 09:05)  POCT Blood Glucose.: 236 mg/dL (02-20-23 @ 23:55)  POCT Blood Glucose.: 207 mg/dL (02-20-23 @ 22:10)  POCT Blood Glucose.: 123 mg/dL (02-20-23 @ 15:48)  POCT Blood Glucose.: 159 mg/dL (02-20-23 @ 13:14)                          8.8    7.29  )-----------( 297      ( 23 Feb 2023 01:52 )             28.0     02-23    131<L>  |  94<L>  |  22  ----------------------------<  268<H>  4.8   |  22  |  1.34<H>    Ca    8.7      23 Feb 2023 01:52      eGFR: 65 mL/min/1.73m2 (23 Feb 2023 01:52)    02-16 Chol 71 Direct LDL -- LDL calculated 33 HDL 16<L> Trig 116  Thyroid Function Tests:  02-16 @ 05:39 TSH 2.47 FreeT4 -- T3 -- Anti TPO -- Anti Thyroglobulin Ab -- TSI --      A1C with Estimated Average Glucose Result: 10.8 % (02-17-23 @ 07:07)  A1C with Estimated Average Glucose Result: 11.2 % (02-16-23 @ 05:39)    Estimated Average Glucose: 263 mg/dL (02-17-23 @ 07:07)  Estimated Average Glucose: 275 mg/dL (02-16-23 @ 05:39)        Diet, NPO after Midnight:      NPO Start Date: 22-Feb-2023,   NPO Start Time: 23:59 (02-22-23 @ 08:19) [Active]  Diet, Consistent Carbohydrate Renal w/Evening Snack:   Supplement Feeding Modality:  Oral  Glucerna Shake Cans or Servings Per Day:  2       Frequency:  Daily (02-20-23 @ 12:16) [Active]              seen earlier today     Chief Complaint: Type 2 Diabetes Mellitus     INTERVAL HX: npo for angio today, reports he ate dinner late around 8 pm but didnt finish due to disliking food, FBG above goal this am 277, noted pt on D5NS infusion per primary team       Review of Systems:  General: As above  Cardiovascular: No chest pain  Respiratory: No SOB  GI: No nausea, vomiting  Endocrine: no  S&Sx of hypoglycemia    Allergies    No Known Allergies    Intolerances      MEDICATIONS  (STANDING):  aspirin enteric coated 81 milliGRAM(s) Oral daily  atorvastatin 80 milliGRAM(s) Oral at bedtime  ceFAZolin   IVPB 2000 milliGRAM(s) IV Intermittent every 8 hours  clopidogrel Tablet 75 milliGRAM(s) Oral daily  dextrose 5% + sodium chloride 0.45%. 1000 milliLiter(s) (125 mL/Hr) IV Continuous <Continuous>  dextrose 5%. 1000 milliLiter(s) (100 mL/Hr) IV Continuous <Continuous>  dextrose 5%. 1000 milliLiter(s) (50 mL/Hr) IV Continuous <Continuous>  dextrose 50% Injectable 25 Gram(s) IV Push once  dextrose 50% Injectable 12.5 Gram(s) IV Push once  dextrose 50% Injectable 25 Gram(s) IV Push once  glucagon  Injectable 1 milliGRAM(s) IntraMuscular once  heparin   Injectable 5000 Unit(s) SubCutaneous every 8 hours  influenza   Vaccine 0.5 milliLiter(s) IntraMuscular once  insulin glargine Injectable (LANTUS) 26 Unit(s) SubCutaneous at bedtime  insulin lispro (ADMELOG) corrective regimen sliding scale   SubCutaneous every 6 hours  insulin lispro Injectable (ADMELOG) 16 Unit(s) SubCutaneous three times a day before meals  lactated ringers. 1000 milliLiter(s) (100 mL/Hr) IV Continuous <Continuous>  lactobacillus acidophilus 1 Tablet(s) Oral two times a day with meals  metoprolol succinate ER 25 milliGRAM(s) Oral daily  naloxone Injectable 0.4 milliGRAM(s) IV Push once  sertraline 50 milliGRAM(s) Oral daily      atorvastatin   80 milliGRAM(s) Oral (02-22-23 @ 21:52)    insulin glargine Injectable (LANTUS)   26 Unit(s) SubCutaneous (02-22-23 @ 21:52)    insulin lispro (ADMELOG) corrective regimen sliding scale   3 Unit(s) SubCutaneous (02-23-23 @ 07:59)   1 Unit(s) SubCutaneous (02-22-23 @ 16:32)    insulin lispro Injectable (ADMELOG)   16 Unit(s) SubCutaneous (02-22-23 @ 16:32)   16 Unit(s) SubCutaneous (02-22-23 @ 13:14)        PHYSICAL EXAM:  VITALS: T(C): 37 (02-23-23 @ 09:00)  T(F): 98.6 (02-23-23 @ 09:00), Max: 98.6 (02-23-23 @ 09:00)  HR: 70 (02-23-23 @ 09:00) (59 - 75)  BP: 142/77 (02-23-23 @ 09:00) (130/72 - 146/82)  RR:  (18 - 20)  SpO2:  (93% - 96%)  Wt(kg): --  GENERAL: male laying in bed  in NAD  Respiratory: Respirations unlabored   Extremities: Warm, bilateral feet dressings cdi, RLE erythema   NEURO: Alert , appropriate     LABS:  POCT Blood Glucose.: 277 mg/dL (02-23-23 @ 07:37)  POCT Blood Glucose.: 105 mg/dL (02-22-23 @ 20:43)  POCT Blood Glucose.: 162 mg/dL (02-22-23 @ 16:30)  POCT Blood Glucose.: 118 mg/dL (02-22-23 @ 13:08)  POCT Blood Glucose.: 196 mg/dL (02-22-23 @ 07:30)  POCT Blood Glucose.: 98 mg/dL (02-22-23 @ 00:27)  POCT Blood Glucose.: 117 mg/dL (02-21-23 @ 18:16)  POCT Blood Glucose.: 101 mg/dL (02-21-23 @ 12:09)  POCT Blood Glucose.: 199 mg/dL (02-21-23 @ 09:05)  POCT Blood Glucose.: 236 mg/dL (02-20-23 @ 23:55)  POCT Blood Glucose.: 207 mg/dL (02-20-23 @ 22:10)  POCT Blood Glucose.: 123 mg/dL (02-20-23 @ 15:48)  POCT Blood Glucose.: 159 mg/dL (02-20-23 @ 13:14)                          8.8    7.29  )-----------( 297      ( 23 Feb 2023 01:52 )             28.0     02-23    131<L>  |  94<L>  |  22  ----------------------------<  268<H>  4.8   |  22  |  1.34<H>    Ca    8.7      23 Feb 2023 01:52      eGFR: 65 mL/min/1.73m2 (23 Feb 2023 01:52)    02-16 Chol 71 Direct LDL -- LDL calculated 33 HDL 16<L> Trig 116  Thyroid Function Tests:  02-16 @ 05:39 TSH 2.47 FreeT4 -- T3 -- Anti TPO -- Anti Thyroglobulin Ab -- TSI --      A1C with Estimated Average Glucose Result: 10.8 % (02-17-23 @ 07:07)  A1C with Estimated Average Glucose Result: 11.2 % (02-16-23 @ 05:39)    Estimated Average Glucose: 263 mg/dL (02-17-23 @ 07:07)  Estimated Average Glucose: 275 mg/dL (02-16-23 @ 05:39)        Diet, NPO after Midnight:      NPO Start Date: 22-Feb-2023,   NPO Start Time: 23:59 (02-22-23 @ 08:19) [Active]  Diet, Consistent Carbohydrate Renal w/Evening Snack:   Supplement Feeding Modality:  Oral  Glucerna Shake Cans or Servings Per Day:  2       Frequency:  Daily (02-20-23 @ 12:16) [Active]

## 2023-02-24 ENCOUNTER — TRANSCRIPTION ENCOUNTER (OUTPATIENT)
Age: 53
End: 2023-02-24

## 2023-02-24 ENCOUNTER — RESULT REVIEW (OUTPATIENT)
Age: 53
End: 2023-02-24

## 2023-02-24 LAB
ANION GAP SERPL CALC-SCNC: 10 MMOL/L — SIGNIFICANT CHANGE UP (ref 5–17)
ANION GAP SERPL CALC-SCNC: 9 MMOL/L — SIGNIFICANT CHANGE UP (ref 5–17)
APTT BLD: 32.6 SEC — SIGNIFICANT CHANGE UP (ref 27.5–35.5)
BLD GP AB SCN SERPL QL: NEGATIVE — SIGNIFICANT CHANGE UP
BUN SERPL-MCNC: 16 MG/DL — SIGNIFICANT CHANGE UP (ref 7–23)
BUN SERPL-MCNC: 16 MG/DL — SIGNIFICANT CHANGE UP (ref 7–23)
CALCIUM SERPL-MCNC: 8.7 MG/DL — SIGNIFICANT CHANGE UP (ref 8.4–10.5)
CALCIUM SERPL-MCNC: 9.1 MG/DL — SIGNIFICANT CHANGE UP (ref 8.4–10.5)
CHLORIDE SERPL-SCNC: 95 MMOL/L — LOW (ref 96–108)
CHLORIDE SERPL-SCNC: 97 MMOL/L — SIGNIFICANT CHANGE UP (ref 96–108)
CO2 SERPL-SCNC: 26 MMOL/L — SIGNIFICANT CHANGE UP (ref 22–31)
CO2 SERPL-SCNC: 28 MMOL/L — SIGNIFICANT CHANGE UP (ref 22–31)
CREAT SERPL-MCNC: 1.24 MG/DL — SIGNIFICANT CHANGE UP (ref 0.5–1.3)
CREAT SERPL-MCNC: 1.36 MG/DL — HIGH (ref 0.5–1.3)
EGFR: 63 ML/MIN/1.73M2 — SIGNIFICANT CHANGE UP
EGFR: 71 ML/MIN/1.73M2 — SIGNIFICANT CHANGE UP
GLUCOSE BLDC GLUCOMTR-MCNC: 160 MG/DL — HIGH (ref 70–99)
GLUCOSE BLDC GLUCOMTR-MCNC: 243 MG/DL — HIGH (ref 70–99)
GLUCOSE BLDC GLUCOMTR-MCNC: 319 MG/DL — HIGH (ref 70–99)
GLUCOSE BLDC GLUCOMTR-MCNC: 345 MG/DL — HIGH (ref 70–99)
GLUCOSE SERPL-MCNC: 221 MG/DL — HIGH (ref 70–99)
GLUCOSE SERPL-MCNC: 248 MG/DL — HIGH (ref 70–99)
GRAM STN FLD: SIGNIFICANT CHANGE UP
HCT VFR BLD CALC: 27.5 % — LOW (ref 39–50)
HCT VFR BLD CALC: 29.7 % — LOW (ref 39–50)
HGB BLD-MCNC: 8.7 G/DL — LOW (ref 13–17)
HGB BLD-MCNC: 9.4 G/DL — LOW (ref 13–17)
INR BLD: 1.25 RATIO — HIGH (ref 0.88–1.16)
MCHC RBC-ENTMCNC: 29.6 PG — SIGNIFICANT CHANGE UP (ref 27–34)
MCHC RBC-ENTMCNC: 29.6 PG — SIGNIFICANT CHANGE UP (ref 27–34)
MCHC RBC-ENTMCNC: 31.6 GM/DL — LOW (ref 32–36)
MCHC RBC-ENTMCNC: 31.6 GM/DL — LOW (ref 32–36)
MCV RBC AUTO: 93.4 FL — SIGNIFICANT CHANGE UP (ref 80–100)
MCV RBC AUTO: 93.5 FL — SIGNIFICANT CHANGE UP (ref 80–100)
NRBC # BLD: 0 /100 WBCS — SIGNIFICANT CHANGE UP (ref 0–0)
NRBC # BLD: 0 /100 WBCS — SIGNIFICANT CHANGE UP (ref 0–0)
PLATELET # BLD AUTO: 247 K/UL — SIGNIFICANT CHANGE UP (ref 150–400)
PLATELET # BLD AUTO: 247 K/UL — SIGNIFICANT CHANGE UP (ref 150–400)
POTASSIUM SERPL-MCNC: 4.6 MMOL/L — SIGNIFICANT CHANGE UP (ref 3.5–5.3)
POTASSIUM SERPL-MCNC: 4.6 MMOL/L — SIGNIFICANT CHANGE UP (ref 3.5–5.3)
POTASSIUM SERPL-SCNC: 4.6 MMOL/L — SIGNIFICANT CHANGE UP (ref 3.5–5.3)
POTASSIUM SERPL-SCNC: 4.6 MMOL/L — SIGNIFICANT CHANGE UP (ref 3.5–5.3)
PROTHROM AB SERPL-ACNC: 14.4 SEC — HIGH (ref 10.5–13.4)
RBC # BLD: 2.94 M/UL — LOW (ref 4.2–5.8)
RBC # BLD: 3.18 M/UL — LOW (ref 4.2–5.8)
RBC # FLD: 12.9 % — SIGNIFICANT CHANGE UP (ref 10.3–14.5)
RBC # FLD: 13 % — SIGNIFICANT CHANGE UP (ref 10.3–14.5)
RH IG SCN BLD-IMP: POSITIVE — SIGNIFICANT CHANGE UP
SODIUM SERPL-SCNC: 132 MMOL/L — LOW (ref 135–145)
SODIUM SERPL-SCNC: 133 MMOL/L — LOW (ref 135–145)
SPECIMEN SOURCE: SIGNIFICANT CHANGE UP
WBC # BLD: 10.85 K/UL — HIGH (ref 3.8–10.5)
WBC # BLD: 9.05 K/UL — SIGNIFICANT CHANGE UP (ref 3.8–10.5)
WBC # FLD AUTO: 10.85 K/UL — HIGH (ref 3.8–10.5)
WBC # FLD AUTO: 9.05 K/UL — SIGNIFICANT CHANGE UP (ref 3.8–10.5)

## 2023-02-24 PROCEDURE — 99233 SBSQ HOSP IP/OBS HIGH 50: CPT

## 2023-02-24 PROCEDURE — 99232 SBSQ HOSP IP/OBS MODERATE 35: CPT

## 2023-02-24 PROCEDURE — 88311 DECALCIFY TISSUE: CPT | Mod: 26

## 2023-02-24 PROCEDURE — 73610 X-RAY EXAM OF ANKLE: CPT | Mod: 26,RT

## 2023-02-24 PROCEDURE — 88305 TISSUE EXAM BY PATHOLOGIST: CPT | Mod: 26

## 2023-02-24 DEVICE — SURGIFLO MATRIX WITH THROMBIN KIT: Type: IMPLANTABLE DEVICE | Site: RIGHT | Status: FUNCTIONAL

## 2023-02-24 RX ORDER — METOPROLOL TARTRATE 50 MG
25 TABLET ORAL DAILY
Refills: 0 | Status: DISCONTINUED | OUTPATIENT
Start: 2023-02-24 | End: 2023-03-03

## 2023-02-24 RX ORDER — INSULIN LISPRO 100/ML
2 VIAL (ML) SUBCUTANEOUS ONCE
Refills: 0 | Status: COMPLETED | OUTPATIENT
Start: 2023-02-24 | End: 2023-02-24

## 2023-02-24 RX ORDER — HYDROMORPHONE HYDROCHLORIDE 2 MG/ML
0.5 INJECTION INTRAMUSCULAR; INTRAVENOUS; SUBCUTANEOUS ONCE
Refills: 0 | Status: DISCONTINUED | OUTPATIENT
Start: 2023-02-24 | End: 2023-02-24

## 2023-02-24 RX ORDER — INSULIN LISPRO 100/ML
16 VIAL (ML) SUBCUTANEOUS
Refills: 0 | Status: DISCONTINUED | OUTPATIENT
Start: 2023-02-24 | End: 2023-02-26

## 2023-02-24 RX ORDER — SENNA PLUS 8.6 MG/1
2 TABLET ORAL AT BEDTIME
Refills: 0 | Status: DISCONTINUED | OUTPATIENT
Start: 2023-02-24 | End: 2023-03-03

## 2023-02-24 RX ORDER — HYDROMORPHONE HYDROCHLORIDE 2 MG/ML
0.25 INJECTION INTRAMUSCULAR; INTRAVENOUS; SUBCUTANEOUS
Refills: 0 | Status: DISCONTINUED | OUTPATIENT
Start: 2023-02-24 | End: 2023-02-24

## 2023-02-24 RX ORDER — ONDANSETRON 8 MG/1
4 TABLET, FILM COATED ORAL EVERY 8 HOURS
Refills: 0 | Status: DISCONTINUED | OUTPATIENT
Start: 2023-02-24 | End: 2023-03-03

## 2023-02-24 RX ORDER — SODIUM CHLORIDE 9 MG/ML
1000 INJECTION, SOLUTION INTRAVENOUS
Refills: 0 | Status: DISCONTINUED | OUTPATIENT
Start: 2023-02-24 | End: 2023-02-24

## 2023-02-24 RX ORDER — MORPHINE SULFATE 50 MG/1
2 CAPSULE, EXTENDED RELEASE ORAL ONCE
Refills: 0 | Status: DISCONTINUED | OUTPATIENT
Start: 2023-02-24 | End: 2023-02-25

## 2023-02-24 RX ORDER — CEFAZOLIN SODIUM 1 G
2000 VIAL (EA) INJECTION EVERY 8 HOURS
Refills: 0 | Status: DISCONTINUED | OUTPATIENT
Start: 2023-02-24 | End: 2023-03-03

## 2023-02-24 RX ORDER — FENTANYL CITRATE 50 UG/ML
25 INJECTION INTRAVENOUS
Refills: 0 | Status: DISCONTINUED | OUTPATIENT
Start: 2023-02-24 | End: 2023-02-24

## 2023-02-24 RX ORDER — ONDANSETRON 8 MG/1
4 TABLET, FILM COATED ORAL ONCE
Refills: 0 | Status: DISCONTINUED | OUTPATIENT
Start: 2023-02-24 | End: 2023-02-24

## 2023-02-24 RX ORDER — OXYCODONE HYDROCHLORIDE 5 MG/1
5 TABLET ORAL EVERY 6 HOURS
Refills: 0 | Status: DISCONTINUED | OUTPATIENT
Start: 2023-02-24 | End: 2023-02-28

## 2023-02-24 RX ORDER — HYDROMORPHONE HYDROCHLORIDE 2 MG/ML
0.5 INJECTION INTRAMUSCULAR; INTRAVENOUS; SUBCUTANEOUS
Refills: 0 | Status: DISCONTINUED | OUTPATIENT
Start: 2023-02-24 | End: 2023-02-24

## 2023-02-24 RX ORDER — ATORVASTATIN CALCIUM 80 MG/1
80 TABLET, FILM COATED ORAL AT BEDTIME
Refills: 0 | Status: DISCONTINUED | OUTPATIENT
Start: 2023-02-24 | End: 2023-03-03

## 2023-02-24 RX ORDER — SERTRALINE 25 MG/1
50 TABLET, FILM COATED ORAL DAILY
Refills: 0 | Status: DISCONTINUED | OUTPATIENT
Start: 2023-02-24 | End: 2023-03-03

## 2023-02-24 RX ORDER — PHENYLEPHRINE HYDROCHLORIDE 10 MG/ML
0.2 INJECTION INTRAVENOUS
Qty: 40 | Refills: 0 | Status: DISCONTINUED | OUTPATIENT
Start: 2023-02-24 | End: 2023-02-24

## 2023-02-24 RX ORDER — GLUCAGON INJECTION, SOLUTION 0.5 MG/.1ML
1 INJECTION, SOLUTION SUBCUTANEOUS ONCE
Refills: 0 | Status: DISCONTINUED | OUTPATIENT
Start: 2023-02-24 | End: 2023-03-03

## 2023-02-24 RX ORDER — HYDROMORPHONE HYDROCHLORIDE 2 MG/ML
1 INJECTION INTRAMUSCULAR; INTRAVENOUS; SUBCUTANEOUS EVERY 6 HOURS
Refills: 0 | Status: DISCONTINUED | OUTPATIENT
Start: 2023-02-24 | End: 2023-02-25

## 2023-02-24 RX ORDER — INSULIN LISPRO 100/ML
VIAL (ML) SUBCUTANEOUS
Refills: 0 | Status: DISCONTINUED | OUTPATIENT
Start: 2023-02-24 | End: 2023-02-26

## 2023-02-24 RX ORDER — LACTOBACILLUS ACIDOPHILUS 100MM CELL
1 CAPSULE ORAL
Refills: 0 | Status: DISCONTINUED | OUTPATIENT
Start: 2023-02-24 | End: 2023-03-03

## 2023-02-24 RX ORDER — INSULIN GLARGINE 100 [IU]/ML
30 INJECTION, SOLUTION SUBCUTANEOUS AT BEDTIME
Refills: 0 | Status: DISCONTINUED | OUTPATIENT
Start: 2023-02-24 | End: 2023-02-25

## 2023-02-24 RX ORDER — INSULIN LISPRO 100/ML
VIAL (ML) SUBCUTANEOUS AT BEDTIME
Refills: 0 | Status: DISCONTINUED | OUTPATIENT
Start: 2023-02-24 | End: 2023-02-26

## 2023-02-24 RX ORDER — LANOLIN ALCOHOL/MO/W.PET/CERES
3 CREAM (GRAM) TOPICAL AT BEDTIME
Refills: 0 | Status: DISCONTINUED | OUTPATIENT
Start: 2023-02-24 | End: 2023-03-03

## 2023-02-24 RX ORDER — ALBUMIN HUMAN 25 %
500 VIAL (ML) INTRAVENOUS ONCE
Refills: 0 | Status: COMPLETED | OUTPATIENT
Start: 2023-02-24 | End: 2023-02-24

## 2023-02-24 RX ADMIN — Medication 2000 MILLILITER(S): at 14:24

## 2023-02-24 RX ADMIN — HYDROMORPHONE HYDROCHLORIDE 1 MILLIGRAM(S): 2 INJECTION INTRAMUSCULAR; INTRAVENOUS; SUBCUTANEOUS at 01:15

## 2023-02-24 RX ADMIN — OXYCODONE HYDROCHLORIDE 5 MILLIGRAM(S): 5 TABLET ORAL at 23:31

## 2023-02-24 RX ADMIN — ATORVASTATIN CALCIUM 80 MILLIGRAM(S): 80 TABLET, FILM COATED ORAL at 21:00

## 2023-02-24 RX ADMIN — HYDROMORPHONE HYDROCHLORIDE 1 MILLIGRAM(S): 2 INJECTION INTRAMUSCULAR; INTRAVENOUS; SUBCUTANEOUS at 01:00

## 2023-02-24 RX ADMIN — Medication 16 UNIT(S): at 18:23

## 2023-02-24 RX ADMIN — HYDROMORPHONE HYDROCHLORIDE 0.5 MILLIGRAM(S): 2 INJECTION INTRAMUSCULAR; INTRAVENOUS; SUBCUTANEOUS at 16:50

## 2023-02-24 RX ADMIN — PHENYLEPHRINE HYDROCHLORIDE 7.31 MICROGRAM(S)/KG/MIN: 10 INJECTION INTRAVENOUS at 12:41

## 2023-02-24 RX ADMIN — Medication 4: at 18:22

## 2023-02-24 RX ADMIN — Medication 3 MILLIGRAM(S): at 20:59

## 2023-02-24 RX ADMIN — SODIUM CHLORIDE 75 MILLILITER(S): 9 INJECTION, SOLUTION INTRAVENOUS at 01:00

## 2023-02-24 RX ADMIN — HYDROMORPHONE HYDROCHLORIDE 1 MILLIGRAM(S): 2 INJECTION INTRAMUSCULAR; INTRAVENOUS; SUBCUTANEOUS at 09:00

## 2023-02-24 RX ADMIN — HYDROMORPHONE HYDROCHLORIDE 0.5 MILLIGRAM(S): 2 INJECTION INTRAMUSCULAR; INTRAVENOUS; SUBCUTANEOUS at 20:59

## 2023-02-24 RX ADMIN — OXYCODONE HYDROCHLORIDE 5 MILLIGRAM(S): 5 TABLET ORAL at 22:31

## 2023-02-24 RX ADMIN — OXYCODONE HYDROCHLORIDE 5 MILLIGRAM(S): 5 TABLET ORAL at 05:54

## 2023-02-24 RX ADMIN — HYDROMORPHONE HYDROCHLORIDE 0.5 MILLIGRAM(S): 2 INJECTION INTRAMUSCULAR; INTRAVENOUS; SUBCUTANEOUS at 21:56

## 2023-02-24 RX ADMIN — Medication 2: at 22:10

## 2023-02-24 RX ADMIN — HYDROMORPHONE HYDROCHLORIDE 0.5 MILLIGRAM(S): 2 INJECTION INTRAMUSCULAR; INTRAVENOUS; SUBCUTANEOUS at 13:15

## 2023-02-24 RX ADMIN — OXYCODONE HYDROCHLORIDE 5 MILLIGRAM(S): 5 TABLET ORAL at 04:37

## 2023-02-24 RX ADMIN — OXYCODONE HYDROCHLORIDE 5 MILLIGRAM(S): 5 TABLET ORAL at 17:14

## 2023-02-24 RX ADMIN — HYDROMORPHONE HYDROCHLORIDE 0.5 MILLIGRAM(S): 2 INJECTION INTRAMUSCULAR; INTRAVENOUS; SUBCUTANEOUS at 13:45

## 2023-02-24 RX ADMIN — HYDROMORPHONE HYDROCHLORIDE 0.5 MILLIGRAM(S): 2 INJECTION INTRAMUSCULAR; INTRAVENOUS; SUBCUTANEOUS at 18:59

## 2023-02-24 RX ADMIN — Medication 2 UNIT(S): at 13:37

## 2023-02-24 RX ADMIN — HYDROMORPHONE HYDROCHLORIDE 0.5 MILLIGRAM(S): 2 INJECTION INTRAMUSCULAR; INTRAVENOUS; SUBCUTANEOUS at 20:53

## 2023-02-24 RX ADMIN — INSULIN GLARGINE 30 UNIT(S): 100 INJECTION, SOLUTION SUBCUTANEOUS at 22:11

## 2023-02-24 RX ADMIN — Medication 100 MILLIGRAM(S): at 03:32

## 2023-02-24 RX ADMIN — Medication 25 MILLIGRAM(S): at 04:37

## 2023-02-24 RX ADMIN — Medication 100 MILLIGRAM(S): at 22:10

## 2023-02-24 NOTE — PRE-ANESTHESIA EVALUATION ADULT - NSANTHAIRWAYFT_ENT_ALL_CORE
uncooperative. denies loose, but declines to open mouth for exam
TM > 3 FB, MO > 3 FB, Full neck AROM

## 2023-02-24 NOTE — PROGRESS NOTE ADULT - ASSESSMENT
Plan:   Pt is scheduled for right foot partial 1st ray resection with anterior tibial debridement with Dr. Lott Today Friday 2/24 at 9:30 AM. Patient is aware of procedure and is NPO since midnight.  CXR on sunrise.  EKG on sunrise.  Medical/Cardiac clearance pending and documented in chart.  Consent signed and in chart.  Procedure was explained to patient in detail. All alternatives, risks and complications were discussed. All questions answered.   Plan:   Pt is scheduled for right foot partial 1st ray resection with anterior tibial debridement with Dr. Lott Today Friday 2/24 at 9:30 AM. Patient is aware of procedure and is NPO since midnight.  CXR on sunrise.  EKG on sunrise.  Medical/Cardiac clearance since 2/24 and documented in chart.  Consent signed and in chart.  Procedure was explained to patient in detail. All alternatives, risks and complications were discussed. All questions answered.

## 2023-02-24 NOTE — PRE-ANESTHESIA EVALUATION ADULT - NSANTHPMHFT_GEN_ALL_CORE
chart and consultant notes reviewed. cad s/p stent < 2 yo, with remaining untreated dz/med mgmt. pt report stable cv clinical status since - ambulates at home without cp/sob. tte at that time essentially unremarkable. poorly controlled dm - current fs < 200
51 yo male with PMH of CAD s/p PCI with stent, HTN, HLD, uncontrolled IDDM2, anxiety and depression who presents from outpatient podiatry office with worsening R foot pain found to be in severe sepsis 2/2 cellulitis and osteomyelitis with CT of R ankle concerning for possible septic arthritis and gas-forming/necrotizing infection with course c/b MSSA bacteremia, awaiting OR 2/24 for ray resection. Presents now for RLE angiogram to evaluate vasculature.    Denies active CP/SOB/Orthopnea

## 2023-02-24 NOTE — CHART NOTE - NSCHARTNOTEFT_GEN_A_CORE
POST-OPERATIVE NOTE    Patient is s/p RLE diagnostic angiogram    Subjective:  Patient reports pain at the incisional site, controlled with medication  Denies chest pain, shortness of breath, nausea, vomiting    Vital Signs Last 24 Hrs  T(C): 36 (24 Feb 2023 00:20), Max: 37 (23 Feb 2023 09:00)  T(F): 96.8 (24 Feb 2023 00:20), Max: 98.6 (23 Feb 2023 09:00)  HR: 69 (24 Feb 2023 03:00) (62 - 71)  BP: 118/66 (24 Feb 2023 03:00) (118/66 - 159/89)  BP(mean): 84 (24 Feb 2023 03:00) (80 - 113)  RR: 14 (24 Feb 2023 03:00) (14 - 20)  SpO2: 93% (24 Feb 2023 03:00) (90% - 96%)    Parameters below as of 24 Feb 2023 03:00  Patient On (Oxygen Delivery Method): room air    I&O's Detail    22 Feb 2023 07:01  -  23 Feb 2023 07:00  --------------------------------------------------------  IN:    dextrose 5% + sodium chloride 0.45%: 875 mL    IV PiggyBack: 50 mL    Oral Fluid: 480 mL  Total IN: 1405 mL    OUT:    Voided (mL): 2450 mL  Total OUT: 2450 mL    Total NET: -1045 mL      23 Feb 2023 07:01  -  24 Feb 2023 03:27  --------------------------------------------------------  IN:    Lactated Ringers: 150 mL    Oral Fluid: 200 mL  Total IN: 350 mL    OUT:    Voided (mL): 1800 mL  Total OUT: 1800 mL    Total NET: -1450 mL      Physical Exam:  General: NAD, resting comfortably in bed  Pulmonary: Nonlabored breathing, no respiratory distress  Groins: soft, no swelling, no hematoma, femoral pulses +2 b/l, bandage not soiled  Extremities: DP/PT signals + b/l      LABS:                        8.8    7.29  )-----------( 297      ( 23 Feb 2023 01:52 )             28.0     02-23    131<L>  |  94<L>  |  22  ----------------------------<  268<H>  4.8   |  22  |  1.34<H>    Ca    8.7      23 Feb 2023 01:52      PT/INR - ( 23 Feb 2023 01:52 )   PT: 14.3 sec;   INR: 1.24 ratio         PTT - ( 23 Feb 2023 01:52 )  PTT:29.3 sec      Assessment:  The patient is a 52y Male who is now several hours post-op from above procedure.    Plan:  - Pain control as needed  - Flat for 4 hours  - F/u AM labs POST-OPERATIVE NOTE    Patient is s/p RLE diagnostic angiogram    Subjective:  Patient reports pain at the incisional site, controlled with medication  Denies chest pain, shortness of breath, nausea, vomiting    Vital Signs Last 24 Hrs  T(C): 36 (24 Feb 2023 00:20), Max: 37 (23 Feb 2023 09:00)  T(F): 96.8 (24 Feb 2023 00:20), Max: 98.6 (23 Feb 2023 09:00)  HR: 69 (24 Feb 2023 03:00) (62 - 71)  BP: 118/66 (24 Feb 2023 03:00) (118/66 - 159/89)  BP(mean): 84 (24 Feb 2023 03:00) (80 - 113)  RR: 14 (24 Feb 2023 03:00) (14 - 20)  SpO2: 93% (24 Feb 2023 03:00) (90% - 96%)    Parameters below as of 24 Feb 2023 03:00  Patient On (Oxygen Delivery Method): room air    I&O's Detail    22 Feb 2023 07:01  -  23 Feb 2023 07:00  --------------------------------------------------------  IN:    dextrose 5% + sodium chloride 0.45%: 875 mL    IV PiggyBack: 50 mL    Oral Fluid: 480 mL  Total IN: 1405 mL    OUT:    Voided (mL): 2450 mL  Total OUT: 2450 mL    Total NET: -1045 mL      23 Feb 2023 07:01  -  24 Feb 2023 03:27  --------------------------------------------------------  IN:    Lactated Ringers: 150 mL    Oral Fluid: 200 mL  Total IN: 350 mL    OUT:    Voided (mL): 1800 mL  Total OUT: 1800 mL    Total NET: -1450 mL      Physical Exam:  General: NAD, resting comfortably in bed  Pulmonary: Nonlabored breathing, no respiratory distress  Groins: soft, no swelling, no hematoma, femoral pulses +2 b/l, bandage not soiled  Extremities: AT/PT signals + b/l      LABS:                        8.8    7.29  )-----------( 297      ( 23 Feb 2023 01:52 )             28.0     02-23    131<L>  |  94<L>  |  22  ----------------------------<  268<H>  4.8   |  22  |  1.34<H>    Ca    8.7      23 Feb 2023 01:52      PT/INR - ( 23 Feb 2023 01:52 )   PT: 14.3 sec;   INR: 1.24 ratio         PTT - ( 23 Feb 2023 01:52 )  PTT:29.3 sec      Assessment:  The patient is a 52y Male who is now several hours post-op from above procedure.    Plan:  - Pain control as needed  - Flat for 4 hours  - F/u AM labs

## 2023-02-24 NOTE — PROGRESS NOTE ADULT - SUBJECTIVE AND OBJECTIVE BOX
Chico Torres MD  Division of Hospital Medicine  Available via MS teams  If no response or off hours page: [phone]  ---------------------------------------------------------    TIKI HOUSTON  52y  Male      Patient is a 52y old  Male who presents with a chief complaint of r foot pain/tenderness, warmth, swelling (24 Feb 2023 06:57)      INTERVAL HPI/OVERNIGHT EVENTS:  s/p diagnostic angio with vascular tolerated procedure well      REVIEW OF SYSTEMS: 10 point ROS negative unless listed above    T(C): 36.4 (02-24-23 @ 05:33), Max: 37 (02-23-23 @ 09:00)  HR: 64 (02-24-23 @ 05:33) (62 - 71)  BP: 162/86 (02-24-23 @ 05:33) (117/69 - 162/86)  RR: 18 (02-24-23 @ 05:33) (14 - 19)  SpO2: 94% (02-24-23 @ 05:33) (90% - 97%)  Wt(kg): --Vital Signs Last 24 Hrs  T(C): 36.4 (24 Feb 2023 05:33), Max: 37 (23 Feb 2023 09:00)  T(F): 97.5 (24 Feb 2023 05:33), Max: 98.6 (23 Feb 2023 09:00)  HR: 64 (24 Feb 2023 05:33) (62 - 71)  BP: 162/86 (24 Feb 2023 05:33) (117/69 - 162/86)  BP(mean): 102 (24 Feb 2023 04:15) (80 - 113)  RR: 18 (24 Feb 2023 05:33) (14 - 19)  SpO2: 94% (24 Feb 2023 05:33) (90% - 97%)    Parameters below as of 24 Feb 2023 05:33  Patient On (Oxygen Delivery Method): room air        PHYSICAL EXAM:  GENERAL: NAD, well-groomed, well-developed  NECK: Supple, No JVD  CHEST/LUNG: Clear to auscultation bilaterally; No rales, rhonchi, wheezing, or rubs  HEART: Regular rate and rhythm; No murmurs, rubs, or gallops   EXTREMITIES:  RLE dressed    Consultant(s) Notes Reviewed:  [x ] YES  [ ] NO  Care Discussed with Consultants/Other Providers [ x] YES  [ ] NO    LABS:                        8.8    7.29  )-----------( 297      ( 23 Feb 2023 01:52 )             28.0     02-23    131<L>  |  94<L>  |  22  ----------------------------<  268<H>  4.8   |  22  |  1.34<H>    Ca    8.7      23 Feb 2023 01:52      PT/INR - ( 23 Feb 2023 01:52 )   PT: 14.3 sec;   INR: 1.24 ratio         PTT - ( 23 Feb 2023 01:52 )  PTT:29.3 sec    CAPILLARY BLOOD GLUCOSE      POCT Blood Glucose.: 160 mg/dL (24 Feb 2023 00:41)  POCT Blood Glucose.: 290 mg/dL (23 Feb 2023 13:33)            RADIOLOGY & ADDITIONAL TESTS:    Imaging Personally Reviewed:  [ ] YES  [ ] NO Chico Torres MD  Division of Hospital Medicine  Available via MS teams  If no response or off hours page: [phone]  ---------------------------------------------------------    TIKI HOUSTON  52y  Male      Patient is a 52y old  Male who presents with a chief complaint of r foot pain/tenderness, warmth, swelling (24 Feb 2023 06:57)      INTERVAL HPI/OVERNIGHT EVENTS:  s/p diagnostic angio with vascular tolerated procedure well. Angry about not being able to eat      REVIEW OF SYSTEMS: 10 point ROS negative unless listed above    T(C): 36.4 (02-24-23 @ 05:33), Max: 37 (02-23-23 @ 09:00)  HR: 64 (02-24-23 @ 05:33) (62 - 71)  BP: 162/86 (02-24-23 @ 05:33) (117/69 - 162/86)  RR: 18 (02-24-23 @ 05:33) (14 - 19)  SpO2: 94% (02-24-23 @ 05:33) (90% - 97%)  Wt(kg): --Vital Signs Last 24 Hrs  T(C): 36.4 (24 Feb 2023 05:33), Max: 37 (23 Feb 2023 09:00)  T(F): 97.5 (24 Feb 2023 05:33), Max: 98.6 (23 Feb 2023 09:00)  HR: 64 (24 Feb 2023 05:33) (62 - 71)  BP: 162/86 (24 Feb 2023 05:33) (117/69 - 162/86)  BP(mean): 102 (24 Feb 2023 04:15) (80 - 113)  RR: 18 (24 Feb 2023 05:33) (14 - 19)  SpO2: 94% (24 Feb 2023 05:33) (90% - 97%)    Parameters below as of 24 Feb 2023 05:33  Patient On (Oxygen Delivery Method): room air        PHYSICAL EXAM:  GENERAL: NAD, well-groomed, well-developed  NECK: Supple, No JVD  CHEST/LUNG: Clear to auscultation bilaterally; No rales, rhonchi, wheezing, or rubs  HEART: Regular rate and rhythm; No murmurs, rubs, or gallops   EXTREMITIES:  RLE dressed    Consultant(s) Notes Reviewed:  [x ] YES  [ ] NO  Care Discussed with Consultants/Other Providers [ x] YES  [ ] NO    LABS:                        8.8    7.29  )-----------( 297      ( 23 Feb 2023 01:52 )             28.0     02-23    131<L>  |  94<L>  |  22  ----------------------------<  268<H>  4.8   |  22  |  1.34<H>    Ca    8.7      23 Feb 2023 01:52      PT/INR - ( 23 Feb 2023 01:52 )   PT: 14.3 sec;   INR: 1.24 ratio         PTT - ( 23 Feb 2023 01:52 )  PTT:29.3 sec    CAPILLARY BLOOD GLUCOSE      POCT Blood Glucose.: 160 mg/dL (24 Feb 2023 00:41)  POCT Blood Glucose.: 290 mg/dL (23 Feb 2023 13:33)            RADIOLOGY & ADDITIONAL TESTS:    Imaging Personally Reviewed:  [ ] YES  [ ] NO

## 2023-02-24 NOTE — BRIEF OPERATIVE NOTE - NSICDXBRIEFPOSTOP_GEN_ALL_CORE_FT
POST-OP DIAGNOSIS:  PAD (peripheral artery disease) 24-Feb-2023 00:10:33  Haven Santos  
POST-OP DIAGNOSIS:  Osteomyelitis of ankle or foot, acute 24-Feb-2023 12:30:16  Maddy Velazquez

## 2023-02-24 NOTE — BRIEF OPERATIVE NOTE - OPERATION/FINDINGS
pt is s/p right foot revision of partial 1st ray resection and incision and drainage of right ankle. closed distally with 3.0 nylon, open proximally and packed with plan packing. evidence of significant purulent drainage and infection tracking up the tibialis tendon sheath.
RLE diagnostic angiogram

## 2023-02-24 NOTE — BRIEF OPERATIVE NOTE - SPECIMENS
None
pathology: 1st metatarsal right foot, right foot margin of resection of 1st metatarsal. microbiology: right foot margin of 1st metatarsal , right foot Deep wound culture

## 2023-02-24 NOTE — PROVIDER CONTACT NOTE (OTHER) - RECOMMENDATIONS
PA notified & aware. Education provided & pt continued to refuse. Pt A&Ox4. Will endorse AM labs to day RN. PA notified & aware. come to bedside to see RN. Education provided & pt continued to refuse. Pt A&Ox4. Will endorse AM labs to day RN.

## 2023-02-24 NOTE — PROVIDER CONTACT NOTE (OTHER) - ASSESSMENT
refusing PACU VS #3/#4 and pre-op am labs (aptt/cbc/cmp/ptinr/t&s). Refusing 2L NC &  monitor despite education. Complained about wanting food but explained NPO for surgery in AM. Pt uncooperative. VSS, pain managed with PRNs

## 2023-02-24 NOTE — CHART NOTE - NSCHARTNOTEFT_GEN_A_CORE
Age: 52y    Gender: Male    POCT Blood Glucose:  243 mg/dL (02-24-23 @ 12:54)  160 mg/dL (02-24-23 @ 00:41)      eMAR:  insulin lispro Injectable (ADMELOG).   2 Unit(s) SubCutaneous (02-24-23 @ 13:37)    53 y/o M w/ hx of uncontrolled Type 2 DM w/ hyperglycemia complicated by neuropathy, amputations, retinopathy and CAD, HTN and HLD admitted for sepsis 2/2 right foot infection (high risk patient with severely uncontrolled diabetes with A1c of 11.2% at high risk of CAD and CVA with high level decision-making). s/p angio; s/p podiatry amputation/debridement 2/24   BG tightly controlled due to poor po intake.      s/p angio late last night , did not receive lantus as was off floor> Fasting serum BG above goal 221   now s/p podiatry procedure    Uncontrolled type 2 diabetes mellitus with hyperglycemia, with long-term current use of insulin.   A1C 11.2   Last seen at the office 1/2023 with dose adjustment of Toujeo to 66 units qhs and metformin increased to 1000mg BID from 500mg BID. Has been on Trulicity 3mg weekly and takes Humalog around 40 units with meals.  ·  Plan:   - FBG  above goal this AM while npo p mn  due to not receiving basal insulin   - Please restart  Lantus to 30 units sq qhs , further titration based on FBG   -Continue with Admelog 16 units AC meals MAKE SURE PT EATS! Hold if not eating.   -continue with admelog correctional scales TID AC and low qhs scale   - cons carb diet     discussed with Eduin CORADO

## 2023-02-24 NOTE — PROGRESS NOTE ADULT - SUBJECTIVE AND OBJECTIVE BOX
Patient is a 52y old  Male who presents with a chief complaint of r foot pain/tenderness, warmth, swelling (23 Feb 2023 13:15)      INTERVAL HPI/OVERNIGHT EVENTS:   Pt is scheduled for right foot partial 1st ray resection with anterior tibial debridement with Dr. Lott Today Friday 2/24 at 9:30 AM. Patient is aware of procedure and is NPO since midnight.    MEDICATIONS  (STANDING):  aspirin enteric coated 81 milliGRAM(s) Oral daily  atorvastatin 80 milliGRAM(s) Oral at bedtime  ceFAZolin   IVPB 2000 milliGRAM(s) IV Intermittent every 8 hours  clopidogrel Tablet 75 milliGRAM(s) Oral daily  dextrose 5%. 1000 milliLiter(s) (100 mL/Hr) IV Continuous <Continuous>  dextrose 5%. 1000 milliLiter(s) (50 mL/Hr) IV Continuous <Continuous>  dextrose 50% Injectable 25 Gram(s) IV Push once  dextrose 50% Injectable 12.5 Gram(s) IV Push once  dextrose 50% Injectable 25 Gram(s) IV Push once  glucagon  Injectable 1 milliGRAM(s) IntraMuscular once  heparin   Injectable 5000 Unit(s) SubCutaneous every 8 hours  influenza   Vaccine 0.5 milliLiter(s) IntraMuscular once  insulin glargine Injectable (LANTUS) 30 Unit(s) SubCutaneous at bedtime  insulin lispro (ADMELOG) corrective regimen sliding scale   SubCutaneous every 6 hours  insulin lispro Injectable (ADMELOG) 16 Unit(s) SubCutaneous three times a day before meals  lactated ringers. 1000 milliLiter(s) (100 mL/Hr) IV Continuous <Continuous>  lactobacillus acidophilus 1 Tablet(s) Oral two times a day with meals  metoprolol succinate ER 25 milliGRAM(s) Oral daily  naloxone Injectable 0.4 milliGRAM(s) IV Push once  sertraline 50 milliGRAM(s) Oral daily    MEDICATIONS  (PRN):  acetaminophen     Tablet .. 650 milliGRAM(s) Oral every 6 hours PRN Temp greater or equal to 38C (100.4F), Mild Pain (1 - 3)  aluminum hydroxide/magnesium hydroxide/simethicone Suspension 30 milliLiter(s) Oral every 4 hours PRN Dyspepsia  bisacodyl 5 milliGRAM(s) Oral daily PRN Constipation  dextrose Oral Gel 15 Gram(s) Oral once PRN Blood Glucose LESS THAN 70 milliGRAM(s)/deciliter  HYDROmorphone  Injectable 1 milliGRAM(s) IV Push every 6 hours PRN Severe Pain (7 - 10)  melatonin 3 milliGRAM(s) Oral at bedtime PRN Insomnia  ondansetron Injectable 4 milliGRAM(s) IV Push every 8 hours PRN Nausea and/or Vomiting  oxyCODONE    IR 5 milliGRAM(s) Oral every 6 hours PRN Moderate Pain (4 - 6)  senna 2 Tablet(s) Oral at bedtime PRN Constipation      Allergies    No Known Allergies    Intolerances        Vital Signs Last 24 Hrs  T(C): 36.4 (24 Feb 2023 05:33), Max: 37 (23 Feb 2023 09:00)  T(F): 97.5 (24 Feb 2023 05:33), Max: 98.6 (23 Feb 2023 09:00)  HR: 64 (24 Feb 2023 05:33) (62 - 71)  BP: 162/86 (24 Feb 2023 05:33) (117/69 - 162/86)  BP(mean): 102 (24 Feb 2023 04:15) (80 - 113)  RR: 18 (24 Feb 2023 05:33) (14 - 19)  SpO2: 94% (24 Feb 2023 05:33) (90% - 97%)    Parameters below as of 24 Feb 2023 05:33  Patient On (Oxygen Delivery Method): room air        LABS:                        8.8    7.29  )-----------( 297      ( 23 Feb 2023 01:52 )             28.0     02-23    131<L>  |  94<L>  |  22  ----------------------------<  268<H>  4.8   |  22  |  1.34<H>    Ca    8.7      23 Feb 2023 01:52      PT/INR - ( 23 Feb 2023 01:52 )   PT: 14.3 sec;   INR: 1.24 ratio         PTT - ( 23 Feb 2023 01:52 )  PTT:29.3 sec    CAPILLARY BLOOD GLUCOSE      POCT Blood Glucose.: 160 mg/dL (24 Feb 2023 00:41)  POCT Blood Glucose.: 290 mg/dL (23 Feb 2023 13:33)  POCT Blood Glucose.: 277 mg/dL (23 Feb 2023 07:37)      RADIOLOGY & ADDITIONAL TESTS:

## 2023-02-24 NOTE — PROGRESS NOTE ADULT - SUBJECTIVE AND OBJECTIVE BOX
Vascular Surgery Progress Note    SUBJECTIVE: Patient seen and examined at the bedside.  Pain controlled after angio, not complaining of lower extremity deficits at this time.     OBJECTIVE:  Vital Signs Last 24 Hrs  T(C): 36.4 (24 Feb 2023 05:33), Max: 37 (23 Feb 2023 09:00)  T(F): 97.5 (24 Feb 2023 05:33), Max: 98.6 (23 Feb 2023 09:00)  HR: 64 (24 Feb 2023 05:33) (62 - 71)  BP: 162/86 (24 Feb 2023 05:33) (117/69 - 162/86)  BP(mean): 102 (24 Feb 2023 04:15) (80 - 113)  RR: 18 (24 Feb 2023 05:33) (14 - 19)  SpO2: 94% (24 Feb 2023 05:33) (90% - 97%)    Parameters below as of 24 Feb 2023 05:33  Patient On (Oxygen Delivery Method): room air    PHYSICAL EXAM:   General- NAD, A&Ox3  Groin: access site soft, no hematoma or pulsatile mass   Extremities- R foot TMA. First MTP with punched out ulcer on posterior aspect of toe. No cellulitis or erythema noted. AT/PT signals. DP signal not able to be doppler. Femoral pulses palpable bilaterally.   Lungs- unlabored breathing, room air     LABS                                 8.8    7.29  )-----------( 297      ( 23 Feb 2023 01:52 )             28.0   02-23    131<L>  |  94<L>  |  22  ----------------------------<  268<H>  4.8   |  22  |  1.34<H>    Ca    8.7      23 Feb 2023 01:52

## 2023-02-24 NOTE — PRE-ANESTHESIA EVALUATION ADULT - NSANTHOSAYNRD_GEN_A_CORE
No. RAMIREZ screening performed.  STOP BANG Legend: 0-2 = LOW Risk; 3-4 = INTERMEDIATE Risk; 5-8 = HIGH Risk
No. RAMIREZ screening performed.  STOP BANG Legend: 0-2 = LOW Risk; 3-4 = INTERMEDIATE Risk; 5-8 = HIGH Risk

## 2023-02-24 NOTE — BRIEF OPERATIVE NOTE - COMMENTS
Low concern for viability, high concern for residual infection. pod plan to follow OR data and ID recs. Procedure was done for source control. pod plan to return to OR next week possibly on Tuesday for delay primary closure. trend WBC and H/H. Ordered STAT CBC post procedure if H/H less than 7 please transfuse and give one unit of blood (provider contacted).

## 2023-02-24 NOTE — PROGRESS NOTE ADULT - PROBLEM SELECTOR PLAN 9
stable  - c/w toprol XL 25mg PO daily  - hold lisinopril given TEENA and marginal BP  - monitor vitals

## 2023-02-24 NOTE — PROGRESS NOTE ADULT - PROBLEM SELECTOR PLAN 1
h/o poorly controlled dm, pad s/p right toe amputations  OM over first metatarsal stump confirmed on MRI, CT w/ septic arthritis of underlying 1st tarsometatarsal joint;   - podiatry following pt needs debridement/amputation of r 1st ray - planned for 9:30am Friday 2/24  - s/p arthrocentesis by podiatry ngtd  - ID following - IV ancef given MSSA bacteremia, bld cx cleared since 2/17  - vasc sx consult appreciated, s/p diagnostic angio  - pain control - dilaudid 1 mg iv prn severe pain, oxy prn moderate pain h/o poorly controlled dm, pad s/p right toe amputations  OM over first metatarsal stump confirmed on MRI, CT w/ septic arthritis of underlying 1st tarsometatarsal joint;   - podiatry following, pt needs debridement/amputation of r 1st ray - planned for 9:30am Friday 2/24  - s/p arthrocentesis by podiatry ngtd  - ID following - IV ancef given MSSA bacteremia, bld cx cleared since 2/17  - vasc sx consult appreciated, s/p diagnostic angio  - pain control - dilaudid 1 mg iv prn severe pain, oxy prn moderate pain

## 2023-02-24 NOTE — PROGRESS NOTE ADULT - PROBLEM SELECTOR PLAN 2
Pt had late presentation MI in June 2022. s/p PCI with SIERRA x1 pRCA 95%, SIERRA x1 dRCA, LAD 70% and Cx 70%. TTE at that time had EF 52% but no WMA.   - Pt with RCRI of 2 (Class III risk) and 10.1% chance of 30 day MACE. Intermediate risk for planned procedure  - Overall pt remains medically optimized for planned amputation with podiatry, no need to obtain current TTE ordered prior to planned procedure

## 2023-02-24 NOTE — BRIEF OPERATIVE NOTE - NSICDXBRIEFPREOP_GEN_ALL_CORE_FT
PRE-OP DIAGNOSIS:  Osteomyelitis of ankle or foot, acute 24-Feb-2023 12:30:04  Maddy Velazquez  
PRE-OP DIAGNOSIS:  PAD (peripheral artery disease) 24-Feb-2023 00:10:25  Haven Santos

## 2023-02-24 NOTE — PROVIDER CONTACT NOTE (OTHER) - ASSESSMENT
patient is angry and expresses interest in going home now. Patient refusing blood draw and blood glucose POCT, patient appears to be comfortably, resting in bed. Patient on phenylephrine infusion to maintain SBP over 100mmHg.

## 2023-02-24 NOTE — PROGRESS NOTE ADULT - ASSESSMENT
Assessment: 53 yo male with PMH of CAD s/p PCI with stent, HTN, HLD, uncontrolled IDDM2, anxiety and depression who presents from outpatient podiatry office with worsening R foot pain, likely septic toe.    Recs:  - S/p angio 2/23  - No vascular contraindication to planned podiatric procedure   - care per primary team    Vascular Surgery  [id number]

## 2023-02-24 NOTE — BRIEF OPERATIVE NOTE - NSICDXBRIEFPROCEDURE_GEN_ALL_CORE_FT
PROCEDURES:  Insertion, catheter, aorta 24-Feb-2023 00:10:15  Haven Santos  
PROCEDURES:  Incision and drainage, infected, ankle, bursa 24-Feb-2023 12:28:38  Maddy Velazquez  Resection of metatarsal bone 24-Feb-2023 12:29:25  Maddy Velazquez

## 2023-02-25 DIAGNOSIS — D62 ACUTE POSTHEMORRHAGIC ANEMIA: ICD-10-CM

## 2023-02-25 LAB
ANION GAP SERPL CALC-SCNC: 9 MMOL/L — SIGNIFICANT CHANGE UP (ref 5–17)
BUN SERPL-MCNC: 16 MG/DL — SIGNIFICANT CHANGE UP (ref 7–23)
CALCIUM SERPL-MCNC: 8.3 MG/DL — LOW (ref 8.4–10.5)
CHLORIDE SERPL-SCNC: 99 MMOL/L — SIGNIFICANT CHANGE UP (ref 96–108)
CO2 SERPL-SCNC: 27 MMOL/L — SIGNIFICANT CHANGE UP (ref 22–31)
CREAT SERPL-MCNC: 1.4 MG/DL — HIGH (ref 0.5–1.3)
EGFR: 61 ML/MIN/1.73M2 — SIGNIFICANT CHANGE UP
GLUCOSE BLDC GLUCOMTR-MCNC: 187 MG/DL — HIGH (ref 70–99)
GLUCOSE BLDC GLUCOMTR-MCNC: 215 MG/DL — HIGH (ref 70–99)
GLUCOSE BLDC GLUCOMTR-MCNC: 230 MG/DL — HIGH (ref 70–99)
GLUCOSE BLDC GLUCOMTR-MCNC: 240 MG/DL — HIGH (ref 70–99)
GLUCOSE BLDC GLUCOMTR-MCNC: 299 MG/DL — HIGH (ref 70–99)
GLUCOSE SERPL-MCNC: 222 MG/DL — HIGH (ref 70–99)
HCT VFR BLD CALC: 20.1 % — CRITICAL LOW (ref 39–50)
HGB BLD-MCNC: 6.4 G/DL — CRITICAL LOW (ref 13–17)
LACTATE SERPL-SCNC: 0.8 MMOL/L — SIGNIFICANT CHANGE UP (ref 0.5–2)
MCHC RBC-ENTMCNC: 29.5 PG — SIGNIFICANT CHANGE UP (ref 27–34)
MCHC RBC-ENTMCNC: 31.8 GM/DL — LOW (ref 32–36)
MCV RBC AUTO: 92.6 FL — SIGNIFICANT CHANGE UP (ref 80–100)
NRBC # BLD: 0 /100 WBCS — SIGNIFICANT CHANGE UP (ref 0–0)
PLATELET # BLD AUTO: 212 K/UL — SIGNIFICANT CHANGE UP (ref 150–400)
POTASSIUM SERPL-MCNC: 4.6 MMOL/L — SIGNIFICANT CHANGE UP (ref 3.5–5.3)
POTASSIUM SERPL-SCNC: 4.6 MMOL/L — SIGNIFICANT CHANGE UP (ref 3.5–5.3)
RBC # BLD: 2.17 M/UL — LOW (ref 4.2–5.8)
RBC # FLD: 13.1 % — SIGNIFICANT CHANGE UP (ref 10.3–14.5)
SODIUM SERPL-SCNC: 135 MMOL/L — SIGNIFICANT CHANGE UP (ref 135–145)
WBC # BLD: 7.82 K/UL — SIGNIFICANT CHANGE UP (ref 3.8–10.5)
WBC # FLD AUTO: 7.82 K/UL — SIGNIFICANT CHANGE UP (ref 3.8–10.5)

## 2023-02-25 PROCEDURE — 99233 SBSQ HOSP IP/OBS HIGH 50: CPT

## 2023-02-25 PROCEDURE — 99232 SBSQ HOSP IP/OBS MODERATE 35: CPT

## 2023-02-25 RX ORDER — HYDROMORPHONE HYDROCHLORIDE 2 MG/ML
0.5 INJECTION INTRAMUSCULAR; INTRAVENOUS; SUBCUTANEOUS EVERY 4 HOURS
Refills: 0 | Status: DISCONTINUED | OUTPATIENT
Start: 2023-02-25 | End: 2023-02-26

## 2023-02-25 RX ORDER — ASPIRIN/CALCIUM CARB/MAGNESIUM 324 MG
81 TABLET ORAL DAILY
Refills: 0 | Status: DISCONTINUED | OUTPATIENT
Start: 2023-02-25 | End: 2023-03-03

## 2023-02-25 RX ORDER — INSULIN GLARGINE 100 [IU]/ML
34 INJECTION, SOLUTION SUBCUTANEOUS AT BEDTIME
Refills: 0 | Status: DISCONTINUED | OUTPATIENT
Start: 2023-02-25 | End: 2023-02-26

## 2023-02-25 RX ORDER — HYDROMORPHONE HYDROCHLORIDE 2 MG/ML
1 INJECTION INTRAMUSCULAR; INTRAVENOUS; SUBCUTANEOUS ONCE
Refills: 0 | Status: DISCONTINUED | OUTPATIENT
Start: 2023-02-25 | End: 2023-02-25

## 2023-02-25 RX ORDER — HYDROMORPHONE HYDROCHLORIDE 2 MG/ML
1.5 INJECTION INTRAMUSCULAR; INTRAVENOUS; SUBCUTANEOUS EVERY 4 HOURS
Refills: 0 | Status: DISCONTINUED | OUTPATIENT
Start: 2023-02-25 | End: 2023-02-28

## 2023-02-25 RX ADMIN — HYDROMORPHONE HYDROCHLORIDE 1 MILLIGRAM(S): 2 INJECTION INTRAMUSCULAR; INTRAVENOUS; SUBCUTANEOUS at 01:00

## 2023-02-25 RX ADMIN — HYDROMORPHONE HYDROCHLORIDE 1 MILLIGRAM(S): 2 INJECTION INTRAMUSCULAR; INTRAVENOUS; SUBCUTANEOUS at 00:35

## 2023-02-25 RX ADMIN — Medication 2: at 09:49

## 2023-02-25 RX ADMIN — INSULIN GLARGINE 34 UNIT(S): 100 INJECTION, SOLUTION SUBCUTANEOUS at 21:31

## 2023-02-25 RX ADMIN — Medication 100 MILLIGRAM(S): at 06:30

## 2023-02-25 RX ADMIN — Medication 3 MILLIGRAM(S): at 23:40

## 2023-02-25 RX ADMIN — Medication 100 MILLIGRAM(S): at 21:31

## 2023-02-25 RX ADMIN — HYDROMORPHONE HYDROCHLORIDE 1.5 MILLIGRAM(S): 2 INJECTION INTRAMUSCULAR; INTRAVENOUS; SUBCUTANEOUS at 09:41

## 2023-02-25 RX ADMIN — HYDROMORPHONE HYDROCHLORIDE 1 MILLIGRAM(S): 2 INJECTION INTRAMUSCULAR; INTRAVENOUS; SUBCUTANEOUS at 03:00

## 2023-02-25 RX ADMIN — ATORVASTATIN CALCIUM 80 MILLIGRAM(S): 80 TABLET, FILM COATED ORAL at 21:31

## 2023-02-25 RX ADMIN — Medication 25 MILLIGRAM(S): at 12:58

## 2023-02-25 RX ADMIN — HYDROMORPHONE HYDROCHLORIDE 1.5 MILLIGRAM(S): 2 INJECTION INTRAMUSCULAR; INTRAVENOUS; SUBCUTANEOUS at 18:28

## 2023-02-25 RX ADMIN — HYDROMORPHONE HYDROCHLORIDE 1.5 MILLIGRAM(S): 2 INJECTION INTRAMUSCULAR; INTRAVENOUS; SUBCUTANEOUS at 10:48

## 2023-02-25 RX ADMIN — Medication 81 MILLIGRAM(S): at 12:53

## 2023-02-25 RX ADMIN — HYDROMORPHONE HYDROCHLORIDE 1.5 MILLIGRAM(S): 2 INJECTION INTRAMUSCULAR; INTRAVENOUS; SUBCUTANEOUS at 14:19

## 2023-02-25 RX ADMIN — HYDROMORPHONE HYDROCHLORIDE 1 MILLIGRAM(S): 2 INJECTION INTRAMUSCULAR; INTRAVENOUS; SUBCUTANEOUS at 02:56

## 2023-02-25 RX ADMIN — HYDROMORPHONE HYDROCHLORIDE 1.5 MILLIGRAM(S): 2 INJECTION INTRAMUSCULAR; INTRAVENOUS; SUBCUTANEOUS at 15:19

## 2023-02-25 RX ADMIN — Medication 3 MILLIGRAM(S): at 00:36

## 2023-02-25 RX ADMIN — Medication 1: at 12:55

## 2023-02-25 RX ADMIN — Medication 100 MILLIGRAM(S): at 13:10

## 2023-02-25 RX ADMIN — HYDROMORPHONE HYDROCHLORIDE 1.5 MILLIGRAM(S): 2 INJECTION INTRAMUSCULAR; INTRAVENOUS; SUBCUTANEOUS at 23:00

## 2023-02-25 RX ADMIN — SERTRALINE 50 MILLIGRAM(S): 25 TABLET, FILM COATED ORAL at 12:56

## 2023-02-25 RX ADMIN — HYDROMORPHONE HYDROCHLORIDE 1.5 MILLIGRAM(S): 2 INJECTION INTRAMUSCULAR; INTRAVENOUS; SUBCUTANEOUS at 22:29

## 2023-02-25 RX ADMIN — Medication 16 UNIT(S): at 13:12

## 2023-02-25 NOTE — PROGRESS NOTE ADULT - ASSESSMENT
53 y/o M w/ hx of uncontrolled Type 2 DM w/ hyperglycemia complicated by neuropathy, amputations, retinopathy and CAD, HTN and HLD admitted for sepsis 2/2 right foot infection (high risk patient with severely uncontrolled diabetes with A1c of 11.2% at high risk of CAD and CVA with high level decision-making). s/p podiatry partial ray resection with I&D 2/24         Uncontrolled type 2 diabetes mellitus with hyperglycemia, with long-term current use of insulin.   A1C 11.2   Last seen at the office 1/2023 with dose adjustment of Toujeo to 66 units qhs and metformin increased to 1000mg BID from 500mg BID. Has been on Trulicity 3mg weekly and takes Humalog around 40 units with meals.  ·  Plan:   Bedtime BG above goal 2/2 nutritional indiscretions vs. post op pressors causing increased insulin requirements; continue with admelog until pattern identified ,cautious titration Cr up with poor po intake today   -  FBG  above goal this AM  Recommend to increase Lantus to 34 units nightly   - Continue with  Admelog 16 units AC meals for now. Hold if not eating.  - continue wit low dose SSI   - cons carb diet    Outpt. endo follow-up with Dr. Jenifer London  Outpt. optho, podiatry, nephrology  Plan to d/c on basal bolus +metformin and GLP-1.  F/u with  Dr Jenifer London.     Problem/Plan - 2:  ·  Problem: HTN (hypertension).   ·  Plan: ·  Recommendation: Goal BP <130/80 c/w lisinopril.     Problem/Plan - 3:  ·  Problem: HLD (hyperlipidemia).   ·  Plan: ·  Recommendation: statin therapy      discussed with patient & RN   Contact via Microsoft Teams during business hours  To reach covering provider access AMION via sunrise tools  For Urgent matters/after-hours/weekends/holidays please page endocrine fellow on call   For nonurgent matters please email [email or web address]    Please note that this patient may be followed by different provider tomorrow.  Notify endocrine 24 hours prior to discharge for final recommendations    greater than 50% of the encounter was spent counseling and/or coordination of care.  26 minutes spent on total encounter; The necessity of the time spent during the encounter on this date of service was due to development of plan of care/coordination of care/glycemic control through review of labs, blood glucose values and vital signs.

## 2023-02-25 NOTE — PROGRESS NOTE ADULT - PROBLEM SELECTOR PLAN 11
- c/w sertraline 50    Dispo: pending TTE, OR with podiatry - c/w sertraline 50    Dispo: pending TTE, cultures

## 2023-02-25 NOTE — PROGRESS NOTE ADULT - SUBJECTIVE AND OBJECTIVE BOX
seen earlier today     Chief Complaint: Type 2 Diabetes Mellitus     INTERVAL HX: s/p vasc surgery yesterday, reports had a very large sandwhich from outside brought in last night>BG 300s post op . FBG above goal. reports poor po intake at breakfast today 2/2 pain asking for pain meds RN made aware . encouraged PO intake for wound healing       Review of Systems:  General: As above  Cardiovascular: No chest pain  Respiratory: No SOB  GI: No nausea, vomiting  Endocrine: no  S&Sx of hypoglycemia    Allergies    No Known Allergies    Intolerances      MEDICATIONS  (STANDING):  aspirin enteric coated 81 milliGRAM(s) Oral daily  atorvastatin 80 milliGRAM(s) Oral at bedtime  ceFAZolin   IVPB 2000 milliGRAM(s) IV Intermittent every 8 hours  glucagon  Injectable 1 milliGRAM(s) IntraMuscular once  influenza   Vaccine 0.5 milliLiter(s) IntraMuscular once  insulin glargine Injectable (LANTUS) 30 Unit(s) SubCutaneous at bedtime  insulin lispro (ADMELOG) corrective regimen sliding scale   SubCutaneous three times a day before meals  insulin lispro (ADMELOG) corrective regimen sliding scale   SubCutaneous at bedtime  insulin lispro Injectable (ADMELOG) 16 Unit(s) SubCutaneous three times a day before meals  lactated ringers. 1000 milliLiter(s) (100 mL/Hr) IV Continuous <Continuous>  lactobacillus acidophilus 1 Tablet(s) Oral two times a day with meals  metoprolol succinate ER 25 milliGRAM(s) Oral daily  naloxone Injectable 0.4 milliGRAM(s) IV Push once  sertraline 50 milliGRAM(s) Oral daily      atorvastatin   80 milliGRAM(s) Oral (02-24-23 @ 21:00)    insulin glargine Injectable (LANTUS)   30 Unit(s) SubCutaneous (02-24-23 @ 22:11)    insulin lispro (ADMELOG) corrective regimen sliding scale   1 Unit(s) SubCutaneous (02-25-23 @ 12:55)   2 Unit(s) SubCutaneous (02-25-23 @ 09:49)   4 Unit(s) SubCutaneous (02-24-23 @ 18:22)    insulin lispro (ADMELOG) corrective regimen sliding scale   2 Unit(s) SubCutaneous (02-24-23 @ 22:10)    insulin lispro Injectable (ADMELOG)   16 Unit(s) SubCutaneous (02-24-23 @ 18:23)    insulin lispro Injectable (ADMELOG).   2 Unit(s) SubCutaneous (02-24-23 @ 13:37)        PHYSICAL EXAM:  VITALS: T(C): 37.1 (02-25-23 @ 12:36)  T(F): 98.7 (02-25-23 @ 12:36), Max: 98.7 (02-25-23 @ 12:36)  HR: 80 (02-25-23 @ 12:36) (66 - 92)  BP: 132/66 (02-25-23 @ 12:36) (84/55 - 140/75)  RR:  (12 - 18)  SpO2:  (93% - 98%)  Wt(kg): --  GENERAL: male laying in bed in NAD  Respiratory: Respirations unlabored   Extremities: Warm, LLE dressing cdi, RLE ace wrapdressing cdi   NEURO: Alert , appropriate     LABS:  POCT Blood Glucose.: 187 mg/dL (02-25-23 @ 12:33)  POCT Blood Glucose.: 230 mg/dL (02-25-23 @ 09:47)  POCT Blood Glucose.: 240 mg/dL (02-25-23 @ 08:23)  POCT Blood Glucose.: 299 mg/dL (02-24-23 @ 23:59)  POCT Blood Glucose.: 319 mg/dL (02-24-23 @ 21:48)  POCT Blood Glucose.: 345 mg/dL (02-24-23 @ 17:50)  POCT Blood Glucose.: 243 mg/dL (02-24-23 @ 12:54)  POCT Blood Glucose.: 160 mg/dL (02-24-23 @ 00:41)  POCT Blood Glucose.: 290 mg/dL (02-23-23 @ 13:33)  POCT Blood Glucose.: 277 mg/dL (02-23-23 @ 07:37)  POCT Blood Glucose.: 105 mg/dL (02-22-23 @ 20:43)  POCT Blood Glucose.: 162 mg/dL (02-22-23 @ 16:30)                          6.4    7.82  )-----------( 212      ( 25 Feb 2023 08:53 )             20.1     02-25    135  |  99  |  16  ----------------------------<  222<H>  4.6   |  27  |  1.40<H>    Ca    8.3<L>      25 Feb 2023 08:53      eGFR: 61 mL/min/1.73m2 (25 Feb 2023 08:53)  eGFR: 71 mL/min/1.73m2 (24 Feb 2023 13:42)    02-16 Chol 71 Direct LDL -- LDL calculated 33 HDL 16<L> Trig 116  Thyroid Function Tests:  02-16 @ 05:39 TSH 2.47 FreeT4 -- T3 -- Anti TPO -- Anti Thyroglobulin Ab -- TSI --      A1C with Estimated Average Glucose Result: 10.8 % (02-17-23 @ 07:07)  A1C with Estimated Average Glucose Result: 11.2 % (02-16-23 @ 05:39)    Estimated Average Glucose: 263 mg/dL (02-17-23 @ 07:07)  Estimated Average Glucose: 275 mg/dL (02-16-23 @ 05:39)        Diet, Consistent Carbohydrate Renal w/Evening Snack:   Supplement Feeding Modality:  Oral  Glucerna Shake Cans or Servings Per Day:  2       Frequency:  Daily (02-24-23 @ 12:01) [Active]

## 2023-02-25 NOTE — PROVIDER CONTACT NOTE (OTHER) - ACTION/TREATMENT ORDERED:
VS & 1x stat dose 1mg IV dilaudid, continue to monitor VS & 1x stat dose IV morphine ordered, continue to monitor

## 2023-02-25 NOTE — PROGRESS NOTE ADULT - SUBJECTIVE AND OBJECTIVE BOX
Patient is a 52y old  Male who presents with a chief complaint of r foot pain/tenderness, warmth, swelling (25 Feb 2023 13:10)       INTERVAL HPI/OVERNIGHT EVENTS:  Patient seen and evaluated at bedside.  Pt is resting comfortable in NAD. Denies N/V/F/C.  Pain rated at X/10    Allergies    No Known Allergies    Intolerances        Vital Signs Last 24 Hrs  T(C): 37.1 (25 Feb 2023 12:36), Max: 37.1 (25 Feb 2023 12:36)  T(F): 98.7 (25 Feb 2023 12:36), Max: 98.7 (25 Feb 2023 12:36)  HR: 80 (25 Feb 2023 12:36) (66 - 92)  BP: 132/66 (25 Feb 2023 12:36) (95/56 - 140/75)  BP(mean): 76 (24 Feb 2023 15:50) (73 - 104)  RR: 18 (25 Feb 2023 12:36) (12 - 18)  SpO2: 96% (25 Feb 2023 12:36) (95% - 98%)    Parameters below as of 25 Feb 2023 12:36  Patient On (Oxygen Delivery Method): room air        LABS:                        6.4    7.82  )-----------( 212      ( 25 Feb 2023 08:53 )             20.1     02-25    135  |  99  |  16  ----------------------------<  222<H>  4.6   |  27  |  1.40<H>    Ca    8.3<L>      25 Feb 2023 08:53      PT/INR - ( 24 Feb 2023 08:33 )   PT: 14.4 sec;   INR: 1.25 ratio         PTT - ( 24 Feb 2023 08:33 )  PTT:32.6 sec    CAPILLARY BLOOD GLUCOSE      POCT Blood Glucose.: 187 mg/dL (25 Feb 2023 12:33)  POCT Blood Glucose.: 230 mg/dL (25 Feb 2023 09:47)  POCT Blood Glucose.: 240 mg/dL (25 Feb 2023 08:23)  POCT Blood Glucose.: 299 mg/dL (24 Feb 2023 23:59)  POCT Blood Glucose.: 319 mg/dL (24 Feb 2023 21:48)  POCT Blood Glucose.: 345 mg/dL (24 Feb 2023 17:50)      Lower Extremity Physical Exam:  Vascular: DP/PT 0/4, B/L, CFT <3 seconds B/L, Temperature gradient warm to cool, B/L.   Neuro: Epicritic sensation diminished to the level of toes, B/L.  Musculoskeletal/Ortho: s/p R foot TMA, Right ankle no pain with active or passive dorsiflexion/plantarflexion/inversion/eversion, pain on palpation localized to the medial and lateral malleoli.  Skin: s/p  right foot 1st met amputation and right ankle I&D (DOS 2/24/23): distal sutures intact, flaps warm and viable, proximal tib ant tendon exposed, granular wound bed, scant sanguinous drainage, proximal tracking 2cm.       RADIOLOGY & ADDITIONAL TESTS:

## 2023-02-25 NOTE — PROVIDER CONTACT NOTE (OTHER) - BACKGROUND
Pt is a 52 year old male p/w right foot redness, swelling, pain/ tenderness. Dx severe sepsis 2/2 diabetic foot wound infection

## 2023-02-25 NOTE — PROGRESS NOTE ADULT - SUBJECTIVE AND OBJECTIVE BOX
Chico Torres MD  Division of Hospital Medicine  Available via MS teams  If no response or off hours page: [phone]  ---------------------------------------------------------    TIKI HOUSTON  52y  Male      Patient is a 52y old  Male who presents with a chief complaint of r foot pain/tenderness, warmth, swelling (24 Feb 2023 08:22)      INTERVAL HPI/OVERNIGHT EVENTS:  Seen at bedside. Has pain post procedure      REVIEW OF SYSTEMS: 10 point ROS negative unless listed above    T(C): 36.7 (02-25-23 @ 06:41), Max: 36.7 (02-25-23 @ 06:41)  HR: 80 (02-25-23 @ 06:41) (66 - 92)  BP: 119/68 (02-25-23 @ 09:39) (84/55 - 140/75)  RR: 18 (02-25-23 @ 06:41) (12 - 18)  SpO2: 95% (02-25-23 @ 06:41) (93% - 98%)  Wt(kg): --Vital Signs Last 24 Hrs  T(C): 36.7 (25 Feb 2023 06:41), Max: 36.7 (25 Feb 2023 06:41)  T(F): 98.1 (25 Feb 2023 06:41), Max: 98.1 (25 Feb 2023 06:41)  HR: 80 (25 Feb 2023 06:41) (66 - 92)  BP: 119/68 (25 Feb 2023 09:39) (84/55 - 140/75)  BP(mean): 76 (24 Feb 2023 15:50) (64 - 104)  RR: 18 (25 Feb 2023 06:41) (12 - 18)  SpO2: 95% (25 Feb 2023 06:41) (93% - 98%)    Parameters below as of 25 Feb 2023 06:41  Patient On (Oxygen Delivery Method): room air        PHYSICAL EXAM:  GENERAL: NAD, well-groomed, well-developed  NECK: Supple, No JVD  CHEST/LUNG: Clear to auscultation bilaterally; No rales, rhonchi, wheezing, or rubs  HEART: Regular rate and rhythm; No murmurs, rubs, or gallops   EXTREMITIES:  RLE wrapped    Consultant(s) Notes Reviewed:  [x ] YES  [ ] NO  Care Discussed with Consultants/Other Providers [ x] YES  [ ] NO    LABS:                        6.4    7.82  )-----------( 212      ( 25 Feb 2023 08:53 )             20.1     02-25    135  |  99  |  16  ----------------------------<  222<H>  4.6   |  27  |  1.40<H>    Ca    8.3<L>      25 Feb 2023 08:53      PT/INR - ( 24 Feb 2023 08:33 )   PT: 14.4 sec;   INR: 1.25 ratio         PTT - ( 24 Feb 2023 08:33 )  PTT:32.6 sec    CAPILLARY BLOOD GLUCOSE      POCT Blood Glucose.: 230 mg/dL (25 Feb 2023 09:47)  POCT Blood Glucose.: 240 mg/dL (25 Feb 2023 08:23)  POCT Blood Glucose.: 299 mg/dL (24 Feb 2023 23:59)  POCT Blood Glucose.: 319 mg/dL (24 Feb 2023 21:48)  POCT Blood Glucose.: 345 mg/dL (24 Feb 2023 17:50)  POCT Blood Glucose.: 243 mg/dL (24 Feb 2023 12:54)            RADIOLOGY & ADDITIONAL TESTS:    Imaging Personally Reviewed:  [ ] YES  [ ] NO

## 2023-02-25 NOTE — PROGRESS NOTE ADULT - PROBLEM SELECTOR PLAN 8
s/p pci w stents  -  DAPT was not resumed by surgical team when placing orders post op. For now will resume aspirin give stents were placed in 6/2022. Hold plavix for now pending repeat CBC   - statin  - c/w Toprol XL 25mg daily

## 2023-02-25 NOTE — PROVIDER CONTACT NOTE (OTHER) - ASSESSMENT
pt c/o breakthrough pain after 5mg PO PRN oxy, no PRN orders for severe/breakthrough pain available. RLE WNL, VSS

## 2023-02-25 NOTE — PROGRESS NOTE ADULT - ASSESSMENT
52M s/p  right foot 1st met amputation and right ankle I&D (DOS 2/24/23)  - Pt seen and evaluated.  - Afebrile, no leukocytosis  - s/p  right foot 1st met amputation and right ankle I&D (DOS 2/24/23): distal sutures intact, flaps warm and viable, proximal tib ant tendon exposed, granular wound bed, scant sanguinous drainage, proximal tracking 2cm.   - Right foot wound culture growing MSSA, strep anginosus, Peptostreptococcus  - R ankle MR: OM of 1st met, bases of 2nd and 3rd met, all cuneiforms and likely cuboid  - Right ankle joint aspirate: Negative for organisms and crystals  - SAL/PVRs incomplete: RABI 0.98, LABI 0.97, waveforms normal to level of the ankle joint  - s/p LLE angio w 3 vessel r/o  - Per intraop findings, high concern for residual infection, low concern for viability   - OR data pending   - Pod plan for return to OR for possible delayed primary closure   - Please document medical optimization for R foot surgery  - Seen with attending

## 2023-02-25 NOTE — CHART NOTE - NSCHARTNOTEFT_GEN_A_CORE
Called to room for 52 year old male who is s/p diabetic foot wound and s/p surgical procedure. Pt is complaining of pain 10/10 to his right foot beginning at his ankle. The area is bandaged and is s/p amputation of toes. Pt describes pain as throbbing and states that touching the area results in increased pain. Pt has been having break through pain throughout the day and states that the dilaudid dose he is getting is not relieving his pain. PT Dilaudid increased to 1mg q6h. Recalled by nurse at midnight for pt continuing to have increasing pain. States pain is still 10/10. There is good circulation to the R leg. Color pink Cap refill to lower extremity <1 seconds. Pt states this is pain he has had since surgery. Pt given additional dose of morphine 2mg IVP. Will continue to follow. PT is arrogant and insisting on pain meds for relief of severe pain.   Will continue to monitor pt Called to room for 52 year old male who is s/p diabetic foot wound and s/p surgical procedure. Pt is complaining of pain 10/10 to his right foot beginning at his ankle. The area is bandaged and is s/p amputation of toes. Pt describes pain as throbbing and states that touching the area results in increased pain. Pt has been having break through pain throughout the day and states that the dilaudid dose he is getting is not relieving his pain. PT Dilaudid increased to 1mg q6h. Recalled by nurse at midnight for pt continuing to have increasing pain. States pain is still 10/10. There is good circulation to the R leg. Color pink Cap refill to lower extremity <1 seconds. Pt states this is pain he has had since surgery. Pt given additional dose of Dilaudid 1mg . Will continue to follow. PT is arrogant and insisting on pain meds for relief of severe pain.   Will continue to monitor pt

## 2023-02-25 NOTE — PROVIDER CONTACT NOTE (OTHER) - ASSESSMENT
Pt is AOx4, VS stable, Patient refused blood transfusion, pt states " absolutely not! I will not take another persons blood" pt is very adamant about not receiving a blood transfusion.

## 2023-02-25 NOTE — PROGRESS NOTE ADULT - PROBLEM SELECTOR PLAN 3
Decrease in Hgb this am. Possibly 2/2 acute blood loss from surgery given EBL was 700cc  - Check repeat CBC, if still less then 7 transfuse Decrease in Hgb this am. Possibly 2/2 acute blood loss from surgery given EBL was 700cc  - Ordered repeat CBC and patient refused  - Was going to order pRBC transfusion but pt refused  - Trend cbc

## 2023-02-25 NOTE — PROVIDER CONTACT NOTE (OTHER) - ASSESSMENT
pt c/o pain being severe & uncontrolled & requesting provider at bedside despite staying on top of medications, next PRN dose not available until 3am & pt cannot wait

## 2023-02-26 LAB
ANION GAP SERPL CALC-SCNC: 10 MMOL/L — SIGNIFICANT CHANGE UP (ref 5–17)
BUN SERPL-MCNC: 14 MG/DL — SIGNIFICANT CHANGE UP (ref 7–23)
CALCIUM SERPL-MCNC: 8.5 MG/DL — SIGNIFICANT CHANGE UP (ref 8.4–10.5)
CHLORIDE SERPL-SCNC: 96 MMOL/L — SIGNIFICANT CHANGE UP (ref 96–108)
CO2 SERPL-SCNC: 26 MMOL/L — SIGNIFICANT CHANGE UP (ref 22–31)
CREAT SERPL-MCNC: 1.17 MG/DL — SIGNIFICANT CHANGE UP (ref 0.5–1.3)
CULTURE RESULTS: SIGNIFICANT CHANGE UP
EGFR: 76 ML/MIN/1.73M2 — SIGNIFICANT CHANGE UP
GLUCOSE BLDC GLUCOMTR-MCNC: 155 MG/DL — HIGH (ref 70–99)
GLUCOSE BLDC GLUCOMTR-MCNC: 185 MG/DL — HIGH (ref 70–99)
GLUCOSE BLDC GLUCOMTR-MCNC: 280 MG/DL — HIGH (ref 70–99)
GLUCOSE BLDC GLUCOMTR-MCNC: 99 MG/DL — SIGNIFICANT CHANGE UP (ref 70–99)
GLUCOSE SERPL-MCNC: 237 MG/DL — HIGH (ref 70–99)
HCT VFR BLD CALC: 19.6 % — CRITICAL LOW (ref 39–50)
HCT VFR BLD CALC: 24 % — LOW (ref 39–50)
HGB BLD-MCNC: 6.2 G/DL — CRITICAL LOW (ref 13–17)
HGB BLD-MCNC: 7.7 G/DL — LOW (ref 13–17)
MCHC RBC-ENTMCNC: 29.2 PG — SIGNIFICANT CHANGE UP (ref 27–34)
MCHC RBC-ENTMCNC: 29.5 PG — SIGNIFICANT CHANGE UP (ref 27–34)
MCHC RBC-ENTMCNC: 31.6 GM/DL — LOW (ref 32–36)
MCHC RBC-ENTMCNC: 32.1 GM/DL — SIGNIFICANT CHANGE UP (ref 32–36)
MCV RBC AUTO: 92 FL — SIGNIFICANT CHANGE UP (ref 80–100)
MCV RBC AUTO: 92.5 FL — SIGNIFICANT CHANGE UP (ref 80–100)
NRBC # BLD: 0 /100 WBCS — SIGNIFICANT CHANGE UP (ref 0–0)
NRBC # BLD: 0 /100 WBCS — SIGNIFICANT CHANGE UP (ref 0–0)
PLATELET # BLD AUTO: 189 K/UL — SIGNIFICANT CHANGE UP (ref 150–400)
PLATELET # BLD AUTO: 207 K/UL — SIGNIFICANT CHANGE UP (ref 150–400)
POTASSIUM SERPL-MCNC: 4.1 MMOL/L — SIGNIFICANT CHANGE UP (ref 3.5–5.3)
POTASSIUM SERPL-SCNC: 4.1 MMOL/L — SIGNIFICANT CHANGE UP (ref 3.5–5.3)
RBC # BLD: 2.12 M/UL — LOW (ref 4.2–5.8)
RBC # BLD: 2.61 M/UL — LOW (ref 4.2–5.8)
RBC # FLD: 13 % — SIGNIFICANT CHANGE UP (ref 10.3–14.5)
RBC # FLD: 13.2 % — SIGNIFICANT CHANGE UP (ref 10.3–14.5)
SODIUM SERPL-SCNC: 132 MMOL/L — LOW (ref 135–145)
SPECIMEN SOURCE: SIGNIFICANT CHANGE UP
WBC # BLD: 6.51 K/UL — SIGNIFICANT CHANGE UP (ref 3.8–10.5)
WBC # BLD: 7.32 K/UL — SIGNIFICANT CHANGE UP (ref 3.8–10.5)
WBC # FLD AUTO: 6.51 K/UL — SIGNIFICANT CHANGE UP (ref 3.8–10.5)
WBC # FLD AUTO: 7.32 K/UL — SIGNIFICANT CHANGE UP (ref 3.8–10.5)

## 2023-02-26 PROCEDURE — 99232 SBSQ HOSP IP/OBS MODERATE 35: CPT

## 2023-02-26 PROCEDURE — 99233 SBSQ HOSP IP/OBS HIGH 50: CPT

## 2023-02-26 RX ORDER — INSULIN GLARGINE 100 [IU]/ML
40 INJECTION, SOLUTION SUBCUTANEOUS AT BEDTIME
Refills: 0 | Status: DISCONTINUED | OUTPATIENT
Start: 2023-02-26 | End: 2023-02-27

## 2023-02-26 RX ORDER — INSULIN LISPRO 100/ML
VIAL (ML) SUBCUTANEOUS AT BEDTIME
Refills: 0 | Status: DISCONTINUED | OUTPATIENT
Start: 2023-02-26 | End: 2023-03-03

## 2023-02-26 RX ORDER — INSULIN LISPRO 100/ML
VIAL (ML) SUBCUTANEOUS
Refills: 0 | Status: DISCONTINUED | OUTPATIENT
Start: 2023-02-26 | End: 2023-03-03

## 2023-02-26 RX ORDER — INSULIN LISPRO 100/ML
14 VIAL (ML) SUBCUTANEOUS
Refills: 0 | Status: DISCONTINUED | OUTPATIENT
Start: 2023-02-26 | End: 2023-03-03

## 2023-02-26 RX ORDER — HYDROMORPHONE HYDROCHLORIDE 2 MG/ML
0.5 INJECTION INTRAMUSCULAR; INTRAVENOUS; SUBCUTANEOUS
Refills: 0 | Status: DISCONTINUED | OUTPATIENT
Start: 2023-02-26 | End: 2023-02-28

## 2023-02-26 RX ADMIN — HYDROMORPHONE HYDROCHLORIDE 1.5 MILLIGRAM(S): 2 INJECTION INTRAMUSCULAR; INTRAVENOUS; SUBCUTANEOUS at 06:55

## 2023-02-26 RX ADMIN — Medication 3 MILLIGRAM(S): at 21:15

## 2023-02-26 RX ADMIN — HYDROMORPHONE HYDROCHLORIDE 1.5 MILLIGRAM(S): 2 INJECTION INTRAMUSCULAR; INTRAVENOUS; SUBCUTANEOUS at 17:40

## 2023-02-26 RX ADMIN — HYDROMORPHONE HYDROCHLORIDE 1.5 MILLIGRAM(S): 2 INJECTION INTRAMUSCULAR; INTRAVENOUS; SUBCUTANEOUS at 21:56

## 2023-02-26 RX ADMIN — HYDROMORPHONE HYDROCHLORIDE 1.5 MILLIGRAM(S): 2 INJECTION INTRAMUSCULAR; INTRAVENOUS; SUBCUTANEOUS at 03:00

## 2023-02-26 RX ADMIN — HYDROMORPHONE HYDROCHLORIDE 0.5 MILLIGRAM(S): 2 INJECTION INTRAMUSCULAR; INTRAVENOUS; SUBCUTANEOUS at 09:42

## 2023-02-26 RX ADMIN — HYDROMORPHONE HYDROCHLORIDE 1.5 MILLIGRAM(S): 2 INJECTION INTRAMUSCULAR; INTRAVENOUS; SUBCUTANEOUS at 11:30

## 2023-02-26 RX ADMIN — Medication 3: at 08:12

## 2023-02-26 RX ADMIN — HYDROMORPHONE HYDROCHLORIDE 0.5 MILLIGRAM(S): 2 INJECTION INTRAMUSCULAR; INTRAVENOUS; SUBCUTANEOUS at 08:09

## 2023-02-26 RX ADMIN — Medication 100 MILLIGRAM(S): at 21:16

## 2023-02-26 RX ADMIN — Medication 81 MILLIGRAM(S): at 12:17

## 2023-02-26 RX ADMIN — Medication 16 UNIT(S): at 12:16

## 2023-02-26 RX ADMIN — Medication 2: at 17:06

## 2023-02-26 RX ADMIN — Medication 100 MILLIGRAM(S): at 14:59

## 2023-02-26 RX ADMIN — SERTRALINE 50 MILLIGRAM(S): 25 TABLET, FILM COATED ORAL at 12:17

## 2023-02-26 RX ADMIN — ATORVASTATIN CALCIUM 80 MILLIGRAM(S): 80 TABLET, FILM COATED ORAL at 21:15

## 2023-02-26 RX ADMIN — Medication 14 UNIT(S): at 17:07

## 2023-02-26 RX ADMIN — Medication 16 UNIT(S): at 08:13

## 2023-02-26 RX ADMIN — HYDROMORPHONE HYDROCHLORIDE 1.5 MILLIGRAM(S): 2 INJECTION INTRAMUSCULAR; INTRAVENOUS; SUBCUTANEOUS at 21:16

## 2023-02-26 RX ADMIN — HYDROMORPHONE HYDROCHLORIDE 1.5 MILLIGRAM(S): 2 INJECTION INTRAMUSCULAR; INTRAVENOUS; SUBCUTANEOUS at 17:06

## 2023-02-26 RX ADMIN — HYDROMORPHONE HYDROCHLORIDE 0.5 MILLIGRAM(S): 2 INJECTION INTRAMUSCULAR; INTRAVENOUS; SUBCUTANEOUS at 10:25

## 2023-02-26 RX ADMIN — HYDROMORPHONE HYDROCHLORIDE 1.5 MILLIGRAM(S): 2 INJECTION INTRAMUSCULAR; INTRAVENOUS; SUBCUTANEOUS at 10:58

## 2023-02-26 RX ADMIN — HYDROMORPHONE HYDROCHLORIDE 1.5 MILLIGRAM(S): 2 INJECTION INTRAMUSCULAR; INTRAVENOUS; SUBCUTANEOUS at 13:11

## 2023-02-26 RX ADMIN — HYDROMORPHONE HYDROCHLORIDE 0.5 MILLIGRAM(S): 2 INJECTION INTRAMUSCULAR; INTRAVENOUS; SUBCUTANEOUS at 18:57

## 2023-02-26 RX ADMIN — HYDROMORPHONE HYDROCHLORIDE 0.5 MILLIGRAM(S): 2 INJECTION INTRAMUSCULAR; INTRAVENOUS; SUBCUTANEOUS at 08:40

## 2023-02-26 RX ADMIN — HYDROMORPHONE HYDROCHLORIDE 0.5 MILLIGRAM(S): 2 INJECTION INTRAMUSCULAR; INTRAVENOUS; SUBCUTANEOUS at 13:12

## 2023-02-26 RX ADMIN — HYDROMORPHONE HYDROCHLORIDE 0.5 MILLIGRAM(S): 2 INJECTION INTRAMUSCULAR; INTRAVENOUS; SUBCUTANEOUS at 12:13

## 2023-02-26 RX ADMIN — HYDROMORPHONE HYDROCHLORIDE 0.5 MILLIGRAM(S): 2 INJECTION INTRAMUSCULAR; INTRAVENOUS; SUBCUTANEOUS at 14:59

## 2023-02-26 RX ADMIN — Medication 100 MILLIGRAM(S): at 05:21

## 2023-02-26 RX ADMIN — HYDROMORPHONE HYDROCHLORIDE 1.5 MILLIGRAM(S): 2 INJECTION INTRAMUSCULAR; INTRAVENOUS; SUBCUTANEOUS at 02:30

## 2023-02-26 RX ADMIN — HYDROMORPHONE HYDROCHLORIDE 1.5 MILLIGRAM(S): 2 INJECTION INTRAMUSCULAR; INTRAVENOUS; SUBCUTANEOUS at 13:45

## 2023-02-26 RX ADMIN — INSULIN GLARGINE 40 UNIT(S): 100 INJECTION, SOLUTION SUBCUTANEOUS at 21:58

## 2023-02-26 RX ADMIN — HYDROMORPHONE HYDROCHLORIDE 0.5 MILLIGRAM(S): 2 INJECTION INTRAMUSCULAR; INTRAVENOUS; SUBCUTANEOUS at 15:30

## 2023-02-26 RX ADMIN — HYDROMORPHONE HYDROCHLORIDE 1.5 MILLIGRAM(S): 2 INJECTION INTRAMUSCULAR; INTRAVENOUS; SUBCUTANEOUS at 07:26

## 2023-02-26 NOTE — PROGRESS NOTE ADULT - PROBLEM SELECTOR PLAN 3
Recommendation: statin therapy      Can be reached via TEAMS/pager: [phone]   office:  [phone] (M-F 9a-5pm)               [phone] (nights/weekends)   Can access Amion coverage via sunrise/tools

## 2023-02-26 NOTE — PROGRESS NOTE ADULT - SUBJECTIVE AND OBJECTIVE BOX
TE[facility] Daily Progress Note  =====================================================    SUBJECTIVE: Patient seen and examined at the bedside.  Pain controlled after angio, not complaining of lower extremity deficits at this time.     --------------------------------------------------------------------------------------  OBJECTIVE:    VITAL SIGNS:  Vital Signs Last 24 Hrs  T(C): 36.7 (26 Feb 2023 08:15), Max: 37.1 (25 Feb 2023 12:36)  T(F): 98 (26 Feb 2023 08:15), Max: 98.7 (25 Feb 2023 12:36)  HR: 71 (26 Feb 2023 08:15) (71 - 85)  BP: 129/76 (26 Feb 2023 08:15) (107/58 - 132/66)  BP(mean): --  RR: 18 (26 Feb 2023 08:15) (18 - 18)  SpO2: 94% (26 Feb 2023 08:15) (93% - 98%)    Parameters below as of 26 Feb 2023 08:15  Patient On (Oxygen Delivery Method): room air      --------------------------------------------------------------------------------------    EXAM:  General- NAD, A&Ox3  Groin: access site soft, no hematoma or pulsatile mass   Extremities- R foot TMA with 1st metatarsal resection/tib ant tendon debridement with pods No cellulitis or erythema noted. AT/PT signals. DP signal not able to be doppler. Femoral pulses palpable bilaterally.   Lungs- unlabored breathing, room air     --------------------------------------------------------------------------------------    LABS:                        6.2    6.51  )-----------( 189      ( 26 Feb 2023 08:59 )             19.6     02-26    132<L>  |  96  |  14  ----------------------------<  237<H>  4.1   |  26  |  1.17    Ca    8.5      26 Feb 2023 08:59            --------------------------------------------------------------------------------------    INS AND OUTS:    02-25-23 @ 07:01  -  02-26-23 @ 07:00  --------------------------------------------------------  IN: 1680 mL / OUT: 1500 mL / NET: 180 mL        --------------------------------------------------------------------------------------    MEDICATIONS:  MEDICATIONS  (STANDING):  aspirin enteric coated 81 milliGRAM(s) Oral daily  atorvastatin 80 milliGRAM(s) Oral at bedtime  ceFAZolin   IVPB 2000 milliGRAM(s) IV Intermittent every 8 hours  glucagon  Injectable 1 milliGRAM(s) IntraMuscular once  influenza   Vaccine 0.5 milliLiter(s) IntraMuscular once  insulin glargine Injectable (LANTUS) 34 Unit(s) SubCutaneous at bedtime  insulin lispro (ADMELOG) corrective regimen sliding scale   SubCutaneous three times a day before meals  insulin lispro (ADMELOG) corrective regimen sliding scale   SubCutaneous at bedtime  insulin lispro Injectable (ADMELOG) 16 Unit(s) SubCutaneous three times a day before meals  lactated ringers. 1000 milliLiter(s) (100 mL/Hr) IV Continuous <Continuous>  lactobacillus acidophilus 1 Tablet(s) Oral two times a day with meals  metoprolol succinate ER 25 milliGRAM(s) Oral daily  naloxone Injectable 0.4 milliGRAM(s) IV Push once  sertraline 50 milliGRAM(s) Oral daily    MEDICATIONS  (PRN):  aluminum hydroxide/magnesium hydroxide/simethicone Suspension 30 milliLiter(s) Oral every 4 hours PRN Dyspepsia  bisacodyl 5 milliGRAM(s) Oral daily PRN Constipation  HYDROmorphone  Injectable 1.5 milliGRAM(s) IV Push every 4 hours PRN Severe Pain (7 - 10)  HYDROmorphone  Injectable 0.5 milliGRAM(s) IV Push every 2 hours PRN Breakthrough pain  melatonin 3 milliGRAM(s) Oral at bedtime PRN Insomnia  ondansetron Injectable 4 milliGRAM(s) IV Push every 8 hours PRN Nausea and/or Vomiting  oxyCODONE    IR 5 milliGRAM(s) Oral every 6 hours PRN Moderate Pain (4 - 6)  senna 2 Tablet(s) Oral at bedtime PRN Constipation    --------------------------------------------------------------------------------------

## 2023-02-26 NOTE — PROGRESS NOTE ADULT - PROBLEM SELECTOR PLAN 1
-test BG AC/HS  -Increase Lantus 40 units QHS  -Adjust Admelog 14 units AC meals  -Adjust Admelog moderate correction scale AC and mod HS scale  -cons carb diet  Outpt. endo follow-up with Dr. Jenifer London  Outpt. optho, podiatry, nephrology  Plan to d/c on basal bolus +metformin and GLP-1.  F/u with  Dr Jenifer London.

## 2023-02-26 NOTE — PROGRESS NOTE ADULT - PROBLEM SELECTOR PLAN 1
h/o poorly controlled dm, pad s/p right toe amputations  OM over first metatarsal stump confirmed on MRI, CT w/ septic arthritis of underlying 1st tarsometatarsal joint;   - podiatry s/p debridement/amputation of r 1st ray - Friday 2/24, f/u deep wound cx  - Podiatry plans to RTOR for delayed primary closure  - s/p arthrocentesis by podiatry ngtd  - ID following - IV ancef given MSSA bacteremia, bld cx cleared since 2/17  - vasc sx consult appreciated, s/p diagnostic angio  - pain control - dilaudid 1.5 mg iv prn severe pain, oxy prn moderate pain

## 2023-02-26 NOTE — PROGRESS NOTE ADULT - PROBLEM SELECTOR PLAN 3
Decrease in Hgb this am. Possibly 2/2 acute blood loss from surgery given EBL was 700cc  - Ordered repeat CBC yesterday and patient refused  - Was going to order pRBC transfusion on 2/26 but pt refused. Will reattempt transfusion again today  - Trend cbc

## 2023-02-26 NOTE — PROGRESS NOTE ADULT - ASSESSMENT
51 yo male with PMH of CAD s/p PCI with stent, HTN, HLD, uncontrolled IDDM2, anxiety and depression who presents from outpatient podiatry office with worsening R foot pain, likely septic toe.    Recs:  - S/p angio 2/23 and s/p pods procedure on 2/24  - Per Pods, high concern for residual infection, but low concern for viability  - Pods plan to RTOR for delayed primary closure  - care per primary team    Vascular Surgery  [id number]

## 2023-02-26 NOTE — PROGRESS NOTE ADULT - SUBJECTIVE AND OBJECTIVE BOX
Patient is a 52y old  Male who presents with a chief complaint of r foot pain/tenderness, warmth, swelling (26 Feb 2023 09:47)       INTERVAL HPI/OVERNIGHT EVENTS:  Patient seen and evaluated at bedside.  Pt is resting comfortable in NAD. Denies N/V/F/C.  Pain rated at X/10    Allergies    No Known Allergies    Intolerances        Vital Signs Last 24 Hrs  T(C): 36.8 (26 Feb 2023 10:49), Max: 37.1 (25 Feb 2023 12:36)  T(F): 98.2 (26 Feb 2023 10:49), Max: 98.7 (25 Feb 2023 12:36)  HR: 78 (26 Feb 2023 10:49) (71 - 85)  BP: 112/68 (26 Feb 2023 10:49) (107/58 - 132/66)  BP(mean): --  RR: 18 (26 Feb 2023 10:49) (18 - 18)  SpO2: 98% (26 Feb 2023 10:49) (93% - 98%)    Parameters below as of 26 Feb 2023 10:49  Patient On (Oxygen Delivery Method): room air        LABS:                        6.2    6.51  )-----------( 189      ( 26 Feb 2023 08:59 )             19.6     02-26    132<L>  |  96  |  14  ----------------------------<  237<H>  4.1   |  26  |  1.17    Ca    8.5      26 Feb 2023 08:59          CAPILLARY BLOOD GLUCOSE      POCT Blood Glucose.: 280 mg/dL (26 Feb 2023 08:11)  POCT Blood Glucose.: 215 mg/dL (25 Feb 2023 21:25)  POCT Blood Glucose.: 187 mg/dL (25 Feb 2023 12:33)      Lower Extremity Physical Exam:  Vascular: DP/PT 0/4, B/L, CFT <3 seconds B/L, Temperature gradient warm to cool, B/L.   Neuro: Epicritic sensation diminished to the level of toes, B/L.  Musculoskeletal/Ortho: s/p R foot TMA, Right ankle no pain with active or passive dorsiflexion/plantarflexion/inversion/eversion, pain on palpation localized to the medial and lateral malleoli.  Skin: s/p right foot 1st met amputation and right ankle I&D (DOS 2/24/23): distal sutures intact, flaps warm and viable, proximal tib ant tendon exposed, proximal wound bed dusky with mild TA tendon dessication, scant sanguinous drainage, proximal tracking 2cm.     RADIOLOGY & ADDITIONAL TESTS:

## 2023-02-26 NOTE — PROGRESS NOTE ADULT - ASSESSMENT
53 yo male with PMH of CAD s/p PCI with stent, HTN, HLD, uncontrolled IDDM2, anxiety and depression who presents from outpatient podiatry office with worsening R foot pain found to be in severe sepsis 2/2 cellulitis and osteomyelitis with CT of R ankle concerning for possible septic arthritis and gas-forming/necrotizing infection with course c/b MSSA bacteremia, s/p OR 2/24.

## 2023-02-26 NOTE — PROGRESS NOTE ADULT - ASSESSMENT
52M s/p  right foot 1st met amputation and right ankle I&D (DOS 2/24/23)  - Pt seen and evaluated.  - Afebrile, no leukocytosis, H/H 6.4/20  - s/p right foot 1st met amputation and right ankle I&D (DOS 2/24/23): distal sutures intact, flaps warm and viable, proximal tib ant tendon exposed, proximal wound bed dusky with mild TA tendon dessication, scant sanguinous drainage, proximal tracking 2cm.   - Right foot wound culture growing MSSA, strep anginosus, Peptostreptococcus  - R ankle MR: OM of 1st met, bases of 2nd and 3rd met, all cuneiforms and likely cuboid  - s/p LLE angio w 3 vessel r/o  - Per intraop findings, high concern for residual infection, low concern for viability   - OR clean bone culture no growth prelim   - Discussed with patient the importance of changing the dressings daily and monitoring the wound for ischemic changes or infectious process however patient at this time is refusing dressing changes and would like the dressings to be changed with his surgeon Dr Lott. Also discussed the importance of getting blood transfusion to aid in wound healing but patient is not amenable at this time.   - Pod plan for return to OR for possible delayed primary closure   - Please document medical optimization for R foot surgery under anesthesia   - Seen with attending

## 2023-02-26 NOTE — PROGRESS NOTE ADULT - SUBJECTIVE AND OBJECTIVE BOX
Chico Torres MD  Division of Hospital Medicine  Available via MS teams  If no response or off hours page: [phone]  ---------------------------------------------------------    TIKI HOUSTON  52y  Male      Patient is a 52y old  Male who presents with a chief complaint of r foot pain/tenderness, warmth, swelling (26 Feb 2023 09:47)      INTERVAL HPI/OVERNIGHT EVENTS:  Seen at bedside. Pain better controlled on regimen bringing pain down to 5/10. Hgb low again today advised transfusion       REVIEW OF SYSTEMS: 10 point ROS negative unless listed above    T(C): 36.7 (02-26-23 @ 11:23), Max: 37.1 (02-25-23 @ 12:36)  HR: 73 (02-26-23 @ 11:23) (71 - 85)  BP: 114/70 (02-26-23 @ 11:23) (107/58 - 132/66)  RR: 18 (02-26-23 @ 11:23) (18 - 18)  SpO2: 96% (02-26-23 @ 11:23) (93% - 98%)  Wt(kg): --Vital Signs Last 24 Hrs  T(C): 36.7 (26 Feb 2023 11:23), Max: 37.1 (25 Feb 2023 12:36)  T(F): 98 (26 Feb 2023 11:23), Max: 98.7 (25 Feb 2023 12:36)  HR: 73 (26 Feb 2023 11:23) (71 - 85)  BP: 114/70 (26 Feb 2023 11:23) (107/58 - 132/66)  BP(mean): --  RR: 18 (26 Feb 2023 11:23) (18 - 18)  SpO2: 96% (26 Feb 2023 11:23) (93% - 98%)    Parameters below as of 26 Feb 2023 11:23  Patient On (Oxygen Delivery Method): room air        PHYSICAL EXAM:  GENERAL: NAD, well-groomed, well-developed  NECK: Supple, No JVD  CHEST/LUNG: Clear to auscultation bilaterally; No rales, rhonchi, wheezing, or rubs  HEART: Regular rate and rhythm; No murmurs, rubs, or gallops   EXTREMITIES:  RLE wrapped      Consultant(s) Notes Reviewed:  [x ] YES  [ ] NO  Care Discussed with Consultants/Other Providers [ x] YES  [ ] NO    LABS:                        6.2    6.51  )-----------( 189      ( 26 Feb 2023 08:59 )             19.6     02-26    132<L>  |  96  |  14  ----------------------------<  237<H>  4.1   |  26  |  1.17    Ca    8.5      26 Feb 2023 08:59          CAPILLARY BLOOD GLUCOSE      POCT Blood Glucose.: 280 mg/dL (26 Feb 2023 08:11)  POCT Blood Glucose.: 215 mg/dL (25 Feb 2023 21:25)  POCT Blood Glucose.: 187 mg/dL (25 Feb 2023 12:33)            RADIOLOGY & ADDITIONAL TESTS:    Imaging Personally Reviewed:  [ ] YES  [ ] NO

## 2023-02-26 NOTE — PROGRESS NOTE ADULT - SUBJECTIVE AND OBJECTIVE BOX
Diabetes Follow up note:    Chief complaint: T2DM    Interval Hx: BG values 90-280s over past 24 hours. Pt seen at bedside while receiving blood transfusion. Reports appetite fluctuating, ate small amount of breakfast but more of lunch today.     Review of Systems:  General: + pain  GI: Tolerating POs. Denies N/V/D/Abd pain  CV: Denies CP/SOB  ENDO: No S&Sx of hypoglycemia    MEDS:  atorvastatin 80 milliGRAM(s) Oral at bedtime  insulin glargine Injectable (LANTUS) 40 Unit(s) SubCutaneous at bedtime  insulin lispro (ADMELOG) corrective regimen sliding scale   SubCutaneous three times a day before meals  insulin lispro (ADMELOG) corrective regimen sliding scale   SubCutaneous at bedtime  insulin lispro Injectable (ADMELOG) 14 Unit(s) SubCutaneous three times a day before meals    ceFAZolin   IVPB 2000 milliGRAM(s) IV Intermittent every 8 hours    Allergies    No Known Allergies        PE:  General: Male lying in bed. NAD.   Vital Signs Last 24 Hrs  T(C): 36.6 (26 Feb 2023 14:04), Max: 36.9 (26 Feb 2023 01:30)  T(F): 97.8 (26 Feb 2023 14:04), Max: 98.5 (26 Feb 2023 01:30)  HR: 70 (26 Feb 2023 14:04) (70 - 85)  BP: 99/65 (26 Feb 2023 14:04) (99/65 - 129/76)  BP(mean): --  RR: 18 (26 Feb 2023 14:04) (18 - 18)  SpO2: 95% (26 Feb 2023 14:04) (93% - 98%)    Parameters below as of 26 Feb 2023 14:04  Patient On (Oxygen Delivery Method): room air      Abd: Soft, NT,ND,   Extremities: Warm. R foot dsg c/d/i  Neuro: A&O X3    LABS:  POCT Blood Glucose.: 99 mg/dL (02-26-23 @ 12:13)  POCT Blood Glucose.: 280 mg/dL (02-26-23 @ 08:11)  POCT Blood Glucose.: 215 mg/dL (02-25-23 @ 21:25)  POCT Blood Glucose.: 187 mg/dL (02-25-23 @ 12:33)  POCT Blood Glucose.: 230 mg/dL (02-25-23 @ 09:47)  POCT Blood Glucose.: 240 mg/dL (02-25-23 @ 08:23)  POCT Blood Glucose.: 299 mg/dL (02-24-23 @ 23:59)  POCT Blood Glucose.: 319 mg/dL (02-24-23 @ 21:48)  POCT Blood Glucose.: 345 mg/dL (02-24-23 @ 17:50)  POCT Blood Glucose.: 243 mg/dL (02-24-23 @ 12:54)  POCT Blood Glucose.: 160 mg/dL (02-24-23 @ 00:41)                            6.2    6.51  )-----------( 189      ( 26 Feb 2023 08:59 )             19.6       02-26    132<L>  |  96  |  14  ----------------------------<  237<H>  4.1   |  26  |  1.17    eGFR: 76    Ca    8.5      02-26        Thyroid Function Tests:  02-16 @ 05:39 TSH 2.47 FreeT4 -- T3 -- Anti TPO -- Anti Thyroglobulin Ab -- TSI --      A1C with Estimated Average Glucose Result: 10.8 % (02-17-23 @ 07:07)  A1C with Estimated Average Glucose Result: 11.2 % (02-16-23 @ 05:39)  A1C with Estimated Average Glucose Result: 11.7 % (06-22-22 @ 23:39)          Contact number: cameron [phone] or [phone]

## 2023-02-26 NOTE — PROGRESS NOTE ADULT - ASSESSMENT
51 y/o M w/ hx of uncontrolled Type 2 DM w/ hyperglycemia complicated by neuropathy, amputations, retinopathy and CAD, HTN and HLD admitted for sepsis 2/2 right foot infection (high risk patient with severely uncontrolled diabetes with A1c of 11.2% at high risk of CAD and CVA with high level decision-making). s/p podiatry partial ray resection with I&D 2/24 BG values variable in setting of erratic PO intake. BG goal (100-180mg/dl).

## 2023-02-27 LAB
ANION GAP SERPL CALC-SCNC: 9 MMOL/L — SIGNIFICANT CHANGE UP (ref 5–17)
BUN SERPL-MCNC: 14 MG/DL — SIGNIFICANT CHANGE UP (ref 7–23)
CALCIUM SERPL-MCNC: 8.6 MG/DL — SIGNIFICANT CHANGE UP (ref 8.4–10.5)
CHLORIDE SERPL-SCNC: 97 MMOL/L — SIGNIFICANT CHANGE UP (ref 96–108)
CO2 SERPL-SCNC: 27 MMOL/L — SIGNIFICANT CHANGE UP (ref 22–31)
CREAT SERPL-MCNC: 1.14 MG/DL — SIGNIFICANT CHANGE UP (ref 0.5–1.3)
EGFR: 78 ML/MIN/1.73M2 — SIGNIFICANT CHANGE UP
GLUCOSE BLDC GLUCOMTR-MCNC: 118 MG/DL — HIGH (ref 70–99)
GLUCOSE BLDC GLUCOMTR-MCNC: 123 MG/DL — HIGH (ref 70–99)
GLUCOSE BLDC GLUCOMTR-MCNC: 183 MG/DL — HIGH (ref 70–99)
GLUCOSE BLDC GLUCOMTR-MCNC: 258 MG/DL — HIGH (ref 70–99)
GLUCOSE SERPL-MCNC: 242 MG/DL — HIGH (ref 70–99)
HCT VFR BLD CALC: 20.7 % — CRITICAL LOW (ref 39–50)
HCT VFR BLD CALC: 23.8 % — LOW (ref 39–50)
HGB BLD-MCNC: 6.7 G/DL — CRITICAL LOW (ref 13–17)
HGB BLD-MCNC: 7.8 G/DL — LOW (ref 13–17)
MCHC RBC-ENTMCNC: 29.3 PG — SIGNIFICANT CHANGE UP (ref 27–34)
MCHC RBC-ENTMCNC: 29.8 PG — SIGNIFICANT CHANGE UP (ref 27–34)
MCHC RBC-ENTMCNC: 32.4 GM/DL — SIGNIFICANT CHANGE UP (ref 32–36)
MCHC RBC-ENTMCNC: 32.8 GM/DL — SIGNIFICANT CHANGE UP (ref 32–36)
MCV RBC AUTO: 89.5 FL — SIGNIFICANT CHANGE UP (ref 80–100)
MCV RBC AUTO: 92 FL — SIGNIFICANT CHANGE UP (ref 80–100)
NRBC # BLD: 0 /100 WBCS — SIGNIFICANT CHANGE UP (ref 0–0)
NRBC # BLD: 0 /100 WBCS — SIGNIFICANT CHANGE UP (ref 0–0)
PLATELET # BLD AUTO: 205 K/UL — SIGNIFICANT CHANGE UP (ref 150–400)
PLATELET # BLD AUTO: 210 K/UL — SIGNIFICANT CHANGE UP (ref 150–400)
POTASSIUM SERPL-MCNC: 4.5 MMOL/L — SIGNIFICANT CHANGE UP (ref 3.5–5.3)
POTASSIUM SERPL-SCNC: 4.5 MMOL/L — SIGNIFICANT CHANGE UP (ref 3.5–5.3)
RBC # BLD: 2.25 M/UL — LOW (ref 4.2–5.8)
RBC # BLD: 2.66 M/UL — LOW (ref 4.2–5.8)
RBC # FLD: 13.2 % — SIGNIFICANT CHANGE UP (ref 10.3–14.5)
RBC # FLD: 13.2 % — SIGNIFICANT CHANGE UP (ref 10.3–14.5)
SARS-COV-2 RNA SPEC QL NAA+PROBE: SIGNIFICANT CHANGE UP
SODIUM SERPL-SCNC: 133 MMOL/L — LOW (ref 135–145)
WBC # BLD: 6.31 K/UL — SIGNIFICANT CHANGE UP (ref 3.8–10.5)
WBC # BLD: 6.75 K/UL — SIGNIFICANT CHANGE UP (ref 3.8–10.5)
WBC # FLD AUTO: 6.31 K/UL — SIGNIFICANT CHANGE UP (ref 3.8–10.5)
WBC # FLD AUTO: 6.75 K/UL — SIGNIFICANT CHANGE UP (ref 3.8–10.5)

## 2023-02-27 PROCEDURE — 99232 SBSQ HOSP IP/OBS MODERATE 35: CPT

## 2023-02-27 PROCEDURE — 99221 1ST HOSP IP/OBS SF/LOW 40: CPT

## 2023-02-27 PROCEDURE — 99233 SBSQ HOSP IP/OBS HIGH 50: CPT

## 2023-02-27 RX ORDER — METRONIDAZOLE 500 MG
500 TABLET ORAL
Refills: 0 | Status: DISCONTINUED | OUTPATIENT
Start: 2023-02-27 | End: 2023-03-01

## 2023-02-27 RX ORDER — INSULIN GLARGINE 100 [IU]/ML
48 INJECTION, SOLUTION SUBCUTANEOUS AT BEDTIME
Refills: 0 | Status: DISCONTINUED | OUTPATIENT
Start: 2023-02-27 | End: 2023-03-02

## 2023-02-27 RX ADMIN — HYDROMORPHONE HYDROCHLORIDE 1.5 MILLIGRAM(S): 2 INJECTION INTRAMUSCULAR; INTRAVENOUS; SUBCUTANEOUS at 10:15

## 2023-02-27 RX ADMIN — HYDROMORPHONE HYDROCHLORIDE 0.5 MILLIGRAM(S): 2 INJECTION INTRAMUSCULAR; INTRAVENOUS; SUBCUTANEOUS at 08:39

## 2023-02-27 RX ADMIN — Medication 14 UNIT(S): at 18:39

## 2023-02-27 RX ADMIN — Medication 6: at 09:11

## 2023-02-27 RX ADMIN — Medication 100 MILLIGRAM(S): at 13:44

## 2023-02-27 RX ADMIN — HYDROMORPHONE HYDROCHLORIDE 0.5 MILLIGRAM(S): 2 INJECTION INTRAMUSCULAR; INTRAVENOUS; SUBCUTANEOUS at 08:09

## 2023-02-27 RX ADMIN — Medication 81 MILLIGRAM(S): at 12:31

## 2023-02-27 RX ADMIN — Medication 500 MILLIGRAM(S): at 16:04

## 2023-02-27 RX ADMIN — HYDROMORPHONE HYDROCHLORIDE 0.5 MILLIGRAM(S): 2 INJECTION INTRAMUSCULAR; INTRAVENOUS; SUBCUTANEOUS at 20:13

## 2023-02-27 RX ADMIN — HYDROMORPHONE HYDROCHLORIDE 1.5 MILLIGRAM(S): 2 INJECTION INTRAMUSCULAR; INTRAVENOUS; SUBCUTANEOUS at 01:34

## 2023-02-27 RX ADMIN — HYDROMORPHONE HYDROCHLORIDE 1.5 MILLIGRAM(S): 2 INJECTION INTRAMUSCULAR; INTRAVENOUS; SUBCUTANEOUS at 06:13

## 2023-02-27 RX ADMIN — SERTRALINE 50 MILLIGRAM(S): 25 TABLET, FILM COATED ORAL at 12:30

## 2023-02-27 RX ADMIN — HYDROMORPHONE HYDROCHLORIDE 1.5 MILLIGRAM(S): 2 INJECTION INTRAMUSCULAR; INTRAVENOUS; SUBCUTANEOUS at 18:33

## 2023-02-27 RX ADMIN — ATORVASTATIN CALCIUM 80 MILLIGRAM(S): 80 TABLET, FILM COATED ORAL at 21:16

## 2023-02-27 RX ADMIN — Medication 14 UNIT(S): at 09:13

## 2023-02-27 RX ADMIN — HYDROMORPHONE HYDROCHLORIDE 1.5 MILLIGRAM(S): 2 INJECTION INTRAMUSCULAR; INTRAVENOUS; SUBCUTANEOUS at 06:45

## 2023-02-27 RX ADMIN — Medication 14 UNIT(S): at 14:00

## 2023-02-27 RX ADMIN — HYDROMORPHONE HYDROCHLORIDE 1.5 MILLIGRAM(S): 2 INJECTION INTRAMUSCULAR; INTRAVENOUS; SUBCUTANEOUS at 19:03

## 2023-02-27 RX ADMIN — HYDROMORPHONE HYDROCHLORIDE 1.5 MILLIGRAM(S): 2 INJECTION INTRAMUSCULAR; INTRAVENOUS; SUBCUTANEOUS at 22:39

## 2023-02-27 RX ADMIN — HYDROMORPHONE HYDROCHLORIDE 1.5 MILLIGRAM(S): 2 INJECTION INTRAMUSCULAR; INTRAVENOUS; SUBCUTANEOUS at 10:45

## 2023-02-27 RX ADMIN — HYDROMORPHONE HYDROCHLORIDE 0.5 MILLIGRAM(S): 2 INJECTION INTRAMUSCULAR; INTRAVENOUS; SUBCUTANEOUS at 16:00

## 2023-02-27 RX ADMIN — HYDROMORPHONE HYDROCHLORIDE 1.5 MILLIGRAM(S): 2 INJECTION INTRAMUSCULAR; INTRAVENOUS; SUBCUTANEOUS at 14:50

## 2023-02-27 RX ADMIN — Medication 1 TABLET(S): at 18:34

## 2023-02-27 RX ADMIN — INSULIN GLARGINE 48 UNIT(S): 100 INJECTION, SOLUTION SUBCUTANEOUS at 21:17

## 2023-02-27 RX ADMIN — Medication 100 MILLIGRAM(S): at 21:17

## 2023-02-27 RX ADMIN — Medication 25 MILLIGRAM(S): at 09:19

## 2023-02-27 RX ADMIN — Medication 2: at 18:38

## 2023-02-27 RX ADMIN — HYDROMORPHONE HYDROCHLORIDE 0.5 MILLIGRAM(S): 2 INJECTION INTRAMUSCULAR; INTRAVENOUS; SUBCUTANEOUS at 12:26

## 2023-02-27 RX ADMIN — Medication 100 MILLIGRAM(S): at 06:13

## 2023-02-27 RX ADMIN — HYDROMORPHONE HYDROCHLORIDE 1.5 MILLIGRAM(S): 2 INJECTION INTRAMUSCULAR; INTRAVENOUS; SUBCUTANEOUS at 14:20

## 2023-02-27 RX ADMIN — Medication 1 TABLET(S): at 09:18

## 2023-02-27 RX ADMIN — HYDROMORPHONE HYDROCHLORIDE 0.5 MILLIGRAM(S): 2 INJECTION INTRAMUSCULAR; INTRAVENOUS; SUBCUTANEOUS at 16:30

## 2023-02-27 RX ADMIN — HYDROMORPHONE HYDROCHLORIDE 0.5 MILLIGRAM(S): 2 INJECTION INTRAMUSCULAR; INTRAVENOUS; SUBCUTANEOUS at 12:56

## 2023-02-27 RX ADMIN — HYDROMORPHONE HYDROCHLORIDE 1.5 MILLIGRAM(S): 2 INJECTION INTRAMUSCULAR; INTRAVENOUS; SUBCUTANEOUS at 02:10

## 2023-02-27 NOTE — PROGRESS NOTE ADULT - SUBJECTIVE AND OBJECTIVE BOX
Follow Up:  bacteremia    Interval History/ROS:  Interval events noted: Patient to the OR on 2/24/2023 for I&D of the ankle and resection of metatarsal bone.  Following OR, high concern for residual infection plan for further surgery.  Pathology and cultures obtained.  OR cultures with no growth to date.  Pathology report pending.  Patient required transfusion following procedure.    Patient otherwise remains afebrile.  Otherwise hemodynamically stable on room air.  Refusing echocardiogram.    Patient seen examined at bedside.  Denies any new pain or discomfort.    Allergies  No Known Allergies    ANTIMICROBIALS:  ceFAZolin   IVPB 2000 every 8 hours      OTHER MEDS:  MEDICATIONS  (STANDING):  aluminum hydroxide/magnesium hydroxide/simethicone Suspension 30 every 4 hours PRN  aspirin enteric coated 81 daily  atorvastatin 80 at bedtime  bisacodyl 5 daily PRN  glucagon  Injectable 1 once  HYDROmorphone  Injectable 1.5 every 4 hours PRN  HYDROmorphone  Injectable 0.5 every 2 hours PRN  influenza   Vaccine 0.5 once  insulin glargine Injectable (LANTUS) 48 at bedtime  insulin lispro (ADMELOG) corrective regimen sliding scale  three times a day before meals  insulin lispro (ADMELOG) corrective regimen sliding scale  at bedtime  insulin lispro Injectable (ADMELOG) 14 three times a day before meals  melatonin 3 at bedtime PRN  metoprolol succinate ER 25 daily  ondansetron Injectable 4 every 8 hours PRN  oxyCODONE    IR 5 every 6 hours PRN  senna 2 at bedtime PRN  sertraline 50 daily      Vital Signs Last 24 Hrs  T(C): 36.7 (27 Feb 2023 05:08), Max: 36.7 (27 Feb 2023 05:08)  T(F): 98 (27 Feb 2023 05:08), Max: 98 (27 Feb 2023 05:08)  HR: 69 (27 Feb 2023 05:08) (69 - 75)  BP: 123/68 (27 Feb 2023 06:16) (99/65 - 138/76)  BP(mean): --  RR: 18 (27 Feb 2023 05:08) (18 - 18)  SpO2: 96% (27 Feb 2023 05:08) (95% - 96%)    Parameters below as of 27 Feb 2023 05:08  Patient On (Oxygen Delivery Method): room air        PHYSICAL EXAM:  Constitutional: comfortable  Cardiovascular:   normal S1, S2, no murmur, no edema  Respiratory:  no resp distress  GI:  soft, non-tender,   Neurologic: awake and alert, normal strength, no focal findings  Skin:  foot wound bandaged- refusing exam however area continues to appear erythematous               6.7    6.75  )-----------( 205      ( 27 Feb 2023 08:30 )             20.7       02-27    133<L>  |  97  |  14  ----------------------------<  242<H>  4.5   |  27  |  1.14    Ca    8.6      27 Feb 2023 08:28    MICROBIOLOGY:  v    Culture - Fungal, Other (collected 24 Feb 2023 15:13)  Source: .Other Other  Preliminary Report (27 Feb 2023 07:48):    Testing in progress    Culture - Surgical Swab (collected 24 Feb 2023 15:09)  Source: .Surgical Swab Surgical Swab  Preliminary Report (25 Feb 2023 16:18):    No growth    Culture - Fungal, Tissue (collected 24 Feb 2023 15:09)  Source: .Tissue Other  Preliminary Report (27 Feb 2023 07:49):    Testing in progress    Culture - Tissue with Gram Stain (collected 24 Feb 2023 15:09)  Source: .Tissue Other  Gram Stain (24 Feb 2023 22:12):    No polymorphonuclear cells seen    No organisms seen  Preliminary Report (25 Feb 2023 16:53):    No growth

## 2023-02-27 NOTE — PROGRESS NOTE ADULT - ASSESSMENT
52M s/p  right foot 1st met amputation and right ankle I&D (DOS 2/24/23)  - Pt seen and evaluated.  - Afebrile, no leukocytosis, H/H 6.4/20  - s/p right foot 1st met amputation and right ankle I&D (DOS 2/24/23): distal sutures intact, flaps warm and viable, proximal tib ant tendon exposed, proximal wound bed dusky with mild TA tendon dessication, scant sanguinous drainage, proximal tracking 2cm.   - Right foot wound culture growing MSSA, strep anginosus, Peptostreptococcus  - R ankle MR: OM of 1st met, bases of 2nd and 3rd met, all cuneiforms and likely cuboid  - s/p LLE angio w 3 vessel r/o  - Per intraop findings, high concern for residual infection, low concern for viability   - OR clean bone culture no growth prelim   - Discussed with pt for Pod surgical plan and the importance of surgical debridement and skin substitute for wound healing and closure, but patient is not amenable at this time.   - Surgery plan tomorrow cancelled   - Pod plan local wound care  - Discussed with attending

## 2023-02-27 NOTE — PROVIDER CONTACT NOTE (CRITICAL VALUE NOTIFICATION) - SITUATION
Pt.  tissue culture from 2/16/2023. Pt. result Polymorphonuclear Leukocytes Gram negative rods Moderate gram variable cocci
2/15 positive blood culture resulted: growth in aerobic gram positive cocci in clusters
hemoglobin 6.7, hematocrit 20.7

## 2023-02-27 NOTE — PROVIDER CONTACT NOTE (CRITICAL VALUE NOTIFICATION) - BACKGROUND
2/15 pt to [facility] w L foot pain, 2/24 pt to OR for I&D of right ankle bursa w/ resection of metatarsal bone.

## 2023-02-27 NOTE — PROGRESS NOTE ADULT - SUBJECTIVE AND OBJECTIVE BOX
Diabetes Follow up note:    Chief complaint: T2DM    Interval Hx: Fasting glucose in mid 200s this AM. Pt seen at bedside. Reports getting hungry and snacking on crackers during the night. Feels we are not giving him enough insulin at night. Reports tolerating POs.     Review of Systems:  General: + foot pain  GI: Tolerating POs. Denies N/V/D/Abd pain  CV: Denies CP/SOB  ENDO: No S&Sx of hypoglycemia    MEDS:  atorvastatin 80 milliGRAM(s) Oral at bedtime  insulin glargine Injectable (LANTUS) 48 Unit(s) SubCutaneous at bedtime  insulin lispro (ADMELOG) corrective regimen sliding scale   SubCutaneous three times a day before meals  insulin lispro (ADMELOG) corrective regimen sliding scale   SubCutaneous at bedtime  insulin lispro Injectable (ADMELOG) 14 Unit(s) SubCutaneous three times a day before meals    ceFAZolin   IVPB 2000 milliGRAM(s) IV Intermittent every 8 hours    Allergies    No Known Allergies        PE:  General: Male lying in bed. NAD.   Vital Signs Last 24 Hrs  T(C): 36.7 (27 Feb 2023 05:08), Max: 36.7 (26 Feb 2023 11:23)  T(F): 98 (27 Feb 2023 05:08), Max: 98 (26 Feb 2023 11:23)  HR: 69 (27 Feb 2023 05:08) (69 - 75)  BP: 123/68 (27 Feb 2023 06:16) (99/65 - 138/76)  BP(mean): --  RR: 18 (27 Feb 2023 05:08) (18 - 18)  SpO2: 96% (27 Feb 2023 05:08) (95% - 96%)    Parameters below as of 27 Feb 2023 05:08  Patient On (Oxygen Delivery Method): room air      Abd: Soft, NT,ND,   Extremities: Warm R foot dsg c/d/i  Neuro: A&O X3    LABS:  POCT Blood Glucose.: 258 mg/dL (02-27-23 @ 09:03)  POCT Blood Glucose.: 185 mg/dL (02-26-23 @ 21:55)  POCT Blood Glucose.: 155 mg/dL (02-26-23 @ 17:03)  POCT Blood Glucose.: 99 mg/dL (02-26-23 @ 12:13)  POCT Blood Glucose.: 280 mg/dL (02-26-23 @ 08:11)  POCT Blood Glucose.: 215 mg/dL (02-25-23 @ 21:25)  POCT Blood Glucose.: 187 mg/dL (02-25-23 @ 12:33)  POCT Blood Glucose.: 230 mg/dL (02-25-23 @ 09:47)  POCT Blood Glucose.: 240 mg/dL (02-25-23 @ 08:23)  POCT Blood Glucose.: 299 mg/dL (02-24-23 @ 23:59)  POCT Blood Glucose.: 319 mg/dL (02-24-23 @ 21:48)  POCT Blood Glucose.: 345 mg/dL (02-24-23 @ 17:50)  POCT Blood Glucose.: 243 mg/dL (02-24-23 @ 12:54)                            6.7    6.75  )-----------( 205      ( 27 Feb 2023 08:30 )             20.7       02-27    133<L>  |  97  |  14  ----------------------------<  242<H>  4.5   |  27  |  1.14    eGFR: 78    Ca    8.6      02-27        Thyroid Function Tests:  02-16 @ 05:39 TSH 2.47 FreeT4 -- T3 -- Anti TPO -- Anti Thyroglobulin Ab -- TSI --      A1C with Estimated Average Glucose Result: 10.8 % (02-17-23 @ 07:07)  A1C with Estimated Average Glucose Result: 11.2 % (02-16-23 @ 05:39)  A1C with Estimated Average Glucose Result: 11.7 % (06-22-22 @ 23:39)          Contact number: beeradha [phone] or [phone]

## 2023-02-27 NOTE — PROGRESS NOTE ADULT - PROBLEM SELECTOR PLAN 3
Recommendation: statin therapy      Can be reached via TEAMS/pager: [phone]   office:  [phone] (M-F 9a-5pm)               [phone] (nights/weekends)   Can access Amion coverage via sunrise/tools

## 2023-02-27 NOTE — BH CONSULTATION LIAISON ASSESSMENT NOTE - CURRENT MEDICATION
MEDICATIONS  (STANDING):  aspirin enteric coated 81 milliGRAM(s) Oral daily  atorvastatin 80 milliGRAM(s) Oral at bedtime  ceFAZolin   IVPB 2000 milliGRAM(s) IV Intermittent every 8 hours  glucagon  Injectable 1 milliGRAM(s) IntraMuscular once  influenza   Vaccine 0.5 milliLiter(s) IntraMuscular once  insulin glargine Injectable (LANTUS) 48 Unit(s) SubCutaneous at bedtime  insulin lispro (ADMELOG) corrective regimen sliding scale   SubCutaneous three times a day before meals  insulin lispro (ADMELOG) corrective regimen sliding scale   SubCutaneous at bedtime  insulin lispro Injectable (ADMELOG) 14 Unit(s) SubCutaneous three times a day before meals  lactobacillus acidophilus 1 Tablet(s) Oral two times a day with meals  metoprolol succinate ER 25 milliGRAM(s) Oral daily  metroNIDAZOLE    Tablet 500 milliGRAM(s) Oral two times a day  naloxone Injectable 0.4 milliGRAM(s) IV Push once  sertraline 50 milliGRAM(s) Oral daily    MEDICATIONS  (PRN):  aluminum hydroxide/magnesium hydroxide/simethicone Suspension 30 milliLiter(s) Oral every 4 hours PRN Dyspepsia  bisacodyl 5 milliGRAM(s) Oral daily PRN Constipation  HYDROmorphone  Injectable 1.5 milliGRAM(s) IV Push every 4 hours PRN Severe Pain (7 - 10)  HYDROmorphone  Injectable 0.5 milliGRAM(s) IV Push every 2 hours PRN Breakthrough pain  melatonin 3 milliGRAM(s) Oral at bedtime PRN Insomnia  ondansetron Injectable 4 milliGRAM(s) IV Push every 8 hours PRN Nausea and/or Vomiting  oxyCODONE    IR 5 milliGRAM(s) Oral every 6 hours PRN Moderate Pain (4 - 6)  senna 2 Tablet(s) Oral at bedtime PRN Constipation

## 2023-02-27 NOTE — BH CONSULTATION LIAISON ASSESSMENT NOTE - NSSUICPROTFACT_PSY_ALL_CORE
Responsibility to children, family, or others/Identifies reasons for living/Ability to cope with stress

## 2023-02-27 NOTE — PROVIDER CONTACT NOTE (CRITICAL VALUE NOTIFICATION) - ACTION/TREATMENT ORDERED:
Repeat blood culture in AM. Continue IV Zosyn and vanco
Provider aware.
ACP Down made aware. Pt. is currently on Vancomycin and  Zosyn. Safety maintained

## 2023-02-27 NOTE — PROGRESS NOTE ADULT - ASSESSMENT
51 yo male with PMH of CAD s/p PCI with stent, HTN, HLD, uncontrolled IDDM2, anxiety and depression who presents from outpatient podiatry office with worsening R foot pain found to be in severe sepsis 2/2 cellulitis and osteomyelitis with CT of R ankle concerning for possible septic arthritis and gas-forming/necrotizing infection with course c/b MSSA bacteremia, s/p OR 2/24.

## 2023-02-27 NOTE — PROGRESS NOTE ADULT - PROBLEM SELECTOR PLAN 1
-test BG AC/HS  -Increase Lantus 48 units QHS  -c/w Admelog 14 units AC meals  -c/w Admelog moderate correction scale AC and mod HS scale  -cons carb diet  Outpt. optho, podiatry, nephrology  Plan to d/c on basal bolus +metformin and GLP-1.  F/u with  Dr Jenifer London 5/3. Can schedule sooner w/NP if needed

## 2023-02-27 NOTE — BH CONSULTATION LIAISON ASSESSMENT NOTE - NSBHCHARTREVIEWVS_PSY_A_CORE FT
Vital Signs Last 24 Hrs  T(C): 36.7 (27 Feb 2023 05:08), Max: 36.7 (27 Feb 2023 05:08)  T(F): 98 (27 Feb 2023 05:08), Max: 98 (27 Feb 2023 05:08)  HR: 69 (27 Feb 2023 05:08) (69 - 75)  BP: 123/68 (27 Feb 2023 06:16) (108/75 - 138/76)  BP(mean): --  RR: 18 (27 Feb 2023 05:08) (18 - 18)  SpO2: 96% (27 Feb 2023 05:08) (96% - 96%)    Parameters below as of 27 Feb 2023 05:08  Patient On (Oxygen Delivery Method): room air

## 2023-02-27 NOTE — PROGRESS NOTE ADULT - PROBLEM SELECTOR PLAN 8
s/p pci w stents  -  DAPT was not resumed by surgical team when placing orders post op. For now will resume aspirin give stents were placed in 6/2022. Hold plavix for now pending repeat CBC   - statin  - c/w Toprol XL 25mg daily.

## 2023-02-27 NOTE — PROGRESS NOTE ADULT - ASSESSMENT
51 yo male with PMH of CAD s/p PCI with stent, HTN, HLD, uncontrolled IDDM2, anxiety and depression who presents from outpatient podiatry office with worsening R foot pain, likely septic toe.    Recs:  - S/p angio 2/23 and s/p pods procedure on 2/24  - Good intraop bleeding, viable foot  - No further vascular intervention at this time   - Pods plan to RTOR for delayed primary closure  - care per primary team  - Please reconsult with any further vascular surgical questions     Vascular Surgery  [id number]

## 2023-02-27 NOTE — BH CONSULTATION LIAISON ASSESSMENT NOTE - RISK ASSESSMENT
low, denies si/hi, no hx of self injurious behaviors or suicide attempts. pt motivated to get better.

## 2023-02-27 NOTE — BH CONSULTATION LIAISON ASSESSMENT NOTE - SUMMARY
Pt is a 53 y/o DWM with hx of vascular disease including CAD s/p PCI with stent, HTN, HLD, uncontrolled IDDM2, presents with worsening R foot pain found to be in severe sepsis 2/2 cellulitis and osteomyelitis with CT of R ankle concerning for possible septic arthritis and gas-forming/necrotizing infection requiring debridement. Psych called for capacity to refuse Echo as pt has been refusing this procedure to rule out endocarditis.   pt seen, AOA x 3, reports hx of mild depression/anxiety, on zoloft 50 mg daily for many years since his divorce, denies current depression/anxiety, no si/hi, and sleep and appetite fair. no hx of manic or psychotic symptoms, denies substance abuse problems. pt denies panic attacks, denies feeling hopeless and helpless. denies hx of self injurious behaviors or suicide attempts. remains compliant with zoloft 50 mg daily.

## 2023-02-27 NOTE — PROGRESS NOTE ADULT - PROBLEM SELECTOR PLAN 6
: lactic acidosis 3.3->1.8 (resolved),beta hydroxy negative  - stress hyperglycemia iso severe sepsis and poorly controlled dm  - resolved.

## 2023-02-27 NOTE — PROGRESS NOTE ADULT - SUBJECTIVE AND OBJECTIVE BOX
Patient is a 52y old  Male who presents with a chief complaint of r foot pain/tenderness, warmth, swelling (27 Feb 2023 14:25)       INTERVAL HPI/OVERNIGHT EVENTS:  Patient seen and evaluated at bedside.  Pt is resting comfortable in NAD. Denies N/V/F/C.    Allergies    No Known Allergies    Intolerances        Vital Signs Last 24 Hrs  T(C): 36.7 (27 Feb 2023 05:08), Max: 36.7 (27 Feb 2023 05:08)  T(F): 98 (27 Feb 2023 05:08), Max: 98 (27 Feb 2023 05:08)  HR: 69 (27 Feb 2023 05:08) (69 - 75)  BP: 123/68 (27 Feb 2023 06:16) (108/75 - 138/76)  BP(mean): --  RR: 18 (27 Feb 2023 05:08) (18 - 18)  SpO2: 96% (27 Feb 2023 05:08) (96% - 96%)    Parameters below as of 27 Feb 2023 05:08  Patient On (Oxygen Delivery Method): room air        LABS:                        6.7    6.75  )-----------( 205      ( 27 Feb 2023 08:30 )             20.7     02-27    133<L>  |  97  |  14  ----------------------------<  242<H>  4.5   |  27  |  1.14    Ca    8.6      27 Feb 2023 08:28          CAPILLARY BLOOD GLUCOSE      POCT Blood Glucose.: 118 mg/dL (27 Feb 2023 13:37)  POCT Blood Glucose.: 258 mg/dL (27 Feb 2023 09:03)  POCT Blood Glucose.: 185 mg/dL (26 Feb 2023 21:55)  POCT Blood Glucose.: 155 mg/dL (26 Feb 2023 17:03)      Lower Extremity Physical Exam:    Vascular: DP/PT 0/4, B/L, CFT <3 seconds B/L, Temperature gradient warm to cool, B/L.   Neuro: Epicritic sensation diminished to the level of toes, B/L.  Musculoskeletal/Ortho: s/p R foot TMA, Right ankle no pain with active or passive dorsiflexion/plantarflexion/inversion/eversion, pain on palpation localized to the medial and lateral malleoli.  Skin: s/p right foot 1st met amputation and right ankle I&D (DOS 2/24/23): distal sutures intact, flaps warm and viable, proximal tib ant tendon exposed, proximal wound bed dusky with mild TA tendon dessication, scant sanguinous drainage, proximal tracking 2cm.     RADIOLOGY & ADDITIONAL TESTS:

## 2023-02-27 NOTE — PROGRESS NOTE ADULT - SUBJECTIVE AND OBJECTIVE BOX
TEAM VASCULAR Surgery Daily Progress Note  =====================================================    SUBJECTIVE: Patient seen and examined at the bedside.  No complaints of groin or leg pain, recovering from pods procedure.     --------------------------------------------------------------------------------------  OBJECTIVE:  Vital Signs Last 24 Hrs  T(C): 36.7 (27 Feb 2023 05:08), Max: 36.8 (26 Feb 2023 10:49)  T(F): 98 (27 Feb 2023 05:08), Max: 98.2 (26 Feb 2023 10:49)  HR: 69 (27 Feb 2023 05:08) (69 - 78)  BP: 123/68 (27 Feb 2023 06:16) (99/65 - 138/76)  BP(mean): --  RR: 18 (27 Feb 2023 05:08) (18 - 18)  SpO2: 96% (27 Feb 2023 05:08) (94% - 98%)    Parameters below as of 27 Feb 2023 05:08  Patient On (Oxygen Delivery Method): room air    I&O's Detail    26 Feb 2023 07:01  -  27 Feb 2023 07:00  --------------------------------------------------------  IN:    Oral Fluid: 1000 mL  Total IN: 1000 mL    OUT:    Voided (mL): 1100 mL  Total OUT: 1100 mL    Total NET: -100 mL  --------------------------------------------------------------------------------------    EXAM:  General- NAD, [id number]  Groin: access site soft, no hematoma or pulsatile mass   Extremities- R foot dressing c/d/i femoral pulses palpable   Lungs- unlabored breathing, room air     --------------------------------------------------------------------------------------    LABS:                        7.7    7.32  )-----------( 207      ( 26 Feb 2023 16:46 )             24.0   02-26    132<L>  |  96  |  14  ----------------------------<  237<H>  4.1   |  26  |  1.17    Ca    8.5      26 Feb 2023 08:59    --------------------------------------------------------------------------------------

## 2023-02-27 NOTE — BH CONSULTATION LIAISON ASSESSMENT NOTE - HPI (INCLUDE ILLNESS QUALITY, SEVERITY, DURATION, TIMING, CONTEXT, MODIFYING FACTORS, ASSOCIATED SIGNS AND SYMPTOMS)
Pt is a 51 y/o DWM with hx of vascular disease including CAD s/p PCI with stent, HTN, HLD, uncontrolled IDDM2, presents with worsening R foot pain found to be in severe sepsis 2/2 cellulitis and osteomyelitis with CT of R ankle concerning for possible septic arthritis and gas-forming/necrotizing infection requiring debridement. Psych called for capacity to refuse Echo as pt has been refusing this procedure to rule out endocarditis.   pt seen, AOA x 3, reports hx of mild depression/anxiety, on zoloft 50 mg daily for many years since his divorce, denies current depression/anxiety, no si/hi, and sleep and appetite fair. no hx of manic or psychotic symptoms, denies substance abuse problems. pt denies panic attacks, denies feeling hopeless and helpless. denies hx of self injurious behaviors or suicide attempts. remains compliant with zoloft 50 mg daily.

## 2023-02-27 NOTE — PROGRESS NOTE ADULT - PROBLEM SELECTOR PLAN 9
stable  - c/w toprol XL 25mg PO daily  - hold lisinopril given TEENA and marginal BP  - monitor vitals.

## 2023-02-27 NOTE — PROVIDER CONTACT NOTE (OTHER) - BACKGROUND
2/15 pt to [facility] w L foot pain, 2/24 pt to OR for I&D of right ankle bursa w/ resection of metatarsal bone.

## 2023-02-27 NOTE — PROGRESS NOTE ADULT - PROBLEM SELECTOR PLAN 2
2/15 blood cx +MSSA, recent bld cx 2/17 NGTD  - c/w ancef   - TTE pending to r/o vegetation/endocarditis, however patient is refusing adamantly  - discussed benefits and risks however continues to refuse  - since refusing several aspects of care, will obtain psych consult for capacity.

## 2023-02-27 NOTE — PROGRESS NOTE ADULT - ASSESSMENT
53 y/o M w/ hx of uncontrolled Type 2 DM w/ hyperglycemia complicated by neuropathy, amputations, retinopathy and CAD, HTN and HLD admitted for sepsis 2/2 right foot infection (high risk patient with severely uncontrolled diabetes with A1c of 11.2% at high risk of CAD and CVA with high level decision-making). s/p podiatry partial ray resection with I&D 2/24 BG values elevated in AM 2/2 eating overnight. Will increase basal.  BG goal (100-180mg/dl).

## 2023-02-27 NOTE — PROGRESS NOTE ADULT - SUBJECTIVE AND OBJECTIVE BOX
[facility] Division of Hospital Medicine  Russ Cotter MD  Available via MS Teams      SUBJECTIVE / OVERNIGHT EVENTS:  No acute events overnight. Patient adamantly refusing TTE. Refused to have LE to be examined.     ADDITIONAL REVIEW OF SYSTEMS:  REVIEW OF SYSTEMS:    CONSTITUTIONAL: No weakness, fevers or chills  EYES: no blurry vision or eye pain.   ENT: No throat pain. No dysphagia.    NECK: No pain or stiffness  RESPIRATORY: No cough, wheezing, hemoptysis; No shortness of breath  CARDIOVASCULAR: No chest pain or palpitations.  GASTROINTESTINAL: No abdominal pain. No nausea or vomiting; No diarrhea or constipation. No melena or hematochezia.  GENITOURINARY: No dysuria, frequency or hematuria  NEUROLOGICAL: No numbness or weakness. No dizziness or falls.   SKIN: No itching, burning, rashes, or lesions.   LYMPHATIC: No masses or swelling.   All other review of systems is negative unless indicated above.  MEDICATIONS  (STANDING):  aspirin enteric coated 81 milliGRAM(s) Oral daily  atorvastatin 80 milliGRAM(s) Oral at bedtime  ceFAZolin   IVPB 2000 milliGRAM(s) IV Intermittent every 8 hours  glucagon  Injectable 1 milliGRAM(s) IntraMuscular once  influenza   Vaccine 0.5 milliLiter(s) IntraMuscular once  insulin glargine Injectable (LANTUS) 48 Unit(s) SubCutaneous at bedtime  insulin lispro (ADMELOG) corrective regimen sliding scale   SubCutaneous three times a day before meals  insulin lispro (ADMELOG) corrective regimen sliding scale   SubCutaneous at bedtime  insulin lispro Injectable (ADMELOG) 14 Unit(s) SubCutaneous three times a day before meals  lactobacillus acidophilus 1 Tablet(s) Oral two times a day with meals  metoprolol succinate ER 25 milliGRAM(s) Oral daily  metroNIDAZOLE    Tablet 500 milliGRAM(s) Oral two times a day  naloxone Injectable 0.4 milliGRAM(s) IV Push once  sertraline 50 milliGRAM(s) Oral daily    MEDICATIONS  (PRN):  aluminum hydroxide/magnesium hydroxide/simethicone Suspension 30 milliLiter(s) Oral every 4 hours PRN Dyspepsia  bisacodyl 5 milliGRAM(s) Oral daily PRN Constipation  HYDROmorphone  Injectable 1.5 milliGRAM(s) IV Push every 4 hours PRN Severe Pain (7 - 10)  HYDROmorphone  Injectable 0.5 milliGRAM(s) IV Push every 2 hours PRN Breakthrough pain  melatonin 3 milliGRAM(s) Oral at bedtime PRN Insomnia  ondansetron Injectable 4 milliGRAM(s) IV Push every 8 hours PRN Nausea and/or Vomiting  oxyCODONE    IR 5 milliGRAM(s) Oral every 6 hours PRN Moderate Pain (4 - 6)  senna 2 Tablet(s) Oral at bedtime PRN Constipation      I&O's Summary    26 Feb 2023 07:01  -  27 Feb 2023 07:00  --------------------------------------------------------  IN: 1000 mL / OUT: 1100 mL / NET: -100 mL    27 Feb 2023 07:01  -  27 Feb 2023 14:25  --------------------------------------------------------  IN: 200 mL / OUT: 450 mL / NET: -250 mL        PHYSICAL EXAM:  Vital Signs Last 24 Hrs  T(C): 36.7 (27 Feb 2023 05:08), Max: 36.7 (27 Feb 2023 05:08)  T(F): 98 (27 Feb 2023 05:08), Max: 98 (27 Feb 2023 05:08)  HR: 69 (27 Feb 2023 05:08) (69 - 75)  BP: 123/68 (27 Feb 2023 06:16) (108/75 - 138/76)  BP(mean): --  RR: 18 (27 Feb 2023 05:08) (18 - 18)  SpO2: 96% (27 Feb 2023 05:08) (96% - 96%)    Parameters below as of 27 Feb 2023 05:08  Patient On (Oxygen Delivery Method): room air    GENERAL: NAD, well-groomed, well-developed  NECK: Supple, No JVD  CHEST/LUNG: Clear to auscultation bilaterally; No rales, rhonchi, wheezing, or rubs  HEART: Regular rate and rhythm; No murmurs, rubs, or gallops   EXTREMITIES:  RLE wrapped      LABS:                        6.7    6.75  )-----------( 205      ( 27 Feb 2023 08:30 )             20.7     02-27    133<L>  |  97  |  14  ----------------------------<  242<H>  4.5   |  27  |  1.14    Ca    8.6      27 Feb 2023 08:28                Culture - Fungal, Other (collected 24 Feb 2023 15:13)  Source: .Other Other  Preliminary Report (27 Feb 2023 07:48):    Testing in progress    Culture - Surgical Swab (collected 24 Feb 2023 15:09)  Source: .Surgical Swab Surgical Swab  Preliminary Report (25 Feb 2023 16:18):    No growth    Culture - Fungal, Tissue (collected 24 Feb 2023 15:09)  Source: .Tissue Other  Preliminary Report (27 Feb 2023 07:49):    Testing in progress    Culture - Tissue with Gram Stain (collected 24 Feb 2023 15:09)  Source: .Tissue Other  Gram Stain (24 Feb 2023 22:12):    No polymorphonuclear cells seen    No organisms seen  Preliminary Report (25 Feb 2023 16:53):    No growth      COVID-19 PCR: NotDetec (22 Feb 2023 09:33)  SARS-CoV-2: NotDetec (15 Feb 2023 14:14)      RADIOLOGY & ADDITIONAL TESTS:  ekg, labs, micro imaging personally reviewed      COORDINATION OF CARE:  care discussed and coordinated with consultants.

## 2023-02-27 NOTE — PROGRESS NOTE ADULT - NSPROGADDITIONALINFOA_GEN_ALL_CORE
26 minutes spent on the development of plan of care/coordination of care/glycemic control through review of labs, blood glucose values and vital signs.
D/W ACP Allie
26 minutes spent on the development of plan of care/coordination of care/glycemic control through review of labs, blood glucose values and vital signs.
26 minutes spent on the development of plan of care/coordination of care/glycemic control through review of labs, blood glucose values and vital signs.
.  Karime Bueno MD  Division of Hospital Medicine  [facility]   Available on Microsoft Teams - messages preferred prior to calls.    Plan discussed with patient and medicine MICKIE Griffith.
.  Karime Bueno MD  Division of Hospital Medicine  [facility]   Available on Microsoft Teams - messages preferred prior to calls.    Plan discussed with patient and medicine MICKIE Quiros.
Dispo: pending TTE, cultures, RTOR.
D/W MICKIE Griffith

## 2023-02-27 NOTE — PROVIDER CONTACT NOTE (OTHER) - RECOMMENDATIONS
RN educated pt on the importance of echo and reasoning behind order. Pt verbalized understanding of order and continued to refuse.

## 2023-02-27 NOTE — PROGRESS NOTE ADULT - PROBLEM SELECTOR PLAN 3
Decrease in Hgb this am. Possibly 2/2 acute blood loss from surgery   - hemoglobin 6.7 today, ordered 1 more unit of PRC-transfusion again today  - Trend cbc.

## 2023-02-27 NOTE — PROVIDER CONTACT NOTE (OTHER) - ACTION/TREATMENT ORDERED:
Pt refusing Metprolol, asked to move it to 8am. Md aware, said it is okay to move it to 8am. Provider to put provider to rn to okay this,

## 2023-02-27 NOTE — PROVIDER CONTACT NOTE (CRITICAL VALUE NOTIFICATION) - TEST AND RESULT REPORTED:
Tissue Culture from 2/15/2023. result Polymorphonuclear Leukocytes.
Positive blood culture
hemoglobin 6.7, hematocrit 20.7

## 2023-02-27 NOTE — PROGRESS NOTE ADULT - ATTENDING COMMENTS
agree with above  diabetic foot infection s/p podiatry intervention with good bleeding intra operatively  s/p diagnostic angiogram  no further vascular intervention required at this time  will follow to make sure foot is salvageable from infectious standpoint prior to signing off
52 year old male with PAD  CLTI of RLE  s/p diagnostic angiogram, 3 vessel runoff in line to foot with intact plantar arch  revascularization not indicated, can proceed with any further podiatry procedure necessary  will follow
OR today for RLE angiogram, possible revascularization  maintain NPO status
OR tomorrow for RLE angiogram, possible revascularization  NPO at midnight  am labs
The patient is seen with resident for right ankle gout vs. septic ankle and osteomyelitis of the 1st met same limb.  Ankle tap performed with no purulence however the blood would indicate a possible traumatic arthropathy possible Charcot.  Recommend dual energy CT to check for crystals.  If positive he may benefit from intraarticular steroid injection, if negative MRI to assess the bones of the tarsus and ankle.  As for the osteomyelitis of the 1st met, he is refusing surgery which would assist wound closure as well as manage the infection.  Will d/w ID options.  Bone biopsy failed to provide specimen to guide therapy.

## 2023-02-27 NOTE — BH CONSULTATION LIAISON ASSESSMENT NOTE - NSBHCONSULTRECOMMENDOTHER_PSY_A_CORE FT
-pt at this time demonstrates capacity to refuse ECHO, as he is able to verbalize purpose of exam, to rule out vegetations on heart, and risks of refusing the procedure which can lead to emboli, resulting in stroke and death. pt indicates frustration over waiting for exams to be done during this hospitalization, and does state that if a specific date and time can be determined to have the ECHO, he would be amenable to having this procedure. therefore, would encourage pt to have procedure and provide a specific date and time for this procedure    -continue zoloft as ordered for hx of depression    -supportive therapy provided

## 2023-02-27 NOTE — PROGRESS NOTE ADULT - ASSESSMENT
52-year-old male with a past medical history most significant for diabetes, CAD status post PCI, gout who is admitted to the hospital due to right ankle pain.    #MSSA bacteremia  Blood cultures obtained on admission from 2/15 growing MSSA (1/2 sets, 1/4 bottles)  Source likely lower extremity wound  Repeat from 2/17 ngtd  joint fluid culture from 2/17 ngtd  Tissue culture with MSSA, strep, peptostreptococcus    #Abnormal imaging of the right lower extremity  #Right foot ulcer with associated right ankle pain and swelling  #Concern for septic arthritis  Fever and leukocytosis upon admission  History of MRSA foot infection  Aspirated by podiatry, bone biopsy attempted however unsuccessful  To the OR on 2/24 - cultures negative to date, pathology pending  Return to OR planned this week    #Leukocytosis  #Elevated inflammatory markers    #History of amputation of toe with right third and fourth partial ray resection and April 2021 and second partial ray resection in May 2021    Recommendations  Continue cefazolin 2g q8hr  Add metronidazole 500 mg BID  Follow pending OR and pathology  Follow pending blood cultures  Follow Podiatry, vascular surgery  input - plan for return to OR this week  Follow fever curve and WBC count    Nirav Simmons MD  Division of Infectious Diseases  Available on Teams

## 2023-02-27 NOTE — PROGRESS NOTE ADULT - PROBLEM SELECTOR PLAN 1
h/o poorly controlled dm, pad s/p right toe amputations  OM over first metatarsal stump confirmed on MRI, CT w/ septic arthritis of underlying 1st tarsometatarsal joint;   - podiatry s/p debridement/amputation of r 1st ray - Friday 2/24, f/u deep wound cx  - Podiatry plans to RTOR for delayed primary closure, possibly 2/28  - s/p arthrocentesis by podiatry ngtd  - ID following - IV ancef given MSSA bacteremia, bld cx cleared since 2/17  - recs adding flagyl 500 BID.   - vasc sx consult appreciated, s/p diagnostic angio  - pain control - dilaudid 1.5 mg iv prn severe pain, oxy prn moderate pain.

## 2023-02-28 LAB
ANION GAP SERPL CALC-SCNC: 8 MMOL/L — SIGNIFICANT CHANGE UP (ref 5–17)
APTT BLD: 34 SEC — SIGNIFICANT CHANGE UP (ref 27.5–35.5)
BASOPHILS # BLD AUTO: 0.02 K/UL — SIGNIFICANT CHANGE UP (ref 0–0.2)
BASOPHILS NFR BLD AUTO: 0.3 % — SIGNIFICANT CHANGE UP (ref 0–2)
BLD GP AB SCN SERPL QL: NEGATIVE — SIGNIFICANT CHANGE UP
BUN SERPL-MCNC: 12 MG/DL — SIGNIFICANT CHANGE UP (ref 7–23)
CALCIUM SERPL-MCNC: 9 MG/DL — SIGNIFICANT CHANGE UP (ref 8.4–10.5)
CHLORIDE SERPL-SCNC: 95 MMOL/L — LOW (ref 96–108)
CO2 SERPL-SCNC: 30 MMOL/L — SIGNIFICANT CHANGE UP (ref 22–31)
CREAT SERPL-MCNC: 1.11 MG/DL — SIGNIFICANT CHANGE UP (ref 0.5–1.3)
EGFR: 81 ML/MIN/1.73M2 — SIGNIFICANT CHANGE UP
EOSINOPHIL # BLD AUTO: 0.13 K/UL — SIGNIFICANT CHANGE UP (ref 0–0.5)
EOSINOPHIL NFR BLD AUTO: 2.2 % — SIGNIFICANT CHANGE UP (ref 0–6)
GLUCOSE BLDC GLUCOMTR-MCNC: 200 MG/DL — HIGH (ref 70–99)
GLUCOSE BLDC GLUCOMTR-MCNC: 250 MG/DL — HIGH (ref 70–99)
GLUCOSE SERPL-MCNC: 218 MG/DL — HIGH (ref 70–99)
HCT VFR BLD CALC: 25 % — LOW (ref 39–50)
HGB BLD-MCNC: 8.1 G/DL — LOW (ref 13–17)
IMM GRANULOCYTES NFR BLD AUTO: 1.2 % — HIGH (ref 0–0.9)
INR BLD: 1.21 RATIO — HIGH (ref 0.88–1.16)
LYMPHOCYTES # BLD AUTO: 1.42 K/UL — SIGNIFICANT CHANGE UP (ref 1–3.3)
LYMPHOCYTES # BLD AUTO: 24.1 % — SIGNIFICANT CHANGE UP (ref 13–44)
MAGNESIUM SERPL-MCNC: 1.8 MG/DL — SIGNIFICANT CHANGE UP (ref 1.6–2.6)
MCHC RBC-ENTMCNC: 29.8 PG — SIGNIFICANT CHANGE UP (ref 27–34)
MCHC RBC-ENTMCNC: 32.4 GM/DL — SIGNIFICANT CHANGE UP (ref 32–36)
MCV RBC AUTO: 91.9 FL — SIGNIFICANT CHANGE UP (ref 80–100)
MONOCYTES # BLD AUTO: 0.39 K/UL — SIGNIFICANT CHANGE UP (ref 0–0.9)
MONOCYTES NFR BLD AUTO: 6.6 % — SIGNIFICANT CHANGE UP (ref 2–14)
NEUTROPHILS # BLD AUTO: 3.86 K/UL — SIGNIFICANT CHANGE UP (ref 1.8–7.4)
NEUTROPHILS NFR BLD AUTO: 65.6 % — SIGNIFICANT CHANGE UP (ref 43–77)
NRBC # BLD: 0 /100 WBCS — SIGNIFICANT CHANGE UP (ref 0–0)
PHOSPHATE SERPL-MCNC: 3.5 MG/DL — SIGNIFICANT CHANGE UP (ref 2.5–4.5)
PLATELET # BLD AUTO: 236 K/UL — SIGNIFICANT CHANGE UP (ref 150–400)
POTASSIUM SERPL-MCNC: 4.4 MMOL/L — SIGNIFICANT CHANGE UP (ref 3.5–5.3)
POTASSIUM SERPL-SCNC: 4.4 MMOL/L — SIGNIFICANT CHANGE UP (ref 3.5–5.3)
PROTHROM AB SERPL-ACNC: 13.9 SEC — HIGH (ref 10.5–13.4)
RBC # BLD: 2.72 M/UL — LOW (ref 4.2–5.8)
RBC # FLD: 13.3 % — SIGNIFICANT CHANGE UP (ref 10.3–14.5)
RH IG SCN BLD-IMP: POSITIVE — SIGNIFICANT CHANGE UP
SODIUM SERPL-SCNC: 133 MMOL/L — LOW (ref 135–145)
WBC # BLD: 5.89 K/UL — SIGNIFICANT CHANGE UP (ref 3.8–10.5)
WBC # FLD AUTO: 5.89 K/UL — SIGNIFICANT CHANGE UP (ref 3.8–10.5)

## 2023-02-28 PROCEDURE — 99232 SBSQ HOSP IP/OBS MODERATE 35: CPT

## 2023-02-28 PROCEDURE — 93306 TTE W/DOPPLER COMPLETE: CPT | Mod: 26

## 2023-02-28 PROCEDURE — 99233 SBSQ HOSP IP/OBS HIGH 50: CPT

## 2023-02-28 PROCEDURE — 99221 1ST HOSP IP/OBS SF/LOW 40: CPT

## 2023-02-28 RX ORDER — HYDROMORPHONE HYDROCHLORIDE 2 MG/ML
2 INJECTION INTRAMUSCULAR; INTRAVENOUS; SUBCUTANEOUS EVERY 4 HOURS
Refills: 0 | Status: DISCONTINUED | OUTPATIENT
Start: 2023-02-28 | End: 2023-03-03

## 2023-02-28 RX ORDER — HYDROMORPHONE HYDROCHLORIDE 2 MG/ML
4 INJECTION INTRAMUSCULAR; INTRAVENOUS; SUBCUTANEOUS EVERY 4 HOURS
Refills: 0 | Status: DISCONTINUED | OUTPATIENT
Start: 2023-02-28 | End: 2023-03-03

## 2023-02-28 RX ORDER — HYDROMORPHONE HYDROCHLORIDE 2 MG/ML
0.5 INJECTION INTRAMUSCULAR; INTRAVENOUS; SUBCUTANEOUS DAILY
Refills: 0 | Status: DISCONTINUED | OUTPATIENT
Start: 2023-02-28 | End: 2023-03-03

## 2023-02-28 RX ADMIN — HYDROMORPHONE HYDROCHLORIDE 4 MILLIGRAM(S): 2 INJECTION INTRAMUSCULAR; INTRAVENOUS; SUBCUTANEOUS at 20:48

## 2023-02-28 RX ADMIN — HYDROMORPHONE HYDROCHLORIDE 0.5 MILLIGRAM(S): 2 INJECTION INTRAMUSCULAR; INTRAVENOUS; SUBCUTANEOUS at 00:35

## 2023-02-28 RX ADMIN — OXYCODONE HYDROCHLORIDE 5 MILLIGRAM(S): 5 TABLET ORAL at 14:40

## 2023-02-28 RX ADMIN — HYDROMORPHONE HYDROCHLORIDE 1.5 MILLIGRAM(S): 2 INJECTION INTRAMUSCULAR; INTRAVENOUS; SUBCUTANEOUS at 14:16

## 2023-02-28 RX ADMIN — SERTRALINE 50 MILLIGRAM(S): 25 TABLET, FILM COATED ORAL at 14:17

## 2023-02-28 RX ADMIN — HYDROMORPHONE HYDROCHLORIDE 1.5 MILLIGRAM(S): 2 INJECTION INTRAMUSCULAR; INTRAVENOUS; SUBCUTANEOUS at 14:46

## 2023-02-28 RX ADMIN — Medication 2: at 17:27

## 2023-02-28 RX ADMIN — Medication 4: at 07:52

## 2023-02-28 RX ADMIN — Medication 1 TABLET(S): at 17:19

## 2023-02-28 RX ADMIN — Medication 14 UNIT(S): at 17:28

## 2023-02-28 RX ADMIN — Medication 100 MILLIGRAM(S): at 14:40

## 2023-02-28 RX ADMIN — HYDROMORPHONE HYDROCHLORIDE 4 MILLIGRAM(S): 2 INJECTION INTRAMUSCULAR; INTRAVENOUS; SUBCUTANEOUS at 17:48

## 2023-02-28 RX ADMIN — HYDROMORPHONE HYDROCHLORIDE 0.5 MILLIGRAM(S): 2 INJECTION INTRAMUSCULAR; INTRAVENOUS; SUBCUTANEOUS at 12:15

## 2023-02-28 RX ADMIN — Medication 81 MILLIGRAM(S): at 14:17

## 2023-02-28 RX ADMIN — Medication 1 TABLET(S): at 07:53

## 2023-02-28 RX ADMIN — Medication 500 MILLIGRAM(S): at 06:10

## 2023-02-28 RX ADMIN — HYDROMORPHONE HYDROCHLORIDE 0.5 MILLIGRAM(S): 2 INJECTION INTRAMUSCULAR; INTRAVENOUS; SUBCUTANEOUS at 12:45

## 2023-02-28 RX ADMIN — HYDROMORPHONE HYDROCHLORIDE 0.5 MILLIGRAM(S): 2 INJECTION INTRAMUSCULAR; INTRAVENOUS; SUBCUTANEOUS at 08:45

## 2023-02-28 RX ADMIN — HYDROMORPHONE HYDROCHLORIDE 1.5 MILLIGRAM(S): 2 INJECTION INTRAMUSCULAR; INTRAVENOUS; SUBCUTANEOUS at 03:10

## 2023-02-28 RX ADMIN — HYDROMORPHONE HYDROCHLORIDE 1.5 MILLIGRAM(S): 2 INJECTION INTRAMUSCULAR; INTRAVENOUS; SUBCUTANEOUS at 06:38

## 2023-02-28 RX ADMIN — Medication 500 MILLIGRAM(S): at 17:18

## 2023-02-28 RX ADMIN — HYDROMORPHONE HYDROCHLORIDE 1.5 MILLIGRAM(S): 2 INJECTION INTRAMUSCULAR; INTRAVENOUS; SUBCUTANEOUS at 06:08

## 2023-02-28 RX ADMIN — OXYCODONE HYDROCHLORIDE 5 MILLIGRAM(S): 5 TABLET ORAL at 15:10

## 2023-02-28 RX ADMIN — HYDROMORPHONE HYDROCHLORIDE 1.5 MILLIGRAM(S): 2 INJECTION INTRAMUSCULAR; INTRAVENOUS; SUBCUTANEOUS at 02:40

## 2023-02-28 RX ADMIN — Medication 14 UNIT(S): at 07:52

## 2023-02-28 RX ADMIN — Medication 25 MILLIGRAM(S): at 06:54

## 2023-02-28 RX ADMIN — Medication 100 MILLIGRAM(S): at 06:10

## 2023-02-28 RX ADMIN — HYDROMORPHONE HYDROCHLORIDE 4 MILLIGRAM(S): 2 INJECTION INTRAMUSCULAR; INTRAVENOUS; SUBCUTANEOUS at 17:18

## 2023-02-28 RX ADMIN — HYDROMORPHONE HYDROCHLORIDE 4 MILLIGRAM(S): 2 INJECTION INTRAMUSCULAR; INTRAVENOUS; SUBCUTANEOUS at 20:19

## 2023-02-28 RX ADMIN — HYDROMORPHONE HYDROCHLORIDE 1.5 MILLIGRAM(S): 2 INJECTION INTRAMUSCULAR; INTRAVENOUS; SUBCUTANEOUS at 10:04

## 2023-02-28 RX ADMIN — HYDROMORPHONE HYDROCHLORIDE 0.5 MILLIGRAM(S): 2 INJECTION INTRAMUSCULAR; INTRAVENOUS; SUBCUTANEOUS at 01:05

## 2023-02-28 NOTE — PROVIDER CONTACT NOTE (OTHER) - ACTION/TREATMENT ORDERED:
NP Zeta made aware. As per NP to reach out to attending and pain Management. Will continue to monitor.

## 2023-02-28 NOTE — PROGRESS NOTE ADULT - ASSESSMENT
52-year-old male with a past medical history most significant for diabetes, CAD status post PCI, gout who is admitted to the hospital due to right ankle pain.    #MSSA bacteremia  Blood cultures obtained on admission from 2/15 growing MSSA (1/2 sets, 1/4 bottles)  Source likely lower extremity wound  Repeat from 2/17 ngtd  joint fluid culture from 2/17 ngtd  Tissue culture with MSSA, strep, peptostreptococcus    #Abnormal imaging of the right lower extremity  #Right foot ulcer with associated right ankle pain and swelling  #Concern for septic arthritis  Fever and leukocytosis upon admission  History of MRSA foot infection  Aspirated by podiatry, bone biopsy attempted however unsuccessful  To the OR on 2/24 - cultures negative to date, pathology pending  Return to OR planned this week    #Leukocytosis  #Elevated inflammatory markers    #History of amputation of toe with right third and fourth partial ray resection and April 2021 and second partial ray resection in May 2021    Recommendations  Continue cefazolin 2g q8hr  Continue metronidazole 500 mg BID  Follow pending OR and pathology  Follow Podiatry, vascular surgery  input - now refusing further surgery  Discussed***  Follow fever curve and WBC count    Nirav Simmons MD  Division of Infectious Diseases  Available on Teams       52-year-old male with a past medical history most significant for diabetes, CAD status post PCI, gout who is admitted to the hospital due to right ankle pain.    #MSSA bacteremia  Blood cultures obtained on admission from 2/15 growing MSSA (1/2 sets, 1/4 bottles)  Source likely lower extremity wound  Repeat from 2/17 ngtd  joint fluid culture from 2/17 ngtd  Tissue culture with MSSA, strep, peptostreptococcus    #Abnormal imaging of the right lower extremity  #Right foot ulcer with associated right ankle pain and swelling  #Concern for septic arthritis  Fever and leukocytosis upon admission  History of MRSA foot infection  Aspirated by podiatry, bone biopsy attempted however unsuccessful  To the OR on 2/24 - cultures negative to date, pathology pending  Return to OR planned this week however patient refusing    #Leukocytosis  #Elevated inflammatory markers    #History of amputation of toe with right third and fourth partial ray resection and April 2021 and second partial ray resection in May 2021    Recommendations  Continue cefazolin 2g q8hr  Continue metronidazole 500 mg BID  Follow pending OR and pathology  Follow Podiatry, vascular surgery  input - now refusing further surgery  Discussed importance for surgery   Follow TTE  Follow fever curve and WBC count    Nirav Simmons MD  Division of Infectious Diseases  Available on Teams

## 2023-02-28 NOTE — PROGRESS NOTE ADULT - ASSESSMENT
52M s/p right foot 1st met amputation and right ankle I&D (DOS 2/24/23)  - Pt seen and evaluated.  - Afebrile, no leukocytosis, H/H 8.1/25  - s/p right foot 1st met amputation and right ankle I&D (DOS 2/24/23): distal sutures intact, flaps warm and viable, proximal tib ant tendon exposed, proximal wound bed dusky with mild TA tendon dessication, scant sanguinous drainage, distal and proximal tracking 2cm.   - Right foot wound culture growing MSSA, strep anginosus, Peptostreptococcus  - R ankle MR: OM of 1st met, bases of 2nd and 3rd met, all cuneiforms and likely cuboid  - s/p LLE angio w 3 vessel r/o  - Per intraop findings, high concern for residual infection, low concern for viability   - OR clean bone culture no growth prelim   - VAC to be applied today   - Recommend pain management consult   - Please is requesting a secondary podiatric opinion at this time for his foot and leg wound  - Patient is stable for discharge from podiatry standpoint pending VAC song   - F/u information and instructions can be found in the f/u section of d/c provider note   - Seen with attending

## 2023-02-28 NOTE — PROVIDER CONTACT NOTE (OTHER) - ASSESSMENT
pt is refusing to eat or cooperate with care until  he gets increase in pain regimen.  claims pain medication is not adequate despite q4 iv push Dilaudid and breakthrough pain meds. pt is refusing to eat or cooperate with care until  he gets increase in pain regimen.  claims pain medication is not adequate despite q4 iv push Dilaudid and breakthrough pain meds. pt educated on importance of care and compliance. pt continues to refuse.

## 2023-02-28 NOTE — PROGRESS NOTE ADULT - SUBJECTIVE AND OBJECTIVE BOX
[facility] Division of Hospital Medicine  Russ Cotter MD  Available via MS Teams      SUBJECTIVE / OVERNIGHT EVENTS:    ADDITIONAL REVIEW OF SYSTEMS:    MEDICATIONS  (STANDING):  aspirin enteric coated 81 milliGRAM(s) Oral daily  atorvastatin 80 milliGRAM(s) Oral at bedtime  ceFAZolin   IVPB 2000 milliGRAM(s) IV Intermittent every 8 hours  glucagon  Injectable 1 milliGRAM(s) IntraMuscular once  influenza   Vaccine 0.5 milliLiter(s) IntraMuscular once  insulin glargine Injectable (LANTUS) 48 Unit(s) SubCutaneous at bedtime  insulin lispro (ADMELOG) corrective regimen sliding scale   SubCutaneous three times a day before meals  insulin lispro (ADMELOG) corrective regimen sliding scale   SubCutaneous at bedtime  insulin lispro Injectable (ADMELOG) 14 Unit(s) SubCutaneous three times a day before meals  lactobacillus acidophilus 1 Tablet(s) Oral two times a day with meals  metoprolol succinate ER 25 milliGRAM(s) Oral daily  metroNIDAZOLE    Tablet 500 milliGRAM(s) Oral two times a day  naloxone Injectable 0.4 milliGRAM(s) IV Push once  sertraline 50 milliGRAM(s) Oral daily    MEDICATIONS  (PRN):  aluminum hydroxide/magnesium hydroxide/simethicone Suspension 30 milliLiter(s) Oral every 4 hours PRN Dyspepsia  bisacodyl 5 milliGRAM(s) Oral daily PRN Constipation  HYDROmorphone  Injectable 0.5 milliGRAM(s) IV Push every 2 hours PRN Breakthrough pain  HYDROmorphone  Injectable 1.5 milliGRAM(s) IV Push every 4 hours PRN Severe Pain (7 - 10)  melatonin 3 milliGRAM(s) Oral at bedtime PRN Insomnia  ondansetron Injectable 4 milliGRAM(s) IV Push every 8 hours PRN Nausea and/or Vomiting  oxyCODONE    IR 5 milliGRAM(s) Oral every 6 hours PRN Moderate Pain (4 - 6)  senna 2 Tablet(s) Oral at bedtime PRN Constipation      I&O's Summary    27 Feb 2023 07:01  -  28 Feb 2023 07:00  --------------------------------------------------------  IN: 200 mL / OUT: 1550 mL / NET: -1350 mL    28 Feb 2023 07:01  -  28 Feb 2023 14:42  --------------------------------------------------------  IN: 480 mL / OUT: 850 mL / NET: -370 mL        PHYSICAL EXAM:  Vital Signs Last 24 Hrs  T(C): 36.9 (28 Feb 2023 11:01), Max: 36.9 (27 Feb 2023 16:00)  T(F): 98.5 (28 Feb 2023 11:01), Max: 98.5 (28 Feb 2023 11:01)  HR: 70 (28 Feb 2023 11:01) (68 - 70)  BP: 145/80 (28 Feb 2023 11:01) (126/73 - 145/80)  BP(mean): --  RR: 18 (28 Feb 2023 11:01) (18 - 20)  SpO2: 96% (28 Feb 2023 11:01) (93% - 97%)    Parameters below as of 28 Feb 2023 11:01  Patient On (Oxygen Delivery Method): room air      CONSTITUTIONAL: NAD, well-developed, well-groomed  EYES: PERRLA; conjunctiva and sclera clear  ENMT: Moist oral mucosa, no pharyngeal injection or exudates; normal dentition  NECK: Supple, no palpable masses; no thyromegaly  RESPIRATORY: Normal respiratory effort; lungs are clear to auscultation bilaterally  CARDIOVASCULAR: Regular rate and rhythm, normal S1 and S2, no murmur/rub/gallop; No lower extremity edema; Peripheral pulses are 2+ bilaterally  ABDOMEN: Nontender to palpation, normoactive bowel sounds, no rebound/guarding; No hepatosplenomegaly  MUSCULOSKELETAL:  Normal gait; no clubbing or cyanosis of digits; no joint swelling or tenderness to palpation  PSYCH: A+O to person, place, and time; affect appropriate  NEUROLOGY: CN 2-12 are intact and symmetric; no gross sensory deficits   SKIN: No rashes; no palpable lesions    LABS:                        8.1    5.89  )-----------( 236      ( 28 Feb 2023 09:17 )             25.0     02-28    133<L>  |  95<L>  |  12  ----------------------------<  218<H>  4.4   |  30  |  1.11    Ca    9.0      28 Feb 2023 09:17  Phos  3.5     02-28  Mg     1.8     02-28      PT/INR - ( 28 Feb 2023 09:17 )   PT: 13.9 sec;   INR: 1.21 ratio         PTT - ( 28 Feb 2023 09:17 )  PTT:34.0 sec          COVID-19 PCR: NotDetec (27 Feb 2023 13:10)  COVID-19 PCR: NotDetec (22 Feb 2023 09:33)  SARS-CoV-2: NotDetec (15 Feb 2023 14:14)      RADIOLOGY & ADDITIONAL TESTS:  ekg, labs, micro imaging personally reviewed      COORDINATION OF CARE:  care discussed and coordinated with consultants.     [facility] Division of Hospital Medicine  Russ Cotter MD  Available via MS Teams      SUBJECTIVE / OVERNIGHT EVENTS:  Patient again was refusing TTE yesterday, but after extensive discussion was amenable. He reports intermittent pain in LE.     ADDITIONAL REVIEW OF SYSTEMS:  negative, as above    MEDICATIONS  (STANDING):  aspirin enteric coated 81 milliGRAM(s) Oral daily  atorvastatin 80 milliGRAM(s) Oral at bedtime  ceFAZolin   IVPB 2000 milliGRAM(s) IV Intermittent every 8 hours  glucagon  Injectable 1 milliGRAM(s) IntraMuscular once  influenza   Vaccine 0.5 milliLiter(s) IntraMuscular once  insulin glargine Injectable (LANTUS) 48 Unit(s) SubCutaneous at bedtime  insulin lispro (ADMELOG) corrective regimen sliding scale   SubCutaneous three times a day before meals  insulin lispro (ADMELOG) corrective regimen sliding scale   SubCutaneous at bedtime  insulin lispro Injectable (ADMELOG) 14 Unit(s) SubCutaneous three times a day before meals  lactobacillus acidophilus 1 Tablet(s) Oral two times a day with meals  metoprolol succinate ER 25 milliGRAM(s) Oral daily  metroNIDAZOLE    Tablet 500 milliGRAM(s) Oral two times a day  naloxone Injectable 0.4 milliGRAM(s) IV Push once  sertraline 50 milliGRAM(s) Oral daily    MEDICATIONS  (PRN):  aluminum hydroxide/magnesium hydroxide/simethicone Suspension 30 milliLiter(s) Oral every 4 hours PRN Dyspepsia  bisacodyl 5 milliGRAM(s) Oral daily PRN Constipation  HYDROmorphone  Injectable 0.5 milliGRAM(s) IV Push every 2 hours PRN Breakthrough pain  HYDROmorphone  Injectable 1.5 milliGRAM(s) IV Push every 4 hours PRN Severe Pain (7 - 10)  melatonin 3 milliGRAM(s) Oral at bedtime PRN Insomnia  ondansetron Injectable 4 milliGRAM(s) IV Push every 8 hours PRN Nausea and/or Vomiting  oxyCODONE    IR 5 milliGRAM(s) Oral every 6 hours PRN Moderate Pain (4 - 6)  senna 2 Tablet(s) Oral at bedtime PRN Constipation      I&O's Summary    27 Feb 2023 07:01  -  28 Feb 2023 07:00  --------------------------------------------------------  IN: 200 mL / OUT: 1550 mL / NET: -1350 mL    28 Feb 2023 07:01  -  28 Feb 2023 14:42  --------------------------------------------------------  IN: 480 mL / OUT: 850 mL / NET: -370 mL        PHYSICAL EXAM:  Vital Signs Last 24 Hrs  T(C): 36.9 (28 Feb 2023 11:01), Max: 36.9 (27 Feb 2023 16:00)  T(F): 98.5 (28 Feb 2023 11:01), Max: 98.5 (28 Feb 2023 11:01)  HR: 70 (28 Feb 2023 11:01) (68 - 70)  BP: 145/80 (28 Feb 2023 11:01) (126/73 - 145/80)  BP(mean): --  RR: 18 (28 Feb 2023 11:01) (18 - 20)  SpO2: 96% (28 Feb 2023 11:01) (93% - 97%)    Parameters below as of 28 Feb 2023 11:01  Patient On (Oxygen Delivery Method): room air      CONSTITUTIONAL: NAD, well-developed, well-groomed  EYES: PERRLA; conjunctiva and sclera clear  ENMT: Moist oral mucosa, no pharyngeal injection or exudates; normal dentition  NECK: Supple, no palpable masses; no thyromegaly  RESPIRATORY: Normal respiratory effort; lungs are clear to auscultation bilaterally  CARDIOVASCULAR: Regular rate and rhythm, normal S1 and S2, no murmur/rub/gallop; No lower extremity edema; Peripheral pulses are 2+ bilaterally  ABDOMEN: Nontender to palpation, normoactive bowel sounds, no rebound/guarding; No hepatosplenomegaly  MUSCULOSKELETAL:  Normal gait; no clubbing or cyanosis of digits; no joint swelling or tenderness to palpation  PSYCH: A+O to person, place, and time; affect appropriate  NEUROLOGY: CN 2-12 are intact and symmetric; no gross sensory deficits   SKIN: No rashes; no palpable lesions    LABS:                        8.1    5.89  )-----------( 236      ( 28 Feb 2023 09:17 )             25.0     02-28    133<L>  |  95<L>  |  12  ----------------------------<  218<H>  4.4   |  30  |  1.11    Ca    9.0      28 Feb 2023 09:17  Phos  3.5     02-28  Mg     1.8     02-28      PT/INR - ( 28 Feb 2023 09:17 )   PT: 13.9 sec;   INR: 1.21 ratio         PTT - ( 28 Feb 2023 09:17 )  PTT:34.0 sec          COVID-19 PCR: NotDetec (27 Feb 2023 13:10)  COVID-19 PCR: NotDetec (22 Feb 2023 09:33)  SARS-CoV-2: NotDetec (15 Feb 2023 14:14)      RADIOLOGY & ADDITIONAL TESTS:  ekg, labs, micro imaging personally reviewed      COORDINATION OF CARE:  care discussed and coordinated with consultants.

## 2023-02-28 NOTE — PROGRESS NOTE ADULT - SUBJECTIVE AND OBJECTIVE BOX
Follow Up:  bacteremia    Interval History/ROS:  Patient overnight: No acute events.  Patient remains afebrile.  Otherwise hemodynamically stable on room air.  Latest labs show no leukocytosis, BMP with renal function within normal limits.  OR cultures with no growth to date.     Patient seen examined at bedside.  Denies any new pain or discomfort.    Allergies  No Known Allergies    ANTIMICROBIALS:  ceFAZolin   IVPB 2000 every 8 hours  metroNIDAZOLE    Tablet 500 two times a day      OTHER MEDS:  MEDICATIONS  (STANDING):  aluminum hydroxide/magnesium hydroxide/simethicone Suspension 30 every 4 hours PRN  aspirin enteric coated 81 daily  atorvastatin 80 at bedtime  bisacodyl 5 daily PRN  glucagon  Injectable 1 once  HYDROmorphone  Injectable 1.5 every 4 hours PRN  HYDROmorphone  Injectable 0.5 every 2 hours PRN  influenza   Vaccine 0.5 once  insulin glargine Injectable (LANTUS) 48 at bedtime  insulin lispro (ADMELOG) corrective regimen sliding scale  three times a day before meals  insulin lispro (ADMELOG) corrective regimen sliding scale  at bedtime  insulin lispro Injectable (ADMELOG) 14 three times a day before meals  melatonin 3 at bedtime PRN  metoprolol succinate ER 25 daily  ondansetron Injectable 4 every 8 hours PRN  oxyCODONE    IR 5 every 6 hours PRN  senna 2 at bedtime PRN  sertraline 50 daily      Vital Signs Last 24 Hrs  T(C): 36.7 (28 Feb 2023 00:14), Max: 36.9 (27 Feb 2023 16:00)  T(F): 98 (28 Feb 2023 00:14), Max: 98.4 (27 Feb 2023 16:00)  HR: 70 (28 Feb 2023 00:14) (68 - 70)  BP: 134/72 (28 Feb 2023 00:14) (126/73 - 136/72)  BP(mean): --  RR: 18 (28 Feb 2023 00:14) (18 - 20)  SpO2: 93% (28 Feb 2023 00:14) (93% - 97%)    Parameters below as of 28 Feb 2023 00:14  Patient On (Oxygen Delivery Method): room air        PHYSICAL EXAM:  Constitutional: comfortable  Cardiovascular:   normal S1, S2, no murmur, no edema  Respiratory:  no resp distress  GI:  soft, non-tender,   Neurologic: awake and alert, normal strength, no focal findings  Skin:  foot wound bandaged- refusing exam however area continues to appear erythematous               8.1    5.89  )-----------( 236      ( 28 Feb 2023 09:17 )             25.0       02-28    133<L>  |  95<L>  |  12  ----------------------------<  218<H>  4.4   |  30  |  1.11    Ca    9.0      28 Feb 2023 09:17  Phos  3.5     02-28  Mg     1.8     02-28            MICROBIOLOGY:  v    Culture - Fungal, Other (collected 24 Feb 2023 15:13)  Source: .Other Other  Preliminary Report (27 Feb 2023 07:48):    Testing in progress    Culture - Surgical Swab (collected 24 Feb 2023 15:09)  Source: .Surgical Swab Surgical Swab  Preliminary Report (25 Feb 2023 16:18):    No growth    Culture - Fungal, Tissue (collected 24 Feb 2023 15:09)  Source: .Tissue Other  Preliminary Report (27 Feb 2023 07:49):    Testing in progress    Culture - Tissue with Gram Stain (collected 24 Feb 2023 15:09)  Source: .Tissue Other  Gram Stain (24 Feb 2023 22:12):    No polymorphonuclear cells seen    No organisms seen  Preliminary Report (25 Feb 2023 16:53):    No growth     Follow Up:  bacteremia    Interval History/ROS:  Patient overnight: No acute events.  Patient remains afebrile.  Otherwise hemodynamically stable on room air.  Latest labs show no leukocytosis, BMP with renal function within normal limits.  OR cultures with no growth to date. Echo to be done today.    Patient seen examined at bedside.  Denies any new pain or discomfort. Short ryan answers today, unhappy with hospital stay.    Allergies  No Known Allergies    ANTIMICROBIALS:  ceFAZolin   IVPB 2000 every 8 hours  metroNIDAZOLE    Tablet 500 two times a day      OTHER MEDS:  MEDICATIONS  (STANDING):  aluminum hydroxide/magnesium hydroxide/simethicone Suspension 30 every 4 hours PRN  aspirin enteric coated 81 daily  atorvastatin 80 at bedtime  bisacodyl 5 daily PRN  glucagon  Injectable 1 once  HYDROmorphone  Injectable 1.5 every 4 hours PRN  HYDROmorphone  Injectable 0.5 every 2 hours PRN  influenza   Vaccine 0.5 once  insulin glargine Injectable (LANTUS) 48 at bedtime  insulin lispro (ADMELOG) corrective regimen sliding scale  three times a day before meals  insulin lispro (ADMELOG) corrective regimen sliding scale  at bedtime  insulin lispro Injectable (ADMELOG) 14 three times a day before meals  melatonin 3 at bedtime PRN  metoprolol succinate ER 25 daily  ondansetron Injectable 4 every 8 hours PRN  oxyCODONE    IR 5 every 6 hours PRN  senna 2 at bedtime PRN  sertraline 50 daily      Vital Signs Last 24 Hrs  T(C): 36.7 (28 Feb 2023 00:14), Max: 36.9 (27 Feb 2023 16:00)  T(F): 98 (28 Feb 2023 00:14), Max: 98.4 (27 Feb 2023 16:00)  HR: 70 (28 Feb 2023 00:14) (68 - 70)  BP: 134/72 (28 Feb 2023 00:14) (126/73 - 136/72)  BP(mean): --  RR: 18 (28 Feb 2023 00:14) (18 - 20)  SpO2: 93% (28 Feb 2023 00:14) (93% - 97%)    Parameters below as of 28 Feb 2023 00:14  Patient On (Oxygen Delivery Method): room air        PHYSICAL EXAM:  Constitutional: appears agitated  Cardiovascular:   normal S1, S2, no murmur, no edema  Respiratory:  no resp distress  GI:  soft, non-tender,   Neurologic: awake and alert, normal strength, no focal findings  Skin:  foot wound bandaged- refusing exam however area continues to appear erythematous               8.1    5.89  )-----------( 236      ( 28 Feb 2023 09:17 )             25.0       02-28    133<L>  |  95<L>  |  12  ----------------------------<  218<H>  4.4   |  30  |  1.11    Ca    9.0      28 Feb 2023 09:17  Phos  3.5     02-28  Mg     1.8     02-28            MICROBIOLOGY:  v    Culture - Fungal, Other (collected 24 Feb 2023 15:13)  Source: .Other Other  Preliminary Report (27 Feb 2023 07:48):    Testing in progress    Culture - Surgical Swab (collected 24 Feb 2023 15:09)  Source: .Surgical Swab Surgical Swab  Preliminary Report (25 Feb 2023 16:18):    No growth    Culture - Fungal, Tissue (collected 24 Feb 2023 15:09)  Source: .Tissue Other  Preliminary Report (27 Feb 2023 07:49):    Testing in progress    Culture - Tissue with Gram Stain (collected 24 Feb 2023 15:09)  Source: .Tissue Other  Gram Stain (24 Feb 2023 22:12):    No polymorphonuclear cells seen    No organisms seen  Preliminary Report (25 Feb 2023 16:53):    No growth

## 2023-02-28 NOTE — PROGRESS NOTE ADULT - SUBJECTIVE AND OBJECTIVE BOX
Patient is a 52y old  Male who presents with a chief complaint of r foot pain/tenderness, warmth, swelling (28 Feb 2023 10:31)       INTERVAL HPI/OVERNIGHT EVENTS:  Patient seen and evaluated at bedside.  Pt is resting comfortable in NAD. Denies N/V/F/C.  Pain rated at X/10    Allergies    No Known Allergies    Intolerances        Vital Signs Last 24 Hrs  T(C): 36.9 (28 Feb 2023 11:01), Max: 36.9 (27 Feb 2023 16:00)  T(F): 98.5 (28 Feb 2023 11:01), Max: 98.5 (28 Feb 2023 11:01)  HR: 70 (28 Feb 2023 11:01) (68 - 70)  BP: 145/80 (28 Feb 2023 11:01) (126/73 - 145/80)  BP(mean): --  RR: 18 (28 Feb 2023 11:01) (18 - 20)  SpO2: 96% (28 Feb 2023 11:01) (93% - 97%)    Parameters below as of 28 Feb 2023 11:01  Patient On (Oxygen Delivery Method): room air        LABS:                        8.1    5.89  )-----------( 236      ( 28 Feb 2023 09:17 )             25.0     02-28    133<L>  |  95<L>  |  12  ----------------------------<  218<H>  4.4   |  30  |  1.11    Ca    9.0      28 Feb 2023 09:17  Phos  3.5     02-28  Mg     1.8     02-28      PT/INR - ( 28 Feb 2023 09:17 )   PT: 13.9 sec;   INR: 1.21 ratio         PTT - ( 28 Feb 2023 09:17 )  PTT:34.0 sec    CAPILLARY BLOOD GLUCOSE      POCT Blood Glucose.: 250 mg/dL (28 Feb 2023 07:41)  POCT Blood Glucose.: 123 mg/dL (27 Feb 2023 20:40)  POCT Blood Glucose.: 183 mg/dL (27 Feb 2023 18:37)  POCT Blood Glucose.: 118 mg/dL (27 Feb 2023 13:37)      Lower Extremity Physical Exam:  Vascular: DP/PT 0/4, B/L, CFT <3 seconds B/L, Temperature gradient warm to cool, B/L.   Neuro: Epicritic sensation diminished to the level of toes, B/L.  Musculoskeletal/Ortho: s/p R foot TMA, Right ankle no pain with active or passive dorsiflexion/plantarflexion/inversion/eversion, pain on palpation localized to the medial and lateral malleoli.  Skin: s/p right foot 1st met amputation and right ankle I&D (DOS 2/24/23): distal sutures intact, flaps warm and viable, proximal tib ant tendon exposed, proximal wound bed dusky with mild TA tendon dessication, scant sanguinous drainage, distal and proximal tracking 2cm.     RADIOLOGY & ADDITIONAL TESTS:

## 2023-02-28 NOTE — CONSULT NOTE ADULT - REASON FOR ADMISSION
r foot pain/tenderness, warmth, swelling

## 2023-02-28 NOTE — PROGRESS NOTE ADULT - PROBLEM SELECTOR PLAN 1
h/o poorly controlled dm, pad s/p right toe amputations  OM over first metatarsal stump confirmed on MRI, CT w/ septic arthritis of underlying 1st tarsometatarsal joint;   - podiatry s/p debridement/amputation of r 1st ray - Friday 2/24,   - Podiatry plans to RTOR for delayed primary closure, however patient refused]  - now plans for wound vac to be placed today 2/28  - s/p arthrocentesis by podiatry ngtd  - ID following - IV ancef given MSSA bacteremia, bld cx cleared since 2/17  - recs added flagyl 500 BID since 2/27  - vasc sx consult appreciated, s/p diagnostic angio  - pain control - dilaudid 1.5 mg iv prn severe pain, oxy prn moderate pain.  - pain management consult

## 2023-02-28 NOTE — CONSULT NOTE ADULT - ASSESSMENT
52M PMHx DM, CAD, PCI w/ stents, HTN, HLD, anxiety/depression, multiple partial ray resections, s/p foot debridement 2 wks prior to admission    Admitted with right foot redness, swelling, pain/tenderness, warmth.  Pt in severe sepsis, cellulitis, OM, (+)MSSA  2/24 s/p right foot first metatarsal amputation and right ankle I&D  Now awaiting VAC and closure    Current out- patient pain regimen: None  Out Patient Pain Management provider: None    Pt c/o sharp, throbbing post-op incisional pain. Denies neuropathic pain, denies phantom pain, denies spasm.  Good effect with IV Dilaudid but does not last. Pt very focused on opioid doses, requesting increase in IV Dilaudid. Educated re: benefits of PO over IV and need for longer acting regimen.     Plan discussed with primary team:  Start PO Dilaudid 2 mg every 4 hours PRN moderate pain and 4 mg every 4 hours PRN severe pain, administer a dose 1 hour prior to dressing changes  Change IV Dilaudid to 0.5 mg once daily PRN for severe breakthrough pain with dressing changes  Warm/cool packs for comfort  Bowel Regimen  Incentive Spirometer  PT per primary team  Monitor for sedation, respiratory depression  Narcan Rescue Kit if discharged with opioids (Naloxone 4 mg/0.1 ml nasal spray - 1 spray q 2-3 minutes alternating between nostrils)    This is acute, post-op pain, managed by primary team  Signing off     Time spent on encounter:  30      Minutes    Chronic Pain Service  [phone]

## 2023-02-28 NOTE — CONSULT NOTE ADULT - SUBJECTIVE AND OBJECTIVE BOX
Chief Complaint:  Patient is a 52y old  Male who presents with a chief complaint of r foot pain/tenderness, warmth, swelling (28 Feb 2023 14:42)    HPI:  52M PMHx DM, CAD, PCI w/ stents, HTN, HLD, anxiety/depression, multiple partial ray resections, s/p foot debridement 2 wks prior to admission    Admitted with right foot redness, swelling, pain/tenderness, warmth.  Pt in severe sepsis, cellulitis, OM, (+)MSSA  2/24 s/p right foot first metatarsal amputation and right ankle I&D  Now awaiting VAC and closure    Current out- patient pain regimen: None    Out Patient Pain Management provider: None    [facility] Prescription Monitoring Program: Reference #[id number]    Opioid Risk Tool (ORT-OUD) Score: Low    Pain Score: 6/10, tolerable    Pt seen sitting up in bed, appears comfortable, c/o sharp, throbbing post-op incisional pain. Denies neuropathic pain, denies phantom pain, denies spasm.  Good effect with IV Dilaudid but does not last. Pt very focused on opioid doses, requesting increase in IV Dilaudid. Educated re: benefits of PO over IV and need for longer acting regimen.     REVIEW OF SYSTEMS:  CONSTITUTIONAL: No fever, weight loss, fatigue, falls  NEURO: No headaches, memory loss, loss of strength, tremors, dizziness or blurred vision  RESP: No shortness of breath, cough  CV: No chest pain, palpitations  GI: No abdominal pain, nausea, vomiting, diarrhea, constipation, incontinence, (+)flatus  : No urinary incontinence/retention  MSK: No upper or lower motor strength weakness  SKIN: No itching, burning, rashes, or lesions       PHYSICAL EXAM  GENERAL: Seen at bedside, NAD, well-groomed, well-developed, appears stated age, no signs of toxicity  NEURO: Alert & Oriented X3, Good concentration; Follows commands   HEENT: Head atraumatic, normocephalic; speech clear and fluent, tangential and loquacious  RESP: Lungs Clear to auscultation bilaterally; no rales or rhonchi; chest expansion equal  CV: Regular rate and rhythm  GI: Appetite good, (+)BM  : Voiding  EXTREMITIES: moving all extremities, no cyanosis or edema, right foot dressing C/D/I  SKIN: No rashes or lesions  PSYCH: affect normal; good eye contact; no signs of depression or anxiety    PAST MEDICAL & SURGICAL HISTORY:  CAD (coronary artery disease)      Diabetes      HTN (hypertension)      Diabetic foot ulcer      Status post amputation of toe  s/p R 3/4th partial ray resection (4/2021; s/p 2nd partial ray resection (5/2021)          FAMILY HISTORY:  Family history of early CAD (Father)        Allergies    No Known Allergies      MEDICATIONS  (STANDING):  aspirin enteric coated 81 milliGRAM(s) Oral daily  atorvastatin 80 milliGRAM(s) Oral at bedtime  ceFAZolin   IVPB 2000 milliGRAM(s) IV Intermittent every 8 hours  glucagon  Injectable 1 milliGRAM(s) IntraMuscular once  influenza   Vaccine 0.5 milliLiter(s) IntraMuscular once  insulin glargine Injectable (LANTUS) 48 Unit(s) SubCutaneous at bedtime  insulin lispro (ADMELOG) corrective regimen sliding scale   SubCutaneous three times a day before meals  insulin lispro (ADMELOG) corrective regimen sliding scale   SubCutaneous at bedtime  insulin lispro Injectable (ADMELOG) 14 Unit(s) SubCutaneous three times a day before meals  lactobacillus acidophilus 1 Tablet(s) Oral two times a day with meals  metoprolol succinate ER 25 milliGRAM(s) Oral daily  metroNIDAZOLE    Tablet 500 milliGRAM(s) Oral two times a day  naloxone Injectable 0.4 milliGRAM(s) IV Push once  sertraline 50 milliGRAM(s) Oral daily    MEDICATIONS  (PRN):  aluminum hydroxide/magnesium hydroxide/simethicone Suspension 30 milliLiter(s) Oral every 4 hours PRN Dyspepsia  bisacodyl 5 milliGRAM(s) Oral daily PRN Constipation  HYDROmorphone   Tablet 2 milliGRAM(s) Oral every 4 hours PRN Moderate Pain (4 - 6)  HYDROmorphone   Tablet 4 milliGRAM(s) Oral every 4 hours PRN Severe Pain (7 - 10)  melatonin 3 milliGRAM(s) Oral at bedtime PRN Insomnia  ondansetron Injectable 4 milliGRAM(s) IV Push every 8 hours PRN Nausea and/or Vomiting  senna 2 Tablet(s) Oral at bedtime PRN Constipation      Vital Signs:  T(C): 36.6 (02-28-23 @ 17:50)  HR: 65 (02-28-23 @ 17:50)  BP: 146/88 (02-28-23 @ 17:50)  RR: 18 (02-28-23 @ 17:50)  SpO2: 95% (02-28-23 @ 17:50)    Pertinent labs/radiology:  Reviewed                          8.1    5.89  )-----------( 236      ( 28 Feb 2023 09:17 )             25.0       02-28    133<L>  |  95<L>  |  12  ----------------------------<  218<H>  4.4   |  30  |  1.11    Ca    9.0      28 Feb 2023 09:17  Phos  3.5     02-28  Mg     1.8     02-28

## 2023-02-28 NOTE — PROGRESS NOTE ADULT - PROBLEM SELECTOR PLAN 3
Decrease in Hgb this am. Possibly 2/2 acute blood loss from surgery   - s/p PRBC unit 2/27  - hemoglobin 8.1 today  monitor and trend CBC

## 2023-02-28 NOTE — PROGRESS NOTE ADULT - PROBLEM SELECTOR PLAN 2
2/15 blood cx +MSSA, recent bld cx 2/17 NGTD  - c/w ancef   - ID recs appreciated  - TTE pending to r/o vegetation/endocarditis, however patient is refusing adamantly  - discussed benefits and risks however continued to refuse  - after further discussion today 2/28- amenable to TTE  - psych consult for capacity: has capacity and able to refuse tests/procedures

## 2023-03-01 LAB
ANION GAP SERPL CALC-SCNC: 10 MMOL/L — SIGNIFICANT CHANGE UP (ref 5–17)
BUN SERPL-MCNC: 14 MG/DL — SIGNIFICANT CHANGE UP (ref 7–23)
CALCIUM SERPL-MCNC: 8.9 MG/DL — SIGNIFICANT CHANGE UP (ref 8.4–10.5)
CHLORIDE SERPL-SCNC: 100 MMOL/L — SIGNIFICANT CHANGE UP (ref 96–108)
CO2 SERPL-SCNC: 26 MMOL/L — SIGNIFICANT CHANGE UP (ref 22–31)
CREAT SERPL-MCNC: 1.05 MG/DL — SIGNIFICANT CHANGE UP (ref 0.5–1.3)
CULTURE RESULTS: SIGNIFICANT CHANGE UP
CULTURE RESULTS: SIGNIFICANT CHANGE UP
EGFR: 86 ML/MIN/1.73M2 — SIGNIFICANT CHANGE UP
GLUCOSE BLDC GLUCOMTR-MCNC: 161 MG/DL — HIGH (ref 70–99)
GLUCOSE BLDC GLUCOMTR-MCNC: 196 MG/DL — HIGH (ref 70–99)
GLUCOSE BLDC GLUCOMTR-MCNC: 234 MG/DL — HIGH (ref 70–99)
GLUCOSE BLDC GLUCOMTR-MCNC: 249 MG/DL — HIGH (ref 70–99)
GLUCOSE SERPL-MCNC: 159 MG/DL — HIGH (ref 70–99)
HCT VFR BLD CALC: 25.6 % — LOW (ref 39–50)
HGB BLD-MCNC: 8.4 G/DL — LOW (ref 13–17)
MCHC RBC-ENTMCNC: 29.6 PG — SIGNIFICANT CHANGE UP (ref 27–34)
MCHC RBC-ENTMCNC: 32.8 GM/DL — SIGNIFICANT CHANGE UP (ref 32–36)
MCV RBC AUTO: 90.1 FL — SIGNIFICANT CHANGE UP (ref 80–100)
NRBC # BLD: 0 /100 WBCS — SIGNIFICANT CHANGE UP (ref 0–0)
PLATELET # BLD AUTO: 218 K/UL — SIGNIFICANT CHANGE UP (ref 150–400)
POTASSIUM SERPL-MCNC: 4.6 MMOL/L — SIGNIFICANT CHANGE UP (ref 3.5–5.3)
POTASSIUM SERPL-SCNC: 4.6 MMOL/L — SIGNIFICANT CHANGE UP (ref 3.5–5.3)
RBC # BLD: 2.84 M/UL — LOW (ref 4.2–5.8)
RBC # FLD: 13.1 % — SIGNIFICANT CHANGE UP (ref 10.3–14.5)
SODIUM SERPL-SCNC: 136 MMOL/L — SIGNIFICANT CHANGE UP (ref 135–145)
SPECIMEN SOURCE: SIGNIFICANT CHANGE UP
SPECIMEN SOURCE: SIGNIFICANT CHANGE UP
WBC # BLD: 6.41 K/UL — SIGNIFICANT CHANGE UP (ref 3.8–10.5)
WBC # FLD AUTO: 6.41 K/UL — SIGNIFICANT CHANGE UP (ref 3.8–10.5)

## 2023-03-01 PROCEDURE — 99233 SBSQ HOSP IP/OBS HIGH 50: CPT

## 2023-03-01 RX ORDER — HYDROMORPHONE HYDROCHLORIDE 2 MG/ML
2 INJECTION INTRAMUSCULAR; INTRAVENOUS; SUBCUTANEOUS ONCE
Refills: 0 | Status: DISCONTINUED | OUTPATIENT
Start: 2023-03-01 | End: 2023-03-01

## 2023-03-01 RX ADMIN — Medication 100 MILLIGRAM(S): at 05:46

## 2023-03-01 RX ADMIN — Medication 2: at 15:32

## 2023-03-01 RX ADMIN — Medication 14 UNIT(S): at 16:12

## 2023-03-01 RX ADMIN — HYDROMORPHONE HYDROCHLORIDE 4 MILLIGRAM(S): 2 INJECTION INTRAMUSCULAR; INTRAVENOUS; SUBCUTANEOUS at 23:55

## 2023-03-01 RX ADMIN — Medication 500 MILLIGRAM(S): at 05:45

## 2023-03-01 RX ADMIN — HYDROMORPHONE HYDROCHLORIDE 4 MILLIGRAM(S): 2 INJECTION INTRAMUSCULAR; INTRAVENOUS; SUBCUTANEOUS at 09:59

## 2023-03-01 RX ADMIN — HYDROMORPHONE HYDROCHLORIDE 4 MILLIGRAM(S): 2 INJECTION INTRAMUSCULAR; INTRAVENOUS; SUBCUTANEOUS at 00:35

## 2023-03-01 RX ADMIN — HYDROMORPHONE HYDROCHLORIDE 0.5 MILLIGRAM(S): 2 INJECTION INTRAMUSCULAR; INTRAVENOUS; SUBCUTANEOUS at 23:05

## 2023-03-01 RX ADMIN — HYDROMORPHONE HYDROCHLORIDE 4 MILLIGRAM(S): 2 INJECTION INTRAMUSCULAR; INTRAVENOUS; SUBCUTANEOUS at 17:10

## 2023-03-01 RX ADMIN — HYDROMORPHONE HYDROCHLORIDE 4 MILLIGRAM(S): 2 INJECTION INTRAMUSCULAR; INTRAVENOUS; SUBCUTANEOUS at 01:05

## 2023-03-01 RX ADMIN — HYDROMORPHONE HYDROCHLORIDE 4 MILLIGRAM(S): 2 INJECTION INTRAMUSCULAR; INTRAVENOUS; SUBCUTANEOUS at 10:59

## 2023-03-01 RX ADMIN — HYDROMORPHONE HYDROCHLORIDE 0.5 MILLIGRAM(S): 2 INJECTION INTRAMUSCULAR; INTRAVENOUS; SUBCUTANEOUS at 22:05

## 2023-03-01 RX ADMIN — HYDROMORPHONE HYDROCHLORIDE 4 MILLIGRAM(S): 2 INJECTION INTRAMUSCULAR; INTRAVENOUS; SUBCUTANEOUS at 05:45

## 2023-03-01 RX ADMIN — Medication 1 TABLET(S): at 09:59

## 2023-03-01 RX ADMIN — HYDROMORPHONE HYDROCHLORIDE 2 MILLIGRAM(S): 2 INJECTION INTRAMUSCULAR; INTRAVENOUS; SUBCUTANEOUS at 21:11

## 2023-03-01 RX ADMIN — Medication 3 MILLIGRAM(S): at 23:55

## 2023-03-01 RX ADMIN — Medication 100 MILLIGRAM(S): at 22:12

## 2023-03-01 RX ADMIN — HYDROMORPHONE HYDROCHLORIDE 4 MILLIGRAM(S): 2 INJECTION INTRAMUSCULAR; INTRAVENOUS; SUBCUTANEOUS at 16:10

## 2023-03-01 RX ADMIN — HYDROMORPHONE HYDROCHLORIDE 2 MILLIGRAM(S): 2 INJECTION INTRAMUSCULAR; INTRAVENOUS; SUBCUTANEOUS at 21:53

## 2023-03-01 RX ADMIN — ATORVASTATIN CALCIUM 80 MILLIGRAM(S): 80 TABLET, FILM COATED ORAL at 22:06

## 2023-03-01 RX ADMIN — HYDROMORPHONE HYDROCHLORIDE 2 MILLIGRAM(S): 2 INJECTION INTRAMUSCULAR; INTRAVENOUS; SUBCUTANEOUS at 20:53

## 2023-03-01 RX ADMIN — HYDROMORPHONE HYDROCHLORIDE 4 MILLIGRAM(S): 2 INJECTION INTRAMUSCULAR; INTRAVENOUS; SUBCUTANEOUS at 06:15

## 2023-03-01 RX ADMIN — Medication 2: at 09:30

## 2023-03-01 RX ADMIN — INSULIN GLARGINE 48 UNIT(S): 100 INJECTION, SOLUTION SUBCUTANEOUS at 22:06

## 2023-03-01 NOTE — PROGRESS NOTE ADULT - PROBLEM SELECTOR PLAN 3
Possibly 2/2 acute blood loss from surgery   - s/p PRBC unit 2/27  - hemoglobin 8.4  monitor and trend CBC.

## 2023-03-01 NOTE — PROGRESS NOTE ADULT - PROBLEM SELECTOR PLAN 2
2/15 blood cx +MSSA, recent bld cx 2/17 NGTD  - c/w ancef   - ID recs appreciated  - TTE negative, but limited  - psych consult for capacity: has capacity and able to refuse tests/procedures.  - ID recs:favor PICC line with 6 weeks IV antibiotics until 3/30 but if he declines, would give at least 4 weeks IV antibiotics until 3/16 and finish with oral Cefadroxil 500mg BID

## 2023-03-01 NOTE — PROVIDER CONTACT NOTE (OTHER) - BACKGROUND
Patient admitted for right foot Patient admitted for right foot pain and swelling. Patient diagnosed with sepsis. Patient s/p I&D of right ankle bursa with resection of metatarsal bone.

## 2023-03-01 NOTE — PROGRESS NOTE ADULT - ASSESSMENT
52M A1c 10.8%, R TMA 6/2021, CAD s/p PCI, gout.   Here 2/15 with sepsis, MSSA bacteremia.   From TMA wound (first metatarsal) but also concern for osteomyelitis, septic arthritis and tenosynovitis.   Local culture grew MSSA, Strep anginosus, Peptostreptococcus.   Blood cleared 2/17, TTE negative but limited.   s/p OR 2/24 partial 1st ray resection, ankle I&D with pus.   High concern for residual infection but he refused further surgery and does not want IV antibiotics upon discharge.   He is aware of risk for future limb loss.     Suggest  -can stop Flagyl   -Cefazolin 2GM q8h   -f/u final OR cultures and path  -favor PICC line with 6 weeks IV antibiotics until 3/30 but if he declines, would give at least 4 weeks IV antibiotics until 3/16 and finish with oral Cefadroxil 500mg BID     Discussed with medicine     Sav Riley MD   Infectious Disease   Available on TEAMS. After 5PM and on weekends please page fellow on call or call [phone]

## 2023-03-01 NOTE — PROVIDER CONTACT NOTE (OTHER) - SITUATION
Patient refusing 0600 Metoprolol succinate ER 25mg PO
Pt hgb 6.4, hematocrit 20.1
Pt refusing to take Metoprolol or let RN do BP
Pt unable to go for angiogram at stated time. Wanting to sign out AMA. Refusing fingerstick and medications for 6 pm
Patient is refusing blood draw and POCT BG.
Patient refused blood transfusion, pt states " absolutely not! I will not take another persons blood" pt is very adamant about not receiving a blood transfusion.
Before meal insulin held.
Patient refusing new IV access.
Pt refusing Aspirin, CefaZolin, Sertraline
refusing PACU VS #3/#4 and pre-op am labs (aptt/cbc/cmp/ptinr/t&s). Refusing 2L NC &  monitor
Pt refused CBC blood draw, Pt states " I am not doing more blood work I know the numbers are accurate"
Pt refusing Metprolol, asked to move it to 8am
Patient refusing echocardiogram
Pt admitted w/R septic foot
pt refusing AM vitals and has Metoprolol due
Pt refused blood glucose check, pt refused insulin pt only accepted pain medication dilaudid
pain
pt refusing Fingersticks, insulin, care, PT and wound vac.

## 2023-03-01 NOTE — PROVIDER CONTACT NOTE (OTHER) - DATE AND TIME:
20-Feb-2023 06:33
17-Feb-2023 09:00
19-Feb-2023 17:50
25-Feb-2023 08:56
25-Feb-2023 13:15
25-Feb-2023 18:30
27-Feb-2023 09:20
01-Mar-2023 02:50
01-Mar-2023 09:54
01-Mar-2023 14:00
23-Feb-2023 06:14
23-Feb-2023 18:30
22-Feb-2023 15:00
26-Feb-2023 05:23
28-Feb-2023 14:00
19-Feb-2023 09:37
20-Feb-2023 09:00
24-Feb-2023 21:00
22-Feb-2023 14:00
24-Feb-2023 12:37
25-Feb-2023 00:00
25-Feb-2023 09:45
27-Feb-2023 06:19
24-Feb-2023 06:00

## 2023-03-01 NOTE — PROGRESS NOTE ADULT - PROBLEM SELECTOR PLAN 1
h/o poorly controlled dm, pad s/p right toe amputations  OM over first metatarsal stump confirmed on MRI, CT w/ septic arthritis of underlying 1st tarsometatarsal joint;   - podiatry s/p debridement/amputation of r 1st ray - Friday 2/24,   - Podiatry plans to RTOR for delayed primary closure, however patient refused  - s/p wound vac   - s/p arthrocentesis by podiatry ngtd  - ID following - IV ancef given MSSA bacteremia, bld cx cleared since 2/17  - d/c flagyl  - recs on continuing with IV antibiotics for 6 weeks, however patient refusing   - vasc sx consult appreciated, s/p diagnostic angio  - pain management recs appreciated

## 2023-03-01 NOTE — PROGRESS NOTE ADULT - SUBJECTIVE AND OBJECTIVE BOX
[facility] EMERGENCY DEPT 
[address] [place] [id number] 
[phone] Work/School Note Date: 12/4/2017 To Whom It May concern: 
 
Garret Vidal was seen and treated today in the emergency room by the following provider(s): 
Attending Provider: Tae Alonzo MD 
Nurse Practitioner: Sissy Elmore NP. Garret Vidal may return to work on 12/06/2017. Sincerely, Sissy Elmore NP 
 
 
 
 [facility] Division of Hospital Medicine  Russ Cotter MD  Available via MS Teams      SUBJECTIVE / OVERNIGHT EVENTS:  No acute events overnight. Patient refusing IV antibiotics on discharge.     ADDITIONAL REVIEW OF SYSTEMS:  unable to obtian, as patient refuses to participate in interview    MEDICATIONS  (STANDING):  aspirin enteric coated 81 milliGRAM(s) Oral daily  atorvastatin 80 milliGRAM(s) Oral at bedtime  ceFAZolin   IVPB 2000 milliGRAM(s) IV Intermittent every 8 hours  glucagon  Injectable 1 milliGRAM(s) IntraMuscular once  influenza   Vaccine 0.5 milliLiter(s) IntraMuscular once  insulin glargine Injectable (LANTUS) 48 Unit(s) SubCutaneous at bedtime  insulin lispro (ADMELOG) corrective regimen sliding scale   SubCutaneous three times a day before meals  insulin lispro (ADMELOG) corrective regimen sliding scale   SubCutaneous at bedtime  insulin lispro Injectable (ADMELOG) 14 Unit(s) SubCutaneous three times a day before meals  lactobacillus acidophilus 1 Tablet(s) Oral two times a day with meals  metoprolol succinate ER 25 milliGRAM(s) Oral daily  naloxone Injectable 0.4 milliGRAM(s) IV Push once  sertraline 50 milliGRAM(s) Oral daily    MEDICATIONS  (PRN):  aluminum hydroxide/magnesium hydroxide/simethicone Suspension 30 milliLiter(s) Oral every 4 hours PRN Dyspepsia  bisacodyl 5 milliGRAM(s) Oral daily PRN Constipation  HYDROmorphone   Tablet 2 milliGRAM(s) Oral every 4 hours PRN Moderate Pain (4 - 6)  HYDROmorphone   Tablet 4 milliGRAM(s) Oral every 4 hours PRN Severe Pain (7 - 10)  HYDROmorphone  Injectable 0.5 milliGRAM(s) IV Push daily PRN severe breakthrough pain  melatonin 3 milliGRAM(s) Oral at bedtime PRN Insomnia  ondansetron Injectable 4 milliGRAM(s) IV Push every 8 hours PRN Nausea and/or Vomiting  senna 2 Tablet(s) Oral at bedtime PRN Constipation      I&O's Summary    28 Feb 2023 07:01  -  01 Mar 2023 07:00  --------------------------------------------------------  IN: 480 mL / OUT: 1850 mL / NET: -1370 mL    01 Mar 2023 07:01  -  01 Mar 2023 14:55  --------------------------------------------------------  IN: 360 mL / OUT: 250 mL / NET: 110 mL        PHYSICAL EXAM:  Vital Signs Last 24 Hrs  T(C): 36.8 (01 Mar 2023 13:30), Max: 36.8 (01 Mar 2023 13:30)  T(F): 98.3 (01 Mar 2023 13:30), Max: 98.3 (01 Mar 2023 13:30)  HR: 80 (01 Mar 2023 13:30) (61 - 80)  BP: 137/80 (01 Mar 2023 13:30) (115/59 - 151/82)  BP(mean): --  RR: 18 (01 Mar 2023 13:30) (18 - 18)  SpO2: 95% (01 Mar 2023 13:30) (94% - 98%)    Parameters below as of 01 Mar 2023 13:30  Patient On (Oxygen Delivery Method): room air      CONSTITUTIONAL: NAD, well-developed, well-groomed  EYES: PERRLA; conjunctiva and sclera clear  ENMT: Moist oral mucosa, no pharyngeal injection or exudates; normal dentition  NECK: Supple, no palpable masses; no thyromegaly  RESPIRATORY: Normal respiratory effort; lungs are clear to auscultation bilaterally  CARDIOVASCULAR: Regular rate and rhythm, normal S1 and S2, no murmur/rub/gallop; No lower extremity edema; Peripheral pulses are 2+ bilaterally  ABDOMEN: Nontender to palpation, normoactive bowel sounds, no rebound/guarding; No hepatosplenomegaly  MUSCULOSKELETAL:  both feet dressed. right foot dorsum woundvac   PSYCH: A+O to person, place, and time; affect appropriate  NEUROLOGY:  no gross motor/sensory deficits   SKIN: No rashes; no palpable lesions    LABS:                        8.4    6.41  )-----------( 218      ( 01 Mar 2023 08:30 )             25.6     03-01    136  |  100  |  14  ----------------------------<  159<H>  4.6   |  26  |  1.05    Ca    8.9      01 Mar 2023 08:40  Phos  3.5     02-28  Mg     1.8     02-28      PT/INR - ( 28 Feb 2023 09:17 )   PT: 13.9 sec;   INR: 1.21 ratio         PTT - ( 28 Feb 2023 09:17 )  PTT:34.0 sec          COVID-19 PCR: NotDetec (27 Feb 2023 13:10)  COVID-19 PCR: NotDetec (22 Feb 2023 09:33)  SARS-CoV-2: NotDetec (15 Feb 2023 14:14)      RADIOLOGY & ADDITIONAL TESTS:  ekg, labs, micro imaging personally reviewed      COORDINATION OF CARE:  care discussed and coordinated with consultants.

## 2023-03-01 NOTE — PROVIDER CONTACT NOTE (OTHER) - REASON
Patient refusing 0600 Metoprolol succinate ER 25mg PO
Patient refusing new IV access
Patient refused blood transfusion
Pt glucose 70
Pt refuses to eat and is hypoglycemic to 79
pt refuses PT eval
pt refusing vitals
Pt refused CBC blood draw
Pt refusing Metprolol, asked to move it to 8am
refusing am labs & vs
Patient refusing echocardiogram
pain
pt refusing Fingersticks, insulin, care, PT and wound vac.
Pt refused blood glucose check
pt glucose 72
Pt complains of pain and not due for oxy for 1 hour
Pt refusing vascular study
patient refusing care.
PT refusing meds
Pt attempting to sign out AMA
before breakfast insulin held
Pt hgb 6.4, hematocrit 20.1
Pt refusing to take Metoprolol or let RN do vitals

## 2023-03-01 NOTE — PROVIDER CONTACT NOTE (OTHER) - RECOMMENDATIONS
RN provided patient education on increased risk of infection and need for new IV. Patient expresses understanding.

## 2023-03-01 NOTE — PROVIDER CONTACT NOTE (OTHER) - ASSESSMENT
Patient refusing new IV access. Alert and oriented x4. No s/s of distress at this time. Resting comfortably.

## 2023-03-01 NOTE — CHART NOTE - NSCHARTNOTEFT_GEN_A_CORE
-200s over past 24 hours.   Pt refusing fs/insulin on/off.   Not enough data to make further changes at this time.   C/w current insulin regimen.     Can be reached via TEAMS/pager: [phone]   office:  [phone] (M-F 9a-5pm)               [phone] (nights/weekends)   Can access [employer] coverage via sunrise/tools

## 2023-03-01 NOTE — PROGRESS NOTE ADULT - SUBJECTIVE AND OBJECTIVE BOX
Follow Up: foot infection, bacteremia    Interval History/ROS: Sleeping, briefly acknowledges me and only answers yes/no or nods/shakes his head. Denies chills, pain or diarrhea. Does not want to consider more surgery or IV antibiotics at discharge. Discussed risk for limb loss with suboptimal management and he understands.     Allergies  No Known Allergies        ANTIMICROBIALS:  ceFAZolin   IVPB 2000 every 8 hours  metroNIDAZOLE    Tablet 500 two times a day      OTHER MEDS:  aluminum hydroxide/magnesium hydroxide/simethicone Suspension 30 milliLiter(s) Oral every 4 hours PRN  aspirin enteric coated 81 milliGRAM(s) Oral daily  atorvastatin 80 milliGRAM(s) Oral at bedtime  bisacodyl 5 milliGRAM(s) Oral daily PRN  glucagon  Injectable 1 milliGRAM(s) IntraMuscular once  HYDROmorphone   Tablet 2 milliGRAM(s) Oral every 4 hours PRN  HYDROmorphone   Tablet 4 milliGRAM(s) Oral every 4 hours PRN  HYDROmorphone  Injectable 0.5 milliGRAM(s) IV Push daily PRN  influenza   Vaccine 0.5 milliLiter(s) IntraMuscular once  insulin glargine Injectable (LANTUS) 48 Unit(s) SubCutaneous at bedtime  insulin lispro (ADMELOG) corrective regimen sliding scale   SubCutaneous three times a day before meals  insulin lispro (ADMELOG) corrective regimen sliding scale   SubCutaneous at bedtime  insulin lispro Injectable (ADMELOG) 14 Unit(s) SubCutaneous three times a day before meals  lactobacillus acidophilus 1 Tablet(s) Oral two times a day with meals  melatonin 3 milliGRAM(s) Oral at bedtime PRN  metoprolol succinate ER 25 milliGRAM(s) Oral daily  naloxone Injectable 0.4 milliGRAM(s) IV Push once  ondansetron Injectable 4 milliGRAM(s) IV Push every 8 hours PRN  senna 2 Tablet(s) Oral at bedtime PRN  sertraline 50 milliGRAM(s) Oral daily      Vital Signs Last 24 Hrs  T(C): 36.6 (01 Mar 2023 09:12), Max: 36.6 (28 Feb 2023 17:50)  T(F): 97.9 (01 Mar 2023 09:12), Max: 97.9 (28 Feb 2023 17:50)  HR: 68 (01 Mar 2023 09:12) (61 - 68)  BP: 126/62 (01 Mar 2023 09:12) (115/59 - 151/82)  BP(mean): --  RR: 18 (01 Mar 2023 09:12) (18 - 18)  SpO2: 94% (01 Mar 2023 09:12) (94% - 98%)    Parameters below as of 01 Mar 2023 09:12  Patient On (Oxygen Delivery Method): room air        Physical Exam:  General: non toxic, sleeping   Cardio: regular rate   Respiratory: nonlabored   abd: nondistended  Musculoskeletal: both feet dressed. right foot dorsum woundvac   vascular: no phlebitis   Skin: no rash                          8.4    6.41  )-----------( 218      ( 01 Mar 2023 08:30 )             25.6       03-01    136  |  100  |  14  ----------------------------<  159<H>  4.6   |  26  |  1.05    Ca    8.9      01 Mar 2023 08:40  Phos  3.5     02-28  Mg     1.8     02-28            MICROBIOLOGY:  Culture - Fungal, Other (collected 02-24-23 @ 15:13)  Source: .Other Other  Preliminary Report (02-27-23 @ 07:48):    Testing in progress    Culture - Surgical Swab (collected 02-24-23 @ 15:09)  Source: .Surgical Swab Surgical Swab  Preliminary Report (02-25-23 @ 16:18):    No growth    Culture - Fungal, Tissue (collected 02-24-23 @ 15:09)  Source: .Tissue Other  Preliminary Report (02-27-23 @ 07:49):    Testing in progress    Culture - Tissue with Gram Stain (collected 02-24-23 @ 15:09)  Source: .Tissue Other  Gram Stain (02-24-23 @ 22:12):    No polymorphonuclear cells seen    No organisms seen  Preliminary Report (02-25-23 @ 16:53):    No growth    RADIOLOGY:  Images below reviewed personally  Xray Ankle Complete 3 Views, Right (02.24.23 @ 12:27)   Right foot TMA stump noted with smooth corticated osseous stump margins   and preserved appearing overlying soft tissue coverage. No gross   radiographic evidence for underlying osteomyelitis.  Appearance of dorsal midfoot/hindfoot and anterior distal calf skin   surface contour irregularities on lateral view suggesting ulceration   versus postsurgical change.  Chronic smallossicle adjacent to medial malleolar tip. No dislocations   or acute appearing fractures.  Congruent ankle mortise with smooth and intact talar dome. Preserved   visualized midfoot and hindfoot joint spaces and no joint margin erosions.  No discrete lytic or blastic lesions.    MR Ankle w/wo IV Cont, Right (02.17.23 @ 20:56)   Patchy low T1 signal abnormality with bone marrow edema,   osseous enhancement and osseous destruction noted at the base of the   first metatarsal and the distal aspect of the medial cuneiform. This   appears compatible with osteomyelitis. The extent of the osteomyelitis at   the first metatarsal is not known as the mid and distal aspect of the   bone or not imaged on this study. There is similar-appearing bone marrow   edema signal, osseousenhancement with less prominent low T1 signal noted   within the remaining cuneiforms as well as the bases of the second and   third metatarsals. There is patchy bone marrow edema and minimal T1   signal abnormality with enhancement noted within the cuboid as well.  Dedicated MRI imaging with contrast of the midfoot/forefoot is   recommended for complete evaluation of these bones and to evaluate the   extent of osteomyelitis.  Complex appearing peripherally enhancing tenosynovitis of the anterior   tibialis tendon concerning for septic tenosynovitis.   Follow Up: foot infection, bacteremia    Interval History/ROS: Sleeping, briefly acknowledges me and only answers yes/no or nods/shakes his head. Denies chills, pain or diarrhea. Does not want to consider more surgery or IV antibiotics at discharge, only oral. Discussed risk for limb loss with suboptimal management and he understands.     Allergies  No Known Allergies        ANTIMICROBIALS:  ceFAZolin   IVPB 2000 every 8 hours  metroNIDAZOLE    Tablet 500 two times a day      OTHER MEDS:  aluminum hydroxide/magnesium hydroxide/simethicone Suspension 30 milliLiter(s) Oral every 4 hours PRN  aspirin enteric coated 81 milliGRAM(s) Oral daily  atorvastatin 80 milliGRAM(s) Oral at bedtime  bisacodyl 5 milliGRAM(s) Oral daily PRN  glucagon  Injectable 1 milliGRAM(s) IntraMuscular once  HYDROmorphone   Tablet 2 milliGRAM(s) Oral every 4 hours PRN  HYDROmorphone   Tablet 4 milliGRAM(s) Oral every 4 hours PRN  HYDROmorphone  Injectable 0.5 milliGRAM(s) IV Push daily PRN  influenza   Vaccine 0.5 milliLiter(s) IntraMuscular once  insulin glargine Injectable (LANTUS) 48 Unit(s) SubCutaneous at bedtime  insulin lispro (ADMELOG) corrective regimen sliding scale   SubCutaneous three times a day before meals  insulin lispro (ADMELOG) corrective regimen sliding scale   SubCutaneous at bedtime  insulin lispro Injectable (ADMELOG) 14 Unit(s) SubCutaneous three times a day before meals  lactobacillus acidophilus 1 Tablet(s) Oral two times a day with meals  melatonin 3 milliGRAM(s) Oral at bedtime PRN  metoprolol succinate ER 25 milliGRAM(s) Oral daily  naloxone Injectable 0.4 milliGRAM(s) IV Push once  ondansetron Injectable 4 milliGRAM(s) IV Push every 8 hours PRN  senna 2 Tablet(s) Oral at bedtime PRN  sertraline 50 milliGRAM(s) Oral daily      Vital Signs Last 24 Hrs  T(C): 36.6 (01 Mar 2023 09:12), Max: 36.6 (28 Feb 2023 17:50)  T(F): 97.9 (01 Mar 2023 09:12), Max: 97.9 (28 Feb 2023 17:50)  HR: 68 (01 Mar 2023 09:12) (61 - 68)  BP: 126/62 (01 Mar 2023 09:12) (115/59 - 151/82)  BP(mean): --  RR: 18 (01 Mar 2023 09:12) (18 - 18)  SpO2: 94% (01 Mar 2023 09:12) (94% - 98%)    Parameters below as of 01 Mar 2023 09:12  Patient On (Oxygen Delivery Method): room air        Physical Exam:  General: non toxic, sleeping   Cardio: regular rate   Respiratory: nonlabored   abd: nondistended  Musculoskeletal: both feet dressed. right foot dorsum woundvac   vascular: no phlebitis   Skin: no rash                          8.4    6.41  )-----------( 218      ( 01 Mar 2023 08:30 )             25.6       03-01    136  |  100  |  14  ----------------------------<  159<H>  4.6   |  26  |  1.05    Ca    8.9      01 Mar 2023 08:40  Phos  3.5     02-28  Mg     1.8     02-28            MICROBIOLOGY:  Culture - Fungal, Other (collected 02-24-23 @ 15:13)  Source: .Other Other  Preliminary Report (02-27-23 @ 07:48):    Testing in progress    Culture - Surgical Swab (collected 02-24-23 @ 15:09)  Source: .Surgical Swab Surgical Swab  Preliminary Report (02-25-23 @ 16:18):    No growth    Culture - Fungal, Tissue (collected 02-24-23 @ 15:09)  Source: .Tissue Other  Preliminary Report (02-27-23 @ 07:49):    Testing in progress    Culture - Tissue with Gram Stain (collected 02-24-23 @ 15:09)  Source: .Tissue Other  Gram Stain (02-24-23 @ 22:12):    No polymorphonuclear cells seen    No organisms seen  Preliminary Report (02-25-23 @ 16:53):    No growth    RADIOLOGY:  Images below reviewed personally  Xray Ankle Complete 3 Views, Right (02.24.23 @ 12:27)   Right foot TMA stump noted with smooth corticated osseous stump margins   and preserved appearing overlying soft tissue coverage. No gross   radiographic evidence for underlying osteomyelitis.  Appearance of dorsal midfoot/hindfoot and anterior distal calf skin   surface contour irregularities on lateral view suggesting ulceration   versus postsurgical change.  Chronic smallossicle adjacent to medial malleolar tip. No dislocations   or acute appearing fractures.  Congruent ankle mortise with smooth and intact talar dome. Preserved   visualized midfoot and hindfoot joint spaces and no joint margin erosions.  No discrete lytic or blastic lesions.    MR Ankle w/wo IV Cont, Right (02.17.23 @ 20:56)   Patchy low T1 signal abnormality with bone marrow edema,   osseous enhancement and osseous destruction noted at the base of the   first metatarsal and the distal aspect of the medial cuneiform. This   appears compatible with osteomyelitis. The extent of the osteomyelitis at   the first metatarsal is not known as the mid and distal aspect of the   bone or not imaged on this study. There is similar-appearing bone marrow   edema signal, osseousenhancement with less prominent low T1 signal noted   within the remaining cuneiforms as well as the bases of the second and   third metatarsals. There is patchy bone marrow edema and minimal T1   signal abnormality with enhancement noted within the cuboid as well.  Dedicated MRI imaging with contrast of the midfoot/forefoot is   recommended for complete evaluation of these bones and to evaluate the   extent of osteomyelitis.  Complex appearing peripherally enhancing tenosynovitis of the anterior   tibialis tendon concerning for septic tenosynovitis.

## 2023-03-01 NOTE — PROVIDER CONTACT NOTE (OTHER) - NAME OF MD/NP/PA/DO NOTIFIED:
MICKIE Ken
NIGHT ACP Cydney Loya [id number]
NP Jammie Shallow
NP Zeta Tim
RAZA Haskins
Rosio Jackson
Lavern Menjivar
NP Jammie Shallow
raven aquino
NIGHT ACP Cydney Loya [id number]
NP Zeta Trudi montemayor
Edwina sutton
MICKIE Rivas
Bj López, NP
Bj López, NP
MICKIE Ken
RAZA Desai
Bj López, NP
MICKIE Cortez
RAZA Desai
Bj López, NP
Cem Nails
Cem Nails
[place] from podiatry

## 2023-03-01 NOTE — PROVIDER CONTACT NOTE (OTHER) - BACKGROUND
53 yo m w/ pmh dm, cad s/p pci w stenths, htn, hld, anx/dep, ed, p/w ight foot redness, swelling, pain/redness, warmth

## 2023-03-01 NOTE — PROGRESS NOTE ADULT - TIME BILLING
Counseling and coordinating care with other providers and microbiology
- Ordering, reviewing, and interpreting labs, testing, and imaging.  - Independently obtaining a review of systems and performing a physical exam  - Reviewing consultant documentation/recommendations in addition to discussing plan of care with consultants.  - Counselling and educating patient and family regarding interpretation of aforementioned items and plan of care.
- Ordering, reviewing, and interpreting labs, testing, and imaging.  - Independently obtaining a review of systems and performing a physical exam  - Reviewing consultant documentation/recommendations in addition to discussing plan of care with consultants.  - Counselling and educating patient and family regarding interpretation of aforementioned items and plan of care.

## 2023-03-01 NOTE — PROVIDER CONTACT NOTE (OTHER) - BACKGROUND
53 yo m w/ pmh dm, cad s/p pci w stents, htn, hld, anx/dep, ed, p/w right foot redness, swelling, pain/tenderness, warmth.

## 2023-03-02 LAB
ANION GAP SERPL CALC-SCNC: 11 MMOL/L — SIGNIFICANT CHANGE UP (ref 5–17)
BUN SERPL-MCNC: 14 MG/DL — SIGNIFICANT CHANGE UP (ref 7–23)
CALCIUM SERPL-MCNC: 9 MG/DL — SIGNIFICANT CHANGE UP (ref 8.4–10.5)
CHLORIDE SERPL-SCNC: 98 MMOL/L — SIGNIFICANT CHANGE UP (ref 96–108)
CO2 SERPL-SCNC: 25 MMOL/L — SIGNIFICANT CHANGE UP (ref 22–31)
CREAT SERPL-MCNC: 0.97 MG/DL — SIGNIFICANT CHANGE UP (ref 0.5–1.3)
EGFR: 95 ML/MIN/1.73M2 — SIGNIFICANT CHANGE UP
GLUCOSE BLDC GLUCOMTR-MCNC: 209 MG/DL — HIGH (ref 70–99)
GLUCOSE BLDC GLUCOMTR-MCNC: 216 MG/DL — HIGH (ref 70–99)
GLUCOSE BLDC GLUCOMTR-MCNC: 251 MG/DL — HIGH (ref 70–99)
GLUCOSE SERPL-MCNC: 259 MG/DL — HIGH (ref 70–99)
HCT VFR BLD CALC: 25.9 % — LOW (ref 39–50)
HGB BLD-MCNC: 8.4 G/DL — LOW (ref 13–17)
MCHC RBC-ENTMCNC: 29.2 PG — SIGNIFICANT CHANGE UP (ref 27–34)
MCHC RBC-ENTMCNC: 32.4 GM/DL — SIGNIFICANT CHANGE UP (ref 32–36)
MCV RBC AUTO: 89.9 FL — SIGNIFICANT CHANGE UP (ref 80–100)
NRBC # BLD: 0 /100 WBCS — SIGNIFICANT CHANGE UP (ref 0–0)
PLATELET # BLD AUTO: 242 K/UL — SIGNIFICANT CHANGE UP (ref 150–400)
POTASSIUM SERPL-MCNC: 4 MMOL/L — SIGNIFICANT CHANGE UP (ref 3.5–5.3)
POTASSIUM SERPL-SCNC: 4 MMOL/L — SIGNIFICANT CHANGE UP (ref 3.5–5.3)
RBC # BLD: 2.88 M/UL — LOW (ref 4.2–5.8)
RBC # FLD: 13.1 % — SIGNIFICANT CHANGE UP (ref 10.3–14.5)
SODIUM SERPL-SCNC: 134 MMOL/L — LOW (ref 135–145)
WBC # BLD: 5.71 K/UL — SIGNIFICANT CHANGE UP (ref 3.8–10.5)
WBC # FLD AUTO: 5.71 K/UL — SIGNIFICANT CHANGE UP (ref 3.8–10.5)

## 2023-03-02 PROCEDURE — 99232 SBSQ HOSP IP/OBS MODERATE 35: CPT

## 2023-03-02 RX ORDER — INSULIN GLARGINE 100 [IU]/ML
52 INJECTION, SOLUTION SUBCUTANEOUS AT BEDTIME
Refills: 0 | Status: DISCONTINUED | OUTPATIENT
Start: 2023-03-02 | End: 2023-03-03

## 2023-03-02 RX ADMIN — Medication 100 MILLIGRAM(S): at 22:14

## 2023-03-02 RX ADMIN — HYDROMORPHONE HYDROCHLORIDE 4 MILLIGRAM(S): 2 INJECTION INTRAMUSCULAR; INTRAVENOUS; SUBCUTANEOUS at 20:27

## 2023-03-02 RX ADMIN — HYDROMORPHONE HYDROCHLORIDE 4 MILLIGRAM(S): 2 INJECTION INTRAMUSCULAR; INTRAVENOUS; SUBCUTANEOUS at 00:55

## 2023-03-02 RX ADMIN — HYDROMORPHONE HYDROCHLORIDE 4 MILLIGRAM(S): 2 INJECTION INTRAMUSCULAR; INTRAVENOUS; SUBCUTANEOUS at 23:31

## 2023-03-02 RX ADMIN — HYDROMORPHONE HYDROCHLORIDE 4 MILLIGRAM(S): 2 INJECTION INTRAMUSCULAR; INTRAVENOUS; SUBCUTANEOUS at 10:39

## 2023-03-02 RX ADMIN — HYDROMORPHONE HYDROCHLORIDE 4 MILLIGRAM(S): 2 INJECTION INTRAMUSCULAR; INTRAVENOUS; SUBCUTANEOUS at 15:32

## 2023-03-02 RX ADMIN — Medication 4: at 18:10

## 2023-03-02 RX ADMIN — ATORVASTATIN CALCIUM 80 MILLIGRAM(S): 80 TABLET, FILM COATED ORAL at 22:13

## 2023-03-02 RX ADMIN — HYDROMORPHONE HYDROCHLORIDE 4 MILLIGRAM(S): 2 INJECTION INTRAMUSCULAR; INTRAVENOUS; SUBCUTANEOUS at 14:32

## 2023-03-02 RX ADMIN — INSULIN GLARGINE 52 UNIT(S): 100 INJECTION, SOLUTION SUBCUTANEOUS at 22:14

## 2023-03-02 RX ADMIN — Medication 100 MILLIGRAM(S): at 06:10

## 2023-03-02 RX ADMIN — Medication 100 MILLIGRAM(S): at 14:32

## 2023-03-02 RX ADMIN — HYDROMORPHONE HYDROCHLORIDE 4 MILLIGRAM(S): 2 INJECTION INTRAMUSCULAR; INTRAVENOUS; SUBCUTANEOUS at 04:57

## 2023-03-02 RX ADMIN — HYDROMORPHONE HYDROCHLORIDE 4 MILLIGRAM(S): 2 INJECTION INTRAMUSCULAR; INTRAVENOUS; SUBCUTANEOUS at 03:57

## 2023-03-02 RX ADMIN — HYDROMORPHONE HYDROCHLORIDE 4 MILLIGRAM(S): 2 INJECTION INTRAMUSCULAR; INTRAVENOUS; SUBCUTANEOUS at 09:39

## 2023-03-02 RX ADMIN — Medication 25 MILLIGRAM(S): at 06:10

## 2023-03-02 RX ADMIN — Medication 3 MILLIGRAM(S): at 23:31

## 2023-03-02 NOTE — PROGRESS NOTE ADULT - SUBJECTIVE AND OBJECTIVE BOX
[facility] Division of Hospital Medicine  Russ Cotter MD  Available via MS Teams      SUBJECTIVE / OVERNIGHT EVENTS:  No acute events overnight. Patient notes pain in RLE is better. He is sleeping better. Denies chest pain, sob, n/v/d, fevers.     ADDITIONAL REVIEW OF SYSTEMS:  negative, as above  MEDICATIONS  (STANDING):  aspirin enteric coated 81 milliGRAM(s) Oral daily  atorvastatin 80 milliGRAM(s) Oral at bedtime  ceFAZolin   IVPB 2000 milliGRAM(s) IV Intermittent every 8 hours  glucagon  Injectable 1 milliGRAM(s) IntraMuscular once  influenza   Vaccine 0.5 milliLiter(s) IntraMuscular once  insulin glargine Injectable (LANTUS) 48 Unit(s) SubCutaneous at bedtime  insulin lispro (ADMELOG) corrective regimen sliding scale   SubCutaneous three times a day before meals  insulin lispro (ADMELOG) corrective regimen sliding scale   SubCutaneous at bedtime  insulin lispro Injectable (ADMELOG) 14 Unit(s) SubCutaneous three times a day before meals  lactobacillus acidophilus 1 Tablet(s) Oral two times a day with meals  metoprolol succinate ER 25 milliGRAM(s) Oral daily  naloxone Injectable 0.4 milliGRAM(s) IV Push once  sertraline 50 milliGRAM(s) Oral daily    MEDICATIONS  (PRN):  aluminum hydroxide/magnesium hydroxide/simethicone Suspension 30 milliLiter(s) Oral every 4 hours PRN Dyspepsia  bisacodyl 5 milliGRAM(s) Oral daily PRN Constipation  HYDROmorphone   Tablet 2 milliGRAM(s) Oral every 4 hours PRN Moderate Pain (4 - 6)  HYDROmorphone   Tablet 4 milliGRAM(s) Oral every 4 hours PRN Severe Pain (7 - 10)  HYDROmorphone  Injectable 0.5 milliGRAM(s) IV Push daily PRN severe breakthrough pain  melatonin 3 milliGRAM(s) Oral at bedtime PRN Insomnia  ondansetron Injectable 4 milliGRAM(s) IV Push every 8 hours PRN Nausea and/or Vomiting  senna 2 Tablet(s) Oral at bedtime PRN Constipation      I&O's Summary    01 Mar 2023 07:01  -  02 Mar 2023 07:00  --------------------------------------------------------  IN: 460 mL / OUT: 1250 mL / NET: -790 mL        PHYSICAL EXAM:  Vital Signs Last 24 Hrs  T(C): 36.4 (02 Mar 2023 12:52), Max: 36.8 (01 Mar 2023 13:30)  T(F): 97.6 (02 Mar 2023 12:52), Max: 98.3 (01 Mar 2023 13:30)  HR: 85 (02 Mar 2023 12:52) (69 - 85)  BP: 142/75 (02 Mar 2023 12:52) (125/67 - 159/81)  BP(mean): --  RR: 18 (02 Mar 2023 12:52) (18 - 18)  SpO2: 98% (02 Mar 2023 12:52) (94% - 99%)    Parameters below as of 02 Mar 2023 12:52  Patient On (Oxygen Delivery Method): room air      CONSTITUTIONAL: NAD, well-developed, well-groomed  EYES: PERRLA; conjunctiva and sclera clear  ENMT: Moist oral mucosa, no pharyngeal injection or exudates; normal dentition  NECK: Supple, no palpable masses; no thyromegaly  RESPIRATORY: Normal respiratory effort; lungs are clear to auscultation bilaterally  CARDIOVASCULAR: Regular rate and rhythm, normal S1 and S2, no murmur/rub/gallop; No lower extremity edema; Peripheral pulses are 2+ bilaterally  ABDOMEN: Nontender to palpation, normoactive bowel sounds, no rebound/guarding; No hepatosplenomegaly  MUSCULOSKELETAL:  both feet dressed. right foot dorsum woundvac   PSYCH: A+O to person, place, and time; affect appropriate  NEUROLOGY:  no gross motor/sensory deficits   SKIN: No rashes; no palpable lesions      LABS:                        8.4    5.71  )-----------( 242      ( 02 Mar 2023 08:36 )             25.9     03-02    134<L>  |  98  |  14  ----------------------------<  259<H>  4.0   |  25  |  0.97    Ca    9.0      02 Mar 2023 08:38                COVID-19 PCR: NotDetec (27 Feb 2023 13:10)  COVID-19 PCR: NotDetec (22 Feb 2023 09:33)  SARS-CoV-2: NotDete (15 Feb 2023 14:14)      RADIOLOGY & ADDITIONAL TESTS:  ekg, labs, micro imaging personally reviewed      COORDINATION OF CARE:  care discussed and coordinated with consultants.

## 2023-03-02 NOTE — PROGRESS NOTE ADULT - ASSESSMENT
51 y/o M w/ hx of uncontrolled Type 2 DM w/ hyperglycemia complicated by neuropathy, amputations, retinopathy and CAD, HTN and HLD admitted for sepsis 2/2 right foot infection (high risk patient with severely uncontrolled diabetes with A1c of 11.2% at high risk of CAD and CVA with high level decision-making). s/p podiatry partial ray resection with I&D 2/24       Uncontrolled type 2 diabetes mellitus with hyperglycemia, with long-term current use of insulin.   ·  Plan:   - pt refusing insulin/POC BG intermittently difficult to make changes to prandial regimen however FBG above goal increase basal insulin   - BG Goal 100-180mg/dl   -test BG AC/HS  -FBG above goal Increase Lantus 52 units QHS  -c/w Admelog 14 units AC meals  -c/w Admelog moderate correction scale AC and mod HS scale  -cons carb diet  Outpt. optho, podiatry, nephrology  Discharge:  Difficult to provide recommendations for bolus doses as pt intermittently refusing poc/insulin so no pattern available to change bolus insulin  if fasting BG at goal tomorrow continue Lantus 52 units sq qhs ; can continue Admelog 14 units TID AC for now with outpatient follow up for any BG <70s or persistently >200 for further adjustments   Metformin 1000mg BID  Trulicity 3mg/week injection  F/u with  Dr Jenifer London 5/3. Can schedule sooner w/NP if needed.  ensure pt has adequate DM supplies and all insulin supplies      Problem/Plan - 2:  ·  Problem: HTN (hypertension).   ·  Plan: Recommendation: Goal BP <130/80 c/w lisinopril.     Problem/Plan - 3:  ·  Problem: HLD (hyperlipidemia).   ·  Plan: Recommendation: statin therapy        discussed with patient and primary team Eduin CORADO And RN   Contact via Microsoft Teams during business hours  To reach covering provider access AMION via sunrise tools  For Urgent matters/after-hours/weekends/holidays please page endocrine fellow on call   For nonurgent matters please email [email or web address]    Please note that this patient may be followed by different provider tomorrow.  Notify endocrine 24 hours prior to discharge for final recommendations    greater than 50% of the encounter was spent counseling and/or coordination of care.  26 minutes spent on total encounter; The necessity of the time spent during the encounter on this date of service was due to development of plan of care/coordination of care/glycemic control through review of labs, blood glucose values and vital signs.

## 2023-03-02 NOTE — PROGRESS NOTE ADULT - PROBLEM SELECTOR PROBLEM 9
HTN (hypertension)
HLD (hyperlipidemia)
HLD (hyperlipidemia)
HTN (hypertension)
HLD (hyperlipidemia)
HTN (hypertension)
HTN (hypertension)

## 2023-03-02 NOTE — PROGRESS NOTE ADULT - PROBLEM SELECTOR PROBLEM 8
CAD (coronary artery disease)
CAD (coronary artery disease)
HTN (hypertension)
CAD (coronary artery disease)
HTN (hypertension)
HTN (hypertension)
CAD (coronary artery disease)
HTN (hypertension)
HTN (hypertension)
CAD (coronary artery disease)
Anxiety and depression
CAD (coronary artery disease)
CAD (coronary artery disease)

## 2023-03-02 NOTE — PROGRESS NOTE ADULT - PROBLEM SELECTOR PLAN 2
2/15 blood cx +MSSA, recent bld cx 2/17 NGTD  - ID recs appreciated  - TTE negative, but limited  - psych consult for capacity: has capacity and able to refuse tests/procedures.  - ID recs:favor PICC line with 6 weeks IV antibiotics until 3/30 but if he declines, would give at least 4 weeks IV antibiotics until 3/16 and finish with oral Cefadroxil 500mg BID.  - however he refuses IV antibiotics, spoke with ID who recommended continuing with oral Cefadroxil 500mg BID until 3/31  - patient understands risks of losing limbs and still refuses IV antibiotics   - patient high risk of recurrent infection, and aware/verbalizes  consequences

## 2023-03-02 NOTE — PROGRESS NOTE ADULT - PROBLEM SELECTOR PLAN 5
lactic acidosis 3.3->1.8 (resolved),beta hydroxy negative  - stress hyperglycemia iso severe sepsis + poorly controlled dm  - resolved
lactic acidosis 3.3->1.8 (resolved)  - stress hyperglycemia iso severe sepsis + poorly controlled dm  - resolved  - beta hydroxy negative
Creatine 1.1, improved  monitor and trend BMP  - hold morphine, hold ace inh.
Creatine 1.1, improved  monitor and trend BMP  - hold morphine, hold ace inh.
lactic acidosis 3.3->1.8 (resolved),beta hydroxy negative  - stress hyperglycemia iso severe sepsis + poorly controlled dm  - resolved
lactic acidosis 3.3->1.8 (resolved),beta hydroxy negative  - stress hyperglycemia iso severe sepsis + poorly controlled dm  - resolved
Slightly better today possibly hemodynamically mediated as pt required pressors post op  - monitor Cr on BMP  - hold morphine, hold ace inh
Creatine 1.1, improved  monitor and trend BMP  - hold morphine, resume ace inh.
s/p pci w stents  - prior recent ekg, tte, lhc reviewed  - monitor for chest pain, ekg changes, volume status changes  - c/w DAPT, statin  - c/w Toprol XL 25mg daily
lactic acidosis 3.3->1.8 (resolved),beta hydroxy negative  - stress hyperglycemia iso severe sepsis + poorly controlled dm  - resolved
Slightly better today possibly hemodynamically mediated as pt required pressors post op  - monitor Cr on BMP  - hold morphine, hold ace inh.
pre-renal etiology  improving  - monitor Cr on BMP  - hold morphine, hold ace inh
Slightly worse today possibly hemodynamically mediated as pt required pressors post op  - monitor Cr on BMP  - hold morphine, hold ace inh
pre-renal etiology  improving  - monitor Cr on BMP  - hold morphine, hold ace inh
pre-renal etiology  improving  - monitor Cr on BMP  - hold morphine, hold ace inh

## 2023-03-02 NOTE — PROGRESS NOTE ADULT - PROBLEM SELECTOR PROBLEM 7
DM (diabetes mellitus)
HLD (hyperlipidemia)
CAD (coronary artery disease)
CAD (coronary artery disease)
DM (diabetes mellitus)
DM (diabetes mellitus)
CAD (coronary artery disease)
DM (diabetes mellitus)

## 2023-03-02 NOTE — PROGRESS NOTE ADULT - PROBLEM SELECTOR PROBLEM 1
Diabetic foot
Uncontrolled type 2 diabetes mellitus with hyperglycemia, with long-term current use of insulin
Severe sepsis
Severe sepsis
Uncontrolled type 2 diabetes mellitus with hyperglycemia, with long-term current use of insulin
Uncontrolled type 2 diabetes mellitus with hyperglycemia, with long-term current use of insulin
Diabetic foot
Severe sepsis
Severe sepsis
Diabetic foot
Uncontrolled type 2 diabetes mellitus with hyperglycemia, with long-term current use of insulin
Severe sepsis
Diabetic foot
Severe sepsis
Severe sepsis
Diabetic foot

## 2023-03-02 NOTE — PROGRESS NOTE ADULT - PROBLEM SELECTOR PLAN 1
h/o poorly controlled dm, pad s/p right toe amputations  OM over first metatarsal stump confirmed on MRI, CT w/ septic arthritis of underlying 1st tarsometatarsal joint;   - podiatry s/p debridement/amputation of r 1st ray - Friday 2/24,   - Podiatry plans to RTOR for delayed primary closure, however patient refused  - s/p wound vac   - s/p arthrocentesis by podiatry ngtd  - ID following - IV ancef given MSSA bacteremia, bld cx cleared since 2/17  - d/c flagyl  - recs on continuing with IV antibiotics for 6 weeks, however patient refusing, spoke to ID who recommended 4 weeks of Oral Cefadroxil 500mg BID until 3/31   - vasc sx consult appreciated, s/p diagnostic angio  - pain management recs appreciated.  - - patient understands risks of losing limbs and still refuses IV antibiotics   - patient high risk of recurrent infection, and aware/verbalizes  consequences

## 2023-03-02 NOTE — PROGRESS NOTE ADULT - PROBLEM SELECTOR PROBLEM 6
High anion gap metabolic acidosis
DM (diabetes mellitus)
High anion gap metabolic acidosis
DM (diabetes mellitus)
DM (diabetes mellitus)
High anion gap metabolic acidosis
High anion gap metabolic acidosis
DM (diabetes mellitus)
DM (diabetes mellitus)
High anion gap metabolic acidosis
HTN (hypertension)
High anion gap metabolic acidosis

## 2023-03-02 NOTE — PROGRESS NOTE ADULT - PROBLEM SELECTOR PROBLEM 10
HLD (hyperlipidemia)
HLD (hyperlipidemia)
Anxiety and depression
HLD (hyperlipidemia)
HLD (hyperlipidemia)
Anxiety and depression
HLD (hyperlipidemia)
Anxiety and depression

## 2023-03-02 NOTE — PROGRESS NOTE ADULT - PROBLEM SELECTOR PROBLEM 3
HLD (hyperlipidemia)
Pre-operative examination
MSSA bacteremia
HLD (hyperlipidemia)
HLD (hyperlipidemia)
MSSA bacteremia
HLD (hyperlipidemia)
High anion gap metabolic acidosis
HLD (hyperlipidemia)
HLD (hyperlipidemia)
MSSA bacteremia
HLD (hyperlipidemia)
HLD (hyperlipidemia)
Acute blood loss anemia
MSSA bacteremia
Acute blood loss anemia
MSSA bacteremia
Acute blood loss anemia
MSSA bacteremia
Acute blood loss anemia
MSSA bacteremia

## 2023-03-02 NOTE — PROGRESS NOTE ADULT - NUTRITIONAL ASSESSMENT
Diet, NPO after Midnight:      NPO Start Date: 27-Feb-2023,   NPO Start Time: 23:59  Except Medications  With Ice Chips/Sips of Water (02-27-23 @ 11:07) [Active]  Diet, Consistent Carbohydrate Renal w/Evening Snack:   Supplement Feeding Modality:  Oral  Glucerna Shake Cans or Servings Per Day:  2       Frequency:  Daily (02-24-23 @ 12:01) [Active]
Diet, Consistent Carbohydrate/No Snacks (02-16-23 @ 06:52) [Active]
Diet, Consistent Carbohydrate/No Snacks (02-16-23 @ 06:52) [Active]
Diet, NPO after Midnight:      NPO Start Date: 27-Feb-2023,   NPO Start Time: 23:59  Except Medications  With Ice Chips/Sips of Water (02-27-23 @ 11:07) [Active]  Diet, Consistent Carbohydrate Renal w/Evening Snack:   Supplement Feeding Modality:  Oral  Glucerna Shake Cans or Servings Per Day:  2       Frequency:  Daily (02-24-23 @ 12:01) [Active]
Diet, Consistent Carbohydrate/No Snacks (02-16-23 @ 06:52) [Active]
Diet, Consistent Carbohydrate Renal w/Evening Snack:   Supplement Feeding Modality:  Oral  Glucerna Shake Cans or Servings Per Day:  2       Frequency:  Daily (02-24-23 @ 12:01) [Active]

## 2023-03-02 NOTE — PROGRESS NOTE ADULT - PROBLEM SELECTOR PROBLEM 5
TEENA (acute kidney injury)
High anion gap metabolic acidosis
High anion gap metabolic acidosis
TEENA (acute kidney injury)
TEENA (acute kidney injury)
CAD (coronary artery disease)
High anion gap metabolic acidosis
TEENA (acute kidney injury)
High anion gap metabolic acidosis
TEENA (acute kidney injury)
TEENA (acute kidney injury)
High anion gap metabolic acidosis
TEENA (acute kidney injury)

## 2023-03-02 NOTE — PROGRESS NOTE ADULT - PROBLEM SELECTOR PROBLEM 2
HTN (hypertension)
Pre-operative examination
HTN (hypertension)
HTN (hypertension)
Diabetic foot
Diabetic foot
MSSA bacteremia
HTN (hypertension)
HTN (hypertension)
MSSA bacteremia
Diabetic foot
HTN (hypertension)
MSSA bacteremia
Diabetic foot
Pre-operative examination
Diabetic foot
Diabetic foot
MSSA bacteremia
Diabetic foot
MSSA bacteremia
MSSA bacteremia

## 2023-03-02 NOTE — PROGRESS NOTE ADULT - PROBLEM SELECTOR PLAN 4
resolved  2/2 cellulitis, osteo, possible septic arthritis, MSSA bacteremia  - treatment of infection as above
resolved  2/2 cellulitis, osteo, possible septic arthritis, MSSA bacteremia  - treatment of infection as above
resolved  2/2 cellulitis, osteo, possible septic arthritis, MSSA bacteremia  - treatment of infection as above.
resolved  2/2 cellulitis, osteo, possible septic arthritis, MSSA bacteremia  - treatment of infection as above
resolved  2/2 cellulitis, osteo, possible septic arthritis, MSSA bacteremia  - treatment of infection as above.
pre-renal etiology given poor access to water in ED hallway until today + in setting of severe infection/sepsis  improving  - monitor Cr on BMP  - hold morphine, hold ace inh
resolved  2/2 cellulitis, osteo, possible septic arthritis, MSSA bacteremia  - treatment of infection as above
pre-renal etiology given poor access to water in ED hallway until today + in setting of severe infection/sepsis  - check bladder scan for PVR  - c/w IVF - LR x 10 hrs  - monitor Cr on BMP  - dc morphine, hold ace inh
BUN/Cr elevated today. suspect pre-renal etiology given poor access to water in ED hallway until today + in setting of severe infection/sepsis  - check bladder scan  - urine lytes checked and reviewed, FENa consistent with pre-renal etiology  - start NS @ 100cc/hr for total 1L  - reassess need for further IVF tomorrow  - monitor Cr on BMP
pre-renal etiology given poor access to water in ED hallway until today + in setting of severe infection/sepsis  improving  - check bladder scan for PVR- pending still  - monitor Cr on BMP  - hold morphine, hold ace inh
pre-renal etiology given poor access to water in ED hallway until today + in setting of severe infection/sepsis  improving  - check bladder scan for PVR- pending still  - monitor Cr on BMP  - hold morphine, hold ace inh
hold home metformin 1g bid, trulicity 3 mg weekly, glargine 66 u hs, lispro 24 u prandial + low dose correctional scale  - last a1c 1/2023 ~11.5; follow up a1c  - c/w lantus 66 units qHS  - c/w pre-meal lispro 24 units TID qAC, hold if NPO  - c/w sliding scale coverage  - change zosyn to run in NS  - Endo consulted, recs appreciated - not enough data to adjust basal/bolus yet
resolved  2/2 cellulitis, osteo, possible septic arthritis, MSSA bacteremia  - treatment of infection as above
resolved  2/2 cellulitis, osteo, possible septic arthritis, MSSA bacteremia  - treatment of infection as above
2/15 blood cx +MSSA, recent bld cx 2/17 NGTD  - abx de-escalated to ancef   - TTE to r/o vegetation/endocarditis

## 2023-03-02 NOTE — PROGRESS NOTE ADULT - PROBLEM SELECTOR PLAN 8
s/p pci w stents  -  DAPT was not resumed by surgical team when placing orders post op. For now will resume aspirin give stents were placed in 6/2022. Hold plavix for now pending repeat CBC   - statin  - c/w Toprol XL 25mg daily. s/p pci w stents  -  DAPT was not resumed by surgical team when placing orders post op. For now will resume aspirin give stents were placed in 6/2022.   - statin  - c/w Toprol XL 25mg daily.

## 2023-03-02 NOTE — PROGRESS NOTE ADULT - PROBLEM SELECTOR PLAN 7
s/p pci w stents  - prior recent ekg, tte, lhc reviewed  - c/w DAPT, statin  - c/w Toprol XL 25mg daily
poorly controlled, hypoglycemic this hosp course with poor po intake  HbA1c 10.8%  - endo following   - Insulin as per endo  - c/w sliding scale coverage
poorly controlled, hypoglycemic this hosp course with poor po intake  HbA1c 10.8%  - endo following   - Insulin as per endo  - c/w sliding scale coverage.
poorly controlled, hypoglycemic this hosp course with poor po intake  HbA1c 10.8%  - endo following   - Insulin as per endo  - c/w sliding scale coverage.  - has been refusing fingersticks and insulin as well, needs endocrine followup and DM regimen outpatient, will likely require insulin
s/p pci w stents  - c/w DAPT, statin  - c/w Toprol XL 25mg daily
poorly controlled, hypoglycemic this hosp course with poor po intake  HbA1c 10.8%  - endo following   - Insulin as per endo  - c/w sliding scale coverage
poorly controlled, hypoglycemic this hosp course with poor po intake  HbA1c 10.8%  - endo following   - Insulin as per endo  - c/w sliding scale coverage.
poorly controlled, hypoglycemic this hosp course with poor po intake  HbA1c 10.8%  - endo following -  - Insulin as per endo  - c/w sliding scale coverage
poorly controlled, hypoglycemic this hosp course with poor po intake  HbA1c 10.8%  - endo following   - Insulin as per endo  - c/w sliding scale coverage
rosuva 40 -> atorva 80
s/p pci w stents  - c/w DAPT, statin  - c/w Toprol XL 25mg daily
s/p pci w stents  - prior recent ekg, tte, lhc reviewed  - monitor for chest pain, ekg changes, volume status changes  - c/w DAPT, statin  - c/w Toprol XL 25mg daily
poorly controlled, hypoglycemic this hosp course with poor po intake  HbA1c 10.8%  - endo following   - Insulin as per endo  - c/w sliding scale coverage.
s/p pci w stents  - c/w DAPT, statin  - c/w Toprol XL 25mg daily
poorly controlled, hypoglycemic this hosp course with poor po intake  HbA1c 10.8%  - endo following   - Insulin as per endo  - c/w sliding scale coverage

## 2023-03-02 NOTE — PROGRESS NOTE ADULT - SUBJECTIVE AND OBJECTIVE BOX
seen earlier today     Chief Complaint: Type 2 Diabetes Mellitus     INTERVAL HX: pt refused insulin this am and refused fs at lunch, reports he didnt eat breakfast or lunch, has outside food [employer] coffee on table counseled pt should let RN check BG and administeri nsulin prior to eating dinner pt not receptive to recommendations despite counseling .  feels were not giving enough insulin at night. denies eating prior to FBG check this am       Review of Systems:  General: As above  Cardiovascular: No chest pain  Respiratory: No SOB  GI: No nausea, vomiting  Endocrine: no  S&Sx of hypoglycemia    Allergies    No Known Allergies    Intolerances      MEDICATIONS  (STANDING):  aspirin enteric coated 81 milliGRAM(s) Oral daily  atorvastatin 80 milliGRAM(s) Oral at bedtime  ceFAZolin   IVPB 2000 milliGRAM(s) IV Intermittent every 8 hours  glucagon  Injectable 1 milliGRAM(s) IntraMuscular once  influenza   Vaccine 0.5 milliLiter(s) IntraMuscular once  insulin glargine Injectable (LANTUS) 48 Unit(s) SubCutaneous at bedtime  insulin lispro (ADMELOG) corrective regimen sliding scale   SubCutaneous three times a day before meals  insulin lispro (ADMELOG) corrective regimen sliding scale   SubCutaneous at bedtime  insulin lispro Injectable (ADMELOG) 14 Unit(s) SubCutaneous three times a day before meals  lactobacillus acidophilus 1 Tablet(s) Oral two times a day with meals  metoprolol succinate ER 25 milliGRAM(s) Oral daily  naloxone Injectable 0.4 milliGRAM(s) IV Push once  sertraline 50 milliGRAM(s) Oral daily        atorvastatin   80 milliGRAM(s) Oral (03-01-23 @ 22:06)    insulin glargine Injectable (LANTUS)   48 Unit(s) SubCutaneous (03-01-23 @ 22:06)    insulin lispro Injectable (ADMELOG)   14 Unit(s) SubCutaneous (03-01-23 @ 16:12)        PHYSICAL EXAM:  VITALS: T(C): 36.4 (03-02-23 @ 12:52)  T(F): 97.6 (03-02-23 @ 12:52), Max: 98.3 (03-01-23 @ 21:30)  HR: 85 (03-02-23 @ 12:52) (69 - 85)  BP: 142/75 (03-02-23 @ 12:52) (125/67 - 159/81)  RR:  (18 - 18)  SpO2:  (96% - 99%)  Wt(kg): --  GENERAL: in NAD  Respiratory: Respirations unlabored   Extremities: Warm, no edema  NEURO: Alert , appropriate     LABS:  POCT Blood Glucose.: 251 mg/dL (03-02-23 @ 09:29)  POCT Blood Glucose.: 234 mg/dL (03-01-23 @ 21:41)  POCT Blood Glucose.: 249 mg/dL (03-01-23 @ 17:31)  POCT Blood Glucose.: 196 mg/dL (03-01-23 @ 15:27)  POCT Blood Glucose.: 161 mg/dL (03-01-23 @ 08:41)  POCT Blood Glucose.: 108 mg/dL (02-28-23 @ 21:33)  POCT Blood Glucose.: 200 mg/dL (02-28-23 @ 17:23)  POCT Blood Glucose.: 250 mg/dL (02-28-23 @ 07:41)  POCT Blood Glucose.: 123 mg/dL (02-27-23 @ 20:40)  POCT Blood Glucose.: 183 mg/dL (02-27-23 @ 18:37)                          8.4    5.71  )-----------( 242      ( 02 Mar 2023 08:36 )             25.9     03-02    134<L>  |  98  |  14  ----------------------------<  259<H>  4.0   |  25  |  0.97    Ca    9.0      02 Mar 2023 08:38      eGFR: 95 mL/min/1.73m2 (02 Mar 2023 08:38)    02-16 Chol 71 Direct LDL -- LDL calculated 33 HDL 16<L> Trig 116  Thyroid Function Tests:  02-16 @ 05:39 TSH 2.47 FreeT4 -- T3 -- Anti TPO -- Anti Thyroglobulin Ab -- TSI --      A1C with Estimated Average Glucose Result: 10.8 % (02-17-23 @ 07:07)  A1C with Estimated Average Glucose Result: 11.2 % (02-16-23 @ 05:39)    Estimated Average Glucose: 263 mg/dL (02-17-23 @ 07:07)  Estimated Average Glucose: 275 mg/dL (02-16-23 @ 05:39)        Diet, Consistent Carbohydrate Renal w/Evening Snack:   Supplement Feeding Modality:  Oral  Glucerna Shake Cans or Servings Per Day:  2       Frequency:  Daily (02-24-23 @ 12:01) [Active]

## 2023-03-02 NOTE — CHART NOTE - NSCHARTNOTEFT_GEN_A_CORE
Nutrition Follow Up Note  Patient seen for: nutrition follow-up    Chart reviewed, events noted. This is a "52y old  Male who presents with a chief complaint of r foot pain/tenderness, warmth, swelling" Per chart "OM over first metatarsal stump confirmed on MRI, CT w/ septic arthritis of underlying 1st tarsometatarsal joint"  s/p debridement/amputation of r 1st ray - , psych consult for capacity: has capacity and able to refuse tests/procedures.    Source: [x] Patient       [x] EMR        [] RN        [] Family at bedside       [] Other:    -If unable to interview patient: [] Trach/Vent/BiPAP  [] Disoriented/confused/inappropriate to interview    Diet Order:   Diet, Consistent Carbohydrate Renal w/Evening Snack:   Supplement Feeding Modality:  Oral  Glucerna Shake Cans or Servings Per Day:  2       Frequency:  Daily (23)    - Is current order appropriate/adequate? [x] Yes  []  No:     - PO intake :   [] >75%  Adequate    [x] 50-75%  Fair       [] <50%  Poor    - Nutrition-related concerns:  -interview kept brief per pt preference, wishing to rest, reports he is eating fine.   -reports he is not drinking Glucerna shakes due to dislike of flavor, pt agreeable to trial of chocolate. Pt encouraged to drink to supplement PO intake if intake is suboptimal at times.   - Endocrine following, pt with poorly controlled T2DM.   - Patient with no nutrition-related questions at this time. Made aware RD remains available as needed.     GI:  Last BM ___.   Bowel Regimen? [] Yes   [x] No      Weights: no new weight to assess   Daily Weight in k.1 ()    Nutritionally Pertinent MEDICATIONS  (STANDING):  atorvastatin  ceFAZolin   IVPB  glucagon  Injectable  insulin glargine Injectable (LANTUS)  insulin lispro (ADMELOG) corrective regimen sliding scale  insulin lispro (ADMELOG) corrective regimen sliding scale  insulin lispro Injectable (ADMELOG)  metoprolol succinate ER    Pertinent Labs:  @ 08:38: Na 134<L>, BUN 14, Cr 0.97, <H>, K+ 4.0, Phos --, Mg --, Alk Phos --, ALT/SGPT --, AST/SGOT --, HbA1c --    A1C with Estimated Average Glucose Result: 10.8 % (23 @ 07:07)  A1C with Estimated Average Glucose Result: 11.2 % (23 @ 05:39)    Finger Sticks:  POCT Blood Glucose.: 251 mg/dL ( @ 09:29)  POCT Blood Glucose.: 234 mg/dL ( @ 21:41)  POCT Blood Glucose.: 249 mg/dL ( @ 17:31)  POCT Blood Glucose.: 196 mg/dL ( @ 15:27)      Skin per nursing documentation: free of pressure injuries per nursing flow sheets, left foot diabetic ulcer   Edema: + 1left foot    Estimated Needs:   [x] no change since previous assessment  [] recalculated:     Previous Nutrition Diagnosis: Altered Nutrition Related Lab Values  Nutrition Diagnosis is: [x] ongoing  [] resolved [] not applicable being addressed with therapeutic diet, endocrine following     New Nutrition Diagnosis: [x] Not applicable    Nutrition Care Plan:  [x] In Progress  [] Achieved  [] Not applicable    Nutrition Interventions:     Education Provided:       [] Yes:  [x] No: pt declining at this time        Recommendations:         [x] Continue current diet order as tolerated.      [x] Continue Glucerna 2x daily. RD to switched flavor      [x] Encourage PO intake of protein-rich foods.      [x] RD remains available for diet education up request     Monitoring and Evaluation:   Continue to monitor nutritional intake, tolerance to diet prescription, weights, labs, skin integrity      RD remains available upon request and will follow up per protocol  Nuzhat Connor RD, CDN, Teams or Pager # [phone]

## 2023-03-02 NOTE — PROGRESS NOTE ADULT - PROBLEM SELECTOR PROBLEM 4
MSSA bacteremia
Severe sepsis
TEENA (acute kidney injury)
DM (diabetes mellitus)
TEENA (acute kidney injury)
Severe sepsis
TEENA (acute kidney injury)
Severe sepsis
Severe sepsis

## 2023-03-03 ENCOUNTER — TRANSCRIPTION ENCOUNTER (OUTPATIENT)
Age: 53
End: 2023-03-03

## 2023-03-03 ENCOUNTER — NON-APPOINTMENT (OUTPATIENT)
Age: 53
End: 2023-03-03

## 2023-03-03 VITALS
RESPIRATION RATE: 18 BRPM | TEMPERATURE: 98 F | DIASTOLIC BLOOD PRESSURE: 68 MMHG | HEART RATE: 66 BPM | SYSTOLIC BLOOD PRESSURE: 148 MMHG | OXYGEN SATURATION: 94 %

## 2023-03-03 LAB
ANION GAP SERPL CALC-SCNC: 11 MMOL/L — SIGNIFICANT CHANGE UP (ref 5–17)
BUN SERPL-MCNC: 18 MG/DL — SIGNIFICANT CHANGE UP (ref 7–23)
CALCIUM SERPL-MCNC: 9.2 MG/DL — SIGNIFICANT CHANGE UP (ref 8.4–10.5)
CHLORIDE SERPL-SCNC: 100 MMOL/L — SIGNIFICANT CHANGE UP (ref 96–108)
CO2 SERPL-SCNC: 26 MMOL/L — SIGNIFICANT CHANGE UP (ref 22–31)
CREAT SERPL-MCNC: 1.04 MG/DL — SIGNIFICANT CHANGE UP (ref 0.5–1.3)
EGFR: 87 ML/MIN/1.73M2 — SIGNIFICANT CHANGE UP
GLUCOSE SERPL-MCNC: 188 MG/DL — HIGH (ref 70–99)
HCT VFR BLD CALC: 27.8 % — LOW (ref 39–50)
HGB BLD-MCNC: 8.7 G/DL — LOW (ref 13–17)
MCHC RBC-ENTMCNC: 28.4 PG — SIGNIFICANT CHANGE UP (ref 27–34)
MCHC RBC-ENTMCNC: 31.3 GM/DL — LOW (ref 32–36)
MCV RBC AUTO: 90.8 FL — SIGNIFICANT CHANGE UP (ref 80–100)
NRBC # BLD: 0 /100 WBCS — SIGNIFICANT CHANGE UP (ref 0–0)
PLATELET # BLD AUTO: 229 K/UL — SIGNIFICANT CHANGE UP (ref 150–400)
POTASSIUM SERPL-MCNC: 4.1 MMOL/L — SIGNIFICANT CHANGE UP (ref 3.5–5.3)
POTASSIUM SERPL-SCNC: 4.1 MMOL/L — SIGNIFICANT CHANGE UP (ref 3.5–5.3)
RBC # BLD: 3.06 M/UL — LOW (ref 4.2–5.8)
RBC # FLD: 13.2 % — SIGNIFICANT CHANGE UP (ref 10.3–14.5)
SODIUM SERPL-SCNC: 137 MMOL/L — SIGNIFICANT CHANGE UP (ref 135–145)
WBC # BLD: 6.05 K/UL — SIGNIFICANT CHANGE UP (ref 3.8–10.5)
WBC # FLD AUTO: 6.05 K/UL — SIGNIFICANT CHANGE UP (ref 3.8–10.5)

## 2023-03-03 PROCEDURE — C1889: CPT

## 2023-03-03 PROCEDURE — 82947 ASSAY GLUCOSE BLOOD QUANT: CPT

## 2023-03-03 PROCEDURE — 87070 CULTURE OTHR SPECIMN AEROBIC: CPT

## 2023-03-03 PROCEDURE — 80048 BASIC METABOLIC PNL TOTAL CA: CPT

## 2023-03-03 PROCEDURE — 84156 ASSAY OF PROTEIN URINE: CPT

## 2023-03-03 PROCEDURE — 84550 ASSAY OF BLOOD/URIC ACID: CPT

## 2023-03-03 PROCEDURE — 85652 RBC SED RATE AUTOMATED: CPT

## 2023-03-03 PROCEDURE — 86923 COMPATIBILITY TEST ELECTRIC: CPT

## 2023-03-03 PROCEDURE — 84132 ASSAY OF SERUM POTASSIUM: CPT

## 2023-03-03 PROCEDURE — 88311 DECALCIFY TISSUE: CPT

## 2023-03-03 PROCEDURE — 84295 ASSAY OF SERUM SODIUM: CPT

## 2023-03-03 PROCEDURE — 36415 COLL VENOUS BLD VENIPUNCTURE: CPT

## 2023-03-03 PROCEDURE — 87186 SC STD MICRODIL/AGAR DIL: CPT

## 2023-03-03 PROCEDURE — 93923 UPR/LXTR ART STDY 3+ LVLS: CPT

## 2023-03-03 PROCEDURE — 85730 THROMBOPLASTIN TIME PARTIAL: CPT

## 2023-03-03 PROCEDURE — 84133 ASSAY OF URINE POTASSIUM: CPT

## 2023-03-03 PROCEDURE — 87086 URINE CULTURE/COLONY COUNT: CPT

## 2023-03-03 PROCEDURE — 82435 ASSAY OF BLOOD CHLORIDE: CPT

## 2023-03-03 PROCEDURE — 93306 TTE W/DOPPLER COMPLETE: CPT

## 2023-03-03 PROCEDURE — U0003: CPT

## 2023-03-03 PROCEDURE — 86140 C-REACTIVE PROTEIN: CPT

## 2023-03-03 PROCEDURE — C1887: CPT

## 2023-03-03 PROCEDURE — 85610 PROTHROMBIN TIME: CPT

## 2023-03-03 PROCEDURE — 97164 PT RE-EVAL EST PLAN CARE: CPT

## 2023-03-03 PROCEDURE — 85018 HEMOGLOBIN: CPT

## 2023-03-03 PROCEDURE — 73610 X-RAY EXAM OF ANKLE: CPT

## 2023-03-03 PROCEDURE — 84540 ASSAY OF URINE/UREA-N: CPT

## 2023-03-03 PROCEDURE — C9399: CPT

## 2023-03-03 PROCEDURE — 97161 PT EVAL LOW COMPLEX 20 MIN: CPT

## 2023-03-03 PROCEDURE — 83690 ASSAY OF LIPASE: CPT

## 2023-03-03 PROCEDURE — 84100 ASSAY OF PHOSPHORUS: CPT

## 2023-03-03 PROCEDURE — 87641 MR-STAPH DNA AMP PROBE: CPT

## 2023-03-03 PROCEDURE — 80061 LIPID PANEL: CPT

## 2023-03-03 PROCEDURE — 85027 COMPLETE CBC AUTOMATED: CPT

## 2023-03-03 PROCEDURE — 87040 BLOOD CULTURE FOR BACTERIA: CPT

## 2023-03-03 PROCEDURE — 97606 NEG PRS WND THER DME>50 SQCM: CPT

## 2023-03-03 PROCEDURE — 73723 MRI JOINT LWR EXTR W/O&W/DYE: CPT

## 2023-03-03 PROCEDURE — 87205 SMEAR GRAM STAIN: CPT

## 2023-03-03 PROCEDURE — 82010 KETONE BODYS QUAN: CPT

## 2023-03-03 PROCEDURE — 83935 ASSAY OF URINE OSMOLALITY: CPT

## 2023-03-03 PROCEDURE — C1894: CPT

## 2023-03-03 PROCEDURE — 87075 CULTR BACTERIA EXCEPT BLOOD: CPT

## 2023-03-03 PROCEDURE — 82803 BLOOD GASES ANY COMBINATION: CPT

## 2023-03-03 PROCEDURE — 0225U NFCT DS DNA&RNA 21 SARSCOV2: CPT

## 2023-03-03 PROCEDURE — 83036 HEMOGLOBIN GLYCOSYLATED A1C: CPT

## 2023-03-03 PROCEDURE — 85025 COMPLETE CBC W/AUTO DIFF WBC: CPT

## 2023-03-03 PROCEDURE — 83930 ASSAY OF BLOOD OSMOLALITY: CPT

## 2023-03-03 PROCEDURE — 80202 ASSAY OF VANCOMYCIN: CPT

## 2023-03-03 PROCEDURE — P9045: CPT

## 2023-03-03 PROCEDURE — 82330 ASSAY OF CALCIUM: CPT

## 2023-03-03 PROCEDURE — 86900 BLOOD TYPING SEROLOGIC ABO: CPT

## 2023-03-03 PROCEDURE — 87102 FUNGUS ISOLATION CULTURE: CPT

## 2023-03-03 PROCEDURE — 73701 CT LOWER EXTREMITY W/DYE: CPT | Mod: MF

## 2023-03-03 PROCEDURE — 71045 X-RAY EXAM CHEST 1 VIEW: CPT

## 2023-03-03 PROCEDURE — 36430 TRANSFUSION BLD/BLD COMPNT: CPT

## 2023-03-03 PROCEDURE — 80053 COMPREHEN METABOLIC PANEL: CPT

## 2023-03-03 PROCEDURE — 85014 HEMATOCRIT: CPT

## 2023-03-03 PROCEDURE — G1004: CPT

## 2023-03-03 PROCEDURE — 96375 TX/PRO/DX INJ NEW DRUG ADDON: CPT

## 2023-03-03 PROCEDURE — 84300 ASSAY OF URINE SODIUM: CPT

## 2023-03-03 PROCEDURE — A9585: CPT

## 2023-03-03 PROCEDURE — U0005: CPT

## 2023-03-03 PROCEDURE — 96374 THER/PROPH/DIAG INJ IV PUSH: CPT

## 2023-03-03 PROCEDURE — 83605 ASSAY OF LACTIC ACID: CPT

## 2023-03-03 PROCEDURE — P9016: CPT

## 2023-03-03 PROCEDURE — 84443 ASSAY THYROID STIM HORMONE: CPT

## 2023-03-03 PROCEDURE — C1769: CPT

## 2023-03-03 PROCEDURE — 93925 LOWER EXTREMITY STUDY: CPT

## 2023-03-03 PROCEDURE — 83735 ASSAY OF MAGNESIUM: CPT

## 2023-03-03 PROCEDURE — 99285 EMERGENCY DEPT VISIT HI MDM: CPT | Mod: 25

## 2023-03-03 PROCEDURE — 87077 CULTURE AEROBIC IDENTIFY: CPT

## 2023-03-03 PROCEDURE — 82962 GLUCOSE BLOOD TEST: CPT

## 2023-03-03 PROCEDURE — 87640 STAPH A DNA AMP PROBE: CPT

## 2023-03-03 PROCEDURE — 89060 EXAM SYNOVIAL FLUID CRYSTALS: CPT

## 2023-03-03 PROCEDURE — 87150 DNA/RNA AMPLIFIED PROBE: CPT

## 2023-03-03 PROCEDURE — 82570 ASSAY OF URINE CREATININE: CPT

## 2023-03-03 PROCEDURE — 99233 SBSQ HOSP IP/OBS HIGH 50: CPT

## 2023-03-03 PROCEDURE — 86901 BLOOD TYPING SEROLOGIC RH(D): CPT

## 2023-03-03 PROCEDURE — 76000 FLUOROSCOPY <1 HR PHYS/QHP: CPT

## 2023-03-03 PROCEDURE — 81001 URINALYSIS AUTO W/SCOPE: CPT

## 2023-03-03 PROCEDURE — 86850 RBC ANTIBODY SCREEN: CPT

## 2023-03-03 PROCEDURE — 88305 TISSUE EXAM BY PATHOLOGIST: CPT

## 2023-03-03 RX ORDER — CLOPIDOGREL BISULFATE 75 MG/1
1 TABLET, FILM COATED ORAL
Qty: 0 | Refills: 0 | DISCHARGE
Start: 2023-03-03

## 2023-03-03 RX ORDER — ASPIRIN/CALCIUM CARB/MAGNESIUM 324 MG
1 TABLET ORAL
Qty: 0 | Refills: 0 | DISCHARGE
Start: 2023-03-03

## 2023-03-03 RX ORDER — ROSUVASTATIN CALCIUM 5 MG/1
1 TABLET ORAL
Qty: 0 | Refills: 0 | DISCHARGE
Start: 2023-03-03

## 2023-03-03 RX ORDER — METOPROLOL TARTRATE 50 MG
1 TABLET ORAL
Qty: 0 | Refills: 0 | DISCHARGE
Start: 2023-03-03

## 2023-03-03 RX ORDER — LACTOBACILLUS ACIDOPHILUS 100MM CELL
1 CAPSULE ORAL
Qty: 0 | Refills: 0 | DISCHARGE
Start: 2023-03-03

## 2023-03-03 RX ORDER — HYDROMORPHONE HYDROCHLORIDE 2 MG/ML
1 INJECTION INTRAMUSCULAR; INTRAVENOUS; SUBCUTANEOUS
Qty: 12 | Refills: 0
Start: 2023-03-03 | End: 2023-03-05

## 2023-03-03 RX ORDER — CEFPODOXIME PROXETIL 100 MG
2.5 TABLET ORAL
Qty: 140 | Refills: 0
Start: 2023-03-03 | End: 2023-03-30

## 2023-03-03 RX ORDER — DULAGLUTIDE 4.5 MG/.5ML
3 INJECTION, SOLUTION SUBCUTANEOUS
Qty: 0 | Refills: 0 | DISCHARGE
Start: 2023-03-03

## 2023-03-03 RX ORDER — SERTRALINE 25 MG/1
1 TABLET, FILM COATED ORAL
Qty: 0 | Refills: 0 | DISCHARGE
Start: 2023-03-03

## 2023-03-03 RX ORDER — METFORMIN HYDROCHLORIDE 850 MG/1
1 TABLET ORAL
Qty: 0 | Refills: 0 | DISCHARGE
Start: 2023-03-03

## 2023-03-03 RX ORDER — SENNA PLUS 8.6 MG/1
2 TABLET ORAL
Qty: 0 | Refills: 0 | DISCHARGE
Start: 2023-03-03

## 2023-03-03 RX ADMIN — Medication 25 MILLIGRAM(S): at 05:52

## 2023-03-03 RX ADMIN — HYDROMORPHONE HYDROCHLORIDE 4 MILLIGRAM(S): 2 INJECTION INTRAMUSCULAR; INTRAVENOUS; SUBCUTANEOUS at 10:54

## 2023-03-03 RX ADMIN — Medication 100 MILLIGRAM(S): at 05:56

## 2023-03-03 RX ADMIN — HYDROMORPHONE HYDROCHLORIDE 4 MILLIGRAM(S): 2 INJECTION INTRAMUSCULAR; INTRAVENOUS; SUBCUTANEOUS at 01:12

## 2023-03-03 RX ADMIN — HYDROMORPHONE HYDROCHLORIDE 4 MILLIGRAM(S): 2 INJECTION INTRAMUSCULAR; INTRAVENOUS; SUBCUTANEOUS at 04:33

## 2023-03-03 RX ADMIN — HYDROMORPHONE HYDROCHLORIDE 4 MILLIGRAM(S): 2 INJECTION INTRAMUSCULAR; INTRAVENOUS; SUBCUTANEOUS at 03:33

## 2023-03-03 NOTE — DISCHARGE NOTE NURSING/CASE MANAGEMENT/SOCIAL WORK - NSDCFUADDAPPT_GEN_ALL_CORE_FT
Podiatry Discharge Instructions:  Follow up: Please follow up with Dr. Gavin Lott within 1 week of discharge from the hospital, please call [phone] for appointment and discuss that you recently were seen in the hospital.  Wound Care: Please apply white foam wound VAC @ 125mmHg to the right lower extremity 3x per week.   Weight bearing: Please remain non weight bearing to right lower extremity with CAM boot and can weight bear for transfers only  Antibiotics: Please continue as instructed.

## 2023-03-03 NOTE — PROGRESS NOTE ADULT - SUBJECTIVE AND OBJECTIVE BOX
Patient is a 52y old  Male who presents with a chief complaint of r foot pain/tenderness, warmth, swelling (02 Mar 2023 16:06)       INTERVAL HPI/OVERNIGHT EVENTS:  Patient seen and evaluated at bedside.  Pt is resting comfortable in NAD. Denies N/V/F/C.    Allergies    No Known Allergies    Intolerances        Vital Signs Last 24 Hrs  T(C): 36.7 (03 Mar 2023 06:14), Max: 36.9 (02 Mar 2023 21:48)  T(F): 98 (03 Mar 2023 06:14), Max: 98.4 (02 Mar 2023 21:48)  HR: 66 (03 Mar 2023 06:14) (66 - 85)  BP: 148/68 (03 Mar 2023 06:14) (133/74 - 148/68)  BP(mean): --  RR: 18 (03 Mar 2023 06:14) (18 - 18)  SpO2: 94% (03 Mar 2023 06:14) (94% - 98%)    Parameters below as of 03 Mar 2023 06:14  Patient On (Oxygen Delivery Method): room air        LABS:                        8.7    6.05  )-----------( 229      ( 03 Mar 2023 09:39 )             27.8     03-03    137  |  100  |  18  ----------------------------<  188<H>  4.1   |  26  |  1.04    Ca    9.2      03 Mar 2023 09:39          CAPILLARY BLOOD GLUCOSE      POCT Blood Glucose.: 216 mg/dL (02 Mar 2023 21:45)  POCT Blood Glucose.: 209 mg/dL (02 Mar 2023 17:22)      Lower Extremity Physical Exam:  Vascular: DP/PT 0/4, B/L, CFT <3 seconds B/L, Temperature gradient warm to cool, B/L.   Neuro: Epicritic sensation diminished to the level of toes, B/L.  Musculoskeletal/Ortho: s/p R foot TMA, Right ankle no pain with active or passive dorsiflexion/plantarflexion/inversion/eversion, pain on palpation localized to the medial and lateral malleoli.  Skin: s/p right foot 1st met amputation and right ankle I&D (DOS 2/24/23): distal sutures intact, flaps warm and viable, proximal tib ant tendon exposed, proximal wound bed dusky with mild TA tendon dessication, scant sanguinous drainage, distal and proximal tracking 2cm.     RADIOLOGY & ADDITIONAL TESTS:

## 2023-03-03 NOTE — PROGRESS NOTE ADULT - PROVIDER SPECIALTY LIST ADULT
Endocrinology
Hospitalist
Infectious Disease
Podiatry
Hospitalist
Infectious Disease
Podiatry
Podiatry
Vascular Surgery
Vascular Surgery
Endocrinology
Infectious Disease
Podiatry
Vascular Surgery
Endocrinology
Endocrinology
Infectious Disease
Podiatry
Vascular Surgery
Internal Medicine
Endocrinology
Internal Medicine
Endocrinology
Hospitalist
Internal Medicine
Hospitalist
Hospitalist
Internal Medicine

## 2023-03-03 NOTE — PROGRESS NOTE ADULT - ASSESSMENT
52M s/p right foot 1st met amputation and right ankle I&D (DOS 2/24/23)  - Pt seen and evaluated.  - Afebrile, no leukocytosis, H/H 8.1/25  - s/p right foot 1st met amputation and right ankle I&D (DOS 2/24/23): distal sutures intact, flaps warm and viable, proximal tib ant tendon exposed, proximal wound bed dusky with mild TA tendon dessication, scant sanguinous drainage, distal and proximal tracking 2cm.   - Right foot wound culture growing MSSA, strep anginosus, Peptostreptococcus  - R ankle MR: OM of 1st met, bases of 2nd and 3rd met, all cuneiforms and likely cuboid  - s/p LLE angio w 3 vessel r/o  - Per intraop findings, high concern for residual infection, low concern for viability   - OR clean bone culture no growth prelim   - ID recs PO Cefadroxil on dishcarge, appreciated  - VAC to be applied today   - Patient is stable for discharge from podiatry standpoint   - F/u information and instructions can be found in the f/u section of d/c provider note   - Seen with attending

## 2023-03-03 NOTE — CHART NOTE - NSCHARTNOTEFT_GEN_A_CORE
Pt fit with camboot to used OOB for protection only,  Pt is NWB at this time.  VAC applied and boot sized and adjusted to accommodate dressing.    Follow up after discharge at wound center    Randall Perry [facility]  [phone]

## 2023-03-03 NOTE — PROGRESS NOTE ADULT - REASON FOR ADMISSION
r foot pain/tenderness, warmth, swelling

## 2023-03-03 NOTE — DISCHARGE NOTE NURSING/CASE MANAGEMENT/SOCIAL WORK - PATIENT PORTAL LINK FT
You can access the FollowMyHealth Patient Portal offered by [facility] by registering at the following website: [email or web address]. By joining [facility]’s FollowMyHealth portal, you will also be able to view your health information using other applications (apps) compatible with our system.

## 2023-03-03 NOTE — DISCHARGE NOTE NURSING/CASE MANAGEMENT/SOCIAL WORK - NSDCPEFALRISK_GEN_ALL_CORE
For information on Fall & Injury Prevention, visit: [email or web address] OR  [email or web address] OR  https://www.cdc.gov/steadi/patient.html

## 2023-03-04 LAB
CULTURE RESULTS: SIGNIFICANT CHANGE UP
SPECIMEN SOURCE: SIGNIFICANT CHANGE UP

## 2023-03-07 NOTE — CHART NOTE - NSCHARTNOTESELECT_GEN_ALL_CORE
+BCX/Event Note
postop/Event Note
Endocrinology/Event Note
Event Note
Nutrition Services
Pre-Op Note Vascular Surgery
d/c appointment/Event Note
d/c appt/Event Note
endocrine/Event Note
podiatry/Event Note

## 2023-03-13 ENCOUNTER — APPOINTMENT (OUTPATIENT)
Dept: WOUND CARE | Facility: CLINIC | Age: 53
End: 2023-03-13
Payer: COMMERCIAL

## 2023-03-13 PROCEDURE — 99213 OFFICE O/P EST LOW 20 MIN: CPT

## 2023-03-23 NOTE — ED ADULT NURSE NOTE - NS ED NURSE RECORD ANOTHER HT AND WT
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 Seizures are well controlled with current dose of Zonisamide. \par Will do an EEG today\par If no seizures can continue this dose 200 mg at bedtime\par Recurrent headaches are rare and have decreased with behavioral measures\par Will get screening bloodwork today\par RTC 6 months\par 
No

## 2023-03-27 ENCOUNTER — APPOINTMENT (OUTPATIENT)
Dept: WOUND CARE | Facility: CLINIC | Age: 53
End: 2023-03-27
Payer: COMMERCIAL

## 2023-03-27 VITALS — TEMPERATURE: 97.1 F

## 2023-03-27 PROCEDURE — 99213 OFFICE O/P EST LOW 20 MIN: CPT

## 2023-04-03 ENCOUNTER — RX RENEWAL (OUTPATIENT)
Age: 53
End: 2023-04-03

## 2023-04-03 RX ORDER — LANCETS
EACH MISCELLANEOUS
Qty: 1 | Refills: 3 | Status: ACTIVE | COMMUNITY
Start: 2023-04-03 | End: 1900-01-01

## 2023-04-03 RX ORDER — BLOOD-GLUCOSE METER
W/DEVICE KIT MISCELLANEOUS
Qty: 1 | Refills: 0 | Status: ACTIVE | COMMUNITY
Start: 2023-04-03 | End: 1900-01-01

## 2023-04-03 NOTE — PLAN
[FreeTextEntry1] : 51yo M s/p right foot 1st met amputation and right ankle I&D\par - pt seen and evaluated\par - right extremity surgical wound open with exposed tendon and fibrogranular wound, sutures intact distally, no erythema, no malodor, no acute signs of infection\par - mechanical debridement with saline soaked gauze and baby soap, adequate bleeding granulation tissue observed \par - OR cultures with no growth, pathology of margin shows chronic OM \par - re-applied wound VAC to right ankle wound \par - pt to complete course of PO cefadroxil as directed\par - RTC 2 week

## 2023-04-03 NOTE — PLAN
[FreeTextEntry1] : 53 yo M s/p right foot 1st met amputation and right ankle I&D\par - pt seen and evaluated\par - right extremity surgical wound open with exposed tendon and fibrogranular wound, sutures intact distally, no erythema, no malodor, no acute signs of infection\par - hyper granulation tissue noted to wound edges, cauterized wound edges with silver nitrate\par - cleaned right ankle wound with scrub and saline\par - removed distal sutures using sterile suture removal kit\par - OR cultures with no growth, pathology of margin shows chronic OM \par - applied xeroform and reapplied wound VAC to right ankle wound \par - pt to complete course of PO cefadroxil as directed has 9 more pills\par - RTC 2 week

## 2023-04-03 NOTE — HISTORY OF PRESENT ILLNESS
[FreeTextEntry1] : Pt recently seen at [facility] and discharged on 3/7. s/p right foot 1st met amputation and right ankle I&D (DOS 2/24/23) was discharged on PO Cefadroxil. OR bone cultures with no growth, OR margin of 1st metatarsal showing chronic OM. Pt was also discharged with wound VAC changed 3x/week with home nursing. Patient has been compliant with antibiotics and has pills remaining. Denies f/n/v/c/sob. \par \par 2/17 A1c: 10.8%

## 2023-04-03 NOTE — HISTORY OF PRESENT ILLNESS
[FreeTextEntry1] : Pt recently seen at [facility] and discharged on 3/7/23. s/p right foot 1st met amputation and right ankle I&D (DOS 2/24/23) was discharged on PO Cefadroxil still taking has 9 more pills left. OR bone cultures with no growth, OR margin of 1st metatarsal showing chronic OM. Pt was also discharged with wound VAC changed 3x/week with home nursing. Patient has been compliant with antibiotics and has pills remaining. Denies F/C/N/V/SOB or other pedal complaints\par \par 2/17/23 A1c: 10.8%\par 3/27/23 123 mg/dl\par

## 2023-04-10 ENCOUNTER — RX RENEWAL (OUTPATIENT)
Age: 53
End: 2023-04-10

## 2023-04-10 ENCOUNTER — APPOINTMENT (OUTPATIENT)
Dept: WOUND CARE | Facility: CLINIC | Age: 53
End: 2023-04-10
Payer: COMMERCIAL

## 2023-04-10 VITALS — TEMPERATURE: 96.3 F

## 2023-04-10 PROCEDURE — 99213 OFFICE O/P EST LOW 20 MIN: CPT

## 2023-04-13 ENCOUNTER — APPOINTMENT (OUTPATIENT)
Dept: INTERNAL MEDICINE | Facility: CLINIC | Age: 53
End: 2023-04-13
Payer: COMMERCIAL

## 2023-04-13 ENCOUNTER — NON-APPOINTMENT (OUTPATIENT)
Age: 53
End: 2023-04-13

## 2023-04-13 VITALS
OXYGEN SATURATION: 98 % | SYSTOLIC BLOOD PRESSURE: 100 MMHG | DIASTOLIC BLOOD PRESSURE: 70 MMHG | TEMPERATURE: 98.1 F | WEIGHT: 208 LBS | HEART RATE: 92 BPM | HEIGHT: 73 IN | BODY MASS INDEX: 27.57 KG/M2

## 2023-04-13 DIAGNOSIS — H35.00 UNSPECIFIED BACKGROUND RETINOPATHY: ICD-10-CM

## 2023-04-13 DIAGNOSIS — T86.828 OTHER COMPLICATIONS OF SKIN GRAFT (ALLOGRAFT) (AUTOGRAFT): ICD-10-CM

## 2023-04-13 DIAGNOSIS — R45.4 IRRITABILITY AND ANGER: ICD-10-CM

## 2023-04-13 DIAGNOSIS — Z01.818 ENCOUNTER FOR OTHER PREPROCEDURAL EXAMINATION: ICD-10-CM

## 2023-04-13 DIAGNOSIS — I25.10 ATHEROSCLEROTIC HEART DISEASE OF NATIVE CORONARY ARTERY W/OUT ANGINA PECTORIS: ICD-10-CM

## 2023-04-13 DIAGNOSIS — Z95.5 PRESENCE OF CORONARY ANGIOPLASTY IMPLANT AND GRAFT: ICD-10-CM

## 2023-04-13 DIAGNOSIS — I96 OTHER COMPLICATIONS OF SKIN GRAFT (ALLOGRAFT) (AUTOGRAFT): ICD-10-CM

## 2023-04-13 PROCEDURE — 93000 ELECTROCARDIOGRAM COMPLETE: CPT

## 2023-04-13 PROCEDURE — 99215 OFFICE O/P EST HI 40 MIN: CPT | Mod: 25

## 2023-04-13 PROCEDURE — 36415 COLL VENOUS BLD VENIPUNCTURE: CPT

## 2023-04-13 RX ORDER — CLOPIDOGREL BISULFATE 75 MG/1
75 TABLET, FILM COATED ORAL DAILY
Qty: 90 | Refills: 3 | Status: ACTIVE | COMMUNITY
Start: 1900-01-01 | End: 1900-01-01

## 2023-04-13 RX ORDER — COLCHICINE 0.6 MG/1
0.6 TABLET ORAL DAILY
Qty: 20 | Refills: 0 | Status: DISCONTINUED | COMMUNITY
Start: 2023-02-13 | End: 2023-04-13

## 2023-04-13 RX ORDER — RAMIPRIL 10 MG/1
10 CAPSULE ORAL
Qty: 90 | Refills: 3 | Status: ACTIVE | COMMUNITY
Start: 2021-06-25 | End: 1900-01-01

## 2023-04-13 RX ORDER — COLCHICINE 0.6 MG/1
0.6 TABLET ORAL TWICE DAILY
Qty: 20 | Refills: 0 | Status: DISCONTINUED | COMMUNITY
Start: 2023-02-13 | End: 2023-04-13

## 2023-04-14 LAB
ALBUMIN SERPL ELPH-MCNC: 4.6 G/DL
ALP BLD-CCNC: 81 U/L
ALT SERPL-CCNC: 20 U/L
ANION GAP SERPL CALC-SCNC: 13 MMOL/L
APTT BLD: 30.8 SEC
AST SERPL-CCNC: 15 U/L
BASOPHILS # BLD AUTO: 0.03 K/UL
BASOPHILS NFR BLD AUTO: 0.4 %
BILIRUB SERPL-MCNC: 0.2 MG/DL
BUN SERPL-MCNC: 15 MG/DL
CALCIUM SERPL-MCNC: 10.1 MG/DL
CHLORIDE SERPL-SCNC: 99 MMOL/L
CO2 SERPL-SCNC: 25 MMOL/L
CREAT SERPL-MCNC: 0.95 MG/DL
EGFR: 98 ML/MIN/1.73M2
EOSINOPHIL # BLD AUTO: 0.12 K/UL
EOSINOPHIL NFR BLD AUTO: 1.5 %
ERYTHROCYTE [SEDIMENTATION RATE] IN BLOOD BY WESTERGREN METHOD: 53 MM/HR
ESTIMATED AVERAGE GLUCOSE: 157 MG/DL
GLUCOSE SERPL-MCNC: 181 MG/DL
HBA1C MFR BLD HPLC: 7.1 %
HCT VFR BLD CALC: 40.2 %
HGB BLD-MCNC: 12.9 G/DL
IMM GRANULOCYTES NFR BLD AUTO: 0.4 %
INR PPP: 0.95 RATIO
LYMPHOCYTES # BLD AUTO: 1.8 K/UL
LYMPHOCYTES NFR BLD AUTO: 22.4 %
MAN DIFF?: NORMAL
MCHC RBC-ENTMCNC: 29.9 PG
MCHC RBC-ENTMCNC: 32.1 GM/DL
MCV RBC AUTO: 93.1 FL
MONOCYTES # BLD AUTO: 0.62 K/UL
MONOCYTES NFR BLD AUTO: 7.7 %
NEUTROPHILS # BLD AUTO: 5.42 K/UL
NEUTROPHILS NFR BLD AUTO: 67.6 %
PLATELET # BLD AUTO: 209 K/UL
POTASSIUM SERPL-SCNC: 5.4 MMOL/L
PROT SERPL-MCNC: 7.7 G/DL
PT BLD: 11.1 SEC
RBC # BLD: 4.32 M/UL
RBC # FLD: 14.6 %
SODIUM SERPL-SCNC: 138 MMOL/L
WBC # FLD AUTO: 8.02 K/UL

## 2023-04-14 NOTE — ASSESSMENT
[FreeTextEntry4] : The patient is cleared for the proposed surgery.  He is to hold Plavix for 7 days but can continue on aspirin.  We have taken him off all insulin with the exception of Toujou which was reduced to 35 units the night prior to surgery he is to hold sertraline and Abilify the day of surgery.  He is to be off medicines and over-the-counter medications for 1 week prior to the surgery.  Metformin is to be held the day of surgery and he will take his Trulicity on Sunday prior to his surgery.

## 2023-04-14 NOTE — HISTORY OF PRESENT ILLNESS
[Coronary Artery Disease] : coronary artery disease [Smoker] : smoker [No Adverse Anesthesia Reaction] : no adverse anesthesia reaction in self or family member [Diabetes] : diabetes [(Patient denies any chest pain, claudication, dyspnea on exertion, orthopnea, palpitations or syncope)] : Patient denies any chest pain, claudication, dyspnea on exertion, orthopnea, palpitations or syncope [Chronic Anticoagulation] : no chronic anticoagulation [Chronic Kidney Disease] : no chronic kidney disease [FreeTextEntry1] : Foot surgery [FreeTextEntry4] : The patient is a 52-year-old male who comes in for 2 reasons.  He was recently in the hospital for right partial foot amputation and osteomyelitis.  He was treated with 50 days of antibiotics and has been off medication for a number of weeks.  He denies pain or fever or chills and he is seen by visiting nurse who wraps him  in the special bandage 3 times per week and he cannot unwrap it to show me at this time.  He is also here for preop and in June of last year he had a myocardial infarction with stent placement.  His cardiology is taking him off metoprolol.  He remains on Plavix and aspirin at this time.  He is diabetic and is well controlled by Dexcom on the current medications [FreeTextEntry7] : EKG is done -EKG shows probable old inferior wall MI but is otherwise within normal limits and unchanged from previous cardiograms

## 2023-04-14 NOTE — PHYSICAL EXAM
[Normal] : normoactive bowel sounds, soft and nontender, no hepatosplenomegaly or masses appreciated [de-identified] : The patient is in no acute distress wearing a boot on the right foot [de-identified] : Harsh breath sounds with mildly increased expiratory phase [FreeTextEntry1] : Refused [de-identified] : Refused [de-identified] : Negative [de-identified] : Thyroid is nonpalpable [de-identified] : Decreased peripheral reflexes and sensation to light touch [de-identified] : Right foot could not be examined as the patient would not take off the boot or wrapping/left foot is normal

## 2023-04-17 NOTE — HISTORY OF PRESENT ILLNESS
[FreeTextEntry1] : Seen for follow up management of an ulceration S/P I&D abscess of the right ankle with exposed TA tendon having home nursing for VAC therapy.  Denies c/o f/c/n/v/d.

## 2023-04-17 NOTE — PLAN
[FreeTextEntry1] : The patient is  explained treatment options. He TA tendon is non viable and will need to excisionally debrided with dermal skin graft.  He will also need a tendo Achilles lengthening.  Will assist post op with an AFO.\par - Will D/C the VAC and initiate Aquacel TIW\par - Will schedule case\par

## 2023-04-17 NOTE — PHYSICAL EXAM
[de-identified] : The TA tendon is desiccated with superficial necrosis [Please See PDF for [employer]] : Please See PDF for [employer]. [de-identified] : T

## 2023-04-24 ENCOUNTER — APPOINTMENT (OUTPATIENT)
Dept: WOUND CARE | Facility: CLINIC | Age: 53
End: 2023-04-24
Payer: COMMERCIAL

## 2023-04-24 VITALS — TEMPERATURE: 96.4 F

## 2023-04-24 PROCEDURE — 99213 OFFICE O/P EST LOW 20 MIN: CPT

## 2023-04-24 NOTE — PLAN
[FreeTextEntry1] : - The patient has a new ulceration under the 2nd metatarsal 2/2 pressure out of the CAM boot at home\par - The patient is explained treatment options. He TA tendon is non viable and will need to excisionally debrided with dermal skin graft.  He will also need a tendo Achilles tenotomy. Will assist post op with an AFO.\par - All question answered\par - excisional debridement carried out to RF plantar wound to subQ, and not beyond, with sterile 15 blade to the sub q level..\par - pt to [facility] for wound debridement, skin graft sub application, and Achilles tenotomy to the R foot and RLE. \par - Resume home nurse wound care until Surgery on 4/28\par - PTR to [facility] on 5/8\par

## 2023-04-24 NOTE — PHYSICAL EXAM
[Please See PDF for [employer]] : Please See PDF for [employer]. [de-identified] : The TA tendon is desiccated with superficial necrosis

## 2023-04-24 NOTE — HISTORY OF PRESENT ILLNESS
[FreeTextEntry1] : Seen for follow up management of an ulceration S/P I&D abscess of the right ankle with exposed TA tendon having home nursing for Aquacel daily.  Denies c/o f/c/n/v/d.

## 2023-04-27 ENCOUNTER — TRANSCRIPTION ENCOUNTER (OUTPATIENT)
Age: 53
End: 2023-04-27

## 2023-04-27 RX ORDER — SODIUM CHLORIDE 9 MG/ML
3 INJECTION INTRAMUSCULAR; INTRAVENOUS; SUBCUTANEOUS EVERY 8 HOURS
Refills: 0 | Status: DISCONTINUED | OUTPATIENT
Start: 2023-04-28 | End: 2023-05-12

## 2023-04-28 ENCOUNTER — TRANSCRIPTION ENCOUNTER (OUTPATIENT)
Age: 53
End: 2023-04-28

## 2023-04-28 ENCOUNTER — OUTPATIENT (OUTPATIENT)
Dept: OUTPATIENT SERVICES | Facility: HOSPITAL | Age: 53
LOS: 1 days | Discharge: HOME | End: 2023-04-28

## 2023-04-28 VITALS
WEIGHT: 210.1 LBS | SYSTOLIC BLOOD PRESSURE: 125 MMHG | HEART RATE: 97 BPM | TEMPERATURE: 98 F | HEIGHT: 73 IN | RESPIRATION RATE: 14 BRPM | OXYGEN SATURATION: 95 % | DIASTOLIC BLOOD PRESSURE: 82 MMHG

## 2023-04-28 VITALS
OXYGEN SATURATION: 95 % | RESPIRATION RATE: 13 BRPM | DIASTOLIC BLOOD PRESSURE: 75 MMHG | HEART RATE: 83 BPM | SYSTOLIC BLOOD PRESSURE: 112 MMHG

## 2023-04-28 DIAGNOSIS — Z89.429 ACQUIRED ABSENCE OF OTHER TOE(S), UNSPECIFIED SIDE: Chronic | ICD-10-CM

## 2023-04-28 LAB
GLUCOSE BLDC GLUCOMTR-MCNC: 158 MG/DL — HIGH (ref 70–99)
GLUCOSE BLDC GLUCOMTR-MCNC: 174 MG/DL — HIGH (ref 70–99)

## 2023-04-28 RX ORDER — HYDROMORPHONE HYDROCHLORIDE 2 MG/ML
0.5 INJECTION INTRAMUSCULAR; INTRAVENOUS; SUBCUTANEOUS
Refills: 0 | Status: DISCONTINUED | OUTPATIENT
Start: 2023-04-28 | End: 2023-04-29

## 2023-04-28 RX ORDER — ACETAMINOPHEN AND CODEINE 300; 30 MG/1; MG/1
300-30 TABLET ORAL 4 TIMES DAILY
Qty: 30 | Refills: 0 | Status: ACTIVE | COMMUNITY
Start: 2023-04-28 | End: 1900-01-01

## 2023-04-28 RX ORDER — CIPROFLOXACIN HYDROCHLORIDE 750 MG/1
750 TABLET, FILM COATED ORAL
Qty: 20 | Refills: 1 | Status: ACTIVE | COMMUNITY
Start: 2023-04-28 | End: 1900-01-01

## 2023-04-28 RX ORDER — CLINDAMYCIN HYDROCHLORIDE 300 MG/1
300 CAPSULE ORAL EVERY 6 HOURS
Qty: 40 | Refills: 1 | Status: ACTIVE | COMMUNITY
Start: 2023-04-28 | End: 1900-01-01

## 2023-04-28 RX ORDER — SODIUM CHLORIDE 9 MG/ML
1000 INJECTION, SOLUTION INTRAVENOUS
Refills: 0 | Status: DISCONTINUED | OUTPATIENT
Start: 2023-04-28 | End: 2023-04-29

## 2023-04-28 RX ORDER — INSULIN ASPART 100 [IU]/ML
14 INJECTION, SOLUTION SUBCUTANEOUS
Qty: 0 | Refills: 0 | DISCHARGE

## 2023-04-28 RX ORDER — INSULIN DEGLUDEC 100 U/ML
52 INJECTION, SOLUTION SUBCUTANEOUS
Qty: 0 | Refills: 0 | DISCHARGE

## 2023-04-28 RX ADMIN — SODIUM CHLORIDE 50 MILLILITER(S): 9 INJECTION, SOLUTION INTRAVENOUS at 09:20

## 2023-04-28 NOTE — ASU DISCHARGE PLAN (ADULT/PEDIATRIC) - NURSING INSTRUCTIONS
Patient to rest today, Follow up with DR. Lott as planned. Frequent hand washing advised to prevent infection.

## 2023-04-28 NOTE — ASU PATIENT PROFILE, ADULT - FALL HARM RISK - UNIVERSAL INTERVENTIONS
Bed in lowest position, wheels locked, appropriate side rails in place/Call bell, personal items and telephone in reach/Instruct patient to call for assistance before getting out of bed or chair/Non-slip footwear when patient is out of bed/[place] to call system/Physically safe environment - no spills, clutter or unnecessary equipment/Purposeful Proactive Rounding/Room/bathroom lighting operational, light cord in reach

## 2023-04-28 NOTE — ASU DISCHARGE PLAN (ADULT/PEDIATRIC) - ASU DC SPECIAL INSTRUCTIONSFT
- Please leave right foot and leg dressing clean, dry, and intact.  - Please make an appointment with Dr. Lott [facility] [phone]  - Please transition to CAM walker on the right foot with limited weightbearing to the right foot. - Please leave right foot and leg dressing clean, dry, and intact.  - Please make an appointment with Dr. Lott on Monday 5/8 [facility] ([phone])  - Please transition to CAM walker on the right foot with limited weightbearing to the right foot.

## 2023-04-30 LAB
CULTURE RESULTS: SIGNIFICANT CHANGE UP
SPECIMEN SOURCE: SIGNIFICANT CHANGE UP

## 2023-05-03 ENCOUNTER — APPOINTMENT (OUTPATIENT)
Dept: ENDOCRINOLOGY | Facility: CLINIC | Age: 53
End: 2023-05-03
Payer: COMMERCIAL

## 2023-05-03 VITALS
HEART RATE: 92 BPM | DIASTOLIC BLOOD PRESSURE: 70 MMHG | WEIGHT: 212 LBS | SYSTOLIC BLOOD PRESSURE: 110 MMHG | BODY MASS INDEX: 28.1 KG/M2 | HEIGHT: 73 IN | OXYGEN SATURATION: 98 %

## 2023-05-03 DIAGNOSIS — M86.179 OTHER ACUTE OSTEOMYELITIS, UNSPECIFIED ANKLE AND FOOT: ICD-10-CM

## 2023-05-03 DIAGNOSIS — E78.5 HYPERLIPIDEMIA, UNSPECIFIED: ICD-10-CM

## 2023-05-03 DIAGNOSIS — Z89.429 ACQUIRED ABSENCE OF OTHER TOE(S), UNSPECIFIED SIDE: ICD-10-CM

## 2023-05-03 DIAGNOSIS — I10 ESSENTIAL (PRIMARY) HYPERTENSION: ICD-10-CM

## 2023-05-03 DIAGNOSIS — F17.200 NICOTINE DEPENDENCE, UNSPECIFIED, UNCOMPLICATED: ICD-10-CM

## 2023-05-03 DIAGNOSIS — Z95.5 PRESENCE OF CORONARY ANGIOPLASTY IMPLANT AND GRAFT: ICD-10-CM

## 2023-05-03 DIAGNOSIS — E11.621 TYPE 2 DIABETES MELLITUS WITH FOOT ULCER: ICD-10-CM

## 2023-05-03 DIAGNOSIS — L97.519 NON-PRESSURE CHRONIC ULCER OF OTHER PART OF RIGHT FOOT WITH UNSPECIFIED SEVERITY: ICD-10-CM

## 2023-05-03 DIAGNOSIS — I25.10 ATHEROSCLEROTIC HEART DISEASE OF NATIVE CORONARY ARTERY WITHOUT ANGINA PECTORIS: ICD-10-CM

## 2023-05-03 DIAGNOSIS — L97.513 NON-PRESSURE CHRONIC ULCER OF OTHER PART OF RIGHT FOOT WITH NECROSIS OF MUSCLE: ICD-10-CM

## 2023-05-03 PROCEDURE — 99215 OFFICE O/P EST HI 40 MIN: CPT | Mod: 25

## 2023-05-03 PROCEDURE — 95251 CONT GLUC MNTR ANALYSIS I&R: CPT

## 2023-05-03 RX ORDER — ROSUVASTATIN CALCIUM 40 MG/1
40 TABLET, FILM COATED ORAL DAILY
Qty: 90 | Refills: 3 | Status: ACTIVE | COMMUNITY
Start: 2020-08-05 | End: 1900-01-01

## 2023-05-03 NOTE — ASSESSMENT
[FreeTextEntry1] : 51 year old male with history of uncontrolled DM Type 2 ( long term with insulin requirement), recent right foot toe amputation, HLD here for f/u.\par HgA1C today is 9.5%\par carb consistent diet was discussed, has seen CDE previously \par HgA1C goal of <7% \par \par -Increase Tresiba to 60 units at bedtime\par -Continue  Trulicity 3.0 mg weekly \par -Novolog 24-24-24 with low dose correctional scale \par -Start Metformin 500 mg BID \par -Continue Carb consistent diet \par -For now medication compliance and diet compliance is heavily emphasized \par \par \par Download and Analysis of Continuous Glucose Monitoring Data: DEXCOM\par Dates reviewed: 04/20/2023-05/03/2023\par \par Date of data printout:05/03/2023\par Analysis and Interpretation of CGM data: (this is where % TIR, high low etc, GMI)\par 26% very high, 39% high, 34% in range, 0% low \par Blood glucose pattern:Pattern of daytime highs and nighttime highs \par \par Diabetic retinopathy\par -Close follow up encouraged\par -Hypo and hyperglycemic states should be avoided \par -HgA1C goal of <7% \par \par Amputation \par -Keep HgA1C <7% \par -Close outpatient follow up with Dr. Lott \par \par HLD \par -LDL is well controlled \par \par Follow up in 4 months. \par  \par

## 2023-05-03 NOTE — HISTORY OF PRESENT ILLNESS
[FreeTextEntry1] : Diabetes F/u Patient HPI\par \par He reported that he got COVID at end of December \par He was off of insulin in January for 2 weeks \par He reported that he has been missing trulicity \par \par \par CC\par Patient referred by Dr. Hirsch for diabetes management.\par \par He recently had a achilles tendon- surgery \par \par Recently has amputation of the right foot toes ( amputations) \par 3-4 weeks since then \par He has been with physical therapy since then.\par \par HPI:\par \par Duration of Diabetes: 18 years \par Is patient on Insulin? Yes \par If yes, how long on insulin? 10 years \par \par List Current Medications for Glycemic control and the doses:\par 1- Tresiba 55-60 units at bedtime \par 2- Novolog 26-30 units before a meal \par 3- Trulicity 3.0 mcg weekly\par 4- Metformin 500 mg BID \par \par \par SMBG (self monitored blood glucose) readings: \par - Name of glucometer: \par - How often does the patient check BG? 4 times per day \par - Does the patient keep a log? \par \par If detailed record is available, what is the range of most of the BG readings?\par - Before Breakfast: 180-210\par - Before Lunch: 180-200\par - Before Dinner: 180-200\par - Before Bedtime:190-200\par \par Does patient get Hypoglycemic episodes?None \par \par Diabetic Complications: Is patient aware of having any of those complications?\par - Eyes: Retinopathy? Proliferative retinopathy (laser treatment in the right eye ( 3 tx) and left eye ( 3 tx) -completed and did 9 treatments. Eye injection in the retina ( and may need laser again) \par  	When was the last fully dilated eye exam? Dr. Newsome 02/2022- will be following up in 6-8 weeks \par - Feet: 	Neuropathy? Yes \par  	Foot Ulcers? Yes \par 	When was the last time patient saw a Podiatrist? 12/2021\par - Kidneys: Nephropathy? None \par \par \par Diet: review patient's diet: \par Breakfast: Whole wheat bread, scrambled eggs, oatmeal, frozen fruit, Kashi cereal \par Lunch: [place] ( whole wheat bread) Turkey, ham, apple \par Dinner: Fish, vegetables, small portions of rice, black beans \par Juice or soda: Crystal light, diet soda \par \par \par \par Exercise: review patient exercise habits: Not much \par \par \par

## 2023-05-03 NOTE — PHYSICAL EXAM
[Alert] : alert [Well Nourished] : well nourished [No Acute Distress] : no acute distress [Normal Sclera/Conjunctiva] : normal sclera/conjunctiva [PERRL] : pupils equal, round and reactive to light [Normal Outer Ear/Nose] : the ears and nose were normal in appearance [Normal TMs] : both tympanic membranes were normal [No Respiratory Distress] : no respiratory distress [Clear to Auscultation] : lungs were clear to auscultation bilaterally [Normal Rate] : heart rate was normal [Normal Bowel Sounds] : normal bowel sounds [Soft] : abdomen soft [No Rash] : no rash [No Skin Lesions] : no skin lesions [No Motor Deficits] : the motor exam was normal [Normal Reflexes] : deep tendon reflexes were 2+ and symmetric [Oriented x3] : oriented to person, place, and time [Normal Insight/Judgement] : insight and judgment were intact [de-identified] : +Right Lower extremity wound

## 2023-05-03 NOTE — HISTORY OF PRESENT ILLNESS
[FreeTextEntry1] : Diabetes F/u Patient HPI\par \par He reported that he got COVID at end of December \par He was off of insulin in January for 2 weeks \par He reported that he has been missing trulicity \par \par \par CC\par Patient referred by Dr. Hirsch for diabetes management.\par \par He recently had a achilles tendon- surgery \par \par Recently has amputation of the right foot toes ( amputations) \par 3-4 weeks since then \par He has been with physical therapy since then.\par \par HPI:\par \par Duration of Diabetes: 18 years \par Is patient on Insulin? Yes \par If yes, how long on insulin? 10 years \par \par List Current Medications for Glycemic control and the doses:\par 1- Tresiba 55-60 units at bedtime \par 2- Novolog 26-30 units before a meal \par 3- Trulicity 3.0 mcg weekly\par 4- Metformin 500 mg BID \par \par \par SMBG (self monitored blood glucose) readings: \par - Name of glucometer: \par - How often does the patient check BG? 4 times per day \par - Does the patient keep a log? \par \par If detailed record is available, what is the range of most of the BG readings?\par - Before Breakfast: 180-210\par - Before Lunch: 180-200\par - Before Dinner: 180-200\par - Before Bedtime:190-200\par \par Does patient get Hypoglycemic episodes?None \par \par Diabetic Complications: Is patient aware of having any of those complications?\par - Eyes: Retinopathy? Proliferative retinopathy (laser treatment in the right eye ( 3 tx) and left eye ( 3 tx) -completed and did 9 treatments. Eye injection in the retina ( and may need laser again) \par  	When was the last fully dilated eye exam? Dr. Newsome 02/2022- will be following up in 6-8 weeks \par - Feet: 	Neuropathy? Yes \par  	Foot Ulcers? Yes \par 	When was the last time patient saw a Podiatrist? 12/2021\par - Kidneys: Nephropathy? None \par \par \par Diet: review patient's diet: \par Breakfast: Whole wheat bread, scrambled eggs, oatmeal, frozen fruit, Kashi cereal \par Lunch: [place] ( whole wheat bread) Turkey, ham, apple \par Dinner: Fish, vegetables, small portions of rice, black beans \par Juice or soda: Crystal light, diet soda \par \par \par \par Exercise: review patient exercise habits: Not much \par \par \par

## 2023-05-03 NOTE — REVIEW OF SYSTEMS
[Fatigue] : no fatigue [Decreased Appetite] : appetite not decreased [Recent Weight Gain (___ Lbs)] : no recent weight gain [Recent Weight Loss (___ Lbs)] : no recent weight loss [Dry Eyes] : no dryness [Dysphagia] : no dysphagia [Neck Pain] : no neck pain [Dysphonia] : no dysphonia [Nasal Congestion] : no nasal congestion [Chest Pain] : no chest pain [Slow Heart Rate] : heart rate is not slow [Palpitations] : no palpitations [Fast Heart Rate] : heart rate is not fast [Nausea] : no nausea [Constipation] : no constipation [Vomiting] : no vomiting [Diarrhea] : no diarrhea [Polyuria] : no polyuria [Hesistancy] : no hesitancy [Joint Pain] : no joint pain [Muscle Weakness] : no muscle weakness [Acanthosis] : no acanthosis  [Acne] : no acne [Headaches] : no headaches [Dizziness] : no dizziness [Tremors] : no tremors [Pain/Numbness of Digits] : no pain/numbness of digits [Depression] : no depression [Polydipsia] : no polydipsia [Cold Intolerance] : no cold intolerance [Easy Bleeding] : no ~M tendency for easy bleeding [Easy Bruising] : no tendency for easy bruising

## 2023-05-03 NOTE — PHYSICAL EXAM
[Alert] : alert [Well Nourished] : well nourished [No Acute Distress] : no acute distress [Normal Sclera/Conjunctiva] : normal sclera/conjunctiva [PERRL] : pupils equal, round and reactive to light [Normal Outer Ear/Nose] : the ears and nose were normal in appearance [Normal TMs] : both tympanic membranes were normal [No Respiratory Distress] : no respiratory distress [Clear to Auscultation] : lungs were clear to auscultation bilaterally [Normal Rate] : heart rate was normal [Normal Bowel Sounds] : normal bowel sounds [Soft] : abdomen soft [No Rash] : no rash [No Skin Lesions] : no skin lesions [No Motor Deficits] : the motor exam was normal [Normal Reflexes] : deep tendon reflexes were 2+ and symmetric [Oriented x3] : oriented to person, place, and time [Normal Insight/Judgement] : insight and judgment were intact [de-identified] : +Right Lower extremity wound

## 2023-05-05 RX ORDER — INSULIN LISPRO 100 [IU]/ML
100 INJECTION, SOLUTION INTRAVENOUS; SUBCUTANEOUS
Qty: 105 | Refills: 0 | Status: ACTIVE | COMMUNITY
Start: 2023-01-25 | End: 1900-01-01

## 2023-05-05 RX ORDER — INSULIN GLARGINE 300 U/ML
300 INJECTION, SOLUTION SUBCUTANEOUS
Qty: 70 | Refills: 0 | Status: ACTIVE | COMMUNITY
Start: 2023-01-25 | End: 1900-01-01

## 2023-05-05 RX ORDER — METFORMIN HYDROCHLORIDE 500 MG/1
500 TABLET, COATED ORAL
Qty: 180 | Refills: 1 | Status: ACTIVE | COMMUNITY
Start: 2022-02-11 | End: 1900-01-01

## 2023-05-05 RX ORDER — BLOOD-GLUCOSE SENSOR
EACH MISCELLANEOUS
Qty: 3 | Refills: 3 | Status: ACTIVE | COMMUNITY
Start: 2021-08-03 | End: 1900-01-01

## 2023-05-05 RX ORDER — BLOOD SUGAR DIAGNOSTIC
STRIP MISCELLANEOUS
Qty: 250 | Refills: 0 | Status: ACTIVE | COMMUNITY
Start: 2023-04-03 | End: 1900-01-01

## 2023-05-08 ENCOUNTER — APPOINTMENT (OUTPATIENT)
Dept: WOUND CARE | Facility: CLINIC | Age: 53
End: 2023-05-08
Payer: COMMERCIAL

## 2023-05-08 ENCOUNTER — OUTPATIENT (OUTPATIENT)
Dept: OUTPATIENT SERVICES | Facility: HOSPITAL | Age: 53
LOS: 1 days | End: 2023-05-08
Payer: COMMERCIAL

## 2023-05-08 DIAGNOSIS — Z89.429 ACQUIRED ABSENCE OF OTHER TOE(S), UNSPECIFIED SIDE: Chronic | ICD-10-CM

## 2023-05-08 PROCEDURE — G0463: CPT

## 2023-05-08 PROCEDURE — 99213 OFFICE O/P EST LOW 20 MIN: CPT | Mod: 24

## 2023-05-08 RX ORDER — ACETAMINOPHEN AND CODEINE PHOSPHATE 300; 30 MG/1; MG/1
300-30 TABLET ORAL
Qty: 20 | Refills: 0 | Status: ACTIVE | COMMUNITY
Start: 2023-05-08 | End: 1900-01-01

## 2023-05-08 NOTE — HISTORY OF PRESENT ILLNESS
[FreeTextEntry1] : Seen for follow up management of an ulceration S/P I&D abscess of the right ankle with exposed TA tendon having home nursing for Aquacel daily.  States that the nurse was d/c since surgery, and needs wound care now. Denies c/o f/c/n/v/d. Pt has been on ciprofloxacin, clindamycin since 4/28. Pt has not been walking since surgery.

## 2023-05-08 NOTE — PHYSICAL EXAM
[Please See PDF for [employer]] : Please See PDF for [employer]. [de-identified] : The TA tendon is desiccated with superficial necrosis

## 2023-05-08 NOTE — PLAN
[FreeTextEntry1] : 52M s/p right foot wound debridement and graft application active barrier 200 amniotic membrane and ADDISON 4/28\par -  pt seen and evaluated\par - right posterior leg sutures intact, dorsal foot and ankle Adaptic intact with staples, and sutures distally and proximally to anterior leg and foot, granular wound bed, distal medial wound to subQ \par - using staple remover, all staples were removed from dorsal wound, sutures left intact\par - excisional debridement of anterior leg wound performed using a sterile # 15 blade, down to subcutaneous tissue but not beyond subcutaneous tissue\par - wound flushed and dressed w/ aquacel, gauze cling and ACE\par - VNS orders given, dressings to be chnaged 3X a week \par - pt states he will be moving to [place] and will continue is care down there \par - RX Tylenol #3 for pain control\par - RTC 1 week\par

## 2023-05-15 ENCOUNTER — APPOINTMENT (OUTPATIENT)
Dept: WOUND CARE | Facility: CLINIC | Age: 53
End: 2023-05-15
Payer: SELF-PAY

## 2023-05-15 ENCOUNTER — OUTPATIENT (OUTPATIENT)
Dept: OUTPATIENT SERVICES | Facility: HOSPITAL | Age: 53
LOS: 1 days | End: 2023-05-15
Payer: SELF-PAY

## 2023-05-15 VITALS — TEMPERATURE: 97.2 F

## 2023-05-15 DIAGNOSIS — Z89.429 ACQUIRED ABSENCE OF OTHER TOE(S), UNSPECIFIED SIDE: Chronic | ICD-10-CM

## 2023-05-15 PROCEDURE — 99213 OFFICE O/P EST LOW 20 MIN: CPT | Mod: 24

## 2023-05-15 PROCEDURE — G0463: CPT

## 2023-05-15 RX ORDER — SULFAMETHOXAZOLE AND TRIMETHOPRIM 800; 160 MG/1; MG/1
800-160 TABLET ORAL TWICE DAILY
Qty: 28 | Refills: 1 | Status: COMPLETED | COMMUNITY
Start: 2023-05-15

## 2023-05-15 NOTE — PHYSICAL EXAM
[Please See PDF for [employer]] : Please See PDF for [employer]. [de-identified] : The TA tendon is desiccated with superficial necrosis

## 2023-05-15 NOTE — HISTORY OF PRESENT ILLNESS
[FreeTextEntry1] : Seen for follow up management of an ulceration S/P I&D abscess of the right ankle with exposed TA tendon having home nursing for Aquacel daily.. Denies c/o f/c/n/v/d. Pt has been on ciprofloxacin, clindamycin since 4/28. Pt has not been walking since surgery.

## 2023-05-15 NOTE — PLAN
[FreeTextEntry1] : 52M s/p right foot wound debridement and graft application active barrier 200 amniotic membrane and ADDISON 4/28\par -  pt seen and evaluated\par - right posterior leg sutures as weel as all sutures distally and proximally to anterior leg and foot were removed using a sterile suture removal kit without incident\par - right anterior leg  granular wound bed was cauterized \par - excisional debridement of anterior leg wound performed using a sterile curette down to subcutaneous tissue but not beyond subcutaneous tissue. flushed with saline solution and packed with iodoform packing\par - C&S right leg wound \par - right  TMA stump wound was debrided using a #15 blade until healthy tissue was observed\par - all wounds were flushed with saline solution and and dressed w/ aquacel, gauze [place] and ACE\par - VNS orders inputted, dressings to be changed 3X a week \par - Rx Bactrim DS x 14 days with one refill\par - RTC 1 week\par

## 2023-05-16 RX ORDER — SULFAMETHOXAZOLE AND TRIMETHOPRIM 800; 160 MG/1; MG/1
800-160 TABLET ORAL TWICE DAILY
Qty: 28 | Refills: 1 | Status: ACTIVE | COMMUNITY
Start: 2023-05-16 | End: 1900-01-01

## 2023-05-17 LAB — BACTERIA SPEC CULT: ABNORMAL

## 2023-05-22 ENCOUNTER — OUTPATIENT (OUTPATIENT)
Dept: OUTPATIENT SERVICES | Facility: HOSPITAL | Age: 53
LOS: 1 days | End: 2023-05-22
Payer: COMMERCIAL

## 2023-05-22 ENCOUNTER — APPOINTMENT (OUTPATIENT)
Dept: WOUND CARE | Facility: CLINIC | Age: 53
End: 2023-05-22
Payer: COMMERCIAL

## 2023-05-22 VITALS — TEMPERATURE: 97.1 F

## 2023-05-22 DIAGNOSIS — L97.319 NON-PRESSURE CHRONIC ULCER OF RIGHT ANKLE WITH UNSPECIFIED SEVERITY: ICD-10-CM

## 2023-05-22 DIAGNOSIS — E11.9 TYPE 2 DIABETES MELLITUS W/OUT COMPLICATIONS: ICD-10-CM

## 2023-05-22 DIAGNOSIS — Z89.429 ACQUIRED ABSENCE OF OTHER TOE(S), UNSPECIFIED SIDE: Chronic | ICD-10-CM

## 2023-05-22 PROCEDURE — 11042 DBRDMT SUBQ TIS 1ST 20SQCM/<: CPT

## 2023-05-22 RX ORDER — AMOXICILLIN AND CLAVULANATE POTASSIUM 875; 125 MG/1; MG/1
875-125 TABLET, COATED ORAL
Qty: 28 | Refills: 2 | Status: ACTIVE | COMMUNITY
Start: 2023-05-22 | End: 1900-01-01

## 2023-05-22 NOTE — PLAN
[FreeTextEntry1] : 52M s/p right foot wound debridement and graft application active barrier 200 amniotic membrane and ADDISON 4/28\par -  pt seen and evaluated\par - right anterior leg and foot fibrogranular wound to subQ extending from leg to dorsal foot, TMA wound to subQ, all wounds w/ no acute SOI, mild drainage dorsally noted\par - excisional debridement of anterior leg wound performed using a sterile curette and 15 blade down to subcutaneous tissue but not beyond subcutaneous tissue. flushed with saline solution and packed with iodoform packing\par - Right leg wound C&S: Group B strep\par - all wounds were flushed with saline solution and and dressed w/ aquacel, gauze [place] and ACE\par - VNS orders inputted, dressings to be changed daily\par - Switch to Augmentin\par - Rx:Custom  right tma prosthetic filler and multidensity custom orthotic for left foot to be placed into patients shoe gear.\par - PTR 2 weeks\par

## 2023-05-22 NOTE — PHYSICAL EXAM
[Please See PDF for [employer]] : Please See PDF for [employer]. [de-identified] : The TA tendon is desiccated with superficial necrosis

## 2023-05-22 NOTE — HISTORY OF PRESENT ILLNESS
[FreeTextEntry1] : Pt seen for follow up management of an ulceration S/P I&D abscess of the right ankle with exposed TA tendon having home nursing for Aquacel daily.. Denies c/o f/c/n/v/d. Pt has been on Bactrim w since 5/15. Pt has been walking in the CAM boot seldom

## 2023-06-05 ENCOUNTER — APPOINTMENT (OUTPATIENT)
Dept: WOUND CARE | Facility: CLINIC | Age: 53
End: 2023-06-05

## 2023-06-05 ENCOUNTER — OUTPATIENT (OUTPATIENT)
Dept: OUTPATIENT SERVICES | Facility: HOSPITAL | Age: 53
LOS: 1 days | End: 2023-06-05
Payer: COMMERCIAL

## 2023-06-05 ENCOUNTER — APPOINTMENT (OUTPATIENT)
Dept: WOUND CARE | Facility: CLINIC | Age: 53
End: 2023-06-05
Payer: COMMERCIAL

## 2023-06-05 VITALS — TEMPERATURE: 97.2 F

## 2023-06-05 DIAGNOSIS — Z89.431 ACQUIRED ABSENCE OF RIGHT FOOT: ICD-10-CM

## 2023-06-05 DIAGNOSIS — Z89.429 ACQUIRED ABSENCE OF OTHER TOE(S), UNSPECIFIED SIDE: Chronic | ICD-10-CM

## 2023-06-05 DIAGNOSIS — L97.513 NON-PRESSURE CHRONIC ULCER OF OTHER PART OF RIGHT FOOT WITH NECROSIS OF MUSCLE: ICD-10-CM

## 2023-06-05 DIAGNOSIS — E11.42 TYPE 2 DIABETES MELLITUS WITH DIABETIC POLYNEUROPATHY: ICD-10-CM

## 2023-06-05 DIAGNOSIS — I73.9 PERIPHERAL VASCULAR DISEASE, UNSPECIFIED: ICD-10-CM

## 2023-06-05 DIAGNOSIS — Z79.4 TYPE 2 DIABETES MELLITUS WITH DIABETIC POLYNEUROPATHY: ICD-10-CM

## 2023-06-05 PROCEDURE — G0463: CPT

## 2023-06-05 PROCEDURE — 99213 OFFICE O/P EST LOW 20 MIN: CPT | Mod: 24

## 2023-06-07 NOTE — PLAN
[FreeTextEntry1] : 52M s/p right foot wound debridement and graft application active barrier 200 amniotic membrane and ADDISON 4/28\par -  pt seen and evaluated\par - right anterior leg and foot fibrogranular wound to subQ extending from leg to dorsal foot with proximal purulent drainage from the wound tracking along the TA tendon, TMA wound to subQ, all wounds w/ no acute SOI, mild drainage dorsally noted\par - excisional debridement of anterior leg wound performed using a sterile curette and 15 blade down to subcutaneous tissue but not beyond subcutaneous tissue. flushed with saline solution and packed with iodoform packing\par - Flushed the anterior leg wound with copious amounts of saline solution. Wound was then packed with 1/4 inch iodoform packing followed by DSD. \par - Right leg wound C&S: Group B strep\par - all wounds were flushed with saline solution and and dressed w/ aquacel, gauze [place] and ACE\par - VNS orders inputted, dressings to be changed daily\par - Rx: Please continue Custom  right tma prosthetic filler and multidensity custom orthotic for left footat all times while ambulating \par - Pt going to to [place] with mom on Friday and will followup with podiatry there on Monday. Packing supplies and DSD given to pat \par - RTC PRN \par

## 2023-06-07 NOTE — PHYSICAL EXAM
[Please See PDF for [employer]] : Please See PDF for [employer]. [de-identified] : The TA tendon is desiccated with superficial necrosis

## 2023-06-14 NOTE — PROGRESS NOTE ADULT - PROBLEM SELECTOR PROBLEM 2
CAD (coronary artery disease)
MAINI, SUSHMA [id number]     Study Date: 		06-14-23  Duration x Hours:   --------------------------------------------------------------------------------------------------  History:  CC/ HPI Patient is a 64y old  Female who presents with a chief complaint of seizures (13 Jun 2023 17:40)    MEDICATIONS  (STANDING):  enoxaparin Injectable 40 milliGRAM(s) SubCutaneous every 24 hours  lamoTRIgine 25 milliGRAM(s) Oral two times a day  levETIRAcetam  IVPB 1500 milliGRAM(s) IV Intermittent every 12 hours  losartan 25 milliGRAM(s) Oral daily    --------------------------------------------------------------------------------------------------  Study Interpretation:    [[[Abbreviation Key:  PDR=alpha rhythm/posterior dominant rhythm. A-P=anterior posterior.  Amplitude: ‘very low’:<20; ‘low’:20-49; ‘medium’:; ‘high’:>150uV.  Persistence for periodic/rhythmic patterns (% of epoch) ‘rare’:<1%; ‘occasional’:1-10%; ‘frequent’:10-50%; ‘abundant’:50-90%; ‘continuous’:>90%.  Persistence for sporadic discharges: ‘rare’:<1/hr; ‘occasional’:1/min-1/hr; ‘frequent’:>1/min; ‘abundant’:>1/10 sec.  RPP=rhythmic and periodic patterns; GRDA=generalized rhythmic delta activity; FIRDA=frontal intermittent GRDA; LRDA=lateralized rhythmic delta activity; TIRDA=temporal intermittent rhythmic delta activity;  LPD=PLED=lateralized periodic discharges; GPD=generalized periodic discharges; BIPDs =bilateral independent periodic discharges; Mf=multifocal; SIRPDs=stimulus induced rhythmic, periodic, or ictal appearing discharges; BIRDs=brief potentially ictal rhythmic discharges >4 Hz, lasting .5-10s; PFA (paroxysmal bursts >13 Hz or =8 Hz <10s).  Modifiers: +F=with fast component; +S=with spike component; +R=with rhythmic component.  S-B=burst suppression pattern.  Max=maximal. N1-drowsy; N2-stage II sleep; N3-slow wave sleep. SSS/BETS=small sharp spikes/benign epileptiform transients of sleep. HV=hyperventilation; PS=photic stimulation]]]    Daily EEG Visual Analysis    FINDINGS:      Background:  Continuous: continuous  Symmetry: symmetric  PDR: 9 Hz activity, with amplitude to 40 uV, that attenuated to eye opening.  Low amplitude frontal beta noted in wakefulness.  Reactivity: present  Voltage: normal, mostly 20-150uV  Anterior Posterior Gradient: present  Other background findings: none  Breach: absent    Background Slowing:  Generalized slowing: none was present.  Focal slowing: Intermittent left posterior temporal delta    State Changes:   -Drowsiness noted with increased slowing, attenuation of fast activity, vertex transients.  -Present with N2 sleep transients with symmetric spindles and K-complexes.    Sporadic Epileptiform Discharges:    None    Rhythmic and Periodic Patterns (RPPs):  None     Electrographic and Electroclinical seizures:  At least 11 electrographic seizures captured. Seizures are poorly lateralized, but appear to originate from the posterior head region. Seizures characterized by initial sharply contoured delta activity from the posterior head region (O1/P7 > O2/P8) that evolves to rhythmic theta activity. For some of the seizures, particularly the ones earlier in the study, there was rapid diffuse generalization of the rhythmic theta activity. Seizure duration was 30-60 seconds. There was no clear clinical correlation. Last clear seizure at 4:46 06/14/23.     Other Clinical Events:  None    Activation Procedures:   -Hyperventilation was not performed.    -Photic stimulation was not performed.    Artifacts:  Intermittent myogenic and movement artifacts were noted.    ECG:  The heart rate on single channel ECG was predominantly between 70-80 BPM.    EEG Classification / Summary:  Abnormal  EEG in the awake / drowsy / asleep state(s).    At least 11 poorly lateralized electrographic seizures from the posterior head region captured as described, Last clear seizure at 4:46 06/14/23.   Intermittent left posterior temporal slowing    Clinical Impression:    At least 11 poorly lateralized electrographic seizures from the posterior head region captured as described, Last clear seizure at 4:46 06/14/23.   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        -------------------------------------------------------------------------------------------------------  [facility] EEG Reading Room Ph#: [phone]  Epilepsy Answering Service after 5PM and before 8:30AM: Ph#: [phone]    Shay Mancera M.D.   Epilepsy fellow
MAINI, SUSHMA [id number]     Study Date: 		06-14-23  Duration: 16 hr 6 min   --------------------------------------------------------------------------------------------------  History:  CC/ HPI Patient is a 64y old  Female who presents with a chief complaint of seizures (13 Jun 2023 17:40)    MEDICATIONS  (STANDING):  enoxaparin Injectable 40 milliGRAM(s) SubCutaneous every 24 hours  lamoTRIgine 25 milliGRAM(s) Oral two times a day  levETIRAcetam  IVPB 1500 milliGRAM(s) IV Intermittent every 12 hours  losartan 25 milliGRAM(s) Oral daily    --------------------------------------------------------------------------------------------------  Study Interpretation:    [[[Abbreviation Key:  PDR=alpha rhythm/posterior dominant rhythm. A-P=anterior posterior.  Amplitude: ‘very low’:<20; ‘low’:20-49; ‘medium’:; ‘high’:>150uV.  Persistence for periodic/rhythmic patterns (% of epoch) ‘rare’:<1%; ‘occasional’:1-10%; ‘frequent’:10-50%; ‘abundant’:50-90%; ‘continuous’:>90%.  Persistence for sporadic discharges: ‘rare’:<1/hr; ‘occasional’:1/min-1/hr; ‘frequent’:>1/min; ‘abundant’:>1/10 sec.  RPP=rhythmic and periodic patterns; GRDA=generalized rhythmic delta activity; FIRDA=frontal intermittent GRDA; LRDA=lateralized rhythmic delta activity; TIRDA=temporal intermittent rhythmic delta activity;  LPD=PLED=lateralized periodic discharges; GPD=generalized periodic discharges; BIPDs =bilateral independent periodic discharges; Mf=multifocal; SIRPDs=stimulus induced rhythmic, periodic, or ictal appearing discharges; BIRDs=brief potentially ictal rhythmic discharges >4 Hz, lasting .5-10s; PFA (paroxysmal bursts >13 Hz or =8 Hz <10s).  Modifiers: +F=with fast component; +S=with spike component; +R=with rhythmic component.  S-B=burst suppression pattern.  Max=maximal. N1-drowsy; N2-stage II sleep; N3-slow wave sleep. SSS/BETS=small sharp spikes/benign epileptiform transients of sleep. HV=hyperventilation; PS=photic stimulation]]]    Daily EEG Visual Analysis    FINDINGS:      Background:  Continuous: continuous  Symmetry: symmetric  PDR: 9 Hz activity, with amplitude to 40 uV, that attenuated to eye opening.  Low amplitude frontal beta noted in wakefulness.  Reactivity: present  Voltage: normal, mostly 20-150uV  Anterior Posterior Gradient: present  Other background findings: none  Breach: absent    Background Slowing:  Generalized slowing: none was present.  Focal slowing: Intermittent left posterior temporal delta    State Changes:   -Drowsiness noted with increased slowing, attenuation of fast activity, vertex transients.  -Present with N2 sleep transients with symmetric spindles and K-complexes.    Sporadic Epileptiform Discharges:    None    Rhythmic and Periodic Patterns (RPPs):  None     Electrographic and Electroclinical seizures:  At least 11 electrographic seizures captured. Seizures are poorly lateralized, but appear to originate from the posterior head region. Seizures characterized by initial sharply contoured delta activity from the posterior head region (O1/P7 > O2/P8) that evolves to rhythmic theta activity. For some of the seizures, particularly the ones earlier in the study, there was rapid diffuse generalization of the rhythmic theta activity. Seizure duration was 30-60 seconds. There was no clear clinical correlation. Last clear seizure at 4:46 06/14/23.     Other Clinical Events:  None    Activation Procedures:   -Hyperventilation was not performed.    -Photic stimulation was not performed.    Artifacts:  Intermittent myogenic and movement artifacts were noted.    ECG:  The heart rate on single channel ECG was predominantly between 70-80 BPM.    EEG Classification / Summary:  Abnormal  EEG in the awake / drowsy / asleep state(s).    At least 11 poorly lateralized electrographic seizures from the posterior head region captured as described, Last clear seizure at 4:46 06/14/23.   Intermittent left posterior temporal slowing    Clinical Impression:    At least 11 poorly lateralized electrographic seizures from the posterior head region captured as described, Last clear seizure at 4:46 06/14/23.   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 EEG Reading Room Ph#: [phone]  Epilepsy Answering Service after 5PM and before 8:30AM: Ph#: [phone]    Shay Mancera M.D.   Epilepsy fellow    Flavio Vasquez MD  Neurology Attending Physician  
CAD (coronary artery disease)

## 2023-06-16 DIAGNOSIS — E11.9 TYPE 2 DIABETES MELLITUS WITHOUT COMPLICATIONS: ICD-10-CM

## 2023-06-16 DIAGNOSIS — L97.513 NON-PRESSURE CHRONIC ULCER OF OTHER PART OF RIGHT FOOT WITH NECROSIS OF MUSCLE: ICD-10-CM

## 2023-06-16 DIAGNOSIS — I73.9 PERIPHERAL VASCULAR DISEASE, UNSPECIFIED: ICD-10-CM

## 2023-06-19 DIAGNOSIS — L97.513 NON-PRESSURE CHRONIC ULCER OF OTHER PART OF RIGHT FOOT WITH NECROSIS OF MUSCLE: ICD-10-CM

## 2023-06-19 DIAGNOSIS — E11.9 TYPE 2 DIABETES MELLITUS WITHOUT COMPLICATIONS: ICD-10-CM

## 2023-06-19 DIAGNOSIS — L97.319 NON-PRESSURE CHRONIC ULCER OF RIGHT ANKLE WITH UNSPECIFIED SEVERITY: ICD-10-CM

## 2023-06-21 DIAGNOSIS — L97.513 NON-PRESSURE CHRONIC ULCER OF OTHER PART OF RIGHT FOOT WITH NECROSIS OF MUSCLE: ICD-10-CM

## 2023-06-21 DIAGNOSIS — L97.319 NON-PRESSURE CHRONIC ULCER OF RIGHT ANKLE WITH UNSPECIFIED SEVERITY: ICD-10-CM

## 2023-06-21 DIAGNOSIS — E11.9 TYPE 2 DIABETES MELLITUS WITHOUT COMPLICATIONS: ICD-10-CM

## 2023-06-21 DIAGNOSIS — I73.9 PERIPHERAL VASCULAR DISEASE, UNSPECIFIED: ICD-10-CM

## 2023-07-10 DIAGNOSIS — Z89.431 ACQUIRED ABSENCE OF RIGHT FOOT: ICD-10-CM

## 2023-07-10 DIAGNOSIS — Z79.4 LONG TERM (CURRENT) USE OF INSULIN: ICD-10-CM

## 2023-07-10 DIAGNOSIS — E11.42 TYPE 2 DIABETES MELLITUS WITH DIABETIC POLYNEUROPATHY: ICD-10-CM

## 2023-07-10 DIAGNOSIS — L97.513 NON-PRESSURE CHRONIC ULCER OF OTHER PART OF RIGHT FOOT WITH NECROSIS OF MUSCLE: ICD-10-CM

## 2023-07-10 DIAGNOSIS — I73.9 PERIPHERAL VASCULAR DISEASE, UNSPECIFIED: ICD-10-CM

## 2023-09-05 ENCOUNTER — RX RENEWAL (OUTPATIENT)
Age: 53
End: 2023-09-05

## 2023-09-05 RX ORDER — SEMAGLUTIDE 0.68 MG/ML
2 INJECTION, SOLUTION SUBCUTANEOUS
Qty: 1 | Refills: 0 | Status: ACTIVE | COMMUNITY
Start: 2023-01-31 | End: 1900-01-01

## 2023-09-23 ENCOUNTER — RX RENEWAL (OUTPATIENT)
Age: 53
End: 2023-09-23

## 2023-09-23 RX ORDER — METOPROLOL SUCCINATE 25 MG/1
25 TABLET, EXTENDED RELEASE ORAL DAILY
Qty: 90 | Refills: 0 | Status: ACTIVE | COMMUNITY
Start: 2022-06-27 | End: 1900-01-01

## 2023-09-26 NOTE — ED ADULT TRIAGE NOTE - CHIEF COMPLAINT QUOTE
[facility]    Single Lumen Midline Placement    Date/Time: 9/26/2023 4:13 PM    Performed by: oLco Gordon RN  Authorized by: Anderson Obando MD  Indications: vascular access (Home IV antibiotics)      UNIVERSAL PROTOCOL   Site Marked: Yes  Prior Images Obtained and Reviewed:  Yes  Required items: Required blood products, implants, devices and special equipment available    Patient identity confirmed:  Verbally with patient, arm band and hospital-assigned identification number  NA - No sedation, light sedation, or local anesthesia  Confirmation Checklist:  Patient's identity using two indicators, relevant allergies, procedure was appropriate and matched the consent or emergent situation and correct equipment/implants were available  Time out: Immediately prior to the procedure a time out was called    Universal Protocol: the Joint Commission Universal Protocol was followed    Preparation: Patient was prepped and draped in usual sterile fashion       ANESTHESIA    Anesthesia:  Local infiltration  Local Anesthetic:  Lidocaine 1% without epinephrine  Anesthetic Total (mL):  1        Preparation: skin prepped with ChloraPrep  Skin prep agent: skin prep agent completely dried prior to procedure  Sterile barriers: maximum sterile barriers were used: cap, mask, sterile gown, sterile gloves, and large sterile sheet  Hand hygiene: hand hygiene performed prior to central venous catheter insertion  Type of line used: Midline  Catheter type: single lumen  Catheter size: 4 Fr  Brand: Bard  Lot number: [id number]  Placement method: venipuncture, MST and ultrasound  Number of attempts: 1  Difficulty threading catheter: no  Successful placement: yes  Orientation: left  Catheter to Vein (%): 9  Location: cephalic vein (4.3mm)  Tip Location: distal to axillary vein  Arm circumference: adults 10 cm  Extremity circumference: 37  Visible catheter length: 2  Internal length: 13 cm  Total catheter length:  rt foot infection 15  Dressing and securement: alcohol impregnated caps, blood cleaned with CHG, blood removed, chlorhexidine disc applied, subcutaneous anchor securement system, sterile dressing applied, site cleansed and transparent dressing  Post procedure assessment: blood return through all ports and free fluid flow  PROCEDURE   Patient Tolerance:  Patient tolerated the procedure well with no immediate complicationsDescribe Procedure:     Midline ok to use, Miroslava Bateman RN informed

## 2023-10-09 RX ORDER — BLOOD-GLUCOSE TRANSMITTER
EACH MISCELLANEOUS
Qty: 1 | Refills: 2 | Status: ACTIVE | COMMUNITY
Start: 2022-10-27 | End: 1900-01-01

## 2023-10-27 NOTE — H&P ADULT - NSICDXNOPASTSURGICALHX_GEN_ALL_CORE
Advise pt that her calcium level has slightly increased.  But her parathyroid level is normal.  Her vitamin D level is normal.  If she is taking any calcium supplements, she should stop.    Her triglycerides have improved.  Continue the fenofibrate.  Remaining lipid panel is normal.   Thyroid is normal.  No diabetes.  
<-- Click to add NO significant Past Surgical History

## 2024-01-30 NOTE — DISCHARGE NOTE PROVIDER - NSDCHC_MEDRECSTATUS_GEN_ALL_CORE
Admission Reconciliation is Completed  Discharge Reconciliation is Not Complete Detail Level: Generalized Instructions: This plan will send the code FBSD to the PM system.  DO NOT or CHANGE the price. Price (Do Not Change): 0.00 Admission Reconciliation is Not Complete  Discharge Reconciliation is Not Complete Admission Reconciliation is Completed  Discharge Reconciliation is Completed

## 2024-04-22 NOTE — PROGRESS NOTE ADULT - PROBLEM SELECTOR PLAN 1
Subjective   History of Present Illness  Patient is a 69-year-old female complaining of increased shortness of breath over the past several days with occasional cough.  She denies fever chest pain vomiting or other complaint.      Review of Systems    Past Medical History:   Diagnosis Date    Arthritis     Bladder cancer     Chronic back pain     Closed nondisplaced fracture of head of right radius 03/2017    Congenital deformity of right hip joint 1956    COPD (chronic obstructive pulmonary disease)     former smoker    Deep venous thrombosis     unprovoked    Depressive disorder     Disease of thyroid gland     Diverticulitis of colon     Essential hypertension     Gastroesophageal reflux disease     Insomnia     Obesity     Pulmonary embolism     provoked by surgery       No Known Allergies    Past Surgical History:   Procedure Laterality Date    ABDOMINAL SURGERY      BRONCHOSCOPY N/A 11/25/2020    Procedure: BRONCHOSCOPY with bronchial washing;  Surgeon: Win Johnston MD;  Location: [facility] ENDOSCOPY;  Service: Pulmonary;  Laterality: N/A;  Post: mucous plugs    BRONCHOSCOPY N/A 4/25/2022    Procedure: BRONCHOSCOPY with bronchial washing;  Surgeon: Win Johnston MD;  Location: [facility] ENDOSCOPY;  Service: Pulmonary;  Laterality: N/A;  post op: pneumonia    BRONCHOSCOPY N/A 4/20/2023    Procedure: BRONCHOSCOPY with bilateral wash;  Surgeon: Win Johnston MD;  Location: [facility] ENDOSCOPY;  Service: Pulmonary;  Laterality: N/A;  mucous plugs    CHOLECYSTECTOMY      COLON SURGERY      COLONOSCOPY      COLOSTOMY CLOSURE N/A 2/25/2021    Procedure: COLOSTOMY TAKEDOWN OR CLOSURE, extenisve lysis of adhesions;  Surgeon: Chi Farmer MD;  Location: [facility] MAIN OR;  Service: General;  Laterality: N/A;    CYSTOSCOPY N/A 2/25/2021    Procedure: CYSTOSCOPY with bladder tumor removal and fulgeration, insertion of 3-way rizzo catheter;  Surgeon: Alfonzo Hayward MD;  Location: [facility] MAIN OR;  Service:  Urology;  Laterality: N/A;    ENDOSCOPY      EXPLORATORY LAPAROTOMY N/A 2020    Procedure: LAPAROTOMY EXPLORATORY HARTMANS;  Surgeon: Chi Farmer MD;  Location: [facility] MAIN OR;  Service: General;  Laterality: N/A;    HYSTERECTOMY      TOTAL HIP ARTHROPLASTY Right     TOTAL HIP ARTHROPLASTY REVISION Right     x3       Family History   Problem Relation Age of Onset    No Known Problems Mother     No Known Problems Father        Social History     Socioeconomic History    Marital status:    Tobacco Use    Smoking status: Former     Current packs/day: 0.00     Average packs/day: 2.0 packs/day for 40.0 years (80.0 ttl pk-yrs)     Types: Cigarettes     Start date:      Quit date: 2019     Years since quittin.3    Smokeless tobacco: Never    Tobacco comments:     ELECTRONIC   Vaping Use    Vaping status: Former    Substances: Nicotine, Flavoring   Substance and Sexual Activity    Alcohol use: Never    Drug use: Never    Sexual activity: Defer           Objective   Physical Exam  Neck has no adenopathy JVD or bruits.  Lungs have mild expiratory wheezes throughout.  Heart has regular rhythm.  Chest is nontender.  Abdomen soft nontender.  Extremity exam unremarkable.  Procedures     My EKG interpretation shows normal sinus rhythm at a rate of 120 with no acute ST change.      ED Course      Results for orders placed or performed during the hospital encounter of 24   Basic Metabolic Panel    Specimen: Blood   Result Value Ref Range    Glucose 149 (H) 65 - 99 mg/dL    BUN 26 (H) 8 - 23 mg/dL    Creatinine 0.96 0.57 - 1.00 mg/dL    Sodium 135 (L) 136 - 145 mmol/L    Potassium 3.4 (L) 3.5 - 5.2 mmol/L    Chloride 96 (L) 98 - 107 mmol/L    CO2 28.0 22.0 - 29.0 mmol/L    Calcium 8.4 (L) 8.6 - 10.5 mg/dL    BUN/Creatinine Ratio 27.1 (H) 7.0 - 25.0    Anion Gap 11.0 5.0 - 15.0 mmol/L    eGFR 65.0 >60.0 mL/min/1.73   Blood Gas, Arterial -    Specimen: Arterial Blood   Result Value Ref Range     Site Right Radial     Shun's Test Positive     pH, Arterial 7.460 (H) 7.350 - 7.450 pH units    pCO2, Arterial 41.0 35.0 - 48.0 mm Hg    pO2, Arterial 92.9 83.0 - 108.0 mm Hg    HCO3, Arterial 29.1 (H) 21.0 - 28.0 mmol/L    Base Excess, Arterial 4.8 (H) 0.0 - 3.0 mmol/L    O2 Saturation, Arterial 97.6 94.0 - 98.0 %    CO2 Content 30.4 (H) 22 - 29 mmol/L    Barometric Pressure for Blood Gas      Modality Cannula     Hemodilution No    CBC Auto Differential    Specimen: Blood   Result Value Ref Range    WBC 13.76 (H) 3.40 - 10.80 10*3/mm3    RBC 4.06 3.77 - 5.28 10*6/mm3    Hemoglobin 11.5 (L) 12.0 - 15.9 g/dL    Hematocrit 37.1 34.0 - 46.6 %    MCV 91.4 79.0 - 97.0 fL    MCH 28.3 26.6 - 33.0 pg    MCHC 31.0 (L) 31.5 - 35.7 g/dL    RDW 14.9 12.3 - 15.4 %    RDW-SD 50.6 37.0 - 54.0 fl    MPV 9.5 6.0 - 12.0 fL    Platelets 194 140 - 450 10*3/mm3    Neutrophil % 89.1 (H) 42.7 - 76.0 %    Lymphocyte % 2.8 (L) 19.6 - 45.3 %    Monocyte % 6.6 5.0 - 12.0 %    Eosinophil % 0.7 0.3 - 6.2 %    Basophil % 0.2 0.0 - 1.5 %    Immature Grans % 0.6 (H) 0.0 - 0.5 %    Neutrophils, Absolute 12.26 (H) 1.70 - 7.00 10*3/mm3    Lymphocytes, Absolute 0.39 (L) 0.70 - 3.10 10*3/mm3    Monocytes, Absolute 0.91 (H) 0.10 - 0.90 10*3/mm3    Eosinophils, Absolute 0.09 0.00 - 0.40 10*3/mm3    Basophils, Absolute 0.03 0.00 - 0.20 10*3/mm3    Immature Grans, Absolute 0.08 (H) 0.00 - 0.05 10*3/mm3    nRBC 0.0 0.0 - 0.2 /100 WBC   ECG 12 Lead Dyspnea   Result Value Ref Range    QT Interval 331 ms    QTC Interval 470 ms   Gold Top - SST   Result Value Ref Range    Extra Tube Hold for add-ons.      XR Chest 1 View    Result Date: 4/22/2024  Impression: Chronic scarring in the left costophrenic angle. No acute chest findings. Electronically Signed: Daly Rich MD  4/22/2024 10:57 AM EDT  Workstation ID: [id number]                                            Medical Decision Making  Chest x-ray interpretation is chronic changes consistent with COPD.   There is no cardiomegaly fusion or infiltrate.  BMP shows no renal insufficiency or electrolyte abnormality.  Glucose 149.  Blood gas on 4 L shows pH to be 7.46 pCO2 of 40 pO2 of 90.  Patient does have borderline leukocytosis with no left shift and no anemia.  Received a breathing treatment and steroids.  Her exam on recheck shows clear lungs.  Patient is not tachypneic and heart rate is 79.  She will be discharged with a prescription for prednisone.  She will hold her antihypertensive medications as well.  She will follow with MD for recheck.    Amount and/or Complexity of Data Reviewed  Labs: ordered. Decision-making details documented in ED Course.  Radiology: ordered and independent interpretation performed.  ECG/medicine tests: ordered and independent interpretation performed.    Risk  Prescription drug management.        Final diagnoses:   Dyspnea, unspecified type   COPD exacerbation       ED Disposition  ED Disposition       ED Disposition   Discharge    Condition   Stable    Comment   --               No follow-up provider specified.       Medication List        New Prescriptions      predniSONE 20 MG tablet  Commonly known as: DELTASONE  Take 1 tablet by mouth Daily.               Where to Get Your Medications        These medications were sent to [facility] [address] [place] [address]      Hours: Monday to Friday 7 AM to 7 PM Phone: [phone]   predniSONE 20 MG tablet            Antony Comer MD  04/22/24 1406     pt with extensive h/o DM foot ulcer, recent admission for R 2nd toe ulcer/OM s/p partial ray resection with flap; recent f/u showing flap necrosis, now admitted for R foot TMA  - pt refusing examination   - c/w Unasyn q6h for now  - f/u wound Cx   - plan for R foot TMA with Dr. Lott this Friday @ 7:30AM--- > VASCULAR SURGERY CONSULT IS CALLED / pt had an arterial DOppler which was done on 5/27   - pain control as needed

## 2024-06-22 NOTE — PROGRESS NOTE ADULT - SUBJECTIVE AND OBJECTIVE BOX
Patient's Name: Jarad Hanna  : 2017  MR#: [id number]    PEDIATRIC EMERGENCY DEPARTMENT NOTE    SUBJECTIVE   CC:    Chief Complaint   Patient presents with    Vomiting     HPI: Jarad Hanna is a 7 y.o. male presenting for evaluation of vomiting.  Parents note that the vomiting started approximately 2 weeks ago when they were on vacation in [place], [place].  Patient and his 2 siblings have had similar symptoms that developed around the same time.  Parents have had no symptoms.  Father notes that the water where they were staying was not supposed to be consumed, but all of the children drink it.  Parents did not drink any the water.  Family notes that the vomiting has been intermittent, but initially started with 4+ episodes a day.  Parents had some concern because the patient had an episode of emesis this morning that appeared to have a small amount of blood.  However, they state that he had a dental surgery last week and may have had some residual bleeding.  Mother notes that they have all been very well-appearing between episodes of emesis. Emesis has been nonbloody.  He has been tolerating PO well.  Mother notes that the patient and his siblings also developed intermittent nonbloody diarrhea over the past week.  There has been no fever, no rashes, no abdominal pain,    HISTORY:   - PMHx: None  - PSx: Dental extraction 26  - Hospitalizations: None  - Medications: No daily medications  - Allergies: has No Known Allergies  - Immunization: IUTD   - FamHx: No family history pertaining to current presentation  - PCP: Kasey Mandel MD MPH    OBJECTIVE   Triage vitals:  T 36.8 °C (98.2 °F)    /70  RR (!) 16  O2 100 %      PHYSICAL EXAM  - Gen: Well appearing, playful, in NAD   - Head/Neck: NCAT, neck w/ FROM, no lymphadenopathy  - Eyes: EOMI, PERRL, anicteric sclerae, noninjected conjunctivae   - Nose: No congestion or rhinorrhea  - Mouth:  MMM, no erythema of posterior  oropharynx  - Heart: RRR, no murmurs, rubs, or gallops  - Lungs: CTA b/l, no rhonchi, rales or wheezing, no increased work of breathing  - Abdomen: soft, NT, ND, normal bowel sounds  - Musculoskeletal: no joint swelling noted   - Extremities: WWP, cap refill <2sec   - Neurologic: Alert, interacts appropriately for age, symmetrical facies, moves all extremities equally, responsive to touch  - Skin: No rashes    RESULTS  Labs Reviewed - No data to display  No orders to display     ED COURSE/MEDICAL DECISION MAKING     Diagnoses as of 06/22/24 1830   Vomiting, unspecified vomiting type, unspecified whether nausea present   --------------------  - Differential Diagnoses Considered: Viral gastroenteritis versus bacterial gastroenteritis versus parasitic infection versus food-borne illness  - Diagnostic testing considered: Stool studies (unable to obtain in ED)    ASSESSMENT/PLAN   Jarad Hanna is a 7 y.o. male presenting for evaluation of vomiting and diarrhea.  Symptoms at this time are most likely due to exposure to unsafe water.  This may have led to a bacterial or parasitic infection.  We were unable to obtain stool studies in the ED, so recommended the patient follow-up with PCP to have stool studies completed.  Residual bleeding from his dental surgery likely led to the small amount of blood in his emesis this morning. Patient is very well-appearing at this time.  Encouraged family to ensure that the patient is hydrating well throughout the course of this illness. All questions answered. Return precautions discussed and recommended follow up with PCP in the next few days or sooner if needed. Family expresses understanding and are in agreement with plan. Discharged home in good condition.    Patient staffed with attending physician Dr. Alton Samuel, DO  Resident  06/23/24 6267     Patient is a 50y old  Male who presents with a chief complaint of foot infection (03 Jun 2021 08:52)      SUBJECTIVE / OVERNIGHT EVENTS:    MEDICATIONS  (STANDING):  amLODIPine   Tablet 5 milliGRAM(s) Oral daily  amoxicillin  500 milliGRAM(s)/clavulanate 1 Tablet(s) Oral two times a day  aspirin enteric coated 81 milliGRAM(s) Oral daily  atorvastatin 80 milliGRAM(s) Oral at bedtime  clopidogrel Tablet 75 milliGRAM(s) Oral daily  collagenase Ointment 1 Application(s) Topical daily  heparin   Injectable 5000 Unit(s) SubCutaneous every 8 hours  insulin glargine Injectable (LANTUS) 35 Unit(s) SubCutaneous at bedtime  insulin lispro (ADMELOG) corrective regimen sliding scale   SubCutaneous three times a day before meals  insulin lispro (ADMELOG) corrective regimen sliding scale   SubCutaneous at bedtime  insulin lispro Injectable (ADMELOG) 4 Unit(s) SubCutaneous three times a day before meals  polyethylene glycol 3350 17 Gram(s) Oral two times a day  senna 2 Tablet(s) Oral at bedtime  sodium chloride 0.9%. 1000 milliLiter(s) (75 mL/Hr) IV Continuous <Continuous>    MEDICATIONS  (PRN):  acetaminophen   Tablet .. 650 milliGRAM(s) Oral every 6 hours PRN Mild Pain (1 - 3)  melatonin 6 milliGRAM(s) Oral at bedtime PRN Insomnia  oxyCODONE    IR 5 milliGRAM(s) Oral every 4 hours PRN Moderate Pain (4 - 6)  oxyCODONE    IR 10 milliGRAM(s) Oral every 4 hours PRN Severe Pain (7 - 10)        CAPILLARY BLOOD GLUCOSE      POCT Blood Glucose.: 161 mg/dL (04 Jun 2021 00:06)  POCT Blood Glucose.: 100 mg/dL (03 Jun 2021 17:24)  POCT Blood Glucose.: 85 mg/dL (03 Jun 2021 12:31)    I&O's Summary    03 Jun 2021 07:01  -  04 Jun 2021 07:00  --------------------------------------------------------  IN: 2050 mL / OUT: 2700 mL / NET: -650 mL        PHYSICAL EXAM:  GENERAL: NAD, well-developed  HEAD:  Atraumatic, Normocephalic  EYES: EOMI, PERRLA, conjunctiva and sclera clear  NECK: Supple, No JVD  CHEST/LUNG: Clear to auscultation bilaterally; No wheeze  HEART: Regular rate and rhythm; No murmurs, rubs, or gallops  ABDOMEN: Soft, Nontender, Nondistended; Bowel sounds present  EXTREMITIES:  2+ Peripheral Pulses, No clubbing, cyanosis, or edema  PSYCH: AAOx3  NEUROLOGY: non-focal  SKIN: No rashes or lesions    LABS:                        11.8   6.90  )-----------( 269      ( 04 Jun 2021 07:22 )             35.6     06-04    139  |  103  |  35<H>  ----------------------------<  240<H>  4.7   |  23  |  2.66<H>    Ca    8.9      04 Jun 2021 07:22  Phos  4.4     06-04  Mg     2.3     06-04          Care Discussed with Consultants/Other Providers:  ODIN   Patient is a 50y old  Male who presents with a chief complaint of foot infection (03 Jun 2021 08:52)      SUBJECTIVE / OVERNIGHT EVENTS:  Patient seen with RN in room  Stated he plans on taking his Ramipril when he goes home not the Norvasc prescribed. Explain not to- may harm his kidney.  Also wants hospital to cover  cost of medicine because "we gave him the kidney problem" Explain most likely will only need new meds x 2 weeks  Patient is anxious to leave but feels hospital should cover cost of thngs      MEDICATIONS  (STANDING):  amLODIPine   Tablet 5 milliGRAM(s) Oral daily  amoxicillin  500 milliGRAM(s)/clavulanate 1 Tablet(s) Oral two times a day  aspirin enteric coated 81 milliGRAM(s) Oral daily  atorvastatin 80 milliGRAM(s) Oral at bedtime  clopidogrel Tablet 75 milliGRAM(s) Oral daily  collagenase Ointment 1 Application(s) Topical daily  heparin   Injectable 5000 Unit(s) SubCutaneous every 8 hours  insulin glargine Injectable (LANTUS) 35 Unit(s) SubCutaneous at bedtime  insulin lispro (ADMELOG) corrective regimen sliding scale   SubCutaneous three times a day before meals  insulin lispro (ADMELOG) corrective regimen sliding scale   SubCutaneous at bedtime  insulin lispro Injectable (ADMELOG) 4 Unit(s) SubCutaneous three times a day before meals  polyethylene glycol 3350 17 Gram(s) Oral two times a day  senna 2 Tablet(s) Oral at bedtime  sodium chloride 0.9%. 1000 milliLiter(s) (75 mL/Hr) IV Continuous <Continuous>    MEDICATIONS  (PRN):  acetaminophen   Tablet .. 650 milliGRAM(s) Oral every 6 hours PRN Mild Pain (1 - 3)  melatonin 6 milliGRAM(s) Oral at bedtime PRN Insomnia  oxyCODONE    IR 5 milliGRAM(s) Oral every 4 hours PRN Moderate Pain (4 - 6)  oxyCODONE    IR 10 milliGRAM(s) Oral every 4 hours PRN Severe Pain (7 - 10)        CAPILLARY BLOOD GLUCOSE      POCT Blood Glucose.: 161 mg/dL (04 Jun 2021 00:06)  POCT Blood Glucose.: 100 mg/dL (03 Jun 2021 17:24)  POCT Blood Glucose.: 85 mg/dL (03 Jun 2021 12:31)    I&O's Summary    03 Jun 2021 07:01  -  04 Jun 2021 07:00  --------------------------------------------------------  IN: 2050 mL / OUT: 2700 mL / NET: -650 mL        PHYSICAL EXAM:  GENERAL: NAD, well-developed  HEAD:  Atraumatic, Normocephalic  EYES: EOMI, PERRLA, conjunctiva and sclera clear  NECK: Supple, No JVD  CHEST/LUNG: Clear to auscultation bilaterally; No wheeze  HEART: Regular rate and rhythm; No murmurs, rubs, or gallops  ABDOMEN: Soft, Nontender, Nondistended; Bowel sounds present  EXTREMITIES:  2+ Peripheral Pulses, No clubbing, cyanosis, or edema  R foot dressing  PSYCH: AAOx3  NEUROLOGY: non-focal  SKIN: No rashes or lesions    LABS:                        11.8   6.90  )-----------( 269      ( 04 Jun 2021 07:22 )             35.6     06-04    139  |  103  |  35<H>  ----------------------------<  240<H>  4.7   |  23  |  2.66<H>    Ca    8.9      04 Jun 2021 07:22  Phos  4.4     06-04  Mg     2.3     06-04      Care Discussed with Consultants/Other Providers:  ODIN/ RN

## 2024-08-28 NOTE — PRE-OP CHECKLIST - PATIENT SENT TO
[Alert] : alert
[Conjunctival Erythema] : conjunctival erythema
[Pale mucosa] : pale mucosa
[Boggy Nasal Turbinates] : boggy and/or pale nasal turbinates
[Pharyngeal erythema] : pharyngeal erythema
[Posterior Pharyngeal Cobblestoning] : posterior pharyngeal cobblestoning
[Clear Rhinorrhea] : clear rhinorrhea was seen
[No Neck Mass] : no neck mass was observed
[Normal Rate and Effort] : normal respiratory rhythm and effort
[Wheezing] : no wheezing was heard
[Normal Rate] : heart rate was normal 
[Normal Cervical Lymph Nodes] : cervical
[de-identified] : periorbital erythema and flaking and itching
[de-identified] : Diffuse erythema with AD lesions on AF and PF - moderate to severe
operating room
Performing Laboratory: [id number]
Expected Date Of Service: 08/28/2024
Lab Facility: [id number]
Bill For Surgical Tray: no
Billing Type: Third-Party Bill

## 2024-12-24 NOTE — ASU PREOP CHECKLIST - ADVANCE DIRECTIVE ADDRESSED/READDRESSED
Yamilex Mathis MD   ·   MD Lenka Gutierrez APRN  ·  MIKEL Hirsch        Inpatient Medical Oncology/Hematology Consult Note            Patient Name: Olena Castrejon  YOB: 1959    DATE OF ADMISSION: 2024  DATE OF CONSULTATION: 2024  CONSULTING PROVIDER: Miguel Lynn DO  REASON FOR CONSULTATION: \"pe;heparin allergy\"  PCP: Rodger Napier    Room: 0428/042-A      CHIEF COMPLAINT:  Shortness of breath and fatigue    Subjective:   The patient was seen and examined, no bleeding, she is not feeling better yet.  She has pain in her left lower extremity.      Past Medical History:   Diagnosis Date    Arthritis     right knee    Blood circulation, collateral     Chronic fatigue 2016    Cirrhosis of liver (HCC)     end stage    COPD exacerbation (HCC) 2015    pt denies having    Diabetes mellitus (HCC)     Essential hypertension 2/15/2016    Gall stones     did have stent placed    GERD (gastroesophageal reflux disease)     Hep C w/o coma, chronic (HCC)     Sucessfully treated with Harvoni-no longer has viral load    History of blood transfusion     Neuropathy     PFO (patent foramen ovale)     Pneumonia     Type 2 diabetes mellitus with stage 3 chronic kidney disease, with long-term current use of insulin (HCC)     Unspecified cerebral artery occlusion with cerebral infarction     Varices        Past Surgical History:   Procedure Laterality Date    BRAIN SURGERY      CARDIAC CATHETERIZATION      cerebral angiogrem to check cavernous malformation in head - negative    CARDIAC SURGERY  2014    heart cath     SECTION      x 2     SECTION   and     COLONOSCOPY      ECHO COMPL W DOP COLOR FLOW  2012         ENDOSCOPY, COLON, DIAGNOSTIC      ESOPHAGEAL VARICE LIGATION      banding    LIVER TRANSPLANT      OTHER SURGICAL HISTORY Right 16    Right Knee Arthroscopy with Debridement partial lateral menisectomy, chondroplasty,  done toxic-appearing.   HENT:      Head: Normocephalic.      Nose: Nose normal.      Mouth/Throat:      Mouth: Mucous membranes are moist.   Eyes:      General: No scleral icterus.     Extraocular Movements: Extraocular movements intact.   Cardiovascular:      Rate and Rhythm: Normal rate and regular rhythm.      Heart sounds: No murmur heard.  Pulmonary:      Effort: No respiratory distress.      Breath sounds: No stridor. No wheezing.   Abdominal:      General: Abdomen is flat. There is no distension.      Palpations: Abdomen is soft. There is no mass.      Tenderness: There is no abdominal tenderness.   Musculoskeletal:      Cervical back: Normal range of motion.      Right lower leg: Edema present.      Left lower leg: Edema present.   Skin:     Coloration: Skin is not jaundiced or pale.   Neurological:      General: No focal deficit present.      Mental Status: She is oriented to person, place, and time.   Psychiatric:         Behavior: Behavior normal.          ECOG PS: 1          Intake/Output Summary (Last 24 hours) at 12/24/2024 1254  Last data filed at 12/24/2024 0345  Gross per 24 hour   Intake --   Output 1000 ml   Net -1000 ml           DIAGNOSTIC DATA:   Lab Results   Component Value Date    WBC 6.1 12/24/2024    HGB 9.9 (L) 12/24/2024    HCT 31.9 (L) 12/24/2024    MCV 95.8 12/24/2024     12/24/2024     Lab Results   Component Value Date    NEUTROABS 3.93 12/24/2024     Lab Results   Component Value Date    ALT <5 12/24/2024    AST 8 12/24/2024    GGT 10 05/20/2022    ALKPHOS 103 12/24/2024    BILITOT 0.2 12/24/2024     Lab Results   Component Value Date    CREATININE 1.5 (H) 12/24/2024    BUN 20 12/24/2024     12/24/2024    K 4.4 12/24/2024     (H) 12/24/2024    CO2 22 12/24/2024       Pathology:     Radiology:        ASSESSMENT     DVT/PE  H/o HIT in 2016  Previous h/o PE in 2016  H/o liver transplant  H/o cirrhosis 2/2 Hep C  Dementia  H/o stroke  Mild normocytic anemia    The patient

## 2025-03-03 NOTE — PROGRESS NOTE ADULT - PROBLEM SELECTOR PLAN 1
h/o poorly controlled dm, pad s/p right toe amputations  OM over first metatarsal stump confirmed on MRI, CT w/ septic arthritis of underlying 1st tarsometatarsal joint;   - podiatry s/p debridement/amputation of r 1st ray - Friday 2/24, f/u deep wound cx  - s/p arthrocentesis by podiatry ngtd  - ID following - IV ancef given MSSA bacteremia, bld cx cleared since 2/17  - vasc sx consult appreciated, s/p diagnostic angio  - pain control - dilaudid 1.5 mg iv prn severe pain, oxy prn moderate pain Admission

## 2025-06-20 NOTE — PHYSICAL THERAPY INITIAL EVALUATION ADULT - GAIT TRAINING, PT EVAL
- cont home meds w HOLD parameters   GOAL: Pt will ambulate 250 feet w/independence, w/use of appropriate assistive device in 2 weeks

## 2025-07-11 NOTE — CONSULT NOTE ADULT - CONSULT REASON
Onset: 7/10 11PM    Location / description: Patient started feeling the chills and was shivering and stomach was hurting a lot and nausea. Headache and neck pain last PM. This AM feeling weak and dizzy and abdominal pain with body aches and current headache. Abdominal pain radiates to right side of back. Patient also has a cough also.   Took a   Denies CP/SOB  Precipitating Factors: see above    Pain Scale (0 - 10), 10 highest: 8/10 abdominal pain generalized    What improves/worsens symptoms: Tylenol-slight relief    Symptom specific medications: Tylenol 0700    Temperature (route and time): 98 F 0700    Recent visits (last 3-4 weeks) for same reason or recent surgery:  not assessed    Reproductive History   LMP: N/A    Contraception: N/A    Pregnant:  N/A    Breastfeeding:  N/A    PLAN:  Go to the Emergency Department    Patient/Caller agrees to follow recommendations.    _______________________________________               Reason for Disposition   SEVERE dizziness (e.g., unable to stand, requires support to walk, feels like passing out now)    Protocols used: Dizziness-A-OH    
Right foot wound and right ankle pain.
Pain Management
OM
Uncontrolled Type 2 DM w/ hyperglycemia
Foot wound

## (undated) DEVICE — Device

## (undated) DEVICE — PACKING GAUZE PLAIN 0.5"

## (undated) DEVICE — DRSG XEROFORM 1 X 8"

## (undated) DEVICE — DRSG TEGADERM 6"X8"

## (undated) DEVICE — SUCTION YANKAUER NO CONTROL VENT

## (undated) DEVICE — GLV 8 PROTEXIS (WHITE)

## (undated) DEVICE — DRAPE FEMORAL ANGIOGRAPHY W TROUGH

## (undated) DEVICE — DRSG OPSITE 13.75 X 4"

## (undated) DEVICE — SPECIMEN CONTAINER 100ML

## (undated) DEVICE — LAP PAD 18 X 18"

## (undated) DEVICE — DRAPE ISOLATION BAG 20X20"

## (undated) DEVICE — DRSG STOCKINETTE IMPERVIOUS XL

## (undated) DEVICE — MARKING PEN W RULER

## (undated) DEVICE — NDL HYPO REGULAR BEVEL 25G X 1.5" (BLUE)

## (undated) DEVICE — SAW BLADE MICROAIRE OSCILATING 25.4MM X 90MM X 1.27MM

## (undated) DEVICE — DRAPE MAYO STAND 30"

## (undated) DEVICE — SUT PLAIN GUT 4-0 18" P-12

## (undated) DEVICE — NDL PERC BASEPLT 18GX7CM

## (undated) DEVICE — DRSG ACE BANDAGE 6"

## (undated) DEVICE — STAPLER SKIN VISI-STAT 35 WIDE

## (undated) DEVICE — VENODYNE/SCD SLEEVE CALF LARGE

## (undated) DEVICE — SUCTION YANKAUER OPEN TIP NO VENT CURVE

## (undated) DEVICE — DRSG KLING 4"

## (undated) DEVICE — SOL IRR POUR NS 0.9% 500ML

## (undated) DEVICE — CONN DUAL HOSE

## (undated) DEVICE — SUT POLYSORB 3-0 30" V-20 UNDYED

## (undated) DEVICE — TUBING HIGH POWER CONTRAST INJ

## (undated) DEVICE — BLADE SCALPEL SAFETYLOCK #11

## (undated) DEVICE — SUT MONOSOF 3-0 18" C-14

## (undated) DEVICE — DRAPE TOWEL BLUE 17" X 24"

## (undated) DEVICE — DRSG STERISTRIPS 0.5 X 4"

## (undated) DEVICE — NDL ENTRY PERC MCKNIGHT 18G

## (undated) DEVICE — PACK GENERAL MINOR

## (undated) DEVICE — DRAPE LIGHT HANDLE COVER (BLUE)

## (undated) DEVICE — GLV 6.5 PROTEXIS (WHITE)

## (undated) DEVICE — BLADE SCALPEL SAFETYLOCK #15

## (undated) DEVICE — DRAPE EQUIPMENT BANDED BAG 30 X 30" (SHOWER CAP)

## (undated) DEVICE — SYR LUER LOK 20CC

## (undated) DEVICE — STRYKER INTERPULSE HANDPIECE W IRR SUCTION TUBE

## (undated) DEVICE — DRSG STOCKINETTE IMPERVIOUS MED

## (undated) DEVICE — DRAPE INSTRUMENT POUCH 6.75" X 11"

## (undated) DEVICE — DRAPE MAGNETIC INSTRUMENT MEDIUM

## (undated) DEVICE — SUT PROLENE 7-0 24" BV175-6

## (undated) DEVICE — FOLEY TRAY 16FR 5CC LTX UMETER CLOSED

## (undated) DEVICE — GOWN XL

## (undated) DEVICE — FRA-BOVIE ESU VAPR 1220265: Type: DURABLE MEDICAL EQUIPMENT

## (undated) DEVICE — SAW BLADE MICROAIRE SAGITTAL 9.4MMX25.4MMX0.6MM

## (undated) DEVICE — PACKING GAUZE IODOFORM 2"

## (undated) DEVICE — DRAPE 1/2 SHEET 40X57"

## (undated) DEVICE — TORQUE DEVICE FOR GUIDEWIRE 0.0100.038"

## (undated) DEVICE — TAPE GLO-N-TELL RADIOPAQUE 20 STRIPS

## (undated) DEVICE — SUT POLYSORB 2-0 30" GS-21 UNDYED

## (undated) DEVICE — NDL COUNTER FOAM AND MAGNET 40-70

## (undated) DEVICE — DRAPE CAMERA VIDEO 7"X96"

## (undated) DEVICE — POSITIONER FOAM EGG CRATE ULNAR 2PCS (PINK)

## (undated) DEVICE — WARMING BLANKET UPPER ADULT

## (undated) DEVICE — GLV 7.5 PROTEXIS (WHITE)

## (undated) DEVICE — BLADE SCALPEL SAFETYLOCK #10

## (undated) DEVICE — SUT BIOSYN 4-0 18" P-12

## (undated) DEVICE — PACK BASIN SPECIAL PROCEDURE

## (undated) DEVICE — SAW BLADE MICROAIRE SAGITTAL 5.8X25.4X0.6 MM

## (undated) DEVICE — PACKING GAUZE PLAIN 2"

## (undated) DEVICE — SYR LUER LOK 10CC

## (undated) DEVICE — PREP BETADINE KIT

## (undated) DEVICE — DRAPE IOBAN 23" X 23"

## (undated) DEVICE — BUR STRYKER OVAL SOLID CARBIDE MED 4MM

## (undated) DEVICE — MEDICATION LABELS W MARKER

## (undated) DEVICE — INFLATION DEVICE BASIXCOMPAK

## (undated) DEVICE — GOWN TRIMAX LG

## (undated) DEVICE — DRAPE 3/4 SHEET W REINFORCEMENT 56X77"

## (undated) DEVICE — VISITEC 4X4

## (undated) DEVICE — PREP CHLORAPREP HI-LITE ORANGE 26ML

## (undated) DEVICE — SOL IRR POUR H2O 250ML

## (undated) DEVICE — DRSG COMBINE 5X9"

## (undated) DEVICE — PACK EXTREMITY